# Patient Record
Sex: FEMALE | Race: WHITE | NOT HISPANIC OR LATINO | Employment: OTHER | URBAN - NONMETROPOLITAN AREA
[De-identification: names, ages, dates, MRNs, and addresses within clinical notes are randomized per-mention and may not be internally consistent; named-entity substitution may affect disease eponyms.]

---

## 2017-01-03 ENCOUNTER — HOSPITAL ENCOUNTER (OUTPATIENT)
Dept: INFUSION THERAPY | Facility: HOSPITAL | Age: 77
Discharge: HOME | End: 2017-01-20
Attending: INTERNAL MEDICINE

## 2017-01-03 LAB
ALBUMIN SERPL-MCNC: 3.5 G/DL (ref 3.5–5)
ANION GAP SERPL CALC-SCNC: 15 MMOL/L (ref 10–20)
BUN SERPL-MCNC: 37 MG/DL (ref 7–20)
CALCIUM SERPL-MCNC: 8.9 MG/DL (ref 8.4–10.2)
CHLORIDE SERPL-SCNC: 103 MMOL/L (ref 98–107)
CONV CO2: 28 MMOL/L (ref 26–30)
CREAT UR-MCNC: 2.7 MG/DL (ref 0.6–1.3)
ERYTHROCYTE [DISTWIDTH] IN BLOOD BY AUTOMATED COUNT: 13.9 % (ref 11.5–14.5)
GFR SERPL CREATININE-BSD FRML MDRD: 17 ML/MIN
GLUCOSE SERPL-MCNC: 103 MG/DL (ref 74–98)
HCT VFR BLD AUTO: 31 % (ref 37–47)
HGB BLD-MCNC: 10 G/DL (ref 12–16)
MCH RBC QN AUTO: 32.5 UUG (ref 27–31)
MCHC RBC AUTO-ENTMCNC: 32.1 G/DL (ref 30–37)
MCV RBC AUTO: 101.3 FL (ref 81–99)
PHOSPHATE SERPL-MCNC: 3.1 MG/DL (ref 2.5–4.5)
PLATELET # BLD AUTO: 220 THOUS (ref 130–400)
POTASSIUM SERPL-SCNC: 4.3 MMOL/L (ref 3.5–5.1)
RBC # BLD AUTO: 3.08 M/UL (ref 4.2–5.4)
SODIUM SERPL-SCNC: 141 MMOL/L (ref 137–145)
WBC # BLD AUTO: 4.1 THOUS (ref 4.8–10.8)

## 2017-01-06 VITALS
DIASTOLIC BLOOD PRESSURE: 70 MMHG | HEIGHT: 59 IN | BODY MASS INDEX: 24.8 KG/M2 | WEIGHT: 123 LBS | SYSTOLIC BLOOD PRESSURE: 140 MMHG

## 2017-01-07 ENCOUNTER — TRANSCRIBE ORDERS (OUTPATIENT)
Dept: MAMMOGRAPHY | Facility: HOSPITAL | Age: 77
End: 2017-01-07

## 2017-01-07 ENCOUNTER — TRANSCRIBE ORDERS (OUTPATIENT)
Dept: ADMINISTRATIVE | Facility: HOSPITAL | Age: 77
End: 2017-01-07

## 2017-01-07 DIAGNOSIS — Z13.9 SCREENING: Primary | ICD-10-CM

## 2017-01-11 ENCOUNTER — OFFICE VISIT (OUTPATIENT)
Dept: OBSTETRICS AND GYNECOLOGY | Facility: CLINIC | Age: 77
End: 2017-01-11

## 2017-01-11 VITALS — WEIGHT: 119.9 LBS | SYSTOLIC BLOOD PRESSURE: 148 MMHG | BODY MASS INDEX: 24.22 KG/M2 | DIASTOLIC BLOOD PRESSURE: 82 MMHG

## 2017-01-11 DIAGNOSIS — N81.11 MIDLINE CYSTOCELE: Primary | ICD-10-CM

## 2017-01-11 PROCEDURE — 99212 OFFICE O/P EST SF 10 MIN: CPT | Performed by: PHYSICIAN ASSISTANT

## 2017-01-11 NOTE — MR AVS SNAPSHOT
Carolyne Liang Eliezer   1/11/2017 1:30 PM   Office Visit    Dept Phone:  [phone]   Encounter #:  [id number]    Provider:  Yaz Woods PA-C   Department:  [facility] OBSTETRICS AND GYNECOLOGY                Your Full Care Plan              Today's Medication Changes          These changes are accurate as of: 1/11/17  2:15 PM.  If you have any questions, ask your nurse or doctor.               Stop taking medication(s)listed here:     COREG CR 40 MG 24 hr capsule   Generic drug:  carvedilol CR   Stopped by:  Yaz Woods PA-C                      Your Updated Medication List          This list is accurate as of: 1/11/17  2:15 PM.  Always use your most recent med list.                allopurinol 100 MG tablet   Commonly known as:  ZYLOPRIM       aspirin 81 MG tablet       calcitriol 0.25 MCG capsule   Commonly known as:  ROCALTROL       carvedilol 12.5 MG tablet   Commonly known as:  COREG       cholecalciferol 1000 UNITS tablet   Commonly known as:  VITAMIN D3       CLARITIN 10 MG capsule   Generic drug:  Loratadine       CRESTOR 10 MG tablet   Generic drug:  rosuvastatin       GENTEAL MILD 0.2 % solution   Generic drug:  Hypromellose       * irbesartan 300 MG tablet   Commonly known as:  AVAPRO       * irbesartan 150 MG tablet   Commonly known as:  AVAPRO       omeprazole 40 MG capsule   Commonly known as:  priLOSEC       PLAQUENIL 200 MG tablet   Generic drug:  hydroxychloroquine       predniSONE 2.5 MG tablet   Commonly known as:  DELTASONE       PROCRIT 09299 UNIT/ML injection   Generic drug:  epoetin ovidio       torsemide 20 MG tablet   Commonly known as:  DEMADEX       VITAMIN B-12 PO       * Notice:  This list has 2 medication(s) that are the same as other medications prescribed for you. Read the directions carefully, and ask your doctor or other care provider to review them with you.            Instructions     None    Patient Instructions History         Upcoming Appointments     Visit Type Date Time Department    GYN FOLLOW UP 2017  1:30 PM [facility] FRANCESCOYONIISAAC KATIE    MAMMO IDALMIS SCREEN GET BILATERAL 2017  1:00 PM  [facility] MAMMO    INFUSION 1 HR 2017 11:00 AM  [facility] OP INFU NON ONC    GYN FOLLOW UP 2017  2:00 PM KIRSTIN MILESISAAC KATIE Osbornt Signup     [facility] [employer] allows you to send messages to your doctor, view your test results, renew your prescriptions, schedule appointments, and more. To sign up, go to [employer] and click on the Sign Up Now link in the New User? box. Enter your [employer] Activation Code exactly as it appears below along with the last four digits of your Social Security Number and your Date of Birth () to complete the sign-up process. If you do not sign up before the expiration date, you must request a new code.    [employer] Activation Code: [email or web address]  Expires: 2017  2:15 PM    If you have questions, you can email [email or web address] or call [phone] to talk to our [employer] staff. Remember, [employer] is NOT to be used for urgent needs. For medical emergencies, dial 911.               Other Info from Your Visit           Your Appointments     2017  1:00 PM EST   Mammo idalmis screen get bilateral with IDALMIS MAMM 1   [facility] MAMMO ([place])    [address] [address] [phone]           Bring any outside films/reports to your appointment. Do not apply any deodorant or powder prior to your appointment on the day of your exam.            2017 11:00 AM EST   INFUSION with RM 4 - CHAIR 1  [facility] OP INF   [facility] OUTPATIENT INFUSION ([place])    [address] [address] [phone]            2017  2:00 PM EDT   GYN FOLLOW UP with Yaz Woods PA-C   [facility] OBSTETRICS AND GYNECOLOGY (--)    [address]  [facility] [address] [phone]               Allergies     Ferrlecit [Na Ferric Gluc Cplx In Sucrose]      Lisinopril      Ranitidine Hcl        Reason for Visit     Pessary Check           Vital Signs     Blood Pressure Weight Last Menstrual Period Body Mass Index Smoking Status       148/82 119 lb 14.4 oz (54.4 kg) (LMP Unknown) 24.22 kg/m2 Never Smoker

## 2017-01-11 NOTE — PROGRESS NOTES
Subjective   Chief Complaint   Patient presents with   • Pessary Check     Visit Vitals   • /82   • Wt 119 lb 14.4 oz (54.4 kg)   • LMP  (LMP Unknown)   • BMI 24.22 kg/m2      Carolyne Martins is a 76 y.o. year old  presenting to be seen for pessary maintenance.  Patient using ring with support pessary and reports doing very well with pessary. Voiding well  Having no spotting or bleeding  No dysuria  No pain or discomfort    Past Medical History   Diagnosis Date   • Anemia    • Arthritis    • Backache    • CAD (coronary artery disease)    • Fracture, radius 2016     right distal radius and ulna, healed.   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   • Subacromial bursitis      right    • Valvular heart disease    • Vitamin B12 deficiency         Current Outpatient Prescriptions:   •  allopurinol (ZYLOPRIM) 100 MG tablet, Take 1 tablet by mouth daily., Disp: , Rfl:   •  aspirin 81 MG tablet, Take 1 tablet by mouth daily., Disp: , Rfl:   •  calcitriol (ROCALTROL) 0.25 MCG capsule, Take  by mouth., Disp: , Rfl:   •  carvedilol (COREG) 12.5 MG tablet, 12.5 mg., Disp: , Rfl:   •  cholecalciferol (VITAMIN D3) 1000 UNITS tablet, Take 1 tablet by mouth daily., Disp: , Rfl:   •  Cyanocobalamin (VITAMIN B-12 PO), Take  by mouth., Disp: , Rfl:   •  epoetin ovidio (PROCRIT) 62518 UNIT/ML injection, Procrit SOLN; Patient Sig: Procrit SOLN ; 0; 2014; Active, Disp: , Rfl:   •  hydroxychloroquine (PLAQUENIL) 200 MG tablet, Take 1 tablet by mouth daily., Disp: ,  Rfl:   •  Hypromellose (GENTEAL MILD) 0.2 % solution, Apply  to eye., Disp: , Rfl:   •  irbesartan (AVAPRO) 150 MG tablet, , Disp: , Rfl:   •  irbesartan (AVAPRO) 300 MG tablet, Take 1 tablet by mouth daily., Disp: , Rfl:   •  Loratadine (CLARITIN) 10 MG capsule, Take 1 capsule by mouth as needed., Disp: , Rfl:   •  omeprazole (PriLOSEC) 40 MG capsule, Take 1 capsule by mouth daily., Disp: , Rfl:   •  predniSONE (DELTASONE) 2.5 MG tablet, Take 1 tablet by mouth daily., Disp: , Rfl:   •  rosuvastatin (CRESTOR) 10 MG tablet, Take 1 tablet by mouth daily., Disp: , Rfl:   •  torsemide (DEMADEX) 20 MG tablet, Take 1 tablet by mouth 2 (two) times a week., Disp: , Rfl:    Allergies   Allergen Reactions   • Ferrlecit [Na Ferric Gluc Cplx In Sucrose]    • Lisinopril    • Ranitidine Hcl       Past Surgical History   Procedure Laterality Date   • Cataract extraction     • Eye surgery        Social History     Social History   • Marital status:      Spouse name: N/A   • Number of children: N/A   • Years of education: N/A     Occupational History   • Not on file.     Social History Main Topics   • Smoking status: Never Smoker   • Smokeless tobacco: Never Used   • Alcohol use No   • Drug use: No   • Sexual activity: Defer     Other Topics Concern   • Not on file     Social History Narrative      Family History   Problem Relation Age of Onset   • Liver cancer Maternal Grandmother    • Gout Other    • Osteoporosis Other    • Stroke Other    • Ulcers Other    • Asthma Other    • Hypertension Other    • Heart disease Other    • Osteoarthritis Other        The following portions of the patient's history were reviewed and updated as appropriate:problem list, current medications, allergies, past family history, past medical history, past social history and past surgical history.         Objective     Physical Exam   Constitutional: She appears well-developed and well-nourished.   Abdominal: Soft. Normal appearance. She exhibits  no distension. There is no tenderness. There is no rigidity and no guarding.   Genitourinary: There is no rash, tenderness, lesion or injury on the right labia. There is no rash, tenderness, lesion or injury on the left labia. Cervix exhibits no discharge. Right adnexum displays no mass and no tenderness. Left adnexum displays no mass and no tenderness. No erythema, tenderness or bleeding in the vagina. No signs of injury around the vagina. No vaginal discharge found.   Genitourinary Comments: Pessary removed cleaned and reinserted  No erosion or lacerations   Skin: Skin is warm, dry and intact.   Psychiatric: She has a normal mood and affect. Her speech is normal and behavior is normal.     Carolyne was seen today for pessary check.    Diagnoses and all orders for this visit:    Midline cystocele           This note was electronically signed.    Yaz Woods PA-C   January 11, 2017

## 2017-01-17 PROBLEM — N18.4 ANEMIA DUE TO STAGE 4 CHRONIC KIDNEY DISEASE TREATED WITH ERYTHROPOIETIN: Status: ACTIVE | Noted: 2017-01-17

## 2017-01-17 PROBLEM — D63.1 ANEMIA DUE TO STAGE 4 CHRONIC KIDNEY DISEASE TREATED WITH ERYTHROPOIETIN: Status: ACTIVE | Noted: 2017-01-17

## 2017-01-25 ENCOUNTER — HOSPITAL ENCOUNTER (OUTPATIENT)
Dept: MAMMOGRAPHY | Facility: HOSPITAL | Age: 77
Discharge: HOME | End: 2017-01-25
Attending: INTERNAL MEDICINE | Admitting: INTERNAL MEDICINE

## 2017-01-25 DIAGNOSIS — Z13.9 SCREENING: ICD-10-CM

## 2017-01-25 PROCEDURE — 77063 BREAST TOMOSYNTHESIS BI: CPT

## 2017-01-25 PROCEDURE — G0202 SCR MAMMO BI INCL CAD: HCPCS

## 2017-02-06 ENCOUNTER — APPOINTMENT (OUTPATIENT)
Dept: INFUSION THERAPY | Facility: HOSPITAL | Age: 77
End: 2017-02-06

## 2017-02-06 ENCOUNTER — HOSPITAL ENCOUNTER (OUTPATIENT)
Dept: INFUSION THERAPY | Facility: HOSPITAL | Age: 77
Setting detail: INFUSION SERIES
Discharge: HOME | End: 2017-02-06

## 2017-02-06 VITALS
DIASTOLIC BLOOD PRESSURE: 75 MMHG | HEIGHT: 59 IN | HEART RATE: 64 BPM | TEMPERATURE: 96.1 F | RESPIRATION RATE: 20 BRPM | BODY MASS INDEX: 24.19 KG/M2 | SYSTOLIC BLOOD PRESSURE: 145 MMHG | OXYGEN SATURATION: 93 % | WEIGHT: 120 LBS

## 2017-02-06 DIAGNOSIS — D63.1 ANEMIA DUE TO STAGE 4 CHRONIC KIDNEY DISEASE TREATED WITH ERYTHROPOIETIN (HCC): ICD-10-CM

## 2017-02-06 DIAGNOSIS — N18.4 ANEMIA DUE TO STAGE 4 CHRONIC KIDNEY DISEASE TREATED WITH ERYTHROPOIETIN (HCC): ICD-10-CM

## 2017-02-06 LAB
ALBUMIN SERPL-MCNC: 3.7 G/DL (ref 3.5–5)
ANION GAP SERPL CALCULATED.3IONS-SCNC: 12.7 MMOL/L
BUN BLD-MCNC: 40 MG/DL (ref 7–20)
BUN/CREAT SERPL: 16 (ref 7.1–23.5)
CALCIUM SPEC-SCNC: 9.8 MG/DL (ref 8.4–10.2)
CHLORIDE SERPL-SCNC: 103 MMOL/L (ref 98–107)
CHOLEST SERPL-MCNC: 138 MG/DL (ref 0–199)
CO2 SERPL-SCNC: 28 MMOL/L (ref 26–30)
CREAT BLD-MCNC: 2.5 MG/DL (ref 0.6–1.3)
DEPRECATED RDW RBC AUTO: 50.3 FL (ref 37–54)
ERYTHROCYTE [DISTWIDTH] IN BLOOD BY AUTOMATED COUNT: 13.9 % (ref 11.5–14.5)
GFR SERPL CREATININE-BSD FRML MDRD: 19 ML/MIN/1.73
GLUCOSE BLD-MCNC: 91 MG/DL (ref 74–98)
HCT VFR BLD AUTO: 31.1 % (ref 37–47)
HDLC SERPL-MCNC: 54 MG/DL (ref 40–60)
HGB BLD-MCNC: 10.2 G/DL (ref 12–16)
LDLC SERPL CALC-MCNC: 64 MG/DL (ref 0–99)
LDLC/HDLC SERPL: 1.19 {RATIO}
MCH RBC QN AUTO: 32.7 PG (ref 27–31)
MCHC RBC AUTO-ENTMCNC: 32.8 G/DL (ref 30–37)
MCV RBC AUTO: 99.7 FL (ref 81–99)
PHOSPHATE SERPL-MCNC: 3.5 MG/DL (ref 2.5–4.5)
PLATELET # BLD AUTO: 195 10*3/MM3 (ref 130–400)
PMV BLD AUTO: 10 FL (ref 6–12)
POTASSIUM BLD-SCNC: 4.7 MMOL/L (ref 3.5–5.1)
RBC # BLD AUTO: 3.12 10*6/MM3 (ref 4.2–5.4)
SODIUM BLD-SCNC: 139 MMOL/L (ref 137–145)
TRIGL SERPL-MCNC: 98 MG/DL
VLDLC SERPL-MCNC: 19.6 MG/DL
WBC NRBC COR # BLD: 5.66 10*3/MM3 (ref 4.8–10.8)

## 2017-02-06 PROCEDURE — 80061 LIPID PANEL: CPT | Performed by: INTERNAL MEDICINE

## 2017-02-06 PROCEDURE — 80069 RENAL FUNCTION PANEL: CPT | Performed by: INTERNAL MEDICINE

## 2017-02-06 PROCEDURE — 36415 COLL VENOUS BLD VENIPUNCTURE: CPT

## 2017-02-06 PROCEDURE — 85027 COMPLETE CBC AUTOMATED: CPT | Performed by: INTERNAL MEDICINE

## 2017-03-06 ENCOUNTER — HOSPITAL ENCOUNTER (OUTPATIENT)
Dept: INFUSION THERAPY | Facility: HOSPITAL | Age: 77
Setting detail: INFUSION SERIES
Discharge: HOME | End: 2017-03-06

## 2017-03-06 ENCOUNTER — APPOINTMENT (OUTPATIENT)
Dept: ONCOLOGY | Facility: HOSPITAL | Age: 77
End: 2017-03-06

## 2017-03-06 VITALS
DIASTOLIC BLOOD PRESSURE: 78 MMHG | HEART RATE: 77 BPM | SYSTOLIC BLOOD PRESSURE: 149 MMHG | RESPIRATION RATE: 18 BRPM | TEMPERATURE: 96.3 F | OXYGEN SATURATION: 99 %

## 2017-03-06 DIAGNOSIS — N18.4 ANEMIA DUE TO STAGE 4 CHRONIC KIDNEY DISEASE TREATED WITH ERYTHROPOIETIN (HCC): ICD-10-CM

## 2017-03-06 DIAGNOSIS — D63.1 ANEMIA DUE TO STAGE 4 CHRONIC KIDNEY DISEASE TREATED WITH ERYTHROPOIETIN (HCC): ICD-10-CM

## 2017-03-06 LAB
DEPRECATED RDW RBC AUTO: 52.4 FL (ref 37–54)
ERYTHROCYTE [DISTWIDTH] IN BLOOD BY AUTOMATED COUNT: 14 % (ref 11.5–14.5)
HCT VFR BLD AUTO: 28.9 % (ref 37–47)
HGB BLD-MCNC: 9.2 G/DL (ref 12–16)
MCH RBC QN AUTO: 32.6 PG (ref 27–31)
MCHC RBC AUTO-ENTMCNC: 31.8 G/DL (ref 30–37)
MCV RBC AUTO: 102.5 FL (ref 81–99)
PLATELET # BLD AUTO: 173 10*3/MM3 (ref 130–400)
PMV BLD AUTO: 9.9 FL (ref 6–12)
RBC # BLD AUTO: 2.82 10*6/MM3 (ref 4.2–5.4)
WBC NRBC COR # BLD: 4.07 10*3/MM3 (ref 4.8–10.8)

## 2017-03-06 RX ADMIN — ERYTHROPOIETIN 40000 UNITS: 40000 INJECTION, SOLUTION INTRAVENOUS; SUBCUTANEOUS at 12:09

## 2017-03-06 NOTE — PATIENT INSTRUCTIONS
.Post Procrit Injection:      Common side effects can include rash, soreness of mouth, nausea, vomiting, headache, joint pain, muscle pain, bone pain, redness at the injection site.    You will need to call your doctor before your next injection.    Severe reactions:  Call 911 or come to the Emergency Room.    Severe reactions can include:   1.  Shortness of breath   2.  Wheezing   3.  Swelling of face or hands   4.  Rash all over body   5.  Changes in blood pressure resulting in dizziness or fainting

## 2017-04-11 ENCOUNTER — OFFICE VISIT (OUTPATIENT)
Dept: OBSTETRICS AND GYNECOLOGY | Facility: CLINIC | Age: 77
End: 2017-04-11

## 2017-04-11 VITALS
BODY MASS INDEX: 23.99 KG/M2 | HEIGHT: 59 IN | SYSTOLIC BLOOD PRESSURE: 130 MMHG | WEIGHT: 119 LBS | DIASTOLIC BLOOD PRESSURE: 76 MMHG

## 2017-04-11 DIAGNOSIS — Z46.89 PESSARY MAINTENANCE: Primary | ICD-10-CM

## 2017-04-11 PROCEDURE — 99212 OFFICE O/P EST SF 10 MIN: CPT | Performed by: PHYSICIAN ASSISTANT

## 2017-04-11 RX ORDER — FLUTICASONE PROPIONATE 50 MCG
2 SPRAY, SUSPENSION (ML) NASAL DAILY PRN
COMMUNITY
Start: 2017-02-20

## 2017-04-11 RX ORDER — ALBUTEROL SULFATE 90 UG/1
2 AEROSOL, METERED RESPIRATORY (INHALATION) EVERY 4 HOURS PRN
COMMUNITY
Start: 2017-04-03 | End: 2018-01-05

## 2017-04-11 NOTE — PROGRESS NOTES
"Subjective   Chief Complaint   Patient presents with   • Pessary Check     /76  Ht 59\" (149.9 cm)  Wt 119 lb (54 kg)  LMP  (LMP Unknown)  BMI 24.04 kg/m2   Carolyne Martins is a 76 y.o. year old  presenting to be seen for pessary maintenance  She has no complaints or concerns today  She is voiding well  Having no vaginal spotting or bleeding    Past Medical History:   Diagnosis Date   • Anemia    • Arthritis    • Backache    • CAD (coronary artery disease)    • Fracture, radius 2016    right distal radius and ulna, healed.   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   • Subacromial bursitis     right    • Valvular heart disease    • Vitamin B12 deficiency         Current Outpatient Prescriptions:   •  allopurinol (ZYLOPRIM) 100 MG tablet, Take 1 tablet by mouth daily., Disp: , Rfl:   •  aspirin 81 MG tablet, Take 1 tablet by mouth daily., Disp: , Rfl:   •  calcitriol (ROCALTROL) 0.25 MCG capsule, Take  by mouth., Disp: , Rfl:   •  carvedilol (COREG) 12.5 MG tablet, 12.5 mg., Disp: , Rfl:   •  cholecalciferol (VITAMIN D3) 1000 UNITS tablet, Take 1 tablet by mouth daily., Disp: , Rfl:   •  Cyanocobalamin (VITAMIN B-12 PO), Take  by mouth., Disp: , Rfl:   •  epoetin ovidio (PROCRIT) 73749 UNIT/ML injection, Procrit SOLN; Patient Sig: Procrit SOLN ; 0; -2014; Active, Disp: , Rfl:   •  fluticasone (FLONASE) 50 MCG/ACT nasal spray, , Disp: , Rfl:   •  hydroxychloroquine (PLAQUENIL) 200 MG tablet, Take 1 tablet by mouth daily., Disp: , Rfl:   •  " Hypromellose (GENTEAL MILD) 0.2 % solution, Apply  to eye., Disp: , Rfl:   •  irbesartan (AVAPRO) 150 MG tablet, , Disp: , Rfl:   •  Loratadine (CLARITIN) 10 MG capsule, Take 1 capsule by mouth as needed., Disp: , Rfl:   •  omeprazole (PriLOSEC) 40 MG capsule, Take 1 capsule by mouth daily., Disp: , Rfl:   •  predniSONE (DELTASONE) 2.5 MG tablet, Take 1 tablet by mouth daily., Disp: , Rfl:   •  rosuvastatin (CRESTOR) 10 MG tablet, Take 1 tablet by mouth daily., Disp: , Rfl:   •  torsemide (DEMADEX) 20 MG tablet, Take 1 tablet by mouth 2 (two) times a week., Disp: , Rfl:   •  VENTOLIN  (90 BASE) MCG/ACT inhaler, , Disp: , Rfl:   •  irbesartan (AVAPRO) 300 MG tablet, Take 1 tablet by mouth daily., Disp: , Rfl:    Allergies   Allergen Reactions   • Ferrlecit [Na Ferric Gluc Cplx In Sucrose]    • Lisinopril    • Ranitidine Hcl       Past Surgical History:   Procedure Laterality Date   • CATARACT EXTRACTION     • EYE SURGERY        Social History     Social History   • Marital status:      Spouse name: N/A   • Number of children: N/A   • Years of education: N/A     Occupational History   • Not on file.     Social History Main Topics   • Smoking status: Never Smoker   • Smokeless tobacco: Never Used   • Alcohol use No   • Drug use: No   • Sexual activity: Defer     Other Topics Concern   • Not on file     Social History Narrative      Family History   Problem Relation Age of Onset   • Liver cancer Maternal Grandmother    • Gout Other    • Osteoporosis Other    • Stroke Other    • Ulcers Other    • Asthma Other    • Hypertension Other    • Heart disease Other    • Osteoarthritis Other        The following portions of the patient's history were reviewed and updated as appropriate:problem list, current medications, allergies, past family history, past medical history, past social history and past surgical history.         Objective     Physical Exam   Constitutional: She appears well-developed and  well-nourished. She is cooperative.   Genitourinary: There is no tenderness or lesion on the right labia. There is no tenderness or lesion on the left labia. Cervix exhibits no discharge. Right adnexum displays no mass and no tenderness. Left adnexum displays no mass and no tenderness. No tenderness or bleeding in the vagina. No signs of injury around the vagina. No vaginal discharge found.   Genitourinary Comments: Pessary removed cleaned and reinserted  No abrasions or erosion--small amt granulation tissue right  fornix   Neurological: She is alert.   Skin: Skin is warm and dry.   Psychiatric: She has a normal mood and affect. Her behavior is normal.     Carolyne was seen today for pessary check.    Diagnoses and all orders for this visit:    Pessary maintenance             This note was electronically signed.    Yaz Woods PA-C   April 11, 2017

## 2017-05-01 ENCOUNTER — HOSPITAL ENCOUNTER (OUTPATIENT)
Dept: INFUSION THERAPY | Facility: HOSPITAL | Age: 77
Discharge: HOME | End: 2017-05-01
Admitting: INTERNAL MEDICINE

## 2017-05-01 VITALS
OXYGEN SATURATION: 100 % | SYSTOLIC BLOOD PRESSURE: 136 MMHG | RESPIRATION RATE: 20 BRPM | HEART RATE: 72 BPM | DIASTOLIC BLOOD PRESSURE: 76 MMHG | TEMPERATURE: 96 F

## 2017-05-01 DIAGNOSIS — D63.1 ANEMIA DUE TO STAGE 4 CHRONIC KIDNEY DISEASE TREATED WITH ERYTHROPOIETIN (HCC): ICD-10-CM

## 2017-05-01 DIAGNOSIS — N18.4 ANEMIA DUE TO STAGE 4 CHRONIC KIDNEY DISEASE TREATED WITH ERYTHROPOIETIN (HCC): ICD-10-CM

## 2017-05-01 LAB
ALBUMIN SERPL-MCNC: 3.8 G/DL (ref 3.5–5)
ANION GAP SERPL CALCULATED.3IONS-SCNC: 15.1 MMOL/L
BUN BLD-MCNC: 48 MG/DL (ref 7–20)
BUN/CREAT SERPL: 18.5 (ref 7.1–23.5)
CALCIUM SPEC-SCNC: 9.6 MG/DL (ref 8.4–10.2)
CHLORIDE SERPL-SCNC: 107 MMOL/L (ref 98–107)
CO2 SERPL-SCNC: 24 MMOL/L (ref 26–30)
CREAT BLD-MCNC: 2.6 MG/DL (ref 0.6–1.3)
DEPRECATED RDW RBC AUTO: 51.8 FL (ref 37–54)
ERYTHROCYTE [DISTWIDTH] IN BLOOD BY AUTOMATED COUNT: 13.8 % (ref 11.5–14.5)
GFR SERPL CREATININE-BSD FRML MDRD: 18 ML/MIN/1.73
GLUCOSE BLD-MCNC: 92 MG/DL (ref 74–98)
HCT VFR BLD AUTO: 31.6 % (ref 37–47)
HGB BLD-MCNC: 10 G/DL (ref 12–16)
MCH RBC QN AUTO: 32.3 PG (ref 27–31)
MCHC RBC AUTO-ENTMCNC: 31.6 G/DL (ref 30–37)
MCV RBC AUTO: 101.9 FL (ref 81–99)
PHOSPHATE SERPL-MCNC: 3.6 MG/DL (ref 2.5–4.5)
PLATELET # BLD AUTO: 209 10*3/MM3 (ref 130–400)
PMV BLD AUTO: 9.8 FL (ref 6–12)
POTASSIUM BLD-SCNC: 5.1 MMOL/L (ref 3.5–5.1)
RBC # BLD AUTO: 3.1 10*6/MM3 (ref 4.2–5.4)
SODIUM BLD-SCNC: 141 MMOL/L (ref 137–145)
WBC NRBC COR # BLD: 5.14 10*3/MM3 (ref 4.8–10.8)

## 2017-05-01 PROCEDURE — 36415 COLL VENOUS BLD VENIPUNCTURE: CPT

## 2017-05-01 PROCEDURE — 85027 COMPLETE CBC AUTOMATED: CPT | Performed by: INTERNAL MEDICINE

## 2017-05-01 PROCEDURE — 80069 RENAL FUNCTION PANEL: CPT | Performed by: INTERNAL MEDICINE

## 2017-06-05 ENCOUNTER — HOSPITAL ENCOUNTER (OUTPATIENT)
Dept: INFUSION THERAPY | Facility: HOSPITAL | Age: 77
Discharge: HOME | End: 2017-06-05
Admitting: INTERNAL MEDICINE

## 2017-06-05 VITALS — SYSTOLIC BLOOD PRESSURE: 178 MMHG | DIASTOLIC BLOOD PRESSURE: 87 MMHG

## 2017-06-05 DIAGNOSIS — N18.4 ANEMIA DUE TO STAGE 4 CHRONIC KIDNEY DISEASE TREATED WITH ERYTHROPOIETIN (HCC): ICD-10-CM

## 2017-06-05 DIAGNOSIS — D63.1 ANEMIA DUE TO STAGE 4 CHRONIC KIDNEY DISEASE TREATED WITH ERYTHROPOIETIN (HCC): ICD-10-CM

## 2017-06-05 LAB
ALBUMIN SERPL-MCNC: 4.1 G/DL (ref 3.5–5)
ANION GAP SERPL CALCULATED.3IONS-SCNC: 13.7 MMOL/L
BASOPHILS # BLD AUTO: 0.01 10*3/MM3 (ref 0–0.2)
BASOPHILS NFR BLD AUTO: 0.2 % (ref 0–2.5)
BUN BLD-MCNC: 36 MG/DL (ref 7–20)
BUN/CREAT SERPL: 15 (ref 7.1–23.5)
CALCIUM SPEC-SCNC: 9.9 MG/DL (ref 8.4–10.2)
CHLORIDE SERPL-SCNC: 104 MMOL/L (ref 98–107)
CO2 SERPL-SCNC: 27 MMOL/L (ref 26–30)
CREAT BLD-MCNC: 2.4 MG/DL (ref 0.6–1.3)
DEPRECATED RDW RBC AUTO: 50.8 FL (ref 37–54)
EOSINOPHIL # BLD AUTO: 0.22 10*3/MM3 (ref 0–0.7)
EOSINOPHIL NFR BLD AUTO: 4.8 % (ref 0–7)
ERYTHROCYTE [DISTWIDTH] IN BLOOD BY AUTOMATED COUNT: 14 % (ref 11.5–14.5)
FERRITIN SERPL-MCNC: 36.8 NG/ML (ref 11.1–264)
GFR SERPL CREATININE-BSD FRML MDRD: 20 ML/MIN/1.73
GLUCOSE BLD-MCNC: 91 MG/DL (ref 74–98)
HCT VFR BLD AUTO: 31.9 % (ref 37–47)
HGB BLD-MCNC: 10.4 G/DL (ref 12–16)
IMM GRANULOCYTES # BLD: 0.03 10*3/MM3 (ref 0–0.06)
IMM GRANULOCYTES NFR BLD: 0.7 % (ref 0–0.6)
IRON 24H UR-MRATE: 103 MCG/DL (ref 37–181)
IRON SATN MFR SERPL: 32 % (ref 11–46)
LYMPHOCYTES # BLD AUTO: 1.05 10*3/MM3 (ref 0.6–3.4)
LYMPHOCYTES NFR BLD AUTO: 23 % (ref 10–50)
MCH RBC QN AUTO: 32.6 PG (ref 27–31)
MCHC RBC AUTO-ENTMCNC: 32.6 G/DL (ref 30–37)
MCV RBC AUTO: 100 FL (ref 81–99)
MONOCYTES # BLD AUTO: 0.55 10*3/MM3 (ref 0–0.9)
MONOCYTES NFR BLD AUTO: 12 % (ref 0–12)
NEUTROPHILS # BLD AUTO: 2.71 10*3/MM3 (ref 2–6.9)
NEUTROPHILS NFR BLD AUTO: 59.3 % (ref 37–80)
NRBC BLD MANUAL-RTO: 0 /100 WBC (ref 0–0)
PHOSPHATE SERPL-MCNC: 3.8 MG/DL (ref 2.5–4.5)
PLATELET # BLD AUTO: 204 10*3/MM3 (ref 130–400)
PMV BLD AUTO: 10.2 FL (ref 6–12)
POTASSIUM BLD-SCNC: 4.7 MMOL/L (ref 3.5–5.1)
RBC # BLD AUTO: 3.19 10*6/MM3 (ref 4.2–5.4)
SODIUM BLD-SCNC: 140 MMOL/L (ref 137–145)
TIBC SERPL-MCNC: 320 MCG/DL (ref 261–497)
URATE SERPL-MCNC: 6.3 MG/DL (ref 2.5–8.5)
WBC NRBC COR # BLD: 4.57 10*3/MM3 (ref 4.8–10.8)

## 2017-06-05 PROCEDURE — 36415 COLL VENOUS BLD VENIPUNCTURE: CPT

## 2017-06-05 PROCEDURE — 85025 COMPLETE CBC W/AUTO DIFF WBC: CPT | Performed by: INTERNAL MEDICINE

## 2017-06-05 PROCEDURE — 83540 ASSAY OF IRON: CPT | Performed by: INTERNAL MEDICINE

## 2017-06-05 PROCEDURE — 82728 ASSAY OF FERRITIN: CPT | Performed by: INTERNAL MEDICINE

## 2017-06-05 PROCEDURE — 80069 RENAL FUNCTION PANEL: CPT | Performed by: INTERNAL MEDICINE

## 2017-06-05 PROCEDURE — 83550 IRON BINDING TEST: CPT | Performed by: INTERNAL MEDICINE

## 2017-06-05 PROCEDURE — 84550 ASSAY OF BLOOD/URIC ACID: CPT | Performed by: INTERNAL MEDICINE

## 2017-06-05 PROCEDURE — 83970 ASSAY OF PARATHORMONE: CPT | Performed by: INTERNAL MEDICINE

## 2017-06-06 PROCEDURE — 83970 ASSAY OF PARATHORMONE: CPT | Performed by: INTERNAL MEDICINE

## 2017-06-06 NOTE — ADDENDUM NOTE
Encounter addended by: Salina Montez RN on: 6/6/2017 12:04 PM<BR>     Actions taken:  activity accessed

## 2017-06-07 LAB — PTH-INTACT SERPL-MCNC: 78 PG/ML (ref 15–65)

## 2017-07-03 ENCOUNTER — APPOINTMENT (OUTPATIENT)
Dept: INFUSION THERAPY | Facility: HOSPITAL | Age: 77
End: 2017-07-03

## 2017-07-24 ENCOUNTER — OFFICE VISIT (OUTPATIENT)
Dept: OBSTETRICS AND GYNECOLOGY | Facility: CLINIC | Age: 77
End: 2017-07-24

## 2017-07-24 VITALS
DIASTOLIC BLOOD PRESSURE: 76 MMHG | WEIGHT: 118 LBS | BODY MASS INDEX: 23.79 KG/M2 | HEIGHT: 59 IN | SYSTOLIC BLOOD PRESSURE: 130 MMHG

## 2017-07-24 DIAGNOSIS — Z46.89 PESSARY MAINTENANCE: ICD-10-CM

## 2017-07-24 DIAGNOSIS — N81.11 MIDLINE CYSTOCELE: Primary | ICD-10-CM

## 2017-07-24 PROCEDURE — 99212 OFFICE O/P EST SF 10 MIN: CPT | Performed by: PHYSICIAN ASSISTANT

## 2017-07-24 NOTE — PROGRESS NOTES
"Subjective   Chief Complaint   Patient presents with   • Pessary Check     /76  Ht 59\" (149.9 cm)  Wt 118 lb (53.5 kg)  LMP  (LMP Unknown)  BMI 23.83 kg/m2   Carolyne Martins is a 77 y.o. year old  presenting to be seen for pessary maintenance  She has no complaints today  No problems with pessary  Voiding well  No spotting or bleeding    Past Medical History:   Diagnosis Date   • Anemia    • Arthritis    • Backache    • CAD (coronary artery disease)    • Fracture, radius 2016    right distal radius and ulna, healed.   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   • Subacromial bursitis     right    • Valvular heart disease    • Vitamin B12 deficiency         Current Outpatient Prescriptions:   •  allopurinol (ZYLOPRIM) 100 MG tablet, Take 1 tablet by mouth daily., Disp: , Rfl:   •  aspirin 81 MG tablet, Take 1 tablet by mouth daily., Disp: , Rfl:   •  calcitriol (ROCALTROL) 0.25 MCG capsule, Take  by mouth., Disp: , Rfl:   •  carvedilol (COREG) 12.5 MG tablet, 12.5 mg., Disp: , Rfl:   •  cholecalciferol (VITAMIN D3) 1000 UNITS tablet, Take 1 tablet by mouth daily., Disp: , Rfl:   •  Cyanocobalamin (VITAMIN B-12 PO), Take  by mouth., Disp: , Rfl:   •  epoetin ovidio (PROCRIT) 72001 UNIT/ML injection, Procrit SOLN; Patient Sig: Procrit SOLN ; 0; -2014; Active, Disp: , Rfl:   •  fluticasone (FLONASE) 50 MCG/ACT nasal spray, , Disp: , Rfl:   •  hydroxychloroquine (PLAQUENIL) 200 MG tablet, Take 1 tablet by mouth daily., Disp: , Rfl:   •  Hypromellose " (GENTEAL MILD) 0.2 % solution, Apply  to eye., Disp: , Rfl:   •  irbesartan (AVAPRO) 150 MG tablet, , Disp: , Rfl:   •  irbesartan (AVAPRO) 300 MG tablet, Take 1 tablet by mouth daily., Disp: , Rfl:   •  Loratadine (CLARITIN) 10 MG capsule, Take 1 capsule by mouth as needed., Disp: , Rfl:   •  omeprazole (PriLOSEC) 40 MG capsule, Take 1 capsule by mouth daily., Disp: , Rfl:   •  predniSONE (DELTASONE) 2.5 MG tablet, Take 1 tablet by mouth daily., Disp: , Rfl:   •  rosuvastatin (CRESTOR) 10 MG tablet, Take 1 tablet by mouth daily., Disp: , Rfl:   •  torsemide (DEMADEX) 20 MG tablet, Take 1 tablet by mouth 2 (two) times a week., Disp: , Rfl:   •  VENTOLIN  (90 BASE) MCG/ACT inhaler, , Disp: , Rfl:    Allergies   Allergen Reactions   • Ferrlecit [Na Ferric Gluc Cplx In Sucrose]    • Lisinopril    • Ranitidine Hcl       Past Surgical History:   Procedure Laterality Date   • CATARACT EXTRACTION     • EYE SURGERY        Social History     Social History   • Marital status:      Spouse name: N/A   • Number of children: N/A   • Years of education: N/A     Occupational History   • Not on file.     Social History Main Topics   • Smoking status: Never Smoker   • Smokeless tobacco: Never Used   • Alcohol use No   • Drug use: No   • Sexual activity: Defer     Other Topics Concern   • Not on file     Social History Narrative      Family History   Problem Relation Age of Onset   • Liver cancer Maternal Grandmother    • Gout Other    • Osteoporosis Other    • Stroke Other    • Ulcers Other    • Asthma Other    • Hypertension Other    • Heart disease Other    • Osteoarthritis Other        The following portions of the patient's history were reviewed and updated as appropriate:problem list, current medications, allergies, past family history, past medical history, past social history and past surgical history.         Objective     Physical Exam   Constitutional: She appears well-developed and well-nourished.    Abdominal: Soft. Normal appearance. She exhibits no distension and no mass. There is no tenderness. There is no rigidity and no guarding.   Genitourinary: Vagina normal. There is no tenderness or lesion on the right labia. There is no tenderness on the left labia. No bleeding in the vagina. No signs of injury around the vagina. No vaginal discharge found.   Genitourinary Comments: 1-2 plus cystocele  Pessary removed cleaned and reinserted-no erosion or abrasions   Neurological: She is alert.   Skin: Skin is warm, dry and intact.   Psychiatric: She has a normal mood and affect. Her behavior is normal.     Carolyne was seen today for pessary check.    Diagnoses and all orders for this visit:    Midline cystocele    Pessary maintenance           This note was electronically signed.    Yaz Woods PA-C   July 24, 2017

## 2017-07-27 DIAGNOSIS — M25.562 PAIN IN BOTH KNEES, UNSPECIFIED CHRONICITY: Primary | ICD-10-CM

## 2017-07-27 DIAGNOSIS — M25.561 PAIN IN BOTH KNEES, UNSPECIFIED CHRONICITY: Primary | ICD-10-CM

## 2017-08-01 ENCOUNTER — OFFICE VISIT (OUTPATIENT)
Dept: ORTHOPEDIC SURGERY | Facility: CLINIC | Age: 77
End: 2017-08-01

## 2017-08-01 ENCOUNTER — HOSPITAL ENCOUNTER (OUTPATIENT)
Dept: INFUSION THERAPY | Facility: HOSPITAL | Age: 77
Discharge: HOME | End: 2017-08-01
Admitting: INTERNAL MEDICINE

## 2017-08-01 VITALS
HEART RATE: 69 BPM | OXYGEN SATURATION: 100 % | SYSTOLIC BLOOD PRESSURE: 129 MMHG | TEMPERATURE: 96.7 F | RESPIRATION RATE: 18 BRPM | DIASTOLIC BLOOD PRESSURE: 61 MMHG

## 2017-08-01 DIAGNOSIS — N18.4 ANEMIA DUE TO STAGE 4 CHRONIC KIDNEY DISEASE TREATED WITH ERYTHROPOIETIN (HCC): ICD-10-CM

## 2017-08-01 DIAGNOSIS — D63.1 ANEMIA DUE TO STAGE 4 CHRONIC KIDNEY DISEASE TREATED WITH ERYTHROPOIETIN (HCC): ICD-10-CM

## 2017-08-01 DIAGNOSIS — M17.0 BILATERAL PRIMARY OSTEOARTHRITIS OF KNEE: ICD-10-CM

## 2017-08-01 LAB
DEPRECATED RDW RBC AUTO: 53.7 FL (ref 37–54)
ERYTHROCYTE [DISTWIDTH] IN BLOOD BY AUTOMATED COUNT: 14.3 % (ref 11.5–14.5)
HCT VFR BLD AUTO: 28.7 % (ref 37–47)
HGB BLD-MCNC: 9.1 G/DL (ref 12–16)
MCH RBC QN AUTO: 32.3 PG (ref 27–31)
MCHC RBC AUTO-ENTMCNC: 31.7 G/DL (ref 30–37)
MCV RBC AUTO: 101.8 FL (ref 81–99)
PLATELET # BLD AUTO: 171 10*3/MM3 (ref 130–400)
PMV BLD AUTO: 9.6 FL (ref 6–12)
RBC # BLD AUTO: 2.82 10*6/MM3 (ref 4.2–5.4)
WBC NRBC COR # BLD: 4.9 10*3/MM3 (ref 4.8–10.8)

## 2017-08-01 PROCEDURE — 20610 DRAIN/INJ JOINT/BURSA W/O US: CPT | Performed by: PHYSICIAN ASSISTANT

## 2017-08-01 PROCEDURE — 85027 COMPLETE CBC AUTOMATED: CPT | Performed by: INTERNAL MEDICINE

## 2017-08-01 PROCEDURE — 36415 COLL VENOUS BLD VENIPUNCTURE: CPT

## 2017-08-01 PROCEDURE — 96372 THER/PROPH/DIAG INJ SC/IM: CPT

## 2017-08-01 PROCEDURE — 63510000001 EPOETIN ALFA PER 1000 UNITS: Performed by: INTERNAL MEDICINE

## 2017-08-01 RX ADMIN — ERYTHROPOIETIN 40000 UNITS: 40000 INJECTION, SOLUTION INTRAVENOUS; SUBCUTANEOUS at 12:10

## 2017-08-01 NOTE — PROGRESS NOTES
Subjective   Carolyne Martins is a 77 y.o.  female is here today for injection therapy.  Pain and Injections of the Left Knee and Pain and Injections of the Right Knee      History of Present Illness     Patient presents with chronic bilateral knee pain.  She states she's had the same type of knee pain for many years.  She would like to receive bilateral Visco supplementation injections as she states she obtained great relief from these in the past.  She denies injury or trauma.  Denies redness, warmth, numbness or tingling to the lower legs.      Past Medical History:   Diagnosis Date   • Anemia    • Arthritis    • Backache    • CAD (coronary artery disease)    • Fracture, radius 2016    right distal radius and ulna, healed.   • GERD (gastroesophageal reflux disease)    • Gout    • High cholesterol    • Hyperparathyroidism    • Hypertension    • Iron deficiency    • Osteoarthritis    • Osteoarthritis    • Osteoarthritis of knees, bilateral     Both knees Supartz injection series August, 2015   • Osteoporosis    • Personal history of cardiac murmur    • Renal disease    • Rheumatic heart disease     Personal history   • Rotator cuff tendonitis     right    • Rupture of right proximal biceps tendon     chronic   • Subacromial bursitis     right    • Valvular heart disease    • Vitamin B12 deficiency         Past Surgical History:   Procedure Laterality Date   • CATARACT EXTRACTION     • EYE SURGERY         Allergies   Allergen Reactions   • Ferrlecit [Na Ferric Gluc Cplx In Sucrose]    • Lisinopril    • Ranitidine Hcl        Review of Systems   Musculoskeletal: Positive for arthralgias (bilateral knee pain).   All other systems reviewed and are negative.      Objective   LMP  (LMP Unknown)   Physical Exam   Constitutional: She is oriented to person, place, and time. She appears well-nourished.   Neck: No tracheal deviation present.   Musculoskeletal:        Right knee: She exhibits no swelling, no effusion, no  ecchymosis and no erythema. Tenderness found. Medial joint line and lateral joint line tenderness noted.        Left knee: She exhibits no swelling, no deformity and no erythema. Tenderness found. Medial joint line and lateral joint line tenderness noted.   Neurological: She is alert and oriented to person, place, and time.   Skin: No rash noted.   Psychiatric: She has a normal mood and affect. Her behavior is normal.     Skin exam stable with no erythema, ecchymosis or rash.  No new swelling.  No motor or sensory changes.  Distal pulse intact.    Large Joint Arthrocentesis  Date/Time: 8/1/2017 4:09 PM  Consent given by: patient  Site marked: site marked  Timeout: Immediately prior to procedure a time out was called to verify the correct patient, procedure, equipment, support staff and site/side marked as required   Supporting Documentation  Indications: pain   Procedure Details  Location: knee - R knee  Preparation: Patient was prepped and draped in the usual sterile fashion  Needle size: 22 G  Approach: anterolateral  Medications administered: 25 mg sodium hyaluronate 25 MG/2.5ML  Patient tolerance: patient tolerated the procedure well with no immediate complications    Large Joint Arthrocentesis  Date/Time: 8/1/2017 4:10 PM  Consent given by: patient  Site marked: site marked  Timeout: Immediately prior to procedure a time out was called to verify the correct patient, procedure, equipment, support staff and site/side marked as required   Supporting Documentation  Indications: pain   Procedure Details  Location: knee - L knee  Preparation: Patient was prepped and draped in the usual sterile fashion  Needle size: 22 G  Approach: anterolateral  Medications administered: 25 mg sodium hyaluronate 25 MG/2.5ML  Patient tolerance: patient tolerated the procedure well with no immediate complications          Assessment/Plan   Independent Review of Radiographic Studies:    Indication to evaluate joint condition, and  compared with prior images, shows evident chronic advanced osteoarthritis.  Laboratory and Other Studies:  No new results reviewed today.       Carolyne was seen today for pain, injections, pain and injections.    Diagnoses and all orders for this visit:    Bilateral primary osteoarthritis of knee    Other orders  -     Large Joint Arthrocentesis  -     Large Joint Arthrocentesis    Orthopedic activities reviewed and patient expressed appreciation  Discussion of orthopedic goals  Risk, benefits, and merits of treatment alternatives reviewed with the patient and questions answered  Call or notify for any adverse effect from injection therapy    Recommendations/Plan:  Exercise, medications, injections, other patient advice, and return appointment as noted.  Patient is encouraged to call or return for any issues or concerns.    FU in 1 week  Patient agreeable to call or return sooner for any concerns.

## 2017-08-11 ENCOUNTER — OFFICE VISIT (OUTPATIENT)
Dept: ORTHOPEDIC SURGERY | Facility: CLINIC | Age: 77
End: 2017-08-11

## 2017-08-11 VITALS — BODY MASS INDEX: 23.79 KG/M2 | HEIGHT: 59 IN | WEIGHT: 118 LBS | RESPIRATION RATE: 18 BRPM

## 2017-08-11 DIAGNOSIS — M17.0 BILATERAL PRIMARY OSTEOARTHRITIS OF KNEE: Primary | ICD-10-CM

## 2017-08-11 PROCEDURE — 20610 DRAIN/INJ JOINT/BURSA W/O US: CPT | Performed by: PHYSICIAN ASSISTANT

## 2017-08-11 NOTE — PROGRESS NOTES
"Subjective   Carolyne Martins is a 77 y.o.  female is here today for injection therapy.  Pain, Follow-up, and Injections of the Left Knee and Injections, Follow-up, and Pain of the Right Knee      History of Present Illness     Past Medical History:   Diagnosis Date   • Anemia    • Arthritis    • Backache    • CAD (coronary artery disease)    • Fracture, radius 2016    right distal radius and ulna, healed.   • GERD (gastroesophageal reflux disease)    • Gout    • High cholesterol    • Hyperparathyroidism    • Hypertension    • Iron deficiency    • Osteoarthritis    • Osteoarthritis    • Osteoarthritis of knees, bilateral     Both knees Supartz injection series August, 2015   • Osteoporosis    • Personal history of cardiac murmur    • Renal disease    • Rheumatic heart disease     Personal history   • Rotator cuff tendonitis     right    • Rupture of right proximal biceps tendon     chronic   • Subacromial bursitis     right    • Valvular heart disease    • Vitamin B12 deficiency         Past Surgical History:   Procedure Laterality Date   • CATARACT EXTRACTION     • EYE SURGERY         Allergies   Allergen Reactions   • Ferrlecit [Na Ferric Gluc Cplx In Sucrose]    • Lisinopril    • Ranitidine Hcl        Review of Systems   Constitutional: Negative for fever.   HENT: Negative for voice change.    Eyes: Negative for visual disturbance.   Respiratory: Negative for shortness of breath.    Cardiovascular: Negative for chest pain.   Gastrointestinal: Negative for abdominal distention and abdominal pain.   Genitourinary: Negative for dysuria.   Musculoskeletal: Positive for arthralgias. Negative for gait problem and joint swelling.   Skin: Negative for rash.   Neurological: Negative for speech difficulty.   Hematological: Does not bruise/bleed easily.   Psychiatric/Behavioral: Negative for confusion.       Objective   Resp 18  Ht 59\" (149.9 cm)  Wt 118 lb (53.5 kg)  LMP  (LMP Unknown)  BMI 23.83 kg/m2   Physical " Exam  Since last injection, patient notes mild relief of symptoms.  No adverse effects of prior injection.  Skin exam stable with no erythema, ecchymosis or rash.  No new swelling.  No motor or sensory changes.  Distal pulse intact.    Large Joint Arthrocentesis  Date/Time: 8/11/2017 1:22 PM  Consent given by: patient  Site marked: site marked  Timeout: Immediately prior to procedure a time out was called to verify the correct patient, procedure, equipment, support staff and site/side marked as required   Supporting Documentation  Indications: pain   Procedure Details  Location: knee - R knee  Preparation: Patient was prepped and draped in the usual sterile fashion  Needle size: 22 G  Approach: anterolateral  Medications administered: 25 mg sodium hyaluronate 25 MG/2.5ML  Patient tolerance: patient tolerated the procedure well with no immediate complications    Large Joint Arthrocentesis  Date/Time: 8/11/2017 1:23 PM  Consent given by: patient  Site marked: site marked  Timeout: Immediately prior to procedure a time out was called to verify the correct patient, procedure, equipment, support staff and site/side marked as required   Supporting Documentation  Indications: pain   Procedure Details  Location: knee - L knee  Preparation: Patient was prepped and draped in the usual sterile fashion  Needle size: 22 G  Approach: anterolateral  Medications administered: 25 mg sodium hyaluronate 25 MG/2.5ML  Patient tolerance: patient tolerated the procedure well with no immediate complications          Assessment/Plan   Independent Review of Radiographic Studies:    No new imaging done today.  Laboratory and Other Studies:  No new results reviewed today.       Carolyne was seen today for pain, follow-up, injections, injections, follow-up and pain.    Diagnoses and all orders for this visit:    Bilateral primary osteoarthritis of knee    Other orders  -     Large Joint Arthrocentesis  -     Large Joint  Arthrocentesis    Orthopedic activities reviewed and patient expressed appreciation  Discussion of orthopedic goals  Risk, benefits, and merits of treatment alternatives reviewed with the patient and questions answered  Call or notify for any adverse effect from injection therapy    Recommendations/Plan:  Exercise, medications, injections, other patient advice, and return appointment as noted.  Patient is encouraged to call or return for any issues or concerns.    FU in 1 week   Patient agreeable to call or return sooner for any concerns.

## 2017-08-18 ENCOUNTER — OFFICE VISIT (OUTPATIENT)
Dept: ORTHOPEDIC SURGERY | Facility: CLINIC | Age: 77
End: 2017-08-18

## 2017-08-18 VITALS — RESPIRATION RATE: 18 BRPM | HEIGHT: 59 IN | BODY MASS INDEX: 23.79 KG/M2 | WEIGHT: 118 LBS

## 2017-08-18 DIAGNOSIS — M17.0 BILATERAL PRIMARY OSTEOARTHRITIS OF KNEE: Primary | ICD-10-CM

## 2017-08-18 DIAGNOSIS — L30.9 DERMATITIS: ICD-10-CM

## 2017-08-18 PROCEDURE — 20610 DRAIN/INJ JOINT/BURSA W/O US: CPT | Performed by: PHYSICIAN ASSISTANT

## 2017-08-18 PROCEDURE — 99212 OFFICE O/P EST SF 10 MIN: CPT | Performed by: PHYSICIAN ASSISTANT

## 2017-08-18 RX ORDER — PERMETHRIN 50 MG/G
CREAM TOPICAL
Qty: 1 EACH | Refills: 0 | Status: SHIPPED | OUTPATIENT
Start: 2017-08-18 | End: 2017-12-05

## 2017-08-18 NOTE — PROGRESS NOTES
Subjective   Carolyne Martins is a 77 y.o.  female is here today for injection therapy.  Injections of the Left Knee; Injections of the Right Knee; and Injections (Bilateral Supartz #3)      History of Present Illness         Patient presents for routine bilateral knee Visco supplementation injections.  She denies any injury or trauma.  Denies warmth or redness to her knees.      Patient would also like for me to evaluate a rash that she developed 3 days ago after being in the weeds and being around turkeys.  She states she was told it was mite's by family friend.  She states it is intensely pruritic.  She has tried Benadryl and steroid cream with no relief.  She states it itches so bad she has been scratching her legs.  She denies any change in her to soaps or detergents.  Past Medical History:   Diagnosis Date   • Anemia    • Arthritis    • Backache    • CAD (coronary artery disease)    • Fracture, radius 2016    right distal radius and ulna, healed.   • GERD (gastroesophageal reflux disease)    • Gout    • High cholesterol    • Hyperparathyroidism    • Hypertension    • Iron deficiency    • Osteoarthritis    • Osteoarthritis    • Osteoarthritis of knees, bilateral     Both knees Supartz injection series August, 2015   • Osteoporosis    • Personal history of cardiac murmur    • Renal disease    • Rheumatic heart disease     Personal history   • Rotator cuff tendonitis     right    • Rupture of right proximal biceps tendon     chronic   • Subacromial bursitis     right    • Valvular heart disease    • Vitamin B12 deficiency         Past Surgical History:   Procedure Laterality Date   • CATARACT EXTRACTION     • EYE SURGERY         Allergies   Allergen Reactions   • Ferrlecit [Na Ferric Gluc Cplx In Sucrose]    • Lisinopril    • Ranitidine Hcl        Review of Systems   Musculoskeletal: Positive for arthralgias (bilateral knee OA- chronic).   Skin: Positive for rash (diffuse).       Objective   Resp 18  Ht  "59\" (149.9 cm)  Wt 118 lb (53.5 kg)  LMP  (LMP Unknown)  BMI 23.83 kg/m2   Physical Exam   Constitutional: She is oriented to person, place, and time. She appears well-nourished.   Neck: No tracheal deviation present.   Pulmonary/Chest: Effort normal.   Musculoskeletal:        Right knee: She exhibits no swelling and no erythema. Tenderness found. Medial joint line tenderness noted.        Left knee: She exhibits no swelling and no erythema. Tenderness found. Medial joint line tenderness noted.   Neurological: She is alert and oriented to person, place, and time.   Skin: Rash noted.   Psychiatric: She has a normal mood and affect. Her behavior is normal.   Vitals reviewed.    Patient does have a diffuse non-confluent papular type rash on her abdomen back and bilateral lower extremity.  This is slightly excoriated.  There are few of these which are linear and the others are sporadically developed    Since last injection, patient notes no relief of symptoms.  No adverse effects of prior injection.  Skin exam stable with no erythema, ecchymosis or rash.  No new swelling.  No motor or sensory changes.  Distal pulse intact.    Large Joint Arthrocentesis  Date/Time: 8/18/2017 12:53 PM  Consent given by: patient  Site marked: site marked  Timeout: Immediately prior to procedure a time out was called to verify the correct patient, procedure, equipment, support staff and site/side marked as required   Supporting Documentation  Indications: pain   Procedure Details  Location: knee - R knee  Preparation: Patient was prepped and draped in the usual sterile fashion  Needle size: 22 G  Approach: anterolateral  Medications administered: 25 mg sodium hyaluronate 25 MG/2.5ML  Patient tolerance: patient tolerated the procedure well with no immediate complications    Large Joint Arthrocentesis  Date/Time: 8/18/2017 12:56 PM  Consent given by: patient  Site marked: site marked  Timeout: Immediately prior to procedure a time out was " called to verify the correct patient, procedure, equipment, support staff and site/side marked as required   Supporting Documentation  Indications: pain   Procedure Details  Location: knee - L knee  Preparation: Patient was prepped and draped in the usual sterile fashion  Needle size: 22 G  Approach: anterolateral  Medications administered: 25 mg sodium hyaluronate 25 MG/2.5ML  Patient tolerance: patient tolerated the procedure well with no immediate complications          Assessment/Plan   Independent Review of Radiographic Studies:    Reviewed at a prior visit.  Laboratory and Other Studies:  No new results reviewed today.       Carolyne was seen today for injections, injections and injections.    Diagnoses and all orders for this visit:    Bilateral primary osteoarthritis of knee    Dermatitis  -     permethrin (ELIMITE) 5 % cream; Apply thin layer from head to toe and leave on for 12 - 14 hours then rinse off in AM    Other orders  -     Large Joint Arthrocentesis  -     Large Joint Arthrocentesis      Orthopedic activities reviewed and patient expressed appreciation  Discussion of orthopedic goals  Risk, benefits, and merits of treatment alternatives reviewed with the patient and questions answered  Call or notify for any adverse effect from injection therapy    Recommendations/Plan:  Exercise, medications, injections, other patient advice, and return appointment as noted.  Patient is encouraged to call or return for any issues or concerns.      FU in 1 week.   I did agree to right fourth with permethrin cream as her rash does have a similar appearance to a scabies.  She is advised to follow-up with her primary care doctor if the rash does not respond to the cream.  Patient agreeable to call or return sooner for any concerns.

## 2017-08-25 ENCOUNTER — OFFICE VISIT (OUTPATIENT)
Dept: ORTHOPEDIC SURGERY | Facility: CLINIC | Age: 77
End: 2017-08-25

## 2017-08-25 VITALS — WEIGHT: 118 LBS | HEIGHT: 59 IN | BODY MASS INDEX: 23.79 KG/M2 | RESPIRATION RATE: 18 BRPM

## 2017-08-25 DIAGNOSIS — M17.0 BILATERAL PRIMARY OSTEOARTHRITIS OF KNEE: Primary | ICD-10-CM

## 2017-08-25 PROCEDURE — 20610 DRAIN/INJ JOINT/BURSA W/O US: CPT | Performed by: PHYSICIAN ASSISTANT

## 2017-08-25 NOTE — PROGRESS NOTES
Subjective   Carolyne Martins is a 77 y.o.  female is here today for injection therapy.  Follow-up, Pain, and Injections of the Left Knee and Follow-up, Pain, and Injections of the Right Knee      History of Present Illness   Patient is here for her fourth bilateral Visco supplementation injection.  She notes great improvement thus far.  She denies any SIDE effects.  Of note patient was treated by me last week for possible scabies.  She was prescribed regimen cream which she states completely alleviated her rash and pruritis.       Past Medical History:   Diagnosis Date   • Anemia    • Arthritis    • Backache    • CAD (coronary artery disease)    • Fracture, radius 2016    right distal radius and ulna, healed.   • GERD (gastroesophageal reflux disease)    • Gout    • High cholesterol    • Hyperparathyroidism    • Hypertension    • Iron deficiency    • Osteoarthritis    • Osteoarthritis    • Osteoarthritis of knees, bilateral     Both knees Supartz injection series August, 2015   • Osteoporosis    • Personal history of cardiac murmur    • Renal disease    • Rheumatic heart disease     Personal history   • Rotator cuff tendonitis     right    • Rupture of right proximal biceps tendon     chronic   • Subacromial bursitis     right    • Valvular heart disease    • Vitamin B12 deficiency         Past Surgical History:   Procedure Laterality Date   • CATARACT EXTRACTION     • EYE SURGERY         Allergies   Allergen Reactions   • Ferrlecit [Na Ferric Gluc Cplx In Sucrose]    • Lisinopril    • Ranitidine Hcl        Review of Systems   Constitutional: Negative for fever.   HENT: Negative for voice change.    Eyes: Negative for visual disturbance.   Respiratory: Negative for shortness of breath.    Cardiovascular: Negative for chest pain.   Gastrointestinal: Negative for abdominal distention and abdominal pain.   Genitourinary: Negative for dysuria.   Musculoskeletal: Positive for arthralgias. Negative for gait problem  "and joint swelling.   Skin: Negative for rash.   Neurological: Negative for speech difficulty.   Hematological: Does not bruise/bleed easily.   Psychiatric/Behavioral: Negative for confusion.       Objective   Resp 18  Ht 59\" (149.9 cm)  Wt 118 lb (53.5 kg)  LMP  (LMP Unknown)  BMI 23.83 kg/m2   Physical Exam  Since last injection, patient notes moderate relief of symptoms.  No adverse effects of prior injection.  Skin exam stable with no erythema, ecchymosis or rash.  No new swelling.  No motor or sensory changes.  Distal pulse intact.    Large Joint Arthrocentesis  Date/Time: 8/25/2017 1:04 PM  Consent given by: patient  Site marked: site marked  Timeout: Immediately prior to procedure a time out was called to verify the correct patient, procedure, equipment, support staff and site/side marked as required   Supporting Documentation  Indications: pain   Procedure Details  Location: knee - R knee  Preparation: Patient was prepped and draped in the usual sterile fashion  Needle size: 22 G  Approach: anterolateral  Medications administered: 25 mg sodium hyaluronate 25 MG/2.5ML  Patient tolerance: patient tolerated the procedure well with no immediate complications    Large Joint Arthrocentesis  Date/Time: 8/25/2017 1:04 PM  Consent given by: patient  Site marked: site marked  Timeout: Immediately prior to procedure a time out was called to verify the correct patient, procedure, equipment, support staff and site/side marked as required   Supporting Documentation  Indications: pain   Procedure Details  Location: knee - L knee  Preparation: Patient was prepped and draped in the usual sterile fashion  Needle size: 22 G  Approach: anterolateral  Medications administered: 25 mg sodium hyaluronate 25 MG/2.5ML  Patient tolerance: patient tolerated the procedure well with no immediate complications          Assessment/Plan   Independent Review of Radiographic Studies:    No new imaging done today.  Laboratory and Other " Studies:  No new results reviewed today.       There are no diagnoses linked to this encounter.  Orthopedic activities reviewed and patient expressed appreciation  Discussion of orthopedic goals  Risk, benefits, and merits of treatment alternatives reviewed with the patient and questions answered  Call or notify for any adverse effect from injection therapy    Recommendations/Plan:  Exercise, medications, injections, other patient advice, and return appointment as noted.  Patient is encouraged to call or return for any issues or concerns.    Fu in 1 week Patient agreeable to call or return sooner for any concerns.

## 2017-09-01 ENCOUNTER — OFFICE VISIT (OUTPATIENT)
Dept: ORTHOPEDIC SURGERY | Facility: CLINIC | Age: 77
End: 2017-09-01

## 2017-09-01 VITALS — WEIGHT: 118.5 LBS | BODY MASS INDEX: 23.89 KG/M2 | HEIGHT: 59 IN | RESPIRATION RATE: 16 BRPM

## 2017-09-01 DIAGNOSIS — M25.561 PAIN IN BOTH KNEES, UNSPECIFIED CHRONICITY: Primary | ICD-10-CM

## 2017-09-01 DIAGNOSIS — M17.0 BILATERAL PRIMARY OSTEOARTHRITIS OF KNEE: ICD-10-CM

## 2017-09-01 DIAGNOSIS — M25.562 PAIN IN BOTH KNEES, UNSPECIFIED CHRONICITY: Primary | ICD-10-CM

## 2017-09-01 PROCEDURE — 20610 DRAIN/INJ JOINT/BURSA W/O US: CPT | Performed by: PHYSICIAN ASSISTANT

## 2017-09-01 NOTE — PROGRESS NOTES
"Subjective   Carolyne Martins is a 77 y.o.  female is here today for injection therapy.  Injections of the Left Knee (Supartz #5) and Injections of the Right Knee (Supartz #5)      History of Present Illness   Patient is here for her fifth Supartz injection into her bilateral knee.  She denies any unwanted side effects.  Past Medical History:   Diagnosis Date   • Anemia    • Arthritis    • Backache    • CAD (coronary artery disease)    • Fracture, radius 2016    right distal radius and ulna, healed.   • GERD (gastroesophageal reflux disease)    • Gout    • High cholesterol    • Hyperparathyroidism    • Hypertension    • Iron deficiency    • Osteoarthritis    • Osteoarthritis    • Osteoarthritis of knees, bilateral     Both knees Supartz injection series August, 2015   • Osteoporosis    • Personal history of cardiac murmur    • Renal disease    • Rheumatic heart disease     Personal history   • Rotator cuff tendonitis     right    • Rupture of right proximal biceps tendon     chronic   • Subacromial bursitis     right    • Valvular heart disease    • Vitamin B12 deficiency         Past Surgical History:   Procedure Laterality Date   • CATARACT EXTRACTION     • EYE SURGERY         Allergies   Allergen Reactions   • Ferrlecit [Na Ferric Gluc Cplx In Sucrose]    • Lisinopril    • Ranitidine Hcl        Review of Systems    Objective   Resp 16  Ht 59\" (149.9 cm)  Wt 118 lb 8 oz (53.8 kg)  LMP  (LMP Unknown)  BMI 23.93 kg/m2   Physical Exam  Since last injection, patient notes moderate relief of symptoms.  No adverse effects of prior injection.  Skin exam stable with no erythema, ecchymosis or rash.  No new swelling.  No motor or sensory changes.  Distal pulse intact.    Large Joint Arthrocentesis  Date/Time: 9/1/2017 1:05 PM  Consent given by: patient  Site marked: site marked  Timeout: Immediately prior to procedure a time out was called to verify the correct patient, procedure, equipment, support staff and " site/side marked as required   Supporting Documentation  Indications: pain   Procedure Details  Location: knee - R knee  Preparation: Patient was prepped and draped in the usual sterile fashion  Needle size: 22 G  Approach: anterolateral  Medications administered: 25 mg sodium hyaluronate 25 MG/2.5ML  Patient tolerance: patient tolerated the procedure well with no immediate complications    Large Joint Arthrocentesis  Date/Time: 9/1/2017 1:06 PM  Consent given by: patient  Site marked: site marked  Timeout: Immediately prior to procedure a time out was called to verify the correct patient, procedure, equipment, support staff and site/side marked as required   Supporting Documentation  Indications: pain   Procedure Details  Location: knee - L knee  Preparation: Patient was prepped and draped in the usual sterile fashion  Needle size: 22 G  Approach: anterolateral  Medications administered: 25 mg sodium hyaluronate 25 MG/2.5ML  Patient tolerance: patient tolerated the procedure well with no immediate complications          Assessment/Plan       Carolyne was seen today for injections and injections.    Diagnoses and all orders for this visit:    Pain in both knees, unspecified chronicity  -     Large Joint Arthrocentesis  -     Large Joint Arthrocentesis    Bilateral primary osteoarthritis of knee    Orthopedic activities reviewed and patient expressed appreciation  Discussion of orthopedic goals  Risk, benefits, and merits of treatment alternatives reviewed with the patient and questions answered  Take prescribed medications as instructed only as tolerated    Recommendations/Plan:  Exercise, medications, injections, other patient advice, and return appointment as noted.  Patient is encouraged to call or return for any issues or concerns.    FU as needed    Patient agreeable to call or return sooner for any concerns.

## 2017-09-05 ENCOUNTER — HOSPITAL ENCOUNTER (OUTPATIENT)
Dept: INFUSION THERAPY | Facility: HOSPITAL | Age: 77
Discharge: HOME | End: 2017-09-05
Admitting: INTERNAL MEDICINE

## 2017-09-05 VITALS
WEIGHT: 120 LBS | BODY MASS INDEX: 24.19 KG/M2 | RESPIRATION RATE: 20 BRPM | HEIGHT: 59 IN | DIASTOLIC BLOOD PRESSURE: 62 MMHG | SYSTOLIC BLOOD PRESSURE: 143 MMHG | HEART RATE: 71 BPM | TEMPERATURE: 96.7 F | OXYGEN SATURATION: 99 %

## 2017-09-05 DIAGNOSIS — N18.4 ANEMIA DUE TO STAGE 4 CHRONIC KIDNEY DISEASE TREATED WITH ERYTHROPOIETIN (HCC): ICD-10-CM

## 2017-09-05 DIAGNOSIS — D63.1 ANEMIA DUE TO STAGE 4 CHRONIC KIDNEY DISEASE TREATED WITH ERYTHROPOIETIN (HCC): ICD-10-CM

## 2017-09-05 LAB
ALBUMIN SERPL-MCNC: 3.8 G/DL (ref 3.5–5)
ANION GAP SERPL CALCULATED.3IONS-SCNC: 13.5 MMOL/L
BUN BLD-MCNC: 35 MG/DL (ref 7–20)
BUN/CREAT SERPL: 14.6 (ref 7.1–23.5)
CALCIUM SPEC-SCNC: 9.5 MG/DL (ref 8.4–10.2)
CHLORIDE SERPL-SCNC: 106 MMOL/L (ref 98–107)
CO2 SERPL-SCNC: 25 MMOL/L (ref 26–30)
CREAT BLD-MCNC: 2.4 MG/DL (ref 0.6–1.3)
DEPRECATED RDW RBC AUTO: 52.4 FL (ref 37–54)
ERYTHROCYTE [DISTWIDTH] IN BLOOD BY AUTOMATED COUNT: 13.8 % (ref 11.5–14.5)
GFR SERPL CREATININE-BSD FRML MDRD: 20 ML/MIN/1.73
GLUCOSE BLD-MCNC: 84 MG/DL (ref 74–98)
HCT VFR BLD AUTO: 30.8 % (ref 37–47)
HGB BLD-MCNC: 9.7 G/DL (ref 12–16)
MCH RBC QN AUTO: 32.2 PG (ref 27–31)
MCHC RBC AUTO-ENTMCNC: 31.5 G/DL (ref 30–37)
MCV RBC AUTO: 102.3 FL (ref 81–99)
PHOSPHATE SERPL-MCNC: 3.4 MG/DL (ref 2.5–4.5)
PLATELET # BLD AUTO: 200 10*3/MM3 (ref 130–400)
PMV BLD AUTO: 9.9 FL (ref 6–12)
POTASSIUM BLD-SCNC: 4.5 MMOL/L (ref 3.5–5.1)
RBC # BLD AUTO: 3.01 10*6/MM3 (ref 4.2–5.4)
SODIUM BLD-SCNC: 140 MMOL/L (ref 137–145)
WBC NRBC COR # BLD: 5.33 10*3/MM3 (ref 4.8–10.8)

## 2017-09-05 PROCEDURE — 96372 THER/PROPH/DIAG INJ SC/IM: CPT

## 2017-09-05 PROCEDURE — 80069 RENAL FUNCTION PANEL: CPT | Performed by: INTERNAL MEDICINE

## 2017-09-05 PROCEDURE — 63510000001 EPOETIN ALFA PER 1000 UNITS: Performed by: INTERNAL MEDICINE

## 2017-09-05 PROCEDURE — 36415 COLL VENOUS BLD VENIPUNCTURE: CPT

## 2017-09-05 PROCEDURE — 85027 COMPLETE CBC AUTOMATED: CPT | Performed by: INTERNAL MEDICINE

## 2017-09-05 RX ADMIN — ERYTHROPOIETIN 40000 UNITS: 40000 INJECTION, SOLUTION INTRAVENOUS; SUBCUTANEOUS at 11:35

## 2017-10-03 ENCOUNTER — HOSPITAL ENCOUNTER (OUTPATIENT)
Dept: INFUSION THERAPY | Facility: HOSPITAL | Age: 77
Discharge: HOME | End: 2017-10-03
Admitting: INTERNAL MEDICINE

## 2017-10-03 DIAGNOSIS — D63.1 ANEMIA DUE TO STAGE 4 CHRONIC KIDNEY DISEASE TREATED WITH ERYTHROPOIETIN (HCC): ICD-10-CM

## 2017-10-03 DIAGNOSIS — N18.4 ANEMIA DUE TO STAGE 4 CHRONIC KIDNEY DISEASE TREATED WITH ERYTHROPOIETIN (HCC): ICD-10-CM

## 2017-10-03 LAB
DEPRECATED RDW RBC AUTO: 50.9 FL (ref 37–54)
ERYTHROCYTE [DISTWIDTH] IN BLOOD BY AUTOMATED COUNT: 14.1 % (ref 11.5–14.5)
HCT VFR BLD AUTO: 30.7 % (ref 37–47)
HGB BLD-MCNC: 9.8 G/DL (ref 12–16)
MCH RBC QN AUTO: 31.4 PG (ref 27–31)
MCHC RBC AUTO-ENTMCNC: 31.9 G/DL (ref 30–37)
MCV RBC AUTO: 98.4 FL (ref 81–99)
PLATELET # BLD AUTO: 198 10*3/MM3 (ref 130–400)
PMV BLD AUTO: 10 FL (ref 6–12)
RBC # BLD AUTO: 3.12 10*6/MM3 (ref 4.2–5.4)
WBC NRBC COR # BLD: 4.01 10*3/MM3 (ref 4.8–10.8)

## 2017-10-03 PROCEDURE — 36415 COLL VENOUS BLD VENIPUNCTURE: CPT

## 2017-10-03 PROCEDURE — 85027 COMPLETE CBC AUTOMATED: CPT | Performed by: INTERNAL MEDICINE

## 2017-10-03 PROCEDURE — 63510000001 EPOETIN ALFA PER 1000 UNITS: Performed by: INTERNAL MEDICINE

## 2017-10-03 PROCEDURE — 96372 THER/PROPH/DIAG INJ SC/IM: CPT

## 2017-10-03 RX ADMIN — ERYTHROPOIETIN 40000 UNITS: 40000 INJECTION, SOLUTION INTRAVENOUS; SUBCUTANEOUS at 11:49

## 2017-10-24 ENCOUNTER — TRANSCRIBE ORDERS (OUTPATIENT)
Dept: CT IMAGING | Facility: HOSPITAL | Age: 77
End: 2017-10-24

## 2017-10-24 DIAGNOSIS — R22.1 NECK SWELLING: Primary | ICD-10-CM

## 2017-10-27 ENCOUNTER — HOSPITAL ENCOUNTER (OUTPATIENT)
Dept: CT IMAGING | Facility: HOSPITAL | Age: 77
Discharge: HOME | End: 2017-10-27
Attending: INTERNAL MEDICINE

## 2017-11-01 ENCOUNTER — HOSPITAL ENCOUNTER (OUTPATIENT)
Dept: CT IMAGING | Facility: HOSPITAL | Age: 77
Discharge: HOME | End: 2017-11-01
Attending: INTERNAL MEDICINE | Admitting: INTERNAL MEDICINE

## 2017-11-01 DIAGNOSIS — R22.1 NECK SWELLING: ICD-10-CM

## 2017-11-01 PROCEDURE — 70490 CT SOFT TISSUE NECK W/O DYE: CPT

## 2017-11-06 ENCOUNTER — HOSPITAL ENCOUNTER (OUTPATIENT)
Dept: INFUSION THERAPY | Facility: HOSPITAL | Age: 77
Discharge: HOME | End: 2017-11-06
Admitting: INTERNAL MEDICINE

## 2017-11-06 ENCOUNTER — OFFICE VISIT (OUTPATIENT)
Dept: OBSTETRICS AND GYNECOLOGY | Facility: CLINIC | Age: 77
End: 2017-11-06

## 2017-11-06 VITALS
SYSTOLIC BLOOD PRESSURE: 142 MMHG | OXYGEN SATURATION: 99 % | TEMPERATURE: 97 F | DIASTOLIC BLOOD PRESSURE: 67 MMHG | HEART RATE: 73 BPM | RESPIRATION RATE: 20 BRPM

## 2017-11-06 VITALS
WEIGHT: 117 LBS | BODY MASS INDEX: 23.59 KG/M2 | SYSTOLIC BLOOD PRESSURE: 128 MMHG | HEIGHT: 59 IN | DIASTOLIC BLOOD PRESSURE: 76 MMHG

## 2017-11-06 DIAGNOSIS — N18.4 ANEMIA DUE TO STAGE 4 CHRONIC KIDNEY DISEASE TREATED WITH ERYTHROPOIETIN (HCC): ICD-10-CM

## 2017-11-06 DIAGNOSIS — N81.11 CYSTOCELE, MIDLINE: Primary | ICD-10-CM

## 2017-11-06 DIAGNOSIS — Z46.89 PESSARY MAINTENANCE: ICD-10-CM

## 2017-11-06 DIAGNOSIS — D63.1 ANEMIA DUE TO STAGE 4 CHRONIC KIDNEY DISEASE TREATED WITH ERYTHROPOIETIN (HCC): ICD-10-CM

## 2017-11-06 LAB
DEPRECATED RDW RBC AUTO: 53.3 FL (ref 37–54)
ERYTHROCYTE [DISTWIDTH] IN BLOOD BY AUTOMATED COUNT: 15.1 % (ref 11.5–14.5)
HCT VFR BLD AUTO: 29.9 % (ref 37–47)
HGB BLD-MCNC: 9.4 G/DL (ref 12–16)
MCH RBC QN AUTO: 30.4 PG (ref 27–31)
MCHC RBC AUTO-ENTMCNC: 31.4 G/DL (ref 30–37)
MCV RBC AUTO: 96.8 FL (ref 81–99)
PLATELET # BLD AUTO: 212 10*3/MM3 (ref 130–400)
PMV BLD AUTO: 10.2 FL (ref 6–12)
RBC # BLD AUTO: 3.09 10*6/MM3 (ref 4.2–5.4)
WBC NRBC COR # BLD: 5.06 10*3/MM3 (ref 4.8–10.8)

## 2017-11-06 PROCEDURE — 85027 COMPLETE CBC AUTOMATED: CPT | Performed by: INTERNAL MEDICINE

## 2017-11-06 PROCEDURE — 96372 THER/PROPH/DIAG INJ SC/IM: CPT

## 2017-11-06 PROCEDURE — 63510000001 EPOETIN ALFA PER 1000 UNITS: Performed by: INTERNAL MEDICINE

## 2017-11-06 PROCEDURE — 99212 OFFICE O/P EST SF 10 MIN: CPT | Performed by: PHYSICIAN ASSISTANT

## 2017-11-06 PROCEDURE — 36415 COLL VENOUS BLD VENIPUNCTURE: CPT

## 2017-11-06 RX ADMIN — ERYTHROPOIETIN 40000 UNITS: 40000 INJECTION, SOLUTION INTRAVENOUS; SUBCUTANEOUS at 11:43

## 2017-11-06 NOTE — PROGRESS NOTES
Subjective   Chief Complaint   Patient presents with   • Pessary Check       Carolyne Martins is a 77 y.o. year old  presenting to be seen for pessary maintenance  She has no complaints or concerns  Voiding well, good bowel movements, no vaginal bleeding or spotting  Has had no UTI symptoms    Past Medical History:   Diagnosis Date   • Anemia    • Arthritis    • Backache    • CAD (coronary artery disease)    • Disease of thyroid gland    • Fracture, radius 2016    right distal radius and ulna, healed.   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   • Subacromial bursitis     right    • Valvular heart disease    • Vitamin B12 deficiency         Current Outpatient Prescriptions:   •  allopurinol (ZYLOPRIM) 100 MG tablet, Take 1 tablet by mouth daily., Disp: , Rfl:   •  aspirin 81 MG tablet, Take 1 tablet by mouth daily., Disp: , Rfl:   •  calcitriol (ROCALTROL) 0.25 MCG capsule, Take  by mouth., Disp: , Rfl:   •  carvedilol (COREG) 12.5 MG tablet, 12.5 mg., Disp: , Rfl:   •  cholecalciferol (VITAMIN D3) 1000 UNITS tablet, Take 1 tablet by mouth daily., Disp: , Rfl:   •  Cyanocobalamin (VITAMIN B-12 PO), Take  by mouth., Disp: , Rfl:   •  epoetin ovidio (PROCRIT) 41657 UNIT/ML injection, Procrit SOLN; Patient Sig: Procrit SOLISAAC ; 0; -2014; Active, Disp: , Rfl:   •  fluticasone (FLONASE) 50 MCG/ACT nasal spray, , Disp: , Rfl:   •  hydroxychloroquine (PLAQUENIL) 200 MG tablet, Take 1 tablet by mouth daily., Disp: , Rfl:   •  Hypromellose (GENTEAL MILD) 0.2 %  "solution, Apply  to eye., Disp: , Rfl:   •  irbesartan (AVAPRO) 150 MG tablet, , Disp: , Rfl:   •  irbesartan (AVAPRO) 300 MG tablet, Take 1 tablet by mouth daily., Disp: , Rfl:   •  Loratadine (CLARITIN) 10 MG capsule, Take 1 capsule by mouth as needed., Disp: , Rfl:   •  omeprazole (PriLOSEC) 40 MG capsule, Take 1 capsule by mouth daily., Disp: , Rfl:   •  predniSONE (DELTASONE) 2.5 MG tablet, Take 1 tablet by mouth daily., Disp: , Rfl:   •  rosuvastatin (CRESTOR) 10 MG tablet, Take 1 tablet by mouth daily., Disp: , Rfl:   •  torsemide (DEMADEX) 20 MG tablet, Take 1 tablet by mouth 2 (two) times a week., Disp: , Rfl:   •  VENTOLIN  (90 BASE) MCG/ACT inhaler, , Disp: , Rfl:   •  permethrin (ELIMITE) 5 % cream, Apply thin layer from head to toe and leave on for 12 - 14 hours then rinse off in AM, Disp: 1 each, Rfl: 0  No current facility-administered medications for this visit.    Allergies   Allergen Reactions   • Ferrlecit [Na Ferric Gluc Cplx In Sucrose]    • Lisinopril    • Ranitidine Hcl       Past Surgical History:   Procedure Laterality Date   • CATARACT EXTRACTION     • EYE SURGERY        Social History     Social History   • Marital status:      Spouse name: N/A   • Number of children: N/A   • Years of education: N/A     Occupational History   • Not on file.     Social History Main Topics   • Smoking status: Never Smoker   • Smokeless tobacco: Never Used   • Alcohol use No   • Drug use: No   • Sexual activity: Defer     Other Topics Concern   • Not on file     Social History Narrative      Family History   Problem Relation Age of Onset   • Liver cancer Maternal Grandmother    • Gout Other    • Osteoporosis Other    • Stroke Other    • Ulcers Other    • Asthma Other    • Hypertension Other    • Heart disease Other    • Osteoarthritis Other        Review of Systems        Objective   /76  Ht 59\" (149.9 cm)  Wt 117 lb (53.1 kg)  LMP  (LMP Unknown)  BMI 23.63 kg/m2    Physical Exam "   Constitutional: She appears well-developed and well-nourished. She is cooperative.   Abdominal: Soft. Normal appearance. There is no tenderness. There is no rigidity and no guarding.   Genitourinary: There is no tenderness or lesion on the right labia. There is no tenderness or lesion on the left labia. No bleeding in the vagina. No signs of injury around the vagina.   Genitourinary Comments: Ring with support pessary removed cleaned and reinserted  No erosion or abrasions    Neurological: She is alert.            Assessment and Plan  Carolyne was seen today for pessary check.    Diagnoses and all orders for this visit:    Cystocele, midline    Pessary maintenance      Patient Instructions   Follow up 3 months or prn             This note was electronically signed.    Yaz Woods PA-C   November 6, 2017

## 2017-11-06 NOTE — PATIENT INSTRUCTIONS
Epoetin Jefe injection  What is this medicine?  EPOETIN JEFE (e CASSIE e tin AL fa) helps your body make more red blood cells. This medicine is used to treat anemia caused by chronic kidney failure, cancer chemotherapy, or HIV-therapy. It may also be used before surgery if you have anemia.  This medicine may be used for other purposes; ask your health care provider or pharmacist if you have questions.  COMMON BRAND NAME(S): Epogen, Procrit  What should I tell my health care provider before I take this medicine?  They need to know if you have any of these conditions:  -blood clotting disorders  -cancer patient not on chemotherapy  -cystic fibrosis  -heart disease, such as angina or heart failure  -hemoglobin level of 12 g/dL or greater  -high blood pressure  -low levels of folate, iron, or vitamin B12  -seizures  -an unusual or allergic reaction to erythropoietin, albumin, benzyl alcohol, hamster proteins, other medicines, foods, dyes, or preservatives  -pregnant or trying to get pregnant  -breast-feeding  How should I use this medicine?  This medicine is for injection into a vein or under the skin. It is usually given by a health care professional in a hospital or clinic setting.  If you get this medicine at home, you will be taught how to prepare and give this medicine. Use exactly as directed. Take your medicine at regular intervals. Do not take your medicine more often than directed.  It is important that you put your used needles and syringes in a special sharps container. Do not put them in a trash can. If you do not have a sharps container, call your pharmacist or healthcare provider to get one.  Talk to your pediatrician regarding the use of this medicine in children. While this drug may be prescribed for selected conditions, precautions do apply.  Overdosage: If you think you have taken too much of this medicine contact a poison control center or emergency room at once.  NOTE: This medicine is only for you. Do  not share this medicine with others.  What if I miss a dose?  If you miss a dose, take it as soon as you can. If it is almost time for your next dose, take only that dose. Do not take double or extra doses.  What may interact with this medicine?  Do not take this medicine with any of the following medications:  -darbepoetin ovidio  This list may not describe all possible interactions. Give your health care provider a list of all the medicines, herbs, non-prescription drugs, or dietary supplements you use. Also tell them if you smoke, drink alcohol, or use illegal drugs. Some items may interact with your medicine.  What should I watch for while using this medicine?  Visit your prescriber or health care professional for regular checks on your progress and for the needed blood tests and blood pressure measurements. It is especially important for the doctor to make sure your hemoglobin level is in the desired range, to limit the risk of potential side effects and to give you the best benefit. Keep all appointments for any recommended tests. Check your blood pressure as directed. Ask your doctor what your blood pressure should be and when you should contact him or her.  As your body makes more red blood cells, you may need to take iron, folic acid, or vitamin B supplements. Ask your doctor or health care provider which products are right for you. If you have kidney disease continue dietary restrictions, even though this medication can make you feel better. Talk with your doctor or health care professional about the foods you eat and the vitamins that you take.  What side effects may I notice from receiving this medicine?  Side effects that you should report to your doctor or health care professional as soon as possible:  -allergic reactions like skin rash, itching or hives, swelling of the face, lips, or tongue  -breathing problems  -changes in vision  -chest pain  -confusion, trouble speaking or understanding  -feeling  faint or lightheaded, falls  -high blood pressure  -muscle aches or pains  -pain, swelling, warmth in the leg  -rapid weight gain  -severe headaches  -sudden numbness or weakness of the face, arm or leg  -trouble walking, dizziness, loss of balance or coordination  -seizures (convulsions)  -swelling of the ankles, feet, hands  -unusually weak or tired  Side effects that usually do not require medical attention (report to your doctor or health care professional if they continue or are bothersome):  -diarrhea  -fever, chills (flu-like symptoms)  -headaches  -nausea, vomiting  -redness, stinging, or swelling at site where injected  This list may not describe all possible side effects. Call your doctor for medical advice about side effects. You may report side effects to FDA at 1-679-FDA-5876.  Where should I keep my medicine?  Keep out of the reach of children.  Store in a refrigerator between 2 and 8 degrees C (36 and 46 degrees F). Do not freeze or shake. Throw away any unused portion if using a single-dose vial. Multi-dose vials can be kept in the refrigerator for up to 21 days after the initial dose. Throw away unused medicine.  NOTE: This sheet is a summary. It may not cover all possible information. If you have questions about this medicine, talk to your doctor, pharmacist, or health care provider.     © 2017, [employer]. (2009-12-01 10:25:44)

## 2017-12-02 ENCOUNTER — APPOINTMENT (OUTPATIENT)
Dept: CT IMAGING | Facility: HOSPITAL | Age: 77
End: 2017-12-02

## 2017-12-02 ENCOUNTER — HOSPITAL ENCOUNTER (EMERGENCY)
Facility: HOSPITAL | Age: 77
Discharge: HOME | End: 2017-12-02
Attending: STUDENT IN AN ORGANIZED HEALTH CARE EDUCATION/TRAINING PROGRAM | Admitting: STUDENT IN AN ORGANIZED HEALTH CARE EDUCATION/TRAINING PROGRAM

## 2017-12-02 VITALS
BODY MASS INDEX: 23.59 KG/M2 | HEART RATE: 72 BPM | SYSTOLIC BLOOD PRESSURE: 170 MMHG | TEMPERATURE: 98 F | HEIGHT: 59 IN | OXYGEN SATURATION: 99 % | RESPIRATION RATE: 17 BRPM | DIASTOLIC BLOOD PRESSURE: 68 MMHG | WEIGHT: 117 LBS

## 2017-12-02 DIAGNOSIS — K92.2 GASTROINTESTINAL HEMORRHAGE, UNSPECIFIED GASTROINTESTINAL HEMORRHAGE TYPE: ICD-10-CM

## 2017-12-02 DIAGNOSIS — K64.4 EXTERNAL HEMORRHOID: Primary | ICD-10-CM

## 2017-12-02 LAB
ALBUMIN SERPL-MCNC: 3.6 G/DL (ref 3.5–5)
ALBUMIN/GLOB SERPL: 1.7 G/DL (ref 1–2)
ALP SERPL-CCNC: 66 U/L (ref 38–126)
ALT SERPL W P-5'-P-CCNC: 40 U/L (ref 13–69)
ANION GAP SERPL CALCULATED.3IONS-SCNC: 13.8 MMOL/L
AST SERPL-CCNC: 34 U/L (ref 15–46)
BASOPHILS # BLD AUTO: 0.02 10*3/MM3 (ref 0–0.2)
BASOPHILS NFR BLD AUTO: 0.3 % (ref 0–2.5)
BILIRUB SERPL-MCNC: 0.6 MG/DL (ref 0.2–1.3)
BUN BLD-MCNC: 44 MG/DL (ref 7–20)
BUN/CREAT SERPL: 16.9 (ref 7.1–23.5)
CALCIUM SPEC-SCNC: 9.8 MG/DL (ref 8.4–10.2)
CHLORIDE SERPL-SCNC: 104 MMOL/L (ref 98–107)
CO2 SERPL-SCNC: 26 MMOL/L (ref 26–30)
CREAT BLD-MCNC: 2.6 MG/DL (ref 0.6–1.3)
DEPRECATED RDW RBC AUTO: 54.7 FL (ref 37–54)
EOSINOPHIL # BLD AUTO: 0.22 10*3/MM3 (ref 0–0.7)
EOSINOPHIL NFR BLD AUTO: 3.7 % (ref 0–7)
ERYTHROCYTE [DISTWIDTH] IN BLOOD BY AUTOMATED COUNT: 15.7 % (ref 11.5–14.5)
GFR SERPL CREATININE-BSD FRML MDRD: 18 ML/MIN/1.73
GLOBULIN UR ELPH-MCNC: 2.1 GM/DL
GLUCOSE BLD-MCNC: 90 MG/DL (ref 74–98)
HCT VFR BLD AUTO: 28.6 % (ref 37–47)
HEMOCCULT STL QL: NEGATIVE
HGB BLD-MCNC: 8.8 G/DL (ref 12–16)
HOLD SPECIMEN: NORMAL
HOLD SPECIMEN: NORMAL
IMM GRANULOCYTES # BLD: 0.02 10*3/MM3 (ref 0–0.06)
IMM GRANULOCYTES NFR BLD: 0.3 % (ref 0–0.6)
LIPASE SERPL-CCNC: 219 U/L (ref 23–300)
LYMPHOCYTES # BLD AUTO: 0.81 10*3/MM3 (ref 0.6–3.4)
LYMPHOCYTES NFR BLD AUTO: 13.8 % (ref 10–50)
MCH RBC QN AUTO: 29.6 PG (ref 27–31)
MCHC RBC AUTO-ENTMCNC: 30.8 G/DL (ref 30–37)
MCV RBC AUTO: 96.3 FL (ref 81–99)
MONOCYTES # BLD AUTO: 0.54 10*3/MM3 (ref 0–0.9)
MONOCYTES NFR BLD AUTO: 9.2 % (ref 0–12)
NEUTROPHILS # BLD AUTO: 4.27 10*3/MM3 (ref 2–6.9)
NEUTROPHILS NFR BLD AUTO: 72.7 % (ref 37–80)
NRBC BLD MANUAL-RTO: 0 /100 WBC (ref 0–0)
PLATELET # BLD AUTO: 201 10*3/MM3 (ref 130–400)
PMV BLD AUTO: 10.2 FL (ref 6–12)
POTASSIUM BLD-SCNC: 3.8 MMOL/L (ref 3.5–5.1)
PROT SERPL-MCNC: 5.7 G/DL (ref 6.3–8.2)
RBC # BLD AUTO: 2.97 10*6/MM3 (ref 4.2–5.4)
SODIUM BLD-SCNC: 140 MMOL/L (ref 137–145)
WBC NRBC COR # BLD: 5.88 10*3/MM3 (ref 4.8–10.8)
WHOLE BLOOD HOLD SPECIMEN: NORMAL
WHOLE BLOOD HOLD SPECIMEN: NORMAL

## 2017-12-02 PROCEDURE — 83690 ASSAY OF LIPASE: CPT | Performed by: PHYSICIAN ASSISTANT

## 2017-12-02 PROCEDURE — 96360 HYDRATION IV INFUSION INIT: CPT

## 2017-12-02 PROCEDURE — 74176 CT ABD & PELVIS W/O CONTRAST: CPT

## 2017-12-02 PROCEDURE — 85025 COMPLETE CBC W/AUTO DIFF WBC: CPT | Performed by: PHYSICIAN ASSISTANT

## 2017-12-02 PROCEDURE — 82272 OCCULT BLD FECES 1-3 TESTS: CPT | Performed by: PHYSICIAN ASSISTANT

## 2017-12-02 PROCEDURE — 80053 COMPREHEN METABOLIC PANEL: CPT | Performed by: PHYSICIAN ASSISTANT

## 2017-12-02 PROCEDURE — 99283 EMERGENCY DEPT VISIT LOW MDM: CPT

## 2017-12-02 RX ORDER — SODIUM CHLORIDE 0.9 % (FLUSH) 0.9 %
10 SYRINGE (ML) INJECTION AS NEEDED
Status: DISCONTINUED | OUTPATIENT
Start: 2017-12-02 | End: 2017-12-02 | Stop reason: HOSPADM

## 2017-12-02 RX ADMIN — SODIUM CHLORIDE 500 ML: 9 INJECTION, SOLUTION INTRAVENOUS at 12:15

## 2017-12-02 NOTE — ED PROVIDER NOTES
Subjective   HPI Comments: 77-year-old female presents to the emergency department for blood in her stool for 2 days, she has a history of hemorrhoids.  She states that she's had a mixture of bright red blood and dark blood in her stool.  She also reports that she's had upper GI scope to in the past by Dr. Concepcion and she had lesions.  She states the symptoms have been intermittent.  She denies being on blood thinners but does take an aspirin daily.  She denies abdominal pain, she reports that she has some chills but no fever.  She notices the symptoms when she has a bowel movement.      History provided by:  Patient   used: No        Review of Systems   Gastrointestinal: Positive for blood in stool.   All other systems reviewed and are negative.      Past Medical History:   Diagnosis Date   • Anemia    • Arthritis    • Backache    • CAD (coronary artery disease)    • Disease of thyroid gland    • Fracture, radius 2016    right distal radius and ulna, healed.   • GERD (gastroesophageal reflux disease)    • Gout    • High cholesterol    • Hyperparathyroidism    • Hypertension    • Iron deficiency    • Osteoarthritis    • Osteoarthritis    • Osteoarthritis of knees, bilateral     Both knees Supartz injection series August, 2015   • Osteoporosis    • Personal history of cardiac murmur    • Renal disease    • Rheumatic heart disease     Personal history   • Rotator cuff tendonitis     right    • Rupture of right proximal biceps tendon     chronic   • Subacromial bursitis     right    • Valvular heart disease    • Vitamin B12 deficiency        Allergies   Allergen Reactions   • Ferrlecit [Na Ferric Gluc Cplx In Sucrose]    • Lisinopril    • Ranitidine Hcl        Past Surgical History:   Procedure Laterality Date   • CATARACT EXTRACTION     • EYE SURGERY         Family History   Problem Relation Age of Onset   • Liver cancer Maternal Grandmother    • Gout Other    • Osteoporosis Other    • Stroke Other     • Ulcers Other    • Asthma Other    • Hypertension Other    • Heart disease Other    • Osteoarthritis Other        Social History     Social History   • Marital status:      Spouse name: N/A   • Number of children: N/A   • Years of education: N/A     Social History Main Topics   • Smoking status: Never Smoker   • Smokeless tobacco: Never Used   • Alcohol use No   • Drug use: No   • Sexual activity: Defer     Other Topics Concern   • None     Social History Narrative           Objective   Physical Exam   Constitutional: She is oriented to person, place, and time. She appears well-developed and well-nourished.   Eyes: EOM are normal.   Neck: Normal range of motion. Neck supple.   Cardiovascular: Normal rate and regular rhythm.    Pulmonary/Chest: Effort normal and breath sounds normal.   Abdominal: Soft. Bowel sounds are normal.   Genitourinary:   Genitourinary Comments: External hemorrhoid on exam, hemorrhoid did not appear thrombosed.   Musculoskeletal: Normal range of motion.   Neurological: She is alert and oriented to person, place, and time. She has normal reflexes.   Skin: Skin is warm and dry.   Psychiatric: She has a normal mood and affect. Her behavior is normal. Judgment and thought content normal.   Nursing note and vitals reviewed.      Procedures         ED Course  ED Course                  MDM    Final diagnoses:   External hemorrhoid   Gastrointestinal hemorrhage, unspecified gastrointestinal hemorrhage type            Peewee Ragsdale Jr., PA-C  12/02/17 1226

## 2017-12-04 ENCOUNTER — OFFICE VISIT (OUTPATIENT)
Dept: GASTROENTEROLOGY | Facility: CLINIC | Age: 77
End: 2017-12-04

## 2017-12-04 ENCOUNTER — PREP FOR SURGERY (OUTPATIENT)
Dept: OTHER | Facility: HOSPITAL | Age: 77
End: 2017-12-04

## 2017-12-04 ENCOUNTER — HOSPITAL ENCOUNTER (OUTPATIENT)
Dept: INFUSION THERAPY | Facility: HOSPITAL | Age: 77
Discharge: HOME | End: 2017-12-04
Admitting: INTERNAL MEDICINE

## 2017-12-04 VITALS
WEIGHT: 117 LBS | HEART RATE: 75 BPM | HEIGHT: 59 IN | TEMPERATURE: 98.6 F | RESPIRATION RATE: 15 BRPM | SYSTOLIC BLOOD PRESSURE: 162 MMHG | BODY MASS INDEX: 23.59 KG/M2 | DIASTOLIC BLOOD PRESSURE: 68 MMHG

## 2017-12-04 VITALS
TEMPERATURE: 97.9 F | DIASTOLIC BLOOD PRESSURE: 76 MMHG | RESPIRATION RATE: 18 BRPM | SYSTOLIC BLOOD PRESSURE: 169 MMHG | HEART RATE: 75 BPM | OXYGEN SATURATION: 98 %

## 2017-12-04 DIAGNOSIS — K92.1 MELENA: ICD-10-CM

## 2017-12-04 DIAGNOSIS — Z12.11 COLON CANCER SCREENING: ICD-10-CM

## 2017-12-04 DIAGNOSIS — N18.4 ANEMIA DUE TO STAGE 4 CHRONIC KIDNEY DISEASE TREATED WITH ERYTHROPOIETIN (HCC): ICD-10-CM

## 2017-12-04 DIAGNOSIS — R12 HEARTBURN: Primary | ICD-10-CM

## 2017-12-04 DIAGNOSIS — K59.00 CONSTIPATION, UNSPECIFIED CONSTIPATION TYPE: Chronic | ICD-10-CM

## 2017-12-04 DIAGNOSIS — Z12.11 COLON CANCER SCREENING: Primary | ICD-10-CM

## 2017-12-04 DIAGNOSIS — D64.9 ANEMIA, UNSPECIFIED TYPE: ICD-10-CM

## 2017-12-04 DIAGNOSIS — D63.1 ANEMIA DUE TO STAGE 4 CHRONIC KIDNEY DISEASE TREATED WITH ERYTHROPOIETIN (HCC): ICD-10-CM

## 2017-12-04 DIAGNOSIS — D64.9 ANEMIA, UNSPECIFIED TYPE: Primary | Chronic | ICD-10-CM

## 2017-12-04 DIAGNOSIS — R19.4 CHANGE IN BOWEL HABITS: ICD-10-CM

## 2017-12-04 DIAGNOSIS — R12 HEARTBURN: Chronic | ICD-10-CM

## 2017-12-04 DIAGNOSIS — K59.00 CONSTIPATION, UNSPECIFIED CONSTIPATION TYPE: ICD-10-CM

## 2017-12-04 PROBLEM — K31.7 GASTRIC POLYP: Status: ACTIVE | Noted: 2017-12-04

## 2017-12-04 LAB
ALBUMIN SERPL-MCNC: 3.7 G/DL (ref 3.5–5)
ANION GAP SERPL CALCULATED.3IONS-SCNC: 15.5 MMOL/L
BUN BLD-MCNC: 35 MG/DL (ref 7–20)
BUN/CREAT SERPL: 13.5 (ref 7.1–23.5)
CALCIUM SPEC-SCNC: 10.2 MG/DL (ref 8.4–10.2)
CHLORIDE SERPL-SCNC: 102 MMOL/L (ref 98–107)
CO2 SERPL-SCNC: 26 MMOL/L (ref 26–30)
CREAT BLD-MCNC: 2.6 MG/DL (ref 0.6–1.3)
DEPRECATED RDW RBC AUTO: 55.8 FL (ref 37–54)
ERYTHROCYTE [DISTWIDTH] IN BLOOD BY AUTOMATED COUNT: 15.7 % (ref 11.5–14.5)
GFR SERPL CREATININE-BSD FRML MDRD: 18 ML/MIN/1.73
GLUCOSE BLD-MCNC: 104 MG/DL (ref 74–98)
HCT VFR BLD AUTO: 30.8 % (ref 37–47)
HGB BLD-MCNC: 9.5 G/DL (ref 12–16)
MCH RBC QN AUTO: 29.7 PG (ref 27–31)
MCHC RBC AUTO-ENTMCNC: 30.8 G/DL (ref 30–37)
MCV RBC AUTO: 96.3 FL (ref 81–99)
PHOSPHATE SERPL-MCNC: 3.3 MG/DL (ref 2.5–4.5)
PLATELET # BLD AUTO: 217 10*3/MM3 (ref 130–400)
PMV BLD AUTO: 10 FL (ref 6–12)
POTASSIUM BLD-SCNC: 4.5 MMOL/L (ref 3.5–5.1)
RBC # BLD AUTO: 3.2 10*6/MM3 (ref 4.2–5.4)
SODIUM BLD-SCNC: 139 MMOL/L (ref 137–145)
WBC NRBC COR # BLD: 5.01 10*3/MM3 (ref 4.8–10.8)

## 2017-12-04 PROCEDURE — 63510000001 EPOETIN ALFA PER 1000 UNITS: Performed by: PHYSICIAN ASSISTANT

## 2017-12-04 PROCEDURE — 96372 THER/PROPH/DIAG INJ SC/IM: CPT

## 2017-12-04 PROCEDURE — 85027 COMPLETE CBC AUTOMATED: CPT | Performed by: INTERNAL MEDICINE

## 2017-12-04 PROCEDURE — 99214 OFFICE O/P EST MOD 30 MIN: CPT | Performed by: NURSE PRACTITIONER

## 2017-12-04 PROCEDURE — 80069 RENAL FUNCTION PANEL: CPT | Performed by: INTERNAL MEDICINE

## 2017-12-04 RX ORDER — AMOXICILLIN AND CLAVULANATE POTASSIUM 875; 125 MG/1; MG/1
1 TABLET, FILM COATED ORAL 2 TIMES DAILY
COMMUNITY
End: 2018-01-05

## 2017-12-04 RX ORDER — SODIUM CHLORIDE 9 MG/ML
70 INJECTION, SOLUTION INTRAVENOUS CONTINUOUS PRN
Status: CANCELLED | OUTPATIENT
Start: 2017-12-04

## 2017-12-04 RX ADMIN — ERYTHROPOIETIN 40000 UNITS: 40000 INJECTION, SOLUTION INTRAVENOUS; SUBCUTANEOUS at 11:40

## 2017-12-04 NOTE — PROGRESS NOTES
Chief Complaint   Patient presents with   • Rectal Bleeding   • Constipation   • Heartburn     The patient has had black stools off and on for the past 2-3 weeks. 3 days ago, the patient began having black stools over 2-3 episodes and went to the emergency room, but was subsequently discharged and told to follow up in office. 2 days ago, the patient woke up and had large amount of black stools. She has not had any bowel movements today. The patient denies bright red blood per rectum.     The patient has a recurrent history of constipation. The patient may have a hard stool every day or every other day. She has not taken anything for the constipation.     The patient has a history of heartburn in the past. She rarely has heartburn. Heartburn is well controlled with Omeprazole daily.     There is a long-standing history of anemia. The patient has had anemia since December 1998. She takes Procrit once per month. She does have a history of chronic kidney disease. She is not on dialysis.     The patient denies nausea or vomiting. There is no history of abdominal pain. The patient denies diarrhea. The patient's last colonoscopy was in 2011. There is no family history of colon cancer.    Rectal Bleeding   This is a new problem. The current episode started 1 to 4 weeks ago. The problem occurs intermittently. The problem has been gradually worsening. Associated symptoms include arthralgias, fatigue, joint swelling and weakness. Pertinent negatives include no abdominal pain, chest pain, chills, coughing, fever, headaches, myalgias, nausea, rash or vomiting. Nothing aggravates the symptoms. She has tried nothing for the symptoms.   Constipation   This is a recurrent problem. Episode onset: September 2017. The problem is unchanged. Her stool frequency is 4 to 5 times per week. The stool is described as firm. The patient is not on a high fiber diet. There has been adequate water intake. Associated symptoms include back pain and  hematochezia. Pertinent negatives include no abdominal pain, diarrhea, fever, nausea or vomiting. She has tried nothing for the symptoms.   Heartburn   She complains of heartburn and wheezing. She reports no abdominal pain, no chest pain, no coughing or no nausea. This is a chronic problem. The current episode started more than 1 year ago. The problem occurs rarely. The problem has been gradually improving. The heartburn duration is several minutes. The heartburn is located in the substernum. The heartburn is of mild intensity. The heartburn does not wake her from sleep. Nothing aggravates the symptoms. Associated symptoms include fatigue. There are no known risk factors. She has tried a PPI for the symptoms. The treatment provided significant relief. Past procedures include an EGD (2014).   Anemia   Presents for follow-up visit. Symptoms include bruises/bleeds easily. There has been no abdominal pain, fever or palpitations. Signs of blood loss that are present include hematochezia. Procedure history includes EGD (2014).     Review of Systems   Constitutional: Positive for fatigue. Negative for appetite change, chills, fever and unexpected weight change.   HENT: Negative for mouth sores, nosebleeds and trouble swallowing.    Eyes: Negative for discharge and redness.   Respiratory: Positive for shortness of breath and wheezing. Negative for apnea and cough.    Cardiovascular: Negative for chest pain, palpitations and leg swelling.   Gastrointestinal: Positive for constipation, heartburn and hematochezia. Negative for abdominal distention, abdominal pain, anal bleeding, blood in stool, diarrhea, nausea and vomiting.   Endocrine: Negative for cold intolerance, heat intolerance and polydipsia.   Genitourinary: Negative for dysuria, hematuria and urgency.   Musculoskeletal: Positive for arthralgias, back pain and joint swelling. Negative for myalgias.   Skin: Negative for rash.   Allergic/Immunologic: Negative for food  allergies and immunocompromised state.   Neurological: Positive for weakness. Negative for dizziness, seizures, syncope and headaches.   Hematological: Negative for adenopathy. Bruises/bleeds easily.   Psychiatric/Behavioral: Negative for dysphoric mood. The patient is not nervous/anxious and is not hyperactive.      Patient Active Problem List   Diagnosis   • Arthralgia of shoulder region   • Tendinopathy of rotator cuff   • Osteoarthritis of right acromioclavicular joint   • Anemia due to stage 4 chronic kidney disease treated with erythropoietin   • Anemia   • Gastric polyp   • Heartburn   • Melena   • Constipation     Past Medical History:   Diagnosis Date   • Anemia 1998   • Arthritis    • Backache    • CAD (coronary artery disease)    • Disease of thyroid gland    • Fracture, radius 2016    right distal radius and ulna, healed.   • GERD (gastroesophageal reflux disease)    • Gout    • High cholesterol    • Hyperparathyroidism    • Hypertension    • Iron deficiency    • Osteoarthritis    • Osteoarthritis of knees, bilateral     Both knees Supartz injection series August, 2015   • Osteoporosis    • Personal history of cardiac murmur    • Renal disease    • Rheumatic heart disease     Personal history   • Rotator cuff tendonitis     right    • Rupture of right proximal biceps tendon     chronic   • Subacromial bursitis     right    • Valvular heart disease    • Vitamin B12 deficiency      Past Surgical History:   Procedure Laterality Date   • CATARACT EXTRACTION  2014   • COLONOSCOPY  2011   • EYE SURGERY     • UPPER GASTROINTESTINAL ENDOSCOPY  08/18/2014     Family History   Problem Relation Age of Onset   • Liver cancer Maternal Grandmother    • Gout Other    • Osteoporosis Other    • Stroke Other    • Ulcers Other    • Asthma Other    • Hypertension Other    • Heart disease Other    • Osteoarthritis Other    • Colon cancer Neg Hx      Social History   Substance Use Topics   • Smoking status: Never Smoker   •  Smokeless tobacco: Never Used   • Alcohol use No       Current Outpatient Prescriptions:   •  allopurinol (ZYLOPRIM) 100 MG tablet, Take 1 tablet by mouth daily., Disp: , Rfl:   •  amoxicillin-clavulanate (AUGMENTIN) 875-125 MG per tablet, Take 1 tablet by mouth 2 (Two) Times a Day., Disp: , Rfl:   •  aspirin 81 MG tablet, Take 1 tablet by mouth daily., Disp: , Rfl:   •  calcitriol (ROCALTROL) 0.25 MCG capsule, Take  by mouth., Disp: , Rfl:   •  carvedilol (COREG) 12.5 MG tablet, 12.5 mg., Disp: , Rfl:   •  cholecalciferol (VITAMIN D3) 1000 UNITS tablet, Take 1 tablet by mouth daily., Disp: , Rfl:   •  Cyanocobalamin (VITAMIN B-12 PO), Take  by mouth., Disp: , Rfl:   •  epoetin ovidio (PROCRIT) 98571 UNIT/ML injection, Procrit SOLN; Patient Sig: Procrit KYRIE ; 0; 16-Jul-2014; Active, Disp: , Rfl:   •  fluticasone (FLONASE) 50 MCG/ACT nasal spray, , Disp: , Rfl:   •  hydroxychloroquine (PLAQUENIL) 200 MG tablet, Take 1 tablet by mouth daily., Disp: , Rfl:   •  Hypromellose (GENTEAL MILD) 0.2 % solution, Apply  to eye., Disp: , Rfl:   •  irbesartan (AVAPRO) 150 MG tablet, , Disp: , Rfl:   •  Loratadine (CLARITIN) 10 MG capsule, Take 1 capsule by mouth as needed., Disp: , Rfl:   •  omeprazole (PriLOSEC) 40 MG capsule, Take 1 capsule by mouth daily., Disp: , Rfl:   •  permethrin (ELIMITE) 5 % cream, Apply thin layer from head to toe and leave on for 12 - 14 hours then rinse off in AM, Disp: 1 each, Rfl: 0  •  predniSONE (DELTASONE) 2.5 MG tablet, Take 1 tablet by mouth daily., Disp: , Rfl:   •  rosuvastatin (CRESTOR) 10 MG tablet, Take 1 tablet by mouth daily., Disp: , Rfl:   •  torsemide (DEMADEX) 20 MG tablet, Take 1 tablet by mouth 2 (two) times a week., Disp: , Rfl:   •  VENTOLIN  (90 BASE) MCG/ACT inhaler, , Disp: , Rfl:   •  irbesartan (AVAPRO) 300 MG tablet, Take 1 tablet by mouth daily., Disp: , Rfl:   No current facility-administered medications for this visit.     Allergies   Allergen Reactions   •  "Ferrlecit [Na Ferric Gluc Cplx In Sucrose]    • Lisinopril    • Ranitidine Hcl      /68  Pulse 75  Temp 98.6 °F (37 °C)  Resp 15  Ht 59\" (149.9 cm)  Wt 117 lb (53.1 kg)  LMP  (LMP Unknown)  BMI 23.63 kg/m2    Physical Exam   Constitutional: She is oriented to person, place, and time. She appears well-developed and well-nourished. No distress.   HENT:   Head: Normocephalic and atraumatic.   Right Ear: Hearing and external ear normal.   Left Ear: Hearing and external ear normal.   Nose: Nose normal.   Mouth/Throat: Oropharynx is clear and moist and mucous membranes are normal. Mucous membranes are not pale, not dry and not cyanotic. No oral lesions. No oropharyngeal exudate.   Eyes: Conjunctivae and EOM are normal. Right eye exhibits no discharge. Left eye exhibits no discharge.   Neck: Trachea normal. Neck supple. No JVD present. No edema present. No thyroid mass and no thyromegaly present.   Cardiovascular: Normal rate, regular rhythm, S2 normal and normal heart sounds.  Exam reveals no gallop, no S3 and no friction rub.    No murmur heard.  Pulmonary/Chest: Effort normal and breath sounds normal. No respiratory distress. She exhibits no tenderness.   Abdominal: Normal appearance and bowel sounds are normal. She exhibits no distension, no ascites and no mass. There is no splenomegaly or hepatomegaly. There is no tenderness. There is no rigidity, no rebound and no guarding. No hernia.       Vascular Status -  Her exam exhibits no right foot edema. Her exam exhibits no left foot edema.  Lymphadenopathy:     She has no cervical adenopathy.        Left: No supraclavicular adenopathy present.   Neurological: She is alert and oriented to person, place, and time. She has normal strength. No cranial nerve deficit or sensory deficit.   Skin: No rash noted. She is not diaphoretic. No cyanosis. No pallor. Nails show no clubbing.   Psychiatric: She has a normal mood and affect.   Nursing note and vitals " reviewed.  Stigmata of chronic liver disease:  None.  Asterixis:  None.    Laboratory Results:  Upon review of records:    Dated 12/2/2017 glucose 90 BUN 44 creatinine 2.6 sodium 140 potassium 3.8 chloride 104 CO2 26 calcium 9.8 albumin 3.6 ALT 40 AST 34 upon phosphatase 66 total bilirubin 0.6 WBC 5.88 hemoglobin 8.8 hematocrit 28.6 platelet count 201 MCV 96.3 lipase 219 fecal occult blood: Negative    Dated 12/4/2017 glucose 104 BUN 35 creatinine 2.6 sodium 139 potassium 4.5 chloride 102 CO2 26 calcium 10.2 albumin 3.7 phosphorus 3.3 WBC 5.01 hemoglobin 9.5 hematocrit 30.8 platelet count 217 MCV 96.3    Abdominal Imaging:  Upon review of records:    CT of abdomen and pelvis without contrast dated 12/2/2017 reveals lung bases are clear. There is a small sliding-type hiatal hernia. The liver is normal in size and attenuation with a benign-appearing cyst. The spleen is unremarkable.  The adrenals are normal.  The aorta is normal in caliber. There is no hydronephrosis. There is no nephrolithiasis. There are bilateral simple and complex renal cysts with the largest measuring 7.2 x 6.0 cm on the right. The appendix is normal.  The urinary bladder is unremarkable. There is no free fluid or adenopathy. A pessary device is present. There are bilateral L5 pars defects with advanced changes of osteoarthritis    Procedures:  Upon review of records:    EGD dated 08/18/2014 revealed: Schatzki ring postdilation to 18 mm serially. Sliding hiatal hernia (less than 3 cm). Multiple gastric polyps. Some of them were noted to be rather vascular status post multiple hot snare polypectomies as above. Erythematous gastritis. No Sanders mucosa was seen. Second portion of duodenum biopsy revealed no pathologic abnormalities. Gastric polyp revealed benign gastric fundic-type polyps. No atypia or inflammation present.     Assessment and Plan:    Carolyne was seen today for rectal bleeding, constipation and heartburn.    Diagnoses and all orders  for this visit:    Anemia, unspecified type  Comments:  History of long-standing anemia. Of interest, the patient has a long-standing history of chronic kidney disease.    Melena  Comments:  Differentials include peptic ulcer disease.    Heartburn  Comments:  History of long-standing heartburn. Well controlled with Omeprazole daily. Concerns for underlying Sanders's.    Constipation, unspecified constipation type  Comments:  History of recurrent constipation. This is described as a change in bowel habits.    Colon cancer screening  Comments:  Last colonoscopy was in 2011. There is no family history of colon cancer.        Plan  and Patient Instructions:   Patient Instructions   1. Antireflux measures: Avoid fried, fatty foods, alcohol, chocolate, coffee, tea,  soft drinks, peppermint and spearmint, spicy foods, tomatoes and tomato based foods, onion based foods, and smoking. Other antireflux measures include weight reduction if overweight, avoiding tight clothing around the abdomen, elevating the head of the bed 6 inches with blocks under the head board, and don't drink or eat before going to bed and avoid lying down immediately after meals..  2. Omeprazole 40 mg 1 po daily in the am 30 minutes before breakfast.  3. High fiber diet with liberal water intake. Discussed in detail.  4. Upper endoscopy-EGD: Description of the procedure, risks, benefits, alternatives and options, including nonoperative options, were discussed with the patient in detail. The patient understands and wishes to proceed.  5. Colonoscopy: Description of the procedure, risks, benefits, alternatives and options, including nonoperative options, were discussed with the patient in detail. The patient understands and wishes to proceed.    Elsi Sánchez, APRN

## 2017-12-04 NOTE — PATIENT INSTRUCTIONS
1. Antireflux measures: Avoid fried, fatty foods, alcohol, chocolate, coffee, tea,  soft drinks, peppermint and spearmint, spicy foods, tomatoes and tomato based foods, onion based foods, and smoking. Other antireflux measures include weight reduction if overweight, avoiding tight clothing around the abdomen, elevating the head of the bed 6 inches with blocks under the head board, and don't drink or eat before going to bed and avoid lying down immediately after meals..  2. Omeprazole 40 mg 1 po daily in the am 30 minutes before breakfast.  3. High fiber diet with liberal water intake. Discussed in detail.  4. Upper endoscopy-EGD: Description of the procedure, risks, benefits, alternatives and options, including nonoperative options, were discussed with the patient in detail. The patient understands and wishes to proceed.  5. Colonoscopy: Description of the procedure, risks, benefits, alternatives and options, including nonoperative options, were discussed with the patient in detail. The patient understands and wishes to proceed.

## 2017-12-05 PROBLEM — Z12.11 COLON CANCER SCREENING: Status: ACTIVE | Noted: 2017-12-05

## 2017-12-05 PROBLEM — R19.4 CHANGE IN BOWEL HABITS: Status: ACTIVE | Noted: 2017-12-05

## 2017-12-05 RX ORDER — CARVEDILOL 12.5 MG/1
25 TABLET ORAL EVERY EVENING
COMMUNITY
End: 2018-03-07 | Stop reason: SDUPTHER

## 2017-12-05 NOTE — PAT
"PT STATED DURING MEDICAL HISTORY THAT SHE IS CURRENTLY ON AN ANTIBIOTIC FOR BRONCHITIS.  ASKED IF DR. BUTTS'S OFFICE IS AWARE.  PT STATES \"I THINK I TOLD THEM, BUT I'M NOT POSITIVE\".  PT STATES THAT SHE WILL CALL THE OFFICE TO NOTIFY.    "

## 2017-12-06 ENCOUNTER — HOSPITAL ENCOUNTER (OUTPATIENT)
Facility: HOSPITAL | Age: 77
Setting detail: HOSPITAL OUTPATIENT SURGERY
Discharge: HOME | End: 2017-12-06
Attending: INTERNAL MEDICINE | Admitting: INTERNAL MEDICINE

## 2017-12-06 ENCOUNTER — ANESTHESIA EVENT (OUTPATIENT)
Dept: GASTROENTEROLOGY | Facility: HOSPITAL | Age: 77
End: 2017-12-06

## 2017-12-06 ENCOUNTER — ANESTHESIA (OUTPATIENT)
Dept: GASTROENTEROLOGY | Facility: HOSPITAL | Age: 77
End: 2017-12-06

## 2017-12-06 VITALS
BODY MASS INDEX: 23.59 KG/M2 | DIASTOLIC BLOOD PRESSURE: 75 MMHG | TEMPERATURE: 98.6 F | HEART RATE: 78 BPM | WEIGHT: 117 LBS | RESPIRATION RATE: 20 BRPM | SYSTOLIC BLOOD PRESSURE: 140 MMHG | OXYGEN SATURATION: 100 % | HEIGHT: 59 IN

## 2017-12-06 DIAGNOSIS — R12 HEARTBURN: ICD-10-CM

## 2017-12-06 DIAGNOSIS — K92.1 MELENA: ICD-10-CM

## 2017-12-06 PROCEDURE — 25010000002 ONDANSETRON PER 1 MG: Performed by: NURSE ANESTHETIST, CERTIFIED REGISTERED

## 2017-12-06 PROCEDURE — 43239 EGD BIOPSY SINGLE/MULTIPLE: CPT | Performed by: INTERNAL MEDICINE

## 2017-12-06 PROCEDURE — 25010000002 PROPOFOL 10 MG/ML EMULSION: Performed by: NURSE ANESTHETIST, CERTIFIED REGISTERED

## 2017-12-06 PROCEDURE — 43251 EGD REMOVE LESION SNARE: CPT | Performed by: INTERNAL MEDICINE

## 2017-12-06 PROCEDURE — 88305 TISSUE EXAM BY PATHOLOGIST: CPT | Performed by: INTERNAL MEDICINE

## 2017-12-06 RX ORDER — ONDANSETRON 2 MG/ML
INJECTION INTRAMUSCULAR; INTRAVENOUS AS NEEDED
Status: DISCONTINUED | OUTPATIENT
Start: 2017-12-06 | End: 2017-12-06 | Stop reason: SURG

## 2017-12-06 RX ORDER — SODIUM CHLORIDE 9 MG/ML
70 INJECTION, SOLUTION INTRAVENOUS CONTINUOUS PRN
Status: DISCONTINUED | OUTPATIENT
Start: 2017-12-06 | End: 2017-12-06 | Stop reason: HOSPADM

## 2017-12-06 RX ORDER — PROPOFOL 10 MG/ML
VIAL (ML) INTRAVENOUS AS NEEDED
Status: DISCONTINUED | OUTPATIENT
Start: 2017-12-06 | End: 2017-12-06 | Stop reason: SURG

## 2017-12-06 RX ADMIN — PROPOFOL 40 MG: 10 INJECTION, EMULSION INTRAVENOUS at 08:57

## 2017-12-06 RX ADMIN — PROPOFOL 20 MG: 10 INJECTION, EMULSION INTRAVENOUS at 08:49

## 2017-12-06 RX ADMIN — PROPOFOL 20 MG: 10 INJECTION, EMULSION INTRAVENOUS at 08:51

## 2017-12-06 RX ADMIN — SODIUM CHLORIDE: 9 INJECTION, SOLUTION INTRAVENOUS at 08:32

## 2017-12-06 RX ADMIN — PROPOFOL 20 MG: 10 INJECTION, EMULSION INTRAVENOUS at 08:54

## 2017-12-06 RX ADMIN — PROPOFOL 20 MG: 10 INJECTION, EMULSION INTRAVENOUS at 08:52

## 2017-12-06 RX ADMIN — PROPOFOL 50 MG: 10 INJECTION, EMULSION INTRAVENOUS at 08:43

## 2017-12-06 RX ADMIN — PROPOFOL 50 MG: 10 INJECTION, EMULSION INTRAVENOUS at 08:40

## 2017-12-06 RX ADMIN — ONDANSETRON 4 MG: 2 INJECTION INTRAMUSCULAR; INTRAVENOUS at 08:34

## 2017-12-06 RX ADMIN — LIDOCAINE HYDROCHLORIDE 60 MG: 20 INJECTION, SOLUTION INTRAVENOUS at 08:40

## 2017-12-06 RX ADMIN — PROPOFOL 20 MG: 10 INJECTION, EMULSION INTRAVENOUS at 08:45

## 2017-12-06 NOTE — H&P (VIEW-ONLY)
Chief Complaint   Patient presents with   • Rectal Bleeding   • Constipation   • Heartburn     The patient has had black stools off and on for the past 2-3 weeks. 3 days ago, the patient began having black stools over 2-3 episodes and went to the emergency room, but was subsequently discharged and told to follow up in office. 2 days ago, the patient woke up and had large amount of black stools. She has not had any bowel movements today. The patient denies bright red blood per rectum.     The patient has a recurrent history of constipation. The patient may have a hard stool every day or every other day. She has not taken anything for the constipation.     The patient has a history of heartburn in the past. She rarely has heartburn. Heartburn is well controlled with Omeprazole daily.     There is a long-standing history of anemia. The patient has had anemia since December 1998. She takes Procrit once per month. She does have a history of chronic kidney disease. She is not on dialysis.     The patient denies nausea or vomiting. There is no history of abdominal pain. The patient denies diarrhea. The patient's last colonoscopy was in 2011. There is no family history of colon cancer.    Rectal Bleeding   This is a new problem. The current episode started 1 to 4 weeks ago. The problem occurs intermittently. The problem has been gradually worsening. Associated symptoms include arthralgias, fatigue, joint swelling and weakness. Pertinent negatives include no abdominal pain, chest pain, chills, coughing, fever, headaches, myalgias, nausea, rash or vomiting. Nothing aggravates the symptoms. She has tried nothing for the symptoms.   Constipation   This is a recurrent problem. Episode onset: September 2017. The problem is unchanged. Her stool frequency is 4 to 5 times per week. The stool is described as firm. The patient is not on a high fiber diet. There has been adequate water intake. Associated symptoms include back pain and  hematochezia. Pertinent negatives include no abdominal pain, diarrhea, fever, nausea or vomiting. She has tried nothing for the symptoms.   Heartburn   She complains of heartburn and wheezing. She reports no abdominal pain, no chest pain, no coughing or no nausea. This is a chronic problem. The current episode started more than 1 year ago. The problem occurs rarely. The problem has been gradually improving. The heartburn duration is several minutes. The heartburn is located in the substernum. The heartburn is of mild intensity. The heartburn does not wake her from sleep. Nothing aggravates the symptoms. Associated symptoms include fatigue. There are no known risk factors. She has tried a PPI for the symptoms. The treatment provided significant relief. Past procedures include an EGD (2014).   Anemia   Presents for follow-up visit. Symptoms include bruises/bleeds easily. There has been no abdominal pain, fever or palpitations. Signs of blood loss that are present include hematochezia. Procedure history includes EGD (2014).     Review of Systems   Constitutional: Positive for fatigue. Negative for appetite change, chills, fever and unexpected weight change.   HENT: Negative for mouth sores, nosebleeds and trouble swallowing.    Eyes: Negative for discharge and redness.   Respiratory: Positive for shortness of breath and wheezing. Negative for apnea and cough.    Cardiovascular: Negative for chest pain, palpitations and leg swelling.   Gastrointestinal: Positive for constipation, heartburn and hematochezia. Negative for abdominal distention, abdominal pain, anal bleeding, blood in stool, diarrhea, nausea and vomiting.   Endocrine: Negative for cold intolerance, heat intolerance and polydipsia.   Genitourinary: Negative for dysuria, hematuria and urgency.   Musculoskeletal: Positive for arthralgias, back pain and joint swelling. Negative for myalgias.   Skin: Negative for rash.   Allergic/Immunologic: Negative for food  allergies and immunocompromised state.   Neurological: Positive for weakness. Negative for dizziness, seizures, syncope and headaches.   Hematological: Negative for adenopathy. Bruises/bleeds easily.   Psychiatric/Behavioral: Negative for dysphoric mood. The patient is not nervous/anxious and is not hyperactive.      Patient Active Problem List   Diagnosis   • Arthralgia of shoulder region   • Tendinopathy of rotator cuff   • Osteoarthritis of right acromioclavicular joint   • Anemia due to stage 4 chronic kidney disease treated with erythropoietin   • Anemia   • Gastric polyp   • Heartburn   • Melena   • Constipation     Past Medical History:   Diagnosis Date   • Anemia 1998   • Arthritis    • Backache    • CAD (coronary artery disease)    • Disease of thyroid gland    • Fracture, radius 2016    right distal radius and ulna, healed.   • GERD (gastroesophageal reflux disease)    • Gout    • High cholesterol    • Hyperparathyroidism    • Hypertension    • Iron deficiency    • Osteoarthritis    • Osteoarthritis of knees, bilateral     Both knees Supartz injection series August, 2015   • Osteoporosis    • Personal history of cardiac murmur    • Renal disease    • Rheumatic heart disease     Personal history   • Rotator cuff tendonitis     right    • Rupture of right proximal biceps tendon     chronic   • Subacromial bursitis     right    • Valvular heart disease    • Vitamin B12 deficiency      Past Surgical History:   Procedure Laterality Date   • CATARACT EXTRACTION  2014   • COLONOSCOPY  2011   • EYE SURGERY     • UPPER GASTROINTESTINAL ENDOSCOPY  08/18/2014     Family History   Problem Relation Age of Onset   • Liver cancer Maternal Grandmother    • Gout Other    • Osteoporosis Other    • Stroke Other    • Ulcers Other    • Asthma Other    • Hypertension Other    • Heart disease Other    • Osteoarthritis Other    • Colon cancer Neg Hx      Social History   Substance Use Topics   • Smoking status: Never Smoker   •  Smokeless tobacco: Never Used   • Alcohol use No       Current Outpatient Prescriptions:   •  allopurinol (ZYLOPRIM) 100 MG tablet, Take 1 tablet by mouth daily., Disp: , Rfl:   •  amoxicillin-clavulanate (AUGMENTIN) 875-125 MG per tablet, Take 1 tablet by mouth 2 (Two) Times a Day., Disp: , Rfl:   •  aspirin 81 MG tablet, Take 1 tablet by mouth daily., Disp: , Rfl:   •  calcitriol (ROCALTROL) 0.25 MCG capsule, Take  by mouth., Disp: , Rfl:   •  carvedilol (COREG) 12.5 MG tablet, 12.5 mg., Disp: , Rfl:   •  cholecalciferol (VITAMIN D3) 1000 UNITS tablet, Take 1 tablet by mouth daily., Disp: , Rfl:   •  Cyanocobalamin (VITAMIN B-12 PO), Take  by mouth., Disp: , Rfl:   •  epoetin ovidio (PROCRIT) 55996 UNIT/ML injection, Procrit SOLN; Patient Sig: Procrit KYRIE ; 0; 16-Jul-2014; Active, Disp: , Rfl:   •  fluticasone (FLONASE) 50 MCG/ACT nasal spray, , Disp: , Rfl:   •  hydroxychloroquine (PLAQUENIL) 200 MG tablet, Take 1 tablet by mouth daily., Disp: , Rfl:   •  Hypromellose (GENTEAL MILD) 0.2 % solution, Apply  to eye., Disp: , Rfl:   •  irbesartan (AVAPRO) 150 MG tablet, , Disp: , Rfl:   •  Loratadine (CLARITIN) 10 MG capsule, Take 1 capsule by mouth as needed., Disp: , Rfl:   •  omeprazole (PriLOSEC) 40 MG capsule, Take 1 capsule by mouth daily., Disp: , Rfl:   •  permethrin (ELIMITE) 5 % cream, Apply thin layer from head to toe and leave on for 12 - 14 hours then rinse off in AM, Disp: 1 each, Rfl: 0  •  predniSONE (DELTASONE) 2.5 MG tablet, Take 1 tablet by mouth daily., Disp: , Rfl:   •  rosuvastatin (CRESTOR) 10 MG tablet, Take 1 tablet by mouth daily., Disp: , Rfl:   •  torsemide (DEMADEX) 20 MG tablet, Take 1 tablet by mouth 2 (two) times a week., Disp: , Rfl:   •  VENTOLIN  (90 BASE) MCG/ACT inhaler, , Disp: , Rfl:   •  irbesartan (AVAPRO) 300 MG tablet, Take 1 tablet by mouth daily., Disp: , Rfl:   No current facility-administered medications for this visit.     Allergies   Allergen Reactions   •  "Ferrlecit [Na Ferric Gluc Cplx In Sucrose]    • Lisinopril    • Ranitidine Hcl      /68  Pulse 75  Temp 98.6 °F (37 °C)  Resp 15  Ht 59\" (149.9 cm)  Wt 117 lb (53.1 kg)  LMP  (LMP Unknown)  BMI 23.63 kg/m2    Physical Exam   Constitutional: She is oriented to person, place, and time. She appears well-developed and well-nourished. No distress.   HENT:   Head: Normocephalic and atraumatic.   Right Ear: Hearing and external ear normal.   Left Ear: Hearing and external ear normal.   Nose: Nose normal.   Mouth/Throat: Oropharynx is clear and moist and mucous membranes are normal. Mucous membranes are not pale, not dry and not cyanotic. No oral lesions. No oropharyngeal exudate.   Eyes: Conjunctivae and EOM are normal. Right eye exhibits no discharge. Left eye exhibits no discharge.   Neck: Trachea normal. Neck supple. No JVD present. No edema present. No thyroid mass and no thyromegaly present.   Cardiovascular: Normal rate, regular rhythm, S2 normal and normal heart sounds.  Exam reveals no gallop, no S3 and no friction rub.    No murmur heard.  Pulmonary/Chest: Effort normal and breath sounds normal. No respiratory distress. She exhibits no tenderness.   Abdominal: Normal appearance and bowel sounds are normal. She exhibits no distension, no ascites and no mass. There is no splenomegaly or hepatomegaly. There is no tenderness. There is no rigidity, no rebound and no guarding. No hernia.       Vascular Status -  Her exam exhibits no right foot edema. Her exam exhibits no left foot edema.  Lymphadenopathy:     She has no cervical adenopathy.        Left: No supraclavicular adenopathy present.   Neurological: She is alert and oriented to person, place, and time. She has normal strength. No cranial nerve deficit or sensory deficit.   Skin: No rash noted. She is not diaphoretic. No cyanosis. No pallor. Nails show no clubbing.   Psychiatric: She has a normal mood and affect.   Nursing note and vitals " reviewed.  Stigmata of chronic liver disease:  None.  Asterixis:  None.    Laboratory Results:  Upon review of records:    Dated 12/2/2017 glucose 90 BUN 44 creatinine 2.6 sodium 140 potassium 3.8 chloride 104 CO2 26 calcium 9.8 albumin 3.6 ALT 40 AST 34 upon phosphatase 66 total bilirubin 0.6 WBC 5.88 hemoglobin 8.8 hematocrit 28.6 platelet count 201 MCV 96.3 lipase 219 fecal occult blood: Negative    Dated 12/4/2017 glucose 104 BUN 35 creatinine 2.6 sodium 139 potassium 4.5 chloride 102 CO2 26 calcium 10.2 albumin 3.7 phosphorus 3.3 WBC 5.01 hemoglobin 9.5 hematocrit 30.8 platelet count 217 MCV 96.3    Abdominal Imaging:  Upon review of records:    CT of abdomen and pelvis without contrast dated 12/2/2017 reveals lung bases are clear. There is a small sliding-type hiatal hernia. The liver is normal in size and attenuation with a benign-appearing cyst. The spleen is unremarkable.  The adrenals are normal.  The aorta is normal in caliber. There is no hydronephrosis. There is no nephrolithiasis. There are bilateral simple and complex renal cysts with the largest measuring 7.2 x 6.0 cm on the right. The appendix is normal.  The urinary bladder is unremarkable. There is no free fluid or adenopathy. A pessary device is present. There are bilateral L5 pars defects with advanced changes of osteoarthritis    Procedures:  Upon review of records:    EGD dated 08/18/2014 revealed: Schatzki ring postdilation to 18 mm serially. Sliding hiatal hernia (less than 3 cm). Multiple gastric polyps. Some of them were noted to be rather vascular status post multiple hot snare polypectomies as above. Erythematous gastritis. No Sanedrs mucosa was seen. Second portion of duodenum biopsy revealed no pathologic abnormalities. Gastric polyp revealed benign gastric fundic-type polyps. No atypia or inflammation present.     Assessment and Plan:    Carolyne was seen today for rectal bleeding, constipation and heartburn.    Diagnoses and all orders  for this visit:    Anemia, unspecified type  Comments:  History of long-standing anemia. Of interest, the patient has a long-standing history of chronic kidney disease.    Melena  Comments:  Differentials include peptic ulcer disease.    Heartburn  Comments:  History of long-standing heartburn. Well controlled with Omeprazole daily. Concerns for underlying Sanders's.    Constipation, unspecified constipation type  Comments:  History of recurrent constipation. This is described as a change in bowel habits.    Colon cancer screening  Comments:  Last colonoscopy was in 2011. There is no family history of colon cancer.        Plan  and Patient Instructions:   Patient Instructions   1. Antireflux measures: Avoid fried, fatty foods, alcohol, chocolate, coffee, tea,  soft drinks, peppermint and spearmint, spicy foods, tomatoes and tomato based foods, onion based foods, and smoking. Other antireflux measures include weight reduction if overweight, avoiding tight clothing around the abdomen, elevating the head of the bed 6 inches with blocks under the head board, and don't drink or eat before going to bed and avoid lying down immediately after meals..  2. Omeprazole 40 mg 1 po daily in the am 30 minutes before breakfast.  3. High fiber diet with liberal water intake. Discussed in detail.  4. Upper endoscopy-EGD: Description of the procedure, risks, benefits, alternatives and options, including nonoperative options, were discussed with the patient in detail. The patient understands and wishes to proceed.  5. Colonoscopy: Description of the procedure, risks, benefits, alternatives and options, including nonoperative options, were discussed with the patient in detail. The patient understands and wishes to proceed.    Elsi Sánchez, APRN

## 2017-12-06 NOTE — OP NOTE
PROCEDURE:  Upper Endoscopy with multiple hot snare polypectomies and retrieval with Chow net and biopsies.    DATE OF PROCEDURE: December 6, 2017.    REFERRING PROVIDER:  Jerald Dawkins MD.     INSTRUMENT:  Olympus GIF H 190 video endoscope     INDICATIONS OF THE PROCEDURE:    This is a 77-year-old white female with history of reflux, anemia, and melena.  Currently undergoing upper endoscopy for further evaluation.   BIOPSIES: Second portion of duodenum.  Gastric antrum, angularis and body of the stomach.       MEDICATIONS:  MAC.     PHOTOGRAPHS:  Photographs were included in the medical records.     CONSENT/PREPROCEDURE EVALUATION:  Risks, benefits, alternatives and options of the procedure including risks of anesthesia/sedation were discussed and informed consent was obtained prior to the procedure. History and physical examination were performed and nothing precluded the test.     REPORT:  The patient was placed in left lateral decubitus position. Once under the influence of IV sedation, the instrument was inserted into the mouth and esophagus was intubated under direct vision without difficulty.     Esophagus:  Z line was noted to be around  36 cm. a nonobstructive Schatzki's-type ring was seen.   A small sliding hiatal hernia less than 3 cm was noted.  No Sanders's esophagus was seen.     Stomach:  Antrum:  Erythematous gastritis.  Angulus, lesser and greater curves: Normal.  Retroflex examination: Sliding hiatal hernia.  Cardia and fundus:  Gastric polyps.     Body of the stomach: Erythematous gastritis.  Good distensibility of the stomach was achieved no giant folds were noted.  Evidence of old healed ulceration was seen.  Multiple gastric polyps 2-5 mm somewhat increased vascularity were noted involving the body of the stomach and adjacent fundus.  31 were removed with hot snare and recovered with Chow net.  Additional, biopsies were obtained from the gastric antrum, angularis and body of the  stomach.    Pylorus and pyloric channel:  normal.     Duodenum:  Bulb: Normal.  Second portion: normal.  No scalloping was seen in the second portion of duodenum.  Biopsies were obtained from the second portion of duodenum.    Intervention:  Multiple hot snare polypectomies of the larger vascular polyps at the body of the stomach and fundus.  31 were removed.  Chow net was used to recovered.      The upper GI tract was decompressed and the scope was pulled out of the patient. The patient tolerated the procedure well.     DIAGNOSES:     1. Nonobstructive Schatzki's ring.  2. Small sliding hiatal hernia less than 3 cm.  3. Erythematous gastritis with evidence of old healed ulceration.  4. Multiple vascular gastric polyps.  Vascular polyps can contribute to chronic blood loss and anemia.      RECOMMENDATIONS:  1.  Dietary instructions.  2.  Omeprazole 40 mg 1 p.o. q.a.m. 1/2 hour before breakfast.  3.  Follow biopsies.  4.  Follow up in office.     Thank you very much for letting me participate in the care of this patient. Please do not hesitate to call me if you have any questions.

## 2017-12-06 NOTE — PLAN OF CARE
Problem: GI Endoscopy (Adult)  Goal: Signs and Symptoms of Listed Potential Problems Will be Absent or Manageable (GI Endoscopy)  Outcome: Ongoing (interventions implemented as appropriate)    12/06/17 0740   GI Endoscopy   Problems Assessed (GI Endoscopy) all   Problems Present (GI Endoscopy) none

## 2017-12-06 NOTE — ANESTHESIA PREPROCEDURE EVALUATION
Anesthesia Evaluation     Patient summary reviewed and Nursing notes reviewed   history of anesthetic complications: PONV  NPO Solid Status: > 8 hours  NPO Liquid Status: > 8 hours     Airway   Mallampati: I  TM distance: >3 FB  Neck ROM: full  no difficulty expected  Dental - normal exam     Pulmonary - normal exam   (+) recent URI resolved,   Cardiovascular - normal exam    (+) hypertension, hyperlipidemia      Neuro/Psych- negative ROS  GI/Hepatic/Renal/Endo    (+)  GERD, hyperthyroidism    Musculoskeletal (-) negative ROS    Abdominal    Substance History - negative use     OB/GYN negative ob/gyn ROS         Other - negative ROS                                       Anesthesia Plan    ASA 3     MAC     intravenous induction   Anesthetic plan and risks discussed with patient.

## 2017-12-06 NOTE — ANESTHESIA POSTPROCEDURE EVALUATION
Patient: Carolyne Martins    Procedure Summary     Date Anesthesia Start Anesthesia Stop Room / Location    12/06/17 0832  [facility] ENDOSCOPY 2 / [facility] ENDOSCOPY       Procedure Diagnosis Surgeon Provider    ESOPHAGOGASTRODUODENOSCOPY with biopsies and hot snare polypectomies (N/A Esophagus) Heartburn; Melena  (Heartburn [R12]; Melena [K92.1]) MD Bryan Salazar CRNA          Anesthesia Type: MAC  Last vitals  BP   143\71   Temp   98   Pulse   80   Resp   18 (12/06/17 0724)16   SpO2   99     Post Anesthesia Care and Evaluation    Patient location during evaluation: PHASE II  Patient participation: complete - patient participated  Level of consciousness: awake and alert  Pain score: 0  Pain management: satisfactory to patient  Airway patency: patent  Anesthetic complications: No anesthetic complications  PONV Status: none  Cardiovascular status: acceptable and stable  Respiratory status: acceptable and nasal cannula  Hydration status: acceptable

## 2017-12-06 NOTE — DISCHARGE INSTRUCTIONS
Postprocedure instructions.  Nothing by mouth until fully alert.  Once fully alert may have clear liquids.  Avoid soda beverages.  May advance diet as tolerated.  Bedrest until fully alert.  Follow-up precautions.    Diet:   Low fat diet.  Limit Coffee, Chocolate, Fried Foods,   Avoid Sodas,High Fat Foods, Cookies, Excessive Tomato or Onions, Alcohol and Smoking).      Treatments:    Other Instructions:  Call [facility] at [phone] or come to the Emergency Department if you experience the following: Chest pain, abdominal pain, bleeding (vomiting of blood or coffee colored material, black stools or trinh blood in stools), fever/chills, nausea and vomiting or dizziness.      Follow-up:    DR. KENNY BUTTS in 4 weeks.Office phone # [phone].  If you already have an appointment keep the appointment.

## 2017-12-11 LAB
LAB AP CASE REPORT: NORMAL
Lab: NORMAL
PATH REPORT.FINAL DX SPEC: NORMAL

## 2017-12-14 ENCOUNTER — TELEPHONE (OUTPATIENT)
Dept: GASTROENTEROLOGY | Facility: CLINIC | Age: 77
End: 2017-12-14

## 2017-12-14 NOTE — TELEPHONE ENCOUNTER
Reviewed pathology of recent procedure. Patient verbalized understanding and will follow-up in office for further discussion.

## 2018-01-03 ENCOUNTER — HOSPITAL ENCOUNTER (OUTPATIENT)
Dept: INFUSION THERAPY | Facility: HOSPITAL | Age: 78
Discharge: HOME | End: 2018-01-03
Admitting: INTERNAL MEDICINE

## 2018-01-03 VITALS
RESPIRATION RATE: 20 BRPM | OXYGEN SATURATION: 99 % | DIASTOLIC BLOOD PRESSURE: 71 MMHG | TEMPERATURE: 97.6 F | HEART RATE: 76 BPM | SYSTOLIC BLOOD PRESSURE: 162 MMHG

## 2018-01-03 DIAGNOSIS — D63.1 ANEMIA DUE TO STAGE 4 CHRONIC KIDNEY DISEASE TREATED WITH ERYTHROPOIETIN (HCC): ICD-10-CM

## 2018-01-03 DIAGNOSIS — N18.4 ANEMIA DUE TO STAGE 4 CHRONIC KIDNEY DISEASE TREATED WITH ERYTHROPOIETIN (HCC): ICD-10-CM

## 2018-01-03 LAB
DEPRECATED RDW RBC AUTO: 58.5 FL (ref 37–54)
ERYTHROCYTE [DISTWIDTH] IN BLOOD BY AUTOMATED COUNT: 16.7 % (ref 11.5–14.5)
HCT VFR BLD AUTO: 29.9 % (ref 37–47)
HGB BLD-MCNC: 9.2 G/DL (ref 12–16)
MCH RBC QN AUTO: 29.5 PG (ref 27–31)
MCHC RBC AUTO-ENTMCNC: 30.8 G/DL (ref 30–37)
MCV RBC AUTO: 95.8 FL (ref 81–99)
PLATELET # BLD AUTO: 226 10*3/MM3 (ref 130–400)
PMV BLD AUTO: 10.1 FL (ref 6–12)
RBC # BLD AUTO: 3.12 10*6/MM3 (ref 4.2–5.4)
WBC NRBC COR # BLD: 7.79 10*3/MM3 (ref 4.8–10.8)

## 2018-01-03 PROCEDURE — 96372 THER/PROPH/DIAG INJ SC/IM: CPT

## 2018-01-03 PROCEDURE — 85027 COMPLETE CBC AUTOMATED: CPT | Performed by: INTERNAL MEDICINE

## 2018-01-03 PROCEDURE — 63510000001 EPOETIN ALFA PER 1000 UNITS: Performed by: PHYSICIAN ASSISTANT

## 2018-01-03 PROCEDURE — 36415 COLL VENOUS BLD VENIPUNCTURE: CPT

## 2018-01-03 RX ORDER — BUDESONIDE AND FORMOTEROL FUMARATE DIHYDRATE 80; 4.5 UG/1; UG/1
2 AEROSOL RESPIRATORY (INHALATION) DAILY
COMMUNITY
End: 2023-02-22 | Stop reason: HOSPADM

## 2018-01-03 RX ADMIN — ERYTHROPOIETIN 40000 UNITS: 40000 INJECTION, SOLUTION INTRAVENOUS; SUBCUTANEOUS at 12:05

## 2018-01-09 ENCOUNTER — ANESTHESIA (OUTPATIENT)
Dept: GASTROENTEROLOGY | Facility: HOSPITAL | Age: 78
End: 2018-01-09

## 2018-01-09 ENCOUNTER — ANESTHESIA EVENT (OUTPATIENT)
Dept: GASTROENTEROLOGY | Facility: HOSPITAL | Age: 78
End: 2018-01-09

## 2018-01-09 ENCOUNTER — HOSPITAL ENCOUNTER (OUTPATIENT)
Facility: HOSPITAL | Age: 78
Setting detail: HOSPITAL OUTPATIENT SURGERY
Discharge: HOME | End: 2018-01-09
Attending: INTERNAL MEDICINE | Admitting: INTERNAL MEDICINE

## 2018-01-09 VITALS
TEMPERATURE: 97.3 F | BODY MASS INDEX: 22.38 KG/M2 | WEIGHT: 111 LBS | SYSTOLIC BLOOD PRESSURE: 183 MMHG | RESPIRATION RATE: 18 BRPM | HEIGHT: 59 IN | HEART RATE: 66 BPM | OXYGEN SATURATION: 97 % | DIASTOLIC BLOOD PRESSURE: 76 MMHG

## 2018-01-09 DIAGNOSIS — R19.4 CHANGE IN BOWEL HABITS: ICD-10-CM

## 2018-01-09 DIAGNOSIS — D64.9 ANEMIA, UNSPECIFIED TYPE: ICD-10-CM

## 2018-01-09 DIAGNOSIS — Z12.11 COLON CANCER SCREENING: ICD-10-CM

## 2018-01-09 DIAGNOSIS — K59.00 CONSTIPATION, UNSPECIFIED CONSTIPATION TYPE: ICD-10-CM

## 2018-01-09 PROCEDURE — 45380 COLONOSCOPY AND BIOPSY: CPT | Performed by: INTERNAL MEDICINE

## 2018-01-09 PROCEDURE — 25010000002 PROPOFOL 200 MG/20ML EMULSION: Performed by: NURSE ANESTHETIST, CERTIFIED REGISTERED

## 2018-01-09 PROCEDURE — 88305 TISSUE EXAM BY PATHOLOGIST: CPT | Performed by: INTERNAL MEDICINE

## 2018-01-09 PROCEDURE — 45390 COLONOSCOPY W/RESECTION: CPT | Performed by: INTERNAL MEDICINE

## 2018-01-09 PROCEDURE — 45382 COLONOSCOPY W/CONTROL BLEED: CPT | Performed by: INTERNAL MEDICINE

## 2018-01-09 PROCEDURE — S0260 H&P FOR SURGERY: HCPCS | Performed by: INTERNAL MEDICINE

## 2018-01-09 PROCEDURE — 45388 COLONOSCOPY W/ABLATION: CPT | Performed by: INTERNAL MEDICINE

## 2018-01-09 DEVICE — DEV CLIP ENDO RESOLUTION360 CONTRL ROT 235CM: Type: IMPLANTABLE DEVICE | Status: FUNCTIONAL

## 2018-01-09 RX ORDER — PROPOFOL 10 MG/ML
INJECTION, EMULSION INTRAVENOUS AS NEEDED
Status: DISCONTINUED | OUTPATIENT
Start: 2018-01-09 | End: 2018-01-09 | Stop reason: SURG

## 2018-01-09 RX ORDER — SODIUM CHLORIDE 9 MG/ML
70 INJECTION, SOLUTION INTRAVENOUS CONTINUOUS PRN
Status: DISCONTINUED | OUTPATIENT
Start: 2018-01-09 | End: 2018-01-09 | Stop reason: HOSPADM

## 2018-01-09 RX ADMIN — PROPOFOL 50 MG: 10 INJECTION, EMULSION INTRAVENOUS at 09:05

## 2018-01-09 RX ADMIN — PROPOFOL 50 MG: 10 INJECTION, EMULSION INTRAVENOUS at 08:55

## 2018-01-09 RX ADMIN — SODIUM CHLORIDE 70 ML/HR: 9 INJECTION, SOLUTION INTRAVENOUS at 07:44

## 2018-01-09 RX ADMIN — PROPOFOL 50 MG: 10 INJECTION, EMULSION INTRAVENOUS at 08:35

## 2018-01-09 RX ADMIN — PROPOFOL 50 MG: 10 INJECTION, EMULSION INTRAVENOUS at 08:30

## 2018-01-09 RX ADMIN — PROPOFOL 50 MG: 10 INJECTION, EMULSION INTRAVENOUS at 08:45

## 2018-01-09 NOTE — ANESTHESIA POSTPROCEDURE EVALUATION
Patient: Carolyne Martins    Procedure Summary     Date Anesthesia Start Anesthesia Stop Room / Location    01/09/18 0825 [id number] [facility] ENDOSCOPY 2 / [facility] ENDOSCOPY       Procedure Diagnosis Surgeon Provider    COLONOSCOPY with endoscopic mucosal resection (hot snare), normal saline submucosal injection, argon thermal ablation, resolution clip placement x4, and cold biopsy polypectomy (N/A Anus) Change in bowel habits; Colon cancer screening; Constipation, unspecified constipation type; Anemia, unspecified type  (Change in bowel habits [R19.4]; Colon cancer screening [Z12.11]; Constipation, unspecified constipation type [K59.00]; Anemia, unspecified type [D64.9]) MD Jesus Salazar CRNA          Anesthesia Type: MAC  Last vitals  BP   (!) 183/76 (pt. reports B/P running high lately) (01/09/18 1000)   Temp   97.3 °F (36.3 °C) (01/09/18 1000)   Pulse   66 (01/09/18 1000)   Resp   18 (01/09/18 1000)     SpO2   97 % (01/09/18 1000)     Post Anesthesia Care and Evaluation    Patient location during evaluation: bedside  Patient participation: complete - patient participated  Level of consciousness: awake  Pain score: 0  Pain management: adequate  Airway patency: patent  Anesthetic complications: No anesthetic complications  PONV Status: controlled  Cardiovascular status: acceptable and stable  Respiratory status: acceptable and room air  Hydration status: acceptable

## 2018-01-09 NOTE — ANESTHESIA PREPROCEDURE EVALUATION
Anesthesia Evaluation     Patient summary reviewed and Nursing notes reviewed   history of anesthetic complications (Hx spinal headache):  NPO Solid Status: > 8 hours  NPO Liquid Status: > 8 hours     Airway   Mallampati: I  TM distance: >3 FB  Neck ROM: full  Dental - normal exam     Pulmonary - normal exam   (+) recent URI resolved,   (-) not a smoker  Cardiovascular - normal exam    Patient on routine beta blocker and Beta blocker given within 24 hours of surgery    (+) hypertension, CAD (states ST was OK per Dr Acuña), hyperlipidemia      Neuro/Psych- negative ROS  GI/Hepatic/Renal/Endo    (+)  GERD, renal disease (stage IV renal failure) CRI, hyperthyroidism    Musculoskeletal     Abdominal    Substance History - negative use     OB/GYN negative ob/gyn ROS         Other   (+) arthritis                                             Anesthesia Plan    ASA 3     MAC   (Pt told that intravenous sedation will be used as the primary anesthetic. Every effort will be made to make sure the patient is comfortable.     The patient was told they may or may not have recall for the procedure.  Will proceed with the plan of care.)  intravenous induction   Anesthetic plan and risks discussed with patient.

## 2018-01-14 LAB
LAB AP CASE REPORT: NORMAL
Lab: NORMAL
PATH REPORT.FINAL DX SPEC: NORMAL

## 2018-02-04 ENCOUNTER — APPOINTMENT (OUTPATIENT)
Dept: CT IMAGING | Facility: HOSPITAL | Age: 78
End: 2018-02-04

## 2018-02-04 ENCOUNTER — APPOINTMENT (OUTPATIENT)
Dept: GENERAL RADIOLOGY | Facility: HOSPITAL | Age: 78
End: 2018-02-04

## 2018-02-04 ENCOUNTER — HOSPITAL ENCOUNTER (EMERGENCY)
Facility: HOSPITAL | Age: 78
Discharge: HOME | End: 2018-02-04
Attending: EMERGENCY MEDICINE | Admitting: EMERGENCY MEDICINE

## 2018-02-04 VITALS
TEMPERATURE: 97.7 F | OXYGEN SATURATION: 98 % | SYSTOLIC BLOOD PRESSURE: 157 MMHG | BODY MASS INDEX: 22.38 KG/M2 | RESPIRATION RATE: 18 BRPM | HEART RATE: 64 BPM | HEIGHT: 59 IN | DIASTOLIC BLOOD PRESSURE: 83 MMHG | WEIGHT: 111 LBS

## 2018-02-04 DIAGNOSIS — N30.00 ACUTE CYSTITIS WITHOUT HEMATURIA: Primary | ICD-10-CM

## 2018-02-04 DIAGNOSIS — D64.9 CHRONIC ANEMIA: ICD-10-CM

## 2018-02-04 DIAGNOSIS — N18.9 CHRONIC RENAL FAILURE, UNSPECIFIED CKD STAGE: ICD-10-CM

## 2018-02-04 LAB
ALBUMIN SERPL-MCNC: 3.4 G/DL (ref 3.5–5)
ALBUMIN/GLOB SERPL: 1.4 G/DL (ref 1–2)
ALP SERPL-CCNC: 64 U/L (ref 38–126)
ALT SERPL W P-5'-P-CCNC: 36 U/L (ref 13–69)
ANION GAP SERPL CALCULATED.3IONS-SCNC: 11.1 MMOL/L
AST SERPL-CCNC: 29 U/L (ref 15–46)
BACTERIA UR QL AUTO: ABNORMAL /HPF
BASOPHILS # BLD AUTO: 0.01 10*3/MM3 (ref 0–0.2)
BASOPHILS NFR BLD AUTO: 0.2 % (ref 0–2.5)
BILIRUB SERPL-MCNC: 0.3 MG/DL (ref 0.2–1.3)
BILIRUB UR QL STRIP: NEGATIVE
BUN BLD-MCNC: 47 MG/DL (ref 7–20)
BUN/CREAT SERPL: 18.1 (ref 7.1–23.5)
CALCIUM SPEC-SCNC: 10.3 MG/DL (ref 8.4–10.2)
CHLORIDE SERPL-SCNC: 103 MMOL/L (ref 98–107)
CLARITY UR: ABNORMAL
CO2 SERPL-SCNC: 29 MMOL/L (ref 26–30)
COLOR UR: YELLOW
CREAT BLD-MCNC: 2.6 MG/DL (ref 0.6–1.3)
DEPRECATED RDW RBC AUTO: 55.7 FL (ref 37–54)
EOSINOPHIL # BLD AUTO: 0.24 10*3/MM3 (ref 0–0.7)
EOSINOPHIL NFR BLD AUTO: 5.6 % (ref 0–7)
ERYTHROCYTE [DISTWIDTH] IN BLOOD BY AUTOMATED COUNT: 15.8 % (ref 11.5–14.5)
FLUAV AG NPH QL: NEGATIVE
FLUBV AG NPH QL IA: NEGATIVE
GFR SERPL CREATININE-BSD FRML MDRD: 18 ML/MIN/1.73
GLOBULIN UR ELPH-MCNC: 2.5 GM/DL
GLUCOSE BLD-MCNC: 96 MG/DL (ref 74–98)
GLUCOSE UR STRIP-MCNC: NEGATIVE MG/DL
HCT VFR BLD AUTO: 27.8 % (ref 37–47)
HGB BLD-MCNC: 8.8 G/DL (ref 12–16)
HGB UR QL STRIP.AUTO: ABNORMAL
HYALINE CASTS UR QL AUTO: ABNORMAL /LPF
IMM GRANULOCYTES # BLD: 0.01 10*3/MM3 (ref 0–0.06)
IMM GRANULOCYTES NFR BLD: 0.2 % (ref 0–0.6)
KETONES UR QL STRIP: NEGATIVE
LEUKOCYTE ESTERASE UR QL STRIP.AUTO: ABNORMAL
LYMPHOCYTES # BLD AUTO: 1.11 10*3/MM3 (ref 0.6–3.4)
LYMPHOCYTES NFR BLD AUTO: 25.9 % (ref 10–50)
MCH RBC QN AUTO: 30.2 PG (ref 27–31)
MCHC RBC AUTO-ENTMCNC: 31.7 G/DL (ref 30–37)
MCV RBC AUTO: 95.5 FL (ref 81–99)
MONOCYTES # BLD AUTO: 0.54 10*3/MM3 (ref 0–0.9)
MONOCYTES NFR BLD AUTO: 12.6 % (ref 0–12)
NEUTROPHILS # BLD AUTO: 2.37 10*3/MM3 (ref 2–6.9)
NEUTROPHILS NFR BLD AUTO: 55.5 % (ref 37–80)
NITRITE UR QL STRIP: NEGATIVE
NRBC BLD MANUAL-RTO: 0 /100 WBC (ref 0–0)
PH UR STRIP.AUTO: 6 [PH] (ref 5–8)
PLATELET # BLD AUTO: 189 10*3/MM3 (ref 130–400)
PMV BLD AUTO: 9.7 FL (ref 6–12)
POTASSIUM BLD-SCNC: 4.1 MMOL/L (ref 3.5–5.1)
PROT SERPL-MCNC: 5.9 G/DL (ref 6.3–8.2)
PROT UR QL STRIP: ABNORMAL
RBC # BLD AUTO: 2.91 10*6/MM3 (ref 4.2–5.4)
RBC # UR: ABNORMAL /HPF
REF LAB TEST METHOD: ABNORMAL
SODIUM BLD-SCNC: 139 MMOL/L (ref 137–145)
SP GR UR STRIP: 1.02 (ref 1–1.03)
SQUAMOUS #/AREA URNS HPF: ABNORMAL /HPF
TROPONIN I SERPL-MCNC: 0.02 NG/ML (ref 0–0.03)
TROPONIN I SERPL-MCNC: 0.02 NG/ML (ref 0–0.03)
UROBILINOGEN UR QL STRIP: ABNORMAL
WBC NRBC COR # BLD: 4.28 10*3/MM3 (ref 4.8–10.8)
WBC UR QL AUTO: ABNORMAL /HPF

## 2018-02-04 PROCEDURE — 84484 ASSAY OF TROPONIN QUANT: CPT | Performed by: EMERGENCY MEDICINE

## 2018-02-04 PROCEDURE — 25010000002 CEFTRIAXONE PER 250 MG: Performed by: EMERGENCY MEDICINE

## 2018-02-04 PROCEDURE — 80053 COMPREHEN METABOLIC PANEL: CPT | Performed by: EMERGENCY MEDICINE

## 2018-02-04 PROCEDURE — 87077 CULTURE AEROBIC IDENTIFY: CPT | Performed by: EMERGENCY MEDICINE

## 2018-02-04 PROCEDURE — 85025 COMPLETE CBC W/AUTO DIFF WBC: CPT | Performed by: EMERGENCY MEDICINE

## 2018-02-04 PROCEDURE — 81001 URINALYSIS AUTO W/SCOPE: CPT | Performed by: EMERGENCY MEDICINE

## 2018-02-04 PROCEDURE — 93005 ELECTROCARDIOGRAM TRACING: CPT | Performed by: EMERGENCY MEDICINE

## 2018-02-04 PROCEDURE — 96365 THER/PROPH/DIAG IV INF INIT: CPT

## 2018-02-04 PROCEDURE — 87147 CULTURE TYPE IMMUNOLOGIC: CPT | Performed by: EMERGENCY MEDICINE

## 2018-02-04 PROCEDURE — 87186 SC STD MICRODIL/AGAR DIL: CPT | Performed by: EMERGENCY MEDICINE

## 2018-02-04 PROCEDURE — 87804 INFLUENZA ASSAY W/OPTIC: CPT | Performed by: EMERGENCY MEDICINE

## 2018-02-04 PROCEDURE — 70450 CT HEAD/BRAIN W/O DYE: CPT

## 2018-02-04 PROCEDURE — 71045 X-RAY EXAM CHEST 1 VIEW: CPT

## 2018-02-04 PROCEDURE — 99284 EMERGENCY DEPT VISIT MOD MDM: CPT

## 2018-02-04 PROCEDURE — 87086 URINE CULTURE/COLONY COUNT: CPT | Performed by: EMERGENCY MEDICINE

## 2018-02-04 RX ORDER — CEPHALEXIN 500 MG/1
500 CAPSULE ORAL 2 TIMES DAILY
Qty: 20 CAPSULE | Refills: 0 | Status: SHIPPED | OUTPATIENT
Start: 2018-02-04 | End: 2018-02-06 | Stop reason: ALTCHOICE

## 2018-02-04 RX ORDER — SODIUM CHLORIDE 0.9 % (FLUSH) 0.9 %
10 SYRINGE (ML) INJECTION AS NEEDED
Status: DISCONTINUED | OUTPATIENT
Start: 2018-02-04 | End: 2018-02-04 | Stop reason: HOSPADM

## 2018-02-04 RX ORDER — CLONIDINE HYDROCHLORIDE 0.1 MG/1
0.1 TABLET ORAL ONCE
Status: COMPLETED | OUTPATIENT
Start: 2018-02-04 | End: 2018-02-04

## 2018-02-04 RX ADMIN — CEFTRIAXONE 1 G: 1 INJECTION, SOLUTION INTRAVENOUS at 04:45

## 2018-02-04 RX ADMIN — CLONIDINE HYDROCHLORIDE 0.1 MG: 0.1 TABLET ORAL at 04:07

## 2018-02-04 NOTE — ED PROVIDER NOTES
Subjective   Patient is a 77 y.o. female presenting with dizziness.   History provided by:  Patient   used: No    Dizziness   Quality:  Lightheadedness  Severity:  Moderate  Onset quality:  Sudden  Timing:  Intermittent  Progression:  Resolved  Chronicity:  New  Context: standing up    Relieved by:  Nothing  Worsened by:  Nothing  Ineffective treatments:  None tried  Associated symptoms: no chest pain, no diarrhea, no headaches, no hearing loss, no nausea, no palpitations, no shortness of breath, no syncope, no tinnitus, no vision changes, no vomiting and no weakness        Review of Systems   Constitutional: Negative for chills and fever.   HENT: Negative for congestion, hearing loss, rhinorrhea, sore throat, tinnitus and trouble swallowing.    Eyes: Negative for discharge and visual disturbance.   Respiratory: Negative for cough, chest tightness, shortness of breath and wheezing.    Cardiovascular: Negative for chest pain, palpitations, leg swelling and syncope.   Gastrointestinal: Negative for abdominal pain, constipation, diarrhea, nausea and vomiting.   Genitourinary: Negative for dysuria, flank pain and hematuria.   Musculoskeletal: Negative for back pain, myalgias and neck pain.   Skin: Negative for color change and rash.   Neurological: Positive for dizziness. Negative for weakness, numbness and headaches.   Psychiatric/Behavioral: Negative for self-injury and suicidal ideas.       Past Medical History:   Diagnosis Date   • Anemia 1998   • Arthritis    • Bleeding from anus    • Bronchitis     STATES HAS BRONCHITIS CURRENTLY (12/5/17).     • Bronchitis 12/2017   • CAD (coronary artery disease)    • Cataract, bilateral    • Chronic kidney disease     STAGE 4.  SEES TERA   • Chronic kidney failure     REPORTS STAGE IV   • Difficulty swallowing solids    • Disease of thyroid gland    • Fracture, radius 2016    right distal radius and ulna, healed.   • GERD (gastroesophageal reflux disease)   "  • Gout    • High cholesterol    • History of nuclear stress test 2014    DR. CORTEZ.  \"IT WAS OK\"   • Hyperparathyroidism    • Hypertension    • Iron deficiency    • Osteoarthritis    • Osteoarthritis of knees, bilateral     Both knees Supartz injection series August, 2015   • Osteoporosis    • Personal history of cardiac murmur    • Presence of pessary    • Problems with swallowing     WITH FOOD OCCASSIONALLY   • Rheumatic heart disease     Personal history   • Rotator cuff tendonitis     right    • Rupture of right proximal biceps tendon     chronic   • Seasonal allergies    • Spinal headache     REPORTS AFTER DELIVERY OF SON   • Subacromial bursitis     right    • Valvular heart disease    • Vitamin B12 deficiency    • Wears glasses        Allergies   Allergen Reactions   • Ferrlecit [Na Ferric Gluc Cplx In Sucrose] Swelling   • Ranitidine Hcl Shortness Of Breath   • Lisinopril Cough       Past Surgical History:   Procedure Laterality Date   • CATARACT EXTRACTION Bilateral    • COLONOSCOPY  2011   • COLONOSCOPY N/A 1/9/2018    Procedure: COLONOSCOPY with endoscopic mucosal resection (hot snare), normal saline submucosal injection, argon thermal ablation, resolution clip placement x4, and cold biopsy polypectomy;  Surgeon: Pieter Concepcion MD;  Location: [facility] ENDOSCOPY;  Service:    • ENDOSCOPY N/A 12/6/2017    Procedure: ESOPHAGOGASTRODUODENOSCOPY with biopsies and hot snare polypectomies;  Surgeon: Pieter Concepcion MD;  Location: [facility] ENDOSCOPY;  Service:    • UPPER GASTROINTESTINAL ENDOSCOPY  08/18/2014       Family History   Problem Relation Age of Onset   • Liver cancer Maternal Grandmother    • Gout Other    • Osteoporosis Other    • Stroke Other    • Ulcers Other    • Asthma Other    • Hypertension Other    • Heart disease Other    • Osteoarthritis Other    • Colon cancer Neg Hx        Social History     Social History   • Marital status:      Spouse name: N/A   • Number of children: N/A   • Years " "of education: N/A     Social History Main Topics   • Smoking status: Never Smoker   • Smokeless tobacco: Never Used   • Alcohol use No   • Drug use: No   • Sexual activity: Defer     Other Topics Concern   • None     Social History Narrative           Objective   Physical Exam   Constitutional: She is oriented to person, place, and time. She appears well-developed and well-nourished.   HENT:   Head: Normocephalic and atraumatic.   Nose: Nose normal.   Mouth/Throat: Oropharynx is clear and moist.   Eyes: Conjunctivae and EOM are normal. Pupils are equal, round, and reactive to light.   Neck: Normal range of motion. Neck supple.   Cardiovascular: Normal rate, regular rhythm, normal heart sounds and intact distal pulses.    Pulmonary/Chest: Effort normal and breath sounds normal. No respiratory distress. She has no wheezes. She exhibits no tenderness.   Abdominal: Soft. Bowel sounds are normal. There is no tenderness. There is no rebound and no guarding.   Musculoskeletal: Normal range of motion. She exhibits no edema, tenderness or deformity.   Neurological: She is alert and oriented to person, place, and time. No cranial nerve deficit. Coordination normal.   Skin: Skin is warm and dry. No rash noted. No erythema. No pallor.   Psychiatric: She has a normal mood and affect. Her behavior is normal. Judgment and thought content normal.   Nursing note and vitals reviewed.      Procedures         ED Course  ED Course                  MDM  Number of Diagnoses or Management Options  Acute cystitis without hematuria:   Diagnosis management comments: EKG: Ventricular rate 65, NE interval 164, , QRS duration 86, sinus rhythm.  EKG: Ventricular rate 62, NE interval 162, , QRS duration 80, sinus rhythm.  Left axis deviation.    77-year-old female presents to the emergency Department with complaints of \"dizziness\".  Patient states that she got out of bed and felt immediately dizzy.  She denies any syncopal episode.  " "Patient came to the emergency department for evaluation.  Patient states that for the last day she just hasn't been feeling well.  Patient is not able to describe this.  Just continues to say she hasn't \"been feeling well.\" When asked what dizzy means to her she says it feels more like lightheadedness than it does vertigo.  She says she really can't describe the dizziness.  Patient states that her symptoms have resolved. They only occurred when she stood up to use the bathroom. Last a few minutes and resolved on its own. She is currently asymptomatic.  Patient denies any chest pain, shortness of breath, nausea, vomiting, diarrhea, constipation, abdominal pain, numbness, tingling, weakness, difficulty speaking, fever, chills.  Vital signs are stable.  Physical exam is normal.  No neuro deficits appreciated. Labs and imaging obtained. CT head normal. CXR does not show an acute process. Creatinine is chronically elevated. Patient follows up with nephrology. Hemoglobin chronically low. UA shows infection. Cultures obtained. Patient given 1G of rocephin. Orthostatic vitals, troponin and EKG are all WNL. On re-evaluation, patient continues to be asymptomatic. Discussed obtaining a repeat EKG and troponin. EKG is WNL. Troponin normal but slightly up from previous. I discussed the results with the patient. I educated her that her troponin is trending up and recommended admission vs repeat troponin. Patient says that she wants to be discharged home. Does not want to wait 2 hours for another troponin/EKG. Patient says that she has no chest pain or shortness of breath and sees Dr. Acuña. She will follow up with him this week. Told the patient to return immediately if she begins having chest pain, shortness of breath, or worsening symptoms. Patient is agreeable with returning. Her vitals are stable. I will start her on antibiotics for her UTI.       Final diagnoses:   Acute cystitis without hematuria   Chronic renal failure, " unspecified CKD stage   Chronic anemia            Sarah Sevilla MD  02/04/18 0659       Sarah Sevilla MD  02/04/18 0659

## 2018-02-05 ENCOUNTER — HOSPITAL ENCOUNTER (OUTPATIENT)
Dept: INFUSION THERAPY | Facility: HOSPITAL | Age: 78
Discharge: HOME | End: 2018-02-05
Admitting: INTERNAL MEDICINE

## 2018-02-05 ENCOUNTER — OFFICE VISIT (OUTPATIENT)
Dept: OBSTETRICS AND GYNECOLOGY | Facility: CLINIC | Age: 78
End: 2018-02-05

## 2018-02-05 VITALS
HEART RATE: 70 BPM | TEMPERATURE: 97.6 F | DIASTOLIC BLOOD PRESSURE: 74 MMHG | SYSTOLIC BLOOD PRESSURE: 136 MMHG | OXYGEN SATURATION: 100 % | RESPIRATION RATE: 18 BRPM

## 2018-02-05 VITALS
HEIGHT: 59 IN | BODY MASS INDEX: 21.97 KG/M2 | DIASTOLIC BLOOD PRESSURE: 78 MMHG | WEIGHT: 109 LBS | SYSTOLIC BLOOD PRESSURE: 158 MMHG

## 2018-02-05 DIAGNOSIS — N18.4 ANEMIA DUE TO STAGE 4 CHRONIC KIDNEY DISEASE TREATED WITH ERYTHROPOIETIN (HCC): ICD-10-CM

## 2018-02-05 DIAGNOSIS — D63.1 ANEMIA DUE TO STAGE 4 CHRONIC KIDNEY DISEASE TREATED WITH ERYTHROPOIETIN (HCC): ICD-10-CM

## 2018-02-05 DIAGNOSIS — Z46.89 PESSARY MAINTENANCE: Primary | ICD-10-CM

## 2018-02-05 DIAGNOSIS — N81.11 CYSTOCELE, MIDLINE: ICD-10-CM

## 2018-02-05 DIAGNOSIS — D64.9 ANEMIA, UNSPECIFIED TYPE: ICD-10-CM

## 2018-02-05 LAB
ALBUMIN SERPL-MCNC: 4.4 G/DL (ref 3.5–5)
ANION GAP SERPL CALCULATED.3IONS-SCNC: 17.6 MMOL/L
BUN BLD-MCNC: 43 MG/DL (ref 7–20)
BUN/CREAT SERPL: 16.5 (ref 7.1–23.5)
CALCIUM SPEC-SCNC: 11.3 MG/DL (ref 8.4–10.2)
CHLORIDE SERPL-SCNC: 101 MMOL/L (ref 98–107)
CO2 SERPL-SCNC: 27 MMOL/L (ref 26–30)
CREAT BLD-MCNC: 2.6 MG/DL (ref 0.6–1.3)
CREAT UR-MCNC: 174.1 MG/DL
FERRITIN SERPL-MCNC: 25.3 NG/ML (ref 11.1–264)
GFR SERPL CREATININE-BSD FRML MDRD: 18 ML/MIN/1.73
GLUCOSE BLD-MCNC: 96 MG/DL (ref 74–98)
IRON 24H UR-MRATE: 149 MCG/DL (ref 37–181)
IRON SATN MFR SERPL: 46 % (ref 11–46)
PHOSPHATE SERPL-MCNC: 4.1 MG/DL (ref 2.5–4.5)
POTASSIUM BLD-SCNC: 4.6 MMOL/L (ref 3.5–5.1)
PROT UR-MCNC: 68 MG/DL (ref 0–11.9)
SODIUM BLD-SCNC: 141 MMOL/L (ref 137–145)
TIBC SERPL-MCNC: 327 MCG/DL (ref 261–497)
URATE SERPL-MCNC: 6.4 MG/DL (ref 2.5–8.5)

## 2018-02-05 PROCEDURE — 83970 ASSAY OF PARATHORMONE: CPT | Performed by: INTERNAL MEDICINE

## 2018-02-05 PROCEDURE — 83550 IRON BINDING TEST: CPT | Performed by: INTERNAL MEDICINE

## 2018-02-05 PROCEDURE — 96372 THER/PROPH/DIAG INJ SC/IM: CPT

## 2018-02-05 PROCEDURE — 99212 OFFICE O/P EST SF 10 MIN: CPT | Performed by: PHYSICIAN ASSISTANT

## 2018-02-05 PROCEDURE — 82570 ASSAY OF URINE CREATININE: CPT | Performed by: INTERNAL MEDICINE

## 2018-02-05 PROCEDURE — 80069 RENAL FUNCTION PANEL: CPT | Performed by: INTERNAL MEDICINE

## 2018-02-05 PROCEDURE — 83540 ASSAY OF IRON: CPT | Performed by: INTERNAL MEDICINE

## 2018-02-05 PROCEDURE — 36415 COLL VENOUS BLD VENIPUNCTURE: CPT

## 2018-02-05 PROCEDURE — 63510000001 EPOETIN ALFA PER 1000 UNITS: Performed by: PHYSICIAN ASSISTANT

## 2018-02-05 PROCEDURE — 84156 ASSAY OF PROTEIN URINE: CPT | Performed by: INTERNAL MEDICINE

## 2018-02-05 PROCEDURE — 84550 ASSAY OF BLOOD/URIC ACID: CPT | Performed by: INTERNAL MEDICINE

## 2018-02-05 PROCEDURE — 82728 ASSAY OF FERRITIN: CPT | Performed by: INTERNAL MEDICINE

## 2018-02-05 RX ADMIN — ERYTHROPOIETIN 40000 UNITS: 40000 INJECTION, SOLUTION INTRAVENOUS; SUBCUTANEOUS at 11:40

## 2018-02-05 NOTE — PROGRESS NOTES
"Subjective   Chief Complaint   Patient presents with   • Follow-up     Pessary check, had urine checked on  and she wants to check the results.       Carolyne Martins is a 77 y.o. year old  presenting to be seen for routine pessary maintenance  She reports no problems with pessary-she is voiding well-has had no vaginal bleeding  Was seen in ER yesterday with elevated blood pressure but noted to have possilbe UTI and was given Keflex-she has not started Keflex yet but will  today  She denies any symptoms of UTI    Urine culture notes 50,000-60,000 staph aureus-sensitivity pending    Past Medical History:   Diagnosis Date   • Anemia    • Arthritis    • Bleeding from anus    • Bronchitis     STATES HAS BRONCHITIS CURRENTLY (17).     • Bronchitis 2017   • CAD (coronary artery disease)    • Cataract, bilateral    • Chronic kidney disease     STAGE 4.  SEES TERA   • Chronic kidney failure     REPORTS STAGE IV   • Difficulty swallowing solids    • Disease of thyroid gland    • Fracture, radius 2016    right distal radius and ulna, healed.   • GERD (gastroesophageal reflux disease)    • Gout    • High cholesterol    • History of nuclear stress test     DR. CORTEZ.  \"IT WAS OK\"   • Hyperparathyroidism    • Hypertension    • Iron deficiency    • Osteoarthritis    • Osteoarthritis of knees, bilateral     Both knees Supartz injection series    • Osteoporosis    • Personal history of cardiac murmur    • Presence of pessary    • Problems with swallowing     WITH FOOD OCCASSIONALLY   • Rheumatic heart disease     Personal history   • Rotator cuff tendonitis     right    • Rupture of right proximal biceps tendon     chronic   • Seasonal allergies    • Spinal headache     REPORTS AFTER DELIVERY OF SON   • Subacromial bursitis     right    • Valvular heart disease    • Vitamin B12 deficiency    • Wears glasses         Current Outpatient Prescriptions:   •  allopurinol (ZYLOPRIM) " 100 MG tablet, Take 1 tablet by mouth daily., Disp: , Rfl:   •  aspirin 81 MG tablet, Take 81 mg by mouth Daily., Disp: , Rfl:   •  budesonide-formoterol (SYMBICORT) 80-4.5 MCG/ACT inhaler, Inhale 2 puffs 2 (Two) Times a Day., Disp: , Rfl:   •  calcitriol (ROCALTROL) 0.25 MCG capsule, Take 0.25 mcg by mouth Daily., Disp: , Rfl:   •  carvedilol (COREG) 12.5 MG tablet, Take 12.5 mg by mouth Every Morning., Disp: , Rfl:   •  carvedilol (COREG) 12.5 MG tablet, Take 25 mg by mouth Every Evening., Disp: , Rfl:   •  cephalexin (KEFLEX) 500 MG capsule, Take 1 capsule by mouth 2 (Two) Times a Day., Disp: 20 capsule, Rfl: 0  •  cholecalciferol (VITAMIN D3) 1000 UNITS tablet, Take 1,000 Units by mouth Daily., Disp: , Rfl:   •  Cyanocobalamin (VITAMIN B-12 PO), Take 500 mg by mouth 2 (Two) Times a Day., Disp: , Rfl:   •  epoetin ovidio (PROCRIT) 45324 UNIT/ML injection, Procrit SOLN; Patient Sig: Procrit KYRIE ; 0; 16-Jul-2014; Active  ONCE A MONTH, Disp: , Rfl:   •  fluticasone (FLONASE) 50 MCG/ACT nasal spray, 2 sprays into each nostril Daily As Needed., Disp: , Rfl:   •  hydroxychloroquine (PLAQUENIL) 200 MG tablet, Take 200 mg by mouth Daily., Disp: , Rfl:   •  irbesartan (AVAPRO) 150 MG tablet, Take 150 mg by mouth Daily., Disp: , Rfl:   •  Loratadine (CLARITIN) 10 MG capsule, Take 10 mg by mouth Daily As Needed (RHINITIS)., Disp: , Rfl:   •  omeprazole (PriLOSEC) 40 MG capsule, Take 40 mg by mouth Daily., Disp: , Rfl:   •  predniSONE (DELTASONE) 2.5 MG tablet, Take 2.5 mg by mouth Daily., Disp: , Rfl:   •  rosuvastatin (CRESTOR) 10 MG tablet, Take 10 mg by mouth Daily., Disp: , Rfl:   •  torsemide (DEMADEX) 20 MG tablet, Take 10 mg by mouth Daily As Needed (SWELLING/SHORTNESS OF BREATH). EDEMA, Disp: , Rfl:   No current facility-administered medications for this visit.    Allergies   Allergen Reactions   • Ferrlecit [Na Ferric Gluc Cplx In Sucrose] Swelling   • Ranitidine Hcl Shortness Of Breath   • Lisinopril Cough     "  Past Surgical History:   Procedure Laterality Date   • CATARACT EXTRACTION Bilateral    • COLONOSCOPY  2011   • COLONOSCOPY N/A 1/9/2018    Procedure: COLONOSCOPY with endoscopic mucosal resection (hot snare), normal saline submucosal injection, argon thermal ablation, resolution clip placement x4, and cold biopsy polypectomy;  Surgeon: Pieter Concepcion MD;  Location: [facility] ENDOSCOPY;  Service:    • ENDOSCOPY N/A 12/6/2017    Procedure: ESOPHAGOGASTRODUODENOSCOPY with biopsies and hot snare polypectomies;  Surgeon: Pieter Concepcion MD;  Location: [facility] ENDOSCOPY;  Service:    • UPPER GASTROINTESTINAL ENDOSCOPY  08/18/2014      Social History     Social History   • Marital status:      Spouse name: N/A   • Number of children: N/A   • Years of education: N/A     Occupational History   • Not on file.     Social History Main Topics   • Smoking status: Never Smoker   • Smokeless tobacco: Never Used   • Alcohol use No   • Drug use: No   • Sexual activity: Not Currently     Other Topics Concern   • Not on file     Social History Narrative      Family History   Problem Relation Age of Onset   • Liver cancer Maternal Grandmother    • Gout Other    • Osteoporosis Other    • Stroke Other    • Ulcers Other    • Asthma Other    • Hypertension Other    • Heart disease Other    • Osteoarthritis Other    • Colon cancer Neg Hx        Review of Systems   Constitutional: Negative.    Gastrointestinal: Negative.    Genitourinary: Negative.            Objective   /78  Ht 149.9 cm (59\")  Wt 49.4 kg (109 lb)  LMP  (LMP Unknown)  Breastfeeding? No  BMI 22.02 kg/m2    Physical Exam   Constitutional: She appears well-developed and well-nourished. She is cooperative.   Abdominal: Soft. Normal appearance. There is no tenderness. There is no rigidity and no guarding.   Genitourinary: There is no tenderness or lesion on the right labia. There is no tenderness or lesion on the left labia.   Genitourinary Comments: Pessary " removed cleaned and reinserted  No erosion or abrasions  2 plus cystocele   Neurological: She is alert.   Skin: Skin is warm and dry.   Psychiatric: She has a normal mood and affect. Her behavior is normal.            Assessment and Plan  Carolyne was seen today for follow-up.    Diagnoses and all orders for this visit:    Pessary maintenance    Cystocele, midline      There are no Patient Instructions on file for this visit.           This note was electronically signed.    Yaz Woods PA-C   February 5, 2018

## 2018-02-05 NOTE — CODE DOCUMENTATION
Pt stated in er yesterday hgb 8.8 form ER visit not repeated for today  Stated had been in ER for high bp  Stated upset about the weather as well.

## 2018-02-06 ENCOUNTER — OFFICE VISIT (OUTPATIENT)
Dept: GASTROENTEROLOGY | Facility: CLINIC | Age: 78
End: 2018-02-06

## 2018-02-06 ENCOUNTER — TELEPHONE (OUTPATIENT)
Dept: OBSTETRICS AND GYNECOLOGY | Facility: CLINIC | Age: 78
End: 2018-02-06

## 2018-02-06 VITALS
SYSTOLIC BLOOD PRESSURE: 173 MMHG | HEART RATE: 85 BPM | RESPIRATION RATE: 18 BRPM | BODY MASS INDEX: 22.18 KG/M2 | DIASTOLIC BLOOD PRESSURE: 92 MMHG | WEIGHT: 110 LBS | HEIGHT: 59 IN | TEMPERATURE: 98.7 F

## 2018-02-06 DIAGNOSIS — D64.9 ANEMIA, UNSPECIFIED TYPE: ICD-10-CM

## 2018-02-06 DIAGNOSIS — D12.3 ADENOMATOUS POLYP OF TRANSVERSE COLON: ICD-10-CM

## 2018-02-06 DIAGNOSIS — R12 HEARTBURN: Primary | ICD-10-CM

## 2018-02-06 LAB
BACTERIA SPEC AEROBE CULT: ABNORMAL
BACTERIA SPEC AEROBE CULT: ABNORMAL
PTH-INTACT SERPL-MCNC: 28 PG/ML (ref 15–65)

## 2018-02-06 PROCEDURE — 99214 OFFICE O/P EST MOD 30 MIN: CPT | Performed by: INTERNAL MEDICINE

## 2018-02-06 RX ORDER — SULFAMETHOXAZOLE AND TRIMETHOPRIM 800; 160 MG/1; MG/1
TABLET ORAL
Qty: 14 TABLET | Refills: 0 | Status: SHIPPED | OUTPATIENT
Start: 2018-02-06 | End: 2018-03-05

## 2018-02-06 NOTE — PATIENT INSTRUCTIONS
1. Antireflux measures: Avoid fried, fatty foods, alcohol, chocolate, coffee, tea,  soft drinks, peppermint and spearmint, spicy foods, tomatoes and tomato based foods, onion based foods, and smoking. Other antireflux measures include weight reduction if overweight, avoiding tight clothing around the abdomen, elevating the head of the bed 6 inches with blocks under the head board, and don't drink or eat before going to bed and avoid lying down immediately after meals..  2. Omeprazole 40 mg 1 po daily in the am 30 minutes before breakfast.  3. A follow-up colonoscopy may be considered in 3 years. December 2020

## 2018-02-06 NOTE — PROGRESS NOTES
Chief Complaint   Patient presents with   • Heartburn   • Anemia       History of Present Illness     The patient has a history of reflux off and on for the last several years.  The reflux is moderately severe.  Symptoms are described as retrosternal burning sensation, and indigestion.  There is history of occasional regurgitative symptoms.  Frequency being several times per week.  The symptoms are worse at night.  The patient takes acid suppressive therapy with reasonable control of his symptoms. The patient denies further black tarry stools. There is no hematemesis or bright red blood per rectum. She denies nosebleeds. There is no abdominal pain. She denies dysphagia or odynophagia. There is no recent weight loss. The patient has history of anemia. She was found to have angiodysplasia within the colon. She was treated with thermal ablation using argon plasma coagulator. The patient has history of chronic renal insufficiency. There is no history of liver or pancreatic disease.     Review of Systems   Constitutional: Negative for appetite change, chills, fatigue, fever and unexpected weight change.   HENT: Negative for mouth sores, nosebleeds and trouble swallowing.    Eyes: Negative for discharge and redness.   Respiratory: Negative for apnea, cough and shortness of breath.    Cardiovascular: Positive for chest pain. Negative for palpitations and leg swelling.   Gastrointestinal: Negative for abdominal distention, abdominal pain, anal bleeding, blood in stool, constipation, diarrhea, nausea and vomiting.   Endocrine: Negative for cold intolerance, heat intolerance and polydipsia.   Genitourinary: Negative for dysuria, hematuria and urgency.   Musculoskeletal: Positive for arthralgias. Negative for joint swelling and myalgias.   Skin: Negative for rash.   Allergic/Immunologic: Negative for food allergies and immunocompromised state.   Neurological: Negative for dizziness, seizures, syncope and headaches.  "  Hematological: Negative for adenopathy. Bruises/bleeds easily.   Psychiatric/Behavioral: Negative for dysphoric mood. The patient is not nervous/anxious and is not hyperactive.      Patient Active Problem List   Diagnosis   • Arthralgia of shoulder region   • Tendinopathy of rotator cuff   • Osteoarthritis of right acromioclavicular joint   • Anemia due to stage 4 chronic kidney disease treated with erythropoietin   • Anemia   • Gastric polyp   • Heartburn   • Melena   • Constipation   • Change in bowel habits   • Colon cancer screening     Past Medical History:   Diagnosis Date   • Anemia 1998   • Arthritis    • Bleeding from anus    • Bronchitis     STATES HAS BRONCHITIS CURRENTLY (12/5/17).     • Bronchitis 12/2017   • CAD (coronary artery disease)    • Cataract, bilateral    • Chronic kidney disease     STAGE 4.  SEES TERA   • Chronic kidney failure     REPORTS STAGE IV   • Difficulty swallowing solids    • Disease of thyroid gland    • Fracture, radius 2016    right distal radius and ulna, healed.   • GERD (gastroesophageal reflux disease)    • Gout    • High cholesterol    • History of nuclear stress test 2014    DR. CORTEZ.  \"IT WAS OK\"   • Hyperparathyroidism    • Hypertension    • Iron deficiency    • Osteoarthritis    • Osteoarthritis of knees, bilateral     Both knees Supartz injection series August, 2015   • Osteoporosis    • Personal history of cardiac murmur    • Presence of pessary    • Problems with swallowing     WITH FOOD OCCASSIONALLY   • Rheumatic heart disease     Personal history   • Rotator cuff tendonitis     right    • Rupture of right proximal biceps tendon     chronic   • Seasonal allergies    • Spinal headache     REPORTS AFTER DELIVERY OF SON   • Subacromial bursitis     right    • Valvular heart disease    • Vitamin B12 deficiency    • Wears glasses      Past Surgical History:   Procedure Laterality Date   • CATARACT EXTRACTION Bilateral    • COLONOSCOPY  2011   • COLONOSCOPY N/A " 1/9/2018    Procedure: COLONOSCOPY with endoscopic mucosal resection (hot snare), normal saline submucosal injection, argon thermal ablation, resolution clip placement x4, and cold biopsy polypectomy;  Surgeon: Pieter Concepcion MD;  Location: [facility] ENDOSCOPY;  Service:    • ENDOSCOPY N/A 12/6/2017    Procedure: ESOPHAGOGASTRODUODENOSCOPY with biopsies and hot snare polypectomies;  Surgeon: Pieter Concepcion MD;  Location: [facility] ENDOSCOPY;  Service:    • UPPER GASTROINTESTINAL ENDOSCOPY  08/18/2014     Family History   Problem Relation Age of Onset   • Liver cancer Maternal Grandmother    • Gout Other    • Osteoporosis Other    • Stroke Other    • Ulcers Other    • Asthma Other    • Hypertension Other    • Heart disease Other    • Osteoarthritis Other    • Colon cancer Neg Hx      Social History   Substance Use Topics   • Smoking status: Never Smoker   • Smokeless tobacco: Never Used   • Alcohol use No       Current Outpatient Prescriptions:   •  allopurinol (ZYLOPRIM) 100 MG tablet, Take 1 tablet by mouth daily., Disp: , Rfl:   •  aspirin 81 MG tablet, Take 81 mg by mouth Daily., Disp: , Rfl:   •  budesonide-formoterol (SYMBICORT) 80-4.5 MCG/ACT inhaler, Inhale 2 puffs 2 (Two) Times a Day., Disp: , Rfl:   •  calcitriol (ROCALTROL) 0.25 MCG capsule, Take 0.25 mcg by mouth Daily., Disp: , Rfl:   •  carvedilol (COREG) 12.5 MG tablet, Take 12.5 mg by mouth Every Morning., Disp: , Rfl:   •  carvedilol (COREG) 12.5 MG tablet, Take 25 mg by mouth Every Evening., Disp: , Rfl:   •  cholecalciferol (VITAMIN D3) 1000 UNITS tablet, Take 1,000 Units by mouth Daily., Disp: , Rfl:   •  Cyanocobalamin (VITAMIN B-12 PO), Take 500 mg by mouth 2 (Two) Times a Day., Disp: , Rfl:   •  epoetin ovidio (PROCRIT) 34584 UNIT/ML injection, Procrit SOLN; Patient Sig: Procrit SOLN ; 0; 16-Jul-2014; Active  ONCE A MONTH, Disp: , Rfl:   •  fluticasone (FLONASE) 50 MCG/ACT nasal spray, 2 sprays into each nostril Daily As Needed., Disp: , Rfl:   •   "hydroxychloroquine (PLAQUENIL) 200 MG tablet, Take 200 mg by mouth Daily., Disp: , Rfl:   •  irbesartan (AVAPRO) 150 MG tablet, Take 150 mg by mouth Daily., Disp: , Rfl:   •  Loratadine (CLARITIN) 10 MG capsule, Take 10 mg by mouth Daily As Needed (RHINITIS)., Disp: , Rfl:   •  omeprazole (PriLOSEC) 40 MG capsule, Take 40 mg by mouth Daily., Disp: , Rfl:   •  predniSONE (DELTASONE) 2.5 MG tablet, Take 2.5 mg by mouth Daily., Disp: , Rfl:   •  rosuvastatin (CRESTOR) 10 MG tablet, Take 10 mg by mouth Daily., Disp: , Rfl:   •  sulfamethoxazole-trimethoprim (BACTRIM DS) 800-160 MG per tablet, One tablet po bid x 7 days, Disp: 14 tablet, Rfl: 0  •  torsemide (DEMADEX) 20 MG tablet, Take 10 mg by mouth Daily As Needed (SWELLING/SHORTNESS OF BREATH). EDEMA, Disp: , Rfl:     Allergies   Allergen Reactions   • Ferrlecit [Na Ferric Gluc Cplx In Sucrose] Swelling   • Ranitidine Hcl Shortness Of Breath   • Lisinopril Cough       Blood pressure 173/92, pulse 85, temperature 98.7 °F (37.1 °C), resp. rate 18, height 149.9 cm (59.02\"), weight 49.9 kg (110 lb), not currently breastfeeding.    Physical Exam   Constitutional: She is oriented to person, place, and time. She appears well-developed and well-nourished. No distress.   HENT:   Head: Normocephalic and atraumatic.   Right Ear: Hearing and external ear normal.   Left Ear: Hearing and external ear normal.   Nose: Nose normal.   Mouth/Throat: Oropharynx is clear and moist and mucous membranes are normal. Mucous membranes are not pale, not dry and not cyanotic. No oral lesions. No oropharyngeal exudate.   Eyes: Conjunctivae and EOM are normal. Right eye exhibits no discharge. Left eye exhibits no discharge. No scleral icterus.   Neck: Trachea normal. Neck supple. No JVD present. No edema present. No thyroid mass and no thyromegaly present.   Cardiovascular: Normal rate, regular rhythm, S2 normal and normal heart sounds.  Exam reveals no gallop, no S3 and no friction rub.    No " murmur heard.  Pulmonary/Chest: Effort normal and breath sounds normal. No respiratory distress. She has no wheezes. She has no rales. She exhibits no tenderness.   Abdominal: Soft. Normal appearance and bowel sounds are normal. She exhibits no distension, no ascites and no mass. There is no splenomegaly or hepatomegaly. There is no tenderness. There is no rigidity, no rebound and no guarding. No hernia.   Musculoskeletal: She exhibits no tenderness or deformity.       Vascular Status -  Her exam exhibits no right foot edema. Her exam exhibits no left foot edema.  Lymphadenopathy:     She has no cervical adenopathy.        Left: No supraclavicular adenopathy present.   Neurological: She is alert and oriented to person, place, and time. She has normal strength. No cranial nerve deficit or sensory deficit. She exhibits normal muscle tone. Coordination normal.   Skin: No rash noted. She is not diaphoretic. No cyanosis. No pallor. Nails show no clubbing.   Psychiatric: She has a normal mood and affect. Her behavior is normal. Judgment and thought content normal.   Nursing note and vitals reviewed.    Stigmata of chronic liver disease:  None.  Asterixis:  None.    Laboratory Testing:  Upon review of medical records:      Dated 12/2/2017 glucose 90 BUN 44 creatinine 2.6 sodium 140 potassium 3.8 chloride 104 CO2 26 calcium 9.8 albumin 3.6 ALT 40 AST 34 upon phosphatase 66 total bilirubin 0.6 WBC 5.88 hemoglobin 8.8 hematocrit 28.6 platelet count 201 MCV 96.3 lipase 219 fecal occult blood: Negative     Dated 12/4/2017 glucose 104 BUN 35 creatinine 2.6 sodium 139 potassium 4.5 chloride 102 CO2 26 calcium 10.2 albumin 3.7 phosphorus 3.3 WBC 5.01 hemoglobin 9.5 hematocrit 30.8 platelet count 217 MCV 96.3    Dated January 3, 2018 WBC 7.79 hemoglobin 9.2 hematocrit 29.9 MCV 95.8 and platelet count 226.    Dated February 4, 2018 sodium 139 potassium 4.1 chloride 103 CO2 29 BUN 47 serum creatinine 2.60 glucose 96.  Calcium 10.3.   Total protein 5.9.  Albumin 3.40.  T bili 0.3 AST 29 ALT 36 alkaline phosphatase 64.  WBC 4.28 hemoglobin 8.8 hematocrit 27.8 MCV 95.5 and platelet count 189.     Abdominal Imaging:  Upon review of records:     CT of abdomen and pelvis without contrast dated 12/2/2017 reveals lung bases are clear. There is a small sliding-type hiatal hernia. The liver is normal in size and attenuation with a benign-appearing cyst. The spleen is unremarkable.  The adrenals are normal.  The aorta is normal in caliber. There is no hydronephrosis. There is no nephrolithiasis. There are bilateral simple and complex renal cysts with the largest measuring 7.2 x 6.0 cm on the right. The appendix is normal.  The urinary bladder is unremarkable. There is no free fluid or adenopathy. A pessary device is present. There are bilateral L5 pars defects with advanced changes of osteoarthritis     Procedures:  Upon review of records:       Dated December 6, 2017 the patient underwent an upper endoscopy which revealed:  Nonobstructive Schatzki’s ring.  Small sliding hiatal hernia less than 3 cm.  Erythematous gastritis with evidence of old healed ulceration.  Multiple vascular gastric polyps.  Vascular polyps can contribute to chronic blood loss and anemia. Second portion of duodenum, biopsies revealed no pathologic alteration.  Negative for celiac disease, microorganisms, metaplasia, or atypia.  Antrum, body, angulus, biopsies revealed gastric body mucosa without pathologic alterations.  Negative for H. pylori, metaplasia, dysplasia, or malignancy.  Gastric polyps, body and fundus, biopsies revealed  multiple fundic gland polyps.  Negative for H. pylori, metaplasia, dysplasia, or malignancy.    Dated January 9, 201 the patient underwent a  colonoscopy to the terminal ileum which revealed:  Left-sided diverticulosis.  Angiodysplasia of colon without bleeding.  Status post thermal ablation therapy.  Colon polyp.  Transverse colon flat lesion. Status  post endoscopic mucosal resection, thermal ablation, and placement of resolution clips to coapt the margins.  Internal hemorrhoids.  Ascending colon polyp, biopsy revealed tubular adenoma without high grade dysplasia.  Transverse colon polyp, flat lesion, biopsy revealed sessile serrated adenoma fragments without cytologic dysplasia.       Assessment and Plan:      There are no diagnoses linked to this encounter.          Plan  and Patient Instructions:    Patient Instructions   1. Antireflux measures: Avoid fried, fatty foods, alcohol, chocolate, coffee, tea,  soft drinks, peppermint and spearmint, spicy foods, tomatoes and tomato based foods, onion based foods, and smoking. Other antireflux measures include weight reduction if overweight, avoiding tight clothing around the abdomen, elevating the head of the bed 6 inches with blocks under the head board, and don't drink or eat before going to bed and avoid lying down immediately after meals..  2. Omeprazole 40 mg 1 po daily in the am 30 minutes before breakfast.  3. A follow-up colonoscopy may be considered in 3 years. December 2020          Pieter Concepcion MD

## 2018-02-06 NOTE — TELEPHONE ENCOUNTER
Spoke with patient-her urine sensitivity is in and is not sensitive to keflex so will need to change. She has taken Bactrim before with no problems and is sensitive to bactrim so rx sent to [facility]

## 2018-02-06 NOTE — TELEPHONE ENCOUNTER
----- Message from Fanny Slaughter sent at 2/6/2018  9:14 AM EST -----  Contact: patient  Patient called wanting to know if [facility] could refill the Kelfex she was prescribed in the ER on 2/4/2018 patient states she lost the prescription.  I explained to the patient that since [facility] was not the one to prescribe the medication that she may not be able to refill it for her.     [facility] in [place]

## 2018-02-10 ENCOUNTER — HOSPITAL ENCOUNTER (EMERGENCY)
Facility: HOSPITAL | Age: 78
Discharge: HOME | End: 2018-02-10
Attending: STUDENT IN AN ORGANIZED HEALTH CARE EDUCATION/TRAINING PROGRAM | Admitting: STUDENT IN AN ORGANIZED HEALTH CARE EDUCATION/TRAINING PROGRAM

## 2018-02-10 VITALS
SYSTOLIC BLOOD PRESSURE: 108 MMHG | HEIGHT: 59 IN | BODY MASS INDEX: 23.18 KG/M2 | RESPIRATION RATE: 18 BRPM | DIASTOLIC BLOOD PRESSURE: 68 MMHG | TEMPERATURE: 98 F | WEIGHT: 115 LBS | HEART RATE: 71 BPM | OXYGEN SATURATION: 100 %

## 2018-02-10 DIAGNOSIS — N18.9 ACUTE ON CHRONIC RENAL INSUFFICIENCY: ICD-10-CM

## 2018-02-10 DIAGNOSIS — N28.9 ACUTE ON CHRONIC RENAL INSUFFICIENCY: ICD-10-CM

## 2018-02-10 DIAGNOSIS — N39.0 URINARY TRACT INFECTION WITHOUT HEMATURIA, SITE UNSPECIFIED: Primary | ICD-10-CM

## 2018-02-10 DIAGNOSIS — K30 UPSET STOMACH: ICD-10-CM

## 2018-02-10 DIAGNOSIS — K92.1 BLOOD IN STOOL: ICD-10-CM

## 2018-02-10 LAB
ALBUMIN SERPL-MCNC: 4 G/DL (ref 3.5–5)
ALBUMIN/GLOB SERPL: 1.7 G/DL (ref 1–2)
ALP SERPL-CCNC: 73 U/L (ref 38–126)
ALT SERPL W P-5'-P-CCNC: 39 U/L (ref 13–69)
ANION GAP SERPL CALCULATED.3IONS-SCNC: 19.9 MMOL/L
APTT PPP: 23.4 SECONDS (ref 25–36)
AST SERPL-CCNC: 32 U/L (ref 15–46)
BASOPHILS # BLD AUTO: 0.03 10*3/MM3 (ref 0–0.2)
BASOPHILS NFR BLD AUTO: 0.4 % (ref 0–2.5)
BILIRUB SERPL-MCNC: 0.3 MG/DL (ref 0.2–1.3)
BUN BLD-MCNC: 63 MG/DL (ref 7–20)
BUN/CREAT SERPL: 14 (ref 7.1–23.5)
CALCIUM SPEC-SCNC: 10.2 MG/DL (ref 8.4–10.2)
CHLORIDE SERPL-SCNC: 103 MMOL/L (ref 98–107)
CO2 SERPL-SCNC: 22 MMOL/L (ref 26–30)
CREAT BLD-MCNC: 4.5 MG/DL (ref 0.6–1.3)
DEPRECATED RDW RBC AUTO: 56.7 FL (ref 37–54)
EOSINOPHIL # BLD AUTO: 0.07 10*3/MM3 (ref 0–0.7)
EOSINOPHIL NFR BLD AUTO: 1 % (ref 0–7)
ERYTHROCYTE [DISTWIDTH] IN BLOOD BY AUTOMATED COUNT: 16.3 % (ref 11.5–14.5)
GFR SERPL CREATININE-BSD FRML MDRD: 10 ML/MIN/1.73
GLOBULIN UR ELPH-MCNC: 2.4 GM/DL
GLUCOSE BLD-MCNC: 83 MG/DL (ref 74–98)
HCT VFR BLD AUTO: 30.6 % (ref 37–47)
HGB BLD-MCNC: 10 G/DL (ref 12–16)
HOLD SPECIMEN: NORMAL
HOLD SPECIMEN: NORMAL
IMM GRANULOCYTES # BLD: 0.02 10*3/MM3 (ref 0–0.06)
IMM GRANULOCYTES NFR BLD: 0.3 % (ref 0–0.6)
INR PPP: 1.06 (ref 0.9–1.1)
LYMPHOCYTES # BLD AUTO: 0.8 10*3/MM3 (ref 0.6–3.4)
LYMPHOCYTES NFR BLD AUTO: 11.1 % (ref 10–50)
MCH RBC QN AUTO: 31.2 PG (ref 27–31)
MCHC RBC AUTO-ENTMCNC: 32.7 G/DL (ref 30–37)
MCV RBC AUTO: 95.3 FL (ref 81–99)
MONOCYTES # BLD AUTO: 0.67 10*3/MM3 (ref 0–0.9)
MONOCYTES NFR BLD AUTO: 9.3 % (ref 0–12)
NEUTROPHILS # BLD AUTO: 5.61 10*3/MM3 (ref 2–6.9)
NEUTROPHILS NFR BLD AUTO: 77.9 % (ref 37–80)
NRBC BLD MANUAL-RTO: 0.4 /100 WBC (ref 0–0)
PLATELET # BLD AUTO: 209 10*3/MM3 (ref 130–400)
PMV BLD AUTO: 9.8 FL (ref 6–12)
POTASSIUM BLD-SCNC: 4.9 MMOL/L (ref 3.5–5.1)
PROT SERPL-MCNC: 6.4 G/DL (ref 6.3–8.2)
PROTHROMBIN TIME: 11.9 SECONDS (ref 9.3–12.1)
RBC # BLD AUTO: 3.21 10*6/MM3 (ref 4.2–5.4)
SODIUM BLD-SCNC: 140 MMOL/L (ref 137–145)
WBC NRBC COR # BLD: 7.2 10*3/MM3 (ref 4.8–10.8)
WHOLE BLOOD HOLD SPECIMEN: NORMAL
WHOLE BLOOD HOLD SPECIMEN: NORMAL

## 2018-02-10 PROCEDURE — 85730 THROMBOPLASTIN TIME PARTIAL: CPT | Performed by: STUDENT IN AN ORGANIZED HEALTH CARE EDUCATION/TRAINING PROGRAM

## 2018-02-10 PROCEDURE — 80053 COMPREHEN METABOLIC PANEL: CPT | Performed by: STUDENT IN AN ORGANIZED HEALTH CARE EDUCATION/TRAINING PROGRAM

## 2018-02-10 PROCEDURE — 25010000002 ONDANSETRON PER 1 MG: Performed by: STUDENT IN AN ORGANIZED HEALTH CARE EDUCATION/TRAINING PROGRAM

## 2018-02-10 PROCEDURE — 85610 PROTHROMBIN TIME: CPT | Performed by: STUDENT IN AN ORGANIZED HEALTH CARE EDUCATION/TRAINING PROGRAM

## 2018-02-10 PROCEDURE — 96374 THER/PROPH/DIAG INJ IV PUSH: CPT

## 2018-02-10 PROCEDURE — 96361 HYDRATE IV INFUSION ADD-ON: CPT

## 2018-02-10 PROCEDURE — 99283 EMERGENCY DEPT VISIT LOW MDM: CPT

## 2018-02-10 PROCEDURE — 85025 COMPLETE CBC W/AUTO DIFF WBC: CPT | Performed by: STUDENT IN AN ORGANIZED HEALTH CARE EDUCATION/TRAINING PROGRAM

## 2018-02-10 RX ORDER — ONDANSETRON 2 MG/ML
4 INJECTION INTRAMUSCULAR; INTRAVENOUS ONCE
Status: COMPLETED | OUTPATIENT
Start: 2018-02-10 | End: 2018-02-10

## 2018-02-10 RX ORDER — ONDANSETRON 4 MG/1
4 TABLET, ORALLY DISINTEGRATING ORAL 4 TIMES DAILY PRN
Qty: 20 TABLET | Refills: 0 | Status: SHIPPED | OUTPATIENT
Start: 2018-02-10 | End: 2018-03-07 | Stop reason: ALTCHOICE

## 2018-02-10 RX ADMIN — ONDANSETRON 4 MG: 2 INJECTION INTRAMUSCULAR; INTRAVENOUS at 09:52

## 2018-02-10 RX ADMIN — SODIUM CHLORIDE 500 ML: 9 INJECTION, SOLUTION INTRAVENOUS at 11:00

## 2018-02-10 RX ADMIN — SODIUM CHLORIDE 500 ML: 9 INJECTION, SOLUTION INTRAVENOUS at 09:52

## 2018-02-10 NOTE — ED PROVIDER NOTES
"Subjective   HPI Comments: Patient is a 77-year-old female that presents today saying she does not feel well and is concerned that her \"blood is low\".  Patient states she always is anemic and had a little bit of blood in her stools this morning.  She was recently worked up for bloody stool with Dr. Concepcion and has had colonoscopy, upper endoscopy, and CT scan.  She is told she has diverticulosis.  Patient states she is currently being treated for urinary tract infection and this is taken her appetite awaiting is made her mildly nauseous.  She was initially started on Keflex but once the urine culture came back they switched her to Bactrim.  She is only been on Bactrim for 2 days but did not take a dose last night or this morning.  Patient states she's had an upset stomach since before Christmas.      Review of Systems   All other systems reviewed and are negative.      Past Medical History:   Diagnosis Date   • Anemia 1998   • Arthritis    • Bleeding from anus    • Bronchitis     STATES HAS BRONCHITIS CURRENTLY (12/5/17).     • Bronchitis 12/2017   • CAD (coronary artery disease)    • Cataract, bilateral    • Chronic kidney disease     STAGE 4.  SEES TERA   • Chronic kidney failure     REPORTS STAGE IV   • Difficulty swallowing solids    • Disease of thyroid gland    • Fracture, radius 2016    right distal radius and ulna, healed.   • GERD (gastroesophageal reflux disease)    • Gout    • High cholesterol    • History of nuclear stress test 2014    DR. CORTEZ.  \"IT WAS OK\"   • Hyperparathyroidism    • Hypertension    • Iron deficiency    • Osteoarthritis    • Osteoarthritis of knees, bilateral     Both knees Supartz injection series August, 2015   • Osteoporosis    • Personal history of cardiac murmur    • Presence of pessary    • Problems with swallowing     WITH FOOD OCCASSIONALLY   • Rheumatic heart disease     Personal history   • Rotator cuff tendonitis     right    • Rupture of right proximal biceps tendon     " chronic   • Seasonal allergies    • Spinal headache     REPORTS AFTER DELIVERY OF SON   • Subacromial bursitis     right    • Valvular heart disease    • Vitamin B12 deficiency    • Wears glasses        Allergies   Allergen Reactions   • Ferrlecit [Na Ferric Gluc Cplx In Sucrose] Swelling   • Ranitidine Hcl Shortness Of Breath   • Lisinopril Cough       Past Surgical History:   Procedure Laterality Date   • CATARACT EXTRACTION Bilateral    • COLONOSCOPY  2011   • COLONOSCOPY N/A 1/9/2018    Procedure: COLONOSCOPY with endoscopic mucosal resection (hot snare), normal saline submucosal injection, argon thermal ablation, resolution clip placement x4, and cold biopsy polypectomy;  Surgeon: Pieter Concepcion MD;  Location: [facility] ENDOSCOPY;  Service:    • ENDOSCOPY N/A 12/6/2017    Procedure: ESOPHAGOGASTRODUODENOSCOPY with biopsies and hot snare polypectomies;  Surgeon: Pieter Concepcion MD;  Location: [facility] ENDOSCOPY;  Service:    • UPPER GASTROINTESTINAL ENDOSCOPY  08/18/2014       Family History   Problem Relation Age of Onset   • Liver cancer Maternal Grandmother    • Gout Other    • Osteoporosis Other    • Stroke Other    • Ulcers Other    • Asthma Other    • Hypertension Other    • Heart disease Other    • Osteoarthritis Other    • Colon cancer Neg Hx        Social History     Social History   • Marital status:      Spouse name: N/A   • Number of children: N/A   • Years of education: N/A     Social History Main Topics   • Smoking status: Never Smoker   • Smokeless tobacco: Never Used   • Alcohol use No   • Drug use: No   • Sexual activity: Not Currently     Other Topics Concern   • None     Social History Narrative           Objective   Physical Exam   Nursing note and vitals reviewed.    GEN: No acute distress  Head: Normocephalic, atraumatic  Eyes: Pupils equal round reactive to light  ENT: Posterior pharynx normal in appearance, oral mucosa is dry  Chest: Nontender to palpation  Cardiovascular: Regular  rate  Lungs: Clear to auscultation bilaterally  Abdomen: Soft, nontender, nondistended, no peritoneal signs  Extremities: No edema, normal appearance  Neuro: GCS 15  Psych: Mood and affect are appropriate    Procedures         ED Course  ED Course                  MDM  Number of Diagnoses or Management Options  Acute on chronic renal insufficiency:   Blood in stool:   Upset stomach:   Urinary tract infection without hematuria, site unspecified:   Diagnosis management comments: Patient recently had her antibiotics changed for her UTI and when the culture returned.  She has just started taking the Bactrim and did not take it last night or this morning because her stomach was upset.  I did stress need for compliance and I think UTI is why she is not eating and drinking well.  Patient was treated with IV fluids and antiemetics here and she states she feels much better.  Her creatinine today is grossly elevated when compared to normal.  I did discuss admission with the patient.  The patient at this time would rather go home I do not believe this is unreasonable given the fact she does have chronic renal insufficiency and has not been eating or drinking well for several days.  I did give her additional 500 mL bolus before discharge and will discharge her home with a prescription for Zofran.       Amount and/or Complexity of Data Reviewed  Clinical lab tests: ordered and reviewed  Decide to obtain previous medical records or to obtain history from someone other than the patient: yes  Obtain history from someone other than the patient: yes  Review and summarize past medical records: yes        Final diagnoses:   Urinary tract infection without hematuria, site unspecified   Upset stomach   Blood in stool   Acute on chronic renal insufficiency            Jerald Kerr MD  02/10/18 2014

## 2018-02-26 DIAGNOSIS — R22.1 NECK MASS: Primary | ICD-10-CM

## 2018-03-05 ENCOUNTER — HOSPITAL ENCOUNTER (OUTPATIENT)
Dept: INFUSION THERAPY | Facility: HOSPITAL | Age: 78
Discharge: HOME | End: 2018-03-05
Admitting: INTERNAL MEDICINE

## 2018-03-05 VITALS
DIASTOLIC BLOOD PRESSURE: 72 MMHG | OXYGEN SATURATION: 99 % | SYSTOLIC BLOOD PRESSURE: 151 MMHG | HEART RATE: 77 BPM | TEMPERATURE: 97.8 F

## 2018-03-05 DIAGNOSIS — D63.1 ANEMIA DUE TO STAGE 4 CHRONIC KIDNEY DISEASE TREATED WITH ERYTHROPOIETIN (HCC): ICD-10-CM

## 2018-03-05 DIAGNOSIS — N18.4 ANEMIA DUE TO STAGE 4 CHRONIC KIDNEY DISEASE TREATED WITH ERYTHROPOIETIN (HCC): ICD-10-CM

## 2018-03-05 LAB
DEPRECATED RDW RBC AUTO: 54.4 FL (ref 37–54)
ERYTHROCYTE [DISTWIDTH] IN BLOOD BY AUTOMATED COUNT: 15.9 % (ref 11.5–14.5)
HCT VFR BLD AUTO: 28.6 % (ref 37–47)
HGB BLD-MCNC: 9.3 G/DL (ref 12–16)
MCH RBC QN AUTO: 30.8 PG (ref 27–31)
MCHC RBC AUTO-ENTMCNC: 32.5 G/DL (ref 30–37)
MCV RBC AUTO: 94.7 FL (ref 81–99)
PLATELET # BLD AUTO: 183 10*3/MM3 (ref 130–400)
PMV BLD AUTO: 9.3 FL (ref 6–12)
RBC # BLD AUTO: 3.02 10*6/MM3 (ref 4.2–5.4)
WBC NRBC COR # BLD: 5.07 10*3/MM3 (ref 4.8–10.8)

## 2018-03-05 PROCEDURE — 85027 COMPLETE CBC AUTOMATED: CPT | Performed by: INTERNAL MEDICINE

## 2018-03-05 PROCEDURE — 36415 COLL VENOUS BLD VENIPUNCTURE: CPT

## 2018-03-05 PROCEDURE — 96372 THER/PROPH/DIAG INJ SC/IM: CPT

## 2018-03-05 PROCEDURE — 63510000001 EPOETIN ALFA PER 1000 UNITS: Performed by: PHYSICIAN ASSISTANT

## 2018-03-05 RX ADMIN — ERYTHROPOIETIN 40000 UNITS: 40000 INJECTION, SOLUTION INTRAVENOUS; SUBCUTANEOUS at 11:33

## 2018-03-07 ENCOUNTER — OFFICE VISIT (OUTPATIENT)
Dept: SURGERY | Facility: CLINIC | Age: 78
End: 2018-03-07

## 2018-03-07 VITALS
HEART RATE: 86 BPM | BODY MASS INDEX: 21.37 KG/M2 | TEMPERATURE: 99.6 F | DIASTOLIC BLOOD PRESSURE: 68 MMHG | RESPIRATION RATE: 18 BRPM | HEIGHT: 59 IN | SYSTOLIC BLOOD PRESSURE: 144 MMHG | WEIGHT: 106 LBS | OXYGEN SATURATION: 98 %

## 2018-03-07 DIAGNOSIS — R22.1 PULSATILE NECK MASS: Primary | ICD-10-CM

## 2018-03-07 PROCEDURE — 99203 OFFICE O/P NEW LOW 30 MIN: CPT | Performed by: SURGERY

## 2018-03-07 RX ORDER — ALBUTEROL SULFATE 90 UG/1
AEROSOL, METERED RESPIRATORY (INHALATION)
COMMUNITY
Start: 2018-02-26 | End: 2019-02-14

## 2018-03-07 NOTE — PROGRESS NOTES
"Patient: Carolyne Martins    YOB: 1940    Date: 03/07/2018    Primary Care Provider: Jerald Dawkins MD    Chief Complaint   Patient presents with   • Mass     left cervical area.       Subjective .     History of present illness:  Pt is here for evaluation of a palpable \"knot\" on her left cervical area which has been present for @ 5 months.  Pt denies pain and she denies redness, warmth and/or drainage at the site, pt also denies injury and/or trauma. Pt admitted to recent unexplained weight loss which she attributes to \"no appetite.\"  Soft tissue ct scan of the neck was performed on 11/01/2017, it was unremarkable.Mass is pulsatile.    The following portions of the patient's history were reviewed and updated as appropriate: allergies, current medications, past family history, past medical history, past social history, past surgical history and problem list.      Review of Systems   Constitutional: Positive for unexpected weight change. Negative for chills and fever.   HENT: Negative for hearing loss, trouble swallowing and voice change.    Eyes: Negative for visual disturbance.   Respiratory: Negative for apnea, cough, chest tightness, shortness of breath and wheezing.    Cardiovascular: Negative for chest pain, palpitations and leg swelling.   Gastrointestinal: Negative for abdominal distention, abdominal pain, anal bleeding, blood in stool, constipation, diarrhea, nausea, rectal pain and vomiting.   Endocrine: Negative for cold intolerance and heat intolerance.   Genitourinary: Negative for difficulty urinating, dysuria and flank pain.   Musculoskeletal: Negative for back pain and gait problem.   Skin: Negative for color change, rash and wound.   Neurological: Negative for dizziness, syncope, speech difficulty, weakness, light-headedness, numbness and headaches.   Hematological: Negative for adenopathy. Does not bruise/bleed easily.   Psychiatric/Behavioral: Negative for confusion. The patient is " "not nervous/anxious.        Allergies:  Allergies   Allergen Reactions   • Ferrlecit [Na Ferric Gluc Cplx In Sucrose] Swelling   • Ranitidine Hcl Shortness Of Breath   • Lisinopril Cough       Medications:    Current Outpatient Prescriptions:   •  allopurinol (ZYLOPRIM) 100 MG tablet, Take 1 tablet by mouth daily., Disp: , Rfl:   •  aspirin 81 MG tablet, Take 81 mg by mouth Daily., Disp: , Rfl:   •  budesonide-formoterol (SYMBICORT) 80-4.5 MCG/ACT inhaler, Inhale 2 puffs 2 (Two) Times a Day., Disp: , Rfl:   •  carvedilol (COREG) 12.5 MG tablet, Take 12.5 mg by mouth Every Morning., Disp: , Rfl:   •  cholecalciferol (VITAMIN D3) 1000 UNITS tablet, Take 1,000 Units by mouth Daily., Disp: , Rfl:   •  Cyanocobalamin (VITAMIN B-12 PO), Take 500 mg by mouth 2 (Two) Times a Day., Disp: , Rfl:   •  epoetin ovidio (PROCRIT) 02579 UNIT/ML injection, Procrit SOLN; Patient Sig: Procrit SOLN ; 0; 16-Jul-2014; Active  ONCE A MONTH, Disp: , Rfl:   •  fluticasone (FLONASE) 50 MCG/ACT nasal spray, 2 sprays into each nostril Daily As Needed., Disp: , Rfl:   •  hydroxychloroquine (PLAQUENIL) 200 MG tablet, Take 200 mg by mouth Daily., Disp: , Rfl:   •  irbesartan (AVAPRO) 150 MG tablet, Take 150 mg by mouth Daily., Disp: , Rfl:   •  Loratadine (CLARITIN) 10 MG capsule, Take 10 mg by mouth Daily As Needed (RHINITIS)., Disp: , Rfl:   •  omeprazole (PriLOSEC) 40 MG capsule, Take 40 mg by mouth Daily., Disp: , Rfl:   •  predniSONE (DELTASONE) 2.5 MG tablet, Take 2.5 mg by mouth Daily., Disp: , Rfl:   •  rosuvastatin (CRESTOR) 10 MG tablet, Take 10 mg by mouth Daily., Disp: , Rfl:   •  torsemide (DEMADEX) 20 MG tablet, Take 10 mg by mouth Daily As Needed (SWELLING/SHORTNESS OF BREATH). EDEMA, Disp: , Rfl:   •  VENTOLIN  (90 Base) MCG/ACT inhaler, , Disp: , Rfl:     History\"  Past Medical History:   Diagnosis Date   • Anemia 1998   • Arthritis    • Bleeding from anus    • Bronchitis     STATES HAS BRONCHITIS CURRENTLY (12/5/17).     • " "Bronchitis 12/2017   • CAD (coronary artery disease)    • Cataract, bilateral    • Chronic kidney disease     STAGE 4.  SEES TERA   • Chronic kidney failure     REPORTS STAGE IV   • Difficulty swallowing solids    • Disease of thyroid gland    • Fracture, radius 2016    right distal radius and ulna, healed.   • GERD (gastroesophageal reflux disease)    • Gout    • High cholesterol    • History of nuclear stress test 2014    DR. CORTEZ.  \"IT WAS OK\"   • Hyperparathyroidism    • Hypertension    • Iron deficiency    • Osteoarthritis    • Osteoarthritis of knees, bilateral     Both knees Supartz injection series August, 2015   • Osteoporosis    • Personal history of cardiac murmur    • Presence of pessary    • Problems with swallowing     WITH FOOD OCCASSIONALLY   • Rheumatic heart disease     Personal history   • Rotator cuff tendonitis     right    • Rupture of right proximal biceps tendon     chronic   • Seasonal allergies    • Spinal headache     REPORTS AFTER DELIVERY OF SON   • Subacromial bursitis     right    • Valvular heart disease    • Vitamin B12 deficiency    • Wears glasses        Past Surgical History:   Procedure Laterality Date   • CATARACT EXTRACTION Bilateral    • COLONOSCOPY  2011   • COLONOSCOPY N/A 1/9/2018    Procedure: COLONOSCOPY with endoscopic mucosal resection (hot snare), normal saline submucosal injection, argon thermal ablation, resolution clip placement x4, and cold biopsy polypectomy;  Surgeon: Pieter Concepcion MD;  Location: [facility] ENDOSCOPY;  Service:    • ENDOSCOPY N/A 12/6/2017    Procedure: ESOPHAGOGASTRODUODENOSCOPY with biopsies and hot snare polypectomies;  Surgeon: Pieter Concepcion MD;  Location: [facility] ENDOSCOPY;  Service:    • UPPER GASTROINTESTINAL ENDOSCOPY  08/18/2014       Family History   Problem Relation Age of Onset   • Liver cancer Maternal Grandmother    • Gout Other    • Osteoporosis Other    • Stroke Other    • Ulcers Other    • Asthma Other    • Hypertension Other  " "  • Heart disease Other    • Osteoarthritis Other    • Colon cancer Neg Hx        Social History   Substance Use Topics   • Smoking status: Never Smoker   • Smokeless tobacco: Never Used   • Alcohol use No        Objective     Vital Signs:   /68  Pulse 86  Temp 99.6 °F (37.6 °C) (Temporal Artery )   Resp 18  Ht 149.9 cm (59\")  Wt 48.1 kg (106 lb)  LMP  (LMP Unknown)  SpO2 98%  BMI 21.41 kg/m2    Physical Exam:   General Appearance:    Alert, cooperative, in no acute distress   Head:    Normocephalic, without obvious abnormality, atraumatic   Eyes:            Lids and lashes normal, conjunctivae and sclerae normal, no   icterus, no pallor, corneas clear, PERRLA   Ears:    Ears appear intact with no abnormalities noted   Throat:   No oral lesions, no thrush, oral mucosa moist   Neck:   No adenopathy, supple, trachea midline, no thyromegaly, no   carotid bruit, no JVD.  Pulsatile left neck mass, likely a prominent carotid bulb.  No bruit on exam.     Lungs:     Clear to auscultation,respirations regular, even and                  unlabored    Heart:    Regular rhythm and normal rate, normal S1 and S2, no            murmur, no gallop, no rub, no click   Chest Wall:    No abnormalities observed   Abdomen:     Normal bowel sounds, no masses, no organomegaly, soft        non-tender, non-distended, no guarding, no rebound                tenderness   Extremities:   Moves all extremities well, no edema, no cyanosis, no             redness   Pulses:   Pulses palpable and equal bilaterally   Skin:   No bleeding, bruising or rash   Lymph nodes:   No palpable adenopathy   Neurologic:   Cranial nerves 2 - 12 grossly intact, sensation intact, DTR       present and equal bilaterally     Results Review:   I reviewed the patient's new clinical results.    Assessment/Plan     1. Pulsatile neck mass        Prominent left carotid artery, prominent bulb.  No mass seen on CT scan.  Patient reassured, follow-up as needed.    I " discussed the patients findings and my recommendations with patient    Review of Systems was reviewed and confirmed as accurate today.    Electronically signed by Daryn Spears MD  03/07/18      .  Scribed for Daryn Spears MD by Jodie Lamas. 3/7/2018  11:15 AM

## 2018-03-26 ENCOUNTER — OFFICE VISIT (OUTPATIENT)
Dept: ORTHOPEDIC SURGERY | Facility: CLINIC | Age: 78
End: 2018-03-26

## 2018-03-26 VITALS — RESPIRATION RATE: 16 BRPM | WEIGHT: 110 LBS | HEIGHT: 59 IN | BODY MASS INDEX: 22.18 KG/M2

## 2018-03-26 DIAGNOSIS — M17.11 PRIMARY OSTEOARTHRITIS OF RIGHT KNEE: Primary | ICD-10-CM

## 2018-03-26 PROCEDURE — 20610 DRAIN/INJ JOINT/BURSA W/O US: CPT | Performed by: PHYSICIAN ASSISTANT

## 2018-03-26 NOTE — PROGRESS NOTES
"Subjective   Patient ID: Carolyne Martins is a 77 y.o.  female  Follow-up of the Right Knee (Patient denies injury to either knee. Right hand dominant/) and Follow-up of the Left Knee         History of Present Illness  Patient presents for bilateral knee pain.  She states her right is worse than her left.  She would like to receive the right gel injections.  She denies injury or trauma.    Pain Score: 9  Pain Location: Knee  Pain Orientation: Right, Left     Pain Descriptors: Aching  Pain Frequency: Constant/continuous           Aggravating Factors: Bending, Standing, Walking           Result of Injury: No  Work-Related Injury: No    Past Medical History:   Diagnosis Date   • Anemia 1998   • Arthritis    • Bleeding from anus    • Bronchitis     STATES HAS BRONCHITIS CURRENTLY (12/5/17).     • Bronchitis 12/2017   • CAD (coronary artery disease)    • Cataract, bilateral    • Chronic kidney disease     STAGE 4.  SEES TERA   • Chronic kidney failure     REPORTS STAGE IV   • Difficulty swallowing solids    • Disease of thyroid gland    • Fracture, radius 2016    right distal radius and ulna, healed.   • GERD (gastroesophageal reflux disease)    • Gout    • High cholesterol    • History of nuclear stress test 2014    DR. CORTEZ.  \"IT WAS OK\"   • Hyperparathyroidism    • Hypertension    • Iron deficiency    • Osteoarthritis    • Osteoarthritis of knees, bilateral     Both knees Supartz injection series August, 2015   • Osteoporosis    • Personal history of cardiac murmur    • Presence of pessary    • Problems with swallowing     WITH FOOD OCCASSIONALLY   • Rheumatic heart disease     Personal history   • Rotator cuff tendonitis     right    • Rupture of right proximal biceps tendon     chronic   • Seasonal allergies    • Spinal headache     REPORTS AFTER DELIVERY OF SON   • Subacromial bursitis     right    • Valvular heart disease    • Vitamin B12 deficiency    • Wears glasses         Past Surgical History: "   Procedure Laterality Date   • CATARACT EXTRACTION Bilateral    • COLONOSCOPY  2011   • COLONOSCOPY N/A 1/9/2018    Procedure: COLONOSCOPY with endoscopic mucosal resection (hot snare), normal saline submucosal injection, argon thermal ablation, resolution clip placement x4, and cold biopsy polypectomy;  Surgeon: Pieter Concepcion MD;  Location: [facility] ENDOSCOPY;  Service:    • ENDOSCOPY N/A 12/6/2017    Procedure: ESOPHAGOGASTRODUODENOSCOPY with biopsies and hot snare polypectomies;  Surgeon: Pieter Concepcion MD;  Location: [facility] ENDOSCOPY;  Service:    • UPPER GASTROINTESTINAL ENDOSCOPY  08/18/2014       Family History   Problem Relation Age of Onset   • Liver cancer Maternal Grandmother    • Gout Other    • Osteoporosis Other    • Stroke Other    • Ulcers Other    • Asthma Other    • Hypertension Other    • Heart disease Other    • Osteoarthritis Other    • Colon cancer Neg Hx        Social History     Social History   • Marital status:      Spouse name: N/A   • Number of children: N/A   • Years of education: N/A     Occupational History   • Not on file.     Social History Main Topics   • Smoking status: Never Smoker   • Smokeless tobacco: Never Used   • Alcohol use No   • Drug use: No   • Sexual activity: Not Currently     Other Topics Concern   • Not on file     Social History Narrative   • No narrative on file       Allergies   Allergen Reactions   • Ferrlecit [Na Ferric Gluc Cplx In Sucrose] Swelling   • Ranitidine Hcl Shortness Of Breath   • Lisinopril Cough       Review of Systems   Constitutional: Negative for fever.   HENT: Negative for voice change.    Eyes: Negative for visual disturbance.   Respiratory: Negative for shortness of breath.    Cardiovascular: Negative for chest pain.   Gastrointestinal: Negative for abdominal distention and abdominal pain.   Genitourinary: Negative for dysuria.   Musculoskeletal: Positive for arthralgias. Negative for gait problem and joint swelling.   Skin: Negative  "for rash.   Neurological: Negative for speech difficulty.   Hematological: Does not bruise/bleed easily.   Psychiatric/Behavioral: Negative for confusion.   All other systems reviewed and are negative.      Objective   Resp 16   Ht 149.9 cm (59.02\")   Wt 49.9 kg (110 lb)   LMP  (LMP Unknown)   BMI 22.21 kg/m²    Physical Exam   Constitutional: She is oriented to person, place, and time.   Neck: No tracheal deviation present.   Pulmonary/Chest: No respiratory distress.   Musculoskeletal:        Right knee: She exhibits swelling. She exhibits no effusion, no ecchymosis, no deformity, no laceration, no erythema and normal alignment. Tenderness found. Medial joint line tenderness noted.   Neurological: She is alert and oriented to person, place, and time.   Skin: Capillary refill takes less than 2 seconds.   Psychiatric: She has a normal mood and affect.   Vitals reviewed.    Right Knee Exam     Other   Other tests: no effusion present           Extremity DVT signs are Negative on physical exam with negative Daniele sign, with no calf pain, with no palpable cords, with no increased pain with passive stretch/extension and with no skin tone change   Neurologic Exam     Mental Status   Oriented to person, place, and time.      Right Knee Exam     Tenderness   The patient is experiencing tenderness in the medial joint line.               Assessment/Plan   Independent Review of Radiographic Studies:    No new imaging done today.  Reviewed at a prior visit.      Large Joint Arthrocentesis  Date/Time: 3/26/2018 2:26 PM  Consent given by: patient  Site marked: site marked  Timeout: Immediately prior to procedure a time out was called to verify the correct patient, procedure, equipment, support staff and site/side marked as required   Supporting Documentation  Indications: pain   Procedure Details  Location: knee - R knee  Preparation: Patient was prepped and draped in the usual sterile fashion  Needle size: 22 G  Approach: " anterolateral  Medications administered: 25 mg sodium hyaluronate 25 MG/2.5ML  Patient tolerance: patient tolerated the procedure well with no immediate complications             Carolyne was seen today for follow-up and follow-up.    Diagnoses and all orders for this visit:    Primary osteoarthritis of right knee  -     Large Joint Arthrocentesis  -     sodium hyaluronate (SUPARTZ) injection 25 mg; Inject 25 mg into the joint Once.       Orthopedic activities reviewed and patient expressed appreciation  Discussion of orthopedic goals  Risk, benefits, and merits of treatment alternatives reviewed with the patient and questions answered  Call or notify for any adverse effect from injection therapy    Recommendations/Plan:  Patient is encouraged to call or return for any issues or concerns.    FU in 1 week    Patient agreeable to call or return sooner for any concerns.

## 2018-04-02 ENCOUNTER — HOSPITAL ENCOUNTER (OUTPATIENT)
Dept: INFUSION THERAPY | Facility: HOSPITAL | Age: 78
Discharge: HOME | End: 2018-04-02
Admitting: INTERNAL MEDICINE

## 2018-04-02 VITALS
TEMPERATURE: 98 F | SYSTOLIC BLOOD PRESSURE: 175 MMHG | HEART RATE: 77 BPM | RESPIRATION RATE: 18 BRPM | OXYGEN SATURATION: 100 % | DIASTOLIC BLOOD PRESSURE: 83 MMHG

## 2018-04-02 DIAGNOSIS — N18.4 ANEMIA DUE TO STAGE 4 CHRONIC KIDNEY DISEASE TREATED WITH ERYTHROPOIETIN (HCC): ICD-10-CM

## 2018-04-02 DIAGNOSIS — D63.1 ANEMIA DUE TO STAGE 4 CHRONIC KIDNEY DISEASE TREATED WITH ERYTHROPOIETIN (HCC): ICD-10-CM

## 2018-04-02 LAB
DEPRECATED RDW RBC AUTO: 56 FL (ref 37–54)
ERYTHROCYTE [DISTWIDTH] IN BLOOD BY AUTOMATED COUNT: 15.9 % (ref 11.5–14.5)
HCT VFR BLD AUTO: 28.4 % (ref 37–47)
HGB BLD-MCNC: 9.1 G/DL (ref 12–16)
MCH RBC QN AUTO: 30.6 PG (ref 27–31)
MCHC RBC AUTO-ENTMCNC: 32 G/DL (ref 30–37)
MCV RBC AUTO: 95.6 FL (ref 81–99)
PLATELET # BLD AUTO: 213 10*3/MM3 (ref 130–400)
PMV BLD AUTO: 9.5 FL (ref 6–12)
RBC # BLD AUTO: 2.97 10*6/MM3 (ref 4.2–5.4)
WBC NRBC COR # BLD: 4.67 10*3/MM3 (ref 4.8–10.8)

## 2018-04-02 PROCEDURE — 85027 COMPLETE CBC AUTOMATED: CPT | Performed by: INTERNAL MEDICINE

## 2018-04-02 PROCEDURE — 63510000001 EPOETIN ALFA PER 1000 UNITS: Performed by: PHYSICIAN ASSISTANT

## 2018-04-02 PROCEDURE — 96372 THER/PROPH/DIAG INJ SC/IM: CPT

## 2018-04-02 RX ADMIN — ERYTHROPOIETIN 40000 UNITS: 40000 INJECTION, SOLUTION INTRAVENOUS; SUBCUTANEOUS at 11:47

## 2018-04-06 ENCOUNTER — OFFICE VISIT (OUTPATIENT)
Dept: ORTHOPEDIC SURGERY | Facility: CLINIC | Age: 78
End: 2018-04-06

## 2018-04-06 ENCOUNTER — HOSPITAL ENCOUNTER (OUTPATIENT)
Dept: ULTRASOUND IMAGING | Facility: HOSPITAL | Age: 78
Discharge: HOME | End: 2018-04-06
Admitting: PHYSICIAN ASSISTANT

## 2018-04-06 VITALS — BODY MASS INDEX: 22.18 KG/M2 | RESPIRATION RATE: 18 BRPM | HEIGHT: 59 IN | WEIGHT: 110 LBS

## 2018-04-06 DIAGNOSIS — M17.11 PRIMARY OSTEOARTHRITIS OF RIGHT KNEE: Primary | ICD-10-CM

## 2018-04-06 PROCEDURE — 93971 EXTREMITY STUDY: CPT

## 2018-04-06 PROCEDURE — 20610 DRAIN/INJ JOINT/BURSA W/O US: CPT | Performed by: PHYSICIAN ASSISTANT

## 2018-04-06 NOTE — PROGRESS NOTES
"Subjective   Carolyne Martins is a 77 y.o.  female   Follow-up, Pain, and Injections of the Right Knee      History of Present Illness   Patient presents for her right Visco supplementation injection.  This will be her second injection.  She states the knee is doing well but she isn't having pain in swelling to the right calf which developed several days ago.  She is concerned she may have a blood clot.  She denies recent travel.  She denies recent surgery.  Patient denies chest pain or shortness of breath.  Past Medical History:   Diagnosis Date   • Anemia 1998   • Arthritis    • Bleeding from anus    • Bronchitis     STATES HAS BRONCHITIS CURRENTLY (12/5/17).     • Bronchitis 12/2017   • CAD (coronary artery disease)    • Cataract, bilateral    • Chronic kidney disease     STAGE 4.  SEES TERA   • Chronic kidney failure     REPORTS STAGE IV   • Difficulty swallowing solids    • Disease of thyroid gland    • Fracture, radius 2016    right distal radius and ulna, healed.   • GERD (gastroesophageal reflux disease)    • Gout    • High cholesterol    • History of nuclear stress test 2014    DR. CORTEZ.  \"IT WAS OK\"   • Hyperparathyroidism    • Hypertension    • Iron deficiency    • Osteoarthritis    • Osteoarthritis of knees, bilateral     Both knees Supartz injection series August, 2015   • Osteoporosis    • Personal history of cardiac murmur    • Presence of pessary    • Problems with swallowing     WITH FOOD OCCASSIONALLY   • Rheumatic heart disease     Personal history   • Rotator cuff tendonitis     right    • Rupture of right proximal biceps tendon     chronic   • Seasonal allergies    • Spinal headache     REPORTS AFTER DELIVERY OF SON   • Subacromial bursitis     right    • Valvular heart disease    • Vitamin B12 deficiency    • Wears glasses         Past Surgical History:   Procedure Laterality Date   • CATARACT EXTRACTION Bilateral    • COLONOSCOPY  2011   • COLONOSCOPY N/A 1/9/2018    Procedure: COLONOSCOPY " "with endoscopic mucosal resection (hot snare), normal saline submucosal injection, argon thermal ablation, resolution clip placement x4, and cold biopsy polypectomy;  Surgeon: Pieter Concepcion MD;  Location: [facility] ENDOSCOPY;  Service:    • ENDOSCOPY N/A 12/6/2017    Procedure: ESOPHAGOGASTRODUODENOSCOPY with biopsies and hot snare polypectomies;  Surgeon: Pieter Concepcion MD;  Location: [facility] ENDOSCOPY;  Service:    • UPPER GASTROINTESTINAL ENDOSCOPY  08/18/2014       Allergies   Allergen Reactions   • Ferrlecit [Na Ferric Gluc Cplx In Sucrose] Swelling   • Ranitidine Hcl Shortness Of Breath   • Lisinopril Cough       Review of Systems   Constitutional: Negative for fever.   HENT: Negative for voice change.    Eyes: Negative for visual disturbance.   Respiratory: Negative for shortness of breath.    Cardiovascular: Negative for chest pain.   Gastrointestinal: Negative for abdominal distention and abdominal pain.   Genitourinary: Negative for dysuria.   Musculoskeletal: Positive for arthralgias. Negative for gait problem and joint swelling.   Skin: Negative for rash.   Neurological: Negative for speech difficulty.   Hematological: Does not bruise/bleed easily.   Psychiatric/Behavioral: Negative for confusion.       Objective   Resp 18   Ht 149.9 cm (59.02\")   Wt 49.9 kg (110 lb)   LMP  (LMP Unknown)   BMI 22.20 kg/m²    Physical Exam   Constitutional: She is oriented to person, place, and time. She appears well-nourished.   Neck: No tracheal deviation present.   Musculoskeletal:        Right knee: She exhibits no swelling, no effusion, no ecchymosis and no erythema. Tenderness found. Medial joint line and lateral joint line tenderness noted.        Right ankle: She exhibits normal pulse.        Right lower leg: She exhibits tenderness and swelling (1+ pitting edema. ).        Right foot: There is normal capillary refill.   Neurological: She is alert and oriented to person, place, and time.   Skin: Capillary refill " takes less than 2 seconds.   Psychiatric: She has a normal mood and affect. Her behavior is normal.   Vitals reviewed.    No redness to the RLE    No adverse effects of prior injection.  Skin exam stable with no erythema, ecchymosis or rash.  No motor or sensory changes.  Distal pulse intact.    Large Joint Arthrocentesis  Date/Time: 4/6/2018 1:14 PM  Consent given by: patient  Site marked: site marked  Timeout: Immediately prior to procedure a time out was called to verify the correct patient, procedure, equipment, support staff and site/side marked as required   Supporting Documentation  Indications: pain   Procedure Details  Location: knee - R knee  Preparation: Patient was prepped and draped in the usual sterile fashion  Needle size: 22 G  Approach: anterolateral  Medications administered: 25 mg sodium hyaluronate 25 MG/2.5ML  Patient tolerance: patient tolerated the procedure well with no immediate complications          Assessment/Plan   Independent Review of Radiographic Studies:    No new imaging done today.  Laboratory and Other Studies:  No new results reviewed today.       Carolyne was seen today for follow-up, pain and injections.    Diagnoses and all orders for this visit:    Primary osteoarthritis of right knee  -     Large Joint Arthrocentesis  -     US Venous Doppler Lower Extremity Right (duplex)      Orthopedic activities reviewed and patient expressed appreciation  Discussion of orthopedic goals  Risk, benefits, and merits of treatment alternatives reviewed with the patient and questions answered  Call or notify for any adverse effect from injection therapy    Recommendations/Plan:  Patient is encouraged to call or return for any issues or concerns.  Because the patient is tender to the right calf we will send for an ultrasound.  If negative she will be able to go home and follow-up in 1 week for her next injection.  Patient agreeable to call or return sooner for any concerns.

## 2018-04-13 ENCOUNTER — OFFICE VISIT (OUTPATIENT)
Dept: ORTHOPEDIC SURGERY | Facility: CLINIC | Age: 78
End: 2018-04-13

## 2018-04-13 VITALS — HEIGHT: 59 IN | BODY MASS INDEX: 22.18 KG/M2 | WEIGHT: 110.01 LBS | RESPIRATION RATE: 16 BRPM

## 2018-04-13 DIAGNOSIS — M17.11 PRIMARY OSTEOARTHRITIS OF RIGHT KNEE: Primary | ICD-10-CM

## 2018-04-13 PROCEDURE — 20610 DRAIN/INJ JOINT/BURSA W/O US: CPT | Performed by: PHYSICIAN ASSISTANT

## 2018-04-13 NOTE — PROGRESS NOTES
"Subjective   Patient ID: Carolyne Martins is a 77 y.o.  female  Follow-up of the Right Knee         History of Present Illness    Patient is here for her 3rd Visco supplementation injection.  She states the knee is improving.  Patient was seen in the office last Friday where she had swelling to the right leg with no skin changes she had a venous Doppler ultrasound was negative for blood clot.  She states she has an upcoming appointment with Dr. Cortez for the swelling of her right leg  Pain Score: 2  Pain Location: Knee  Pain Orientation: Right     Pain Descriptors: Aching  Pain Frequency: Intermittent           Aggravating Factors: Walking, Standing           Result of Injury: No  Work-Related Injury: No    Past Medical History:   Diagnosis Date   • Anemia 1998   • Arthritis    • Bleeding from anus    • Bronchitis     STATES HAS BRONCHITIS CURRENTLY (12/5/17).     • Bronchitis 12/2017   • CAD (coronary artery disease)    • Cataract, bilateral    • Chronic kidney disease     STAGE 4.  SEES TERA   • Chronic kidney failure     REPORTS STAGE IV   • Difficulty swallowing solids    • Disease of thyroid gland    • Fracture, radius 2016    right distal radius and ulna, healed.   • GERD (gastroesophageal reflux disease)    • Gout    • High cholesterol    • History of nuclear stress test 2014    DR. CORTEZ.  \"IT WAS OK\"   • Hyperparathyroidism    • Hypertension    • Iron deficiency    • Osteoarthritis    • Osteoarthritis of knees, bilateral     Both knees Supartz injection series August, 2015   • Osteoporosis    • Personal history of cardiac murmur    • Presence of pessary    • Problems with swallowing     WITH FOOD OCCASSIONALLY   • Rheumatic heart disease     Personal history   • Rotator cuff tendonitis     right    • Rupture of right proximal biceps tendon     chronic   • Seasonal allergies    • Spinal headache     REPORTS AFTER DELIVERY OF SON   • Subacromial bursitis     right    • Valvular heart disease    • Vitamin " B12 deficiency    • Wears glasses         Past Surgical History:   Procedure Laterality Date   • CATARACT EXTRACTION Bilateral    • COLONOSCOPY  2011   • COLONOSCOPY N/A 1/9/2018    Procedure: COLONOSCOPY with endoscopic mucosal resection (hot snare), normal saline submucosal injection, argon thermal ablation, resolution clip placement x4, and cold biopsy polypectomy;  Surgeon: Pieter Concepcion MD;  Location: [facility] ENDOSCOPY;  Service:    • ENDOSCOPY N/A 12/6/2017    Procedure: ESOPHAGOGASTRODUODENOSCOPY with biopsies and hot snare polypectomies;  Surgeon: Pieter Concepcion MD;  Location: [facility] ENDOSCOPY;  Service:    • UPPER GASTROINTESTINAL ENDOSCOPY  08/18/2014       Family History   Problem Relation Age of Onset   • Liver cancer Maternal Grandmother    • Gout Other    • Osteoporosis Other    • Stroke Other    • Ulcers Other    • Asthma Other    • Hypertension Other    • Heart disease Other    • Osteoarthritis Other    • Colon cancer Neg Hx        Social History     Social History   • Marital status:      Spouse name: N/A   • Number of children: N/A   • Years of education: N/A     Occupational History   • Not on file.     Social History Main Topics   • Smoking status: Never Smoker   • Smokeless tobacco: Never Used   • Alcohol use No   • Drug use: No   • Sexual activity: Not Currently     Other Topics Concern   • Not on file     Social History Narrative   • No narrative on file       Allergies   Allergen Reactions   • Ferrlecit [Na Ferric Gluc Cplx In Sucrose] Swelling   • Ranitidine Hcl Shortness Of Breath   • Lisinopril Cough       Review of Systems   Constitutional: Negative for fever.   HENT: Negative for voice change.    Eyes: Negative for visual disturbance.   Respiratory: Negative for shortness of breath.    Cardiovascular: Negative for chest pain.   Gastrointestinal: Negative for abdominal distention and abdominal pain.   Genitourinary: Negative for dysuria.   Musculoskeletal: Positive for arthralgias.  "Negative for gait problem and joint swelling.   Skin: Negative for rash.   Neurological: Negative for speech difficulty.   Hematological: Does not bruise/bleed easily.   Psychiatric/Behavioral: Negative for confusion.   All other systems reviewed and are negative.      Objective   Resp 16   Ht 149.9 cm (59.02\")   Wt 49.9 kg (110 lb 0.2 oz)   LMP  (LMP Unknown)   BMI 22.21 kg/m²    Physical Exam   Constitutional: She is oriented to person, place, and time.   Pulmonary/Chest: Effort normal.   Musculoskeletal:        Right knee: She exhibits no swelling and no erythema. No tenderness found. No medial joint line and no lateral joint line tenderness noted.        Right lower leg: She exhibits swelling (1+ pitting edema ). She exhibits no deformity and no laceration.   Neurological: She is alert and oriented to person, place, and time.   Skin: Capillary refill takes less than 2 seconds. No rash noted.   Psychiatric: She has a normal mood and affect.   Vitals reviewed.    Ortho Exam     Neurologic Exam     Mental Status   Oriented to person, place, and time.      Right Knee Exam     Tenderness   The patient is experiencing tenderness in the no medial joint line, no lateral joint line.         There is NO erythema to the RLE      Assessment/Plan   Independent Review of Radiographic Studies:    No new imaging done today.      Large Joint Arthrocentesis  Date/Time: 4/13/2018 1:18 PM  Consent given by: patient  Site marked: site marked  Timeout: Immediately prior to procedure a time out was called to verify the correct patient, procedure, equipment, support staff and site/side marked as required   Supporting Documentation  Indications: pain   Procedure Details  Location: knee - R knee  Preparation: Patient was prepped and draped in the usual sterile fashion  Needle gauge: 21 gauge used.  Medications administered: 25 mg sodium hyaluronate 25 MG/2.5ML  Patient tolerance: patient tolerated the procedure well with no immediate " complications             Carolyne was seen today for follow-up.    Diagnoses and all orders for this visit:    Primary osteoarthritis of right knee  -     Large Joint Arthrocentesis    Other orders  -     Cancel: Large Joint Arthrocentesis       Orthopedic activities reviewed and patient expressed appreciation  Discussion of orthopedic goals  Risk, benefits, and merits of treatment alternatives reviewed with the patient and questions answered  Call or notify for any adverse effect from injection therapy    Recommendations/Plan:  Patient is encouraged to call or return for any issues or concerns.    FU in 1 week    Patient agreeable to call or return sooner for any concerns.

## 2018-04-20 ENCOUNTER — OFFICE VISIT (OUTPATIENT)
Dept: ORTHOPEDIC SURGERY | Facility: CLINIC | Age: 78
End: 2018-04-20

## 2018-04-20 VITALS — HEIGHT: 59 IN | BODY MASS INDEX: 22.66 KG/M2 | WEIGHT: 112.4 LBS | RESPIRATION RATE: 12 BRPM

## 2018-04-20 DIAGNOSIS — M17.11 PRIMARY OSTEOARTHRITIS OF ONE KNEE, RIGHT: ICD-10-CM

## 2018-04-20 PROCEDURE — 20610 DRAIN/INJ JOINT/BURSA W/O US: CPT | Performed by: PHYSICIAN ASSISTANT

## 2018-04-20 NOTE — PROGRESS NOTES
"Subjective   Patient ID: Carolyne Martins is a 77 y.o. right hand dominant female  Pain of the Right Knee         History of Present Illness  Patient is here for her fourth Supartz injection to the right knee.  She denies redness or warmth to her knee.  Pain Score: 5  Pain Location: Knee  Pain Orientation: Right     Pain Descriptors: Aching  Pain Frequency: Constant/continuous  Pain Onset: Ongoing        Aggravating Factors: Walking, Standing                   Past Medical History:   Diagnosis Date   • Anemia 1998   • Arthritis    • Bleeding from anus    • Bronchitis     STATES HAS BRONCHITIS CURRENTLY (12/5/17).     • Bronchitis 12/2017   • CAD (coronary artery disease)    • Cataract, bilateral    • Chronic kidney disease     STAGE 4.  SEES TERA   • Chronic kidney failure     REPORTS STAGE IV   • Difficulty swallowing solids    • Disease of thyroid gland    • Fracture, radius 2016    right distal radius and ulna, healed.   • GERD (gastroesophageal reflux disease)    • Gout    • High cholesterol    • History of nuclear stress test 2014    DR. CORTEZ.  \"IT WAS OK\"   • Hyperparathyroidism    • Hypertension    • Iron deficiency    • Osteoarthritis    • Osteoarthritis of knees, bilateral     Both knees Supartz injection series August, 2015   • Osteoporosis    • Personal history of cardiac murmur    • Presence of pessary    • Problems with swallowing     WITH FOOD OCCASSIONALLY   • Rheumatic heart disease     Personal history   • Rotator cuff tendonitis     right    • Rupture of right proximal biceps tendon     chronic   • Seasonal allergies    • Spinal headache     REPORTS AFTER DELIVERY OF SON   • Subacromial bursitis     right    • Valvular heart disease    • Vitamin B12 deficiency    • Wears glasses         Past Surgical History:   Procedure Laterality Date   • CATARACT EXTRACTION Bilateral    • COLONOSCOPY  2011   • COLONOSCOPY N/A 1/9/2018    Procedure: COLONOSCOPY with endoscopic mucosal resection (hot snare), " normal saline submucosal injection, argon thermal ablation, resolution clip placement x4, and cold biopsy polypectomy;  Surgeon: Pieter Concepcion MD;  Location: [facility] ENDOSCOPY;  Service:    • ENDOSCOPY N/A 12/6/2017    Procedure: ESOPHAGOGASTRODUODENOSCOPY with biopsies and hot snare polypectomies;  Surgeon: Pieter Concepcion MD;  Location: [facility] ENDOSCOPY;  Service:    • UPPER GASTROINTESTINAL ENDOSCOPY  08/18/2014       Family History   Problem Relation Age of Onset   • Liver cancer Maternal Grandmother    • Gout Other    • Osteoporosis Other    • Stroke Other    • Ulcers Other    • Asthma Other    • Hypertension Other    • Heart disease Other    • Osteoarthritis Other    • Colon cancer Neg Hx        Social History     Social History   • Marital status:      Spouse name: N/A   • Number of children: N/A   • Years of education: N/A     Occupational History   • Not on file.     Social History Main Topics   • Smoking status: Never Smoker   • Smokeless tobacco: Never Used   • Alcohol use No   • Drug use: No   • Sexual activity: Not Currently     Other Topics Concern   • Not on file     Social History Narrative   • No narrative on file       Allergies   Allergen Reactions   • Ferrlecit [Na Ferric Gluc Cplx In Sucrose] Swelling   • Ranitidine Hcl Shortness Of Breath   • Lisinopril Cough       Review of Systems   Constitutional: Negative for fever.   HENT: Negative for voice change.    Eyes: Negative for visual disturbance.   Respiratory: Negative for shortness of breath.    Cardiovascular: Negative for chest pain.   Gastrointestinal: Negative for abdominal distention and abdominal pain.   Genitourinary: Negative for dysuria.   Musculoskeletal: Positive for arthralgias. Negative for gait problem and joint swelling.   Skin: Negative for rash.   Neurological: Negative for speech difficulty.   Hematological: Does not bruise/bleed easily.   Psychiatric/Behavioral: Negative for confusion.   All other systems reviewed and  "are negative.      Objective   Resp 12   Ht 149.9 cm (59.02\")   Wt 51 kg (112 lb 6.4 oz)   LMP  (LMP Unknown)   BMI 22.69 kg/m²    Physical Exam  Ortho Exam   Extremity DVT signs are Negative by clinical screen. and Negative on physical exam with negative Daniele sign, with no calf pain, with no palpable cords, with no increased pain with passive stretch/extension and with no skin tone change   Neurologic Exam   Ortho Exam         Assessment/Plan   Independent Review of Radiographic Studies:    No new imaging done today.  Reviewed with patient at a prior visit.      Large Joint Arthrocentesis  Date/Time: 4/20/2018 1:13 PM  Consent given by: patient  Site marked: site marked  Timeout: Immediately prior to procedure a time out was called to verify the correct patient, procedure, equipment, support staff and site/side marked as required   Supporting Documentation  Indications: pain   Procedure Details  Location: knee - R knee  Preparation: Patient was prepped and draped in the usual sterile fashion  Needle size: 22 G  Approach: anterolateral  Medications administered: 25 mg sodium hyaluronate 25 MG/2.5ML  Patient tolerance: patient tolerated the procedure well with no immediate complications             Carolyne was seen today for pain.    Diagnoses and all orders for this visit:    Primary osteoarthritis of one knee, right  -     Large Joint Arthrocentesis       Orthopedic activities reviewed and patient expressed appreciation  Discussion of orthopedic goals  Risk, benefits, and merits of treatment alternatives reviewed with the patient and questions answered  Call or notify for any adverse effect from injection therapy    Recommendations/Plan:  Patient is encouraged to call or return for any issues or concerns.    Fu in 1 week  Patient agreeable to call or return sooner for any concerns.       "

## 2018-04-25 ENCOUNTER — TRANSCRIBE ORDERS (OUTPATIENT)
Dept: ADMINISTRATIVE | Facility: HOSPITAL | Age: 78
End: 2018-04-25

## 2018-04-25 DIAGNOSIS — Z12.39 SCREENING BREAST EXAMINATION: Primary | ICD-10-CM

## 2018-04-27 ENCOUNTER — OFFICE VISIT (OUTPATIENT)
Dept: ORTHOPEDIC SURGERY | Facility: CLINIC | Age: 78
End: 2018-04-27

## 2018-04-27 VITALS — RESPIRATION RATE: 18 BRPM | HEIGHT: 59 IN | WEIGHT: 112 LBS | BODY MASS INDEX: 22.58 KG/M2

## 2018-04-27 DIAGNOSIS — M17.11 PRIMARY OSTEOARTHRITIS OF ONE KNEE, RIGHT: ICD-10-CM

## 2018-04-27 DIAGNOSIS — R52 PAIN: Primary | ICD-10-CM

## 2018-04-27 PROCEDURE — 20610 DRAIN/INJ JOINT/BURSA W/O US: CPT | Performed by: PHYSICIAN ASSISTANT

## 2018-04-27 RX ORDER — METHYLPREDNISOLONE 4 MG/1
TABLET ORAL
Qty: 21 TABLET | Refills: 0 | Status: SHIPPED | OUTPATIENT
Start: 2018-04-27 | End: 2018-04-27

## 2018-04-27 RX ORDER — METHYLPREDNISOLONE 4 MG/1
TABLET ORAL
Qty: 21 TABLET | Refills: 0 | Status: SHIPPED | OUTPATIENT
Start: 2018-04-27 | End: 2019-01-07

## 2018-04-27 NOTE — PROGRESS NOTES
"Subjective   Carolyne Martins is a 77 y.o.  female   Follow-up, Pain, and Injections of the Right Knee      History of Present Illness     Patient is here for right knee Visco supplementation injection.  She states she is having increased pain to the right knee where her Baker's cyst is.  She denies redness or warmth to the knee.  She denies chest pain or shortness of breath.    Past Medical History:   Diagnosis Date   • Anemia 1998   • Arthritis    • Bleeding from anus    • Bronchitis     STATES HAS BRONCHITIS CURRENTLY (12/5/17).     • Bronchitis 12/2017   • CAD (coronary artery disease)    • Cataract, bilateral    • Chronic kidney disease     STAGE 4.  SEES TERA   • Chronic kidney failure     REPORTS STAGE IV   • Difficulty swallowing solids    • Disease of thyroid gland    • Fracture, radius 2016    right distal radius and ulna, healed.   • GERD (gastroesophageal reflux disease)    • Gout    • High cholesterol    • History of nuclear stress test 2014    DR. CORTEZ.  \"IT WAS OK\"   • Hyperparathyroidism    • Hypertension    • Iron deficiency    • Osteoarthritis    • Osteoarthritis of knees, bilateral     Both knees Supartz injection series August, 2015   • Osteoporosis    • Personal history of cardiac murmur    • Presence of pessary    • Problems with swallowing     WITH FOOD OCCASSIONALLY   • Rheumatic heart disease     Personal history   • Rotator cuff tendonitis     right    • Rupture of right proximal biceps tendon     chronic   • Seasonal allergies    • Spinal headache     REPORTS AFTER DELIVERY OF SON   • Subacromial bursitis     right    • Valvular heart disease    • Vitamin B12 deficiency    • Wears glasses         Past Surgical History:   Procedure Laterality Date   • CATARACT EXTRACTION Bilateral    • COLONOSCOPY  2011   • COLONOSCOPY N/A 1/9/2018    Procedure: COLONOSCOPY with endoscopic mucosal resection (hot snare), normal saline submucosal injection, argon thermal ablation, resolution clip " "placement x4, and cold biopsy polypectomy;  Surgeon: Pieter Concepcion MD;  Location: [facility] ENDOSCOPY;  Service:    • ENDOSCOPY N/A 12/6/2017    Procedure: ESOPHAGOGASTRODUODENOSCOPY with biopsies and hot snare polypectomies;  Surgeon: Pieter Concepcion MD;  Location: [facility] ENDOSCOPY;  Service:    • UPPER GASTROINTESTINAL ENDOSCOPY  08/18/2014       Allergies   Allergen Reactions   • Ferrlecit [Na Ferric Gluc Cplx In Sucrose] Swelling   • Ranitidine Hcl Shortness Of Breath   • Lisinopril Cough       Review of Systems   Constitutional: Negative for fever.   HENT: Negative for voice change.    Eyes: Negative for visual disturbance.   Respiratory: Negative for shortness of breath.    Cardiovascular: Negative for chest pain.   Gastrointestinal: Negative for abdominal distention and abdominal pain.   Genitourinary: Negative for dysuria.   Musculoskeletal: Positive for arthralgias. Negative for gait problem and joint swelling.   Skin: Negative for rash.   Neurological: Negative for speech difficulty.   Hematological: Does not bruise/bleed easily.   Psychiatric/Behavioral: Negative for confusion.       Objective   Resp 18   Ht 149.9 cm (59.02\")   Wt 50.8 kg (112 lb)   LMP  (LMP Unknown)   BMI 22.61 kg/m²    Physical Exam   Constitutional: She is oriented to person, place, and time. She appears well-nourished.   Pulmonary/Chest: No respiratory distress.   Musculoskeletal:        Right knee: She exhibits no swelling, no effusion, no ecchymosis, no deformity and no erythema. Tenderness (ttp behind the right knee) found. Medial joint line tenderness noted.   Neurological: She is alert and oriented to person, place, and time.   Skin: Capillary refill takes less than 2 seconds.   Psychiatric: She has a normal mood and affect. Her behavior is normal.   Vitals reviewed.    Since last injection, patient notes mild relief of symptoms.  No adverse effects of prior injection.  Skin exam stable with no erythema, ecchymosis or rash.  No " new swelling.  No motor or sensory changes.  Distal pulse intact.    Large Joint Arthrocentesis  Date/Time: 4/27/2018 11:16 AM  Consent given by: patient  Site marked: site marked  Timeout: Immediately prior to procedure a time out was called to verify the correct patient, procedure, equipment, support staff and site/side marked as required   Supporting Documentation  Indications: pain   Procedure Details  Location: knee - R knee  Preparation: Patient was prepped and draped in the usual sterile fashion  Needle size: 22 G  Approach: anterolateral  Medications administered: 25 mg sodium hyaluronate 25 MG/2.5ML  Patient tolerance: patient tolerated the procedure well with no immediate complications          Assessment/Plan   Independent Review of Radiographic Studies:    No new imaging done today.  Laboratory and Other Studies:  No new results reviewed today.       Carolyne was seen today for follow-up, pain and injections.    Diagnoses and all orders for this visit:    Pain  -     Large Joint Arthrocentesis    Primary osteoarthritis of one knee, right    Other orders  -     Discontinue: MethylPREDNISolone (MEDROL, DOC,) 4 MG tablet; Take as directed on package instructions.  -     MethylPREDNISolone (MEDROL, DOC,) 4 MG tablet; Take as directed on package instructions.      Orthopedic activities reviewed and patient expressed appreciation  Discussion of orthopedic goals  Risk, benefits, and merits of treatment alternatives reviewed with the patient and questions answered  Call or notify for any adverse effect from injection therapy    Recommendations/Plan:  Patient is encouraged to call or return for any issues or concerns.    FU in 1 -2 months    Patient agreeable to call or return sooner for any concerns.

## 2018-04-30 ENCOUNTER — HOSPITAL ENCOUNTER (OUTPATIENT)
Dept: INFUSION THERAPY | Facility: HOSPITAL | Age: 78
Discharge: HOME | End: 2018-04-30
Admitting: INTERNAL MEDICINE

## 2018-04-30 VITALS
OXYGEN SATURATION: 100 % | DIASTOLIC BLOOD PRESSURE: 74 MMHG | RESPIRATION RATE: 18 BRPM | SYSTOLIC BLOOD PRESSURE: 158 MMHG | TEMPERATURE: 97.6 F | HEART RATE: 68 BPM

## 2018-04-30 DIAGNOSIS — N18.4 ANEMIA DUE TO STAGE 4 CHRONIC KIDNEY DISEASE TREATED WITH ERYTHROPOIETIN (HCC): ICD-10-CM

## 2018-04-30 DIAGNOSIS — D63.1 ANEMIA DUE TO STAGE 4 CHRONIC KIDNEY DISEASE TREATED WITH ERYTHROPOIETIN (HCC): ICD-10-CM

## 2018-04-30 LAB
DEPRECATED RDW RBC AUTO: 56.5 FL (ref 37–54)
ERYTHROCYTE [DISTWIDTH] IN BLOOD BY AUTOMATED COUNT: 16.2 % (ref 11.5–14.5)
HCT VFR BLD AUTO: 26.1 % (ref 37–47)
HGB BLD-MCNC: 8.4 G/DL (ref 12–16)
MCH RBC QN AUTO: 31.2 PG (ref 27–31)
MCHC RBC AUTO-ENTMCNC: 32.2 G/DL (ref 30–37)
MCV RBC AUTO: 97 FL (ref 81–99)
PLATELET # BLD AUTO: 216 10*3/MM3 (ref 130–400)
PMV BLD AUTO: 9.7 FL (ref 6–12)
RBC # BLD AUTO: 2.69 10*6/MM3 (ref 4.2–5.4)
WBC NRBC COR # BLD: 6.4 10*3/MM3 (ref 4.8–10.8)

## 2018-04-30 PROCEDURE — 85027 COMPLETE CBC AUTOMATED: CPT | Performed by: INTERNAL MEDICINE

## 2018-04-30 PROCEDURE — 63510000001 EPOETIN ALFA PER 1000 UNITS: Performed by: PHYSICIAN ASSISTANT

## 2018-04-30 PROCEDURE — 36415 COLL VENOUS BLD VENIPUNCTURE: CPT

## 2018-04-30 PROCEDURE — 96372 THER/PROPH/DIAG INJ SC/IM: CPT

## 2018-04-30 RX ADMIN — ERYTHROPOIETIN 40000 UNITS: 40000 INJECTION, SOLUTION INTRAVENOUS; SUBCUTANEOUS at 11:37

## 2018-05-04 ENCOUNTER — OFFICE VISIT (OUTPATIENT)
Dept: OBSTETRICS AND GYNECOLOGY | Facility: CLINIC | Age: 78
End: 2018-05-04

## 2018-05-04 VITALS
WEIGHT: 114 LBS | BODY MASS INDEX: 22.98 KG/M2 | SYSTOLIC BLOOD PRESSURE: 168 MMHG | DIASTOLIC BLOOD PRESSURE: 70 MMHG | HEIGHT: 59 IN

## 2018-05-04 DIAGNOSIS — Z46.89 PESSARY MAINTENANCE: Primary | ICD-10-CM

## 2018-05-04 DIAGNOSIS — N81.11 CYSTOCELE, MIDLINE: ICD-10-CM

## 2018-05-04 PROCEDURE — 99212 OFFICE O/P EST SF 10 MIN: CPT | Performed by: PHYSICIAN ASSISTANT

## 2018-05-04 NOTE — PROGRESS NOTES
"Subjective   Chief Complaint   Patient presents with   • Pessary Check     No Complaints/Concerns       Carolyne Martins is a 78 y.o. year old  presenting to be seen for routine pessary maintenance  She has no complaints  She is voiding well, has had no symptoms of UTI, no problems with bowel movements  No vaginal bleeding or spotting    Past Medical History:   Diagnosis Date   • Anemia    • Arthritis    • Bleeding from anus    • Bronchitis     STATES HAS BRONCHITIS CURRENTLY (17).     • Bronchitis 2017   • CAD (coronary artery disease)    • Cataract, bilateral    • Chronic kidney disease     STAGE 4.  SEES TERA   • Chronic kidney failure     REPORTS STAGE IV   • Difficulty swallowing solids    • Disease of thyroid gland    • Fracture, radius 2016    right distal radius and ulna, healed.   • GERD (gastroesophageal reflux disease)    • Gout    • High cholesterol    • History of nuclear stress test     DR. CORTEZ.  \"IT WAS OK\"   • Hyperparathyroidism    • Hypertension    • Iron deficiency    • Osteoarthritis    • Osteoarthritis of knees, bilateral     Both knees Supartz injection series    • Osteoporosis    • Personal history of cardiac murmur    • Presence of pessary    • Problems with swallowing     WITH FOOD OCCASSIONALLY   • Rheumatic heart disease     Personal history   • Rotator cuff tendonitis     right    • Rupture of right proximal biceps tendon     chronic   • Seasonal allergies    • Spinal headache     REPORTS AFTER DELIVERY OF SON   • Subacromial bursitis     right    • Valvular heart disease    • Vitamin B12 deficiency    • Wears glasses         Current Outpatient Prescriptions:   •  allopurinol (ZYLOPRIM) 100 MG tablet, Take 1 tablet by mouth daily., Disp: , Rfl:   •  aspirin 81 MG tablet, Take 81 mg by mouth Daily., Disp: , Rfl:   •  budesonide-formoterol (SYMBICORT) 80-4.5 MCG/ACT inhaler, Inhale 2 puffs 2 (Two) Times a Day., Disp: , Rfl:   •  carvedilol (COREG) " 12.5 MG tablet, Take 12.5 mg by mouth Every Morning., Disp: , Rfl:   •  cholecalciferol (VITAMIN D3) 1000 UNITS tablet, Take 1,000 Units by mouth Daily., Disp: , Rfl:   •  Cyanocobalamin (VITAMIN B-12 PO), Take 500 mg by mouth 2 (Two) Times a Day., Disp: , Rfl:   •  epoetin ovidio (PROCRIT) 85802 UNIT/ML injection, Procrit SOLN; Patient Sig: Procrit SOLN ; 0; 16-Jul-2014; Active  ONCE A MONTH, Disp: , Rfl:   •  fluticasone (FLONASE) 50 MCG/ACT nasal spray, 2 sprays into each nostril Daily As Needed., Disp: , Rfl:   •  hydroxychloroquine (PLAQUENIL) 200 MG tablet, Take 200 mg by mouth Daily., Disp: , Rfl:   •  irbesartan (AVAPRO) 150 MG tablet, Take 150 mg by mouth Daily., Disp: , Rfl:   •  Loratadine (CLARITIN) 10 MG capsule, Take 10 mg by mouth Daily As Needed (RHINITIS)., Disp: , Rfl:   •  MethylPREDNISolone (MEDROL, DOC,) 4 MG tablet, Take as directed on package instructions., Disp: 21 tablet, Rfl: 0  •  omeprazole (PriLOSEC) 40 MG capsule, Take 40 mg by mouth Daily., Disp: , Rfl:   •  predniSONE (DELTASONE) 2.5 MG tablet, Take 2.5 mg by mouth Daily., Disp: , Rfl:   •  rosuvastatin (CRESTOR) 10 MG tablet, Take 10 mg by mouth Daily., Disp: , Rfl:   •  torsemide (DEMADEX) 20 MG tablet, Take 10 mg by mouth Daily As Needed (SWELLING/SHORTNESS OF BREATH). EDEMA, Disp: , Rfl:   •  VENTOLIN  (90 Base) MCG/ACT inhaler, , Disp: , Rfl:    Allergies   Allergen Reactions   • Ferrlecit [Na Ferric Gluc Cplx In Sucrose] Swelling   • Ranitidine Hcl Shortness Of Breath   • Lisinopril Cough      Past Surgical History:   Procedure Laterality Date   • CATARACT EXTRACTION Bilateral    • COLONOSCOPY  2011   • COLONOSCOPY N/A 1/9/2018    Procedure: COLONOSCOPY with endoscopic mucosal resection (hot snare), normal saline submucosal injection, argon thermal ablation, resolution clip placement x4, and cold biopsy polypectomy;  Surgeon: Pieter Concepcion MD;  Location: [facility] ENDOSCOPY;  Service:    • ENDOSCOPY N/A 12/6/2017     "Procedure: ESOPHAGOGASTRODUODENOSCOPY with biopsies and hot snare polypectomies;  Surgeon: Pieter Concepcion MD;  Location: [facility] ENDOSCOPY;  Service:    • UPPER GASTROINTESTINAL ENDOSCOPY  08/18/2014      Social History     Social History   • Marital status:      Spouse name: N/A   • Number of children: N/A   • Years of education: N/A     Occupational History   • Not on file.     Social History Main Topics   • Smoking status: Never Smoker   • Smokeless tobacco: Never Used   • Alcohol use No   • Drug use: No   • Sexual activity: Not Currently     Other Topics Concern   • Not on file     Social History Narrative   • No narrative on file      Family History   Problem Relation Age of Onset   • Liver cancer Maternal Grandmother    • Gout Other    • Osteoporosis Other    • Stroke Other    • Ulcers Other    • Asthma Other    • Hypertension Other    • Heart disease Other    • Osteoarthritis Other    • Colon cancer Neg Hx        Review of Systems   Constitutional: Negative.    Gastrointestinal: Negative.    Genitourinary: Negative.            Objective   /70   Ht 149.9 cm (59\")   Wt 51.7 kg (114 lb)   LMP  (LMP Unknown)   BMI 23.03 kg/m²     Physical Exam   Constitutional: She appears well-developed and well-nourished. She is cooperative. No distress.   Abdominal: Soft. Normal appearance. There is no tenderness.   Genitourinary: There is no tenderness or lesion on the right labia. There is no tenderness or lesion on the left labia.   Genitourinary Comments: Ring pessary with support removed, cleaned and reinserted  No erosion or abrasions vaginal walls  1-2 plus cystocele     Neurological: She is alert.   Skin: Skin is warm and dry.   Psychiatric: She has a normal mood and affect. Her behavior is normal.            Assessment and Plan  Carolyne was seen today for pessary check.    Diagnoses and all orders for this visit:    Pessary maintenance    Cystocele, midline      Patient Instructions   Return to office 3 " months or prn             This note was electronically signed.    Yaz Woods PA-C   May 4, 2018

## 2018-05-28 ENCOUNTER — APPOINTMENT (OUTPATIENT)
Dept: INFUSION THERAPY | Facility: HOSPITAL | Age: 78
End: 2018-05-28

## 2018-05-29 ENCOUNTER — APPOINTMENT (OUTPATIENT)
Dept: INFUSION THERAPY | Facility: HOSPITAL | Age: 78
End: 2018-05-29

## 2018-05-30 ENCOUNTER — HOSPITAL ENCOUNTER (OUTPATIENT)
Dept: INFUSION THERAPY | Facility: HOSPITAL | Age: 78
Discharge: HOME | End: 2018-05-30

## 2018-05-30 ENCOUNTER — HOSPITAL ENCOUNTER (OUTPATIENT)
Dept: MAMMOGRAPHY | Facility: HOSPITAL | Age: 78
Discharge: HOME | End: 2018-05-30
Attending: INTERNAL MEDICINE | Admitting: INTERNAL MEDICINE

## 2018-05-30 VITALS
HEART RATE: 64 BPM | OXYGEN SATURATION: 100 % | RESPIRATION RATE: 20 BRPM | TEMPERATURE: 97.6 F | DIASTOLIC BLOOD PRESSURE: 70 MMHG | SYSTOLIC BLOOD PRESSURE: 143 MMHG

## 2018-05-30 DIAGNOSIS — N18.4 ANEMIA DUE TO STAGE 4 CHRONIC KIDNEY DISEASE TREATED WITH ERYTHROPOIETIN (HCC): ICD-10-CM

## 2018-05-30 DIAGNOSIS — Z12.39 SCREENING BREAST EXAMINATION: ICD-10-CM

## 2018-05-30 DIAGNOSIS — D63.1 ANEMIA DUE TO STAGE 4 CHRONIC KIDNEY DISEASE TREATED WITH ERYTHROPOIETIN (HCC): ICD-10-CM

## 2018-05-30 LAB
ALBUMIN SERPL-MCNC: 3.8 G/DL (ref 3.5–5)
ANION GAP SERPL CALCULATED.3IONS-SCNC: 15.6 MMOL/L (ref 10–20)
BUN BLD-MCNC: 45 MG/DL (ref 7–20)
BUN/CREAT SERPL: 18.8 (ref 7.1–23.5)
CALCIUM SPEC-SCNC: 9.2 MG/DL (ref 8.4–10.2)
CHLORIDE SERPL-SCNC: 103 MMOL/L (ref 98–107)
CO2 SERPL-SCNC: 25 MMOL/L (ref 26–30)
CREAT BLD-MCNC: 2.4 MG/DL (ref 0.6–1.3)
DEPRECATED RDW RBC AUTO: 57.6 FL (ref 37–54)
ERYTHROCYTE [DISTWIDTH] IN BLOOD BY AUTOMATED COUNT: 16.2 % (ref 11.5–14.5)
GFR SERPL CREATININE-BSD FRML MDRD: 20 ML/MIN/1.73
GLUCOSE BLD-MCNC: 90 MG/DL (ref 74–98)
HCT VFR BLD AUTO: 27.9 % (ref 37–47)
HGB BLD-MCNC: 8.9 G/DL (ref 12–16)
MCH RBC QN AUTO: 30.8 PG (ref 27–31)
MCHC RBC AUTO-ENTMCNC: 31.9 G/DL (ref 30–37)
MCV RBC AUTO: 96.5 FL (ref 81–99)
PHOSPHATE SERPL-MCNC: 3.6 MG/DL (ref 2.5–4.5)
PLATELET # BLD AUTO: 223 10*3/MM3 (ref 130–400)
PMV BLD AUTO: 10 FL (ref 6–12)
POTASSIUM BLD-SCNC: 4.6 MMOL/L (ref 3.5–5.1)
RBC # BLD AUTO: 2.89 10*6/MM3 (ref 4.2–5.4)
SODIUM BLD-SCNC: 139 MMOL/L (ref 137–145)
WBC NRBC COR # BLD: 3.7 10*3/MM3 (ref 4.8–10.8)

## 2018-05-30 PROCEDURE — 77063 BREAST TOMOSYNTHESIS BI: CPT

## 2018-05-30 PROCEDURE — 96372 THER/PROPH/DIAG INJ SC/IM: CPT

## 2018-05-30 PROCEDURE — 63510000001 EPOETIN ALFA PER 1000 UNITS: Performed by: PHYSICIAN ASSISTANT

## 2018-05-30 PROCEDURE — 36415 COLL VENOUS BLD VENIPUNCTURE: CPT

## 2018-05-30 PROCEDURE — 77067 SCR MAMMO BI INCL CAD: CPT

## 2018-05-30 PROCEDURE — 85027 COMPLETE CBC AUTOMATED: CPT | Performed by: INTERNAL MEDICINE

## 2018-05-30 PROCEDURE — 80069 RENAL FUNCTION PANEL: CPT | Performed by: INTERNAL MEDICINE

## 2018-05-30 RX ADMIN — ERYTHROPOIETIN 40000 UNITS: 40000 INJECTION, SOLUTION INTRAVENOUS; SUBCUTANEOUS at 11:43

## 2018-06-08 ENCOUNTER — LAB (OUTPATIENT)
Dept: LAB | Facility: HOSPITAL | Age: 78
End: 2018-06-08
Attending: INTERNAL MEDICINE

## 2018-06-08 ENCOUNTER — TRANSCRIBE ORDERS (OUTPATIENT)
Dept: LAB | Facility: HOSPITAL | Age: 78
End: 2018-06-08

## 2018-06-08 DIAGNOSIS — N18.4 CHRONIC KIDNEY DISEASE, STAGE IV (SEVERE) (HCC): ICD-10-CM

## 2018-06-08 DIAGNOSIS — N18.4 CHRONIC KIDNEY DISEASE, STAGE IV (SEVERE) (HCC): Primary | ICD-10-CM

## 2018-06-08 LAB
ALBUMIN SERPL-MCNC: 3.5 G/DL (ref 3.5–5)
ANION GAP SERPL CALCULATED.3IONS-SCNC: 14.2 MMOL/L (ref 10–20)
BASOPHILS # BLD AUTO: 0.01 10*3/MM3 (ref 0–0.2)
BASOPHILS NFR BLD AUTO: 0.2 % (ref 0–2.5)
BUN BLD-MCNC: 46 MG/DL (ref 7–20)
BUN/CREAT SERPL: 19.2 (ref 7.1–23.5)
CALCIUM SPEC-SCNC: 9.1 MG/DL (ref 8.4–10.2)
CHLORIDE SERPL-SCNC: 105 MMOL/L (ref 98–107)
CO2 SERPL-SCNC: 27 MMOL/L (ref 26–30)
CREAT BLD-MCNC: 2.4 MG/DL (ref 0.6–1.3)
DEPRECATED RDW RBC AUTO: 58.2 FL (ref 37–54)
EOSINOPHIL # BLD AUTO: 0.16 10*3/MM3 (ref 0–0.7)
EOSINOPHIL NFR BLD AUTO: 3.2 % (ref 0–7)
ERYTHROCYTE [DISTWIDTH] IN BLOOD BY AUTOMATED COUNT: 16.3 % (ref 11.5–14.5)
GFR SERPL CREATININE-BSD FRML MDRD: 20 ML/MIN/1.73
GLUCOSE BLD-MCNC: 88 MG/DL (ref 74–98)
HCT VFR BLD AUTO: 27.3 % (ref 37–47)
HGB BLD-MCNC: 8.6 G/DL (ref 12–16)
IMM GRANULOCYTES # BLD: 0.03 10*3/MM3 (ref 0–0.06)
IMM GRANULOCYTES NFR BLD: 0.6 % (ref 0–0.6)
LYMPHOCYTES # BLD AUTO: 0.59 10*3/MM3 (ref 0.6–3.4)
LYMPHOCYTES NFR BLD AUTO: 11.8 % (ref 10–50)
MCH RBC QN AUTO: 30.6 PG (ref 27–31)
MCHC RBC AUTO-ENTMCNC: 31.5 G/DL (ref 30–37)
MCV RBC AUTO: 97.2 FL (ref 81–99)
MONOCYTES # BLD AUTO: 0.66 10*3/MM3 (ref 0–0.9)
MONOCYTES NFR BLD AUTO: 13.3 % (ref 0–12)
NEUTROPHILS # BLD AUTO: 3.53 10*3/MM3 (ref 2–6.9)
NEUTROPHILS NFR BLD AUTO: 70.9 % (ref 37–80)
NRBC BLD MANUAL-RTO: 0 /100 WBC (ref 0–0)
PHOSPHATE SERPL-MCNC: 4.3 MG/DL (ref 2.5–4.5)
PLATELET # BLD AUTO: 207 10*3/MM3 (ref 130–400)
PMV BLD AUTO: 9.6 FL (ref 6–12)
POTASSIUM BLD-SCNC: 4.2 MMOL/L (ref 3.5–5.1)
RBC # BLD AUTO: 2.81 10*6/MM3 (ref 4.2–5.4)
SODIUM BLD-SCNC: 142 MMOL/L (ref 137–145)
URATE SERPL-MCNC: 6.5 MG/DL (ref 2.5–8.5)
WBC NRBC COR # BLD: 4.98 10*3/MM3 (ref 4.8–10.8)

## 2018-06-08 PROCEDURE — 84550 ASSAY OF BLOOD/URIC ACID: CPT

## 2018-06-08 PROCEDURE — 85025 COMPLETE CBC W/AUTO DIFF WBC: CPT

## 2018-06-08 PROCEDURE — 80069 RENAL FUNCTION PANEL: CPT

## 2018-06-08 PROCEDURE — 36415 COLL VENOUS BLD VENIPUNCTURE: CPT

## 2018-06-27 ENCOUNTER — HOSPITAL ENCOUNTER (OUTPATIENT)
Dept: INFUSION THERAPY | Facility: HOSPITAL | Age: 78
Discharge: HOME | End: 2018-06-27
Admitting: INTERNAL MEDICINE

## 2018-06-27 VITALS
TEMPERATURE: 97.8 F | HEART RATE: 70 BPM | RESPIRATION RATE: 18 BRPM | SYSTOLIC BLOOD PRESSURE: 166 MMHG | DIASTOLIC BLOOD PRESSURE: 82 MMHG | OXYGEN SATURATION: 97 %

## 2018-06-27 DIAGNOSIS — D63.1 ANEMIA DUE TO STAGE 4 CHRONIC KIDNEY DISEASE TREATED WITH ERYTHROPOIETIN (HCC): ICD-10-CM

## 2018-06-27 DIAGNOSIS — N18.4 ANEMIA DUE TO STAGE 4 CHRONIC KIDNEY DISEASE TREATED WITH ERYTHROPOIETIN (HCC): ICD-10-CM

## 2018-06-27 LAB
DEPRECATED RDW RBC AUTO: 54.9 FL (ref 37–54)
ERYTHROCYTE [DISTWIDTH] IN BLOOD BY AUTOMATED COUNT: 16 % (ref 11.5–14.5)
HCT VFR BLD AUTO: 29.6 % (ref 37–47)
HGB BLD-MCNC: 9.5 G/DL (ref 12–16)
MCH RBC QN AUTO: 30.4 PG (ref 27–31)
MCHC RBC AUTO-ENTMCNC: 32.1 G/DL (ref 30–37)
MCV RBC AUTO: 94.9 FL (ref 81–99)
PLATELET # BLD AUTO: 220 10*3/MM3 (ref 130–400)
PMV BLD AUTO: 9.8 FL (ref 6–12)
RBC # BLD AUTO: 3.12 10*6/MM3 (ref 4.2–5.4)
WBC NRBC COR # BLD: 5.15 10*3/MM3 (ref 4.8–10.8)

## 2018-06-27 PROCEDURE — 96372 THER/PROPH/DIAG INJ SC/IM: CPT

## 2018-06-27 PROCEDURE — 36415 COLL VENOUS BLD VENIPUNCTURE: CPT

## 2018-06-27 PROCEDURE — 85027 COMPLETE CBC AUTOMATED: CPT | Performed by: INTERNAL MEDICINE

## 2018-06-27 PROCEDURE — 63510000001 EPOETIN ALFA PER 1000 UNITS: Performed by: INTERNAL MEDICINE

## 2018-06-27 RX ADMIN — ERYTHROPOIETIN 40000 UNITS: 40000 INJECTION, SOLUTION INTRAVENOUS; SUBCUTANEOUS at 11:48

## 2018-06-27 NOTE — CODE DOCUMENTATION
Venipuncture to LAC x 1. CBC obtained and sent to lab. Renal panel obtained on 6/8/18 and not due at this time. Discussed MD orders/lab draw schedule with pt.

## 2018-07-18 ENCOUNTER — HOSPITAL ENCOUNTER (OUTPATIENT)
Dept: INFUSION THERAPY | Facility: HOSPITAL | Age: 78
Discharge: HOME | End: 2018-07-18
Admitting: INTERNAL MEDICINE

## 2018-07-18 VITALS
HEART RATE: 73 BPM | WEIGHT: 110 LBS | BODY MASS INDEX: 22.18 KG/M2 | OXYGEN SATURATION: 99 % | DIASTOLIC BLOOD PRESSURE: 82 MMHG | TEMPERATURE: 97.7 F | SYSTOLIC BLOOD PRESSURE: 146 MMHG | HEIGHT: 59 IN | RESPIRATION RATE: 20 BRPM

## 2018-07-18 DIAGNOSIS — N18.4 ANEMIA DUE TO STAGE 4 CHRONIC KIDNEY DISEASE TREATED WITH ERYTHROPOIETIN (HCC): ICD-10-CM

## 2018-07-18 DIAGNOSIS — D63.1 ANEMIA DUE TO STAGE 4 CHRONIC KIDNEY DISEASE TREATED WITH ERYTHROPOIETIN (HCC): ICD-10-CM

## 2018-07-18 DIAGNOSIS — D64.9 ANEMIA, UNSPECIFIED TYPE: ICD-10-CM

## 2018-07-18 LAB
DEPRECATED RDW RBC AUTO: 57.2 FL (ref 37–54)
ERYTHROCYTE [DISTWIDTH] IN BLOOD BY AUTOMATED COUNT: 16.1 % (ref 11.5–14.5)
HCT VFR BLD AUTO: 28.9 % (ref 37–47)
HGB BLD-MCNC: 9.1 G/DL (ref 12–16)
MCH RBC QN AUTO: 30.4 PG (ref 27–31)
MCHC RBC AUTO-ENTMCNC: 31.5 G/DL (ref 30–37)
MCV RBC AUTO: 96.7 FL (ref 81–99)
PLATELET # BLD AUTO: 170 10*3/MM3 (ref 130–400)
PMV BLD AUTO: 9.7 FL (ref 6–12)
RBC # BLD AUTO: 2.99 10*6/MM3 (ref 4.2–5.4)
WBC NRBC COR # BLD: 3.77 10*3/MM3 (ref 4.8–10.8)

## 2018-07-18 PROCEDURE — 36415 COLL VENOUS BLD VENIPUNCTURE: CPT

## 2018-07-18 PROCEDURE — 85027 COMPLETE CBC AUTOMATED: CPT | Performed by: INTERNAL MEDICINE

## 2018-07-18 PROCEDURE — 96372 THER/PROPH/DIAG INJ SC/IM: CPT

## 2018-07-18 PROCEDURE — 63510000001 EPOETIN ALFA PER 1000 UNITS: Performed by: INTERNAL MEDICINE

## 2018-07-18 RX ORDER — ERYTHROMYCIN 5 MG/G
OINTMENT OPHTHALMIC NIGHTLY
Status: ON HOLD | COMMUNITY
End: 2019-01-15

## 2018-07-18 RX ADMIN — ERYTHROPOIETIN 40000 UNITS: 40000 INJECTION, SOLUTION INTRAVENOUS; SUBCUTANEOUS at 11:39

## 2018-08-02 ENCOUNTER — OFFICE VISIT (OUTPATIENT)
Dept: OBSTETRICS AND GYNECOLOGY | Facility: CLINIC | Age: 78
End: 2018-08-02

## 2018-08-02 VITALS
HEIGHT: 59 IN | BODY MASS INDEX: 22.18 KG/M2 | SYSTOLIC BLOOD PRESSURE: 140 MMHG | DIASTOLIC BLOOD PRESSURE: 80 MMHG | WEIGHT: 110 LBS

## 2018-08-02 DIAGNOSIS — N81.11 CYSTOCELE, MIDLINE: ICD-10-CM

## 2018-08-02 DIAGNOSIS — Z46.89 PESSARY MAINTENANCE: Primary | ICD-10-CM

## 2018-08-02 PROCEDURE — 99212 OFFICE O/P EST SF 10 MIN: CPT | Performed by: PHYSICIAN ASSISTANT

## 2018-08-02 NOTE — PROGRESS NOTES
"Subjective   Chief Complaint   Patient presents with   • Follow-up     Pessary check       Carolyne Martins is a 78 y.o. year old  presenting to be seen for routine pessary maintenance  She has no complaints or concerns  She is voiding well and has no problems with bowel movements  No vaginal bleeding    Past Medical History:   Diagnosis Date   • Anemia    • Arthritis    • Bleeding from anus    • Bronchitis     STATES HAS BRONCHITIS CURRENTLY (17).     • Bronchitis 2017   • CAD (coronary artery disease)    • Cataract, bilateral    • Chronic kidney disease     STAGE 4.  SEES TERA   • Chronic kidney failure     REPORTS STAGE IV   • Difficulty swallowing solids    • Disease of thyroid gland    • Fracture, radius 2016    right distal radius and ulna, healed.   • GERD (gastroesophageal reflux disease)    • Gout    • High cholesterol    • History of nuclear stress test     DR. CORTEZ.  \"IT WAS OK\"   • Hyperparathyroidism (CMS/HCC)    • Hypertension    • Iron deficiency    • Osteoarthritis    • Osteoarthritis of knees, bilateral     Both knees Supartz injection series    • Osteoporosis    • Personal history of cardiac murmur    • Presence of pessary    • Problems with swallowing     WITH FOOD OCCASSIONALLY   • Rheumatic heart disease     Personal history   • Rotator cuff tendonitis     right    • Rupture of right proximal biceps tendon     chronic   • Seasonal allergies    • Spinal headache     REPORTS AFTER DELIVERY OF SON   • Subacromial bursitis     right    • Valvular heart disease    • Vitamin B12 deficiency    • Wears glasses         Current Outpatient Prescriptions:   •  allopurinol (ZYLOPRIM) 100 MG tablet, Take 1 tablet by mouth daily., Disp: , Rfl:   •  aspirin 81 MG tablet, Take 81 mg by mouth Daily., Disp: , Rfl:   •  budesonide-formoterol (SYMBICORT) 80-4.5 MCG/ACT inhaler, Inhale 2 puffs 2 (Two) Times a Day., Disp: , Rfl:   •  carvedilol (COREG) 12.5 MG tablet, Take 12.5 " mg by mouth Every Morning., Disp: , Rfl:   •  cholecalciferol (VITAMIN D3) 1000 UNITS tablet, Take 1,000 Units by mouth Daily., Disp: , Rfl:   •  Cyanocobalamin (VITAMIN B-12 PO), Take 500 mg by mouth 2 (Two) Times a Day., Disp: , Rfl:   •  epoetin ovidio (PROCRIT) 73382 UNIT/ML injection, Procrit SOLN; Patient Sig: Procrit SOLN ; 0; 16-Jul-2014; every 3 weeks, Disp: , Rfl:   •  erythromycin (ROMYCIN) 5 MG/GM ophthalmic ointment, Administer  to both eyes Every Night., Disp: , Rfl:   •  fluticasone (FLONASE) 50 MCG/ACT nasal spray, 2 sprays into each nostril Daily As Needed., Disp: , Rfl:   •  hydroxychloroquine (PLAQUENIL) 200 MG tablet, Take 200 mg by mouth Daily., Disp: , Rfl:   •  irbesartan (AVAPRO) 150 MG tablet, Take 150 mg by mouth Daily., Disp: , Rfl:   •  Loratadine (CLARITIN) 10 MG capsule, Take 10 mg by mouth Daily As Needed (RHINITIS)., Disp: , Rfl:   •  MethylPREDNISolone (MEDROL, DOC,) 4 MG tablet, Take as directed on package instructions., Disp: 21 tablet, Rfl: 0  •  omeprazole (PriLOSEC) 40 MG capsule, Take 40 mg by mouth Daily., Disp: , Rfl:   •  predniSONE (DELTASONE) 2.5 MG tablet, Take 2.5 mg by mouth Daily., Disp: , Rfl:   •  rosuvastatin (CRESTOR) 10 MG tablet, Take 10 mg by mouth Daily., Disp: , Rfl:   •  torsemide (DEMADEX) 20 MG tablet, Take 10 mg by mouth Daily As Needed (SWELLING/SHORTNESS OF BREATH). EDEMA, Disp: , Rfl:   •  VENTOLIN  (90 Base) MCG/ACT inhaler, , Disp: , Rfl:    Allergies   Allergen Reactions   • Ferrlecit [Na Ferric Gluc Cplx In Sucrose] Swelling   • Ranitidine Hcl Shortness Of Breath   • Lisinopril Cough      Past Surgical History:   Procedure Laterality Date   • CATARACT EXTRACTION Bilateral    • COLONOSCOPY  2011   • COLONOSCOPY N/A 1/9/2018    Procedure: COLONOSCOPY with endoscopic mucosal resection (hot snare), normal saline submucosal injection, argon thermal ablation, resolution clip placement x4, and cold biopsy polypectomy;  Surgeon: Pieter Concepcion MD;   "Location: [facility] ENDOSCOPY;  Service:    • ENDOSCOPY N/A 12/6/2017    Procedure: ESOPHAGOGASTRODUODENOSCOPY with biopsies and hot snare polypectomies;  Surgeon: Pieter Concepcion MD;  Location: [facility] ENDOSCOPY;  Service:    • UPPER GASTROINTESTINAL ENDOSCOPY  08/18/2014      Social History     Social History   • Marital status:      Spouse name: N/A   • Number of children: N/A   • Years of education: N/A     Occupational History   • Not on file.     Social History Main Topics   • Smoking status: Never Smoker   • Smokeless tobacco: Never Used   • Alcohol use No   • Drug use: No   • Sexual activity: Not Currently     Other Topics Concern   • Not on file     Social History Narrative   • No narrative on file      Family History   Problem Relation Age of Onset   • Liver cancer Maternal Grandmother    • Gout Other    • Osteoporosis Other    • Stroke Other    • Ulcers Other    • Asthma Other    • Hypertension Other    • Heart disease Other    • Osteoarthritis Other    • Colon cancer Neg Hx        Review of Systems   Constitutional: Negative.    Gastrointestinal: Negative.    Genitourinary: Negative.            Objective   /80   Ht 149.9 cm (59.02\")   Wt 49.9 kg (110 lb)   LMP  (LMP Unknown)   BMI 22.21 kg/m²     Physical Exam   Constitutional: She appears well-developed and well-nourished. She is cooperative.   Abdominal: Soft. Normal appearance. There is no tenderness.   Genitourinary: There is no tenderness or lesion on the right labia. There is no tenderness or lesion on the left labia. No erythema, tenderness or bleeding in the vagina. No signs of injury around the vagina.   Genitourinary Comments: Ring pessary with support removed and cleaned and reinserted  No vaginal wall erosion or abrasions  1-2 plus cystocele   Neurological: She is alert.   Skin: Skin is warm and dry.   Psychiatric: She has a normal mood and affect. Her behavior is normal.            Assessment and Plan  Carolyne was seen today for " follow-up.    Diagnoses and all orders for this visit:    Pessary maintenance    Cystocele, midline      Patient Instructions   RTO 3 months or prn             This note was electronically signed.    Yaz Woods PA-C   August 2, 2018

## 2018-08-08 ENCOUNTER — APPOINTMENT (OUTPATIENT)
Dept: INFUSION THERAPY | Facility: HOSPITAL | Age: 78
End: 2018-08-08

## 2018-08-10 ENCOUNTER — HOSPITAL ENCOUNTER (OUTPATIENT)
Dept: INFUSION THERAPY | Facility: HOSPITAL | Age: 78
Discharge: HOME | End: 2018-08-10
Admitting: INTERNAL MEDICINE

## 2018-08-10 VITALS
DIASTOLIC BLOOD PRESSURE: 70 MMHG | OXYGEN SATURATION: 100 % | SYSTOLIC BLOOD PRESSURE: 140 MMHG | TEMPERATURE: 97.8 F | RESPIRATION RATE: 18 BRPM | HEART RATE: 64 BPM

## 2018-08-10 DIAGNOSIS — D63.1 ANEMIA DUE TO STAGE 4 CHRONIC KIDNEY DISEASE TREATED WITH ERYTHROPOIETIN (HCC): ICD-10-CM

## 2018-08-10 DIAGNOSIS — N18.4 ANEMIA DUE TO STAGE 4 CHRONIC KIDNEY DISEASE TREATED WITH ERYTHROPOIETIN (HCC): ICD-10-CM

## 2018-08-10 LAB
DEPRECATED RDW RBC AUTO: 57.6 FL (ref 37–54)
ERYTHROCYTE [DISTWIDTH] IN BLOOD BY AUTOMATED COUNT: 16.5 % (ref 11.5–14.5)
HCT VFR BLD AUTO: 30 % (ref 37–47)
HGB BLD-MCNC: 9 G/DL (ref 12–16)
MCH RBC QN AUTO: 28.8 PG (ref 27–31)
MCHC RBC AUTO-ENTMCNC: 30 G/DL (ref 30–37)
MCV RBC AUTO: 96.2 FL (ref 81–99)
PLATELET # BLD AUTO: 180 10*3/MM3 (ref 130–400)
PMV BLD AUTO: 9.9 FL (ref 6–12)
RBC # BLD AUTO: 3.12 10*6/MM3 (ref 4.2–5.4)
WBC NRBC COR # BLD: 4.6 10*3/MM3 (ref 4.8–10.8)

## 2018-08-10 PROCEDURE — 36415 COLL VENOUS BLD VENIPUNCTURE: CPT

## 2018-08-10 PROCEDURE — 96372 THER/PROPH/DIAG INJ SC/IM: CPT

## 2018-08-10 PROCEDURE — 63510000001 EPOETIN ALFA PER 1000 UNITS: Performed by: INTERNAL MEDICINE

## 2018-08-10 PROCEDURE — 85027 COMPLETE CBC AUTOMATED: CPT | Performed by: INTERNAL MEDICINE

## 2018-08-10 RX ADMIN — ERYTHROPOIETIN 40000 UNITS: 40000 INJECTION, SOLUTION INTRAVENOUS; SUBCUTANEOUS at 10:56

## 2018-08-31 ENCOUNTER — HOSPITAL ENCOUNTER (OUTPATIENT)
Dept: INFUSION THERAPY | Facility: HOSPITAL | Age: 78
Discharge: HOME | End: 2018-08-31
Admitting: INTERNAL MEDICINE

## 2018-08-31 VITALS
DIASTOLIC BLOOD PRESSURE: 80 MMHG | HEART RATE: 64 BPM | RESPIRATION RATE: 18 BRPM | TEMPERATURE: 97.8 F | SYSTOLIC BLOOD PRESSURE: 168 MMHG

## 2018-08-31 DIAGNOSIS — N18.4 ANEMIA DUE TO STAGE 4 CHRONIC KIDNEY DISEASE TREATED WITH ERYTHROPOIETIN (HCC): ICD-10-CM

## 2018-08-31 DIAGNOSIS — D63.1 ANEMIA DUE TO STAGE 4 CHRONIC KIDNEY DISEASE TREATED WITH ERYTHROPOIETIN (HCC): ICD-10-CM

## 2018-08-31 PROCEDURE — 63510000001 EPOETIN ALFA PER 1000 UNITS: Performed by: INTERNAL MEDICINE

## 2018-08-31 PROCEDURE — 85027 COMPLETE CBC AUTOMATED: CPT | Performed by: INTERNAL MEDICINE

## 2018-08-31 PROCEDURE — 96372 THER/PROPH/DIAG INJ SC/IM: CPT

## 2018-08-31 PROCEDURE — 36415 COLL VENOUS BLD VENIPUNCTURE: CPT

## 2018-08-31 RX ADMIN — ERYTHROPOIETIN 40000 UNITS: 40000 INJECTION, SOLUTION INTRAVENOUS; SUBCUTANEOUS at 11:22

## 2018-09-21 ENCOUNTER — HOSPITAL ENCOUNTER (OUTPATIENT)
Dept: INFUSION THERAPY | Facility: HOSPITAL | Age: 78
Discharge: HOME | End: 2018-09-21
Admitting: INTERNAL MEDICINE

## 2018-09-21 VITALS
TEMPERATURE: 97.6 F | OXYGEN SATURATION: 99 % | SYSTOLIC BLOOD PRESSURE: 162 MMHG | RESPIRATION RATE: 18 BRPM | DIASTOLIC BLOOD PRESSURE: 82 MMHG | HEART RATE: 70 BPM

## 2018-09-21 DIAGNOSIS — D63.1 ANEMIA DUE TO STAGE 4 CHRONIC KIDNEY DISEASE TREATED WITH ERYTHROPOIETIN (HCC): ICD-10-CM

## 2018-09-21 DIAGNOSIS — N18.4 ANEMIA DUE TO STAGE 4 CHRONIC KIDNEY DISEASE TREATED WITH ERYTHROPOIETIN (HCC): ICD-10-CM

## 2018-09-21 LAB
DEPRECATED RDW RBC AUTO: 57.2 FL (ref 37–54)
ERYTHROCYTE [DISTWIDTH] IN BLOOD BY AUTOMATED COUNT: 16.9 % (ref 11.5–14.5)
HCT VFR BLD AUTO: 29.2 % (ref 37–47)
HGB BLD-MCNC: 8.9 G/DL (ref 12–16)
MCH RBC QN AUTO: 28.4 PG (ref 27–31)
MCHC RBC AUTO-ENTMCNC: 30.5 G/DL (ref 30–37)
MCV RBC AUTO: 93.3 FL (ref 81–99)
PLATELET # BLD AUTO: 176 10*3/MM3 (ref 130–400)
PMV BLD AUTO: 9.1 FL (ref 6–12)
RBC # BLD AUTO: 3.13 10*6/MM3 (ref 4.2–5.4)
WBC NRBC COR # BLD: 3.76 10*3/MM3 (ref 4.8–10.8)

## 2018-09-21 PROCEDURE — 96372 THER/PROPH/DIAG INJ SC/IM: CPT

## 2018-09-21 PROCEDURE — 63510000001 EPOETIN ALFA PER 1000 UNITS: Performed by: INTERNAL MEDICINE

## 2018-09-21 PROCEDURE — 85027 COMPLETE CBC AUTOMATED: CPT | Performed by: INTERNAL MEDICINE

## 2018-09-21 PROCEDURE — 36415 COLL VENOUS BLD VENIPUNCTURE: CPT

## 2018-09-21 RX ADMIN — ERYTHROPOIETIN 40000 UNITS: 40000 INJECTION, SOLUTION INTRAVENOUS; SUBCUTANEOUS at 11:02

## 2018-09-21 NOTE — CODE DOCUMENTATION
Pt request to not do renal panel today that she wants to do it with next visit due to has a lot of extra lab work to do including renal panel and was suppose to fast and that she did not.  Cbc only ordered per pt request.

## 2018-10-04 ENCOUNTER — TRANSCRIBE ORDERS (OUTPATIENT)
Dept: ADMINISTRATIVE | Facility: HOSPITAL | Age: 78
End: 2018-10-04

## 2018-10-04 DIAGNOSIS — Z12.39 SCREENING BREAST EXAMINATION: Primary | ICD-10-CM

## 2018-10-12 ENCOUNTER — APPOINTMENT (OUTPATIENT)
Dept: BONE DENSITY | Facility: HOSPITAL | Age: 78
End: 2018-10-12
Attending: INTERNAL MEDICINE

## 2018-10-12 ENCOUNTER — HOSPITAL ENCOUNTER (OUTPATIENT)
Dept: INFUSION THERAPY | Facility: HOSPITAL | Age: 78
Discharge: HOME | End: 2018-10-12
Admitting: INTERNAL MEDICINE

## 2018-10-12 VITALS
TEMPERATURE: 98 F | BODY MASS INDEX: 22.18 KG/M2 | HEART RATE: 61 BPM | OXYGEN SATURATION: 100 % | SYSTOLIC BLOOD PRESSURE: 190 MMHG | HEIGHT: 59 IN | RESPIRATION RATE: 18 BRPM | DIASTOLIC BLOOD PRESSURE: 90 MMHG | WEIGHT: 110 LBS

## 2018-10-12 DIAGNOSIS — Z12.39 SCREENING BREAST EXAMINATION: ICD-10-CM

## 2018-10-12 DIAGNOSIS — N18.4 ANEMIA DUE TO STAGE 4 CHRONIC KIDNEY DISEASE TREATED WITH ERYTHROPOIETIN (HCC): ICD-10-CM

## 2018-10-12 DIAGNOSIS — D63.1 ANEMIA DUE TO STAGE 4 CHRONIC KIDNEY DISEASE TREATED WITH ERYTHROPOIETIN (HCC): ICD-10-CM

## 2018-10-12 LAB
ALBUMIN SERPL-MCNC: 4.2 G/DL (ref 3.5–5)
ANION GAP SERPL CALCULATED.3IONS-SCNC: 11.5 MMOL/L (ref 10–20)
BASOPHILS # BLD AUTO: 0.02 10*3/MM3 (ref 0–0.2)
BASOPHILS NFR BLD AUTO: 0.3 % (ref 0–2.5)
BUN BLD-MCNC: 44 MG/DL (ref 7–20)
BUN/CREAT SERPL: 20 (ref 7.1–23.5)
CALCIUM SPEC-SCNC: 9.8 MG/DL (ref 8.4–10.2)
CHLORIDE SERPL-SCNC: 106 MMOL/L (ref 98–107)
CHOLEST SERPL-MCNC: 141 MG/DL (ref 0–199)
CO2 SERPL-SCNC: 25 MMOL/L (ref 26–30)
CREAT BLD-MCNC: 2.2 MG/DL (ref 0.6–1.3)
DEPRECATED RDW RBC AUTO: 58.1 FL (ref 37–54)
EOSINOPHIL # BLD AUTO: 0.13 10*3/MM3 (ref 0–0.7)
EOSINOPHIL NFR BLD AUTO: 1.6 % (ref 0–7)
ERYTHROCYTE [DISTWIDTH] IN BLOOD BY AUTOMATED COUNT: 17.2 % (ref 11.5–14.5)
FERRITIN SERPL-MCNC: 14.3 NG/ML (ref 11.1–264)
GFR SERPL CREATININE-BSD FRML MDRD: 22 ML/MIN/1.73
GLUCOSE BLD-MCNC: 95 MG/DL (ref 74–98)
HCT VFR BLD AUTO: 30.9 % (ref 37–47)
HDLC SERPL-MCNC: 67 MG/DL (ref 40–60)
HGB BLD-MCNC: 9.5 G/DL (ref 12–16)
IMM GRANULOCYTES # BLD: 0.03 10*3/MM3 (ref 0–0.06)
IMM GRANULOCYTES NFR BLD: 0.4 % (ref 0–0.6)
IRON 24H UR-MRATE: 148 MCG/DL (ref 37–181)
IRON SATN MFR SERPL: 37 % (ref 11–46)
LDLC SERPL CALC-MCNC: 57 MG/DL (ref 0–99)
LDLC/HDLC SERPL: 0.85 {RATIO}
LYMPHOCYTES # BLD AUTO: 0.89 10*3/MM3 (ref 0.6–3.4)
LYMPHOCYTES NFR BLD AUTO: 11.2 % (ref 10–50)
MCH RBC QN AUTO: 28.5 PG (ref 27–31)
MCHC RBC AUTO-ENTMCNC: 30.7 G/DL (ref 30–37)
MCV RBC AUTO: 92.8 FL (ref 81–99)
MONOCYTES # BLD AUTO: 0.47 10*3/MM3 (ref 0–0.9)
MONOCYTES NFR BLD AUTO: 5.9 % (ref 0–12)
NEUTROPHILS # BLD AUTO: 6.42 10*3/MM3 (ref 2–6.9)
NEUTROPHILS NFR BLD AUTO: 80.6 % (ref 37–80)
NRBC BLD MANUAL-RTO: 0 /100 WBC (ref 0–0)
PHOSPHATE SERPL-MCNC: 3.8 MG/DL (ref 2.5–4.5)
PLATELET # BLD AUTO: 193 10*3/MM3 (ref 130–400)
PMV BLD AUTO: 9.6 FL (ref 6–12)
POTASSIUM BLD-SCNC: 4.5 MMOL/L (ref 3.5–5.1)
RBC # BLD AUTO: 3.33 10*6/MM3 (ref 4.2–5.4)
SODIUM BLD-SCNC: 138 MMOL/L (ref 137–145)
TIBC SERPL-MCNC: 403 MCG/DL (ref 261–497)
TRIGL SERPL-MCNC: 86 MG/DL
URATE SERPL-MCNC: 5.6 MG/DL (ref 2.5–8.5)
VLDLC SERPL-MCNC: 17.2 MG/DL
WBC NRBC COR # BLD: 7.96 10*3/MM3 (ref 4.8–10.8)

## 2018-10-12 PROCEDURE — 80069 RENAL FUNCTION PANEL: CPT | Performed by: INTERNAL MEDICINE

## 2018-10-12 PROCEDURE — 84550 ASSAY OF BLOOD/URIC ACID: CPT | Performed by: INTERNAL MEDICINE

## 2018-10-12 PROCEDURE — 36415 COLL VENOUS BLD VENIPUNCTURE: CPT

## 2018-10-12 PROCEDURE — 83550 IRON BINDING TEST: CPT | Performed by: INTERNAL MEDICINE

## 2018-10-12 PROCEDURE — A9270 NON-COVERED ITEM OR SERVICE: HCPCS | Performed by: PHYSICIAN ASSISTANT

## 2018-10-12 PROCEDURE — 63710000001 FUROSEMIDE 20 MG TABLET: Performed by: PHYSICIAN ASSISTANT

## 2018-10-12 PROCEDURE — 77080 DXA BONE DENSITY AXIAL: CPT

## 2018-10-12 PROCEDURE — 96372 THER/PROPH/DIAG INJ SC/IM: CPT

## 2018-10-12 PROCEDURE — 85025 COMPLETE CBC W/AUTO DIFF WBC: CPT | Performed by: INTERNAL MEDICINE

## 2018-10-12 PROCEDURE — 63510000001 EPOETIN ALFA PER 1000 UNITS: Performed by: INTERNAL MEDICINE

## 2018-10-12 PROCEDURE — 82728 ASSAY OF FERRITIN: CPT | Performed by: INTERNAL MEDICINE

## 2018-10-12 PROCEDURE — 83540 ASSAY OF IRON: CPT | Performed by: INTERNAL MEDICINE

## 2018-10-12 PROCEDURE — 80061 LIPID PANEL: CPT | Performed by: INTERNAL MEDICINE

## 2018-10-12 RX ORDER — FUROSEMIDE 20 MG/1
20 TABLET ORAL ONCE
Status: COMPLETED | OUTPATIENT
Start: 2018-10-12 | End: 2018-10-12

## 2018-10-12 RX ADMIN — ERYTHROPOIETIN 40000 UNITS: 40000 INJECTION, SOLUTION INTRAVENOUS; SUBCUTANEOUS at 10:36

## 2018-10-12 RX ADMIN — FUROSEMIDE 20 MG: 20 TABLET ORAL at 10:50

## 2018-10-12 NOTE — CODE DOCUMENTATION
BP elevated at 190/90. Called 's office and spoke with Tiara. Per Jarrod Connor PA-C advised that it was okay to give Procrit injection and to give Lasix 20mg PO. RBV. Instructed pt if she becomes symptomatic she will need to advised  office an go to ER. Pt verbalized understanding.

## 2018-10-23 ENCOUNTER — HOSPITAL ENCOUNTER (EMERGENCY)
Facility: HOSPITAL | Age: 78
Discharge: HOME | End: 2018-10-23
Attending: STUDENT IN AN ORGANIZED HEALTH CARE EDUCATION/TRAINING PROGRAM | Admitting: STUDENT IN AN ORGANIZED HEALTH CARE EDUCATION/TRAINING PROGRAM

## 2018-10-23 ENCOUNTER — APPOINTMENT (OUTPATIENT)
Dept: CT IMAGING | Facility: HOSPITAL | Age: 78
End: 2018-10-23

## 2018-10-23 ENCOUNTER — APPOINTMENT (OUTPATIENT)
Dept: GENERAL RADIOLOGY | Facility: HOSPITAL | Age: 78
End: 2018-10-23

## 2018-10-23 VITALS
RESPIRATION RATE: 18 BRPM | SYSTOLIC BLOOD PRESSURE: 143 MMHG | BODY MASS INDEX: 21.33 KG/M2 | HEART RATE: 97 BPM | DIASTOLIC BLOOD PRESSURE: 76 MMHG | TEMPERATURE: 97.5 F | HEIGHT: 59 IN | WEIGHT: 105.8 LBS | OXYGEN SATURATION: 100 %

## 2018-10-23 DIAGNOSIS — R10.9 NON-SURGICAL ABDOMINAL PAIN: Primary | ICD-10-CM

## 2018-10-23 DIAGNOSIS — E86.0 DEHYDRATION: ICD-10-CM

## 2018-10-23 DIAGNOSIS — K62.5 BRIGHT RED BLOOD PER RECTUM: ICD-10-CM

## 2018-10-23 LAB
ALBUMIN SERPL-MCNC: 3.7 G/DL (ref 3.5–5)
ALBUMIN/GLOB SERPL: 1.4 G/DL (ref 1–2)
ALP SERPL-CCNC: 123 U/L (ref 38–126)
ALT SERPL W P-5'-P-CCNC: 36 U/L (ref 13–69)
ANION GAP SERPL CALCULATED.3IONS-SCNC: 10.3 MMOL/L (ref 10–20)
AST SERPL-CCNC: 31 U/L (ref 15–46)
BACTERIA UR QL AUTO: ABNORMAL /HPF
BASOPHILS # BLD AUTO: 0.02 10*3/MM3 (ref 0–0.2)
BASOPHILS NFR BLD AUTO: 0.3 % (ref 0–2.5)
BILIRUB SERPL-MCNC: 0.7 MG/DL (ref 0.2–1.3)
BILIRUB UR QL STRIP: NEGATIVE
BUN BLD-MCNC: 50 MG/DL (ref 7–20)
BUN/CREAT SERPL: 15.6 (ref 7.1–23.5)
CALCIUM SPEC-SCNC: 9.7 MG/DL (ref 8.4–10.2)
CHLORIDE SERPL-SCNC: 105 MMOL/L (ref 98–107)
CLARITY UR: CLEAR
CO2 SERPL-SCNC: 24 MMOL/L (ref 26–30)
COLOR UR: YELLOW
CREAT BLD-MCNC: 3.2 MG/DL (ref 0.6–1.3)
DEPRECATED RDW RBC AUTO: 58 FL (ref 37–54)
EOSINOPHIL # BLD AUTO: 0.12 10*3/MM3 (ref 0–0.7)
EOSINOPHIL NFR BLD AUTO: 1.8 % (ref 0–7)
ERYTHROCYTE [DISTWIDTH] IN BLOOD BY AUTOMATED COUNT: 17.8 % (ref 11.5–14.5)
GFR SERPL CREATININE-BSD FRML MDRD: 14 ML/MIN/1.73
GFR SERPL CREATININE-BSD FRML MDRD: ABNORMAL ML/MIN/1.73
GLOBULIN UR ELPH-MCNC: 2.6 GM/DL
GLUCOSE BLD-MCNC: 98 MG/DL (ref 74–98)
GLUCOSE UR STRIP-MCNC: NEGATIVE MG/DL
HCT VFR BLD AUTO: 29.4 % (ref 37–47)
HGB BLD-MCNC: 9.1 G/DL (ref 12–16)
HGB UR QL STRIP.AUTO: ABNORMAL
HYALINE CASTS UR QL AUTO: ABNORMAL /LPF
IMM GRANULOCYTES # BLD: 0.03 10*3/MM3 (ref 0–0.06)
IMM GRANULOCYTES NFR BLD: 0.4 % (ref 0–0.6)
KETONES UR QL STRIP: NEGATIVE
LEUKOCYTE ESTERASE UR QL STRIP.AUTO: ABNORMAL
LYMPHOCYTES # BLD AUTO: 0.72 10*3/MM3 (ref 0.6–3.4)
LYMPHOCYTES NFR BLD AUTO: 10.6 % (ref 10–50)
MCH RBC QN AUTO: 28 PG (ref 27–31)
MCHC RBC AUTO-ENTMCNC: 31 G/DL (ref 30–37)
MCV RBC AUTO: 90.5 FL (ref 81–99)
MONOCYTES # BLD AUTO: 0.42 10*3/MM3 (ref 0–0.9)
MONOCYTES NFR BLD AUTO: 6.2 % (ref 0–12)
NEUTROPHILS # BLD AUTO: 5.5 10*3/MM3 (ref 2–6.9)
NEUTROPHILS NFR BLD AUTO: 80.7 % (ref 37–80)
NITRITE UR QL STRIP: NEGATIVE
NRBC BLD MANUAL-RTO: 0 /100 WBC (ref 0–0)
PH UR STRIP.AUTO: <=5 [PH] (ref 5–8)
PLATELET # BLD AUTO: 248 10*3/MM3 (ref 130–400)
PMV BLD AUTO: 9.7 FL (ref 6–12)
POTASSIUM BLD-SCNC: 4.3 MMOL/L (ref 3.5–5.1)
PROT SERPL-MCNC: 6.3 G/DL (ref 6.3–8.2)
PROT UR QL STRIP: ABNORMAL
RBC # BLD AUTO: 3.25 10*6/MM3 (ref 4.2–5.4)
RBC # UR: ABNORMAL /HPF
REF LAB TEST METHOD: ABNORMAL
SODIUM BLD-SCNC: 135 MMOL/L (ref 137–145)
SP GR UR STRIP: 1.02 (ref 1–1.03)
SQUAMOUS #/AREA URNS HPF: ABNORMAL /HPF
UROBILINOGEN UR QL STRIP: ABNORMAL
WBC NRBC COR # BLD: 6.81 10*3/MM3 (ref 4.8–10.8)
WBC UR QL AUTO: ABNORMAL /HPF

## 2018-10-23 PROCEDURE — 71046 X-RAY EXAM CHEST 2 VIEWS: CPT

## 2018-10-23 PROCEDURE — 81001 URINALYSIS AUTO W/SCOPE: CPT | Performed by: STUDENT IN AN ORGANIZED HEALTH CARE EDUCATION/TRAINING PROGRAM

## 2018-10-23 PROCEDURE — 85025 COMPLETE CBC W/AUTO DIFF WBC: CPT | Performed by: STUDENT IN AN ORGANIZED HEALTH CARE EDUCATION/TRAINING PROGRAM

## 2018-10-23 PROCEDURE — 80053 COMPREHEN METABOLIC PANEL: CPT | Performed by: STUDENT IN AN ORGANIZED HEALTH CARE EDUCATION/TRAINING PROGRAM

## 2018-10-23 PROCEDURE — 99283 EMERGENCY DEPT VISIT LOW MDM: CPT

## 2018-10-23 PROCEDURE — 93005 ELECTROCARDIOGRAM TRACING: CPT | Performed by: STUDENT IN AN ORGANIZED HEALTH CARE EDUCATION/TRAINING PROGRAM

## 2018-10-23 PROCEDURE — 74176 CT ABD & PELVIS W/O CONTRAST: CPT

## 2018-10-23 RX ADMIN — SODIUM CHLORIDE, POTASSIUM CHLORIDE, SODIUM LACTATE AND CALCIUM CHLORIDE 500 ML: 600; 310; 30; 20 INJECTION, SOLUTION INTRAVENOUS at 14:28

## 2018-10-23 NOTE — ED PROVIDER NOTES
"Subjective   Patient is a 78-year-old female that presents with concerns for abdominal pain as well as bright red blood per rectum.  Patient states that Dr. Schilling put her on Prilosec for acid reflux and her hiatal hernia 2 weeks ago.  She started passing bright red blood 2 days ago.  She read somewhere that this medication could cause this.  She also states that earlier today for less than 30 minutes she had epigastric pain radiating around the left side of her abdomen into her back.  She states she thought this was her hiatal hernia but wanted to come in to be checked.            Review of Systems   All other systems reviewed and are negative.      Past Medical History:   Diagnosis Date   • Anemia 1998   • Arthritis    • Bleeding from anus    • Bronchitis     STATES HAS BRONCHITIS CURRENTLY (12/5/17).     • Bronchitis 12/2017   • CAD (coronary artery disease)    • Cataract, bilateral    • Chronic kidney disease     STAGE 4.  SEES TERA   • Chronic kidney failure     REPORTS STAGE IV   • Difficulty swallowing solids    • Disease of thyroid gland    • Fracture, radius 2016    right distal radius and ulna, healed.   • GERD (gastroesophageal reflux disease)    • Gout    • High cholesterol    • History of nuclear stress test 2014    DR. CORTEZ.  \"IT WAS OK\"   • Hyperparathyroidism (CMS/HCC)    • Hypertension    • Iron deficiency    • Osteoarthritis    • Osteoarthritis of knees, bilateral     Both knees Supartz injection series August, 2015   • Osteoporosis    • Personal history of cardiac murmur    • Presence of pessary    • Problems with swallowing     WITH FOOD OCCASSIONALLY   • Rheumatic heart disease     Personal history   • Rotator cuff tendonitis     right    • Rupture of right proximal biceps tendon     chronic   • Seasonal allergies    • Spinal headache     REPORTS AFTER DELIVERY OF SON   • Subacromial bursitis     right    • Valvular heart disease    • Vitamin B12 deficiency    • Wears glasses  "       Allergies   Allergen Reactions   • Ferrlecit [Na Ferric Gluc Cplx In Sucrose] Swelling   • Ranitidine Hcl Shortness Of Breath   • Lisinopril Cough       Past Surgical History:   Procedure Laterality Date   • CATARACT EXTRACTION Bilateral    • COLONOSCOPY  2011   • COLONOSCOPY N/A 1/9/2018    Procedure: COLONOSCOPY with endoscopic mucosal resection (hot snare), normal saline submucosal injection, argon thermal ablation, resolution clip placement x4, and cold biopsy polypectomy;  Surgeon: Pieter Concepcion MD;  Location: [facility] ENDOSCOPY;  Service:    • ENDOSCOPY N/A 12/6/2017    Procedure: ESOPHAGOGASTRODUODENOSCOPY with biopsies and hot snare polypectomies;  Surgeon: Pieter Concepcion MD;  Location: [facility] ENDOSCOPY;  Service:    • UPPER GASTROINTESTINAL ENDOSCOPY  08/18/2014       Family History   Problem Relation Age of Onset   • Liver cancer Maternal Grandmother    • Gout Other    • Osteoporosis Other    • Stroke Other    • Ulcers Other    • Asthma Other    • Hypertension Other    • Heart disease Other    • Osteoarthritis Other    • Colon cancer Neg Hx        Social History     Social History   • Marital status:      Social History Main Topics   • Smoking status: Never Smoker   • Smokeless tobacco: Never Used   • Alcohol use No   • Drug use: No   • Sexual activity: Not Currently     Other Topics Concern   • Not on file           Objective   Physical Exam   Nursing note and vitals reviewed.    GEN: No acute distress  Head: Normocephalic, atraumatic  Eyes: Pupils equal round reactive to light  ENT: Posterior pharynx normal in appearance, oral mucosa is moist  Chest: Nontender to palpation  Cardiovascular: Regular rate  Lungs: Clear to auscultation bilaterally  Abdomen: Soft, nontender, nondistended, no peritoneal signs  Extremities: No edema, normal appearance  Neuro: GCS 15  Psych: Mood and affect are appropriate    Procedures           ED Course  ED Course as of Oct 23 1535   Tue Oct 23, 2018   1226 EKG  shows normal sinus rhythm rate is 63.  No significant ST segments.  Intervals are normal.  This is an abnormal EKG.  [DT]      ED Course User Index  [DT] Jerald Kerr MD                  MDM  Number of Diagnoses or Management Options  Bright red blood per rectum:   Dehydration:   Non-surgical abdominal pain:   Diagnosis management comments: Patient has had extensive workups in the past regarding her bright red blood per rectum and after having endoscopy and imaging performed it is believed to be due to diverticulosis.  Abdominal CT shows no acute pathology.  Laboratories do reflect mild dehydration.  Patient was treated with a small fluid bolus.  She does have chronic kidney disease at baseline and does follow Dr. Schilling regularly.       Amount and/or Complexity of Data Reviewed  Clinical lab tests: reviewed  Tests in the radiology section of CPT®: reviewed  Decide to obtain previous medical records or to obtain history from someone other than the patient: yes  Obtain history from someone other than the patient: yes  Review and summarize past medical records: yes  Independent visualization of images, tracings, or specimens: yes          Final diagnoses:   Non-surgical abdominal pain   Bright red blood per rectum   Dehydration            Jerald Kerr MD  10/23/18 1530

## 2018-11-02 ENCOUNTER — HOSPITAL ENCOUNTER (OUTPATIENT)
Dept: INFUSION THERAPY | Facility: HOSPITAL | Age: 78
Discharge: HOME | End: 2018-11-02
Admitting: INTERNAL MEDICINE

## 2018-11-02 VITALS
SYSTOLIC BLOOD PRESSURE: 169 MMHG | BODY MASS INDEX: 21.17 KG/M2 | WEIGHT: 105 LBS | DIASTOLIC BLOOD PRESSURE: 77 MMHG | RESPIRATION RATE: 18 BRPM | TEMPERATURE: 98.7 F | OXYGEN SATURATION: 98 % | HEIGHT: 59 IN | HEART RATE: 75 BPM

## 2018-11-02 DIAGNOSIS — D63.1 ANEMIA DUE TO STAGE 4 CHRONIC KIDNEY DISEASE TREATED WITH ERYTHROPOIETIN (HCC): ICD-10-CM

## 2018-11-02 DIAGNOSIS — N18.4 ANEMIA DUE TO STAGE 4 CHRONIC KIDNEY DISEASE TREATED WITH ERYTHROPOIETIN (HCC): ICD-10-CM

## 2018-11-02 LAB
DEPRECATED RDW RBC AUTO: 59.7 FL (ref 37–54)
ERYTHROCYTE [DISTWIDTH] IN BLOOD BY AUTOMATED COUNT: 17.7 % (ref 11.5–14.5)
HCT VFR BLD AUTO: 28.4 % (ref 37–47)
HGB BLD-MCNC: 8.6 G/DL (ref 12–16)
MCH RBC QN AUTO: 27.7 PG (ref 27–31)
MCHC RBC AUTO-ENTMCNC: 30.3 G/DL (ref 30–37)
MCV RBC AUTO: 91.6 FL (ref 81–99)
PLATELET # BLD AUTO: 195 10*3/MM3 (ref 130–400)
PMV BLD AUTO: 10 FL (ref 6–12)
RBC # BLD AUTO: 3.1 10*6/MM3 (ref 4.2–5.4)
WBC NRBC COR # BLD: 4.21 10*3/MM3 (ref 4.8–10.8)

## 2018-11-02 PROCEDURE — 85027 COMPLETE CBC AUTOMATED: CPT | Performed by: INTERNAL MEDICINE

## 2018-11-02 PROCEDURE — 96372 THER/PROPH/DIAG INJ SC/IM: CPT

## 2018-11-02 PROCEDURE — 36415 COLL VENOUS BLD VENIPUNCTURE: CPT

## 2018-11-02 PROCEDURE — 63510000001 EPOETIN ALFA PER 1000 UNITS: Performed by: INTERNAL MEDICINE

## 2018-11-02 RX ADMIN — ERYTHROPOIETIN 40000 UNITS: 40000 INJECTION, SOLUTION INTRAVENOUS; SUBCUTANEOUS at 11:07

## 2018-11-26 ENCOUNTER — OFFICE VISIT (OUTPATIENT)
Dept: OBSTETRICS AND GYNECOLOGY | Facility: CLINIC | Age: 78
End: 2018-11-26

## 2018-11-26 ENCOUNTER — HOSPITAL ENCOUNTER (OUTPATIENT)
Dept: INFUSION THERAPY | Facility: HOSPITAL | Age: 78
Discharge: HOME | End: 2018-11-26
Admitting: INTERNAL MEDICINE

## 2018-11-26 VITALS
SYSTOLIC BLOOD PRESSURE: 197 MMHG | DIASTOLIC BLOOD PRESSURE: 91 MMHG | HEART RATE: 74 BPM | TEMPERATURE: 97.5 F | RESPIRATION RATE: 18 BRPM | OXYGEN SATURATION: 99 %

## 2018-11-26 VITALS
HEIGHT: 59 IN | WEIGHT: 110 LBS | SYSTOLIC BLOOD PRESSURE: 158 MMHG | BODY MASS INDEX: 22.18 KG/M2 | DIASTOLIC BLOOD PRESSURE: 88 MMHG

## 2018-11-26 DIAGNOSIS — Z46.89 PESSARY MAINTENANCE: Primary | ICD-10-CM

## 2018-11-26 DIAGNOSIS — N18.4 ANEMIA DUE TO STAGE 4 CHRONIC KIDNEY DISEASE TREATED WITH ERYTHROPOIETIN (HCC): Primary | ICD-10-CM

## 2018-11-26 DIAGNOSIS — D63.1 ANEMIA DUE TO STAGE 4 CHRONIC KIDNEY DISEASE TREATED WITH ERYTHROPOIETIN (HCC): Primary | ICD-10-CM

## 2018-11-26 DIAGNOSIS — N81.11 CYSTOCELE, MIDLINE: ICD-10-CM

## 2018-11-26 LAB
DEPRECATED RDW RBC AUTO: 60.9 FL (ref 37–54)
ERYTHROCYTE [DISTWIDTH] IN BLOOD BY AUTOMATED COUNT: 18.4 % (ref 11.5–14.5)
HCT VFR BLD AUTO: 29.7 % (ref 37–47)
HGB BLD-MCNC: 8.9 G/DL (ref 12–16)
MCH RBC QN AUTO: 27.2 PG (ref 27–31)
MCHC RBC AUTO-ENTMCNC: 30 G/DL (ref 30–37)
MCV RBC AUTO: 90.8 FL (ref 81–99)
PLATELET # BLD AUTO: 168 10*3/MM3 (ref 130–400)
PMV BLD AUTO: 9.8 FL (ref 6–12)
RBC # BLD AUTO: 3.27 10*6/MM3 (ref 4.2–5.4)
WBC NRBC COR # BLD: 4.2 10*3/MM3 (ref 4.8–10.8)

## 2018-11-26 PROCEDURE — 36415 COLL VENOUS BLD VENIPUNCTURE: CPT

## 2018-11-26 PROCEDURE — 25010000002 EPOETIN ALFA PER 1000 UNITS: Performed by: INTERNAL MEDICINE

## 2018-11-26 PROCEDURE — 85027 COMPLETE CBC AUTOMATED: CPT | Performed by: INTERNAL MEDICINE

## 2018-11-26 PROCEDURE — 96372 THER/PROPH/DIAG INJ SC/IM: CPT

## 2018-11-26 PROCEDURE — 99212 OFFICE O/P EST SF 10 MIN: CPT | Performed by: PHYSICIAN ASSISTANT

## 2018-11-26 RX ORDER — PNEUMOCOCCAL VACCINE POLYVALENT 25; 25; 25; 25; 25; 25; 25; 25; 25; 25; 25; 25; 25; 25; 25; 25; 25; 25; 25; 25; 25; 25; 25 UG/.5ML; UG/.5ML; UG/.5ML; UG/.5ML; UG/.5ML; UG/.5ML; UG/.5ML; UG/.5ML; UG/.5ML; UG/.5ML; UG/.5ML; UG/.5ML; UG/.5ML; UG/.5ML; UG/.5ML; UG/.5ML; UG/.5ML; UG/.5ML; UG/.5ML; UG/.5ML; UG/.5ML; UG/.5ML; UG/.5ML
INJECTION, SOLUTION INTRAMUSCULAR; SUBCUTANEOUS
Refills: 0 | COMMUNITY
Start: 2018-10-29 | End: 2019-02-14

## 2018-11-26 RX ORDER — INFLUENZA A VIRUS A/MICHIGAN/45/2015 X-275 (H1N1) ANTIGEN (FORMALDEHYDE INACTIVATED), INFLUENZA A VIRUS A/SINGAPORE/INFIMH-16-0019/2016 IVR-186 (H3N2) ANTIGEN (FORMALDEHYDE INACTIVATED), AND INFLUENZA B VIRUS B/MARYLAND/15/2016 BX-69A (A B/COLORADO/6/2017-LIKE VIRUS) ANTIGEN (FORMALDEHYDE INACTIVATED) 60; 60; 60 UG/.5ML; UG/.5ML; UG/.5ML
INJECTION, SUSPENSION INTRAMUSCULAR
Refills: 0 | Status: ON HOLD | COMMUNITY
Start: 2018-10-29 | End: 2019-01-15

## 2018-11-26 RX ADMIN — ERYTHROPOIETIN 40000 UNITS: 40000 INJECTION, SOLUTION INTRAVENOUS; SUBCUTANEOUS at 11:07

## 2018-11-26 NOTE — CODE DOCUMENTATION
Called dr mehta office regard bp  And event of last pm   informed nurse to tell her to take an addition whole water pill   To proceed with procrit

## 2018-11-26 NOTE — CODE DOCUMENTATION
Pt stated pt bp high 0100 this am  Stated feeling bad at that time and heart rate up at that time  Took fluid pill 170/80

## 2018-11-26 NOTE — PROGRESS NOTES
"Subjective   Chief Complaint   Patient presents with   • Pessary Check       Carolyne Martins is a 78 y.o. year old  presenting to be seen for routine pessary check  She has no complaints today  She is voiding well, has had no urinary tract symptoms, no problems with bowel movements and no vaginal bleeding    Past Medical History:   Diagnosis Date   • Anemia    • Arthritis    • Bleeding from anus    • Bronchitis     STATES HAS BRONCHITIS CURRENTLY (17).     • Bronchitis 2017   • CAD (coronary artery disease)    • Cataract, bilateral    • Chronic kidney disease     STAGE 4.  SEES TERA   • Chronic kidney failure     REPORTS STAGE IV   • Difficulty swallowing solids    • Disease of thyroid gland    • Fracture, radius 2016    right distal radius and ulna, healed.   • GERD (gastroesophageal reflux disease)    • Gout    • High cholesterol    • History of nuclear stress test     DR. CORTEZ.  \"IT WAS OK\"   • Hyperparathyroidism (CMS/HCC)    • Hypertension    • Iron deficiency    • Osteoarthritis    • Osteoarthritis of knees, bilateral     Both knees Supartz injection series    • Osteoporosis    • Personal history of cardiac murmur    • Presence of pessary    • Problems with swallowing     WITH FOOD OCCASSIONALLY   • Rheumatic heart disease     Personal history   • Rotator cuff tendonitis     right    • Rupture of right proximal biceps tendon     chronic   • Seasonal allergies    • Spinal headache     REPORTS AFTER DELIVERY OF SON   • Subacromial bursitis     right    • Valvular heart disease    • Vitamin B12 deficiency    • Wears glasses         Current Outpatient Medications:   •  allopurinol (ZYLOPRIM) 100 MG tablet, Take 1 tablet by mouth daily., Disp: , Rfl:   •  aspirin 81 MG tablet, Take 81 mg by mouth Daily., Disp: , Rfl:   •  budesonide-formoterol (SYMBICORT) 80-4.5 MCG/ACT inhaler, Inhale 2 puffs 2 (Two) Times a Day., Disp: , Rfl:   •  carvedilol (COREG) 12.5 MG tablet, Take " 12.5 mg by mouth Every Morning., Disp: , Rfl:   •  cholecalciferol (VITAMIN D3) 1000 UNITS tablet, Take 1,000 Units by mouth Daily., Disp: , Rfl:   •  Cyanocobalamin (VITAMIN B-12 PO), Take 500 mg by mouth 2 (Two) Times a Day., Disp: , Rfl:   •  epoetin ovidio (PROCRIT) 56109 UNIT/ML injection, Procrit SOLN; Patient Sig: Procrit SOLN ; 0; 16-Jul-2014; every 3 weeks, Disp: , Rfl:   •  erythromycin (ROMYCIN) 5 MG/GM ophthalmic ointment, Administer  to both eyes Every Night., Disp: , Rfl:   •  fluticasone (FLONASE) 50 MCG/ACT nasal spray, 2 sprays into each nostril Daily As Needed., Disp: , Rfl:   •  FLUZONE HIGH-DOSE 0.5 ML suspension prefilled syringe injection, inject 0.5 milliliter intramuscularly, Disp: , Rfl: 0  •  irbesartan (AVAPRO) 150 MG tablet, Take 150 mg by mouth Daily., Disp: , Rfl:   •  Loratadine (CLARITIN) 10 MG capsule, Take 10 mg by mouth Daily As Needed (RHINITIS)., Disp: , Rfl:   •  MethylPREDNISolone (MEDROL, DOC,) 4 MG tablet, Take as directed on package instructions., Disp: 21 tablet, Rfl: 0  •  omeprazole (PriLOSEC) 40 MG capsule, Take 40 mg by mouth Daily., Disp: , Rfl:   •  PNEUMOVAX 23 25 MCG/0.5ML vaccine, inject 0.5 milliliter intramuscularly, Disp: , Rfl: 0  •  rosuvastatin (CRESTOR) 10 MG tablet, Take 10 mg by mouth Daily., Disp: , Rfl:   •  torsemide (DEMADEX) 20 MG tablet, Take 10 mg by mouth Daily As Needed (SWELLING/SHORTNESS OF BREATH). EDEMA, Disp: , Rfl:   •  VENTOLIN  (90 Base) MCG/ACT inhaler, , Disp: , Rfl:   No current facility-administered medications for this visit.    Allergies   Allergen Reactions   • Ferrlecit [Na Ferric Gluc Cplx In Sucrose] Swelling   • Ranitidine Hcl Shortness Of Breath   • Levofloxacin Other (See Comments)     Leg cramps     • Lisinopril Cough      Past Surgical History:   Procedure Laterality Date   • CATARACT EXTRACTION Bilateral    • COLONOSCOPY  2011   • UPPER GASTROINTESTINAL ENDOSCOPY  08/18/2014      Social History     Socioeconomic  "History   • Marital status:      Spouse name: Not on file   • Number of children: Not on file   • Years of education: Not on file   • Highest education level: Not on file   Social Needs   • Financial resource strain: Not on file   • Food insecurity - worry: Not on file   • Food insecurity - inability: Not on file   • Transportation needs - medical: Not on file   • Transportation needs - non-medical: Not on file   Occupational History   • Not on file   Tobacco Use   • Smoking status: Never Smoker   • Smokeless tobacco: Never Used   Substance and Sexual Activity   • Alcohol use: No   • Drug use: No   • Sexual activity: Not Currently   Other Topics Concern   • Not on file   Social History Narrative   • Not on file      Family History   Problem Relation Age of Onset   • Liver cancer Maternal Grandmother    • Gout Other    • Osteoporosis Other    • Stroke Other    • Ulcers Other    • Asthma Other    • Hypertension Other    • Heart disease Other    • Osteoarthritis Other    • Colon cancer Neg Hx        Review of Systems   Constitutional: Negative.    Gastrointestinal: Negative.    Genitourinary: Negative.            Objective   /88 (BP Location: Left arm, Patient Position: Sitting, Cuff Size: Adult)   Ht 149.9 cm (59\")   Wt 49.9 kg (110 lb)   LMP  (LMP Unknown)   BMI 22.22 kg/m²     Physical Exam   Constitutional: She appears well-developed and well-nourished. She is cooperative.   Abdominal: Soft. Normal appearance. There is no tenderness.   Genitourinary: There is no tenderness or lesion on the right labia. There is no tenderness or lesion on the left labia.   Genitourinary Comments: Ring pessary with support removed, cleaned and reinserted. No vaginal wall abrasions or erosion  2 plus cystocele   Neurological: She is alert.   Skin: Skin is warm and dry.   Psychiatric: She has a normal mood and affect. Her behavior is normal.            Assessment and Plan  Carolyne was seen today for pessary " check.    Diagnoses and all orders for this visit:    Pessary maintenance    Cystocele, midline      Patient Instructions   RTO 3 months or prn             This note was electronically signed.    Yaz Woods PA-C   November 26, 2018

## 2018-12-17 ENCOUNTER — OFFICE VISIT (OUTPATIENT)
Dept: GASTROENTEROLOGY | Facility: CLINIC | Age: 78
End: 2018-12-17

## 2018-12-17 ENCOUNTER — HOSPITAL ENCOUNTER (OUTPATIENT)
Dept: INFUSION THERAPY | Facility: HOSPITAL | Age: 78
Discharge: HOME | End: 2018-12-17
Admitting: PHYSICIAN ASSISTANT

## 2018-12-17 VITALS
TEMPERATURE: 98.6 F | HEIGHT: 59 IN | DIASTOLIC BLOOD PRESSURE: 77 MMHG | WEIGHT: 110 LBS | OXYGEN SATURATION: 100 % | BODY MASS INDEX: 22.18 KG/M2 | HEART RATE: 64 BPM | RESPIRATION RATE: 16 BRPM | SYSTOLIC BLOOD PRESSURE: 176 MMHG

## 2018-12-17 VITALS
HEIGHT: 59 IN | WEIGHT: 108 LBS | DIASTOLIC BLOOD PRESSURE: 72 MMHG | BODY MASS INDEX: 21.77 KG/M2 | RESPIRATION RATE: 16 BRPM | HEART RATE: 86 BPM | SYSTOLIC BLOOD PRESSURE: 151 MMHG | TEMPERATURE: 98.4 F

## 2018-12-17 DIAGNOSIS — D64.9 ANEMIA, UNSPECIFIED TYPE: ICD-10-CM

## 2018-12-17 DIAGNOSIS — N18.4 ANEMIA DUE TO STAGE 4 CHRONIC KIDNEY DISEASE TREATED WITH ERYTHROPOIETIN (HCC): Primary | ICD-10-CM

## 2018-12-17 DIAGNOSIS — R13.10 DYSPHAGIA, UNSPECIFIED TYPE: ICD-10-CM

## 2018-12-17 DIAGNOSIS — R10.13 EPIGASTRIC PAIN: Primary | ICD-10-CM

## 2018-12-17 DIAGNOSIS — R63.4 WEIGHT LOSS: ICD-10-CM

## 2018-12-17 DIAGNOSIS — K59.00 CONSTIPATION, UNSPECIFIED CONSTIPATION TYPE: ICD-10-CM

## 2018-12-17 DIAGNOSIS — D63.1 ANEMIA DUE TO STAGE 4 CHRONIC KIDNEY DISEASE TREATED WITH ERYTHROPOIETIN (HCC): Primary | ICD-10-CM

## 2018-12-17 LAB
DEPRECATED RDW RBC AUTO: 63.2 FL (ref 37–54)
ERYTHROCYTE [DISTWIDTH] IN BLOOD BY AUTOMATED COUNT: 19 % (ref 11.5–14.5)
HCT VFR BLD AUTO: 29.8 % (ref 37–47)
HGB BLD-MCNC: 9.1 G/DL (ref 12–16)
MCH RBC QN AUTO: 28.2 PG (ref 27–31)
MCHC RBC AUTO-ENTMCNC: 30.5 G/DL (ref 30–37)
MCV RBC AUTO: 92.3 FL (ref 81–99)
PLATELET # BLD AUTO: 197 10*3/MM3 (ref 130–400)
PMV BLD AUTO: 9.7 FL (ref 6–12)
RBC # BLD AUTO: 3.23 10*6/MM3 (ref 4.2–5.4)
WBC NRBC COR # BLD: 4.65 10*3/MM3 (ref 4.8–10.8)

## 2018-12-17 PROCEDURE — 85027 COMPLETE CBC AUTOMATED: CPT | Performed by: PHYSICIAN ASSISTANT

## 2018-12-17 PROCEDURE — 25010000002 EPOETIN ALFA PER 1000 UNITS: Performed by: INTERNAL MEDICINE

## 2018-12-17 PROCEDURE — 99214 OFFICE O/P EST MOD 30 MIN: CPT | Performed by: INTERNAL MEDICINE

## 2018-12-17 PROCEDURE — 96372 THER/PROPH/DIAG INJ SC/IM: CPT

## 2018-12-17 PROCEDURE — 36415 COLL VENOUS BLD VENIPUNCTURE: CPT

## 2018-12-17 RX ORDER — CARBOXYMETHYLCELLULOSE SODIUM 5 MG/ML
1 SOLUTION/ DROPS OPHTHALMIC 3 TIMES DAILY PRN
COMMUNITY

## 2018-12-17 RX ADMIN — ERYTHROPOIETIN 40000 UNITS: 40000 INJECTION, SOLUTION INTRAVENOUS; SUBCUTANEOUS at 11:39

## 2018-12-17 NOTE — PROGRESS NOTES
Chief Complaint   Patient presents with   • Abdominal Pain     History of Present Illness     There is history of epigastric abdominal pain off and on for the last 2-3 months.  The pain is gradual in onset, mild  in severity and aching in character.  Frequency being 2-3 times a week.  The pain may last for several minutes.  There is no radiation of abdominal pain.  Eating worsens the abdominal discomfort.  No definite relieving factors of abdominal pain.  The patient has mild nausea at times. There is occasional associated emesis. The patient has been having intermittent dysphagia for the last 2-3 months. She claims that there is some weight loss. The patient was weighing 134 pounds about 2 years ago. Currently the patient weighs 108 pounds. The patient has history of moderately severe reflux. Her symptoms are under good control. There is history of occasional mild constipation. She denies bright red blood per rectum, melena or hematochezia.     Review of Systems   Constitutional: Positive for fatigue. Negative for appetite change, chills, fever and unexpected weight change.   HENT: Positive for trouble swallowing. Negative for mouth sores and nosebleeds.    Eyes: Negative for discharge and redness.   Respiratory: Negative for apnea, cough and shortness of breath.    Cardiovascular: Negative for chest pain, palpitations and leg swelling.   Gastrointestinal: Positive for abdominal pain and constipation. Negative for anal bleeding, blood in stool, diarrhea, nausea and vomiting.   Endocrine: Negative for cold intolerance, heat intolerance and polydipsia.   Genitourinary: Negative for dysuria, hematuria and urgency.   Musculoskeletal: Positive for arthralgias. Negative for joint swelling and myalgias.   Skin: Negative for rash.   Allergic/Immunologic: Negative for food allergies and immunocompromised state.   Neurological: Positive for dizziness and headaches. Negative for seizures and syncope.   Hematological: Negative  "for adenopathy. Bruises/bleeds easily.   Psychiatric/Behavioral: Negative for dysphoric mood. The patient is not nervous/anxious and is not hyperactive.      Patient Active Problem List   Diagnosis   • Arthralgia of shoulder region   • Tendinopathy of rotator cuff   • Osteoarthritis of right acromioclavicular joint   • Anemia due to stage 4 chronic kidney disease treated with erythropoietin (CMS/HCC)   • Anemia   • Gastric polyp   • Heartburn   • Melena   • Constipation   • Change in bowel habits   • Colon cancer screening     Past Medical History:   Diagnosis Date   • Anemia 1998   • Arthritis    • Bleeding from anus    • Bronchitis     STATES HAS BRONCHITIS CURRENTLY (12/5/17).     • Bronchitis 12/2017   • CAD (coronary artery disease)    • Cataract, bilateral    • Chronic kidney disease     STAGE 4.  SEES TERA   • Chronic kidney failure     REPORTS STAGE IV   • Difficulty swallowing solids    • Disease of thyroid gland    • Fracture, radius 2016    right distal radius and ulna, healed.   • GERD (gastroesophageal reflux disease)    • Gout    • High cholesterol    • History of nuclear stress test 2014    DR. CORTEZ.  \"IT WAS OK\"   • Hyperparathyroidism (CMS/HCC)    • Hyperparathyroidism (CMS/HCC)    • Hypertension    • Iron deficiency    • Osteoarthritis    • Osteoarthritis of knees, bilateral     Both knees Supartz injection series August, 2015   • Osteoporosis    • Personal history of cardiac murmur    • Presence of pessary    • Problems with swallowing     WITH FOOD OCCASSIONALLY   • Rheumatic heart disease     Personal history   • Rotator cuff tendonitis     right    • Rupture of right proximal biceps tendon     chronic   • Seasonal allergies    • Spinal headache     REPORTS AFTER DELIVERY OF SON   • Subacromial bursitis     right    • Valvular heart disease    • Vitamin B12 deficiency    • Vitamin D deficiency    • Wears glasses      Past Surgical History:   Procedure Laterality Date   • CATARACT EXTRACTION " Bilateral    • COLONOSCOPY  2011   • COLONOSCOPY N/A 1/9/2018    Procedure: COLONOSCOPY with endoscopic mucosal resection (hot snare), normal saline submucosal injection, argon thermal ablation, resolution clip placement x4, and cold biopsy polypectomy;  Surgeon: Pieter Concepcion MD;  Location: [facility] ENDOSCOPY;  Service:    • ENDOSCOPY N/A 12/6/2017    Procedure: ESOPHAGOGASTRODUODENOSCOPY with biopsies and hot snare polypectomies;  Surgeon: Pieter Concepcion MD;  Location: [facility] ENDOSCOPY;  Service:    • UPPER GASTROINTESTINAL ENDOSCOPY  08/18/2014     Family History   Problem Relation Age of Onset   • Liver cancer Maternal Grandmother    • Gout Other    • Osteoporosis Other    • Stroke Other    • Ulcers Other    • Asthma Other    • Hypertension Other    • Heart disease Other    • Osteoarthritis Other    • Colon cancer Neg Hx      Social History     Tobacco Use   • Smoking status: Never Smoker   • Smokeless tobacco: Never Used   Substance Use Topics   • Alcohol use: No       Current Outpatient Medications:   •  allopurinol (ZYLOPRIM) 100 MG tablet, Take 1 tablet by mouth daily., Disp: , Rfl:   •  aspirin 81 MG tablet, Take 81 mg by mouth Daily., Disp: , Rfl:   •  budesonide-formoterol (SYMBICORT) 80-4.5 MCG/ACT inhaler, Inhale 2 puffs 2 (Two) Times a Day., Disp: , Rfl:   •  carboxymethylcellulose (REFRESH PLUS) 0.5 % solution, 3 (Three) Times a Day As Needed for Dry Eyes., Disp: , Rfl:   •  carvedilol (COREG) 12.5 MG tablet, Take 12.5 mg by mouth Every Morning., Disp: , Rfl:   •  cholecalciferol (VITAMIN D3) 1000 UNITS tablet, Take 1,000 Units by mouth Daily., Disp: , Rfl:   •  Cyanocobalamin (VITAMIN B-12 PO), Take 500 mg by mouth 2 (Two) Times a Day., Disp: , Rfl:   •  epoetin ovidio (PROCRIT) 27730 UNIT/ML injection, Procrit SOLN; Patient Sig: Procrit SOLN ; 0; 16-Jul-2014; every 3 weeks, Disp: , Rfl:   •  fluticasone (FLONASE) 50 MCG/ACT nasal spray, 2 sprays into each nostril Daily As Needed., Disp: , Rfl:   •   "irbesartan (AVAPRO) 150 MG tablet, Take 150 mg by mouth Daily., Disp: , Rfl:   •  Loratadine (CLARITIN) 10 MG capsule, Take 10 mg by mouth Daily As Needed (RHINITIS)., Disp: , Rfl:   •  MethylPREDNISolone (MEDROL, DOC,) 4 MG tablet, Take as directed on package instructions., Disp: 21 tablet, Rfl: 0  •  omeprazole (PriLOSEC) 40 MG capsule, Take 40 mg by mouth Daily., Disp: , Rfl:   •  rosuvastatin (CRESTOR) 10 MG tablet, Take 10 mg by mouth Daily., Disp: , Rfl:   •  torsemide (DEMADEX) 20 MG tablet, Take 10 mg by mouth 2 (Two) Times a Week. EDEMA, Disp: , Rfl:   •  erythromycin (ROMYCIN) 5 MG/GM ophthalmic ointment, Administer  to both eyes Every Night., Disp: , Rfl:   •  FLUZONE HIGH-DOSE 0.5 ML suspension prefilled syringe injection, inject 0.5 milliliter intramuscularly, Disp: , Rfl: 0  •  PNEUMOVAX 23 25 MCG/0.5ML vaccine, inject 0.5 milliliter intramuscularly, Disp: , Rfl: 0  •  VENTOLIN  (90 Base) MCG/ACT inhaler, , Disp: , Rfl:     Allergies   Allergen Reactions   • Ferrlecit [Na Ferric Gluc Cplx In Sucrose] Swelling   • Ranitidine Hcl Shortness Of Breath   • Levofloxacin Other (See Comments)     Leg cramps     • Lisinopril Cough       Blood pressure 151/72, pulse 86, temperature 98.4 °F (36.9 °C), resp. rate 16, height 149.9 cm (59\"), weight 49 kg (108 lb), not currently breastfeeding.    Physical Exam   Constitutional: She is oriented to person, place, and time. She appears well-developed and well-nourished. No distress.   HENT:   Head: Normocephalic and atraumatic.   Right Ear: Hearing and external ear normal.   Left Ear: Hearing and external ear normal.   Nose: Nose normal.   Mouth/Throat: Oropharynx is clear and moist and mucous membranes are normal. Mucous membranes are not pale, not dry and not cyanotic. No oral lesions. No oropharyngeal exudate.   Eyes: Conjunctivae and EOM are normal. Right eye exhibits no discharge. Left eye exhibits no discharge. No scleral icterus.   Neck: Trachea normal. " Neck supple. No JVD present. No edema present. No thyroid mass and no thyromegaly present.   Cardiovascular: Normal rate, regular rhythm, S2 normal and normal heart sounds. Exam reveals no gallop, no S3 and no friction rub.   No murmur heard.  Pulmonary/Chest: Effort normal and breath sounds normal. No respiratory distress. She has no wheezes. She has no rales. She exhibits no tenderness.   Abdominal: Soft. Normal appearance and bowel sounds are normal. She exhibits no distension, no ascites and no mass. There is no splenomegaly or hepatomegaly. There is tenderness. There is no rigidity, no rebound and no guarding. No hernia.   Musculoskeletal: She exhibits no tenderness or deformity.     Vascular Status -  Her right foot exhibits no edema. Her left foot exhibits no edema.  Lymphadenopathy:     She has no cervical adenopathy.        Left: No supraclavicular adenopathy present.   Neurological: She is alert and oriented to person, place, and time. She has normal strength. No cranial nerve deficit or sensory deficit. She exhibits normal muscle tone. Coordination normal.   Skin: No rash noted. She is not diaphoretic. No cyanosis. No pallor. Nails show no clubbing.   Psychiatric: She has a normal mood and affect. Her behavior is normal. Judgment and thought content normal.   Nursing note and vitals reviewed.  Stigmata of chronic liver disease:  None.  Asterixis:  None.    Laboratory Testing:  Upon review of medical records:    Dated 12/2/2017 glucose 90 BUN 44 creatinine 2.6 sodium 140 potassium 3.8 chloride 104 CO2 26 calcium 9.8 albumin 3.6 ALT 40 AST 34 upon phosphatase 66 total bilirubin 0.6 WBC 5.88 hemoglobin 8.8 hematocrit 28.6 platelet count 201 MCV 96.3 lipase 219 fecal occult blood: Negative     Dated 12/4/2017 glucose 104 BUN 35 creatinine 2.6 sodium 139 potassium 4.5 chloride 102 CO2 26 calcium 10.2 albumin 3.7 phosphorus 3.3 WBC 5.01 hemoglobin 9.5 hematocrit 30.8 platelet count 217 MCV 96.3     Dated  January 3, 2018 WBC 7.79 hemoglobin 9.2 hematocrit 29.9 MCV 95.8 and platelet count 226.     Dated February 4, 2018 sodium 139 potassium 4.1 chloride 103 CO2 29 BUN 47 serum creatinine 2.60 glucose 96.  Calcium 10.3.  Total protein 5.9.  Albumin 3.40.  T bili 0.3 AST 29 ALT 36 alkaline phosphatase 64.  WBC 4.28 hemoglobin 8.8 hematocrit 27.8 MCV 95.5 and platelet count 189.    Dated October 12, 2018 sodium 138 potassium 4.5 chloride 106 CO2 25 BUN 44 serum creatinine 2.20 glucose 95.  Calcium 9.8.  Albumin 4.20.  WBC 7.96 and global 9.5 hematocrit 30.9 MCV 92.8 and platelet count 193.     Abdominal Imaging:  Upon review of records:     CT of abdomen and pelvis without contrast dated 12/2/2017 reveals lung bases are clear. There is a small sliding-type hiatal hernia. The liver is normal in size and attenuation with a benign-appearing cyst. The spleen is unremarkable.  The adrenals are normal.  The aorta is normal in caliber. There is no hydronephrosis. There is no nephrolithiasis. There are bilateral simple and complex renal cysts with the largest measuring 7.2 x 6.0 cm on the right. The appendix is normal.  The urinary bladder is unremarkable. There is no free fluid or adenopathy. A pessary device is present. There are bilateral L5 pars defects with advanced changes of osteoarthritis    Dated October 23, 2018 the patient underwent a CT of abdomen and pelvis without contrast which revealed: Bilateral renal masses are seen incompletely characterized without contrast.  Many of these do not feel criteria of simple cyst.  These are stable since prior exam.  Small low-density lesion (hepatic lobe is seen probably a cyst, unchanged.  Remaining solid organs are unremarkable.  Bowel shows no evidence of obstruction.  No free air or free fluid within the abdomen.  No bowel dilatation is seen.  No free fluid.  Appendix is normal.  Uterus is unremarkable.  A pessary is present.  Advanced sigmoid diverticulosis is noted.      Procedures:  Upon review of records:      Dated December 6, 2017 the patient underwent an upper endoscopy which revealed:  Nonobstructive Schatzki’s ring.  Small sliding hiatal hernia less than 3 cm.  Erythematous gastritis with evidence of old healed ulceration.  Multiple vascular gastric polyps.  Vascular polyps can contribute to chronic blood loss and anemia. Second portion of duodenum, biopsies revealed no pathologic alteration.  Negative for celiac disease, microorganisms, metaplasia, or atypia.  Antrum, body, angulus, biopsies revealed gastric body mucosa without pathologic alterations.  Negative for H. pylori, metaplasia, dysplasia, or malignancy.  Gastric polyps, body and fundus, biopsies revealed  multiple fundic gland polyps.  Negative for H. pylori, metaplasia, dysplasia, or malignancy.     Dated January 9, 2018 the patient underwent a  colonoscopy to the terminal ileum which revealed:  Left-sided diverticulosis.  Angiodysplasia of colon without bleeding.  Status post thermal ablation therapy.  Colon polyp. Transverse colon flat lesion. Status post endoscopic mucosal resection, thermal ablation, and placement of resolution clips to coapt the margins.  Internal hemorrhoids.  Ascending colon polyp, biopsy revealed tubular adenoma without high grade dysplasia.  Transverse colon polyp, flat lesion, biopsy revealed sessile serrated adenoma fragments without cytologic dysplasia.      Assessment:      ICD-10-CM ICD-9-CM   1. Epigastric pain R10.13 789.06   2. Dysphagia, unspecified type R13.10 787.20   3. Constipation, unspecified constipation type K59.00 564.00   4. Weight loss R63.4 783.21   5. Anemia, unspecified type D64.9 285.9         Discussion:  1.     Plan/  Patient Instructions   1. Omeprazole 40 mg 1 by mouth every morning one half hour before breakfast.     2. Zofran 4 mg tablet.  1-to 3 times a day by mouth as needed for nausea.  3. Upper endoscopy (EGD) counseling:  Description of the procedure,  risks, benefits, alternatives and options including nonoperative options were discussed with the patient in detail.  The patient understands and wishes to proceed.       Pieter Concepcion MD

## 2018-12-17 NOTE — PATIENT INSTRUCTIONS
1. Omeprazole 40 mg 1 by mouth every morning one half hour before breakfast.     2. Zofran 4 mg tablet.  1-to 3 times a day by mouth as needed for nausea.  3. Upper endoscopy (EGD) counseling:  Description of the procedure, risks, benefits, alternatives and options including nonoperative options were discussed with the patient in detail.  The patient understands and wishes to proceed.

## 2019-01-03 ENCOUNTER — PREP FOR SURGERY (OUTPATIENT)
Dept: OTHER | Facility: HOSPITAL | Age: 79
End: 2019-01-03

## 2019-01-03 DIAGNOSIS — R63.4 WEIGHT LOSS: ICD-10-CM

## 2019-01-03 DIAGNOSIS — R13.10 DYSPHAGIA, UNSPECIFIED TYPE: ICD-10-CM

## 2019-01-03 DIAGNOSIS — R10.13 EPIGASTRIC PAIN: Primary | ICD-10-CM

## 2019-01-03 DIAGNOSIS — D64.9 ANEMIA: ICD-10-CM

## 2019-01-03 RX ORDER — SODIUM CHLORIDE 9 MG/ML
70 INJECTION, SOLUTION INTRAVENOUS CONTINUOUS PRN
Status: CANCELLED | OUTPATIENT
Start: 2019-01-03

## 2019-01-04 PROBLEM — R63.4 WEIGHT LOSS: Status: ACTIVE | Noted: 2019-01-04

## 2019-01-04 PROBLEM — R10.13 EPIGASTRIC PAIN: Status: ACTIVE | Noted: 2019-01-04

## 2019-01-07 ENCOUNTER — HOSPITAL ENCOUNTER (OUTPATIENT)
Dept: INFUSION THERAPY | Facility: HOSPITAL | Age: 79
Discharge: HOME | End: 2019-01-07
Admitting: INTERNAL MEDICINE

## 2019-01-07 VITALS
DIASTOLIC BLOOD PRESSURE: 70 MMHG | RESPIRATION RATE: 18 BRPM | HEART RATE: 68 BPM | WEIGHT: 108 LBS | TEMPERATURE: 97.2 F | HEIGHT: 59 IN | SYSTOLIC BLOOD PRESSURE: 137 MMHG | BODY MASS INDEX: 21.77 KG/M2 | OXYGEN SATURATION: 100 %

## 2019-01-07 DIAGNOSIS — M81.0 SENILE OSTEOPOROSIS: ICD-10-CM

## 2019-01-07 DIAGNOSIS — N18.4 ANEMIA DUE TO STAGE 4 CHRONIC KIDNEY DISEASE TREATED WITH ERYTHROPOIETIN (HCC): Primary | ICD-10-CM

## 2019-01-07 DIAGNOSIS — D63.1 ANEMIA DUE TO STAGE 4 CHRONIC KIDNEY DISEASE TREATED WITH ERYTHROPOIETIN (HCC): Primary | ICD-10-CM

## 2019-01-07 LAB
ALBUMIN SERPL-MCNC: 3.9 G/DL (ref 3.5–5)
ANION GAP SERPL CALCULATED.3IONS-SCNC: 12.3 MMOL/L (ref 10–20)
BUN BLD-MCNC: 40 MG/DL (ref 7–20)
BUN/CREAT SERPL: 16.7 (ref 7.1–23.5)
CALCIUM SPEC-SCNC: 9 MG/DL (ref 8.4–10.2)
CHLORIDE SERPL-SCNC: 104 MMOL/L (ref 98–107)
CO2 SERPL-SCNC: 25 MMOL/L (ref 26–30)
CREAT BLD-MCNC: 2.4 MG/DL (ref 0.6–1.3)
DEPRECATED RDW RBC AUTO: 63.8 FL (ref 37–54)
ERYTHROCYTE [DISTWIDTH] IN BLOOD BY AUTOMATED COUNT: 19.3 % (ref 11.5–14.5)
GFR SERPL CREATININE-BSD FRML MDRD: 20 ML/MIN/1.73
GLUCOSE BLD-MCNC: 86 MG/DL (ref 74–98)
HCT VFR BLD AUTO: 29.1 % (ref 37–47)
HGB BLD-MCNC: 8.9 G/DL (ref 12–16)
MCH RBC QN AUTO: 28.2 PG (ref 27–31)
MCHC RBC AUTO-ENTMCNC: 30.6 G/DL (ref 30–37)
MCV RBC AUTO: 92.1 FL (ref 81–99)
PHOSPHATE SERPL-MCNC: 4.3 MG/DL (ref 2.5–4.5)
PLATELET # BLD AUTO: 179 10*3/MM3 (ref 130–400)
PMV BLD AUTO: 9.9 FL (ref 6–12)
POTASSIUM BLD-SCNC: 4.3 MMOL/L (ref 3.5–5.1)
RBC # BLD AUTO: 3.16 10*6/MM3 (ref 4.2–5.4)
SODIUM BLD-SCNC: 137 MMOL/L (ref 137–145)
WBC NRBC COR # BLD: 3.38 10*3/MM3 (ref 4.8–10.8)

## 2019-01-07 PROCEDURE — 96372 THER/PROPH/DIAG INJ SC/IM: CPT

## 2019-01-07 PROCEDURE — 85027 COMPLETE CBC AUTOMATED: CPT | Performed by: INTERNAL MEDICINE

## 2019-01-07 PROCEDURE — 25010000002 EPOETIN ALFA PER 1000 UNITS: Performed by: INTERNAL MEDICINE

## 2019-01-07 PROCEDURE — 36415 COLL VENOUS BLD VENIPUNCTURE: CPT

## 2019-01-07 PROCEDURE — 96401 CHEMO ANTI-NEOPL SQ/IM: CPT

## 2019-01-07 PROCEDURE — 25010000002 DENOSUMAB 60 MG/ML SOLUTION: Performed by: INTERNAL MEDICINE

## 2019-01-07 PROCEDURE — 80069 RENAL FUNCTION PANEL: CPT | Performed by: INTERNAL MEDICINE

## 2019-01-07 RX ADMIN — ERYTHROPOIETIN 40000 UNITS: 40000 INJECTION, SOLUTION INTRAVENOUS; SUBCUTANEOUS at 12:02

## 2019-01-07 RX ADMIN — DENOSUMAB 60 MG: 60 INJECTION SUBCUTANEOUS at 11:48

## 2019-01-07 NOTE — PATIENT INSTRUCTIONS
Denosumab injection  What is this medicine?  DENOSUMAB (den oh irvin mab) slows bone breakdown. Prolia is used to treat osteoporosis in women after menopause and in men. Xgeva is used to treat a high calcium level due to cancer and to prevent bone fractures and other bone problems caused by multiple myeloma or cancer bone metastases. Xgeva is also used to treat giant cell tumor of the bone.  This medicine may be used for other purposes; ask your health care provider or pharmacist if you have questions.  COMMON BRAND NAME(S): Prolia, XGEVA  What should I tell my health care provider before I take this medicine?  They need to know if you have any of these conditions:  -dental disease  -having surgery or tooth extraction  -infection  -kidney disease  -low levels of calcium or Vitamin D in the blood  -malnutrition  -on hemodialysis  -skin conditions or sensitivity  -thyroid or parathyroid disease  -an unusual reaction to denosumab, other medicines, foods, dyes, or preservatives  -pregnant or trying to get pregnant  -breast-feeding  How should I use this medicine?  This medicine is for injection under the skin. It is given by a health care professional in a hospital or clinic setting.  If you are getting Prolia, a special MedGuide will be given to you by the pharmacist with each prescription and refill. Be sure to read this information carefully each time.  For Prolia, talk to your pediatrician regarding the use of this medicine in children. Special care may be needed. For Xgeva, talk to your pediatrician regarding the use of this medicine in children. While this drug may be prescribed for children as young as 13 years for selected conditions, precautions do apply.  Overdosage: If you think you have taken too much of this medicine contact a poison control center or emergency room at once.  NOTE: This medicine is only for you. Do not share this medicine with others.  What if I miss a dose?  It is important not to miss your  dose. Call your doctor or health care professional if you are unable to keep an appointment.  What may interact with this medicine?  Do not take this medicine with any of the following medications:  -other medicines containing denosumab  This medicine may also interact with the following medications:  -medicines that lower your chance of fighting infection  -steroid medicines like prednisone or cortisone  This list may not describe all possible interactions. Give your health care provider a list of all the medicines, herbs, non-prescription drugs, or dietary supplements you use. Also tell them if you smoke, drink alcohol, or use illegal drugs. Some items may interact with your medicine.  What should I watch for while using this medicine?  Visit your doctor or health care professional for regular checks on your progress. Your doctor or health care professional may order blood tests and other tests to see how you are doing.  Call your doctor or health care professional for advice if you get a fever, chills or sore throat, or other symptoms of a cold or flu. Do not treat yourself. This drug may decrease your body's ability to fight infection. Try to avoid being around people who are sick.  You should make sure you get enough calcium and vitamin D while you are taking this medicine, unless your doctor tells you not to. Discuss the foods you eat and the vitamins you take with your health care professional.  See your dentist regularly. Brush and floss your teeth as directed. Before you have any dental work done, tell your dentist you are receiving this medicine.  Do not become pregnant while taking this medicine or for 5 months after stopping it. Talk with your doctor or health care professional about your birth control options while taking this medicine. Women should inform their doctor if they wish to become pregnant or think they might be pregnant. There is a potential for serious side effects to an unborn child. Talk  to your health care professional or pharmacist for more information.  What side effects may I notice from receiving this medicine?  Side effects that you should report to your doctor or health care professional as soon as possible:  -allergic reactions like skin rash, itching or hives, swelling of the face, lips, or tongue  -bone pain  -breathing problems  -dizziness  -jaw pain, especially after dental work  -redness, blistering, peeling of the skin  -signs and symptoms of infection like fever or chills; cough; sore throat; pain or trouble passing urine  -signs of low calcium like fast heartbeat, muscle cramps or muscle pain; pain, tingling, numbness in the hands or feet; seizures  -unusual bleeding or bruising  -unusually weak or tired  Side effects that usually do not require medical attention (report to your doctor or health care professional if they continue or are bothersome):  -constipation  -diarrhea  -headache  -joint pain  -loss of appetite  -muscle pain  -runny nose  -tiredness  -upset stomach  This list may not describe all possible side effects. Call your doctor for medical advice about side effects. You may report side effects to FDA at 1-143-FDA-2282.  Where should I keep my medicine?  This medicine is only given in a clinic, doctor's office, or other health care setting and will not be stored at home.  NOTE: This sheet is a summary. It may not cover all possible information. If you have questions about this medicine, talk to your doctor, pharmacist, or health care provider.  © 2018 [employer]/Gold Standard (2018-01-09 19:17:21)

## 2019-01-15 ENCOUNTER — ANESTHESIA EVENT (OUTPATIENT)
Dept: GASTROENTEROLOGY | Facility: HOSPITAL | Age: 79
End: 2019-01-15

## 2019-01-15 ENCOUNTER — ANESTHESIA (OUTPATIENT)
Dept: GASTROENTEROLOGY | Facility: HOSPITAL | Age: 79
End: 2019-01-15

## 2019-01-15 ENCOUNTER — HOSPITAL ENCOUNTER (OUTPATIENT)
Facility: HOSPITAL | Age: 79
Setting detail: HOSPITAL OUTPATIENT SURGERY
Discharge: HOME | End: 2019-01-15
Attending: INTERNAL MEDICINE | Admitting: INTERNAL MEDICINE

## 2019-01-15 VITALS
TEMPERATURE: 97.6 F | HEIGHT: 59 IN | WEIGHT: 110 LBS | OXYGEN SATURATION: 98 % | DIASTOLIC BLOOD PRESSURE: 61 MMHG | BODY MASS INDEX: 22.18 KG/M2 | RESPIRATION RATE: 18 BRPM | HEART RATE: 68 BPM | SYSTOLIC BLOOD PRESSURE: 127 MMHG

## 2019-01-15 DIAGNOSIS — R10.13 EPIGASTRIC PAIN: ICD-10-CM

## 2019-01-15 DIAGNOSIS — R63.4 WEIGHT LOSS: ICD-10-CM

## 2019-01-15 DIAGNOSIS — D64.9 ANEMIA: ICD-10-CM

## 2019-01-15 DIAGNOSIS — R13.10 DYSPHAGIA, UNSPECIFIED TYPE: ICD-10-CM

## 2019-01-15 PROCEDURE — 43251 EGD REMOVE LESION SNARE: CPT | Performed by: INTERNAL MEDICINE

## 2019-01-15 PROCEDURE — 25010000002 AMPICILLIN PER 500 MG: Performed by: INTERNAL MEDICINE

## 2019-01-15 PROCEDURE — 88305 TISSUE EXAM BY PATHOLOGIST: CPT | Performed by: INTERNAL MEDICINE

## 2019-01-15 PROCEDURE — 25010000002 PROPOFOL 200 MG/20ML EMULSION: Performed by: NURSE ANESTHETIST, CERTIFIED REGISTERED

## 2019-01-15 PROCEDURE — 43239 EGD BIOPSY SINGLE/MULTIPLE: CPT | Performed by: INTERNAL MEDICINE

## 2019-01-15 RX ORDER — SODIUM CHLORIDE 9 MG/ML
70 INJECTION, SOLUTION INTRAVENOUS CONTINUOUS PRN
Status: DISCONTINUED | OUTPATIENT
Start: 2019-01-15 | End: 2019-01-15 | Stop reason: HOSPADM

## 2019-01-15 RX ORDER — SODIUM CHLORIDE 0.9 % (FLUSH) 0.9 %
3 SYRINGE (ML) INJECTION AS NEEDED
Status: DISCONTINUED | OUTPATIENT
Start: 2019-01-15 | End: 2019-01-15 | Stop reason: HOSPADM

## 2019-01-15 RX ORDER — PROPOFOL 10 MG/ML
INJECTION, EMULSION INTRAVENOUS AS NEEDED
Status: DISCONTINUED | OUTPATIENT
Start: 2019-01-15 | End: 2019-01-15 | Stop reason: SURG

## 2019-01-15 RX ADMIN — SODIUM CHLORIDE 70 ML/HR: 9 INJECTION, SOLUTION INTRAVENOUS at 07:29

## 2019-01-15 RX ADMIN — AMPICILLIN SODIUM 2 G: 2 INJECTION, POWDER, FOR SOLUTION INTRAMUSCULAR; INTRAVENOUS at 07:18

## 2019-01-15 RX ADMIN — PROPOFOL 50 MG: 10 INJECTION, EMULSION INTRAVENOUS at 08:27

## 2019-01-15 RX ADMIN — LIDOCAINE HYDROCHLORIDE 60 MG: 20 INJECTION, SOLUTION INTRAVENOUS at 08:27

## 2019-01-15 RX ADMIN — PROPOFOL 50 MG: 10 INJECTION, EMULSION INTRAVENOUS at 08:37

## 2019-01-15 RX ADMIN — PROPOFOL 50 MG: 10 INJECTION, EMULSION INTRAVENOUS at 08:30

## 2019-01-15 RX ADMIN — PROPOFOL 50 MG: 10 INJECTION, EMULSION INTRAVENOUS at 08:34

## 2019-01-15 NOTE — ANESTHESIA PREPROCEDURE EVALUATION
Anesthesia Evaluation     Patient summary reviewed and Nursing notes reviewed   history of anesthetic complications (Hx spinal headache):  NPO Solid Status: > 8 hours  NPO Liquid Status: > 8 hours           Airway   Mallampati: I  TM distance: >3 FB  Neck ROM: full  No difficulty expected  Dental - normal exam     Pulmonary - normal exam   (+) recent URI resolved,   (-) not a smoker  Cardiovascular - normal exam  Exercise tolerance: good (4-7 METS)    ECG reviewed  PT is on anticoagulation therapy  Patient on routine beta blocker and Beta blocker given within 24 hours of surgery    (+) hypertension poorly controlled 2 medications or greater, CAD (states ST was OK per Dr Acuña), hyperlipidemia,     ROS comment: ASA 81 mg     Neuro/Psych  (+) headaches,     GI/Hepatic/Renal/Endo    (+)  GERD,  renal disease (stage IV renal failure) CRI, hyperthyroidism    Musculoskeletal     Abdominal     Abdomen: soft.   Substance History - negative use     OB/GYN negative ob/gyn ROS         Other   (+) arthritis       Other Comment: Hx of spinal headache                    Anesthesia Plan    ASA 3     MAC   (Pt told that intravenous sedation will be used as the primary anesthetic. Every effort will be made to make sure the patient is comfortable.     The patient was told they may or may not have recall for the procedure.  Will proceed with the plan of care.)  intravenous induction   Anesthetic plan, all risks, benefits, and alternatives have been provided, discussed and informed consent has been obtained with: patient.

## 2019-01-15 NOTE — ANESTHESIA POSTPROCEDURE EVALUATION
Patient: Carolyne Martins    Procedure Summary     Date:  01/15/19 Room / Location:  [facility] ENDOSCOPY 2 / [facility] ENDOSCOPY    Anesthesia Start:  0817 Anesthesia Stop:  0853    Procedure:  ESOPHAGOGASTRODUODENOSCOPY WITH BIOPSIES AND HOT SNARE POLYPECTOMIES X10 (N/A Esophagus) Diagnosis:       Schatzki's ring      Gastritis      Gastric polyps      Hiatal hernia      (Epigastric pain [R10.13])      (Anemia [D64.9])      (Weight loss [R63.4])    Surgeon:  Pieter Concepcion MD Provider:  Eliud Duque CRNA    Anesthesia Type:  MAC ASA Status:  3          Anesthesia Type: MAC  Last vitals  BP   127/61 (01/15/19 0918)   Temp   97.6 °F (36.4 °C) (01/15/19 0854)   Pulse   68 (01/15/19 0918)   Resp   18 (01/15/19 0918)     SpO2   98 % (01/15/19 0918)     Post Anesthesia Care and Evaluation    Patient location during evaluation: bedside  Patient participation: complete - patient participated  Level of consciousness: awake and sleepy but conscious  Pain management: adequate  Airway patency: patent  Anesthetic complications: No anesthetic complications  PONV Status: none  Cardiovascular status: acceptable  Respiratory status: acceptable, spontaneous ventilation and nasal cannula  Hydration status: acceptable

## 2019-01-15 NOTE — INTERVAL H&P NOTE
"  H&P reviewed. The patient was examined and there are no changes to the H&P.       No recent shortness of breath or chest pains.      Blood pressure 180/84, pulse 68, temperature 97.5 °F (36.4 °C), temperature source Temporal, resp. rate 18, height 149.9 cm (59\"), weight 49.9 kg (110 lb), SpO2 99 %, not currently breastfeeding.    Chest: clear to auscultation.  Cardiac exam: No S3 no murmurs.   Abdomen: soft bowel sounds present nondistended nontender.        "

## 2019-01-15 NOTE — DISCHARGE INSTRUCTIONS
Rest today  No pushing,pulling,tugging,heavy lifting, or strenuous activity   No major decision making,driving,or drinking alcoholic beverages for 24 hours due to the sedation you received  Always use good hand hygiene/washing technique  No driving on pain medication.      To assist you in voiding:  Drink plenty of fluids  Listen to running water while attempting to void.    If you are unable to urinate and you have an uncomfortable urge to void or it has been   6 hours since you were discharged, return to the Emergency Room.        ************************************************************************************      Postprocedure instructions.  1. Nothing by mouth for 30 min.  2. Clear liquids diet (No Sodas) for 1 hours.  3. May advance to soft low-fat diet  in 2 hours       Diet otherwise:    1. Low fat diet.    2. Avoid soda beverages, excessive use of coffee and tea.   3. Avoid smoking and alcohol.      Other Instructions:  Call [facility] at [phone] or come to the Emergency Department if you experience the following: Chest pain, abdominal pain, bleeding (vomiting of blood or coffee colored material, black stools or trinh blood in stools),   fever/chills, nausea and vomiting or dizziness.    Follow-up:    Dr. Concepcion in 3-4 weeks.   Office phone #[phone].   If you already have an appointment just keep that appointment.    ************************************************************************************    Notes to the patient and the family from Dr. Concepcion.    Dear patient/family member,    Findings on today's procedure are as follows:  1. Schatzki's ring.  2. Inflammation of the stomach. Gastritis.    3. Small sliding hiatal hernia.    4. No cancer. No active ulcers.  5. Stomach polyps. 10 were removed.    Recommendations:    Prilosec(Omeprazole) DR capsule 40 mg. Take one capsule in the morning half an hour before eating daily.  Other instructions as above.      Should you have  more questions please do not hesitate to talk to the nurse who can call me and let me talk to you.      I hope you feel better.    Pieter Concepcion M.D., FACP, FACG.

## 2019-01-15 NOTE — OP NOTE
PROCEDURE:  Upper Endoscopy with 10 hot snare polypectomies and biopsies.    DATE OF PROCEDURE: January 15, 2019.    REFERRING PROVIDER:  Jerald Dawkins MD.     INSTRUMENT:    Olympus GIF H180 J video endoscope.  Olympus GIF H 190 video endoscope     INDICATIONS OF THE PROCEDURE:         BIOPSIES: Second portion of duodenum.  Gastric antrum, body and fundus.       MEDICATIONS:  MAC.     PHOTOGRAPHS:  Photographs were included in the medical records.     CONSENT/PREPROCEDURE EVALUATION:  Risks, benefits, alternatives and options of the procedure including risks of anesthesia/sedation were discussed and informed consent was obtained prior to the procedure. History and physical examination were performed and nothing precluded the test.     REPORT:  The patient was placed in left lateral decubitus position. Once under the influence of IV sedation, the instrument was inserted into the mouth and esophagus was intubated under direct vision without difficulty.     Esophagus:  Z line was noted to be around 36  cm.    A small sliding hiatal hernia less than 3 cm was noted.  No Sanders's esophagus was seen. A nonobstructive Schatzki's-type ring was seen.     Stomach:  Antrum:  Erythematous gastritis.  Angulus, lesser and greater curves: Normal.  Retroflex examination: Sliding hiatal hernia.  Cardia and fundus:  2-4 mm sessile polyps were seen. 2 were removed with hot snare.      Body of the stomach: Erythematous gastritis.  Good distensibility of the stomach was achieved no giant folds were noted.   Biopsies were obtained from the gastric antrum,  body and fundus. 2-4 mm sessile polyps were noted at the body of the stomach. 2 were removed with hot snare.     Pylorus and pyloric channel:  normal.     Duodenum:  Bulb: Normal.  Second portion: normal.  No scalloping was seen in the second portion of duodenum.  Biopsies were obtained from the second portion of duodenum.    Intervention:  None.       The upper GI tract was  decompressed and the scope was pulled out of the patient. The patient tolerated the procedure well.     DIAGNOSES:     1. Nonobstructive Schatzki's-type ring.   2. Small sliding hiatal hernia less than 3 cm.   3. Gastric polyps.   4. Erythematous gastritis.  5. No Sanders's.  6. Good distensibility of the stomach was achieved. No giant folds were noted.  7. This test does not explain the cause of anemia.      RECOMMENDATIONS:  1.  Dietary instructions.  2.  Omeprazole 40 mg 1 p.o. q.a.m. 1/2 hour before breakfast.  3.  Follow biopsies.  4.  Follow up in office.     Thank you very much for letting me participate in the care of this patient. Please do not hesitate to call me if you have any questions.

## 2019-01-17 ENCOUNTER — TELEPHONE (OUTPATIENT)
Dept: GASTROENTEROLOGY | Facility: CLINIC | Age: 79
End: 2019-01-17

## 2019-01-17 NOTE — TELEPHONE ENCOUNTER
Tell the patient to monitor bowel movements. Eat high fiber diet and drink liberal amounts of water. If she has black, tarry stools or is having abdominal pain, fevers, or vomiting of blood, she needs to go to the emergency room for evaluation.

## 2019-01-17 NOTE — TELEPHONE ENCOUNTER
Pt had EGD on 1-15, had 1st bm today and says it was really dark and somewhat loose, should she be concerned

## 2019-01-18 LAB
LAB AP CASE REPORT: NORMAL
PATH REPORT.FINAL DX SPEC: NORMAL

## 2019-01-23 ENCOUNTER — HOSPITAL ENCOUNTER (EMERGENCY)
Facility: HOSPITAL | Age: 79
Discharge: HOME | End: 2019-01-23
Attending: EMERGENCY MEDICINE | Admitting: EMERGENCY MEDICINE

## 2019-01-23 ENCOUNTER — TELEPHONE (OUTPATIENT)
Dept: GASTROENTEROLOGY | Facility: CLINIC | Age: 79
End: 2019-01-23

## 2019-01-23 ENCOUNTER — APPOINTMENT (OUTPATIENT)
Dept: GENERAL RADIOLOGY | Facility: HOSPITAL | Age: 79
End: 2019-01-23

## 2019-01-23 VITALS
SYSTOLIC BLOOD PRESSURE: 148 MMHG | HEART RATE: 72 BPM | DIASTOLIC BLOOD PRESSURE: 76 MMHG | RESPIRATION RATE: 19 BRPM | HEIGHT: 59 IN | TEMPERATURE: 98 F | OXYGEN SATURATION: 99 % | BODY MASS INDEX: 21 KG/M2 | WEIGHT: 104.2 LBS

## 2019-01-23 DIAGNOSIS — K92.1 BLOOD IN STOOL: Primary | ICD-10-CM

## 2019-01-23 LAB
ALBUMIN SERPL-MCNC: 4 G/DL (ref 3.5–5)
ALBUMIN/GLOB SERPL: 1.6 G/DL (ref 1–2)
ALP SERPL-CCNC: 87 U/L (ref 38–126)
ALT SERPL W P-5'-P-CCNC: 18 U/L (ref 13–69)
ANION GAP SERPL CALCULATED.3IONS-SCNC: 11.9 MMOL/L (ref 10–20)
AST SERPL-CCNC: 31 U/L (ref 15–46)
BASOPHILS # BLD AUTO: 0.01 10*3/MM3 (ref 0–0.2)
BASOPHILS NFR BLD AUTO: 0.3 % (ref 0–2.5)
BILIRUB SERPL-MCNC: 0.8 MG/DL (ref 0.2–1.3)
BUN BLD-MCNC: 46 MG/DL (ref 7–20)
BUN/CREAT SERPL: 20.9 (ref 7.1–23.5)
CALCIUM SPEC-SCNC: 8 MG/DL (ref 8.4–10.2)
CHLORIDE SERPL-SCNC: 105 MMOL/L (ref 98–107)
CO2 SERPL-SCNC: 23 MMOL/L (ref 26–30)
CREAT BLD-MCNC: 2.2 MG/DL (ref 0.6–1.3)
DEPRECATED RDW RBC AUTO: 63 FL (ref 37–54)
EOSINOPHIL # BLD AUTO: 0.11 10*3/MM3 (ref 0–0.7)
EOSINOPHIL NFR BLD AUTO: 3.1 % (ref 0–7)
ERYTHROCYTE [DISTWIDTH] IN BLOOD BY AUTOMATED COUNT: 18.9 % (ref 11.5–14.5)
GFR SERPL CREATININE-BSD FRML MDRD: 22 ML/MIN/1.73
GLOBULIN UR ELPH-MCNC: 2.5 GM/DL
GLUCOSE BLD-MCNC: 84 MG/DL (ref 74–98)
HCT VFR BLD AUTO: 27.9 % (ref 37–47)
HEMOCCULT STL QL: NEGATIVE
HGB BLD-MCNC: 8.8 G/DL (ref 12–16)
IMM GRANULOCYTES # BLD AUTO: 0.01 10*3/MM3 (ref 0–0.06)
IMM GRANULOCYTES NFR BLD AUTO: 0.3 % (ref 0–0.6)
INR PPP: 1.07 (ref 0.9–1.1)
LIPASE SERPL-CCNC: 196 U/L (ref 23–300)
LYMPHOCYTES # BLD AUTO: 1.05 10*3/MM3 (ref 0.6–3.4)
LYMPHOCYTES NFR BLD AUTO: 29.8 % (ref 10–50)
MCH RBC QN AUTO: 28.9 PG (ref 27–31)
MCHC RBC AUTO-ENTMCNC: 31.5 G/DL (ref 30–37)
MCV RBC AUTO: 91.5 FL (ref 81–99)
MONOCYTES # BLD AUTO: 0.49 10*3/MM3 (ref 0–0.9)
MONOCYTES NFR BLD AUTO: 13.9 % (ref 0–12)
NEUTROPHILS # BLD AUTO: 1.85 10*3/MM3 (ref 2–6.9)
NEUTROPHILS NFR BLD AUTO: 52.6 % (ref 37–80)
NRBC BLD AUTO-RTO: 0 /100 WBC (ref 0–0)
PLATELET # BLD AUTO: 201 10*3/MM3 (ref 130–400)
PMV BLD AUTO: 9.6 FL (ref 6–12)
POTASSIUM BLD-SCNC: 4.9 MMOL/L (ref 3.5–5.1)
PROT SERPL-MCNC: 6.5 G/DL (ref 6.3–8.2)
PROTHROMBIN TIME: 11.9 SECONDS (ref 9.3–12.1)
RBC # BLD AUTO: 3.05 10*6/MM3 (ref 4.2–5.4)
SODIUM BLD-SCNC: 135 MMOL/L (ref 137–145)
WBC NRBC COR # BLD: 3.52 10*3/MM3 (ref 4.8–10.8)

## 2019-01-23 PROCEDURE — 82272 OCCULT BLD FECES 1-3 TESTS: CPT | Performed by: PHYSICIAN ASSISTANT

## 2019-01-23 PROCEDURE — 85610 PROTHROMBIN TIME: CPT | Performed by: PHYSICIAN ASSISTANT

## 2019-01-23 PROCEDURE — 99283 EMERGENCY DEPT VISIT LOW MDM: CPT

## 2019-01-23 PROCEDURE — 85025 COMPLETE CBC W/AUTO DIFF WBC: CPT | Performed by: PHYSICIAN ASSISTANT

## 2019-01-23 PROCEDURE — 71046 X-RAY EXAM CHEST 2 VIEWS: CPT

## 2019-01-23 PROCEDURE — 80053 COMPREHEN METABOLIC PANEL: CPT | Performed by: PHYSICIAN ASSISTANT

## 2019-01-23 PROCEDURE — 74018 RADEX ABDOMEN 1 VIEW: CPT

## 2019-01-23 PROCEDURE — 83690 ASSAY OF LIPASE: CPT | Performed by: PHYSICIAN ASSISTANT

## 2019-01-23 RX ORDER — SODIUM CHLORIDE 0.9 % (FLUSH) 0.9 %
10 SYRINGE (ML) INJECTION AS NEEDED
Status: DISCONTINUED | OUTPATIENT
Start: 2019-01-23 | End: 2019-01-23 | Stop reason: HOSPADM

## 2019-01-23 NOTE — TELEPHONE ENCOUNTER
Patient is S/P EGD 1/15/19.  Last pm had vomiting, diarrhea, and Rectal bleeding.  Wants to know if she should go to the ER.  I told her that is what we would advise.  H/O hgb 8.9 on January 7th.  With her bleeding and symptoms, it would be best to be evaluated at ER.

## 2019-01-23 NOTE — ED PROVIDER NOTES
"Subjective   This patient states she has a history of gastritis and Dr. Concepcion performed an upper endoscopy about a days ago and removed 10 polyps which were found to be benign.  She states last night she had one episode of diarrhea that had dark red blood in it and one episode of vomiting.  She also states this morning she had another soft stool with dark red blood.  She says she has mild epigastric abdominal pain, no rectal pain.  She called Dr. Simental's office to make them aware of her symptoms and instructed her to come to the ER for \"scans.\"            Review of Systems   Gastrointestinal: Positive for abdominal pain, blood in stool, diarrhea and vomiting.   All other systems reviewed and are negative.      Past Medical History:   Diagnosis Date   • Anemia 1998   • Arthritis    • Bleeding from anus    • Bronchitis     STATES HAS BRONCHITIS CURRENTLY (12/5/17).     • Bronchitis 12/2017   • CAD (coronary artery disease)    • Cataract, bilateral    • Chronic kidney disease     STAGE 4.  SEES TERA   • Chronic kidney failure     REPORTS STAGE IV   • Difficulty swallowing solids    • Disease of thyroid gland    • Fracture, radius 2016    right distal radius and ulna, healed.   • GERD (gastroesophageal reflux disease)    • Gout    • High cholesterol    • History of nuclear stress test 2014    DR. CORTEZ.  \"IT WAS OK\"   • Hyperparathyroidism (CMS/HCC)    • Hyperparathyroidism (CMS/HCC)    • Hypertension    • Iron deficiency    • Osteoarthritis    • Osteoarthritis of knees, bilateral     Both knees Supartz injection series August, 2015   • Osteoporosis    • Personal history of cardiac murmur    • Presence of pessary    • Problems with swallowing     WITH FOOD OCCASSIONALLY   • Rheumatic heart disease     Personal history   • Rotator cuff tendonitis     right    • Rupture of right proximal biceps tendon     chronic   • Seasonal allergies    • Spinal headache     REPORTS AFTER DELIVERY OF SON   • Subacromial bursitis     " right    • Valvular heart disease    • Vitamin B12 deficiency    • Vitamin D deficiency    • Wears glasses        Allergies   Allergen Reactions   • Ferrlecit [Na Ferric Gluc Cplx In Sucrose] Swelling   • Ranitidine Hcl Shortness Of Breath   • Levofloxacin Other (See Comments)     Leg cramps     • Lisinopril Cough       Past Surgical History:   Procedure Laterality Date   • CATARACT EXTRACTION Bilateral    • COLONOSCOPY  2011   • COLONOSCOPY N/A 1/9/2018    Procedure: COLONOSCOPY with endoscopic mucosal resection (hot snare), normal saline submucosal injection, argon thermal ablation, resolution clip placement x4, and cold biopsy polypectomy;  Surgeon: Pieter Concepcion MD;  Location: [facility] ENDOSCOPY;  Service:    • ENDOSCOPY N/A 12/6/2017    Procedure: ESOPHAGOGASTRODUODENOSCOPY with biopsies and hot snare polypectomies;  Surgeon: Pieter Concepcion MD;  Location: [facility] ENDOSCOPY;  Service:    • ENDOSCOPY N/A 1/15/2019    Procedure: ESOPHAGOGASTRODUODENOSCOPY WITH BIOPSIES AND HOT SNARE POLYPECTOMIES X10;  Surgeon: Pieter Concepcion MD;  Location: [facility] ENDOSCOPY;  Service: Gastroenterology   • UPPER GASTROINTESTINAL ENDOSCOPY  08/18/2014       Family History   Problem Relation Age of Onset   • Liver cancer Maternal Grandmother    • Gout Other    • Osteoporosis Other    • Stroke Other    • Ulcers Other    • Asthma Other    • Hypertension Other    • Heart disease Other    • Osteoarthritis Other    • Colon cancer Neg Hx        Social History     Socioeconomic History   • Marital status:      Spouse name: Not on file   • Number of children: Not on file   • Years of education: Not on file   • Highest education level: Not on file   Tobacco Use   • Smoking status: Never Smoker   • Smokeless tobacco: Never Used   Substance and Sexual Activity   • Alcohol use: No   • Drug use: No   • Sexual activity: Not Currently           Objective   Physical Exam   Constitutional: She is oriented to person, place, and time. She appears  well-developed and well-nourished.   HENT:   Head: Normocephalic and atraumatic.   Neck: Normal range of motion.   Cardiovascular: Normal rate and regular rhythm.   Pulmonary/Chest: Effort normal.   Abdominal: Soft. Bowel sounds are normal. There is tenderness in the epigastric area. There is no rigidity, no rebound, no guarding and no tenderness at McBurney's point.   Genitourinary: Rectal exam shows external hemorrhoid. Rectal exam shows no internal hemorrhoid, no fissure, no mass and anal tone normal.   Musculoskeletal: Normal range of motion.   Neurological: She is alert and oriented to person, place, and time.   Skin: Skin is warm and dry. Capillary refill takes less than 2 seconds.   Psychiatric: She has a normal mood and affect. Her behavior is normal. Judgment and thought content normal.       Procedures           ED Course        5:37 PM,review of plain films with Dr. Mckenzie reveals no free air or acute abnormality. Pt with vital signs that are WNL and overall well appearing. Has fu with Dr. Concepcion on 2/11 and will keep that appt and return to the ED for worsening sxs between now and then.          [facility]      Final diagnoses:   Blood in stool            Camron Reis PA-C  01/23/19 [id number]

## 2019-01-28 ENCOUNTER — HOSPITAL ENCOUNTER (OUTPATIENT)
Dept: INFUSION THERAPY | Facility: HOSPITAL | Age: 79
Discharge: HOME | End: 2019-01-28
Admitting: INTERNAL MEDICINE

## 2019-01-28 VITALS
SYSTOLIC BLOOD PRESSURE: 148 MMHG | WEIGHT: 104 LBS | DIASTOLIC BLOOD PRESSURE: 67 MMHG | BODY MASS INDEX: 20.96 KG/M2 | HEIGHT: 59 IN | HEART RATE: 63 BPM | TEMPERATURE: 97.5 F | RESPIRATION RATE: 20 BRPM | OXYGEN SATURATION: 100 %

## 2019-01-28 DIAGNOSIS — N18.4 ANEMIA DUE TO STAGE 4 CHRONIC KIDNEY DISEASE TREATED WITH ERYTHROPOIETIN (HCC): ICD-10-CM

## 2019-01-28 DIAGNOSIS — D63.1 ANEMIA DUE TO STAGE 4 CHRONIC KIDNEY DISEASE TREATED WITH ERYTHROPOIETIN (HCC): ICD-10-CM

## 2019-01-28 DIAGNOSIS — D64.9 ANEMIA, UNSPECIFIED TYPE: Primary | ICD-10-CM

## 2019-01-28 LAB
ALBUMIN SERPL-MCNC: 4 G/DL (ref 3.5–5)
ANION GAP SERPL CALCULATED.3IONS-SCNC: 11.4 MMOL/L (ref 10–20)
BASOPHILS # BLD AUTO: 0.02 10*3/MM3 (ref 0–0.2)
BASOPHILS NFR BLD AUTO: 0.6 % (ref 0–2.5)
BUN BLD-MCNC: 34 MG/DL (ref 7–20)
BUN/CREAT SERPL: 15.5 (ref 7.1–23.5)
CALCIUM SPEC-SCNC: 8.8 MG/DL (ref 8.4–10.2)
CHLORIDE SERPL-SCNC: 108 MMOL/L (ref 98–107)
CO2 SERPL-SCNC: 22 MMOL/L (ref 26–30)
CREAT BLD-MCNC: 2.2 MG/DL (ref 0.6–1.3)
DEPRECATED RDW RBC AUTO: 63.6 FL (ref 37–54)
EOSINOPHIL # BLD AUTO: 0.11 10*3/MM3 (ref 0–0.7)
EOSINOPHIL NFR BLD AUTO: 3.1 % (ref 0–7)
ERYTHROCYTE [DISTWIDTH] IN BLOOD BY AUTOMATED COUNT: 18.8 % (ref 11.5–14.5)
FERRITIN SERPL-MCNC: 13.4 NG/ML (ref 11.1–264)
GFR SERPL CREATININE-BSD FRML MDRD: 22 ML/MIN/1.73
GLUCOSE BLD-MCNC: 90 MG/DL (ref 74–98)
HCT VFR BLD AUTO: 30.1 % (ref 37–47)
HGB BLD-MCNC: 9.3 G/DL (ref 12–16)
IMM GRANULOCYTES # BLD AUTO: 0.02 10*3/MM3 (ref 0–0.06)
IMM GRANULOCYTES NFR BLD AUTO: 0.6 % (ref 0–0.6)
IRON 24H UR-MRATE: 148 MCG/DL (ref 37–181)
IRON SATN MFR SERPL: 38 % (ref 11–46)
LYMPHOCYTES # BLD AUTO: 0.93 10*3/MM3 (ref 0.6–3.4)
LYMPHOCYTES NFR BLD AUTO: 26.1 % (ref 10–50)
MCH RBC QN AUTO: 28.6 PG (ref 27–31)
MCHC RBC AUTO-ENTMCNC: 30.9 G/DL (ref 30–37)
MCV RBC AUTO: 92.6 FL (ref 81–99)
MONOCYTES # BLD AUTO: 0.42 10*3/MM3 (ref 0–0.9)
MONOCYTES NFR BLD AUTO: 11.8 % (ref 0–12)
NEUTROPHILS # BLD AUTO: 2.06 10*3/MM3 (ref 2–6.9)
NEUTROPHILS NFR BLD AUTO: 57.8 % (ref 37–80)
NRBC BLD AUTO-RTO: 0 /100 WBC (ref 0–0)
PHOSPHATE SERPL-MCNC: 3.5 MG/DL (ref 2.5–4.5)
PLATELET # BLD AUTO: 203 10*3/MM3 (ref 130–400)
PMV BLD AUTO: 9.6 FL (ref 6–12)
POTASSIUM BLD-SCNC: 4.4 MMOL/L (ref 3.5–5.1)
RBC # BLD AUTO: 3.25 10*6/MM3 (ref 4.2–5.4)
SODIUM BLD-SCNC: 137 MMOL/L (ref 137–145)
TIBC SERPL-MCNC: 387 MCG/DL (ref 261–497)
URATE SERPL-MCNC: 6.6 MG/DL (ref 2.5–8.5)
WBC NRBC COR # BLD: 3.56 10*3/MM3 (ref 4.8–10.8)

## 2019-01-28 PROCEDURE — 83550 IRON BINDING TEST: CPT | Performed by: INTERNAL MEDICINE

## 2019-01-28 PROCEDURE — 84550 ASSAY OF BLOOD/URIC ACID: CPT | Performed by: INTERNAL MEDICINE

## 2019-01-28 PROCEDURE — 80069 RENAL FUNCTION PANEL: CPT | Performed by: INTERNAL MEDICINE

## 2019-01-28 PROCEDURE — 36415 COLL VENOUS BLD VENIPUNCTURE: CPT

## 2019-01-28 PROCEDURE — 82728 ASSAY OF FERRITIN: CPT | Performed by: INTERNAL MEDICINE

## 2019-01-28 PROCEDURE — 96372 THER/PROPH/DIAG INJ SC/IM: CPT

## 2019-01-28 PROCEDURE — 85025 COMPLETE CBC W/AUTO DIFF WBC: CPT | Performed by: INTERNAL MEDICINE

## 2019-01-28 PROCEDURE — 83540 ASSAY OF IRON: CPT | Performed by: INTERNAL MEDICINE

## 2019-01-28 PROCEDURE — 25010000002 EPOETIN ALFA PER 1000 UNITS: Performed by: INTERNAL MEDICINE

## 2019-01-28 RX ADMIN — ERYTHROPOIETIN 40000 UNITS: 40000 INJECTION, SOLUTION INTRAVENOUS; SUBCUTANEOUS at 11:47

## 2019-02-14 ENCOUNTER — OFFICE VISIT (OUTPATIENT)
Dept: GASTROENTEROLOGY | Facility: CLINIC | Age: 79
End: 2019-02-14

## 2019-02-14 VITALS
RESPIRATION RATE: 18 BRPM | TEMPERATURE: 97.4 F | DIASTOLIC BLOOD PRESSURE: 72 MMHG | SYSTOLIC BLOOD PRESSURE: 146 MMHG | WEIGHT: 111 LBS | HEART RATE: 78 BPM | BODY MASS INDEX: 22.38 KG/M2 | HEIGHT: 59 IN

## 2019-02-14 DIAGNOSIS — K31.7 GASTRIC POLYP: ICD-10-CM

## 2019-02-14 DIAGNOSIS — K59.00 CONSTIPATION, UNSPECIFIED CONSTIPATION TYPE: ICD-10-CM

## 2019-02-14 DIAGNOSIS — K62.5 BRBPR (BRIGHT RED BLOOD PER RECTUM): ICD-10-CM

## 2019-02-14 DIAGNOSIS — D64.9 ANEMIA, UNSPECIFIED TYPE: ICD-10-CM

## 2019-02-14 DIAGNOSIS — R12 HEARTBURN: ICD-10-CM

## 2019-02-14 DIAGNOSIS — D12.3 ADENOMATOUS POLYP OF TRANSVERSE COLON: ICD-10-CM

## 2019-02-14 DIAGNOSIS — R10.13 EPIGASTRIC PAIN: Primary | ICD-10-CM

## 2019-02-14 PROCEDURE — 99214 OFFICE O/P EST MOD 30 MIN: CPT | Performed by: INTERNAL MEDICINE

## 2019-02-14 NOTE — PROGRESS NOTES
Chief Complaint   Patient presents with   • Abdominal Pain     History of Present Illness     The patient has history of epigastric abdominal pain off and on for the last 4 months. The pain had been described as mild in severity and aching in character. Frequency was 2-3 times a week. The pain used to last for several minutes. The patient denied radiation of abdominal pain. Eating will worsen the abdominal pain. The patient had some nausea described as mild off and on. The patient had occasional emesis. She had undergone an upper endoscopy on January 15, 2019 and was found to have nonobstructive Schatzki's ring, small sliding hiatal hernia less than 3 cm, gastric polyps, Erythematous gastritis. The patient has history of anemia. Upper endoscopy did not reveal the cause of anemia. Multiple polyps were removed. On January 23, 2019 the patient went to [facility] emergency room with nausea vomiting and diarrhea She had one episode of small amounts of reddish blood associated with loose diarrheal stools. Her diarrhea lasted for 1 day. The patient denies further diarrhea. There is no further bright red blood per rectum.    Since then the patient denies further epigastric abdominal pain. She denies nausea or vomiting. Her reflux symptoms are under control. She denies dysphagia. There is no odynophagia. The patient denies hematemesis, melena or hematochezia. She is eating better. The patient has regained 7 pounds. The patient has some constipation off and on. There is no dizziness.     Review of Systems   Constitutional: Negative for appetite change, chills, fatigue, fever and unexpected weight change.   HENT: Negative for mouth sores, nosebleeds and trouble swallowing.    Eyes: Negative for discharge and redness.   Respiratory: Negative for apnea, cough and shortness of breath.    Cardiovascular: Negative for chest pain, palpitations and leg swelling.   Gastrointestinal: Positive for abdominal pain and  "constipation. Negative for abdominal distention, anal bleeding, blood in stool, diarrhea, nausea and vomiting.   Endocrine: Negative for cold intolerance, heat intolerance and polydipsia.   Genitourinary: Negative for dysuria, hematuria and urgency.   Musculoskeletal: Positive for arthralgias. Negative for joint swelling and myalgias.   Skin: Negative for rash.   Allergic/Immunologic: Negative for food allergies and immunocompromised state.   Neurological: Negative for dizziness, seizures, syncope and headaches.   Hematological: Negative for adenopathy. Does not bruise/bleed easily.   Psychiatric/Behavioral: Negative for dysphoric mood. The patient is not nervous/anxious and is not hyperactive.      Patient Active Problem List   Diagnosis   • Arthralgia of shoulder region   • Tendinopathy of rotator cuff   • Osteoarthritis of right acromioclavicular joint   • Anemia due to stage 4 chronic kidney disease treated with erythropoietin (CMS/HCC)   • Anemia   • Gastric polyp   • Heartburn   • Melena   • Constipation   • Change in bowel habits   • Colon cancer screening   • Epigastric pain   • Weight loss   • Senile osteoporosis     Past Medical History:   Diagnosis Date   • Anemia 1998   • Arthritis    • Bleeding from anus    • Bronchitis     STATES HAS BRONCHITIS CURRENTLY (12/5/17).     • Bronchitis 12/2017   • CAD (coronary artery disease)    • Cataract, bilateral    • Chronic kidney disease     STAGE 4.  SEES TERA   • Chronic kidney failure     REPORTS STAGE IV   • Difficulty swallowing solids    • Disease of thyroid gland    • Fracture, radius 2016    right distal radius and ulna, healed.   • GERD (gastroesophageal reflux disease)    • Gout    • High cholesterol    • History of nuclear stress test 2014    DR. CORTEZ.  \"IT WAS OK\"   • Hyperparathyroidism (CMS/HCC)    • Hyperparathyroidism (CMS/HCC)    • Hypertension    • Iron deficiency    • Osteoarthritis    • Osteoarthritis of knees, bilateral     Both knees " Supartz injection series August, 2015   • Osteoporosis    • Personal history of cardiac murmur    • Presence of pessary    • Problems with swallowing     WITH FOOD OCCASSIONALLY   • Rheumatic heart disease     Personal history   • Rotator cuff tendonitis     right    • Rupture of right proximal biceps tendon     chronic   • Seasonal allergies    • Spinal headache     REPORTS AFTER DELIVERY OF SON   • Subacromial bursitis     right    • Valvular heart disease    • Vitamin B12 deficiency    • Vitamin D deficiency    • Wears glasses      Past Surgical History:   Procedure Laterality Date   • CATARACT EXTRACTION Bilateral    • CERVICAL POLYPECTOMY     • COLONOSCOPY  2011   • COLONOSCOPY N/A 1/9/2018    Procedure: COLONOSCOPY with endoscopic mucosal resection (hot snare), normal saline submucosal injection, argon thermal ablation, resolution clip placement x4, and cold biopsy polypectomy;  Surgeon: Pieter Concepcion MD;  Location: [facility] ENDOSCOPY;  Service:    • ENDOSCOPY N/A 12/6/2017    Procedure: ESOPHAGOGASTRODUODENOSCOPY with biopsies and hot snare polypectomies;  Surgeon: Pieter Concepcion MD;  Location: [facility] ENDOSCOPY;  Service:    • ENDOSCOPY N/A 1/15/2019    Procedure: ESOPHAGOGASTRODUODENOSCOPY WITH BIOPSIES AND HOT SNARE POLYPECTOMIES X10;  Surgeon: Pieter Concepcion MD;  Location: [facility] ENDOSCOPY;  Service: Gastroenterology   • UPPER GASTROINTESTINAL ENDOSCOPY  08/18/2014     Family History   Problem Relation Age of Onset   • Liver cancer Maternal Grandmother    • Gout Other    • Osteoporosis Other    • Stroke Other    • Ulcers Other    • Asthma Other    • Hypertension Other    • Heart disease Other    • Osteoarthritis Other    • Colon cancer Neg Hx      Social History     Tobacco Use   • Smoking status: Never Smoker   • Smokeless tobacco: Never Used   Substance Use Topics   • Alcohol use: No       Current Outpatient Medications:   •  allopurinol (ZYLOPRIM) 100 MG tablet, Take 1 tablet by mouth daily., Disp: ,  "Rfl:   •  aspirin 81 MG tablet, Take 81 mg by mouth Daily., Disp: , Rfl:   •  budesonide-formoterol (SYMBICORT) 80-4.5 MCG/ACT inhaler, Inhale 2 puffs 2 (Two) Times a Day., Disp: , Rfl:   •  carboxymethylcellulose (REFRESH PLUS) 0.5 % solution, 3 (Three) Times a Day As Needed for Dry Eyes., Disp: , Rfl:   •  carvedilol (COREG) 12.5 MG tablet, Take 12.5 mg by mouth Every Morning. 1 AM and 2 evening, Disp: , Rfl:   •  Cyanocobalamin (VITAMIN B-12 PO), Take 500 mg by mouth 2 (Two) Times a Day., Disp: , Rfl:   •  epoetin ovidio (PROCRIT) 22822 UNIT/ML injection, Procrit SOLN; Patient Sig: Procrit SOLN ; 0; 16-Jul-2014; every 3 weeks, Disp: , Rfl:   •  fluticasone (FLONASE) 50 MCG/ACT nasal spray, 2 sprays into each nostril Daily As Needed., Disp: , Rfl:   •  irbesartan (AVAPRO) 150 MG tablet, Take 150 mg by mouth Daily., Disp: , Rfl:   •  Loratadine (CLARITIN) 10 MG capsule, Take 10 mg by mouth Daily As Needed (RHINITIS)., Disp: , Rfl:   •  omeprazole (PriLOSEC) 40 MG capsule, Take 40 mg by mouth Daily., Disp: , Rfl:   •  rosuvastatin (CRESTOR) 10 MG tablet, Take 10 mg by mouth Daily., Disp: , Rfl:   •  torsemide (DEMADEX) 20 MG tablet, Take 10 mg by mouth. EDEMA- TAKE EVERY OTHER DAY, Disp: , Rfl:   •  cholecalciferol (VITAMIN D3) 1000 UNITS tablet, Take 1,000 Units by mouth Daily., Disp: , Rfl:     Allergies   Allergen Reactions   • Ferrlecit [Na Ferric Gluc Cplx In Sucrose] Swelling   • Ranitidine Hcl Shortness Of Breath   • Levofloxacin Other (See Comments)     Leg cramps     • Lisinopril Cough       Blood pressure 146/72, pulse 78, temperature 97.4 °F (36.3 °C), resp. rate 18, height 149.9 cm (59\"), weight 50.3 kg (111 lb), not currently breastfeeding.    Physical Exam   Constitutional: She is oriented to person, place, and time. She appears well-developed and well-nourished. No distress.   HENT:   Head: Normocephalic and atraumatic.   Right Ear: Hearing and external ear normal.   Left Ear: Hearing and external ear " normal.   Nose: Nose normal.   Mouth/Throat: Oropharynx is clear and moist and mucous membranes are normal. Mucous membranes are not pale, not dry and not cyanotic. No oral lesions. No oropharyngeal exudate.   Eyes: Conjunctivae and EOM are normal. Right eye exhibits no discharge. Left eye exhibits no discharge. No scleral icterus.   Neck: Trachea normal. Neck supple. No JVD present. No edema present. No thyroid mass and no thyromegaly present.   Cardiovascular: Normal rate, regular rhythm, S2 normal and normal heart sounds. Exam reveals no gallop, no S3 and no friction rub.   No murmur heard.  Pulmonary/Chest: Effort normal and breath sounds normal. No respiratory distress. She has no wheezes. She has no rales. She exhibits no tenderness.   Abdominal: Soft. Normal appearance and bowel sounds are normal. She exhibits no distension, no ascites and no mass. There is no splenomegaly or hepatomegaly. There is no tenderness. There is no rigidity, no rebound and no guarding. No hernia.   Musculoskeletal: She exhibits no tenderness or deformity.     Vascular Status -  Her right foot exhibits no edema. Her left foot exhibits no edema.  Lymphadenopathy:     She has no cervical adenopathy.        Left: No supraclavicular adenopathy present.   Neurological: She is alert and oriented to person, place, and time. She has normal strength. No cranial nerve deficit or sensory deficit. She exhibits normal muscle tone. Coordination normal.   Skin: No rash noted. She is not diaphoretic. No cyanosis. No pallor. Nails show no clubbing.   Psychiatric: She has a normal mood and affect. Her behavior is normal. Judgment and thought content normal.   Nursing note and vitals reviewed.  Stigmata of chronic liver disease:  None.  Asterixis:  None.    Laboratory Testing:  Upon review of medical records:    Dated 12/2/2017 glucose 90 BUN 44 creatinine 2.6 sodium 140 potassium 3.8 chloride 104 CO2 26 calcium 9.8 albumin 3.6 ALT 40 AST 34 upon  phosphatase 66 total bilirubin 0.6 WBC 5.88 hemoglobin 8.8 hematocrit 28.6 platelet count 201 MCV 96.3 lipase 219 fecal occult blood: Negative     Dated 12/4/2017 glucose 104 BUN 35 creatinine 2.6 sodium 139 potassium 4.5 chloride 102 CO2 26 calcium 10.2 albumin 3.7 phosphorus 3.3 WBC 5.01 hemoglobin 9.5 hematocrit 30.8 platelet count 217 MCV 96.3     Dated January 3, 2018 WBC 7.79 hemoglobin 9.2 hematocrit 29.9 MCV 95.8 and platelet count 226.     Dated February 4, 2018 sodium 139 potassium 4.1 chloride 103 CO2 29 BUN 47 serum creatinine 2.60 glucose 96.  Calcium 10.3.  Total protein 5.9.  Albumin 3.40.  T bili 0.3 AST 29 ALT 36 alkaline phosphatase 64.  WBC 4.28 hemoglobin 8.8 hematocrit 27.8 MCV 95.5 and platelet count 189.     Dated October 12, 2018 sodium 138 potassium 4.5 chloride 106 CO2 25 BUN 44 serum creatinine 2.20 glucose 95.  Calcium 9.8.  Albumin 4.20.  WBC 7.96 and global 9.5 hematocrit 30.9 MCV 92.8 and platelet count 193.    Dated January 28 2019 sodium 137 potassium 4.4 chloride 108 CO2 22 BUN 34 serum creatinine 2.20 glucose 90.  Calcium 8.8.  Albumin 4.00.  Phosphorus 3.5.  Serum iron 148.  TIBC 387.  Iron saturation 38%.  Ferritin 13.40.  Uric acid 6.6.  WBC 3.56 hemoglobin 9.3 hematocrit 30.1 MCV 92.6 and platelet count 203.     Abdominal Imaging:  Upon review of records:     CT of abdomen and pelvis without contrast dated 12/2/2017 reveals lung bases are clear. There is a small sliding-type hiatal hernia. The liver is normal in size and attenuation with a benign-appearing cyst. The spleen is unremarkable.  The adrenals are normal.  The aorta is normal in caliber. There is no hydronephrosis. There is no nephrolithiasis. There are bilateral simple and complex renal cysts with the largest measuring 7.2 x 6.0 cm on the right. The appendix is normal.  The urinary bladder is unremarkable. There is no free fluid or adenopathy. A pessary device is present. There are bilateral L5 pars defects with  advanced changes of osteoarthritis     Dated October 23, 2018 the patient underwent a CT of abdomen and pelvis without contrast which revealed: Bilateral renal masses are seen incompletely characterized without contrast.  Many of these do not feel criteria of simple cyst.  These are stable since prior exam.  Small low-density lesion (hepatic lobe is seen probably a cyst, unchanged.  Remaining solid organs are unremarkable.  Bowel shows no evidence of obstruction.  No free air or free fluid within the abdomen.  No bowel dilatation is seen.  No free fluid.  Appendix is normal.  Uterus is unremarkable.  A pessary is present.  Advanced sigmoid diverticulosis is noted.     Procedures:  Upon review of records:     Dated January 9, 2018 the patient underwent a  colonoscopy to the terminal ileum which revealed: Left-sided diverticulosis.  Angiodysplasia of colon without bleeding.  Status post thermal ablation therapy.  Colon polyp. Transverse colon flat lesion. Status post endoscopic mucosal resection, thermal ablation, and placement of resolution clips to coapt the margins.  Internal hemorrhoids.  Ascending colon polyp, biopsy revealed tubular adenoma without high grade dysplasia.  Transverse colon polyp, flat lesion, biopsy revealed sessile serrated adenoma fragments without cytologic dysplasia.       Dated January 15, 2019 the patient underwent an upper endoscopy which revealed: Nonobstructive Schatzki’s-type ring.  Small sliding hiatal hernia less than 3 cm.  Gastric polyps.  Erythematous gastritis.  No Sanders’s.  Good distensibility of the stomach was achieved.  No giant polyp were noted.  This test is not explain the cause of anemia.  An portion of duodenum, biopsy revealed no pathologic alterations.  Negative for celiac disease, microorganisms, metaplasia or atypia.  Antrum, body and fundus, biopsies revealed no pathologic alterations.  Negative for H. pylori, metaplasia, dysplasia or malignancy.  Gastric polyps,  body and fundus, biopsies revealed fundic gland polyps.  Negative for H. pylori, metaplasia, dysplasia or malignancy.    Assessment:      ICD-10-CM ICD-9-CM   1. Epigastric pain R10.13 789.06   2. BRBPR (bright red blood per rectum) K62.5 569.3   3. Constipation, unspecified constipation type K59.00 564.00   4. Heartburn R12 787.1   5. Anemia, unspecified type D64.9 285.9   6. Gastric polyp K31.7 211.1   7. Adenomatous polyp of transverse colon D12.3 211.3         Discussion:  1. The patient has history of colonic angiodysplasia which had been treated with thermal ablation in the past. She was also noted to have vascular gastric polyps which could have been contributing to anemia.    Plan/  Patient Instructions   High-fiber diet with liberal water intake.  Prilosec(Omeprazole) DR capsule 40 mg. Take one capsule in the morning half an hour before eating daily.  The patient may take Toth stool softener over-the-counter. Take 1 gelcap at night for constipation.  The patient has been advised to continue to watch the symptoms. Should there be a recurrence of symptoms the patient has been advised to call back. She should also continue to watch her weight.  Follow-up colonoscopy is recommended in 3 years. January 2021.             Pieter Concepcion MD

## 2019-02-16 NOTE — PATIENT INSTRUCTIONS
High-fiber diet with liberal water intake.  Prilosec(Omeprazole) DR capsule 40 mg. Take one capsule in the morning half an hour before eating daily.  The patient may take Toth stool softener over-the-counter. Take 1 gelcap at night for constipation.  The patient has been advised to continue to watch the symptoms. Should there be a recurrence of symptoms the patient has been advised to call back. She should also continue to watch her weight.  Follow-up colonoscopy is recommended in 3 years. January 2021.

## 2019-02-18 ENCOUNTER — OFFICE VISIT (OUTPATIENT)
Dept: OBSTETRICS AND GYNECOLOGY | Facility: CLINIC | Age: 79
End: 2019-02-18

## 2019-02-18 ENCOUNTER — HOSPITAL ENCOUNTER (OUTPATIENT)
Dept: INFUSION THERAPY | Facility: HOSPITAL | Age: 79
Discharge: HOME | End: 2019-02-18
Admitting: INTERNAL MEDICINE

## 2019-02-18 VITALS
SYSTOLIC BLOOD PRESSURE: 150 MMHG | DIASTOLIC BLOOD PRESSURE: 78 MMHG | HEIGHT: 59 IN | WEIGHT: 108.4 LBS | BODY MASS INDEX: 21.85 KG/M2

## 2019-02-18 VITALS
HEART RATE: 66 BPM | BODY MASS INDEX: 21.97 KG/M2 | DIASTOLIC BLOOD PRESSURE: 66 MMHG | WEIGHT: 109 LBS | SYSTOLIC BLOOD PRESSURE: 126 MMHG | HEIGHT: 59 IN

## 2019-02-18 DIAGNOSIS — D63.1 ANEMIA DUE TO STAGE 4 CHRONIC KIDNEY DISEASE TREATED WITH ERYTHROPOIETIN (HCC): Primary | ICD-10-CM

## 2019-02-18 DIAGNOSIS — Z46.89 PESSARY MAINTENANCE: Primary | ICD-10-CM

## 2019-02-18 DIAGNOSIS — R30.0 BURNING WITH URINATION: ICD-10-CM

## 2019-02-18 DIAGNOSIS — N18.4 ANEMIA DUE TO STAGE 4 CHRONIC KIDNEY DISEASE TREATED WITH ERYTHROPOIETIN (HCC): Primary | ICD-10-CM

## 2019-02-18 DIAGNOSIS — N81.11 CYSTOCELE, MIDLINE: ICD-10-CM

## 2019-02-18 LAB
BILIRUB BLD-MCNC: NEGATIVE MG/DL
CLARITY, POC: ABNORMAL
COLOR UR: YELLOW
DEPRECATED RDW RBC AUTO: 62.4 FL (ref 37–54)
ERYTHROCYTE [DISTWIDTH] IN BLOOD BY AUTOMATED COUNT: 18.6 % (ref 11.5–14.5)
GLUCOSE UR STRIP-MCNC: NEGATIVE MG/DL
HCT VFR BLD AUTO: 30 % (ref 37–47)
HGB BLD-MCNC: 9.2 G/DL (ref 12–16)
KETONES UR QL: NEGATIVE
LEUKOCYTE EST, POC: ABNORMAL
MCH RBC QN AUTO: 28.3 PG (ref 27–31)
MCHC RBC AUTO-ENTMCNC: 30.7 G/DL (ref 30–37)
MCV RBC AUTO: 92.3 FL (ref 81–99)
NITRITE UR-MCNC: NEGATIVE MG/ML
PH UR: 5.5 [PH] (ref 5–8)
PLATELET # BLD AUTO: 182 10*3/MM3 (ref 130–400)
PMV BLD AUTO: 9.4 FL (ref 6–12)
PROT UR STRIP-MCNC: ABNORMAL MG/DL
RBC # BLD AUTO: 3.25 10*6/MM3 (ref 4.2–5.4)
RBC # UR STRIP: NEGATIVE /UL
SP GR UR: 1.02 (ref 1–1.03)
UROBILINOGEN UR QL: NORMAL
WBC NRBC COR # BLD: 3.97 10*3/MM3 (ref 4.8–10.8)

## 2019-02-18 PROCEDURE — 25010000002 EPOETIN ALFA PER 1000 UNITS: Performed by: INTERNAL MEDICINE

## 2019-02-18 PROCEDURE — 99213 OFFICE O/P EST LOW 20 MIN: CPT | Performed by: PHYSICIAN ASSISTANT

## 2019-02-18 PROCEDURE — 85027 COMPLETE CBC AUTOMATED: CPT | Performed by: INTERNAL MEDICINE

## 2019-02-18 PROCEDURE — 96372 THER/PROPH/DIAG INJ SC/IM: CPT

## 2019-02-18 PROCEDURE — 36415 COLL VENOUS BLD VENIPUNCTURE: CPT

## 2019-02-18 RX ORDER — CEPHALEXIN 500 MG/1
500 CAPSULE ORAL 2 TIMES DAILY
Qty: 14 CAPSULE | Refills: 0 | Status: SHIPPED | OUTPATIENT
Start: 2019-02-18 | End: 2019-02-25

## 2019-02-18 RX ADMIN — ERYTHROPOIETIN 40000 UNITS: 40000 INJECTION, SOLUTION INTRAVENOUS; SUBCUTANEOUS at 11:41

## 2019-02-18 NOTE — PROGRESS NOTES
I have reviewed the notes, assessments, and/or procedures performed by   Sharron Woods, I concur with her/his documentation of Carolyne Martins.

## 2019-02-18 NOTE — PATIENT INSTRUCTIONS
will send clean catch urine for culture  Will start keflex today with complaint of dysuria  RTO 3 months for pessary check or prn

## 2019-02-18 NOTE — PROGRESS NOTES
"Subjective   Chief Complaint   Patient presents with   • Pessary Check     C/O burning with urination       Carolyne Martins is a 78 y.o. year old  presenting to be seen for routine pessary maintenance  She reports having dysuria for 3-4 days but has been voiding ok. No hematuria  Has had no vaginal bleeding  Has had a little constipation and using stool softeners      Past Medical History:   Diagnosis Date   • Anemia    • Arthritis    • Bleeding from anus    • Bronchitis     STATES HAS BRONCHITIS CURRENTLY (17).     • Bronchitis 2017   • CAD (coronary artery disease)    • Cataract, bilateral    • Chronic kidney disease     STAGE 4.  SEES TERA   • Chronic kidney failure     REPORTS STAGE IV   • Difficulty swallowing solids    • Disease of thyroid gland    • Fracture, radius 2016    right distal radius and ulna, healed.   • GERD (gastroesophageal reflux disease)    • Gout    • High cholesterol    • History of nuclear stress test     DR. CORTEZ.  \"IT WAS OK\"   • Hyperparathyroidism (CMS/HCC)    • Hyperparathyroidism (CMS/HCC)    • Hypertension    • Iron deficiency    • Osteoarthritis    • Osteoarthritis of knees, bilateral     Both knees Supartz injection series    • Osteoporosis    • Personal history of cardiac murmur    • Presence of pessary    • Problems with swallowing     WITH FOOD OCCASSIONALLY   • Rheumatic heart disease     Personal history   • Rotator cuff tendonitis     right    • Rupture of right proximal biceps tendon     chronic   • Seasonal allergies    • Spinal headache     REPORTS AFTER DELIVERY OF SON   • Subacromial bursitis     right    • Valvular heart disease    • Vitamin B12 deficiency    • Vitamin D deficiency    • Wears glasses         Current Outpatient Medications:   •  allopurinol (ZYLOPRIM) 100 MG tablet, Take 1 tablet by mouth daily., Disp: , Rfl:   •  aspirin 81 MG tablet, Take 81 mg by mouth Daily., Disp: , Rfl:   •  budesonide-formoterol (SYMBICORT) " 80-4.5 MCG/ACT inhaler, Inhale 2 puffs 2 (Two) Times a Day., Disp: , Rfl:   •  carboxymethylcellulose (REFRESH PLUS) 0.5 % solution, 3 (Three) Times a Day As Needed for Dry Eyes., Disp: , Rfl:   •  carvedilol (COREG) 12.5 MG tablet, Take 12.5 mg by mouth Every Morning. 1 AM and 2 evening, Disp: , Rfl:   •  cholecalciferol (VITAMIN D3) 1000 UNITS tablet, Take 1,000 Units by mouth Daily., Disp: , Rfl:   •  Cyanocobalamin (VITAMIN B-12 PO), Take 500 mg by mouth 2 (Two) Times a Day., Disp: , Rfl:   •  epoetin ovidio (PROCRIT) 38219 UNIT/ML injection, Procrit SOLN; Patient Sig: Procrit SOLN ; 0; 16-Jul-2014; every 3 weeks, Disp: , Rfl:   •  irbesartan (AVAPRO) 150 MG tablet, Take 150 mg by mouth Daily., Disp: , Rfl:   •  Loratadine (CLARITIN) 10 MG capsule, Take 10 mg by mouth Daily As Needed (RHINITIS)., Disp: , Rfl:   •  omeprazole (PriLOSEC) 40 MG capsule, Take 40 mg by mouth Daily., Disp: , Rfl:   •  rosuvastatin (CRESTOR) 10 MG tablet, Take 10 mg by mouth Daily., Disp: , Rfl:   •  torsemide (DEMADEX) 20 MG tablet, Take 10 mg by mouth. EDEMA- TAKE EVERY OTHER DAY, Disp: , Rfl:   •  cephalexin (KEFLEX) 500 MG capsule, Take 1 capsule by mouth 2 (Two) Times a Day for 7 days., Disp: 14 capsule, Rfl: 0  •  fluticasone (FLONASE) 50 MCG/ACT nasal spray, 2 sprays into each nostril Daily As Needed., Disp: , Rfl:   No current facility-administered medications for this visit.    Allergies   Allergen Reactions   • Ferrlecit [Na Ferric Gluc Cplx In Sucrose] Swelling   • Ranitidine Hcl Shortness Of Breath   • Levofloxacin Other (See Comments)     Leg cramps     • Lisinopril Cough      Past Surgical History:   Procedure Laterality Date   • CATARACT EXTRACTION Bilateral    • CERVICAL POLYPECTOMY     • COLONOSCOPY  2011   • COLONOSCOPY N/A 1/9/2018    Procedure: COLONOSCOPY with endoscopic mucosal resection (hot snare), normal saline submucosal injection, argon thermal ablation, resolution clip placement x4, and cold biopsy  "polypectomy;  Surgeon: Pieter Concepcion MD;  Location: [facility] ENDOSCOPY;  Service:    • ENDOSCOPY N/A 12/6/2017    Procedure: ESOPHAGOGASTRODUODENOSCOPY with biopsies and hot snare polypectomies;  Surgeon: Pieter Concepcion MD;  Location: [facility] ENDOSCOPY;  Service:    • ENDOSCOPY N/A 1/15/2019    Procedure: ESOPHAGOGASTRODUODENOSCOPY WITH BIOPSIES AND HOT SNARE POLYPECTOMIES X10;  Surgeon: Pieter Concepcion MD;  Location: [facility] ENDOSCOPY;  Service: Gastroenterology   • UPPER GASTROINTESTINAL ENDOSCOPY  08/18/2014      Social History     Socioeconomic History   • Marital status:      Spouse name: Not on file   • Number of children: Not on file   • Years of education: Not on file   • Highest education level: Not on file   Social Needs   • Financial resource strain: Not on file   • Food insecurity - worry: Not on file   • Food insecurity - inability: Not on file   • Transportation needs - medical: Not on file   • Transportation needs - non-medical: Not on file   Occupational History   • Not on file   Tobacco Use   • Smoking status: Never Smoker   • Smokeless tobacco: Never Used   Substance and Sexual Activity   • Alcohol use: No   • Drug use: No   • Sexual activity: Not Currently   Other Topics Concern   • Not on file   Social History Narrative   • Not on file      Family History   Problem Relation Age of Onset   • Liver cancer Maternal Grandmother    • Gout Other    • Osteoporosis Other    • Stroke Other    • Ulcers Other    • Asthma Other    • Hypertension Other    • Heart disease Other    • Osteoarthritis Other    • Colon cancer Neg Hx        Review of Systems   Constitutional: Negative.    Gastrointestinal: Negative.    Genitourinary: Positive for dysuria. Negative for difficulty urinating, pelvic pain and vaginal bleeding.           Objective   /78   Ht 149.9 cm (59\")   Wt 49.2 kg (108 lb 6.4 oz)   LMP  (LMP Unknown)   Breastfeeding? No   BMI 21.89 kg/m²     Physical Exam   Constitutional: She appears " well-developed and well-nourished. She is cooperative. No distress.   Genitourinary: There is no tenderness or lesion on the right labia. There is no tenderness or lesion on the left labia.   Genitourinary Comments: Ring with support pessary removed, cleaned and reinserted. No vaginal wall erosion or abrasions    Neurological: She is alert.   Skin: Skin is warm and dry.   Psychiatric: She has a normal mood and affect. Her behavior is normal.            Assessment and Plan  Carolyne was seen today for pessary check.    Diagnoses and all orders for this visit:    Pessary maintenance    Cystocele, midline    Burning with urination  -     POC Urinalysis Dipstick, Automated  -     Urine Culture - , Urine, Clean Catch    Other orders  -     cephalexin (KEFLEX) 500 MG capsule; Take 1 capsule by mouth 2 (Two) Times a Day for 7 days.      Patient Instructions   will send clean catch urine for culture  Will start keflex today with complaint of dysuria  RTO 3 months for pessary check or prn             This note was electronically signed.    Yaz Woods PA-C   February 18, 2019

## 2019-02-21 LAB
BACTERIA UR CULT: ABNORMAL
BACTERIA UR CULT: ABNORMAL
OTHER ANTIBIOTIC SUSC ISLT: ABNORMAL

## 2019-02-22 ENCOUNTER — TELEPHONE (OUTPATIENT)
Dept: OBSTETRICS AND GYNECOLOGY | Facility: CLINIC | Age: 79
End: 2019-02-22

## 2019-02-22 RX ORDER — SULFAMETHOXAZOLE AND TRIMETHOPRIM 800; 160 MG/1; MG/1
1 TABLET ORAL 2 TIMES DAILY
Qty: 14 TABLET | Refills: 0 | Status: SHIPPED | OUTPATIENT
Start: 2019-02-22 | End: 2019-03-01

## 2019-02-22 NOTE — TELEPHONE ENCOUNTER
Notified patient of urine lab results and sent Bactrim DS.  Patient wanted me to let you know that due to kidney issues she isn't supposed to take more than 500mgs of antibiotics at a time.  She just wants to make sure Bactrim DS will be okay to take.

## 2019-02-22 NOTE — TELEPHONE ENCOUNTER
I think so with bactrim this is OK but she could call her nephrologist to double check with them. Thanks

## 2019-02-24 NOTE — PROGRESS NOTES
"Subjective   Patient ID: Carolyne Martins is a 77 y.o. right hand dominant female {16TC Visit Reason Acute:90480::\"is being seen for orthopaedic evaluation today for ***\"} Pain and Injections of the Left Knee and Pain and Injections of the Right Knee         History of Present Illness       Pain Location: Knee  Pain Orientation: Left, Right     Pain Descriptors: Aching              Aggravating Factors: Walking                   Past Medical History:   Diagnosis Date   • Anemia    • Arthritis    • Backache    • CAD (coronary artery disease)    • Fracture, radius 2016    right distal radius and ulna, healed.   • GERD (gastroesophageal reflux disease)    • Gout    • High cholesterol    • Hyperparathyroidism    • Hypertension    • Iron deficiency    • Osteoarthritis    • Osteoarthritis    • Osteoarthritis of knees, bilateral     Both knees Supartz injection series August, 2015   • Osteoporosis    • Personal history of cardiac murmur    • Renal disease    • Rheumatic heart disease     Personal history   • Rotator cuff tendonitis     right    • Rupture of right proximal biceps tendon     chronic   • Subacromial bursitis     right    • Valvular heart disease    • Vitamin B12 deficiency         Past Surgical History:   Procedure Laterality Date   • CATARACT EXTRACTION     • EYE SURGERY         Family History   Problem Relation Age of Onset   • Liver cancer Maternal Grandmother    • Gout Other    • Osteoporosis Other    • Stroke Other    • Ulcers Other    • Asthma Other    • Hypertension Other    • Heart disease Other    • Osteoarthritis Other        Social History     Social History   • Marital status:      Spouse name: N/A   • Number of children: N/A   • Years of education: N/A     Occupational History   • Not on file.     Social History Main Topics   • Smoking status: Never Smoker   • Smokeless tobacco: Never Used   • Alcohol use No   • Drug use: No   • Sexual activity: Defer     Other Topics Concern   • Not " "on file     Social History Narrative       Allergies   Allergen Reactions   • Ferrlecit [Na Ferric Gluc Cplx In Sucrose]    • Lisinopril    • Ranitidine Hcl        Review of Systems   Constitutional: Negative for fever.   HENT: Negative for voice change.    Eyes: Negative for visual disturbance.   Respiratory: Negative for shortness of breath.    Cardiovascular: Negative for chest pain.   Gastrointestinal: Negative for abdominal distention and abdominal pain.   Genitourinary: Negative for dysuria.   Musculoskeletal: Positive for arthralgias. Negative for gait problem and joint swelling.   Skin: Negative for rash.   Neurological: Negative for speech difficulty.   Hematological: Does not bruise/bleed easily.   Psychiatric/Behavioral: Negative for confusion.   All other systems reviewed and are negative.      Objective   LMP  (LMP Unknown)   Physical Exam  Ortho Exam   Extremity DVT signs are {[facility] Daniele:06514}   Neurologic Exam   Ortho Exam         Assessment/Plan   Independent Review of Radiographic Studies:    {TC Imagin}  Laboratory and Other Studies:  {[facility] Lab Studies:41562}   Medical Decision Making:    {[facility] Clinical Progress, Decision Making, and Outcome:16996}    Procedures  [] No procedures were performed in office today.     There are no diagnoses linked to this encounter.   {[facility] Patient Advice:10415::\"Regular exercise as tolerated\",\"Orthopedic activities reviewed and patient expressed appreciation\",\"Discussion of orthopedic goals\",\"Risk, benefits, and merits of treatment alternatives reviewed with the patient and questions answered\"}    Recommendations/Plan:  {TC Recommendations/Plan:35278::\"Exercise, medications, injections, other patient advice, and return appointment as noted.\",\"Patient is encouraged to call or return for any issues or concerns.\"}    No Follow-up on file.  Patient agreeable to call or return sooner for any concerns.                 " Launch [employer] and print the 'Prescriptions from this Visit' Report

## 2019-02-25 RX ORDER — TETRACYCLINE HYDROCHLORIDE 250 MG/1
250 CAPSULE ORAL 2 TIMES DAILY
Qty: 10 CAPSULE | Refills: 0 | Status: SHIPPED | OUTPATIENT
Start: 2019-02-25 | End: 2019-10-01

## 2019-03-11 ENCOUNTER — HOSPITAL ENCOUNTER (OUTPATIENT)
Dept: INFUSION THERAPY | Facility: HOSPITAL | Age: 79
Discharge: HOME | End: 2019-03-11
Admitting: INTERNAL MEDICINE

## 2019-03-11 VITALS
TEMPERATURE: 98.6 F | DIASTOLIC BLOOD PRESSURE: 64 MMHG | OXYGEN SATURATION: 96 % | HEIGHT: 59 IN | RESPIRATION RATE: 20 BRPM | BODY MASS INDEX: 22.18 KG/M2 | WEIGHT: 110 LBS | HEART RATE: 73 BPM | SYSTOLIC BLOOD PRESSURE: 133 MMHG

## 2019-03-11 DIAGNOSIS — D63.1 ANEMIA DUE TO STAGE 4 CHRONIC KIDNEY DISEASE TREATED WITH ERYTHROPOIETIN (HCC): Primary | ICD-10-CM

## 2019-03-11 DIAGNOSIS — N18.4 ANEMIA DUE TO STAGE 4 CHRONIC KIDNEY DISEASE TREATED WITH ERYTHROPOIETIN (HCC): Primary | ICD-10-CM

## 2019-03-11 LAB
DEPRECATED RDW RBC AUTO: 60.7 FL (ref 37–54)
ERYTHROCYTE [DISTWIDTH] IN BLOOD BY AUTOMATED COUNT: 18.5 % (ref 11.5–14.5)
HCT VFR BLD AUTO: 27.2 % (ref 37–47)
HGB BLD-MCNC: 8.6 G/DL (ref 12–16)
MCH RBC QN AUTO: 28.5 PG (ref 27–31)
MCHC RBC AUTO-ENTMCNC: 31.6 G/DL (ref 30–37)
MCV RBC AUTO: 90.1 FL (ref 81–99)
PLATELET # BLD AUTO: 122 10*3/MM3 (ref 130–400)
PMV BLD AUTO: 10 FL (ref 6–12)
RBC # BLD AUTO: 3.02 10*6/MM3 (ref 4.2–5.4)
WBC NRBC COR # BLD: 4.6 10*3/MM3 (ref 4.8–10.8)

## 2019-03-11 PROCEDURE — 25010000002 EPOETIN ALFA PER 1000 UNITS: Performed by: INTERNAL MEDICINE

## 2019-03-11 PROCEDURE — 36415 COLL VENOUS BLD VENIPUNCTURE: CPT

## 2019-03-11 PROCEDURE — 96372 THER/PROPH/DIAG INJ SC/IM: CPT

## 2019-03-11 PROCEDURE — 85027 COMPLETE CBC AUTOMATED: CPT | Performed by: INTERNAL MEDICINE

## 2019-03-11 RX ADMIN — ERYTHROPOIETIN 40000 UNITS: 40000 INJECTION, SOLUTION INTRAVENOUS; SUBCUTANEOUS at 11:54

## 2019-04-01 ENCOUNTER — HOSPITAL ENCOUNTER (OUTPATIENT)
Dept: INFUSION THERAPY | Facility: HOSPITAL | Age: 79
Discharge: HOME | End: 2019-04-01
Admitting: INTERNAL MEDICINE

## 2019-04-01 VITALS
RESPIRATION RATE: 18 BRPM | WEIGHT: 110 LBS | DIASTOLIC BLOOD PRESSURE: 72 MMHG | TEMPERATURE: 98.1 F | BODY MASS INDEX: 22.18 KG/M2 | SYSTOLIC BLOOD PRESSURE: 158 MMHG | HEART RATE: 67 BPM | OXYGEN SATURATION: 99 % | HEIGHT: 59 IN

## 2019-04-01 DIAGNOSIS — D63.1 ANEMIA DUE TO STAGE 4 CHRONIC KIDNEY DISEASE TREATED WITH ERYTHROPOIETIN (HCC): Primary | ICD-10-CM

## 2019-04-01 DIAGNOSIS — N18.4 ANEMIA DUE TO STAGE 4 CHRONIC KIDNEY DISEASE TREATED WITH ERYTHROPOIETIN (HCC): Primary | ICD-10-CM

## 2019-04-01 LAB
DEPRECATED RDW RBC AUTO: 61.7 FL (ref 37–54)
ERYTHROCYTE [DISTWIDTH] IN BLOOD BY AUTOMATED COUNT: 18.5 % (ref 12.3–15.4)
HCT VFR BLD AUTO: 28 % (ref 34–46.6)
HGB BLD-MCNC: 8.6 G/DL (ref 12–15.9)
MCH RBC QN AUTO: 28.3 PG (ref 26.6–33)
MCHC RBC AUTO-ENTMCNC: 30.7 G/DL (ref 31.5–35.7)
MCV RBC AUTO: 92.1 FL (ref 79–97)
PLATELET # BLD AUTO: 178 10*3/MM3 (ref 140–450)
PMV BLD AUTO: 9.9 FL (ref 6–12)
RBC # BLD AUTO: 3.04 10*6/MM3 (ref 3.77–5.28)
WBC NRBC COR # BLD: 3.05 10*3/MM3 (ref 3.4–10.8)

## 2019-04-01 PROCEDURE — 85027 COMPLETE CBC AUTOMATED: CPT | Performed by: INTERNAL MEDICINE

## 2019-04-01 PROCEDURE — 96372 THER/PROPH/DIAG INJ SC/IM: CPT

## 2019-04-01 PROCEDURE — 36415 COLL VENOUS BLD VENIPUNCTURE: CPT

## 2019-04-01 PROCEDURE — 25010000002 EPOETIN ALFA PER 1000 UNITS: Performed by: INTERNAL MEDICINE

## 2019-04-01 RX ADMIN — ERYTHROPOIETIN 40000 UNITS: 40000 INJECTION, SOLUTION INTRAVENOUS; SUBCUTANEOUS at 11:40

## 2019-04-22 ENCOUNTER — HOSPITAL ENCOUNTER (OUTPATIENT)
Dept: INFUSION THERAPY | Facility: HOSPITAL | Age: 79
Discharge: HOME | End: 2019-04-22
Admitting: PHYSICIAN ASSISTANT

## 2019-04-22 VITALS
TEMPERATURE: 97.7 F | BODY MASS INDEX: 21.77 KG/M2 | RESPIRATION RATE: 20 BRPM | WEIGHT: 108 LBS | DIASTOLIC BLOOD PRESSURE: 60 MMHG | OXYGEN SATURATION: 100 % | SYSTOLIC BLOOD PRESSURE: 158 MMHG | HEART RATE: 69 BPM | HEIGHT: 59 IN

## 2019-04-22 DIAGNOSIS — D63.1 ANEMIA DUE TO STAGE 4 CHRONIC KIDNEY DISEASE TREATED WITH ERYTHROPOIETIN (HCC): Primary | ICD-10-CM

## 2019-04-22 DIAGNOSIS — N18.4 ANEMIA DUE TO STAGE 4 CHRONIC KIDNEY DISEASE TREATED WITH ERYTHROPOIETIN (HCC): Primary | ICD-10-CM

## 2019-04-22 LAB
ALBUMIN SERPL-MCNC: 3.6 G/DL (ref 3.5–5)
ANION GAP SERPL CALCULATED.3IONS-SCNC: 12.4 MMOL/L (ref 10–20)
BUN BLD-MCNC: 37 MG/DL (ref 7–20)
BUN/CREAT SERPL: 16.8 (ref 7.1–23.5)
CALCIUM SPEC-SCNC: 8.7 MG/DL (ref 8.4–10.2)
CHLORIDE SERPL-SCNC: 104 MMOL/L (ref 98–107)
CO2 SERPL-SCNC: 25 MMOL/L (ref 26–30)
CREAT BLD-MCNC: 2.2 MG/DL (ref 0.6–1.3)
DEPRECATED RDW RBC AUTO: 62.4 FL (ref 37–54)
ERYTHROCYTE [DISTWIDTH] IN BLOOD BY AUTOMATED COUNT: 18.7 % (ref 12.3–15.4)
GFR SERPL CREATININE-BSD FRML MDRD: 22 ML/MIN/1.73
GLUCOSE BLD-MCNC: 100 MG/DL (ref 74–98)
HCT VFR BLD AUTO: 26.8 % (ref 34–46.6)
HGB BLD-MCNC: 8.5 G/DL (ref 12–15.9)
MCH RBC QN AUTO: 29.2 PG (ref 26.6–33)
MCHC RBC AUTO-ENTMCNC: 31.7 G/DL (ref 31.5–35.7)
MCV RBC AUTO: 92.1 FL (ref 79–97)
PHOSPHATE SERPL-MCNC: 3.7 MG/DL (ref 2.5–4.5)
PLATELET # BLD AUTO: 188 10*3/MM3 (ref 140–450)
PMV BLD AUTO: 10 FL (ref 6–12)
POTASSIUM BLD-SCNC: 4.4 MMOL/L (ref 3.5–5.1)
RBC # BLD AUTO: 2.91 10*6/MM3 (ref 3.77–5.28)
SODIUM BLD-SCNC: 137 MMOL/L (ref 137–145)
WBC NRBC COR # BLD: 3.35 10*3/MM3 (ref 3.4–10.8)

## 2019-04-22 PROCEDURE — 36415 COLL VENOUS BLD VENIPUNCTURE: CPT

## 2019-04-22 PROCEDURE — 85027 COMPLETE CBC AUTOMATED: CPT | Performed by: INTERNAL MEDICINE

## 2019-04-22 PROCEDURE — 96372 THER/PROPH/DIAG INJ SC/IM: CPT

## 2019-04-22 PROCEDURE — 80069 RENAL FUNCTION PANEL: CPT | Performed by: INTERNAL MEDICINE

## 2019-04-22 PROCEDURE — 25010000002 EPOETIN ALFA PER 1000 UNITS: Performed by: PHYSICIAN ASSISTANT

## 2019-04-22 RX ADMIN — ERYTHROPOIETIN 40000 UNITS: 40000 INJECTION, SOLUTION INTRAVENOUS; SUBCUTANEOUS at 11:38

## 2019-05-13 ENCOUNTER — HOSPITAL ENCOUNTER (OUTPATIENT)
Dept: INFUSION THERAPY | Facility: HOSPITAL | Age: 79
Discharge: HOME | End: 2019-05-13
Admitting: INTERNAL MEDICINE

## 2019-05-13 ENCOUNTER — OFFICE VISIT (OUTPATIENT)
Dept: OBSTETRICS AND GYNECOLOGY | Facility: CLINIC | Age: 79
End: 2019-05-13

## 2019-05-13 VITALS
OXYGEN SATURATION: 100 % | HEART RATE: 65 BPM | DIASTOLIC BLOOD PRESSURE: 67 MMHG | BODY MASS INDEX: 22.18 KG/M2 | TEMPERATURE: 97.7 F | WEIGHT: 110 LBS | RESPIRATION RATE: 20 BRPM | HEIGHT: 59 IN | SYSTOLIC BLOOD PRESSURE: 156 MMHG

## 2019-05-13 VITALS
DIASTOLIC BLOOD PRESSURE: 90 MMHG | BODY MASS INDEX: 23.02 KG/M2 | WEIGHT: 114.2 LBS | HEIGHT: 59 IN | SYSTOLIC BLOOD PRESSURE: 182 MMHG

## 2019-05-13 DIAGNOSIS — Z46.89 PESSARY MAINTENANCE: Primary | ICD-10-CM

## 2019-05-13 DIAGNOSIS — N81.11 CYSTOCELE, MIDLINE: ICD-10-CM

## 2019-05-13 DIAGNOSIS — D63.1 ANEMIA DUE TO STAGE 4 CHRONIC KIDNEY DISEASE TREATED WITH ERYTHROPOIETIN (HCC): Primary | ICD-10-CM

## 2019-05-13 DIAGNOSIS — N18.4 ANEMIA DUE TO STAGE 4 CHRONIC KIDNEY DISEASE TREATED WITH ERYTHROPOIETIN (HCC): Primary | ICD-10-CM

## 2019-05-13 LAB
DEPRECATED RDW RBC AUTO: 65.1 FL (ref 37–54)
ERYTHROCYTE [DISTWIDTH] IN BLOOD BY AUTOMATED COUNT: 19.4 % (ref 12.3–15.4)
HCT VFR BLD AUTO: 25.1 % (ref 34–46.6)
HGB BLD-MCNC: 7.9 G/DL (ref 12–15.9)
MCH RBC QN AUTO: 29.3 PG (ref 26.6–33)
MCHC RBC AUTO-ENTMCNC: 31.5 G/DL (ref 31.5–35.7)
MCV RBC AUTO: 93 FL (ref 79–97)
PLATELET # BLD AUTO: 184 10*3/MM3 (ref 140–450)
PMV BLD AUTO: 9.2 FL (ref 6–12)
RBC # BLD AUTO: 2.7 10*6/MM3 (ref 3.77–5.28)
WBC NRBC COR # BLD: 3.34 10*3/MM3 (ref 3.4–10.8)

## 2019-05-13 PROCEDURE — 85027 COMPLETE CBC AUTOMATED: CPT | Performed by: INTERNAL MEDICINE

## 2019-05-13 PROCEDURE — 99212 OFFICE O/P EST SF 10 MIN: CPT | Performed by: PHYSICIAN ASSISTANT

## 2019-05-13 PROCEDURE — 36415 COLL VENOUS BLD VENIPUNCTURE: CPT

## 2019-05-13 PROCEDURE — 96372 THER/PROPH/DIAG INJ SC/IM: CPT

## 2019-05-13 PROCEDURE — 25010000002 EPOETIN ALFA PER 1000 UNITS: Performed by: PHYSICIAN ASSISTANT

## 2019-05-13 RX ADMIN — ERYTHROPOIETIN 40000 UNITS: 40000 INJECTION, SOLUTION INTRAVENOUS; SUBCUTANEOUS at 12:13

## 2019-05-13 NOTE — CODE DOCUMENTATION
Spoke with Latisha at Dr Fitzgerald, new orders to check labs every 2 weeks, patient reports more tiredness this week, advised patient to report to doctor's office with worsening symptoms.   Patient advised nursing staff she fell at home during the night to get up for the bathroom, hit hit on the right side and did not have any headache, confusion or blurry vision since fall.   Reported the fall to the doctor's office as well.   Advised patient to FU at the ED for any SS of confusion, headache, or blurry vision or  Weakness in one side of the body.

## 2019-05-13 NOTE — PROGRESS NOTES
"Subjective   Chief Complaint   Patient presents with   • Follow-up     3 month FU Pessary check. No complaints       Carolyne Martins is a 79 y.o. year old  presenting to be seen for routine pessary maintenance  She has no complaints or concerns  She is voiding well, has had no symptoms of UTI, no problems with bowel movements and no vaginal bleeding     Past Medical History:   Diagnosis Date   • Anemia    • Arthritis    • Bleeding from anus    • Bronchitis     STATES HAS BRONCHITIS CURRENTLY (17).     • Bronchitis 2017   • CAD (coronary artery disease)    • Cataract, bilateral    • Chronic kidney disease     STAGE 4.  SEES TERA   • Chronic kidney failure     REPORTS STAGE IV   • Difficulty swallowing solids    • Disease of thyroid gland    • Fracture, radius 2016    right distal radius and ulna, healed.   • GERD (gastroesophageal reflux disease)    • Gout    • High cholesterol    • History of nuclear stress test     DR. CORTEZ.  \"IT WAS OK\"   • Hyperparathyroidism (CMS/HCC)    • Hyperparathyroidism (CMS/HCC)    • Hypertension    • Iron deficiency    • Osteoarthritis    • Osteoarthritis of knees, bilateral     Both knees Supartz injection series    • Osteoporosis    • Personal history of cardiac murmur    • Presence of pessary    • Problems with swallowing     WITH FOOD OCCASSIONALLY   • Rheumatic heart disease     Personal history   • Rotator cuff tendonitis     right    • Rupture of right proximal biceps tendon     chronic   • Seasonal allergies    • Spinal headache     REPORTS AFTER DELIVERY OF SON   • Subacromial bursitis     right    • Valvular heart disease    • Vitamin B12 deficiency    • Vitamin D deficiency    • Wears glasses         Current Outpatient Medications:   •  allopurinol (ZYLOPRIM) 100 MG tablet, Take 1 tablet by mouth daily., Disp: , Rfl:   •  aspirin 81 MG tablet, Take 81 mg by mouth Daily., Disp: , Rfl:   •  budesonide-formoterol (SYMBICORT) 80-4.5 MCG/ACT " inhaler, Inhale 2 puffs 2 (Two) Times a Day., Disp: , Rfl:   •  carboxymethylcellulose (REFRESH PLUS) 0.5 % solution, 3 (Three) Times a Day As Needed for Dry Eyes., Disp: , Rfl:   •  carvedilol (COREG) 12.5 MG tablet, Take 12.5 mg by mouth Every Morning. 1 AM and 2 evening, Disp: , Rfl:   •  cholecalciferol (VITAMIN D3) 1000 UNITS tablet, Take 1,000 Units by mouth Daily., Disp: , Rfl:   •  Cyanocobalamin (VITAMIN B-12 PO), Take 500 mg by mouth 2 (Two) Times a Day., Disp: , Rfl:   •  epoetin ovidio (PROCRIT) 03383 UNIT/ML injection, Procrit SOLN; Patient Sig: Procrit SOLN ; 0; 16-Jul-2014; every 3 weeks, Disp: , Rfl:   •  fluticasone (FLONASE) 50 MCG/ACT nasal spray, 2 sprays into each nostril Daily As Needed., Disp: , Rfl:   •  irbesartan (AVAPRO) 150 MG tablet, Take 150 mg by mouth Daily., Disp: , Rfl:   •  Loratadine (CLARITIN) 10 MG capsule, Take 10 mg by mouth Daily As Needed (RHINITIS)., Disp: , Rfl:   •  omeprazole (PriLOSEC) 40 MG capsule, Take 40 mg by mouth Daily., Disp: , Rfl:   •  rosuvastatin (CRESTOR) 10 MG tablet, Take 10 mg by mouth Daily., Disp: , Rfl:   •  tetracycline (ACHROMYCIN,SUMYCIN) 250 MG capsule, Take 1 capsule by mouth 2 (Two) Times a Day., Disp: 10 capsule, Rfl: 0  •  torsemide (DEMADEX) 20 MG tablet, Take 10 mg by mouth. EDEMA- TAKE EVERY OTHER DAY, Disp: , Rfl:   No current facility-administered medications for this visit.    Allergies   Allergen Reactions   • Ferrlecit [Na Ferric Gluc Cplx In Sucrose] Swelling   • Ranitidine Hcl Shortness Of Breath   • Levofloxacin Other (See Comments)     Leg cramps     • Lisinopril Cough      Past Surgical History:   Procedure Laterality Date   • CATARACT EXTRACTION Bilateral    • CERVICAL POLYPECTOMY     • COLONOSCOPY  2011   • COLONOSCOPY N/A 1/9/2018    Procedure: COLONOSCOPY with endoscopic mucosal resection (hot snare), normal saline submucosal injection, argon thermal ablation, resolution clip placement x4, and cold biopsy polypectomy;  Surgeon:  "Pieter Concepcion MD;  Location: [facility] ENDOSCOPY;  Service:    • ENDOSCOPY N/A 12/6/2017    Procedure: ESOPHAGOGASTRODUODENOSCOPY with biopsies and hot snare polypectomies;  Surgeon: Pieter Concepcion MD;  Location: [facility] ENDOSCOPY;  Service:    • ENDOSCOPY N/A 1/15/2019    Procedure: ESOPHAGOGASTRODUODENOSCOPY WITH BIOPSIES AND HOT SNARE POLYPECTOMIES X10;  Surgeon: Pieter Concepcion MD;  Location: [facility] ENDOSCOPY;  Service: Gastroenterology   • UPPER GASTROINTESTINAL ENDOSCOPY  08/18/2014      Social History     Socioeconomic History   • Marital status:      Spouse name: Not on file   • Number of children: Not on file   • Years of education: Not on file   • Highest education level: Not on file   Tobacco Use   • Smoking status: Never Smoker   • Smokeless tobacco: Never Used   Substance and Sexual Activity   • Alcohol use: No   • Drug use: No   • Sexual activity: Not Currently      Family History   Problem Relation Age of Onset   • Liver cancer Maternal Grandmother    • Gout Other    • Osteoporosis Other    • Stroke Other    • Ulcers Other    • Asthma Other    • Hypertension Other    • Heart disease Other    • Osteoarthritis Other    • Colon cancer Neg Hx        Review of Systems   Constitutional: Negative.    Gastrointestinal: Negative.    Genitourinary: Negative.            Objective   BP (!) 182/90   Ht 149.9 cm (59\")   Wt 51.8 kg (114 lb 3.2 oz)   LMP  (LMP Unknown)   Breastfeeding? No   BMI 23.07 kg/m²     Physical Exam   Constitutional: She appears well-developed and well-nourished. She is cooperative. No distress.   Abdominal: Soft. Normal appearance. There is no tenderness. There is no rigidity and no guarding.   Genitourinary: There is no tenderness or lesion on the right labia. There is no tenderness or lesion on the left labia.   Genitourinary Comments: Ring with support pessary removed cleaned and reinserted  No vaginal wall erosion or abrasions   2 plus cystocele    Neurological: She is alert. "   Skin: Skin is warm and dry.   Psychiatric: She has a normal mood and affect. Her behavior is normal.            Assessment and Plan  Carolyne was seen today for follow-up.    Diagnoses and all orders for this visit:    Pessary maintenance    Cystocele, midline      Patient Instructions   RTO 3 months or prn             This note was electronically signed.    Yaz Woods PA-C   May 13, 2019

## 2019-05-23 ENCOUNTER — TRANSCRIBE ORDERS (OUTPATIENT)
Dept: ADMINISTRATIVE | Facility: HOSPITAL | Age: 79
End: 2019-05-23

## 2019-05-23 DIAGNOSIS — Z12.39 SCREENING BREAST EXAMINATION: Primary | ICD-10-CM

## 2019-05-28 ENCOUNTER — HOSPITAL ENCOUNTER (OUTPATIENT)
Dept: INFUSION THERAPY | Facility: HOSPITAL | Age: 79
Discharge: HOME | End: 2019-05-28
Admitting: INTERNAL MEDICINE

## 2019-05-28 VITALS
SYSTOLIC BLOOD PRESSURE: 152 MMHG | RESPIRATION RATE: 20 BRPM | HEIGHT: 59 IN | HEART RATE: 60 BPM | TEMPERATURE: 97.9 F | OXYGEN SATURATION: 100 % | BODY MASS INDEX: 22.18 KG/M2 | DIASTOLIC BLOOD PRESSURE: 75 MMHG | WEIGHT: 110 LBS

## 2019-05-28 DIAGNOSIS — D63.1 ANEMIA DUE TO STAGE 4 CHRONIC KIDNEY DISEASE TREATED WITH ERYTHROPOIETIN (HCC): Primary | ICD-10-CM

## 2019-05-28 DIAGNOSIS — N18.4 ANEMIA DUE TO STAGE 4 CHRONIC KIDNEY DISEASE TREATED WITH ERYTHROPOIETIN (HCC): Primary | ICD-10-CM

## 2019-05-28 LAB
25(OH)D3 SERPL-MCNC: 62.5 NG/ML
ALBUMIN SERPL-MCNC: 3.9 G/DL (ref 3.5–5)
ANION GAP SERPL CALCULATED.3IONS-SCNC: 14.2 MMOL/L (ref 10–20)
BUN BLD-MCNC: 47 MG/DL (ref 7–20)
BUN/CREAT SERPL: 20.4 (ref 7.1–23.5)
CALCIUM SPEC-SCNC: 8.3 MG/DL (ref 8.4–10.2)
CHLORIDE SERPL-SCNC: 102 MMOL/L (ref 98–107)
CO2 SERPL-SCNC: 25 MMOL/L (ref 26–30)
CREAT BLD-MCNC: 2.3 MG/DL (ref 0.6–1.3)
DEPRECATED RDW RBC AUTO: 64.7 FL (ref 37–54)
ERYTHROCYTE [DISTWIDTH] IN BLOOD BY AUTOMATED COUNT: 19.5 % (ref 12.3–15.4)
FERRITIN SERPL-MCNC: 12.7 NG/ML (ref 11.1–264)
GFR SERPL CREATININE-BSD FRML MDRD: 21 ML/MIN/1.73
GLUCOSE BLD-MCNC: 92 MG/DL (ref 74–98)
HCT VFR BLD AUTO: 26.1 % (ref 34–46.6)
HGB BLD-MCNC: 8.2 G/DL (ref 12–15.9)
IRON 24H UR-MRATE: 90 MCG/DL (ref 37–181)
IRON SATN MFR SERPL: 23 % (ref 11–46)
MCH RBC QN AUTO: 29 PG (ref 26.6–33)
MCHC RBC AUTO-ENTMCNC: 31.4 G/DL (ref 31.5–35.7)
MCV RBC AUTO: 92.2 FL (ref 79–97)
PHOSPHATE SERPL-MCNC: 4.1 MG/DL (ref 2.5–4.5)
PLATELET # BLD AUTO: 202 10*3/MM3 (ref 140–450)
PMV BLD AUTO: 9.5 FL (ref 6–12)
POTASSIUM BLD-SCNC: 4.2 MMOL/L (ref 3.5–5.1)
RBC # BLD AUTO: 2.83 10*6/MM3 (ref 3.77–5.28)
SODIUM BLD-SCNC: 137 MMOL/L (ref 137–145)
TIBC SERPL-MCNC: 391 MCG/DL (ref 261–497)
URATE SERPL-MCNC: 6.5 MG/DL (ref 2.5–8.5)
WBC NRBC COR # BLD: 3.15 10*3/MM3 (ref 3.4–10.8)

## 2019-05-28 PROCEDURE — 85027 COMPLETE CBC AUTOMATED: CPT | Performed by: INTERNAL MEDICINE

## 2019-05-28 PROCEDURE — 84550 ASSAY OF BLOOD/URIC ACID: CPT | Performed by: INTERNAL MEDICINE

## 2019-05-28 PROCEDURE — 82310 ASSAY OF CALCIUM: CPT | Performed by: INTERNAL MEDICINE

## 2019-05-28 PROCEDURE — 83970 ASSAY OF PARATHORMONE: CPT | Performed by: INTERNAL MEDICINE

## 2019-05-28 PROCEDURE — 82728 ASSAY OF FERRITIN: CPT | Performed by: INTERNAL MEDICINE

## 2019-05-28 PROCEDURE — 80069 RENAL FUNCTION PANEL: CPT | Performed by: INTERNAL MEDICINE

## 2019-05-28 PROCEDURE — 83540 ASSAY OF IRON: CPT | Performed by: INTERNAL MEDICINE

## 2019-05-28 PROCEDURE — 82306 VITAMIN D 25 HYDROXY: CPT | Performed by: INTERNAL MEDICINE

## 2019-05-28 PROCEDURE — 83550 IRON BINDING TEST: CPT | Performed by: INTERNAL MEDICINE

## 2019-05-28 PROCEDURE — 25010000002 EPOETIN ALFA PER 1000 UNITS: Performed by: PHYSICIAN ASSISTANT

## 2019-05-28 PROCEDURE — 96372 THER/PROPH/DIAG INJ SC/IM: CPT

## 2019-05-28 RX ADMIN — ERYTHROPOIETIN 40000 UNITS: 40000 INJECTION, SOLUTION INTRAVENOUS; SUBCUTANEOUS at 12:22

## 2019-05-29 LAB
CALCIUM SERPL-MCNC: 8.4 MG/DL (ref 8.7–10.3)
INTACT PTH: ABNORMAL
PTH-INTACT SERPL-MCNC: 166 PG/ML (ref 15–65)

## 2019-06-10 ENCOUNTER — HOSPITAL ENCOUNTER (OUTPATIENT)
Dept: INFUSION THERAPY | Facility: HOSPITAL | Age: 79
Discharge: HOME | End: 2019-06-10
Admitting: INTERNAL MEDICINE

## 2019-06-10 VITALS
SYSTOLIC BLOOD PRESSURE: 157 MMHG | HEART RATE: 67 BPM | TEMPERATURE: 98.5 F | WEIGHT: 110 LBS | RESPIRATION RATE: 18 BRPM | HEIGHT: 59 IN | OXYGEN SATURATION: 100 % | BODY MASS INDEX: 22.18 KG/M2 | DIASTOLIC BLOOD PRESSURE: 70 MMHG

## 2019-06-10 DIAGNOSIS — N18.4 ANEMIA DUE TO STAGE 4 CHRONIC KIDNEY DISEASE TREATED WITH ERYTHROPOIETIN (HCC): Primary | ICD-10-CM

## 2019-06-10 DIAGNOSIS — D63.1 ANEMIA DUE TO STAGE 4 CHRONIC KIDNEY DISEASE TREATED WITH ERYTHROPOIETIN (HCC): Primary | ICD-10-CM

## 2019-06-10 LAB
DEPRECATED RDW RBC AUTO: 65.2 FL (ref 37–54)
ERYTHROCYTE [DISTWIDTH] IN BLOOD BY AUTOMATED COUNT: 19.6 % (ref 12.3–15.4)
HCT VFR BLD AUTO: 27 % (ref 34–46.6)
HGB BLD-MCNC: 8.3 G/DL (ref 12–15.9)
MCH RBC QN AUTO: 28.1 PG (ref 26.6–33)
MCHC RBC AUTO-ENTMCNC: 30.7 G/DL (ref 31.5–35.7)
MCV RBC AUTO: 91.5 FL (ref 79–97)
PLATELET # BLD AUTO: 192 10*3/MM3 (ref 140–450)
PMV BLD AUTO: 9.3 FL (ref 6–12)
RBC # BLD AUTO: 2.95 10*6/MM3 (ref 3.77–5.28)
WBC NRBC COR # BLD: 3.79 10*3/MM3 (ref 3.4–10.8)

## 2019-06-10 PROCEDURE — 96372 THER/PROPH/DIAG INJ SC/IM: CPT

## 2019-06-10 PROCEDURE — 36415 COLL VENOUS BLD VENIPUNCTURE: CPT

## 2019-06-10 PROCEDURE — 85027 COMPLETE CBC AUTOMATED: CPT | Performed by: INTERNAL MEDICINE

## 2019-06-10 PROCEDURE — 25010000002 EPOETIN ALFA PER 1000 UNITS: Performed by: PHYSICIAN ASSISTANT

## 2019-06-10 RX ADMIN — ERYTHROPOIETIN 40000 UNITS: 40000 INJECTION, SOLUTION INTRAVENOUS; SUBCUTANEOUS at 12:00

## 2019-06-11 ENCOUNTER — HOSPITAL ENCOUNTER (OUTPATIENT)
Dept: INFUSION THERAPY | Facility: HOSPITAL | Age: 79
End: 2019-06-11

## 2019-06-24 ENCOUNTER — HOSPITAL ENCOUNTER (OUTPATIENT)
Dept: INFUSION THERAPY | Facility: HOSPITAL | Age: 79
Discharge: HOME | End: 2019-06-24
Admitting: INTERNAL MEDICINE

## 2019-06-24 VITALS
RESPIRATION RATE: 18 BRPM | OXYGEN SATURATION: 100 % | DIASTOLIC BLOOD PRESSURE: 68 MMHG | HEART RATE: 65 BPM | SYSTOLIC BLOOD PRESSURE: 141 MMHG | TEMPERATURE: 98.6 F

## 2019-06-24 DIAGNOSIS — D63.1 ANEMIA DUE TO STAGE 4 CHRONIC KIDNEY DISEASE TREATED WITH ERYTHROPOIETIN (HCC): Primary | ICD-10-CM

## 2019-06-24 DIAGNOSIS — N18.4 ANEMIA DUE TO STAGE 4 CHRONIC KIDNEY DISEASE TREATED WITH ERYTHROPOIETIN (HCC): Primary | ICD-10-CM

## 2019-06-24 LAB
DEPRECATED RDW RBC AUTO: 62.1 FL (ref 37–54)
ERYTHROCYTE [DISTWIDTH] IN BLOOD BY AUTOMATED COUNT: 18.6 % (ref 12.3–15.4)
HCT VFR BLD AUTO: 28.1 % (ref 34–46.6)
HGB BLD-MCNC: 8.6 G/DL (ref 12–15.9)
MCH RBC QN AUTO: 28.2 PG (ref 26.6–33)
MCHC RBC AUTO-ENTMCNC: 30.6 G/DL (ref 31.5–35.7)
MCV RBC AUTO: 92.1 FL (ref 79–97)
PLATELET # BLD AUTO: 216 10*3/MM3 (ref 140–450)
PMV BLD AUTO: 9.6 FL (ref 6–12)
RBC # BLD AUTO: 3.05 10*6/MM3 (ref 3.77–5.28)
WBC NRBC COR # BLD: 3.77 10*3/MM3 (ref 3.4–10.8)

## 2019-06-24 PROCEDURE — 25010000002 EPOETIN ALFA PER 1000 UNITS: Performed by: PHYSICIAN ASSISTANT

## 2019-06-24 PROCEDURE — 36415 COLL VENOUS BLD VENIPUNCTURE: CPT

## 2019-06-24 PROCEDURE — 85027 COMPLETE CBC AUTOMATED: CPT | Performed by: INTERNAL MEDICINE

## 2019-06-24 PROCEDURE — 96372 THER/PROPH/DIAG INJ SC/IM: CPT

## 2019-06-24 RX ADMIN — ERYTHROPOIETIN 40000 UNITS: 40000 INJECTION, SOLUTION INTRAVENOUS; SUBCUTANEOUS at 11:34

## 2019-07-08 ENCOUNTER — HOSPITAL ENCOUNTER (OUTPATIENT)
Dept: INFUSION THERAPY | Facility: HOSPITAL | Age: 79
Discharge: HOME | End: 2019-07-08
Admitting: INTERNAL MEDICINE

## 2019-07-08 VITALS
SYSTOLIC BLOOD PRESSURE: 151 MMHG | HEART RATE: 69 BPM | OXYGEN SATURATION: 98 % | TEMPERATURE: 98.2 F | DIASTOLIC BLOOD PRESSURE: 73 MMHG

## 2019-07-08 DIAGNOSIS — D63.1 ANEMIA DUE TO STAGE 4 CHRONIC KIDNEY DISEASE TREATED WITH ERYTHROPOIETIN (HCC): Primary | ICD-10-CM

## 2019-07-08 DIAGNOSIS — N18.4 ANEMIA DUE TO STAGE 4 CHRONIC KIDNEY DISEASE TREATED WITH ERYTHROPOIETIN (HCC): Primary | ICD-10-CM

## 2019-07-08 LAB
DEPRECATED RDW RBC AUTO: 58.8 FL (ref 37–54)
ERYTHROCYTE [DISTWIDTH] IN BLOOD BY AUTOMATED COUNT: 18 % (ref 12.3–15.4)
HCT VFR BLD AUTO: 28 % (ref 34–46.6)
HGB BLD-MCNC: 8.6 G/DL (ref 12–15.9)
MCH RBC QN AUTO: 27.7 PG (ref 26.6–33)
MCHC RBC AUTO-ENTMCNC: 30.7 G/DL (ref 31.5–35.7)
MCV RBC AUTO: 90 FL (ref 79–97)
PLATELET # BLD AUTO: 209 10*3/MM3 (ref 140–450)
PMV BLD AUTO: 9.3 FL (ref 6–12)
RBC # BLD AUTO: 3.11 10*6/MM3 (ref 3.77–5.28)
WBC NRBC COR # BLD: 4.39 10*3/MM3 (ref 3.4–10.8)

## 2019-07-08 PROCEDURE — 85027 COMPLETE CBC AUTOMATED: CPT | Performed by: INTERNAL MEDICINE

## 2019-07-08 PROCEDURE — 96372 THER/PROPH/DIAG INJ SC/IM: CPT

## 2019-07-08 PROCEDURE — 36415 COLL VENOUS BLD VENIPUNCTURE: CPT

## 2019-07-08 PROCEDURE — 25010000002 EPOETIN ALFA PER 1000 UNITS: Performed by: PHYSICIAN ASSISTANT

## 2019-07-08 RX ADMIN — ERYTHROPOIETIN 40000 UNITS: 40000 INJECTION, SOLUTION INTRAVENOUS; SUBCUTANEOUS at 11:41

## 2019-07-17 ENCOUNTER — HOSPITAL ENCOUNTER (OUTPATIENT)
Dept: MAMMOGRAPHY | Facility: HOSPITAL | Age: 79
Discharge: HOME | End: 2019-07-17
Admitting: PHYSICIAN ASSISTANT

## 2019-07-17 DIAGNOSIS — Z12.39 SCREENING BREAST EXAMINATION: ICD-10-CM

## 2019-07-17 PROCEDURE — 77063 BREAST TOMOSYNTHESIS BI: CPT

## 2019-07-17 PROCEDURE — 77067 SCR MAMMO BI INCL CAD: CPT

## 2019-07-22 ENCOUNTER — HOSPITAL ENCOUNTER (OUTPATIENT)
Dept: INFUSION THERAPY | Facility: HOSPITAL | Age: 79
Discharge: HOME | End: 2019-07-22
Admitting: INTERNAL MEDICINE

## 2019-07-22 VITALS
TEMPERATURE: 98 F | DIASTOLIC BLOOD PRESSURE: 70 MMHG | HEART RATE: 71 BPM | SYSTOLIC BLOOD PRESSURE: 143 MMHG | OXYGEN SATURATION: 100 %

## 2019-07-22 DIAGNOSIS — N18.4 ANEMIA DUE TO STAGE 4 CHRONIC KIDNEY DISEASE TREATED WITH ERYTHROPOIETIN (HCC): Primary | ICD-10-CM

## 2019-07-22 DIAGNOSIS — D63.1 ANEMIA DUE TO STAGE 4 CHRONIC KIDNEY DISEASE TREATED WITH ERYTHROPOIETIN (HCC): Primary | ICD-10-CM

## 2019-07-22 LAB
DEPRECATED RDW RBC AUTO: 58.3 FL (ref 37–54)
ERYTHROCYTE [DISTWIDTH] IN BLOOD BY AUTOMATED COUNT: 17.9 % (ref 12.3–15.4)
HCT VFR BLD AUTO: 28.5 % (ref 34–46.6)
HGB BLD-MCNC: 8.6 G/DL (ref 12–15.9)
MCH RBC QN AUTO: 27.1 PG (ref 26.6–33)
MCHC RBC AUTO-ENTMCNC: 30.2 G/DL (ref 31.5–35.7)
MCV RBC AUTO: 89.9 FL (ref 79–97)
PLATELET # BLD AUTO: 194 10*3/MM3 (ref 140–450)
PMV BLD AUTO: 9.2 FL (ref 6–12)
RBC # BLD AUTO: 3.17 10*6/MM3 (ref 3.77–5.28)
WBC NRBC COR # BLD: 4.1 10*3/MM3 (ref 3.4–10.8)

## 2019-07-22 PROCEDURE — 96372 THER/PROPH/DIAG INJ SC/IM: CPT

## 2019-07-22 PROCEDURE — 25010000002 EPOETIN ALFA PER 1000 UNITS: Performed by: PHYSICIAN ASSISTANT

## 2019-07-22 PROCEDURE — 85027 COMPLETE CBC AUTOMATED: CPT | Performed by: INTERNAL MEDICINE

## 2019-07-22 RX ADMIN — ERYTHROPOIETIN 40000 UNITS: 40000 INJECTION, SOLUTION INTRAVENOUS; SUBCUTANEOUS at 11:34

## 2019-08-05 ENCOUNTER — HOSPITAL ENCOUNTER (OUTPATIENT)
Dept: INFUSION THERAPY | Facility: HOSPITAL | Age: 79
Discharge: HOME | End: 2019-08-05
Admitting: INTERNAL MEDICINE

## 2019-08-05 VITALS
HEART RATE: 66 BPM | RESPIRATION RATE: 18 BRPM | HEIGHT: 59 IN | WEIGHT: 111 LBS | BODY MASS INDEX: 22.38 KG/M2 | SYSTOLIC BLOOD PRESSURE: 176 MMHG | TEMPERATURE: 97.5 F | DIASTOLIC BLOOD PRESSURE: 80 MMHG | OXYGEN SATURATION: 100 %

## 2019-08-05 DIAGNOSIS — M81.0 SENILE OSTEOPOROSIS: ICD-10-CM

## 2019-08-05 DIAGNOSIS — D63.1 ANEMIA DUE TO STAGE 4 CHRONIC KIDNEY DISEASE TREATED WITH ERYTHROPOIETIN (HCC): Primary | ICD-10-CM

## 2019-08-05 DIAGNOSIS — N18.4 ANEMIA DUE TO STAGE 4 CHRONIC KIDNEY DISEASE TREATED WITH ERYTHROPOIETIN (HCC): Primary | ICD-10-CM

## 2019-08-05 LAB
DEPRECATED RDW RBC AUTO: 58.8 FL (ref 37–54)
ERYTHROCYTE [DISTWIDTH] IN BLOOD BY AUTOMATED COUNT: 17.6 % (ref 12.3–15.4)
HCT VFR BLD AUTO: 29.2 % (ref 34–46.6)
HGB BLD-MCNC: 8.7 G/DL (ref 12–15.9)
MCH RBC QN AUTO: 27.1 PG (ref 26.6–33)
MCHC RBC AUTO-ENTMCNC: 29.8 G/DL (ref 31.5–35.7)
MCV RBC AUTO: 91 FL (ref 79–97)
PLATELET # BLD AUTO: 200 10*3/MM3 (ref 140–450)
PMV BLD AUTO: 9.4 FL (ref 6–12)
RBC # BLD AUTO: 3.21 10*6/MM3 (ref 3.77–5.28)
WBC NRBC COR # BLD: 4.19 10*3/MM3 (ref 3.4–10.8)

## 2019-08-05 PROCEDURE — 96372 THER/PROPH/DIAG INJ SC/IM: CPT

## 2019-08-05 PROCEDURE — 25010000002 DENOSUMAB 60 MG/ML SOLUTION PREFILLED SYRINGE: Performed by: INTERNAL MEDICINE

## 2019-08-05 PROCEDURE — 96401 CHEMO ANTI-NEOPL SQ/IM: CPT

## 2019-08-05 PROCEDURE — 25010000002 EPOETIN ALFA PER 1000 UNITS: Performed by: PHYSICIAN ASSISTANT

## 2019-08-05 PROCEDURE — 36415 COLL VENOUS BLD VENIPUNCTURE: CPT

## 2019-08-05 PROCEDURE — 85027 COMPLETE CBC AUTOMATED: CPT | Performed by: INTERNAL MEDICINE

## 2019-08-05 RX ADMIN — DENOSUMAB 60 MG: 60 INJECTION SUBCUTANEOUS at 11:36

## 2019-08-05 RX ADMIN — ERYTHROPOIETIN 40000 UNITS: 40000 INJECTION, SOLUTION INTRAVENOUS; SUBCUTANEOUS at 11:34

## 2019-08-19 ENCOUNTER — HOSPITAL ENCOUNTER (OUTPATIENT)
Dept: INFUSION THERAPY | Facility: HOSPITAL | Age: 79
Discharge: HOME | End: 2019-08-19
Admitting: INTERNAL MEDICINE

## 2019-08-19 ENCOUNTER — OFFICE VISIT (OUTPATIENT)
Dept: OBSTETRICS AND GYNECOLOGY | Facility: CLINIC | Age: 79
End: 2019-08-19

## 2019-08-19 VITALS
HEIGHT: 59 IN | SYSTOLIC BLOOD PRESSURE: 164 MMHG | BODY MASS INDEX: 22.86 KG/M2 | DIASTOLIC BLOOD PRESSURE: 78 MMHG | WEIGHT: 113.4 LBS

## 2019-08-19 VITALS
DIASTOLIC BLOOD PRESSURE: 82 MMHG | HEART RATE: 69 BPM | TEMPERATURE: 98.1 F | RESPIRATION RATE: 18 BRPM | SYSTOLIC BLOOD PRESSURE: 148 MMHG | OXYGEN SATURATION: 99 %

## 2019-08-19 DIAGNOSIS — N18.4 ANEMIA DUE TO STAGE 4 CHRONIC KIDNEY DISEASE TREATED WITH ERYTHROPOIETIN (HCC): Primary | ICD-10-CM

## 2019-08-19 DIAGNOSIS — N81.11 CYSTOCELE, MIDLINE: ICD-10-CM

## 2019-08-19 DIAGNOSIS — Z46.89 PESSARY MAINTENANCE: Primary | ICD-10-CM

## 2019-08-19 DIAGNOSIS — D63.1 ANEMIA DUE TO STAGE 4 CHRONIC KIDNEY DISEASE TREATED WITH ERYTHROPOIETIN (HCC): Primary | ICD-10-CM

## 2019-08-19 LAB
ALBUMIN SERPL-MCNC: 3.7 G/DL (ref 3.5–5.2)
ANION GAP SERPL CALCULATED.3IONS-SCNC: 12 MMOL/L (ref 5–15)
BUN BLD-MCNC: 39 MG/DL (ref 8–23)
BUN/CREAT SERPL: 15.4 (ref 7–25)
CALCIUM SPEC-SCNC: 8.8 MG/DL (ref 8.6–10.5)
CHLORIDE SERPL-SCNC: 105 MMOL/L (ref 98–107)
CO2 SERPL-SCNC: 22 MMOL/L (ref 22–29)
CREAT BLD-MCNC: 2.53 MG/DL (ref 0.57–1)
DEPRECATED RDW RBC AUTO: 57.7 FL (ref 37–54)
ERYTHROCYTE [DISTWIDTH] IN BLOOD BY AUTOMATED COUNT: 17.4 % (ref 12.3–15.4)
GFR SERPL CREATININE-BSD FRML MDRD: 18 ML/MIN/1.73
GLUCOSE BLD-MCNC: 93 MG/DL (ref 65–99)
HCT VFR BLD AUTO: 29.1 % (ref 34–46.6)
HGB BLD-MCNC: 8.6 G/DL (ref 12–15.9)
MCH RBC QN AUTO: 26.8 PG (ref 26.6–33)
MCHC RBC AUTO-ENTMCNC: 29.6 G/DL (ref 31.5–35.7)
MCV RBC AUTO: 90.7 FL (ref 79–97)
PHOSPHATE SERPL-MCNC: 3.4 MG/DL (ref 2.5–4.5)
PLATELET # BLD AUTO: 200 10*3/MM3 (ref 140–450)
PMV BLD AUTO: 9.8 FL (ref 6–12)
POTASSIUM BLD-SCNC: 4.5 MMOL/L (ref 3.5–5.2)
RBC # BLD AUTO: 3.21 10*6/MM3 (ref 3.77–5.28)
SODIUM BLD-SCNC: 139 MMOL/L (ref 136–145)
WBC NRBC COR # BLD: 3.63 10*3/MM3 (ref 3.4–10.8)

## 2019-08-19 PROCEDURE — 99212 OFFICE O/P EST SF 10 MIN: CPT | Performed by: PHYSICIAN ASSISTANT

## 2019-08-19 PROCEDURE — 80069 RENAL FUNCTION PANEL: CPT | Performed by: PHYSICIAN ASSISTANT

## 2019-08-19 PROCEDURE — 36415 COLL VENOUS BLD VENIPUNCTURE: CPT

## 2019-08-19 PROCEDURE — 85027 COMPLETE CBC AUTOMATED: CPT | Performed by: PHYSICIAN ASSISTANT

## 2019-08-19 PROCEDURE — 96372 THER/PROPH/DIAG INJ SC/IM: CPT

## 2019-08-19 PROCEDURE — 25010000002 EPOETIN ALFA PER 1000 UNITS: Performed by: PHYSICIAN ASSISTANT

## 2019-08-19 RX ADMIN — ERYTHROPOIETIN 40000 UNITS: 40000 INJECTION, SOLUTION INTRAVENOUS; SUBCUTANEOUS at 11:33

## 2019-08-19 NOTE — PROGRESS NOTES
"Subjective   Chief Complaint   Patient presents with   • Pessary Check     Doing well/ No complaints       Carolyne Martins is a 79 y.o. year old  presenting to be seen for routine pessary maintenance  She has no complaints or concerns  She is voiding well, no symptoms or urinary tract infection, no problems with bowel movements and no vaginal bleeding      Past Medical History:   Diagnosis Date   • Anemia    • Arthritis    • Bleeding from anus    • Bronchitis     STATES HAS BRONCHITIS CURRENTLY (17).     • Bronchitis 2017   • CAD (coronary artery disease)    • Cataract, bilateral    • Chronic kidney disease     STAGE 4.  SEES TERA   • Chronic kidney failure     REPORTS STAGE IV   • Difficulty swallowing solids    • Disease of thyroid gland    • Fracture, radius 2016    right distal radius and ulna, healed.   • GERD (gastroesophageal reflux disease)    • Gout    • High cholesterol    • History of nuclear stress test     DR. CORTEZ.  \"IT WAS OK\"   • Hyperparathyroidism (CMS/HCC)    • Hyperparathyroidism (CMS/HCC)    • Hypertension    • Iron deficiency    • Osteoarthritis    • Osteoarthritis of knees, bilateral     Both knees Supartz injection series    • Osteoporosis    • Personal history of cardiac murmur    • Presence of pessary    • Problems with swallowing     WITH FOOD OCCASSIONALLY   • Rheumatic heart disease     Personal history   • Rotator cuff tendonitis     right    • Rupture of right proximal biceps tendon     chronic   • Seasonal allergies    • Spinal headache     REPORTS AFTER DELIVERY OF SON   • Subacromial bursitis     right    • Valvular heart disease    • Vitamin B12 deficiency    • Vitamin D deficiency    • Wears glasses         Current Outpatient Medications:   •  allopurinol (ZYLOPRIM) 100 MG tablet, Take 1 tablet by mouth daily., Disp: , Rfl:   •  aspirin 81 MG tablet, Take 81 mg by mouth Daily., Disp: , Rfl:   •  budesonide-formoterol (SYMBICORT) 80-4.5 " MCG/ACT inhaler, Inhale 2 puffs 2 (Two) Times a Day., Disp: , Rfl:   •  carboxymethylcellulose (REFRESH PLUS) 0.5 % solution, 3 (Three) Times a Day As Needed for Dry Eyes., Disp: , Rfl:   •  carvedilol (COREG) 12.5 MG tablet, Take 12.5 mg by mouth Every Morning. 1 AM and 2 evening, Disp: , Rfl:   •  cholecalciferol (VITAMIN D3) 1000 UNITS tablet, Take 1,000 Units by mouth Daily., Disp: , Rfl:   •  Cyanocobalamin (VITAMIN B-12 PO), Take 500 mg by mouth 2 (Two) Times a Day., Disp: , Rfl:   •  epoetin ovidio (PROCRIT) 07031 UNIT/ML injection, Procrit SOLN; Patient Sig: Procrit SOLN ; 0; 16-Jul-2014; every 3 weeks, Disp: , Rfl:   •  fluticasone (FLONASE) 50 MCG/ACT nasal spray, 2 sprays into each nostril Daily As Needed., Disp: , Rfl:   •  irbesartan (AVAPRO) 150 MG tablet, Take 150 mg by mouth Daily., Disp: , Rfl:   •  Loratadine (CLARITIN) 10 MG capsule, Take 10 mg by mouth Daily As Needed (RHINITIS)., Disp: , Rfl:   •  omeprazole (PriLOSEC) 40 MG capsule, Take 40 mg by mouth Daily., Disp: , Rfl:   •  rosuvastatin (CRESTOR) 10 MG tablet, Take 10 mg by mouth Daily., Disp: , Rfl:   •  tetracycline (ACHROMYCIN,SUMYCIN) 250 MG capsule, Take 1 capsule by mouth 2 (Two) Times a Day., Disp: 10 capsule, Rfl: 0  •  torsemide (DEMADEX) 20 MG tablet, Take 10 mg by mouth. EDEMA- TAKE EVERY OTHER DAY, Disp: , Rfl:    Allergies   Allergen Reactions   • Ferrlecit [Na Ferric Gluc Cplx In Sucrose] Swelling   • Ranitidine Hcl Shortness Of Breath   • Levofloxacin Other (See Comments)     Leg cramps     • Lisinopril Cough      Past Surgical History:   Procedure Laterality Date   • CATARACT EXTRACTION Bilateral    • CERVICAL POLYPECTOMY     • COLONOSCOPY  2011   • COLONOSCOPY N/A 1/9/2018    Procedure: COLONOSCOPY with endoscopic mucosal resection (hot snare), normal saline submucosal injection, argon thermal ablation, resolution clip placement x4, and cold biopsy polypectomy;  Surgeon: Pieter Concepcion MD;  Location: [facility] ENDOSCOPY;   "Service:    • ENDOSCOPY N/A 12/6/2017    Procedure: ESOPHAGOGASTRODUODENOSCOPY with biopsies and hot snare polypectomies;  Surgeon: Pieter Concepcion MD;  Location: [facility] ENDOSCOPY;  Service:    • ENDOSCOPY N/A 1/15/2019    Procedure: ESOPHAGOGASTRODUODENOSCOPY WITH BIOPSIES AND HOT SNARE POLYPECTOMIES X10;  Surgeon: Pieter Concepcion MD;  Location: [facility] ENDOSCOPY;  Service: Gastroenterology   • UPPER GASTROINTESTINAL ENDOSCOPY  08/18/2014      Social History     Socioeconomic History   • Marital status:      Spouse name: Not on file   • Number of children: Not on file   • Years of education: Not on file   • Highest education level: Not on file   Tobacco Use   • Smoking status: Never Smoker   • Smokeless tobacco: Never Used   Substance and Sexual Activity   • Alcohol use: No   • Drug use: No   • Sexual activity: Not Currently      Family History   Problem Relation Age of Onset   • Liver cancer Maternal Grandmother    • Gout Other    • Osteoporosis Other    • Stroke Other    • Ulcers Other    • Asthma Other    • Hypertension Other    • Heart disease Other    • Osteoarthritis Other    • Colon cancer Neg Hx        Review of Systems   Constitutional: Negative.    Gastrointestinal: Negative.    Genitourinary: Negative.            Objective   /78   Ht 149.9 cm (59\")   Wt 51.4 kg (113 lb 6.4 oz)   LMP  (LMP Unknown)   Breastfeeding? No   BMI 22.90 kg/m²     Physical Exam   Constitutional: She appears well-developed and well-nourished. She is cooperative.   Abdominal: Soft. Normal appearance. There is no tenderness.   Genitourinary: There is no tenderness or lesion on the right labia. There is no tenderness or lesion on the left labia.   Genitourinary Comments: Ring with support pessary removed, cleaned and reinserted. No vaginal wall erosion or granulation tissue  2nd degree cystocele   Neurological: She is alert.   Skin: Skin is warm and dry.   Psychiatric: She has a normal mood and affect. Her behavior is " normal.            Assessment and Plan  Carolyne was seen today for pessary check.    Diagnoses and all orders for this visit:    Pessary maintenance    Cystocele, midline      Patient Instructions   RTO 3 months or prn             This note was electronically signed.    Yaz Woods PA-C   August 19, 2019

## 2019-09-03 ENCOUNTER — HOSPITAL ENCOUNTER (OUTPATIENT)
Dept: INFUSION THERAPY | Facility: HOSPITAL | Age: 79
Discharge: HOME | End: 2019-09-03
Admitting: PHYSICIAN ASSISTANT

## 2019-09-03 VITALS
TEMPERATURE: 98.6 F | OXYGEN SATURATION: 99 % | SYSTOLIC BLOOD PRESSURE: 129 MMHG | DIASTOLIC BLOOD PRESSURE: 74 MMHG | RESPIRATION RATE: 18 BRPM | HEART RATE: 71 BPM

## 2019-09-03 DIAGNOSIS — N18.4 ANEMIA DUE TO STAGE 4 CHRONIC KIDNEY DISEASE TREATED WITH ERYTHROPOIETIN (HCC): Primary | ICD-10-CM

## 2019-09-03 DIAGNOSIS — D63.1 ANEMIA DUE TO STAGE 4 CHRONIC KIDNEY DISEASE TREATED WITH ERYTHROPOIETIN (HCC): Primary | ICD-10-CM

## 2019-09-03 LAB
DEPRECATED RDW RBC AUTO: 58.2 FL (ref 37–54)
ERYTHROCYTE [DISTWIDTH] IN BLOOD BY AUTOMATED COUNT: 17.6 % (ref 12.3–15.4)
HCT VFR BLD AUTO: 28.4 % (ref 34–46.6)
HGB BLD-MCNC: 8.4 G/DL (ref 12–15.9)
MCH RBC QN AUTO: 26.9 PG (ref 26.6–33)
MCHC RBC AUTO-ENTMCNC: 29.6 G/DL (ref 31.5–35.7)
MCV RBC AUTO: 91 FL (ref 79–97)
PLATELET # BLD AUTO: 191 10*3/MM3 (ref 140–450)
PMV BLD AUTO: 9.6 FL (ref 6–12)
RBC # BLD AUTO: 3.12 10*6/MM3 (ref 3.77–5.28)
WBC NRBC COR # BLD: 3.57 10*3/MM3 (ref 3.4–10.8)

## 2019-09-03 PROCEDURE — 96372 THER/PROPH/DIAG INJ SC/IM: CPT

## 2019-09-03 PROCEDURE — 25010000002 EPOETIN ALFA PER 1000 UNITS: Performed by: PHYSICIAN ASSISTANT

## 2019-09-03 PROCEDURE — 36415 COLL VENOUS BLD VENIPUNCTURE: CPT

## 2019-09-03 PROCEDURE — 85027 COMPLETE CBC AUTOMATED: CPT | Performed by: INTERNAL MEDICINE

## 2019-09-03 RX ADMIN — ERYTHROPOIETIN 40000 UNITS: 40000 INJECTION, SOLUTION INTRAVENOUS; SUBCUTANEOUS at 11:24

## 2019-09-17 ENCOUNTER — HOSPITAL ENCOUNTER (OUTPATIENT)
Dept: INFUSION THERAPY | Facility: HOSPITAL | Age: 79
Discharge: HOME | End: 2019-09-17
Admitting: PHYSICIAN ASSISTANT

## 2019-09-17 VITALS
HEART RATE: 74 BPM | OXYGEN SATURATION: 99 % | SYSTOLIC BLOOD PRESSURE: 133 MMHG | DIASTOLIC BLOOD PRESSURE: 68 MMHG | TEMPERATURE: 97.9 F | RESPIRATION RATE: 18 BRPM

## 2019-09-17 DIAGNOSIS — N18.4 ANEMIA DUE TO STAGE 4 CHRONIC KIDNEY DISEASE TREATED WITH ERYTHROPOIETIN (HCC): Primary | ICD-10-CM

## 2019-09-17 DIAGNOSIS — D63.1 ANEMIA DUE TO STAGE 4 CHRONIC KIDNEY DISEASE TREATED WITH ERYTHROPOIETIN (HCC): Primary | ICD-10-CM

## 2019-09-17 LAB
DEPRECATED RDW RBC AUTO: 57.3 FL (ref 37–54)
ERYTHROCYTE [DISTWIDTH] IN BLOOD BY AUTOMATED COUNT: 17.9 % (ref 12.3–15.4)
HCT VFR BLD AUTO: 27.9 % (ref 34–46.6)
HGB BLD-MCNC: 8.4 G/DL (ref 12–15.9)
MCH RBC QN AUTO: 27 PG (ref 26.6–33)
MCHC RBC AUTO-ENTMCNC: 30.1 G/DL (ref 31.5–35.7)
MCV RBC AUTO: 89.7 FL (ref 79–97)
PLATELET # BLD AUTO: 203 10*3/MM3 (ref 140–450)
PMV BLD AUTO: 9.8 FL (ref 6–12)
RBC # BLD AUTO: 3.11 10*6/MM3 (ref 3.77–5.28)
WBC NRBC COR # BLD: 3.67 10*3/MM3 (ref 3.4–10.8)

## 2019-09-17 PROCEDURE — 25010000002 EPOETIN ALFA PER 1000 UNITS: Performed by: PHYSICIAN ASSISTANT

## 2019-09-17 PROCEDURE — 85027 COMPLETE CBC AUTOMATED: CPT | Performed by: INTERNAL MEDICINE

## 2019-09-17 PROCEDURE — 36415 COLL VENOUS BLD VENIPUNCTURE: CPT

## 2019-09-17 PROCEDURE — 96372 THER/PROPH/DIAG INJ SC/IM: CPT

## 2019-09-17 RX ORDER — ERYTHROMYCIN 5 MG/G
OINTMENT OPHTHALMIC NIGHTLY
Status: ON HOLD | COMMUNITY
End: 2021-03-30

## 2019-09-17 RX ADMIN — ERYTHROPOIETIN 40000 UNITS: 40000 INJECTION, SOLUTION INTRAVENOUS; SUBCUTANEOUS at 11:27

## 2019-09-24 DIAGNOSIS — M25.561 ARTHRALGIA OF BOTH KNEES: Primary | ICD-10-CM

## 2019-09-24 DIAGNOSIS — M25.562 ARTHRALGIA OF BOTH KNEES: Primary | ICD-10-CM

## 2019-09-25 ENCOUNTER — OFFICE VISIT (OUTPATIENT)
Dept: ORTHOPEDIC SURGERY | Facility: CLINIC | Age: 79
End: 2019-09-25

## 2019-09-25 VITALS — RESPIRATION RATE: 18 BRPM | BODY MASS INDEX: 22.78 KG/M2 | HEIGHT: 59 IN | WEIGHT: 113 LBS

## 2019-09-25 DIAGNOSIS — M17.12 PRIMARY LOCALIZED OSTEOARTHRITIS OF LEFT KNEE: ICD-10-CM

## 2019-09-25 DIAGNOSIS — M17.11 PRIMARY LOCALIZED OSTEOARTHRITIS OF RIGHT KNEE: Primary | ICD-10-CM

## 2019-09-25 PROCEDURE — 20610 DRAIN/INJ JOINT/BURSA W/O US: CPT | Performed by: PHYSICIAN ASSISTANT

## 2019-09-25 PROCEDURE — 99212 OFFICE O/P EST SF 10 MIN: CPT | Performed by: PHYSICIAN ASSISTANT

## 2019-09-25 RX ORDER — PERMETHRIN 50 MG/G
CREAM TOPICAL
Refills: 0 | COMMUNITY
Start: 2019-07-05 | End: 2019-12-16

## 2019-09-25 NOTE — PROGRESS NOTES
"Subjective   Patient ID: Carolyne Martins is a 79 y.o. female  Pain of the Left Knee (Requesting Supartz bilaterally, had right knee Supartz 4/2018) and Pain of the Right Knee         History of Present Illness  Patient presents with chronic bilateral knee pain left greater than right ongoing for many years.  She has had Supartz injection in the past which has helped minimally.  She denies injury or trauma.  Denies redness or warmth to either knee.                                                 Past Medical History:   Diagnosis Date   • Anemia 1998   • Arthritis    • Bleeding from anus    • Bronchitis     STATES HAS BRONCHITIS CURRENTLY (12/5/17).     • Bronchitis 12/2017   • CAD (coronary artery disease)    • Cataract, bilateral    • Chronic kidney disease     STAGE 4.  SEES TERA   • Chronic kidney failure     REPORTS STAGE IV   • Difficulty swallowing solids    • Disease of thyroid gland    • Fracture, radius 2016    right distal radius and ulna, healed.   • GERD (gastroesophageal reflux disease)    • Gout    • High cholesterol    • History of nuclear stress test 2014    DR. CORTEZ.  \"IT WAS OK\"   • Hyperparathyroidism (CMS/HCC)    • Hyperparathyroidism (CMS/HCC)    • Hypertension    • Iron deficiency    • Osteoarthritis    • Osteoarthritis of knees, bilateral     Both knees Supartz injection series August, 2015   • Osteoporosis    • Personal history of cardiac murmur    • Presence of pessary    • Problems with swallowing     WITH FOOD OCCASSIONALLY   • Rheumatic heart disease     Personal history   • Rotator cuff tendonitis     right    • Rupture of right proximal biceps tendon     chronic   • Seasonal allergies    • Spinal headache     REPORTS AFTER DELIVERY OF SON   • Subacromial bursitis     right    • Valvular heart disease    • Vitamin B12 deficiency    • Vitamin D deficiency    • Wears glasses         Past Surgical History:   Procedure Laterality Date   • CATARACT EXTRACTION Bilateral    • CERVICAL " POLYPECTOMY     • COLONOSCOPY  2011   • COLONOSCOPY N/A 1/9/2018    Procedure: COLONOSCOPY with endoscopic mucosal resection (hot snare), normal saline submucosal injection, argon thermal ablation, resolution clip placement x4, and cold biopsy polypectomy;  Surgeon: Pieter Concepcion MD;  Location: [facility] ENDOSCOPY;  Service:    • ENDOSCOPY N/A 12/6/2017    Procedure: ESOPHAGOGASTRODUODENOSCOPY with biopsies and hot snare polypectomies;  Surgeon: Pieter Concepcion MD;  Location: [facility] ENDOSCOPY;  Service:    • ENDOSCOPY N/A 1/15/2019    Procedure: ESOPHAGOGASTRODUODENOSCOPY WITH BIOPSIES AND HOT SNARE POLYPECTOMIES X10;  Surgeon: Pieter Concepcion MD;  Location: [facility] ENDOSCOPY;  Service: Gastroenterology   • UPPER GASTROINTESTINAL ENDOSCOPY  08/18/2014       Family History   Problem Relation Age of Onset   • Liver cancer Maternal Grandmother    • Gout Other    • Osteoporosis Other    • Stroke Other    • Ulcers Other    • Asthma Other    • Hypertension Other    • Heart disease Other    • Osteoarthritis Other    • Colon cancer Neg Hx        Social History     Socioeconomic History   • Marital status:      Spouse name: Not on file   • Number of children: Not on file   • Years of education: Not on file   • Highest education level: Not on file   Tobacco Use   • Smoking status: Never Smoker   • Smokeless tobacco: Never Used   Substance and Sexual Activity   • Alcohol use: No   • Drug use: No   • Sexual activity: Not Currently         Current Outpatient Medications:   •  allopurinol (ZYLOPRIM) 100 MG tablet, Take 1 tablet by mouth daily., Disp: , Rfl:   •  aspirin 81 MG tablet, Take 81 mg by mouth Daily., Disp: , Rfl:   •  budesonide-formoterol (SYMBICORT) 80-4.5 MCG/ACT inhaler, Inhale 2 puffs 2 (Two) Times a Day., Disp: , Rfl:   •  carboxymethylcellulose (REFRESH PLUS) 0.5 % solution, 3 (Three) Times a Day As Needed for Dry Eyes., Disp: , Rfl:   •  carvedilol (COREG) 12.5 MG tablet, Take 12.5 mg by mouth Every Morning.  1 AM and 2 evening, Disp: , Rfl:   •  cholecalciferol (VITAMIN D3) 1000 UNITS tablet, Take 1,000 Units by mouth Daily., Disp: , Rfl:   •  Cyanocobalamin (VITAMIN B-12 PO), Take 500 mg by mouth 2 (Two) Times a Day., Disp: , Rfl:   •  epoetin ovidio (PROCRIT) 18629 UNIT/ML injection, Procrit SOLN; Patient Sig: Procrit SOLN ; 0; 16-Jul-2014; every 3 weeks, Disp: , Rfl:   •  erythromycin (ROMYCIN) 5 MG/GM ophthalmic ointment, Administer  to both eyes Every Night., Disp: , Rfl:   •  fluticasone (FLONASE) 50 MCG/ACT nasal spray, 2 sprays into each nostril Daily As Needed., Disp: , Rfl:   •  irbesartan (AVAPRO) 150 MG tablet, Take 150 mg by mouth Daily., Disp: , Rfl:   •  Loratadine (CLARITIN) 10 MG capsule, Take 10 mg by mouth Daily As Needed (RHINITIS)., Disp: , Rfl:   •  omeprazole (PriLOSEC) 40 MG capsule, Take 40 mg by mouth Daily., Disp: , Rfl:   •  permethrin (ELIMITE) 5 % cream, massage thoroughly into skin from head to soles of feet leave on for 8 to 12 hours then wash off, Disp: , Rfl: 0  •  rosuvastatin (CRESTOR) 10 MG tablet, Take 10 mg by mouth Daily., Disp: , Rfl:   •  tetracycline (ACHROMYCIN,SUMYCIN) 250 MG capsule, Take 1 capsule by mouth 2 (Two) Times a Day., Disp: 10 capsule, Rfl: 0  •  torsemide (DEMADEX) 20 MG tablet, Take 10 mg by mouth. EDEMA- TAKE EVERY OTHER DAY, Disp: , Rfl:     Allergies   Allergen Reactions   • Ferrlecit [Na Ferric Gluc Cplx In Sucrose] Swelling   • Ranitidine Hcl Shortness Of Breath   • Levofloxacin Other (See Comments)     Leg cramps     • Lisinopril Cough       Review of Systems   Constitutional: Negative for diaphoresis, fever and unexpected weight change.   HENT: Negative for dental problem and sore throat.    Eyes: Negative for visual disturbance.   Respiratory: Negative for shortness of breath.    Cardiovascular: Negative for chest pain.   Gastrointestinal: Negative for abdominal pain, constipation, diarrhea, nausea and vomiting.   Genitourinary: Negative for difficulty  "urinating and frequency.   Musculoskeletal: Positive for arthralgias (bilateral knee) and joint swelling.   Neurological: Negative for headaches.   Hematological: Does not bruise/bleed easily.       I have reviewed the above medical and surgical history, family history, social history, medications, allergies and review of systems.    Objective   Resp 18   Ht 149.9 cm (59\")   Wt 51.3 kg (113 lb)   LMP  (LMP Unknown)   BMI 22.82 kg/m²    Physical Exam   Constitutional: She appears well-developed and well-nourished.   Neck: No tracheal deviation present.   Pulmonary/Chest: Effort normal.   Musculoskeletal:        Right knee: She exhibits no swelling, no effusion, no ecchymosis and no erythema. Tenderness found. Medial joint line and lateral joint line tenderness noted.        Left knee: She exhibits no swelling, no effusion, no ecchymosis and no erythema. Tenderness found. Medial joint line and lateral joint line tenderness noted.   Neurological: She is alert.   Psychiatric: She has a normal mood and affect.   Nursing note and vitals reviewed.    Right Knee Exam     Range of Motion   Extension: 5   Flexion: 100     Other   Erythema: absent  Effusion: no effusion present      Left Knee Exam     Range of Motion   Extension: 5   Flexion: 90     Other   Erythema: absent  Effusion: no effusion present           Extremity DVT signs are Negative on physical exam with negative Daniele sign, with no calf pain, with no palpable cords, with no increased pain with passive stretch/extension and with no skin tone change   Neurologic Exam       Assessment/Plan   Independent Review of Radiographic Studies:    Indication to evaluate joint condition, and compared with prior images, shows evident chronic advanced osteoarthritis.      Large Joint Arthrocentesis: R knee  Date/Time: 9/25/2019 1:57 PM  Consent given by: patient  Site marked: site marked  Timeout: Immediately prior to procedure a time out was called to verify the correct " patient, procedure, equipment, support staff and site/side marked as required   Supporting Documentation  Indications: pain   Procedure Details  Location: knee - R knee  Preparation: Patient was prepped and draped in the usual sterile fashion  Needle size: 20 G (21g)  Approach: anterolateral  Medications administered: 25 mg sodium hyaluronate 25 MG/2.5ML  Patient tolerance: patient tolerated the procedure well with no immediate complications    Large Joint Arthrocentesis: L knee  Date/Time: 9/25/2019 1:58 PM  Consent given by: patient  Site marked: site marked  Timeout: Immediately prior to procedure a time out was called to verify the correct patient, procedure, equipment, support staff and site/side marked as required   Supporting Documentation  Indications: pain   Procedure Details  Location: knee - L knee  Preparation: Patient was prepped and draped in the usual sterile fashion  Needle size: 20 G (21g)  Approach: anterolateral  Medications administered: 25 mg sodium hyaluronate 25 MG/2.5ML  Patient tolerance: patient tolerated the procedure well with no immediate complications             Carolyne was seen today for pain and pain.    Diagnoses and all orders for this visit:    Primary localized osteoarthritis of right knee  -     Large Joint Arthrocentesis: R knee    Primary localized osteoarthritis of left knee  -     Large Joint Arthrocentesis: L knee       Orthopedic activities reviewed and patient expressed appreciation  Discussion of orthopedic goals  Risk, benefits, and merits of treatment alternatives reviewed with the patient and questions answered  Call or notify for any adverse effect from injection therapy    Recommendations/Plan:  Patient is encouraged to call or return for any issues or concerns.    FU in 1 week    Patient agreeable to call or return sooner for any concerns.           EMR Dragon-transcription disclaimer:  This encounter note is an electronic transcription of spoken language to printed  text.  Electronic transcription of spoken language may permit erroneous or at times nonsensical words or phrases to be inadvertently transcribed.  Although I have reviewed the note for such errors, some may still exist

## 2019-10-01 ENCOUNTER — HOSPITAL ENCOUNTER (OUTPATIENT)
Dept: INFUSION THERAPY | Facility: HOSPITAL | Age: 79
Discharge: HOME | End: 2019-10-01
Admitting: INTERNAL MEDICINE

## 2019-10-01 ENCOUNTER — OFFICE VISIT (OUTPATIENT)
Dept: ORTHOPEDIC SURGERY | Facility: CLINIC | Age: 79
End: 2019-10-01

## 2019-10-01 VITALS — BODY MASS INDEX: 22.38 KG/M2 | WEIGHT: 111 LBS | HEIGHT: 59 IN | RESPIRATION RATE: 18 BRPM

## 2019-10-01 VITALS
HEART RATE: 72 BPM | WEIGHT: 111 LBS | BODY MASS INDEX: 22.38 KG/M2 | HEIGHT: 59 IN | OXYGEN SATURATION: 99 % | RESPIRATION RATE: 16 BRPM | TEMPERATURE: 98.5 F | DIASTOLIC BLOOD PRESSURE: 70 MMHG | SYSTOLIC BLOOD PRESSURE: 150 MMHG

## 2019-10-01 DIAGNOSIS — M17.11 PRIMARY LOCALIZED OSTEOARTHRITIS OF RIGHT KNEE: Primary | ICD-10-CM

## 2019-10-01 DIAGNOSIS — N18.4 ANEMIA DUE TO STAGE 4 CHRONIC KIDNEY DISEASE TREATED WITH ERYTHROPOIETIN (HCC): Primary | ICD-10-CM

## 2019-10-01 DIAGNOSIS — D63.1 ANEMIA DUE TO STAGE 4 CHRONIC KIDNEY DISEASE TREATED WITH ERYTHROPOIETIN (HCC): Primary | ICD-10-CM

## 2019-10-01 DIAGNOSIS — M17.12 PRIMARY LOCALIZED OSTEOARTHRITIS OF LEFT KNEE: ICD-10-CM

## 2019-10-01 LAB
25(OH)D3 SERPL-MCNC: 46.1 NG/ML (ref 30–100)
ALBUMIN SERPL-MCNC: 4 G/DL (ref 3.5–5.2)
ALBUMIN/GLOB SERPL: 1.4 G/DL
ALP SERPL-CCNC: 61 U/L (ref 39–117)
ALT SERPL W P-5'-P-CCNC: 9 U/L (ref 1–33)
ANION GAP SERPL CALCULATED.3IONS-SCNC: 12.8 MMOL/L (ref 5–15)
AST SERPL-CCNC: 20 U/L (ref 1–32)
BILIRUB SERPL-MCNC: 0.4 MG/DL (ref 0.2–1.2)
BUN BLD-MCNC: 47 MG/DL (ref 8–23)
BUN/CREAT SERPL: 18.4 (ref 7–25)
CALCIUM SPEC-SCNC: 9.2 MG/DL (ref 8.6–10.5)
CHLORIDE SERPL-SCNC: 103 MMOL/L (ref 98–107)
CO2 SERPL-SCNC: 21.2 MMOL/L (ref 22–29)
CREAT BLD-MCNC: 2.55 MG/DL (ref 0.57–1)
CREAT UR-MCNC: 118.6 MG/DL
DEPRECATED RDW RBC AUTO: 58.7 FL (ref 37–54)
ERYTHROCYTE [DISTWIDTH] IN BLOOD BY AUTOMATED COUNT: 18.2 % (ref 12.3–15.4)
GFR SERPL CREATININE-BSD FRML MDRD: 18 ML/MIN/1.73
GLOBULIN UR ELPH-MCNC: 2.8 GM/DL
GLUCOSE BLD-MCNC: 93 MG/DL (ref 65–99)
HCT VFR BLD AUTO: 27.4 % (ref 34–46.6)
HGB BLD-MCNC: 8.1 G/DL (ref 12–15.9)
MCH RBC QN AUTO: 26.6 PG (ref 26.6–33)
MCHC RBC AUTO-ENTMCNC: 29.6 G/DL (ref 31.5–35.7)
MCV RBC AUTO: 89.8 FL (ref 79–97)
PHOSPHATE SERPL-MCNC: 4.3 MG/DL (ref 2.5–4.5)
PLATELET # BLD AUTO: 193 10*3/MM3 (ref 140–450)
PMV BLD AUTO: 9.7 FL (ref 6–12)
POTASSIUM BLD-SCNC: 4.9 MMOL/L (ref 3.5–5.2)
PROT SERPL-MCNC: 6.8 G/DL (ref 6–8.5)
PROT UR-MCNC: 43 MG/DL
PROT/CREAT UR: 362.6 MG/G CREA (ref 0–200)
PTH-INTACT SERPL-MCNC: 105 PG/ML (ref 15–65)
RBC # BLD AUTO: 3.05 10*6/MM3 (ref 3.77–5.28)
SODIUM BLD-SCNC: 137 MMOL/L (ref 136–145)
URATE SERPL-MCNC: 5.7 MG/DL (ref 2.4–5.7)
WBC NRBC COR # BLD: 3.61 10*3/MM3 (ref 3.4–10.8)

## 2019-10-01 PROCEDURE — 36415 COLL VENOUS BLD VENIPUNCTURE: CPT

## 2019-10-01 PROCEDURE — 25010000002 EPOETIN ALFA PER 1000 UNITS: Performed by: PHYSICIAN ASSISTANT

## 2019-10-01 PROCEDURE — 85027 COMPLETE CBC AUTOMATED: CPT | Performed by: INTERNAL MEDICINE

## 2019-10-01 PROCEDURE — 84100 ASSAY OF PHOSPHORUS: CPT | Performed by: INTERNAL MEDICINE

## 2019-10-01 PROCEDURE — 82306 VITAMIN D 25 HYDROXY: CPT | Performed by: INTERNAL MEDICINE

## 2019-10-01 PROCEDURE — 82570 ASSAY OF URINE CREATININE: CPT | Performed by: INTERNAL MEDICINE

## 2019-10-01 PROCEDURE — 84156 ASSAY OF PROTEIN URINE: CPT | Performed by: INTERNAL MEDICINE

## 2019-10-01 PROCEDURE — 83970 ASSAY OF PARATHORMONE: CPT | Performed by: INTERNAL MEDICINE

## 2019-10-01 PROCEDURE — 96372 THER/PROPH/DIAG INJ SC/IM: CPT

## 2019-10-01 PROCEDURE — 80053 COMPREHEN METABOLIC PANEL: CPT | Performed by: INTERNAL MEDICINE

## 2019-10-01 PROCEDURE — 84550 ASSAY OF BLOOD/URIC ACID: CPT | Performed by: INTERNAL MEDICINE

## 2019-10-01 PROCEDURE — 20610 DRAIN/INJ JOINT/BURSA W/O US: CPT | Performed by: ORTHOPAEDIC SURGERY

## 2019-10-01 RX ADMIN — ERYTHROPOIETIN 40000 UNITS: 40000 INJECTION, SOLUTION INTRAVENOUS; SUBCUTANEOUS at 11:48

## 2019-10-01 NOTE — PROGRESS NOTES
"Subjective   Carolyne Martins is a 79 y.o. female here today for injection therapy.    Chief Complaint   Patient presents with   • Left Knee - Injections   • Right Knee - Injections     Bilateral Supartz # 2       Past Medical History:   Diagnosis Date   • Anemia 1998   • Arthritis    • Bleeding from anus    • Bronchitis     STATES HAS BRONCHITIS CURRENTLY (12/5/17).     • Bronchitis 12/2017   • CAD (coronary artery disease)    • Cataract, bilateral    • Chronic kidney disease     STAGE 4.  SEES TERA   • Chronic kidney failure     REPORTS STAGE IV   • Difficulty swallowing solids    • Disease of thyroid gland    • Fracture, radius 2016    right distal radius and ulna, healed.   • GERD (gastroesophageal reflux disease)    • Gout    • High cholesterol    • History of nuclear stress test 2014    DR. CORTEZ.  \"IT WAS OK\"   • Hyperparathyroidism (CMS/HCC)    • Hyperparathyroidism (CMS/HCC)    • Hypertension    • Iron deficiency    • Osteoarthritis    • Osteoarthritis of knees, bilateral     Both knees Supartz injection series August, 2015   • Osteoporosis    • Personal history of cardiac murmur    • Presence of pessary    • Problems with swallowing     WITH FOOD OCCASSIONALLY   • Rheumatic heart disease     Personal history   • Rotator cuff tendonitis     right    • Rupture of right proximal biceps tendon     chronic   • Seasonal allergies    • Spinal headache     REPORTS AFTER DELIVERY OF SON   • Subacromial bursitis     right    • Valvular heart disease    • Vitamin B12 deficiency    • Vitamin D deficiency    • Wears glasses         Past Surgical History:   Procedure Laterality Date   • CATARACT EXTRACTION Bilateral    • CERVICAL POLYPECTOMY     • COLONOSCOPY  2011   • COLONOSCOPY N/A 1/9/2018    Procedure: COLONOSCOPY with endoscopic mucosal resection (hot snare), normal saline submucosal injection, argon thermal ablation, resolution clip placement x4, and cold biopsy polypectomy;  Surgeon: Pieter Concepcion MD;  " "Location: [facility] ENDOSCOPY;  Service:    • ENDOSCOPY N/A 12/6/2017    Procedure: ESOPHAGOGASTRODUODENOSCOPY with biopsies and hot snare polypectomies;  Surgeon: Pieter Concepcion MD;  Location: [facility] ENDOSCOPY;  Service:    • ENDOSCOPY N/A 1/15/2019    Procedure: ESOPHAGOGASTRODUODENOSCOPY WITH BIOPSIES AND HOT SNARE POLYPECTOMIES X10;  Surgeon: Pieter Concepcion MD;  Location: [facility] ENDOSCOPY;  Service: Gastroenterology   • UPPER GASTROINTESTINAL ENDOSCOPY  08/18/2014       Allergies   Allergen Reactions   • Ferrlecit [Na Ferric Gluc Cplx In Sucrose] Swelling   • Ranitidine Hcl Shortness Of Breath   • Levofloxacin Other (See Comments)     Leg cramps     • Lisinopril Cough       Objective   Resp 18   Ht 149.9 cm (59\")   Wt 50.3 kg (111 lb)   LMP  (LMP Unknown)   BMI 22.42 kg/m²    Physical Exam  Skin exam stable with no erythema, ecchymosis or rash. No new swelling. No motor or sensory changes. Distal pulse intact.    Large Joint Arthrocentesis: R knee  Date/Time: 10/1/2019 2:18 PM  Consent given by: patient  Site marked: site marked  Timeout: Immediately prior to procedure a time out was called to verify the correct patient, procedure, equipment, support staff and site/side marked as required   Supporting Documentation  Indications: pain   Procedure Details  Location: knee - R knee  Preparation: Patient was prepped and draped in the usual sterile fashion  Needle gauge: 21g.  Medications administered: 25 mg sodium hyaluronate 25 MG/2.5ML  Patient tolerance: patient tolerated the procedure well with no immediate complications    Large Joint Arthrocentesis: L knee  Date/Time: 10/1/2019 2:19 PM  Consent given by: patient  Site marked: site marked  Timeout: Immediately prior to procedure a time out was called to verify the correct patient, procedure, equipment, support staff and site/side marked as required   Supporting Documentation  Indications: pain   Procedure Details  Location: knee - L knee  Preparation: Patient " was prepped and draped in the usual sterile fashion  Needle gauge: 21g.  Medications administered: 25 mg sodium hyaluronate 25 MG/2.5ML  Patient tolerance: patient tolerated the procedure well with no immediate complications          Assessment/Plan     No diagnosis found.    No complications of injection noted.    Discussion of orthopaedic goals and activities and patient and/or guardian expressed appreciation. Call or notify for any adverse effect from injection therapy. Ice, heat, and/or modalities as beneficial. Watch for signs and symptoms of infection.    Recommendations:  Work/Activity Status: May perform usual activities as tolerated. May return to routine exercise and physical work as tolerated. No strenuous activity.  Patient and/or guardian is encouraged to call or return for any issues or concerns.      Patient agreeable to call or return sooner for any concerns.

## 2019-10-08 ENCOUNTER — APPOINTMENT (OUTPATIENT)
Dept: INFUSION THERAPY | Facility: HOSPITAL | Age: 79
End: 2019-10-08

## 2019-10-11 ENCOUNTER — OFFICE VISIT (OUTPATIENT)
Dept: ORTHOPEDIC SURGERY | Facility: CLINIC | Age: 79
End: 2019-10-11

## 2019-10-11 VITALS — WEIGHT: 111 LBS | BODY MASS INDEX: 22.38 KG/M2 | HEIGHT: 59 IN | RESPIRATION RATE: 18 BRPM

## 2019-10-11 DIAGNOSIS — M17.11 PRIMARY LOCALIZED OSTEOARTHRITIS OF RIGHT KNEE: Primary | ICD-10-CM

## 2019-10-11 DIAGNOSIS — M17.12 PRIMARY LOCALIZED OSTEOARTHRITIS OF LEFT KNEE: ICD-10-CM

## 2019-10-11 PROCEDURE — 20610 DRAIN/INJ JOINT/BURSA W/O US: CPT | Performed by: PHYSICIAN ASSISTANT

## 2019-10-11 NOTE — PROGRESS NOTES
"Subjective   Carolyne Martins is a 79 y.o. female here today for injection therapy.    Chief Complaint   Patient presents with   • Left Knee - Injections   • Right Knee - Injections     Supartz # 3       Past Medical History:   Diagnosis Date   • Anemia 1998   • Arthritis    • Bleeding from anus    • Bronchitis     STATES HAS BRONCHITIS CURRENTLY (12/5/17).     • Bronchitis 12/2017   • CAD (coronary artery disease)    • Cataract, bilateral    • Chronic kidney disease     STAGE 4.  SEES TERA   • Chronic kidney failure     REPORTS STAGE IV   • Difficulty swallowing solids    • Disease of thyroid gland    • Fracture, radius 2016    right distal radius and ulna, healed.   • GERD (gastroesophageal reflux disease)    • Gout    • High cholesterol    • History of nuclear stress test 2014    DR. CORTEZ.  \"IT WAS OK\"   • Hyperparathyroidism (CMS/HCC)    • Hyperparathyroidism (CMS/HCC)    • Hypertension    • Iron deficiency    • Osteoarthritis    • Osteoarthritis of knees, bilateral     Both knees Supartz injection series August, 2015   • Osteoporosis    • Personal history of cardiac murmur    • Presence of pessary    • Problems with swallowing     WITH FOOD OCCASSIONALLY   • Rheumatic heart disease     Personal history   • Rotator cuff tendonitis     right    • Rupture of right proximal biceps tendon     chronic   • Seasonal allergies    • Spinal headache     REPORTS AFTER DELIVERY OF SON   • Subacromial bursitis     right    • Valvular heart disease    • Vitamin B12 deficiency    • Vitamin D deficiency    • Wears glasses          Past Surgical History:   Procedure Laterality Date   • CATARACT EXTRACTION Bilateral    • CERVICAL POLYPECTOMY     • COLONOSCOPY  2011   • COLONOSCOPY N/A 1/9/2018    Procedure: COLONOSCOPY with endoscopic mucosal resection (hot snare), normal saline submucosal injection, argon thermal ablation, resolution clip placement x4, and cold biopsy polypectomy;  Surgeon: Pieter Concepcion MD;  Location: [facility]" "[facility] ENDOSCOPY;  Service:    • ENDOSCOPY N/A 12/6/2017    Procedure: ESOPHAGOGASTRODUODENOSCOPY with biopsies and hot snare polypectomies;  Surgeon: Pieter Concepcion MD;  Location: [facility] ENDOSCOPY;  Service:    • ENDOSCOPY N/A 1/15/2019    Procedure: ESOPHAGOGASTRODUODENOSCOPY WITH BIOPSIES AND HOT SNARE POLYPECTOMIES X10;  Surgeon: Pieter Concepcion MD;  Location: [facility] ENDOSCOPY;  Service: Gastroenterology   • UPPER GASTROINTESTINAL ENDOSCOPY  08/18/2014       Allergies   Allergen Reactions   • Ferrlecit [Na Ferric Gluc Cplx In Sucrose] Swelling   • Ranitidine Hcl Shortness Of Breath   • Levofloxacin Other (See Comments)     Leg cramps     • Lisinopril Cough       Objective   Resp 18   Ht 149.9 cm (59\")   Wt 50.3 kg (111 lb)   LMP  (LMP Unknown)   BMI 22.42 kg/m²    Physical Exam    Skin exam stable with no erythema, ecchymosis or rash.  No new swelling.  No motor or sensory changes.  Distal pulse intact.    Large Joint Arthrocentesis: R knee  Date/Time: 10/11/2019 11:42 AM  Consent given by: patient  Site marked: site marked  Timeout: Immediately prior to procedure a time out was called to verify the correct patient, procedure, equipment, support staff and site/side marked as required   Supporting Documentation  Indications: pain   Procedure Details  Location: knee - R knee  Preparation: Patient was prepped and draped in the usual sterile fashion  Needle size: 22 G (21g)  Approach: anterolateral  Medications administered: 25 mg sodium hyaluronate 25 MG/2.5ML  Patient tolerance: patient tolerated the procedure well with no immediate complications    Large Joint Arthrocentesis: L knee  Date/Time: 10/11/2019 11:44 AM  Consent given by: patient  Site marked: site marked  Timeout: Immediately prior to procedure a time out was called to verify the correct patient, procedure, equipment, support staff and site/side marked as required   Supporting Documentation  Indications: pain   Procedure Details  Location: knee - L " knee  Preparation: Patient was prepped and draped in the usual sterile fashion  Needle size: 22 G (21g)  Approach: anterolateral  Medications administered: 25 mg sodium hyaluronate 25 MG/2.5ML  Patient tolerance: patient tolerated the procedure well with no immediate complications          Assessment/Plan      Diagnosis Plan   1. Primary localized osteoarthritis of right knee  Large Joint Arthrocentesis: R knee   2. Primary localized osteoarthritis of left knee  Large Joint Arthrocentesis: L knee       Ice, heat, and/or modalities as beneficial  Watch for signs and symptoms of infection  Guided on proper techniques for mobility, strength, agility and/or conditioning exercises    Recommendations:  No strenuous activity.    FU in 1 week    Patient agreeable to call or return sooner for any concerns.        - - -

## 2019-10-15 ENCOUNTER — HOSPITAL ENCOUNTER (OUTPATIENT)
Dept: INFUSION THERAPY | Facility: HOSPITAL | Age: 79
Discharge: HOME | End: 2019-10-15
Admitting: INTERNAL MEDICINE

## 2019-10-15 ENCOUNTER — OFFICE VISIT (OUTPATIENT)
Dept: ORTHOPEDIC SURGERY | Facility: CLINIC | Age: 79
End: 2019-10-15

## 2019-10-15 VITALS
TEMPERATURE: 98.4 F | DIASTOLIC BLOOD PRESSURE: 72 MMHG | SYSTOLIC BLOOD PRESSURE: 142 MMHG | HEART RATE: 75 BPM | RESPIRATION RATE: 18 BRPM | OXYGEN SATURATION: 99 %

## 2019-10-15 VITALS — RESPIRATION RATE: 18 BRPM | BODY MASS INDEX: 22.38 KG/M2 | WEIGHT: 111 LBS | HEIGHT: 59 IN

## 2019-10-15 DIAGNOSIS — D63.1 ANEMIA DUE TO STAGE 4 CHRONIC KIDNEY DISEASE TREATED WITH ERYTHROPOIETIN (HCC): Primary | ICD-10-CM

## 2019-10-15 DIAGNOSIS — M17.12 PRIMARY LOCALIZED OSTEOARTHRITIS OF LEFT KNEE: ICD-10-CM

## 2019-10-15 DIAGNOSIS — N18.4 ANEMIA DUE TO STAGE 4 CHRONIC KIDNEY DISEASE TREATED WITH ERYTHROPOIETIN (HCC): Primary | ICD-10-CM

## 2019-10-15 DIAGNOSIS — M17.11 PRIMARY LOCALIZED OSTEOARTHRITIS OF RIGHT KNEE: Primary | ICD-10-CM

## 2019-10-15 LAB
25(OH)D3 SERPL-MCNC: 57.3 NG/ML (ref 30–100)
CREAT UR-MCNC: 135.4 MG/DL
DEPRECATED RDW RBC AUTO: 58.4 FL (ref 37–54)
ERYTHROCYTE [DISTWIDTH] IN BLOOD BY AUTOMATED COUNT: 17.8 % (ref 12.3–15.4)
FERRITIN SERPL-MCNC: 11.7 NG/ML (ref 13–150)
FOLATE SERPL-MCNC: >20 NG/ML (ref 4.78–24.2)
HCT VFR BLD AUTO: 28.9 % (ref 34–46.6)
HGB BLD-MCNC: 8.8 G/DL (ref 12–15.9)
IRON 24H UR-MRATE: 55 MCG/DL (ref 37–145)
IRON SATN MFR SERPL: 12 % (ref 20–50)
MCH RBC QN AUTO: 27.3 PG (ref 26.6–33)
MCHC RBC AUTO-ENTMCNC: 30.4 G/DL (ref 31.5–35.7)
MCV RBC AUTO: 89.8 FL (ref 79–97)
PLATELET # BLD AUTO: 215 10*3/MM3 (ref 140–450)
PMV BLD AUTO: 9.7 FL (ref 6–12)
PROT UR-MCNC: 39 MG/DL
PROT/CREAT UR: 288 MG/G CREA (ref 0–200)
PTH-INTACT SERPL-MCNC: 242 PG/ML (ref 15–65)
RBC # BLD AUTO: 3.22 10*6/MM3 (ref 3.77–5.28)
RETICS # AUTO: 0.02 10*6/MM3 (ref 0.02–0.13)
RETICS/RBC NFR AUTO: 0.75 % (ref 0.7–1.9)
TIBC SERPL-MCNC: 471 MCG/DL (ref 298–536)
TRANSFERRIN SERPL-MCNC: 316 MG/DL (ref 200–360)
VIT B12 BLD-MCNC: >2000 PG/ML (ref 211–946)
WBC NRBC COR # BLD: 7.14 10*3/MM3 (ref 3.4–10.8)

## 2019-10-15 PROCEDURE — 82306 VITAMIN D 25 HYDROXY: CPT | Performed by: INTERNAL MEDICINE

## 2019-10-15 PROCEDURE — 83540 ASSAY OF IRON: CPT | Performed by: INTERNAL MEDICINE

## 2019-10-15 PROCEDURE — 82570 ASSAY OF URINE CREATININE: CPT | Performed by: INTERNAL MEDICINE

## 2019-10-15 PROCEDURE — 82728 ASSAY OF FERRITIN: CPT | Performed by: INTERNAL MEDICINE

## 2019-10-15 PROCEDURE — 84466 ASSAY OF TRANSFERRIN: CPT | Performed by: INTERNAL MEDICINE

## 2019-10-15 PROCEDURE — 82607 VITAMIN B-12: CPT | Performed by: INTERNAL MEDICINE

## 2019-10-15 PROCEDURE — 85027 COMPLETE CBC AUTOMATED: CPT | Performed by: INTERNAL MEDICINE

## 2019-10-15 PROCEDURE — 96372 THER/PROPH/DIAG INJ SC/IM: CPT

## 2019-10-15 PROCEDURE — 36415 COLL VENOUS BLD VENIPUNCTURE: CPT

## 2019-10-15 PROCEDURE — 85045 AUTOMATED RETICULOCYTE COUNT: CPT | Performed by: INTERNAL MEDICINE

## 2019-10-15 PROCEDURE — 83970 ASSAY OF PARATHORMONE: CPT | Performed by: INTERNAL MEDICINE

## 2019-10-15 PROCEDURE — 25010000002 EPOETIN ALFA PER 1000 UNITS: Performed by: PHYSICIAN ASSISTANT

## 2019-10-15 PROCEDURE — 82746 ASSAY OF FOLIC ACID SERUM: CPT | Performed by: INTERNAL MEDICINE

## 2019-10-15 PROCEDURE — 84156 ASSAY OF PROTEIN URINE: CPT | Performed by: INTERNAL MEDICINE

## 2019-10-15 PROCEDURE — 20610 DRAIN/INJ JOINT/BURSA W/O US: CPT | Performed by: PHYSICIAN ASSISTANT

## 2019-10-15 RX ADMIN — ERYTHROPOIETIN 40000 UNITS: 40000 INJECTION, SOLUTION INTRAVENOUS; SUBCUTANEOUS at 10:06

## 2019-10-15 NOTE — PROGRESS NOTES
"Subjective   Carolyne Martins is a 79 y.o. female here today for injection therapy.    Chief Complaint   Patient presents with   • Left Knee - Injections   • Right Knee - Injections     Supartz # 4       Past Medical History:   Diagnosis Date   • Anemia 1998   • Arthritis    • Bleeding from anus    • Bronchitis     STATES HAS BRONCHITIS CURRENTLY (12/5/17).     • Bronchitis 12/2017   • CAD (coronary artery disease)    • Cataract, bilateral    • Chronic kidney disease     STAGE 4.  SEES TERA   • Chronic kidney failure     REPORTS STAGE IV   • Difficulty swallowing solids    • Disease of thyroid gland    • Fracture, radius 2016    right distal radius and ulna, healed.   • GERD (gastroesophageal reflux disease)    • Gout    • High cholesterol    • History of nuclear stress test 2014    DR. CORTEZ.  \"IT WAS OK\"   • Hyperparathyroidism (CMS/HCC)    • Hyperparathyroidism (CMS/HCC)    • Hypertension    • Iron deficiency    • Osteoarthritis    • Osteoarthritis of knees, bilateral     Both knees Supartz injection series August, 2015   • Osteoporosis    • Personal history of cardiac murmur    • Presence of pessary    • Problems with swallowing     WITH FOOD OCCASSIONALLY   • Rheumatic heart disease     Personal history   • Rotator cuff tendonitis     right    • Rupture of right proximal biceps tendon     chronic   • Seasonal allergies    • Spinal headache     REPORTS AFTER DELIVERY OF SON   • Subacromial bursitis     right    • Valvular heart disease    • Vitamin B12 deficiency    • Vitamin D deficiency    • Wears glasses          Past Surgical History:   Procedure Laterality Date   • CATARACT EXTRACTION Bilateral    • CERVICAL POLYPECTOMY     • COLONOSCOPY  2011   • COLONOSCOPY N/A 1/9/2018    Procedure: COLONOSCOPY with endoscopic mucosal resection (hot snare), normal saline submucosal injection, argon thermal ablation, resolution clip placement x4, and cold biopsy polypectomy;  Surgeon: Pieter Concepcion MD;  Location: [facility] [place]" "[facility] ENDOSCOPY;  Service:    • ENDOSCOPY N/A 12/6/2017    Procedure: ESOPHAGOGASTRODUODENOSCOPY with biopsies and hot snare polypectomies;  Surgeon: Pieter Concepcion MD;  Location: [facility] ENDOSCOPY;  Service:    • ENDOSCOPY N/A 1/15/2019    Procedure: ESOPHAGOGASTRODUODENOSCOPY WITH BIOPSIES AND HOT SNARE POLYPECTOMIES X10;  Surgeon: Pieter Concepcion MD;  Location: [facility] ENDOSCOPY;  Service: Gastroenterology   • UPPER GASTROINTESTINAL ENDOSCOPY  08/18/2014       Allergies   Allergen Reactions   • Ferrlecit [Na Ferric Gluc Cplx In Sucrose] Swelling   • Ranitidine Hcl Shortness Of Breath   • Levofloxacin Other (See Comments)     Leg cramps     • Lisinopril Cough       Objective   Resp 18   Ht 149.9 cm (59\")   Wt 50.3 kg (111 lb)   LMP  (LMP Unknown)   BMI 22.42 kg/m²    Physical Exam    Skin exam stable with no erythema, ecchymosis or rash.  No new swelling.  No motor or sensory changes.  Distal pulse intact.    Large Joint Arthrocentesis: R knee  Date/Time: 10/15/2019 2:04 PM  Consent given by: patient  Site marked: site marked  Timeout: Immediately prior to procedure a time out was called to verify the correct patient, procedure, equipment, support staff and site/side marked as required   Supporting Documentation  Indications: pain   Procedure Details  Location: knee - R knee  Preparation: Patient was prepped and draped in the usual sterile fashion  Needle gauge: 21g.  Medications administered: 25 mg sodium hyaluronate 25 MG/2.5ML  Patient tolerance: patient tolerated the procedure well with no immediate complications    Large Joint Arthrocentesis: L knee  Date/Time: 10/15/2019 2:05 PM  Consent given by: patient  Site marked: site marked  Timeout: Immediately prior to procedure a time out was called to verify the correct patient, procedure, equipment, support staff and site/side marked as required   Supporting Documentation  Indications: pain   Procedure Details  Location: knee - L knee  Preparation: Patient was " prepped and draped in the usual sterile fashion  Needle gauge: 21g.  Medications administered: 25 mg sodium hyaluronate 25 MG/2.5ML  Patient tolerance: patient tolerated the procedure well with no immediate complications          Assessment/Plan      Diagnosis Plan   1. Primary localized osteoarthritis of right knee  Large Joint Arthrocentesis: R knee   2. Primary localized osteoarthritis of left knee  Large Joint Arthrocentesis: L knee       Ice, heat, and/or modalities as beneficial  Watch for signs and symptoms of infection    Recommendations:  No strenuous activity.    FU in 1 week    Patient agreeable to call or return sooner for any concerns.

## 2019-10-22 ENCOUNTER — OFFICE VISIT (OUTPATIENT)
Dept: ORTHOPEDIC SURGERY | Facility: CLINIC | Age: 79
End: 2019-10-22

## 2019-10-22 VITALS — BODY MASS INDEX: 22.38 KG/M2 | WEIGHT: 111 LBS | RESPIRATION RATE: 18 BRPM | HEIGHT: 59 IN

## 2019-10-22 DIAGNOSIS — M17.12 PRIMARY LOCALIZED OSTEOARTHRITIS OF LEFT KNEE: ICD-10-CM

## 2019-10-22 DIAGNOSIS — M17.11 PRIMARY LOCALIZED OSTEOARTHRITIS OF RIGHT KNEE: Primary | ICD-10-CM

## 2019-10-22 PROCEDURE — 20610 DRAIN/INJ JOINT/BURSA W/O US: CPT | Performed by: PHYSICIAN ASSISTANT

## 2019-10-22 RX ORDER — CALCITRIOL 0.25 UG/1
0.5 CAPSULE, LIQUID FILLED ORAL DAILY
COMMUNITY
Start: 2019-10-17 | End: 2021-03-31 | Stop reason: HOSPADM

## 2019-10-22 RX ORDER — ONDANSETRON 4 MG/1
4 TABLET, ORALLY DISINTEGRATING ORAL EVERY 8 HOURS PRN
Status: ON HOLD | COMMUNITY
Start: 2019-10-21 | End: 2021-03-30

## 2019-10-22 NOTE — PROGRESS NOTES
"Subjective   Carolyne Martins is a 79 y.o. female here today for injection therapy.    Chief Complaint   Patient presents with   • Left Knee - Injections   • Right Knee - Injections       Past Medical History:   Diagnosis Date   • Anemia 1998   • Arthritis    • Bleeding from anus    • Bronchitis     STATES HAS BRONCHITIS CURRENTLY (12/5/17).     • Bronchitis 12/2017   • CAD (coronary artery disease)    • Cataract, bilateral    • Chronic kidney disease     STAGE 4.  SEES TERA   • Chronic kidney failure     REPORTS STAGE IV   • Difficulty swallowing solids    • Disease of thyroid gland    • Fracture, radius 2016    right distal radius and ulna, healed.   • GERD (gastroesophageal reflux disease)    • Gout    • High cholesterol    • History of nuclear stress test 2014    DR. CORTEZ.  \"IT WAS OK\"   • Hyperparathyroidism (CMS/HCC)    • Hyperparathyroidism (CMS/HCC)    • Hypertension    • Iron deficiency    • Osteoarthritis    • Osteoarthritis of knees, bilateral     Both knees Supartz injection series August, 2015   • Osteoporosis    • Personal history of cardiac murmur    • Presence of pessary    • Problems with swallowing     WITH FOOD OCCASSIONALLY   • Rheumatic heart disease     Personal history   • Rotator cuff tendonitis     right    • Rupture of right proximal biceps tendon     chronic   • Seasonal allergies    • Spinal headache     REPORTS AFTER DELIVERY OF SON   • Subacromial bursitis     right    • Valvular heart disease    • Vitamin B12 deficiency    • Vitamin D deficiency    • Wears glasses          Past Surgical History:   Procedure Laterality Date   • CATARACT EXTRACTION Bilateral    • CERVICAL POLYPECTOMY     • COLONOSCOPY  2011   • COLONOSCOPY N/A 1/9/2018    Procedure: COLONOSCOPY with endoscopic mucosal resection (hot snare), normal saline submucosal injection, argon thermal ablation, resolution clip placement x4, and cold biopsy polypectomy;  Surgeon: Pieter Concepcion MD;  Location: [facility] ENDOSCOPY;  " "Service:    • ENDOSCOPY N/A 12/6/2017    Procedure: ESOPHAGOGASTRODUODENOSCOPY with biopsies and hot snare polypectomies;  Surgeon: Pieter Concepcion MD;  Location: [facility] ENDOSCOPY;  Service:    • ENDOSCOPY N/A 1/15/2019    Procedure: ESOPHAGOGASTRODUODENOSCOPY WITH BIOPSIES AND HOT SNARE POLYPECTOMIES X10;  Surgeon: Pieter Concepcion MD;  Location: [facility] ENDOSCOPY;  Service: Gastroenterology   • UPPER GASTROINTESTINAL ENDOSCOPY  08/18/2014       Allergies   Allergen Reactions   • Ferrlecit [Na Ferric Gluc Cplx In Sucrose] Swelling   • Ranitidine Hcl Shortness Of Breath   • Levofloxacin Other (See Comments)     Leg cramps     • Lisinopril Cough       Objective   Resp 18   Ht 149.9 cm (59\")   Wt 50.3 kg (111 lb)   LMP  (LMP Unknown)   BMI 22.42 kg/m²    Physical Exam    Skin exam stable with no erythema, ecchymosis or rash.  No new swelling.  No motor or sensory changes.  Distal pulse intact.    Large Joint Arthrocentesis: R knee  Date/Time: 10/22/2019 8:48 AM  Consent given by: patient  Site marked: site marked  Timeout: Immediately prior to procedure a time out was called to verify the correct patient, procedure, equipment, support staff and site/side marked as required   Supporting Documentation  Indications: pain   Procedure Details  Location: knee - R knee  Preparation: Patient was prepped and draped in the usual sterile fashion  Needle size: 22 G (21G)  Approach: anterolateral  Medications administered: 25 mg sodium hyaluronate 25 MG/2.5ML  Patient tolerance: patient tolerated the procedure well with no immediate complications    Large Joint Arthrocentesis: L knee  Date/Time: 10/22/2019 8:49 AM  Consent given by: patient  Site marked: site marked  Timeout: Immediately prior to procedure a time out was called to verify the correct patient, procedure, equipment, support staff and site/side marked as required   Supporting Documentation  Indications: pain   Procedure Details  Location: knee - L knee  Preparation: " Patient was prepped and draped in the usual sterile fashion  Needle size: 22 G (21G)  Approach: anterolateral  Medications administered: 25 mg sodium hyaluronate 25 MG/2.5ML  Patient tolerance: patient tolerated the procedure well with no immediate complications          Assessment/Plan      Diagnosis Plan   1. Primary localized osteoarthritis of right knee  Large Joint Arthrocentesis: R knee   2. Primary localized osteoarthritis of left knee  Large Joint Arthrocentesis: L knee       Call or notify for any adverse effect from injection therapy  Ice, heat, and/or modalities as beneficial  Watch for signs and symptoms of infection  Guided on proper techniques for mobility, strength, agility and/or conditioning exercises    Recommendations:  No strenuous activity.    FU in 6 months    Patient agreeable to call or return sooner for any concerns.

## 2019-10-28 ENCOUNTER — HOSPITAL ENCOUNTER (OUTPATIENT)
Dept: INFUSION THERAPY | Facility: HOSPITAL | Age: 79
Discharge: HOME | End: 2019-10-28
Admitting: PHYSICIAN ASSISTANT

## 2019-10-28 VITALS
SYSTOLIC BLOOD PRESSURE: 146 MMHG | TEMPERATURE: 99.1 F | HEART RATE: 71 BPM | RESPIRATION RATE: 18 BRPM | DIASTOLIC BLOOD PRESSURE: 75 MMHG | OXYGEN SATURATION: 100 %

## 2019-10-28 DIAGNOSIS — N18.4 ANEMIA DUE TO STAGE 4 CHRONIC KIDNEY DISEASE TREATED WITH ERYTHROPOIETIN (HCC): Primary | ICD-10-CM

## 2019-10-28 DIAGNOSIS — D63.1 ANEMIA DUE TO STAGE 4 CHRONIC KIDNEY DISEASE TREATED WITH ERYTHROPOIETIN (HCC): Primary | ICD-10-CM

## 2019-10-28 LAB
DEPRECATED RDW RBC AUTO: 59 FL (ref 37–54)
ERYTHROCYTE [DISTWIDTH] IN BLOOD BY AUTOMATED COUNT: 18.4 % (ref 12.3–15.4)
HCT VFR BLD AUTO: 29.2 % (ref 34–46.6)
HGB BLD-MCNC: 8.8 G/DL (ref 12–15.9)
MCH RBC QN AUTO: 27.2 PG (ref 26.6–33)
MCHC RBC AUTO-ENTMCNC: 30.1 G/DL (ref 31.5–35.7)
MCV RBC AUTO: 90.4 FL (ref 79–97)
PLATELET # BLD AUTO: 263 10*3/MM3 (ref 140–450)
PMV BLD AUTO: 9.3 FL (ref 6–12)
RBC # BLD AUTO: 3.23 10*6/MM3 (ref 3.77–5.28)
WBC NRBC COR # BLD: 3.83 10*3/MM3 (ref 3.4–10.8)

## 2019-10-28 PROCEDURE — 36415 COLL VENOUS BLD VENIPUNCTURE: CPT

## 2019-10-28 PROCEDURE — 96372 THER/PROPH/DIAG INJ SC/IM: CPT

## 2019-10-28 PROCEDURE — 25010000002 EPOETIN ALFA PER 1000 UNITS: Performed by: PHYSICIAN ASSISTANT

## 2019-10-28 PROCEDURE — 85027 COMPLETE CBC AUTOMATED: CPT | Performed by: INTERNAL MEDICINE

## 2019-10-28 RX ORDER — CEPHALEXIN 500 MG/1
500 CAPSULE ORAL 2 TIMES DAILY
COMMUNITY
End: 2019-12-08

## 2019-10-28 RX ADMIN — ERYTHROPOIETIN 40000 UNITS: 40000 INJECTION, SOLUTION INTRAVENOUS; SUBCUTANEOUS at 11:52

## 2019-10-29 ENCOUNTER — APPOINTMENT (OUTPATIENT)
Dept: INFUSION THERAPY | Facility: HOSPITAL | Age: 79
End: 2019-10-29

## 2019-11-12 ENCOUNTER — APPOINTMENT (OUTPATIENT)
Dept: INFUSION THERAPY | Facility: HOSPITAL | Age: 79
End: 2019-11-12

## 2019-11-14 ENCOUNTER — OFFICE VISIT (OUTPATIENT)
Dept: OBSTETRICS AND GYNECOLOGY | Facility: CLINIC | Age: 79
End: 2019-11-14

## 2019-11-14 ENCOUNTER — HOSPITAL ENCOUNTER (OUTPATIENT)
Dept: INFUSION THERAPY | Facility: HOSPITAL | Age: 79
Discharge: HOME | End: 2019-11-14
Admitting: INTERNAL MEDICINE

## 2019-11-14 VITALS
WEIGHT: 113.6 LBS | BODY MASS INDEX: 22.9 KG/M2 | HEIGHT: 59 IN | SYSTOLIC BLOOD PRESSURE: 120 MMHG | DIASTOLIC BLOOD PRESSURE: 68 MMHG

## 2019-11-14 VITALS
HEART RATE: 71 BPM | DIASTOLIC BLOOD PRESSURE: 65 MMHG | SYSTOLIC BLOOD PRESSURE: 114 MMHG | TEMPERATURE: 98.9 F | RESPIRATION RATE: 16 BRPM | OXYGEN SATURATION: 100 % | HEIGHT: 59 IN | WEIGHT: 114 LBS | BODY MASS INDEX: 22.98 KG/M2

## 2019-11-14 DIAGNOSIS — N18.4 ANEMIA DUE TO STAGE 4 CHRONIC KIDNEY DISEASE TREATED WITH ERYTHROPOIETIN (HCC): Primary | ICD-10-CM

## 2019-11-14 DIAGNOSIS — D63.1 ANEMIA DUE TO STAGE 4 CHRONIC KIDNEY DISEASE TREATED WITH ERYTHROPOIETIN (HCC): Primary | ICD-10-CM

## 2019-11-14 DIAGNOSIS — Z46.89 PESSARY MAINTENANCE: Primary | ICD-10-CM

## 2019-11-14 DIAGNOSIS — N81.11 CYSTOCELE, MIDLINE: ICD-10-CM

## 2019-11-14 LAB
ALBUMIN SERPL-MCNC: 3.7 G/DL (ref 3.5–5.2)
ANION GAP SERPL CALCULATED.3IONS-SCNC: 10.3 MMOL/L (ref 5–15)
BUN BLD-MCNC: 57 MG/DL (ref 8–23)
BUN/CREAT SERPL: 20.2 (ref 7–25)
CALCIUM SPEC-SCNC: 10.2 MG/DL (ref 8.6–10.5)
CHLORIDE SERPL-SCNC: 100 MMOL/L (ref 98–107)
CO2 SERPL-SCNC: 25.7 MMOL/L (ref 22–29)
CREAT BLD-MCNC: 2.82 MG/DL (ref 0.57–1)
DEPRECATED RDW RBC AUTO: 56.3 FL (ref 37–54)
ERYTHROCYTE [DISTWIDTH] IN BLOOD BY AUTOMATED COUNT: 17.1 % (ref 12.3–15.4)
GFR SERPL CREATININE-BSD FRML MDRD: 16 ML/MIN/1.73
GLUCOSE BLD-MCNC: 115 MG/DL (ref 65–99)
HCT VFR BLD AUTO: 27.2 % (ref 34–46.6)
HGB BLD-MCNC: 8.3 G/DL (ref 12–15.9)
MCH RBC QN AUTO: 27.6 PG (ref 26.6–33)
MCHC RBC AUTO-ENTMCNC: 30.5 G/DL (ref 31.5–35.7)
MCV RBC AUTO: 90.4 FL (ref 79–97)
PHOSPHATE SERPL-MCNC: 3.8 MG/DL (ref 2.5–4.5)
PLATELET # BLD AUTO: 188 10*3/MM3 (ref 140–450)
PMV BLD AUTO: 9.9 FL (ref 6–12)
POTASSIUM BLD-SCNC: 5 MMOL/L (ref 3.5–5.2)
RBC # BLD AUTO: 3.01 10*6/MM3 (ref 3.77–5.28)
SODIUM BLD-SCNC: 136 MMOL/L (ref 136–145)
WBC NRBC COR # BLD: 3.29 10*3/MM3 (ref 3.4–10.8)

## 2019-11-14 PROCEDURE — 85027 COMPLETE CBC AUTOMATED: CPT | Performed by: INTERNAL MEDICINE

## 2019-11-14 PROCEDURE — 80069 RENAL FUNCTION PANEL: CPT | Performed by: INTERNAL MEDICINE

## 2019-11-14 PROCEDURE — 96372 THER/PROPH/DIAG INJ SC/IM: CPT

## 2019-11-14 PROCEDURE — 36415 COLL VENOUS BLD VENIPUNCTURE: CPT

## 2019-11-14 PROCEDURE — 25010000002 EPOETIN ALFA PER 1000 UNITS: Performed by: PHYSICIAN ASSISTANT

## 2019-11-14 PROCEDURE — 99212 OFFICE O/P EST SF 10 MIN: CPT | Performed by: PHYSICIAN ASSISTANT

## 2019-11-14 RX ADMIN — ERYTHROPOIETIN 40000 UNITS: 40000 INJECTION, SOLUTION INTRAVENOUS; SUBCUTANEOUS at 11:38

## 2019-11-14 NOTE — PATIENT INSTRUCTIONS
Recommend starting estrogen cream with atrophy noted to minimize potential vaginal wall irritation from pessary though no issues noted today and patient agreeable  Follow up 3 months or prn

## 2019-11-14 NOTE — PROGRESS NOTES
"Subjective   Chief Complaint   Patient presents with   • Pessary Check     doing well       Carolyne Martins is a 79 y.o. year old  presenting to be seen for routine pessary maintenance.  She has no complaints or concerns  She is voiding well, no vaginal bleeding, no problems with bowel movements  Not using estrogen cream      Past Medical History:   Diagnosis Date   • Anemia    • Arthritis    • Bleeding from anus    • Bronchitis     STATES HAS BRONCHITIS CURRENTLY (17).     • Bronchitis 2017   • CAD (coronary artery disease)    • Cataract, bilateral    • Chronic kidney disease     STAGE 4.  SEES TERA   • Chronic kidney failure     REPORTS STAGE IV   • Difficulty swallowing solids    • Disease of thyroid gland    • Fracture, radius 2016    right distal radius and ulna, healed.   • GERD (gastroesophageal reflux disease)    • Gout    • High cholesterol    • History of nuclear stress test     DR. CORTEZ.  \"IT WAS OK\"   • Hyperparathyroidism (CMS/HCC)    • Hyperparathyroidism (CMS/HCC)    • Hypertension    • Iron deficiency    • Osteoarthritis    • Osteoarthritis of knees, bilateral     Both knees Supartz injection series    • Osteoporosis    • Personal history of cardiac murmur    • Presence of pessary    • Problems with swallowing     WITH FOOD OCCASSIONALLY   • Rheumatic heart disease     Personal history   • Rotator cuff tendonitis     right    • Rupture of right proximal biceps tendon     chronic   • Seasonal allergies    • Spinal headache     REPORTS AFTER DELIVERY OF SON   • Subacromial bursitis     right    • Valvular heart disease    • Vitamin B12 deficiency    • Vitamin D deficiency    • Wears glasses         Current Outpatient Medications:   •  allopurinol (ZYLOPRIM) 100 MG tablet, Take 1 tablet by mouth daily., Disp: , Rfl:   •  aspirin 81 MG tablet, Take 81 mg by mouth Daily., Disp: , Rfl:   •  budesonide-formoterol (SYMBICORT) 80-4.5 MCG/ACT inhaler, Inhale 2 puffs 2 " (Two) Times a Day., Disp: , Rfl:   •  calcitriol (ROCALTROL) 0.25 MCG capsule, , Disp: , Rfl:   •  carboxymethylcellulose (REFRESH PLUS) 0.5 % solution, 3 (Three) Times a Day As Needed for Dry Eyes., Disp: , Rfl:   •  carvedilol (COREG) 12.5 MG tablet, Take 12.5 mg by mouth Every Morning. 1 AM and 2 evening, Disp: , Rfl:   •  cholecalciferol (VITAMIN D3) 1000 UNITS tablet, Take 1,000 Units by mouth Daily., Disp: , Rfl:   •  Cyanocobalamin (VITAMIN B-12 PO), Take 500 mg by mouth 2 (Two) Times a Day., Disp: , Rfl:   •  epoetin ovidio (PROCRIT) 77230 UNIT/ML injection, Procrit SOLN; Patient Sig: Procrit SOLN ; 0; 16-Jul-2014; every 3 weeks, Disp: , Rfl:   •  erythromycin (ROMYCIN) 5 MG/GM ophthalmic ointment, Administer  to both eyes Every Night., Disp: , Rfl:   •  fluticasone (FLONASE) 50 MCG/ACT nasal spray, 2 sprays into each nostril Daily As Needed., Disp: , Rfl:   •  irbesartan (AVAPRO) 150 MG tablet, Take 150 mg by mouth Daily., Disp: , Rfl:   •  Loratadine (CLARITIN) 10 MG capsule, Take 10 mg by mouth Daily As Needed (RHINITIS)., Disp: , Rfl:   •  omeprazole (PriLOSEC) 40 MG capsule, Take 40 mg by mouth Daily., Disp: , Rfl:   •  permethrin (ELIMITE) 5 % cream, massage thoroughly into skin from head to soles of feet leave on for 8 to 12 hours then wash off, Disp: , Rfl: 0  •  rosuvastatin (CRESTOR) 10 MG tablet, Take 10 mg by mouth Daily., Disp: , Rfl:   •  torsemide (DEMADEX) 20 MG tablet, Take 10 mg by mouth. EDEMA- TAKE EVERY OTHER DAY, Disp: , Rfl:   •  cephalexin (KEFLEX) 500 MG capsule, Take 500 mg by mouth 2 (Two) Times a Day., Disp: , Rfl:   •  conjugated estrogens (PREMARIN) 0.625 MG/GM vaginal cream, Use 0.5 grams intravaginally 2 times per week to control symptoms., Disp: 30 g, Rfl: 2  •  ondansetron ODT (ZOFRAN-ODT) 4 MG disintegrating tablet, , Disp: , Rfl:   No current facility-administered medications for this visit.    Allergies   Allergen Reactions   • Ferrlecit [Na Ferric Gluc Cplx In Sucrose]  Swelling   • Ranitidine Hcl Shortness Of Breath   • Levofloxacin Other (See Comments)     Leg cramps     • Lisinopril Cough      Past Surgical History:   Procedure Laterality Date   • CATARACT EXTRACTION Bilateral    • CERVICAL POLYPECTOMY     • COLONOSCOPY  2011   • COLONOSCOPY N/A 1/9/2018    Procedure: COLONOSCOPY with endoscopic mucosal resection (hot snare), normal saline submucosal injection, argon thermal ablation, resolution clip placement x4, and cold biopsy polypectomy;  Surgeon: Pieter Concepcion MD;  Location: [facility] ENDOSCOPY;  Service:    • ENDOSCOPY N/A 12/6/2017    Procedure: ESOPHAGOGASTRODUODENOSCOPY with biopsies and hot snare polypectomies;  Surgeon: Pieter Concepcion MD;  Location: [facility] ENDOSCOPY;  Service:    • ENDOSCOPY N/A 1/15/2019    Procedure: ESOPHAGOGASTRODUODENOSCOPY WITH BIOPSIES AND HOT SNARE POLYPECTOMIES X10;  Surgeon: Pieter Concepcion MD;  Location: [facility] ENDOSCOPY;  Service: Gastroenterology   • UPPER GASTROINTESTINAL ENDOSCOPY  08/18/2014      Social History     Socioeconomic History   • Marital status:      Spouse name: Not on file   • Number of children: Not on file   • Years of education: Not on file   • Highest education level: Not on file   Tobacco Use   • Smoking status: Never Smoker   • Smokeless tobacco: Never Used   Substance and Sexual Activity   • Alcohol use: No   • Drug use: No   • Sexual activity: Not Currently     Birth control/protection: Post-menopausal      Family History   Problem Relation Age of Onset   • Liver cancer Maternal Grandmother    • Gout Other    • Osteoporosis Other    • Stroke Other    • Ulcers Other    • Asthma Other    • Hypertension Other    • Heart disease Other    • Osteoarthritis Other    • Colon cancer Neg Hx        Review of Systems   Constitutional: Negative for chills, fever and unexpected weight change.   Gastrointestinal: Negative for abdominal pain, constipation, nausea and vomiting.   Genitourinary: Negative for difficulty  "urinating, dysuria, pelvic pain, vaginal bleeding, vaginal discharge and vaginal pain.           Objective   /68   Ht 149.9 cm (59\")   Wt 51.5 kg (113 lb 9.6 oz)   LMP  (LMP Unknown)   Breastfeeding? No   BMI 22.94 kg/m²     Physical Exam   Constitutional: She appears well-developed and well-nourished. She is cooperative.   Abdominal: Soft. Normal appearance. There is no tenderness.   Genitourinary: There is no tenderness or lesion on the right labia. There is no tenderness or lesion on the left labia. There is erythema and tenderness in the vagina. No bleeding in the vagina. No signs of injury around the vagina. No vaginal discharge found.   Genitourinary Comments: Ring with support pessary removed, cleaned and reinserted. Vaginal tissue atrophic but no abrasions, erosion or granulation tissue   2nd degree cystocele   Neurological: She is alert.            Assessment and Plan  Carolyne was seen today for pessary check.    Diagnoses and all orders for this visit:    Pessary maintenance    Cystocele, midline    Other orders  -     conjugated estrogens (PREMARIN) 0.625 MG/GM vaginal cream; Use 0.5 grams intravaginally 2 times per week to control symptoms.      Patient Instructions   Recommend starting estrogen cream with atrophy noted to minimize potential vaginal wall irritation from pessary though no issues noted today and patient agreeable  Follow up 3 months or prn             This note was electronically signed.    Yaz Woods PA-C   November 14, 2019  "

## 2019-11-18 ENCOUNTER — TELEPHONE (OUTPATIENT)
Dept: OBSTETRICS AND GYNECOLOGY | Facility: CLINIC | Age: 79
End: 2019-11-18

## 2019-11-18 RX ORDER — ESTRADIOL 0.1 MG/G
CREAM VAGINAL
Qty: 42.5 G | Refills: 2 | Status: SHIPPED | OUTPATIENT
Start: 2019-11-18 | End: 2020-12-04 | Stop reason: SDUPTHER

## 2019-11-18 NOTE — TELEPHONE ENCOUNTER
Please inform her I sent in estradiol cream. If it is not covered then recommend  She get the compounded estradiol cream from [facility]'s pharmacy in [place]. Thanks

## 2019-11-18 NOTE — TELEPHONE ENCOUNTER
----- Message from Angela Conway sent at 11/18/2019  1:54 PM EST -----  Contact: Pt  Pt said the Premarin cream was over $200.00  She said Sharron told her to call if she needed a substitute.     Please advise pt.

## 2019-11-20 ENCOUNTER — HOSPITAL ENCOUNTER (EMERGENCY)
Facility: HOSPITAL | Age: 79
Discharge: HOME | End: 2019-11-20
Attending: EMERGENCY MEDICINE | Admitting: EMERGENCY MEDICINE

## 2019-11-20 ENCOUNTER — APPOINTMENT (OUTPATIENT)
Dept: CT IMAGING | Facility: HOSPITAL | Age: 79
End: 2019-11-20

## 2019-11-20 VITALS
BODY MASS INDEX: 22.23 KG/M2 | WEIGHT: 110.25 LBS | TEMPERATURE: 98 F | DIASTOLIC BLOOD PRESSURE: 101 MMHG | OXYGEN SATURATION: 96 % | HEIGHT: 59 IN | RESPIRATION RATE: 18 BRPM | HEART RATE: 76 BPM | SYSTOLIC BLOOD PRESSURE: 175 MMHG

## 2019-11-20 DIAGNOSIS — K92.1 BLOODY STOOL: Primary | ICD-10-CM

## 2019-11-20 DIAGNOSIS — N18.4 STAGE 4 CHRONIC KIDNEY DISEASE (HCC): ICD-10-CM

## 2019-11-20 DIAGNOSIS — I10 HYPERTENSION, UNSPECIFIED TYPE: ICD-10-CM

## 2019-11-20 DIAGNOSIS — R10.9 ABDOMINAL DISCOMFORT: ICD-10-CM

## 2019-11-20 LAB
ALBUMIN SERPL-MCNC: 3.9 G/DL (ref 3.5–5.2)
ALBUMIN/GLOB SERPL: 1.3 G/DL
ALP SERPL-CCNC: 54 U/L (ref 39–117)
ALT SERPL W P-5'-P-CCNC: 13 U/L (ref 1–33)
ANION GAP SERPL CALCULATED.3IONS-SCNC: 13.5 MMOL/L (ref 5–15)
AST SERPL-CCNC: 29 U/L (ref 1–32)
BACTERIA UR QL AUTO: ABNORMAL /HPF
BASOPHILS # BLD AUTO: 0.01 10*3/MM3 (ref 0–0.2)
BASOPHILS NFR BLD AUTO: 0.2 % (ref 0–1.5)
BILIRUB SERPL-MCNC: 0.4 MG/DL (ref 0.2–1.2)
BILIRUB UR QL STRIP: NEGATIVE
BUN BLD-MCNC: 49 MG/DL (ref 8–23)
BUN/CREAT SERPL: 17.1 (ref 7–25)
CALCIUM SPEC-SCNC: 10.1 MG/DL (ref 8.6–10.5)
CHLORIDE SERPL-SCNC: 99 MMOL/L (ref 98–107)
CLARITY UR: CLEAR
CO2 SERPL-SCNC: 24.5 MMOL/L (ref 22–29)
COLOR UR: YELLOW
CREAT BLD-MCNC: 2.86 MG/DL (ref 0.57–1)
DEPRECATED RDW RBC AUTO: 56 FL (ref 37–54)
EOSINOPHIL # BLD AUTO: 0.19 10*3/MM3 (ref 0–0.4)
EOSINOPHIL NFR BLD AUTO: 4.1 % (ref 0.3–6.2)
ERYTHROCYTE [DISTWIDTH] IN BLOOD BY AUTOMATED COUNT: 17.2 % (ref 12.3–15.4)
GFR SERPL CREATININE-BSD FRML MDRD: 16 ML/MIN/1.73
GLOBULIN UR ELPH-MCNC: 2.9 GM/DL
GLUCOSE BLD-MCNC: 97 MG/DL (ref 65–99)
GLUCOSE UR STRIP-MCNC: NEGATIVE MG/DL
HCT VFR BLD AUTO: 29.2 % (ref 34–46.6)
HEMOCCULT STL QL: NEGATIVE
HGB BLD-MCNC: 9.1 G/DL (ref 12–15.9)
HGB UR QL STRIP.AUTO: NEGATIVE
HYALINE CASTS UR QL AUTO: ABNORMAL /LPF
IMM GRANULOCYTES # BLD AUTO: 0.01 10*3/MM3 (ref 0–0.05)
IMM GRANULOCYTES NFR BLD AUTO: 0.2 % (ref 0–0.5)
KETONES UR QL STRIP: NEGATIVE
LEUKOCYTE ESTERASE UR QL STRIP.AUTO: ABNORMAL
LIPASE SERPL-CCNC: 32 U/L (ref 13–60)
LYMPHOCYTES # BLD AUTO: 1.08 10*3/MM3 (ref 0.7–3.1)
LYMPHOCYTES NFR BLD AUTO: 23.1 % (ref 19.6–45.3)
MCH RBC QN AUTO: 28.2 PG (ref 26.6–33)
MCHC RBC AUTO-ENTMCNC: 31.2 G/DL (ref 31.5–35.7)
MCV RBC AUTO: 90.4 FL (ref 79–97)
MONOCYTES # BLD AUTO: 0.6 10*3/MM3 (ref 0.1–0.9)
MONOCYTES NFR BLD AUTO: 12.8 % (ref 5–12)
NEUTROPHILS # BLD AUTO: 2.78 10*3/MM3 (ref 1.7–7)
NEUTROPHILS NFR BLD AUTO: 59.6 % (ref 42.7–76)
NITRITE UR QL STRIP: NEGATIVE
NRBC BLD AUTO-RTO: 0 /100 WBC (ref 0–0.2)
PH UR STRIP.AUTO: 5.5 [PH] (ref 5–8)
PLATELET # BLD AUTO: 217 10*3/MM3 (ref 140–450)
PMV BLD AUTO: 10 FL (ref 6–12)
POTASSIUM BLD-SCNC: 4.5 MMOL/L (ref 3.5–5.2)
PROT SERPL-MCNC: 6.8 G/DL (ref 6–8.5)
PROT UR QL STRIP: ABNORMAL
RBC # BLD AUTO: 3.23 10*6/MM3 (ref 3.77–5.28)
RBC # UR: ABNORMAL /HPF
REF LAB TEST METHOD: ABNORMAL
SODIUM BLD-SCNC: 137 MMOL/L (ref 136–145)
SP GR UR STRIP: 1.01 (ref 1–1.03)
SQUAMOUS #/AREA URNS HPF: ABNORMAL /HPF
UROBILINOGEN UR QL STRIP: ABNORMAL
WBC NRBC COR # BLD: 4.67 10*3/MM3 (ref 3.4–10.8)
WBC UR QL AUTO: ABNORMAL /HPF

## 2019-11-20 PROCEDURE — 85025 COMPLETE CBC W/AUTO DIFF WBC: CPT | Performed by: PHYSICIAN ASSISTANT

## 2019-11-20 PROCEDURE — 82272 OCCULT BLD FECES 1-3 TESTS: CPT | Performed by: PHYSICIAN ASSISTANT

## 2019-11-20 PROCEDURE — 74176 CT ABD & PELVIS W/O CONTRAST: CPT

## 2019-11-20 PROCEDURE — 83690 ASSAY OF LIPASE: CPT | Performed by: PHYSICIAN ASSISTANT

## 2019-11-20 PROCEDURE — 81001 URINALYSIS AUTO W/SCOPE: CPT | Performed by: PHYSICIAN ASSISTANT

## 2019-11-20 PROCEDURE — 99284 EMERGENCY DEPT VISIT MOD MDM: CPT

## 2019-11-20 PROCEDURE — 80053 COMPREHEN METABOLIC PANEL: CPT | Performed by: PHYSICIAN ASSISTANT

## 2019-11-20 RX ORDER — SODIUM CHLORIDE 0.9 % (FLUSH) 0.9 %
10 SYRINGE (ML) INJECTION AS NEEDED
Status: DISCONTINUED | OUTPATIENT
Start: 2019-11-20 | End: 2019-11-20 | Stop reason: HOSPADM

## 2019-11-20 RX ORDER — MORPHINE SULFATE 2 MG/ML
2 INJECTION, SOLUTION INTRAMUSCULAR; INTRAVENOUS ONCE
Status: DISCONTINUED | OUTPATIENT
Start: 2019-11-20 | End: 2019-11-20 | Stop reason: HOSPADM

## 2019-11-20 RX ADMIN — SODIUM CHLORIDE 500 ML: 9 INJECTION, SOLUTION INTRAVENOUS at 11:27

## 2019-11-20 NOTE — ED PROVIDER NOTES
"Subjective   Patient is a 79-year-old female history of anemia, rectal bleeding, CAD, stage IV chronic kidney disease, GERD, high cholesterol, hypertension, hyperparathyroidism, and valvular heart disease presenting to the ER for evaluation of bloody stool.  Patient states that she was not feeling well yesterday in general.  She states she had been constipated and took a stool softener.  She states that she began having loose bowel movements with \"light\" blood.  She states that she did have a few episodes of vomiting yesterday as well, nonbilious and nonbloody in nature.  She states that it seemed to improve around 2 PM yesterday.  She states that this morning she had an additional bowel movement with blood in the stool and with wiping.  She states that she has a baseline anemia so she became worried and wanted to get checked out.  She states that she sees Dr. Vaughan and has a colonoscopy scheduled on December 20.  She states that she does have a dull pain in her left lower quadrant as well.  She states she said chills but has not had a fever.  She denies any headache, dizziness, syncope, hematemesis, cough, dysuria, hematuria, leg pain or swelling, or any other symptoms.  She denies any recent travel, sick contacts, undercooked foods.  She states she takes 81 mg aspirin daily, no other anticoagulation medications.            Review of Systems   Constitutional: Positive for chills. Negative for fever.   HENT: Negative.    Eyes: Negative.    Respiratory: Negative.    Cardiovascular: Negative.    Gastrointestinal: Positive for abdominal pain, blood in stool, diarrhea and vomiting. Negative for nausea.   Genitourinary: Negative for difficulty urinating, dysuria and hematuria.   Musculoskeletal: Negative.    Skin: Negative.    Allergic/Immunologic: Negative for immunocompromised state.   Neurological: Negative for dizziness and syncope.   Psychiatric/Behavioral: Negative.        Past Medical History:   Diagnosis Date   • " "Anemia 1998   • Arthritis    • Bleeding from anus    • Bronchitis     STATES HAS BRONCHITIS CURRENTLY (12/5/17).     • Bronchitis 12/2017   • CAD (coronary artery disease)    • Cataract, bilateral    • Chronic kidney disease     STAGE 4.  SEES TERA   • Chronic kidney failure     REPORTS STAGE IV   • Difficulty swallowing solids    • Disease of thyroid gland    • Fracture, radius 2016    right distal radius and ulna, healed.   • GERD (gastroesophageal reflux disease)    • Gout    • High cholesterol    • History of nuclear stress test 2014    DR. CORTEZ.  \"IT WAS OK\"   • Hyperparathyroidism (CMS/HCC)    • Hyperparathyroidism (CMS/HCC)    • Hypertension    • Iron deficiency    • Osteoarthritis    • Osteoarthritis of knees, bilateral     Both knees Supartz injection series August, 2015   • Osteoporosis    • Personal history of cardiac murmur    • Presence of pessary    • Problems with swallowing     WITH FOOD OCCASSIONALLY   • Rheumatic heart disease     Personal history   • Rotator cuff tendonitis     right    • Rupture of right proximal biceps tendon     chronic   • Seasonal allergies    • Spinal headache     REPORTS AFTER DELIVERY OF SON   • Subacromial bursitis     right    • Valvular heart disease    • Vitamin B12 deficiency    • Vitamin D deficiency    • Wears glasses        Allergies   Allergen Reactions   • Ferrlecit [Na Ferric Gluc Cplx In Sucrose] Swelling   • Ranitidine Hcl Shortness Of Breath   • Levofloxacin Other (See Comments)     Leg cramps     • Lisinopril Cough       Past Surgical History:   Procedure Laterality Date   • CATARACT EXTRACTION Bilateral    • CERVICAL POLYPECTOMY     • COLONOSCOPY  2011   • COLONOSCOPY N/A 1/9/2018    Procedure: COLONOSCOPY with endoscopic mucosal resection (hot snare), normal saline submucosal injection, argon thermal ablation, resolution clip placement x4, and cold biopsy polypectomy;  Surgeon: Pieter Concepcion MD;  Location: [facility] ENDOSCOPY;  Service:    • ENDOSCOPY " N/A 12/6/2017    Procedure: ESOPHAGOGASTRODUODENOSCOPY with biopsies and hot snare polypectomies;  Surgeon: Pieter Concepcion MD;  Location: [facility] ENDOSCOPY;  Service:    • ENDOSCOPY N/A 1/15/2019    Procedure: ESOPHAGOGASTRODUODENOSCOPY WITH BIOPSIES AND HOT SNARE POLYPECTOMIES X10;  Surgeon: Pieter Concepcion MD;  Location: [facility] ENDOSCOPY;  Service: Gastroenterology   • UPPER GASTROINTESTINAL ENDOSCOPY  08/18/2014       Family History   Problem Relation Age of Onset   • Liver cancer Maternal Grandmother    • Gout Other    • Osteoporosis Other    • Stroke Other    • Ulcers Other    • Asthma Other    • Hypertension Other    • Heart disease Other    • Osteoarthritis Other    • Colon cancer Neg Hx        Social History     Socioeconomic History   • Marital status:      Spouse name: Not on file   • Number of children: Not on file   • Years of education: Not on file   • Highest education level: Not on file   Tobacco Use   • Smoking status: Never Smoker   • Smokeless tobacco: Never Used   Substance and Sexual Activity   • Alcohol use: No   • Drug use: No   • Sexual activity: Not Currently     Birth control/protection: Post-menopausal           Objective   Physical Exam   Constitutional: She is oriented to person, place, and time. She appears well-developed and well-nourished. No distress.   HENT:   Head: Normocephalic and atraumatic.   Nose: Nose normal.   Eyes: EOM are normal. Right eye exhibits no discharge. Left eye exhibits no discharge.   Neck: Normal range of motion.   Cardiovascular: Normal rate and regular rhythm.   Pulmonary/Chest: Effort normal and breath sounds normal. No respiratory distress. She has no wheezes. She has no rales.   Abdominal: Soft. Bowel sounds are normal. She exhibits no distension. There is tenderness in the left lower quadrant. There is no guarding.   Tenderness in left lower quadrant. External hemorrhoids noted.  No obvious fissures or bleeding on rectal exam, no stool in the rectal  "vault   Musculoskeletal: Normal range of motion. She exhibits no edema or deformity.   Neurological: She is alert and oriented to person, place, and time. No cranial nerve deficit.   Skin: Skin is warm and dry.   Psychiatric: She has a normal mood and affect. Her behavior is normal.   Nursing note and vitals reviewed.  BP (!) 175/101 Comment: Angela BIRMINGHAM notified; pt to followup with PCP and monitor BP  Pulse 76   Temp 98 °F (36.7 °C) (Oral)   Resp 18   Ht 149.9 cm (59\")   Wt 50 kg (110 lb 4 oz)   LMP  (LMP Unknown)   SpO2 96% Comment: Angela [place]ING[place]AM notified; pt to followup with PCP and monitor BP  BMI 22.27 kg/m²       Procedures           ED Course  ED Course as of Nov 20 2140 Wed Nov 20, 2019   1150 WBC: 4.67 [LA]   1150 Improved from 8 days ago Hemoglobin: (!) 9.1 [LA]   1204 Glucose: 97 [LA]   1204 BUN: (!) 49 [LA]   1204 Stable. Creatinine: (!) 2.86 [LA]   1204 Sodium: 137 [LA]   1204 Potassium: 4.5 [LA]   1204 Chloride: 99 [LA]   1204 ALT (SGPT): 13 [LA]   1204 AST (SGOT): 29 [LA]   1204 Alkaline Phosphatase: 54 [LA]   1204 Total Bilirubin: 0.4 [LA]   1205 HISTORY: LLQ pain, bloody stool     PROCEDURE: Axial images were obtained from the lung bases to the pubic  symphysis by computed tomography. Intravenous and oral contrast were not  administered at request of ordering clinician.     Comparison with 10/23/2018. Solid organ evaluation is limited without  the administration of intravenous contrast. Allowing for this, abdominal  solid organs demonstrate no acute abnormality. Low-attenuation focus in  the lateral segment of the left hepatic lobe is unchanged and favored to  represent a cyst. There are multiple bilateral renal lesions, some of  which are decreased in attenuation and some of which are increased in  attenuation. These are indeterminate, especially given the absence of  intravenous contrast, but appear similar to previous and may represent  combination of simple cysts and hyperdense or " hemorrhagic cyst. Solid  neoplasia thought to be less likely but not entirely excluded and  continued follow-up is suggested.     Evaluation of bowel is limited without oral contrast. No bowel  obstruction. Normal appendix. Moderate stool in the colon. Moderate  diverticulosis without obvious diverticulitis. Pessary ring is present  in the pelvis. Urinary bladder is incompletely distended but otherwise  unremarkable. Uterus is atrophic. Scattered vascular calcifications. No  free fluid, free air or abscess. There is a stable noncalcified 4 mm  nodule in the left lower lobe on series 2 image 11. Minimal scarring in  the lung bases. Mild cardiomegaly. Moderate degenerative changes in the  spine and pelvis.       Impression    1. Diverticulosis.  2. Stable appearance of indeterminate renal lesions as detailed above.  3. Other chronic appearing findings.  4. This exam does not clearly explain the patient's reported history of  bloody stool.      [LA]   1254 Rectal exam chaperoned by doris Carlson  [LA]   1303 Discussed case and labs with Dr. Marmolejo.  Believe she can be followed up as an outpatient with her colonoscopy.  Discussed follow-up with her primary care provider, strict return precautions.  [LA]      ED Course User Index  [LA] Angela Gonzalez PA-C                  MDM  Number of Diagnoses or Management Options  Abdominal discomfort:   Bloody stool:   Hypertension, unspecified type:   Stage 4 chronic kidney disease (CMS/HCC):   Diagnosis management comments: On arrival, patient is afebrile, no acute distress, nontoxic appearance.  Differential includes colitis, infectious diarrhea, hemorrhoids, diverticulitis, anemia, colon polys, mass, GI bleed, and other concerns.  We will obtain basic labs including a lipase, urinalysis.  We will give IV fluids, pain medication.  Will obtain a CT of the abdomen pelvis, no contrast given her CKD. Will perform hemoccult.    Hemoccult negative but also I did not have a lot of  stool in rectal vault, no obvious fissures or external bleeding.  Her hemoglobin has actually improved in comparison to previous.  Her creatinine is baseline.  No obvious signs of infection on the urine.  CT is read by the radiologist revealed diverticulosis, stable renal lesions and other chronic appearing findings.  Discussed these findings the patient.  Discussed the case and lab work with Dr. Marmolejo as well.  Believe patient will need her colonoscopy as scheduled.  I advised to call Dr. Concepcion to see if she needs to be scheduled sooner.  Discussed follow-up with her primary care provider, especially to reevaluate blood pressure.  Discussed strict return precautions to ER.  Patient verbalized understanding and was in agreement with this plan of care       Amount and/or Complexity of Data Reviewed  Clinical lab tests: reviewed and ordered  Tests in the radiology section of CPT®: reviewed and ordered  Discussion of test results with the performing providers: yes  Decide to obtain previous medical records or to obtain history from someone other than the patient: yes  Review and summarize past medical records: yes  Discuss the patient with other providers: yes    Risk of Complications, Morbidity, and/or Mortality  Presenting problems: moderate  Diagnostic procedures: moderate  Management options: low    Patient Progress  Patient progress: stable      Final diagnoses:   Bloody stool   Abdominal discomfort   Stage 4 chronic kidney disease (CMS/[facility])   Hypertension, unspecified type              Angela Gonzalez PA-C  11/20/19 [id number]

## 2019-11-20 NOTE — DISCHARGE INSTRUCTIONS
Try to eat a bland diet for the next few days to prevent further GI upset.  Your bleeding could be related to polyps, internal hemorrhoids.  I see no signs of infectious diarrhea.  If you continue to have bloody diarrhea, will likely need outpatient stool studies with your primary care provider.  I do believe you will need to undergo colonoscopy as scheduled, may contact Dr. Concepcion to see if he needs to do this sooner.  Continue taking all your home medications as directed.  Continue monitoring her blood pressure and follow-up with your primary care to ensure it is stable.  Return to the ER for any change, worsening symptoms, or any additional concerns including but not limited to severe worsening black or bloody stool with dizziness, syncope, severe abdominal pain with fever greater than 100.4.

## 2019-12-03 ENCOUNTER — HOSPITAL ENCOUNTER (OUTPATIENT)
Dept: INFUSION THERAPY | Facility: HOSPITAL | Age: 79
Discharge: HOME | End: 2019-12-03
Admitting: INTERNAL MEDICINE

## 2019-12-03 DIAGNOSIS — N18.4 ANEMIA DUE TO STAGE 4 CHRONIC KIDNEY DISEASE TREATED WITH ERYTHROPOIETIN (HCC): Primary | ICD-10-CM

## 2019-12-03 DIAGNOSIS — D63.1 ANEMIA DUE TO STAGE 4 CHRONIC KIDNEY DISEASE TREATED WITH ERYTHROPOIETIN (HCC): Primary | ICD-10-CM

## 2019-12-03 LAB
DEPRECATED RDW RBC AUTO: 56.3 FL (ref 37–54)
ERYTHROCYTE [DISTWIDTH] IN BLOOD BY AUTOMATED COUNT: 17.4 % (ref 12.3–15.4)
HCT VFR BLD AUTO: 27 % (ref 34–46.6)
HGB BLD-MCNC: 8.3 G/DL (ref 12–15.9)
MCH RBC QN AUTO: 27.9 PG (ref 26.6–33)
MCHC RBC AUTO-ENTMCNC: 30.7 G/DL (ref 31.5–35.7)
MCV RBC AUTO: 90.6 FL (ref 79–97)
PLATELET # BLD AUTO: 187 10*3/MM3 (ref 140–450)
PMV BLD AUTO: 9.7 FL (ref 6–12)
RBC # BLD AUTO: 2.98 10*6/MM3 (ref 3.77–5.28)
WBC NRBC COR # BLD: 3.38 10*3/MM3 (ref 3.4–10.8)

## 2019-12-03 PROCEDURE — 36415 COLL VENOUS BLD VENIPUNCTURE: CPT

## 2019-12-03 PROCEDURE — 96372 THER/PROPH/DIAG INJ SC/IM: CPT

## 2019-12-03 PROCEDURE — 85027 COMPLETE CBC AUTOMATED: CPT | Performed by: INTERNAL MEDICINE

## 2019-12-03 PROCEDURE — 25010000002 EPOETIN ALFA PER 1000 UNITS: Performed by: PHYSICIAN ASSISTANT

## 2019-12-03 RX ADMIN — ERYTHROPOIETIN 40000 UNITS: 40000 INJECTION, SOLUTION INTRAVENOUS; SUBCUTANEOUS at 11:23

## 2019-12-05 ENCOUNTER — OFFICE VISIT (OUTPATIENT)
Dept: GASTROENTEROLOGY | Facility: CLINIC | Age: 79
End: 2019-12-05

## 2019-12-05 VITALS
DIASTOLIC BLOOD PRESSURE: 80 MMHG | RESPIRATION RATE: 15 BRPM | TEMPERATURE: 98.2 F | HEIGHT: 59 IN | BODY MASS INDEX: 22.58 KG/M2 | HEART RATE: 73 BPM | WEIGHT: 112 LBS | SYSTOLIC BLOOD PRESSURE: 140 MMHG

## 2019-12-05 DIAGNOSIS — R10.33 PERIUMBILICAL ABDOMINAL PAIN: Primary | ICD-10-CM

## 2019-12-05 DIAGNOSIS — D63.1 ANEMIA DUE TO STAGE 4 CHRONIC KIDNEY DISEASE TREATED WITH ERYTHROPOIETIN (HCC): ICD-10-CM

## 2019-12-05 DIAGNOSIS — K59.00 CONSTIPATION, UNSPECIFIED CONSTIPATION TYPE: ICD-10-CM

## 2019-12-05 DIAGNOSIS — R11.0 NAUSEA: ICD-10-CM

## 2019-12-05 DIAGNOSIS — R10.814 LEFT LOWER QUADRANT ABDOMINAL TENDERNESS WITHOUT REBOUND TENDERNESS: ICD-10-CM

## 2019-12-05 DIAGNOSIS — N18.4 ANEMIA DUE TO STAGE 4 CHRONIC KIDNEY DISEASE TREATED WITH ERYTHROPOIETIN (HCC): ICD-10-CM

## 2019-12-05 PROCEDURE — 99214 OFFICE O/P EST MOD 30 MIN: CPT | Performed by: INTERNAL MEDICINE

## 2019-12-05 RX ORDER — SULFAMETHOXAZOLE AND TRIMETHOPRIM 400; 80 MG/1; MG/1
1 TABLET ORAL DAILY
Qty: 7 TABLET | Refills: 0 | Status: SHIPPED | OUTPATIENT
Start: 2019-12-05 | End: 2019-12-16

## 2019-12-05 NOTE — PROGRESS NOTES
Chief Complaint   Patient presents with   • Abdominal Pain     History of Present Illness     There is history of epigastric-periumbilical abdominal pain off and on for the last 1 month.  The pain is gradual in onset, mild in severity and aching in character.  Frequency being 2-3 times a week.  The pain may last for several minutes.  There is no radiation of abdominal pain.  Eating worsens the abdominal pain.  No definite relieving factors of abdominal pain.  The patient denies hematemesis, melena or hematochezia.    The patient has history of recurrent nausea for the last several months.  The nausea is described as mild, frequency being a couple of times a week.  Nauseous feeling may last for several minutes.  Eating worsens the symptom however no definite relieving factors of nausea.  There is occasional associated vomiting.  Her reflux symptoms are under control.  She denies dysphagia or odynophagia.  The patient has a tendency to have constipation which is mild to moderate at times.  She denies history of liver disease.  There is no history of recent significant weight loss.     Review of Systems   Constitutional: Positive for fatigue. Negative for appetite change, chills, fever and unexpected weight change.   HENT: Negative for mouth sores, nosebleeds and trouble swallowing.    Eyes: Positive for visual disturbance. Negative for discharge and redness.   Respiratory: Negative for apnea, cough and shortness of breath.    Cardiovascular: Negative for chest pain, palpitations and leg swelling.   Gastrointestinal: Positive for abdominal pain, constipation and nausea. Negative for abdominal distention, anal bleeding, blood in stool, diarrhea and vomiting.   Endocrine: Negative for cold intolerance, heat intolerance and polydipsia.   Genitourinary: Negative for dysuria, hematuria and urgency.   Musculoskeletal: Positive for arthralgias. Negative for joint swelling and myalgias.   Skin: Negative for rash.  "  Allergic/Immunologic: Negative for food allergies and immunocompromised state.   Neurological: Negative for dizziness, seizures, syncope and headaches.   Hematological: Negative for adenopathy. Does not bruise/bleed easily.   Psychiatric/Behavioral: Negative for dysphoric mood. The patient is not nervous/anxious and is not hyperactive.      Patient Active Problem List   Diagnosis   • Arthralgia of shoulder region   • Tendinopathy of rotator cuff   • Osteoarthritis of right acromioclavicular joint   • Anemia due to stage 4 chronic kidney disease treated with erythropoietin (CMS/HCC)   • Anemia   • Gastric polyp   • Heartburn   • Melena   • Constipation   • Change in bowel habits   • Colon cancer screening   • Epigastric pain   • Weight loss   • Senile osteoporosis     Past Medical History:   Diagnosis Date   • Anemia 1998   • Arthritis    • Bleeding from anus    • Bronchitis     STATES HAS BRONCHITIS CURRENTLY (12/5/17).     • Bronchitis 12/2017   • CAD (coronary artery disease)    • Cataract, bilateral    • Chronic kidney disease     STAGE 4.  SEES TERA   • Chronic kidney failure     REPORTS STAGE IV   • Difficulty swallowing solids    • Disease of thyroid gland    • Fracture, radius 2016    right distal radius and ulna, healed.   • GERD (gastroesophageal reflux disease)    • Gout    • High cholesterol    • History of nuclear stress test 2014    DR. CORTEZ.  \"IT WAS OK\"   • Hyperparathyroidism (CMS/HCC)    • Hyperparathyroidism (CMS/HCC)    • Hypertension    • Iron deficiency    • Osteoarthritis    • Osteoarthritis of knees, bilateral     Both knees Supartz injection series August, 2015   • Osteoporosis    • Personal history of cardiac murmur    • Presence of pessary    • Problems with swallowing     WITH FOOD OCCASSIONALLY   • Rheumatic heart disease     Personal history   • Rotator cuff tendonitis     right    • Rupture of right proximal biceps tendon     chronic   • Seasonal allergies    • Spinal headache     " REPORTS AFTER DELIVERY OF SON   • Subacromial bursitis     right    • Valvular heart disease    • Vitamin B12 deficiency    • Vitamin D deficiency    • Wears glasses      Past Surgical History:   Procedure Laterality Date   • CATARACT EXTRACTION Bilateral    • CERVICAL POLYPECTOMY     • COLONOSCOPY  2011   • COLONOSCOPY N/A 1/9/2018    Procedure: COLONOSCOPY with endoscopic mucosal resection (hot snare), normal saline submucosal injection, argon thermal ablation, resolution clip placement x4, and cold biopsy polypectomy;  Surgeon: Pieter Concepcion MD;  Location: [facility] ENDOSCOPY;  Service:    • ENDOSCOPY N/A 12/6/2017    Procedure: ESOPHAGOGASTRODUODENOSCOPY with biopsies and hot snare polypectomies;  Surgeon: Pieter Concepcion MD;  Location: [facility] ENDOSCOPY;  Service:    • ENDOSCOPY N/A 1/15/2019    Procedure: ESOPHAGOGASTRODUODENOSCOPY WITH BIOPSIES AND HOT SNARE POLYPECTOMIES X10;  Surgeon: Pieter Concepcion MD;  Location: [facility] ENDOSCOPY;  Service: Gastroenterology   • UPPER GASTROINTESTINAL ENDOSCOPY  08/18/2014     Family History   Problem Relation Age of Onset   • Liver cancer Maternal Grandmother    • Gout Other    • Osteoporosis Other    • Stroke Other    • Ulcers Other    • Asthma Other    • Hypertension Other    • Heart disease Other    • Osteoarthritis Other    • Colon cancer Neg Hx      Social History     Tobacco Use   • Smoking status: Never Smoker   • Smokeless tobacco: Never Used   Substance Use Topics   • Alcohol use: No       Current Outpatient Medications:   •  allopurinol (ZYLOPRIM) 100 MG tablet, Take 1 tablet by mouth daily., Disp: , Rfl:   •  budesonide-formoterol (SYMBICORT) 80-4.5 MCG/ACT inhaler, Inhale 2 puffs 2 (Two) Times a Day., Disp: , Rfl:   •  calcitriol (ROCALTROL) 0.25 MCG capsule, , Disp: , Rfl:   •  carboxymethylcellulose (REFRESH PLUS) 0.5 % solution, 3 (Three) Times a Day As Needed for Dry Eyes., Disp: , Rfl:   •  carvedilol (COREG) 12.5 MG tablet, Take 12.5 mg by mouth Every  "Morning. 1 AM and 2 evening, Disp: , Rfl:   •  cholecalciferol (VITAMIN D3) 1000 UNITS tablet, Take 1,000 Units by mouth Daily., Disp: , Rfl:   •  Cyanocobalamin (VITAMIN B-12 PO), Take 500 mg by mouth 2 (Two) Times a Day., Disp: , Rfl:   •  epoetin ovidio (PROCRIT) 78779 UNIT/ML injection, Procrit SOLN; Patient Sig: Procrit SOLN ; 0; 16-Jul-2014; every 3 weeks, Disp: , Rfl:   •  erythromycin (ROMYCIN) 5 MG/GM ophthalmic ointment, Administer  to both eyes Every Night., Disp: , Rfl:   •  estradiol (ESTRACE) 0.1 MG/GM vaginal cream, Massage 1 gram in vaginal opening twice weekly, Disp: 42.5 g, Rfl: 2  •  fluticasone (FLONASE) 50 MCG/ACT nasal spray, 2 sprays into each nostril Daily As Needed., Disp: , Rfl:   •  irbesartan (AVAPRO) 150 MG tablet, Take 150 mg by mouth Daily., Disp: , Rfl:   •  omeprazole (PriLOSEC) 40 MG capsule, Take 40 mg by mouth Daily., Disp: , Rfl:   •  ondansetron ODT (ZOFRAN-ODT) 4 MG disintegrating tablet, , Disp: , Rfl:   •  permethrin (ELIMITE) 5 % cream, massage thoroughly into skin from head to soles of feet leave on for 8 to 12 hours then wash off, Disp: , Rfl: 0  •  sulfamethoxazole-trimethoprim (BACTRIM,SEPTRA) 400-80 MG tablet, Take 1 tablet by mouth Daily., Disp: 7 tablet, Rfl: 0    Allergies   Allergen Reactions   • Ferrlecit [Na Ferric Gluc Cplx In Sucrose] Swelling   • Ranitidine Hcl Shortness Of Breath   • Levofloxacin Other (See Comments)     Leg cramps     • Lisinopril Cough       Blood pressure 140/80, pulse 73, temperature 98.2 °F (36.8 °C), resp. rate 15, height 149.9 cm (59\"), weight 50.8 kg (112 lb), not currently breastfeeding.    Physical Exam   Constitutional: She is oriented to person, place, and time. She appears well-developed and well-nourished. No distress.   HENT:   Head: Normocephalic and atraumatic.   Right Ear: Hearing and external ear normal.   Left Ear: Hearing and external ear normal.   Nose: Nose normal.   Mouth/Throat: Oropharynx is clear and moist and mucous " membranes are normal. Mucous membranes are not pale, not dry and not cyanotic. No oral lesions. No oropharyngeal exudate.   Eyes: Conjunctivae and EOM are normal. Right eye exhibits no discharge. Left eye exhibits no discharge. No scleral icterus.   Neck: Trachea normal. Neck supple. No JVD present. No edema present. No thyroid mass and no thyromegaly present.   Cardiovascular: Normal rate, regular rhythm, S2 normal and normal heart sounds. Exam reveals no gallop, no S3 and no friction rub.   No murmur heard.  Pulmonary/Chest: Effort normal and breath sounds normal. No respiratory distress. She has no wheezes. She has no rales. She exhibits no tenderness.   Abdominal: Soft. Normal appearance and bowel sounds are normal. She exhibits no distension, no ascites and no mass. There is no splenomegaly or hepatomegaly. There is tenderness in the left lower quadrant. There is no rigidity, no rebound and no guarding. No hernia.   Musculoskeletal: She exhibits no tenderness or deformity.     Vascular Status -  Her right foot exhibits no edema. Her left foot exhibits no edema.  Lymphadenopathy:     She has no cervical adenopathy.        Left: No supraclavicular adenopathy present.   Neurological: She is alert and oriented to person, place, and time. She has normal strength. No cranial nerve deficit or sensory deficit. She exhibits normal muscle tone. Coordination normal.   Skin: No rash noted. She is not diaphoretic. No cyanosis. No pallor. Nails show no clubbing.   Psychiatric: She has a normal mood and affect. Her behavior is normal. Judgment and thought content normal.   Nursing note and vitals reviewed.  Stigmata of chronic liver disease:  None.  Asterixis:  None.    Laboratory Testing:  Upon review of medical records:    Dated 12/2/2017 glucose 90 BUN 44 creatinine 2.6 sodium 140 potassium 3.8 chloride 104 CO2 26 calcium 9.8 albumin 3.6 ALT 40 AST 34 upon phosphatase 66 total bilirubin 0.6 WBC 5.88 hemoglobin 8.8  hematocrit 28.6 platelet count 201 MCV 96.3 lipase 219 fecal occult blood: Negative     Dated 12/4/2017 glucose 104 BUN 35 creatinine 2.6 sodium 139 potassium 4.5 chloride 102 CO2 26 calcium 10.2 albumin 3.7 phosphorus 3.3 WBC 5.01 hemoglobin 9.5 hematocrit 30.8 platelet count 217 MCV 96.3     Dated January 3, 2018 WBC 7.79 hemoglobin 9.2 hematocrit 29.9 MCV 95.8 and platelet count 226.     Dated February 4, 2018 sodium 139 potassium 4.1 chloride 103 CO2 29 BUN 47 serum creatinine 2.60 glucose 96.  Calcium 10.3.  Total protein 5.9.  Albumin 3.40.  T bili 0.3 AST 29 ALT 36 alkaline phosphatase 64.  WBC 4.28 hemoglobin 8.8 hematocrit 27.8 MCV 95.5 and platelet count 189.     Dated October 12, 2018 sodium 138 potassium 4.5 chloride 106 CO2 25 BUN 44 serum creatinine 2.20 glucose 95.  Calcium 9.8.  Albumin 4.20.  WBC 7.96 and global 9.5 hematocrit 30.9 MCV 92.8 and platelet count 193.     Dated January 28, 2019 sodium 137 potassium 4.4 chloride 108 CO2 22 BUN 34 serum creatinine 2.20 glucose 90.  Calcium 8.8.  Albumin 4.00.  Phosphorus 3.5.  Serum iron 148.  TIBC 387.  Iron saturation 38%.  Ferritin 13.40.  Uric acid 6.6.  WBC 3.56 hemoglobin 9.3 hematocrit 30.1 MCV 92.6 and platelet count 203.    Dated May 13, 2019 WBC 3.34 hemoglobin 7.9 hematocrit 25.1 MCV 93.0 platelet count 184.  Data May 28, 2019 sodium 137 potassium 4.2 chloride 102 CO2 25 BUN 47 serum creatinine 2.30 glucose 92.  Calcium 8.3.  Albumin 3.90.  Phosphorus 4.1.  Serum iron 90.  TIBC 391.  Iron saturation 23%.  Ferritin 12.70.  Uric acid 6.5.  WBC 3.15 hemoglobin 8.2 hematocrit 26.1 MCV 92.2 and platelet count 202.    Dated October 1, 2019 sodium 137 potassium 4.9 chloride 103 CO2 21.2 BUN 47 serum creatinine 2.55 glucose 93.  Calcium 9.2.  Total protein 6.8.  Albumin 4.00.  T bili 0.4 AST 20 ALT 9 alkaline phosphatase 61.  Uric acid 5.7.  Phosphorus 4.3.  WBC 3.61 hemoglobin 8.1 hematocrit 27.4 MCV 89.8 platelet count 193.    Dated October 15, 2019  serum iron 55.  TIBC 471.  Transferrin level 316.  Iron saturation 12%.  Ferritin 11.70.  Vitamin B12 level > 2000.  Folate >20.00.    Dated November 20, 2019 sodium 137 potassium 4.5 chloride 99 CO2 24.5 BUN 49 serum creatinine 2.86 glucose 97.  Calcium 10.1.  Total protein 6.8.  Albumin 3.90.  T bili 0.4 AST 29 ALT 13 alkaline phosphatase 54.  Lipase 32.  Stool for fecal occult blood negative.  WBC 4.67 hemoglobin 9.1 hematocrit 29.2 MCV 90.4 and platelet count 217.    Dated December 3, 2019 WBC 3.38 hemoglobin 8.3 hematocrit 27.0 MCV 90.6 and platelet count 187.     Abdominal Imaging:  Upon review of records:     CT of abdomen and pelvis without contrast dated 12/2/2017 reveals lung bases are clear. There is a small sliding-type hiatal hernia. The liver is normal in size and attenuation with a benign-appearing cyst. The spleen is unremarkable.  The adrenals are normal.  The aorta is normal in caliber. There is no hydronephrosis. There is no nephrolithiasis. There are bilateral simple and complex renal cysts with the largest measuring 7.2 x 6.0 cm on the right. The appendix is normal.  The urinary bladder is unremarkable. There is no free fluid or adenopathy. A pessary device is present. There are bilateral L5 pars defects with advanced changes of osteoarthritis     Dated October 23, 2018 the patient underwent a CT of abdomen and pelvis without contrast which revealed: Bilateral renal masses are seen incompletely characterized without contrast.  Many of these do not feel criteria of simple cyst.  These are stable since prior exam.  Small low-density lesion (hepatic lobe is seen probably a cyst, unchanged.  Remaining solid organs are unremarkable.  Bowel shows no evidence of obstruction.  No free air or free fluid within the abdomen.  No bowel dilatation is seen.  No free fluid.  Appendix is normal.  Uterus is unremarkable.  A pessary is present.  Advanced sigmoid diverticulosis is noted.    Dated November 20, 2019  the patient underwent a CT of the abdomen and pelvis without contrast which revealed: Comparison with 10/23/2018.  Solid organ evaluation is limited without the administration of IV contrast.  Allowing for this, abdominal solid organs demonstrate no acute abnormality.  Low-attenuation focus in the lateral segment of the left hepatic lobe is unchanged and favored to represent a cyst.  Multiple bilateral renal lesions, some of which are decreased in attenuation and some of which are increased in attenuation.  These are indeterminate, especially given the absence of IV contrast, but appear similar to previous and may represent combination of simple cysts and hyperdense or hemorrhagic cyst.  Solid neoplasia thought to be less likely but not entirely excluded and continued follow-up is suggested.  Evaluation of bowel is limited without oral contrast.  No bowel obstruction.  Normal appendix.  Moderate stool in the colon.  Moderate diverticulosis without obvious diverticulitis.  Pessary ring is present in the pelvis.  Urinary bladder is incompletely distended but otherwise unremarkable.  Uterus is atrophic.  Scattered vascular calcifications.  No free fluid, free air or abscess.  There is a stable noncalcified 4 mm nodule in the left lower lobe on series 2 image 11.  Minimal scarring in the lung bases.  Mild cardiomegaly.  Moderate degenerative changes in the spine and pelvis.     Procedures:  Upon review of records:     Dated January 9, 2018 the patient underwent a  colonoscopy to the terminal ileum which revealed: Left-sided diverticulosis.  Angiodysplasia of colon without bleeding.  Status post thermal ablation therapy.  Colon polyp. Transverse colon flat lesion. Status post endoscopic mucosal resection, thermal ablation, and placement of resolution clips to coapt the margins.  Internal hemorrhoids.  Ascending colon polyp, biopsy revealed tubular adenoma without high grade dysplasia.  Transverse colon polyp, flat  "lesion, biopsy revealed sessile serrated adenoma fragments without cytologic dysplasia.       Dated January 15, 2019 the patient underwent an upper endoscopy which revealed: Nonobstructive Schatzki's-type ring.  Small sliding hiatal hernia less than 3 cm.  Gastric polyps.  Erythematous gastritis.  No Sanders's.  Good distensibility of the stomach was achieved.  No giant polyp were noted.  This test is not explain the cause of anemia.  An portion of duodenum, biopsy revealed no pathologic alterations.  Negative for celiac disease, microorganisms, metaplasia or atypia.  Antrum, body and fundus, biopsies revealed no pathologic alterations.  Negative for H. pylori, metaplasia, dysplasia or malignancy.  Gastric polyps, body and fundus, biopsies revealed fundic gland polyps.  Negative for H. pylori, metaplasia, dysplasia or malignancy.       Assessment:      ICD-10-CM ICD-9-CM   1. Periumbilical abdominal pain R10.33 789.05   2. Nausea R11.0 787.02   3. Left lower quadrant abdominal tenderness without rebound tenderness R10.814 789.64   4. Constipation, unspecified constipation type K59.00 564.00   5. Anemia due to stage 4 chronic kidney disease treated with erythropoietin (CMS/[facility]) N18.4 285.21    D63.1 585.4         Discussion:  1.     Plan/  Patient Instructions   1. Prilosec(Omeprazole) DR capsule 40 mg. Take one capsule in the morning half an hour before eating daily.  2. Low fiber diet (avoid fruits, vegetables, cereals, fiber supplements, nuts and seeds) for 5 days thereafter low-fat high-fiber diet.  3. Bactrim 400/80 mg.  Take 1 tablet orally once a day for7 days.  Potential side effects were discussed.  4. Avoid laxatives, enemas for next 5 days.  However, for constipation the patient may use \"stool softeners\" 1-2 per day.  5. May take probiotics such as Align.  Take one orally daily while taking antibiotics and 1-2 weeks thereafter.  6. The patient may call back in 1 week regarding progress.  7. Follow-up in 1 " to 2 weeks.       Pieter Concepcion MD

## 2019-12-05 NOTE — PATIENT INSTRUCTIONS
"1. Prilosec(Omeprazole) DR capsule 40 mg. Take one capsule in the morning half an hour before eating daily.  2. Low fiber diet (avoid fruits, vegetables, cereals, fiber supplements, nuts and seeds) for 5 days thereafter low-fat high-fiber diet.  3. Bactrim 400/80 mg.  Take 1 tablet orally once a day for7 days.  Potential side effects were discussed.  4. Avoid laxatives, enemas for next 5 days.  However, for constipation the patient may use \"stool softeners\" 1-2 per day.  5. May take probiotics such as Align.  Take one orally daily while taking antibiotics and 1-2 weeks thereafter.  6. The patient may call back in 1 week regarding progress.  7. Follow-up in 1 to 2 weeks.  "

## 2019-12-11 ENCOUNTER — TELEPHONE (OUTPATIENT)
Dept: GASTROENTEROLOGY | Facility: CLINIC | Age: 79
End: 2019-12-11

## 2019-12-16 ENCOUNTER — OFFICE VISIT (OUTPATIENT)
Dept: GASTROENTEROLOGY | Facility: CLINIC | Age: 79
End: 2019-12-16

## 2019-12-16 ENCOUNTER — HOSPITAL ENCOUNTER (OUTPATIENT)
Dept: INFUSION THERAPY | Facility: HOSPITAL | Age: 79
Discharge: HOME | End: 2019-12-16
Admitting: INTERNAL MEDICINE

## 2019-12-16 VITALS
DIASTOLIC BLOOD PRESSURE: 50 MMHG | HEART RATE: 85 BPM | TEMPERATURE: 98.9 F | RESPIRATION RATE: 18 BRPM | SYSTOLIC BLOOD PRESSURE: 109 MMHG | OXYGEN SATURATION: 100 %

## 2019-12-16 VITALS
SYSTOLIC BLOOD PRESSURE: 164 MMHG | DIASTOLIC BLOOD PRESSURE: 80 MMHG | RESPIRATION RATE: 16 BRPM | WEIGHT: 110 LBS | HEIGHT: 59 IN | TEMPERATURE: 97.6 F | HEART RATE: 72 BPM | BODY MASS INDEX: 22.18 KG/M2

## 2019-12-16 DIAGNOSIS — D63.1 ANEMIA DUE TO STAGE 4 CHRONIC KIDNEY DISEASE TREATED WITH ERYTHROPOIETIN (HCC): Primary | ICD-10-CM

## 2019-12-16 DIAGNOSIS — N18.4 ANEMIA DUE TO STAGE 4 CHRONIC KIDNEY DISEASE TREATED WITH ERYTHROPOIETIN (HCC): Primary | ICD-10-CM

## 2019-12-16 DIAGNOSIS — R11.0 NAUSEA: ICD-10-CM

## 2019-12-16 DIAGNOSIS — R10.33 PERIUMBILICAL ABDOMINAL PAIN: Primary | ICD-10-CM

## 2019-12-16 DIAGNOSIS — R12 HEARTBURN: ICD-10-CM

## 2019-12-16 LAB
DEPRECATED RDW RBC AUTO: 56.9 FL (ref 37–54)
ERYTHROCYTE [DISTWIDTH] IN BLOOD BY AUTOMATED COUNT: 17.9 % (ref 12.3–15.4)
HCT VFR BLD AUTO: 28.3 % (ref 34–46.6)
HGB BLD-MCNC: 8.8 G/DL (ref 12–15.9)
MCH RBC QN AUTO: 28 PG (ref 26.6–33)
MCHC RBC AUTO-ENTMCNC: 31.1 G/DL (ref 31.5–35.7)
MCV RBC AUTO: 90.1 FL (ref 79–97)
PLATELET # BLD AUTO: 203 10*3/MM3 (ref 140–450)
PMV BLD AUTO: 9.5 FL (ref 6–12)
RBC # BLD AUTO: 3.14 10*6/MM3 (ref 3.77–5.28)
WBC NRBC COR # BLD: 4.13 10*3/MM3 (ref 3.4–10.8)

## 2019-12-16 PROCEDURE — 85027 COMPLETE CBC AUTOMATED: CPT | Performed by: INTERNAL MEDICINE

## 2019-12-16 PROCEDURE — 25010000002 EPOETIN ALFA PER 1000 UNITS: Performed by: PHYSICIAN ASSISTANT

## 2019-12-16 PROCEDURE — 96372 THER/PROPH/DIAG INJ SC/IM: CPT

## 2019-12-16 PROCEDURE — 99214 OFFICE O/P EST MOD 30 MIN: CPT | Performed by: INTERNAL MEDICINE

## 2019-12-16 PROCEDURE — 36415 COLL VENOUS BLD VENIPUNCTURE: CPT

## 2019-12-16 RX ADMIN — ERYTHROPOIETIN 40000 UNITS: 40000 INJECTION, SOLUTION INTRAVENOUS; SUBCUTANEOUS at 11:25

## 2019-12-16 NOTE — PATIENT INSTRUCTIONS
1. Moderate fiber diet.  2. Prilosec(Omeprazole) DR capsule 40 mg. Take one capsule in the morning half an hour before eating daily.  3. The patient may call back on as-needed basis.  4. A possible colon exam may be considered in January 2021 if the overall health is stable in view of large flat polyp (13 mm) which was resected in January 2018.

## 2019-12-16 NOTE — PROGRESS NOTES
Chief Complaint   Patient presents with   • Abdominal Pain     History of Present Illness     Patient has a history of periumbilical abdominal pain off and on for the last a month or so.  The pain was gradual in onset mild in severity and aching in character.  Frequency being 2-3 times a week.  The pain used to last for several minutes.  Eating would worsen the abdominal pain.  The patient was found to have significant tenderness in the left lower quadrant and was treated with antibiotics for diverticulitis.  Currently, she feels much better.  There is resolution of abdominal pain.  She denies associated fever or chills.       There is history of recurrent nausea for the last several months.  Nausea is generally described as mild frequency being couple of times a week.  The nauseous feeling may last for several minutes.  Eating would worsen the nausea.  No definite relieving factors of nausea.  She denied history of emesis.  This has also significantly improved.  The patient did not have further nauseous feeling last week.  Reflux symptoms are under control.  The patient denies dysphagia or odynophagia.  She is feeling better in terms of constipation.  There is no hematemesis, melena or hematochezia.  She denies recent weight loss.  The patient has history of recurrent nausea for the last several months.  The nausea is described as mild, frequency being a couple of times a week. Nauseous feeling may last for several minutes.  Eating worsens the symptom however no definite relieving factors of nausea.  There is occasional associated vomiting.  Her reflux symptoms are under control.  She denies dysphagia or odynophagia.  She denies history of liver disease.  There is no history of recent significant weight loss.     Review of Systems   Constitutional: Positive for fatigue. Negative for appetite change, chills, fever and unexpected weight change.   HENT: Negative for mouth sores, nosebleeds and trouble swallowing.     Eyes: Positive for visual disturbance. Negative for discharge and redness.   Respiratory: Negative for apnea, cough and shortness of breath.    Cardiovascular: Negative for chest pain, palpitations and leg swelling.   Gastrointestinal: Positive for constipation. Negative for abdominal distention, abdominal pain, anal bleeding, blood in stool, diarrhea, nausea and vomiting.   Endocrine: Negative for cold intolerance, heat intolerance and polydipsia.   Genitourinary: Negative for dysuria, hematuria and urgency.   Musculoskeletal: Positive for arthralgias. Negative for joint swelling and myalgias.   Skin: Negative for rash.   Allergic/Immunologic: Negative for food allergies and immunocompromised state.   Neurological: Negative for dizziness, seizures, syncope and headaches.   Hematological: Negative for adenopathy. Does not bruise/bleed easily.   Psychiatric/Behavioral: Negative for dysphoric mood. The patient is not nervous/anxious and is not hyperactive.      Patient Active Problem List   Diagnosis   • Arthralgia of shoulder region   • Tendinopathy of rotator cuff   • Osteoarthritis of right acromioclavicular joint   • Anemia due to stage 4 chronic kidney disease treated with erythropoietin (CMS/[facility])   • Anemia   • Gastric polyp   • Heartburn   • Melena   • Constipation   • Change in bowel habits   • Colon cancer screening   • Epigastric pain   • Weight loss   • Senile osteoporosis     Past Medical History:   Diagnosis Date   • Anemia 1998   • Arthritis    • Bleeding from anus    • Bronchitis     STATES HAS BRONCHITIS CURRENTLY (12/5/17).     • Bronchitis 12/2017   • CAD (coronary artery disease)    • Cataract, bilateral    • Chronic kidney disease     STAGE 4.  SEES TERA   • Chronic kidney failure     REPORTS STAGE IV   • Difficulty swallowing solids    • Disease of thyroid gland    • Fracture, radius 2016    right distal radius and ulna, healed.   • GERD (gastroesophageal reflux disease)    • Gout    • High  "cholesterol    • History of nuclear stress test 2014    DR. CORTEZ.  \"IT WAS OK\"   • Hyperparathyroidism (CMS/HCC)    • Hyperparathyroidism (CMS/HCC)    • Hypertension    • Iron deficiency    • Osteoarthritis    • Osteoarthritis of knees, bilateral     Both knees Supartz injection series August, 2015   • Osteoporosis    • Personal history of cardiac murmur    • Presence of pessary    • Problems with swallowing     WITH FOOD OCCASSIONALLY   • Rheumatic heart disease     Personal history   • Rotator cuff tendonitis     right    • Rupture of right proximal biceps tendon     chronic   • Seasonal allergies    • Spinal headache     REPORTS AFTER DELIVERY OF SON   • Subacromial bursitis     right    • Valvular heart disease    • Vitamin B12 deficiency    • Vitamin D deficiency    • Wears glasses      Past Surgical History:   Procedure Laterality Date   • CATARACT EXTRACTION Bilateral    • CERVICAL POLYPECTOMY     • COLONOSCOPY  2011   • COLONOSCOPY N/A 1/9/2018    Procedure: COLONOSCOPY with endoscopic mucosal resection (hot snare), normal saline submucosal injection, argon thermal ablation, resolution clip placement x4, and cold biopsy polypectomy;  Surgeon: Pieter Concepcion MD;  Location: [facility] ENDOSCOPY;  Service:    • ENDOSCOPY N/A 12/6/2017    Procedure: ESOPHAGOGASTRODUODENOSCOPY with biopsies and hot snare polypectomies;  Surgeon: Pieter Concepcion MD;  Location: [facility] ENDOSCOPY;  Service:    • ENDOSCOPY N/A 1/15/2019    Procedure: ESOPHAGOGASTRODUODENOSCOPY WITH BIOPSIES AND HOT SNARE POLYPECTOMIES X10;  Surgeon: Pieter Concepcion MD;  Location: [facility] ENDOSCOPY;  Service: Gastroenterology   • UPPER GASTROINTESTINAL ENDOSCOPY  08/18/2014     Family History   Problem Relation Age of Onset   • Liver cancer Maternal Grandmother    • Gout Other    • Osteoporosis Other    • Stroke Other    • Ulcers Other    • Asthma Other    • Hypertension Other    • Heart disease Other    • Osteoarthritis Other    • Colon cancer Neg Hx  "     Social History     Tobacco Use   • Smoking status: Never Smoker   • Smokeless tobacco: Never Used   Substance Use Topics   • Alcohol use: No       Current Outpatient Medications:   •  allopurinol (ZYLOPRIM) 100 MG tablet, Take 1 tablet by mouth daily., Disp: , Rfl:   •  budesonide-formoterol (SYMBICORT) 80-4.5 MCG/ACT inhaler, Inhale 2 puffs 2 (Two) Times a Day., Disp: , Rfl:   •  calcitriol (ROCALTROL) 0.25 MCG capsule, , Disp: , Rfl:   •  carboxymethylcellulose (REFRESH PLUS) 0.5 % solution, 3 (Three) Times a Day As Needed for Dry Eyes., Disp: , Rfl:   •  carvedilol (COREG) 12.5 MG tablet, Take 12.5 mg by mouth Every Morning. 1 AM and 2 evening, Disp: , Rfl:   •  cholecalciferol (VITAMIN D3) 1000 UNITS tablet, Take 1,000 Units by mouth Daily., Disp: , Rfl:   •  Cyanocobalamin (VITAMIN B-12 PO), Take 500 mg by mouth 2 (Two) Times a Day., Disp: , Rfl:   •  epoetin ovidio (PROCRIT) 19249 UNIT/ML injection, Procrit SOLN; Patient Sig: Procrit SOLN ; 0; 16-Jul-2014; every 3 weeks, Disp: , Rfl:   •  erythromycin (ROMYCIN) 5 MG/GM ophthalmic ointment, Administer  to both eyes Every Night., Disp: , Rfl:   •  estradiol (ESTRACE) 0.1 MG/GM vaginal cream, Massage 1 gram in vaginal opening twice weekly, Disp: 42.5 g, Rfl: 2  •  fluticasone (FLONASE) 50 MCG/ACT nasal spray, 2 sprays into each nostril Daily As Needed., Disp: , Rfl:   •  irbesartan (AVAPRO) 150 MG tablet, Take 150 mg by mouth Daily., Disp: , Rfl:   •  omeprazole (PriLOSEC) 40 MG capsule, Take 40 mg by mouth Daily., Disp: , Rfl:   •  ondansetron ODT (ZOFRAN-ODT) 4 MG disintegrating tablet, , Disp: , Rfl:   •  Probiotic Product (PROBIOTIC DAILY PO), Take  by mouth Daily., Disp: , Rfl:   No current facility-administered medications for this visit.     Allergies   Allergen Reactions   • Ferrlecit [Na Ferric Gluc Cplx In Sucrose] Swelling   • Ranitidine Hcl Shortness Of Breath   • Levofloxacin Other (See Comments)     Leg cramps     • Lisinopril Cough       Blood  "pressure 164/80, pulse 72, temperature 97.6 °F (36.4 °C), resp. rate 16, height 149.9 cm (59\"), weight 49.9 kg (110 lb), not currently breastfeeding.    Physical Exam   Constitutional: She is oriented to person, place, and time. She appears well-developed and well-nourished. No distress.   HENT:   Head: Normocephalic and atraumatic.   Right Ear: Hearing and external ear normal.   Left Ear: Hearing and external ear normal.   Nose: Nose normal.   Mouth/Throat: Oropharynx is clear and moist and mucous membranes are normal. Mucous membranes are not pale, not dry and not cyanotic. No oral lesions. No oropharyngeal exudate.   Eyes: Conjunctivae and EOM are normal. Right eye exhibits no discharge. Left eye exhibits no discharge. No scleral icterus.   Neck: Trachea normal. Neck supple. No JVD present. No edema present. No thyroid mass and no thyromegaly present.   Cardiovascular: Normal rate, regular rhythm, S2 normal and normal heart sounds. Exam reveals no gallop, no S3 and no friction rub.   No murmur heard.  Pulmonary/Chest: Effort normal and breath sounds normal. No respiratory distress. She has no wheezes. She has no rales. She exhibits no tenderness.   Abdominal: Soft. Normal appearance and bowel sounds are normal. She exhibits no distension, no ascites and no mass. There is no splenomegaly or hepatomegaly. There is no tenderness. There is no rigidity, no rebound and no guarding. No hernia.   Musculoskeletal: She exhibits no tenderness or deformity.     Vascular Status -  Her right foot exhibits no edema. Her left foot exhibits no edema.  Lymphadenopathy:     She has no cervical adenopathy.        Left: No supraclavicular adenopathy present.   Neurological: She is alert and oriented to person, place, and time. She has normal strength. No cranial nerve deficit or sensory deficit. She exhibits normal muscle tone. Coordination normal.   Skin: No rash noted. She is not diaphoretic. No cyanosis. No pallor. Nails show no " clubbing.   Psychiatric: She has a normal mood and affect. Her behavior is normal. Judgment and thought content normal.   Nursing note and vitals reviewed.  Stigmata of chronic liver disease:  None.  Asterixis:  None.    Laboratory Testing:  Upon review of medical records:    Dated February 4, 2018 sodium 139 potassium 4.1 chloride 103 CO2 29 BUN 47 serum creatinine 2.60 glucose 96.  Calcium 10.3.  Total protein 5.9.  Albumin 3.40.  T bili 0.3 AST 29 ALT 36 alkaline phosphatase 64.  WBC 4.28 hemoglobin 8.8 hematocrit 27.8 MCV 95.5 and platelet count 189.     Dated October 12, 2018 sodium 138 potassium 4.5 chloride 106 CO2 25 BUN 44 serum creatinine 2.20 glucose 95.  Calcium 9.8.  Albumin 4.20.  WBC 7.96 and global 9.5 hematocrit 30.9 MCV 92.8 and platelet count 193.     Dated January 28, 2019 sodium 137 potassium 4.4 chloride 108 CO2 22 BUN 34 serum creatinine 2.20 glucose 90.  Calcium 8.8.  Albumin 4.00.  Phosphorus 3.5.  Serum iron 148.  TIBC 387.  Iron saturation 38%.  Ferritin 13.40.  Uric acid 6.6.  WBC 3.56 hemoglobin 9.3 hematocrit 30.1 MCV 92.6 and platelet count 203.     Dated May 13, 2019 WBC 3.34 hemoglobin 7.9 hematocrit 25.1 MCV 93.0 platelet count 184.  Data May 28, 2019 sodium 137 potassium 4.2 chloride 102 CO2 25 BUN 47 serum creatinine 2.30 glucose 92.  Calcium 8.3.  Albumin 3.90.  Phosphorus 4.1.  Serum iron 90.  TIBC 391.  Iron saturation 23%.  Ferritin 12.70.  Uric acid 6.5.  WBC 3.15 hemoglobin 8.2 hematocrit 26.1 MCV 92.2 and platelet count 202.     Dated October 1, 2019 sodium 137 potassium 4.9 chloride 103 CO2 21.2 BUN 47 serum creatinine 2.55 glucose 93.  Calcium 9.2.  Total protein 6.8.  Albumin 4.00.  T bili 0.4 AST 20 ALT 9 alkaline phosphatase 61.  Uric acid 5.7.  Phosphorus 4.3.  WBC 3.61 hemoglobin 8.1 hematocrit 27.4 MCV 89.8 platelet count 193.     Dated October 15, 2019 serum iron 55.  TIBC 471.  Transferrin level 316.  Iron saturation 12%.  Ferritin 11.70.  Vitamin B12 level >  2000.  Folate >20.00.     Dated November 20, 2019 sodium 137 potassium 4.5 chloride 99 CO2 24.5 BUN 49 serum creatinine 2.86 glucose 97.  Calcium 10.1.  Total protein 6.8.  Albumin 3.90.  T bili 0.4 AST 29 ALT 13 alkaline phosphatase 54.  Lipase 32.  Stool for fecal occult blood negative.  WBC 4.67 hemoglobin 9.1 hematocrit 29.2 MCV 90.4 and platelet count 217.     Dated December 3, 2019 WBC 3.38 hemoglobin 8.3 hematocrit 27.0 MCV 90.6 and platelet count 187.     Abdominal Imaging:  Upon review of records:     CT of abdomen and pelvis without contrast dated 12/2/2017 reveals lung bases are clear. There is a small sliding-type hiatal hernia. The liver is normal in size and attenuation with a benign-appearing cyst. The spleen is unremarkable.  The adrenals are normal.  The aorta is normal in caliber. There is no hydronephrosis. There is no nephrolithiasis. There are bilateral simple and complex renal cysts with the largest measuring 7.2 x 6.0 cm on the right. The appendix is normal.  The urinary bladder is unremarkable. There is no free fluid or adenopathy. A pessary device is present. There are bilateral L5 pars defects with advanced changes of osteoarthritis     Dated October 23, 2018 the patient underwent a CT of abdomen and pelvis without contrast which revealed: Bilateral renal masses are seen incompletely characterized without contrast.  Many of these do not feel criteria of simple cyst.  These are stable since prior exam.  Small low-density lesion (hepatic lobe is seen probably a cyst, unchanged.  Remaining solid organs are unremarkable.  Bowel shows no evidence of obstruction.  No free air or free fluid within the abdomen.  No bowel dilatation is seen.  No free fluid.  Appendix is normal.  Uterus is unremarkable.  A pessary is present.  Advanced sigmoid diverticulosis is noted.     Dated November 20, 2019 the patient underwent a CT of the abdomen and pelvis without contrast which revealed: Comparison with  10/23/2018.  Solid organ evaluation is limited without the administration of IV contrast.  Allowing for this, abdominal solid organs demonstrate no acute abnormality.  Low-attenuation focus in the lateral segment of the left hepatic lobe is unchanged and favored to represent a cyst.  Multiple bilateral renal lesions, some of which are decreased in attenuation and some of which are increased in attenuation.  These are indeterminate, especially given the absence of IV contrast, but appear similar to previous and may represent combination of simple cysts and hyperdense or hemorrhagic cyst.  Solid neoplasia thought to be less likely but not entirely excluded and continued follow-up is suggested.  Evaluation of bowel is limited without oral contrast.  No bowel obstruction.  Normal appendix.  Moderate stool in the colon.  Moderate diverticulosis without obvious diverticulitis.  Pessary ring is present in the pelvis.  Urinary bladder is incompletely distended but otherwise unremarkable.  Uterus is atrophic.  Scattered vascular calcifications.  No free fluid, free air or abscess.  There is a stable noncalcified 4 mm nodule in the left lower lobe on series 2 image 11.  Minimal scarring in the lung bases.  Mild cardiomegaly.  Moderate degenerative changes in the spine and pelvis.     Procedures:  Upon review of records:     Dated January 9, 2018 the patient underwent a  colonoscopy to the terminal ileum which revealed: Left-sided diverticulosis.  Angiodysplasia of colon without bleeding.  Status post thermal ablation therapy.  Colon polyp. Transverse colon flat lesion. Status post endoscopic mucosal resection, thermal ablation, and placement of resolution clips to coapt the margins.  Internal hemorrhoids.  Ascending colon polyp, biopsy revealed tubular adenoma without high grade dysplasia.  Transverse colon polyp, flat lesion, biopsy revealed sessile serrated adenoma fragments without cytologic dysplasia.       Dated January  15, 2019 the patient underwent an upper endoscopy which revealed: Nonobstructive Schatzki's-type ring.  Small sliding hiatal hernia less than 3 cm.  Gastric polyps.  Erythematous gastritis.  No Sanders's.  Good distensibility of the stomach was achieved.  No giant polyp were noted.  This test is not explain the cause of anemia.  An portion of duodenum, biopsy revealed no pathologic alterations.  Negative for celiac disease, microorganisms, metaplasia or atypia.  Antrum, body and fundus, biopsies revealed no pathologic alterations.  Negative for H. pylori, metaplasia, dysplasia or malignancy.  Gastric polyps, body and fundus, biopsies revealed fundic gland polyps.  Negative for H. pylori, metaplasia, dysplasia or malignancy.     Assessment:      ICD-10-CM ICD-9-CM   1. Periumbilical abdominal pain R10.33 789.05   2. Nausea R11.0 787.02   3. Heartburn R12 787.1         Discussion:  1.     Plan/  Patient Instructions   1. Moderate fiber diet.  2. Prilosec(Omeprazole) DR capsule 40 mg. Take one capsule in the morning half an hour before eating daily.  3. The patient may call back on as-needed basis.  4. A possible colon exam may be considered in January 2021 if the overall health is stable in view of large flat polyp (13 mm) which was resected in January 2018.       Pieter Concepcion MD

## 2019-12-17 ENCOUNTER — APPOINTMENT (OUTPATIENT)
Dept: INFUSION THERAPY | Facility: HOSPITAL | Age: 79
End: 2019-12-17

## 2019-12-30 ENCOUNTER — HOSPITAL ENCOUNTER (OUTPATIENT)
Dept: INFUSION THERAPY | Facility: HOSPITAL | Age: 79
Discharge: HOME | End: 2019-12-30
Admitting: INTERNAL MEDICINE

## 2019-12-30 VITALS
OXYGEN SATURATION: 100 % | DIASTOLIC BLOOD PRESSURE: 74 MMHG | HEART RATE: 54 BPM | RESPIRATION RATE: 18 BRPM | TEMPERATURE: 98.6 F | SYSTOLIC BLOOD PRESSURE: 163 MMHG

## 2019-12-30 DIAGNOSIS — D63.1 ANEMIA DUE TO STAGE 4 CHRONIC KIDNEY DISEASE TREATED WITH ERYTHROPOIETIN (HCC): Primary | ICD-10-CM

## 2019-12-30 DIAGNOSIS — N18.4 ANEMIA DUE TO STAGE 4 CHRONIC KIDNEY DISEASE TREATED WITH ERYTHROPOIETIN (HCC): Primary | ICD-10-CM

## 2019-12-30 LAB
ALBUMIN SERPL-MCNC: 3.9 G/DL (ref 3.5–5.2)
ANION GAP SERPL CALCULATED.3IONS-SCNC: 12 MMOL/L (ref 5–15)
BUN BLD-MCNC: 49 MG/DL (ref 8–23)
BUN/CREAT SERPL: 17.4 (ref 7–25)
CALCIUM SPEC-SCNC: 10.3 MG/DL (ref 8.6–10.5)
CHLORIDE SERPL-SCNC: 100 MMOL/L (ref 98–107)
CO2 SERPL-SCNC: 26 MMOL/L (ref 22–29)
CREAT BLD-MCNC: 2.82 MG/DL (ref 0.57–1)
DEPRECATED RDW RBC AUTO: 57.1 FL (ref 37–54)
ERYTHROCYTE [DISTWIDTH] IN BLOOD BY AUTOMATED COUNT: 17.6 % (ref 12.3–15.4)
GFR SERPL CREATININE-BSD FRML MDRD: 16 ML/MIN/1.73
GLUCOSE BLD-MCNC: 96 MG/DL (ref 65–99)
HCT VFR BLD AUTO: 26.5 % (ref 34–46.6)
HGB BLD-MCNC: 7.9 G/DL (ref 12–15.9)
MCH RBC QN AUTO: 26.8 PG (ref 26.6–33)
MCHC RBC AUTO-ENTMCNC: 29.8 G/DL (ref 31.5–35.7)
MCV RBC AUTO: 89.8 FL (ref 79–97)
PHOSPHATE SERPL-MCNC: 3.6 MG/DL (ref 2.5–4.5)
PLATELET # BLD AUTO: 204 10*3/MM3 (ref 140–450)
PMV BLD AUTO: 10.3 FL (ref 6–12)
POTASSIUM BLD-SCNC: 4.7 MMOL/L (ref 3.5–5.2)
RBC # BLD AUTO: 2.95 10*6/MM3 (ref 3.77–5.28)
SODIUM BLD-SCNC: 138 MMOL/L (ref 136–145)
WBC NRBC COR # BLD: 4.45 10*3/MM3 (ref 3.4–10.8)

## 2019-12-30 PROCEDURE — 85027 COMPLETE CBC AUTOMATED: CPT | Performed by: INTERNAL MEDICINE

## 2019-12-30 PROCEDURE — 80069 RENAL FUNCTION PANEL: CPT | Performed by: INTERNAL MEDICINE

## 2019-12-30 PROCEDURE — 96372 THER/PROPH/DIAG INJ SC/IM: CPT

## 2019-12-30 PROCEDURE — 36415 COLL VENOUS BLD VENIPUNCTURE: CPT

## 2019-12-30 PROCEDURE — 25010000002 EPOETIN ALFA PER 1000 UNITS: Performed by: PHYSICIAN ASSISTANT

## 2019-12-30 RX ADMIN — ERYTHROPOIETIN 40000 UNITS: 40000 INJECTION, SOLUTION INTRAVENOUS; SUBCUTANEOUS at 11:56

## 2020-01-08 ENCOUNTER — HOSPITAL ENCOUNTER (OUTPATIENT)
Dept: INFUSION THERAPY | Facility: HOSPITAL | Age: 80
Setting detail: INFUSION SERIES
Discharge: HOME | End: 2020-01-08

## 2020-01-08 VITALS
OXYGEN SATURATION: 99 % | DIASTOLIC BLOOD PRESSURE: 73 MMHG | TEMPERATURE: 99 F | BODY MASS INDEX: 22.22 KG/M2 | HEART RATE: 68 BPM | RESPIRATION RATE: 18 BRPM | SYSTOLIC BLOOD PRESSURE: 170 MMHG | WEIGHT: 110 LBS

## 2020-01-08 DIAGNOSIS — D64.9 ANEMIA, UNSPECIFIED TYPE: Primary | ICD-10-CM

## 2020-01-08 LAB
ABO GROUP BLD: NORMAL
ABO GROUP BLD: NORMAL
BLD GP AB SCN SERPL QL: NEGATIVE
RH BLD: POSITIVE
RH BLD: POSITIVE
T&S EXPIRATION DATE: NORMAL

## 2020-01-08 PROCEDURE — 86901 BLOOD TYPING SEROLOGIC RH(D): CPT

## 2020-01-08 PROCEDURE — 86920 COMPATIBILITY TEST SPIN: CPT

## 2020-01-08 PROCEDURE — 86850 RBC ANTIBODY SCREEN: CPT | Performed by: INTERNAL MEDICINE

## 2020-01-08 PROCEDURE — 86900 BLOOD TYPING SEROLOGIC ABO: CPT | Performed by: INTERNAL MEDICINE

## 2020-01-08 PROCEDURE — 86900 BLOOD TYPING SEROLOGIC ABO: CPT

## 2020-01-08 PROCEDURE — 86901 BLOOD TYPING SEROLOGIC RH(D): CPT | Performed by: INTERNAL MEDICINE

## 2020-01-08 PROCEDURE — G0463 HOSPITAL OUTPT CLINIC VISIT: HCPCS

## 2020-01-08 PROCEDURE — 36415 COLL VENOUS BLD VENIPUNCTURE: CPT

## 2020-01-08 RX ORDER — SODIUM CHLORIDE 9 MG/ML
250 INJECTION, SOLUTION INTRAVENOUS AS NEEDED
Status: CANCELLED | OUTPATIENT
Start: 2020-01-08

## 2020-01-10 ENCOUNTER — HOSPITAL ENCOUNTER (OUTPATIENT)
Dept: INFUSION THERAPY | Facility: HOSPITAL | Age: 80
Setting detail: INFUSION SERIES
Discharge: HOME | End: 2020-01-10

## 2020-01-10 VITALS
RESPIRATION RATE: 16 BRPM | TEMPERATURE: 98 F | OXYGEN SATURATION: 99 % | HEART RATE: 68 BPM | SYSTOLIC BLOOD PRESSURE: 154 MMHG | DIASTOLIC BLOOD PRESSURE: 77 MMHG

## 2020-01-10 DIAGNOSIS — D64.9 ANEMIA, UNSPECIFIED TYPE: ICD-10-CM

## 2020-01-10 PROCEDURE — 86900 BLOOD TYPING SEROLOGIC ABO: CPT

## 2020-01-10 PROCEDURE — P9016 RBC LEUKOCYTES REDUCED: HCPCS

## 2020-01-10 PROCEDURE — 36430 TRANSFUSION BLD/BLD COMPNT: CPT

## 2020-01-10 RX ORDER — SODIUM CHLORIDE 9 MG/ML
250 INJECTION, SOLUTION INTRAVENOUS AS NEEDED
Status: DISCONTINUED | OUTPATIENT
Start: 2020-01-10 | End: 2020-01-12 | Stop reason: HOSPADM

## 2020-01-10 RX ADMIN — SODIUM CHLORIDE 250 ML: 9 INJECTION, SOLUTION INTRAVENOUS at 12:28

## 2020-01-11 LAB
ABO + RH BLD: NORMAL
BH BB BLOOD EXPIRATION DATE: NORMAL
BH BB BLOOD TYPE BARCODE: 5100
BH BB DISPENSE STATUS: NORMAL
BH BB PRODUCT CODE: NORMAL
BH BB UNIT NUMBER: NORMAL
CROSSMATCH INTERPRETATION: NORMAL
UNIT  ABO: NORMAL
UNIT  RH: NORMAL

## 2020-01-13 ENCOUNTER — HOSPITAL ENCOUNTER (OUTPATIENT)
Dept: INFUSION THERAPY | Facility: HOSPITAL | Age: 80
Discharge: HOME | End: 2020-01-13
Admitting: INTERNAL MEDICINE

## 2020-01-13 VITALS
OXYGEN SATURATION: 100 % | SYSTOLIC BLOOD PRESSURE: 155 MMHG | TEMPERATURE: 98.8 F | HEART RATE: 65 BPM | DIASTOLIC BLOOD PRESSURE: 74 MMHG | RESPIRATION RATE: 18 BRPM

## 2020-01-13 DIAGNOSIS — N18.4 ANEMIA DUE TO STAGE 4 CHRONIC KIDNEY DISEASE TREATED WITH ERYTHROPOIETIN (HCC): Primary | ICD-10-CM

## 2020-01-13 DIAGNOSIS — D63.1 ANEMIA DUE TO STAGE 4 CHRONIC KIDNEY DISEASE TREATED WITH ERYTHROPOIETIN (HCC): Primary | ICD-10-CM

## 2020-01-13 LAB
DEPRECATED RDW RBC AUTO: 57.4 FL (ref 37–54)
ERYTHROCYTE [DISTWIDTH] IN BLOOD BY AUTOMATED COUNT: 17.9 % (ref 12.3–15.4)
HCT VFR BLD AUTO: 31.5 % (ref 34–46.6)
HGB BLD-MCNC: 9.7 G/DL (ref 12–15.9)
MCH RBC QN AUTO: 28.3 PG (ref 26.6–33)
MCHC RBC AUTO-ENTMCNC: 30.8 G/DL (ref 31.5–35.7)
MCV RBC AUTO: 91.8 FL (ref 79–97)
PLATELET # BLD AUTO: 237 10*3/MM3 (ref 140–450)
PMV BLD AUTO: 10 FL (ref 6–12)
RBC # BLD AUTO: 3.43 10*6/MM3 (ref 3.77–5.28)
WBC NRBC COR # BLD: 3.49 10*3/MM3 (ref 3.4–10.8)

## 2020-01-13 PROCEDURE — 25010000002 EPOETIN ALFA PER 1000 UNITS: Performed by: PHYSICIAN ASSISTANT

## 2020-01-13 PROCEDURE — 85027 COMPLETE CBC AUTOMATED: CPT | Performed by: INTERNAL MEDICINE

## 2020-01-13 PROCEDURE — 36415 COLL VENOUS BLD VENIPUNCTURE: CPT

## 2020-01-13 PROCEDURE — 96372 THER/PROPH/DIAG INJ SC/IM: CPT

## 2020-01-13 RX ADMIN — ERYTHROPOIETIN 40000 UNITS: 40000 INJECTION, SOLUTION INTRAVENOUS; SUBCUTANEOUS at 11:39

## 2020-01-27 ENCOUNTER — HOSPITAL ENCOUNTER (OUTPATIENT)
Dept: INFUSION THERAPY | Facility: HOSPITAL | Age: 80
Discharge: HOME | End: 2020-01-27
Admitting: PHYSICIAN ASSISTANT

## 2020-01-27 VITALS
DIASTOLIC BLOOD PRESSURE: 70 MMHG | TEMPERATURE: 98.3 F | BODY MASS INDEX: 22.18 KG/M2 | RESPIRATION RATE: 18 BRPM | HEART RATE: 70 BPM | OXYGEN SATURATION: 98 % | WEIGHT: 110 LBS | SYSTOLIC BLOOD PRESSURE: 158 MMHG | HEIGHT: 59 IN

## 2020-01-27 DIAGNOSIS — D63.1 ANEMIA DUE TO STAGE 4 CHRONIC KIDNEY DISEASE TREATED WITH ERYTHROPOIETIN (HCC): Primary | ICD-10-CM

## 2020-01-27 DIAGNOSIS — N18.4 ANEMIA DUE TO STAGE 4 CHRONIC KIDNEY DISEASE TREATED WITH ERYTHROPOIETIN (HCC): Primary | ICD-10-CM

## 2020-01-27 LAB
ALBUMIN SERPL-MCNC: 4 G/DL (ref 3.5–5.2)
ANION GAP SERPL CALCULATED.3IONS-SCNC: 13.2 MMOL/L (ref 5–15)
BUN BLD-MCNC: 47 MG/DL (ref 8–23)
BUN/CREAT SERPL: 17.5 (ref 7–25)
CALCIUM SPEC-SCNC: 10.7 MG/DL (ref 8.6–10.5)
CHLORIDE SERPL-SCNC: 99 MMOL/L (ref 98–107)
CO2 SERPL-SCNC: 25.8 MMOL/L (ref 22–29)
CREAT BLD-MCNC: 2.69 MG/DL (ref 0.57–1)
DEPRECATED RDW RBC AUTO: 62.4 FL (ref 37–54)
ERYTHROCYTE [DISTWIDTH] IN BLOOD BY AUTOMATED COUNT: 18.5 % (ref 12.3–15.4)
GFR SERPL CREATININE-BSD FRML MDRD: 17 ML/MIN/1.73
GLUCOSE BLD-MCNC: 97 MG/DL (ref 65–99)
HCT VFR BLD AUTO: 32.7 % (ref 34–46.6)
HGB BLD-MCNC: 9.9 G/DL (ref 12–15.9)
MCH RBC QN AUTO: 28.6 PG (ref 26.6–33)
MCHC RBC AUTO-ENTMCNC: 30.3 G/DL (ref 31.5–35.7)
MCV RBC AUTO: 94.5 FL (ref 79–97)
PHOSPHATE SERPL-MCNC: 3.8 MG/DL (ref 2.5–4.5)
PLATELET # BLD AUTO: 194 10*3/MM3 (ref 140–450)
PMV BLD AUTO: 9.8 FL (ref 6–12)
POTASSIUM BLD-SCNC: 5 MMOL/L (ref 3.5–5.2)
RBC # BLD AUTO: 3.46 10*6/MM3 (ref 3.77–5.28)
SODIUM BLD-SCNC: 138 MMOL/L (ref 136–145)
WBC NRBC COR # BLD: 3.65 10*3/MM3 (ref 3.4–10.8)

## 2020-01-27 PROCEDURE — 80069 RENAL FUNCTION PANEL: CPT | Performed by: INTERNAL MEDICINE

## 2020-01-27 PROCEDURE — 36415 COLL VENOUS BLD VENIPUNCTURE: CPT

## 2020-01-27 PROCEDURE — 85027 COMPLETE CBC AUTOMATED: CPT | Performed by: INTERNAL MEDICINE

## 2020-01-27 PROCEDURE — 25010000002 EPOETIN ALFA PER 1000 UNITS: Performed by: PHYSICIAN ASSISTANT

## 2020-01-27 PROCEDURE — 96372 THER/PROPH/DIAG INJ SC/IM: CPT

## 2020-01-27 RX ADMIN — ERYTHROPOIETIN 40000 UNITS: 40000 INJECTION, SOLUTION INTRAVENOUS; SUBCUTANEOUS at 12:19

## 2020-02-06 ENCOUNTER — HOSPITAL ENCOUNTER (OUTPATIENT)
Dept: INFUSION THERAPY | Facility: HOSPITAL | Age: 80
Discharge: HOME | End: 2020-02-06
Admitting: INTERNAL MEDICINE

## 2020-02-06 VITALS
SYSTOLIC BLOOD PRESSURE: 135 MMHG | OXYGEN SATURATION: 99 % | TEMPERATURE: 98.4 F | DIASTOLIC BLOOD PRESSURE: 83 MMHG | RESPIRATION RATE: 18 BRPM | HEART RATE: 67 BPM

## 2020-02-06 DIAGNOSIS — M81.0 SENILE OSTEOPOROSIS: Primary | ICD-10-CM

## 2020-02-06 PROCEDURE — 96372 THER/PROPH/DIAG INJ SC/IM: CPT

## 2020-02-06 PROCEDURE — 96401 CHEMO ANTI-NEOPL SQ/IM: CPT

## 2020-02-06 PROCEDURE — 25010000002 DENOSUMAB 60 MG/ML SOLUTION PREFILLED SYRINGE: Performed by: INTERNAL MEDICINE

## 2020-02-06 RX ADMIN — DENOSUMAB 60 MG: 60 INJECTION SUBCUTANEOUS at 11:25

## 2020-02-10 ENCOUNTER — OFFICE VISIT (OUTPATIENT)
Dept: OBSTETRICS AND GYNECOLOGY | Facility: CLINIC | Age: 80
End: 2020-02-10

## 2020-02-10 ENCOUNTER — HOSPITAL ENCOUNTER (OUTPATIENT)
Dept: INFUSION THERAPY | Facility: HOSPITAL | Age: 80
Discharge: HOME | End: 2020-02-10
Admitting: INTERNAL MEDICINE

## 2020-02-10 VITALS
DIASTOLIC BLOOD PRESSURE: 77 MMHG | HEART RATE: 69 BPM | RESPIRATION RATE: 16 BRPM | TEMPERATURE: 98.3 F | OXYGEN SATURATION: 98 % | SYSTOLIC BLOOD PRESSURE: 161 MMHG

## 2020-02-10 VITALS
BODY MASS INDEX: 22.82 KG/M2 | SYSTOLIC BLOOD PRESSURE: 172 MMHG | WEIGHT: 113.2 LBS | HEIGHT: 59 IN | DIASTOLIC BLOOD PRESSURE: 72 MMHG

## 2020-02-10 DIAGNOSIS — N81.11 CYSTOCELE, MIDLINE: ICD-10-CM

## 2020-02-10 DIAGNOSIS — N18.4 ANEMIA DUE TO STAGE 4 CHRONIC KIDNEY DISEASE TREATED WITH ERYTHROPOIETIN (HCC): Primary | ICD-10-CM

## 2020-02-10 DIAGNOSIS — D63.1 ANEMIA DUE TO STAGE 4 CHRONIC KIDNEY DISEASE TREATED WITH ERYTHROPOIETIN (HCC): Primary | ICD-10-CM

## 2020-02-10 DIAGNOSIS — Z46.89 PESSARY MAINTENANCE: Primary | ICD-10-CM

## 2020-02-10 LAB
DEPRECATED RDW RBC AUTO: 63.1 FL (ref 37–54)
ERYTHROCYTE [DISTWIDTH] IN BLOOD BY AUTOMATED COUNT: 18.8 % (ref 12.3–15.4)
HCT VFR BLD AUTO: 31.2 % (ref 34–46.6)
HGB BLD-MCNC: 9.6 G/DL (ref 12–15.9)
MCH RBC QN AUTO: 28.8 PG (ref 26.6–33)
MCHC RBC AUTO-ENTMCNC: 30.8 G/DL (ref 31.5–35.7)
MCV RBC AUTO: 93.7 FL (ref 79–97)
PLATELET # BLD AUTO: 212 10*3/MM3 (ref 140–450)
PMV BLD AUTO: 9.5 FL (ref 6–12)
RBC # BLD AUTO: 3.33 10*6/MM3 (ref 3.77–5.28)
WBC NRBC COR # BLD: 4.32 10*3/MM3 (ref 3.4–10.8)

## 2020-02-10 PROCEDURE — 99212 OFFICE O/P EST SF 10 MIN: CPT | Performed by: PHYSICIAN ASSISTANT

## 2020-02-10 PROCEDURE — 36415 COLL VENOUS BLD VENIPUNCTURE: CPT

## 2020-02-10 PROCEDURE — 85027 COMPLETE CBC AUTOMATED: CPT | Performed by: INTERNAL MEDICINE

## 2020-02-10 PROCEDURE — 25010000002 EPOETIN ALFA PER 1000 UNITS: Performed by: PHYSICIAN ASSISTANT

## 2020-02-10 PROCEDURE — 96372 THER/PROPH/DIAG INJ SC/IM: CPT

## 2020-02-10 RX ORDER — FERROUS SULFATE 325(65) MG
325 TABLET ORAL
COMMUNITY
End: 2020-12-14

## 2020-02-10 RX ADMIN — ERYTHROPOIETIN 40000 UNITS: 40000 INJECTION, SOLUTION INTRAVENOUS; SUBCUTANEOUS at 11:16

## 2020-02-10 NOTE — CODE DOCUMENTATION
Advised pt to see PCP or go to ED if having chest pain or soa. Pt verbalized understanding. Pt states she isn't having pain in chest. States if feels more like bone pain. Pt recently took Prolia injection.    .

## 2020-02-10 NOTE — PROGRESS NOTES
"Subjective   Chief Complaint   Patient presents with   • Pessary Check     doing well       Carolyne Martins is a 79 y.o. year old  presenting to be seen for routine pessary maintenance  She has no complaints or concerns  Reports is doing well with pessary and no problems voiding or symptoms of urinary tract infection  No vaginal bleeding    Past Medical History:   Diagnosis Date   • Anemia    • Arthritis    • Bleeding from anus    • Bronchitis     STATES HAS BRONCHITIS CURRENTLY (17).     • Bronchitis 2017   • CAD (coronary artery disease)    • Cataract, bilateral    • Chronic kidney disease     STAGE 4.  SEES TERA   • Chronic kidney failure     REPORTS STAGE IV   • Difficulty swallowing solids    • Disease of thyroid gland    • Fracture, radius 2016    right distal radius and ulna, healed.   • GERD (gastroesophageal reflux disease)    • Gout    • High cholesterol    • History of nuclear stress test     DR. CORTEZ.  \"IT WAS OK\"   • Hyperparathyroidism (CMS/HCC)    • Hyperparathyroidism (CMS/HCC)    • Hypertension    • Iron deficiency    • Osteoarthritis    • Osteoarthritis of knees, bilateral     Both knees Supartz injection series    • Osteoporosis    • Personal history of cardiac murmur    • Presence of pessary    • Problems with swallowing     WITH FOOD OCCASSIONALLY   • Rheumatic heart disease     Personal history   • Rotator cuff tendonitis     right    • Rupture of right proximal biceps tendon     chronic   • Seasonal allergies    • Spinal headache     REPORTS AFTER DELIVERY OF SON   • Subacromial bursitis     right    • Valvular heart disease    • Vitamin B12 deficiency    • Vitamin D deficiency    • Wears glasses         Current Outpatient Medications:   •  allopurinol (ZYLOPRIM) 100 MG tablet, Take 1 tablet by mouth daily., Disp: , Rfl:   •  budesonide-formoterol (SYMBICORT) 80-4.5 MCG/ACT inhaler, Inhale 2 puffs 2 (Two) Times a Day., Disp: , Rfl:   •  calcitriol " (ROCALTROL) 0.25 MCG capsule, , Disp: , Rfl:   •  carboxymethylcellulose (REFRESH PLUS) 0.5 % solution, 3 (Three) Times a Day As Needed for Dry Eyes., Disp: , Rfl:   •  carvedilol (COREG) 12.5 MG tablet, Take 12.5 mg by mouth Every Morning. 1 AM and 2 evening, Disp: , Rfl:   •  cholecalciferol (VITAMIN D3) 1000 UNITS tablet, Take 1,000 Units by mouth Daily., Disp: , Rfl:   •  Cyanocobalamin (VITAMIN B-12 PO), Take 500 mg by mouth 2 (Two) Times a Day., Disp: , Rfl:   •  epoetin ovidio (PROCRIT) 13567 UNIT/ML injection, Procrit SOLN; Patient Sig: Procrit SOLN ; 0; 16-Jul-2014; every 3 weeks, Disp: , Rfl:   •  erythromycin (ROMYCIN) 5 MG/GM ophthalmic ointment, Administer  to both eyes Every Night., Disp: , Rfl:   •  estradiol (ESTRACE) 0.1 MG/GM vaginal cream, Massage 1 gram in vaginal opening twice weekly, Disp: 42.5 g, Rfl: 2  •  ferrous sulfate 325 (65 FE) MG tablet, Take 325 mg by mouth Daily With Breakfast., Disp: , Rfl:   •  fluticasone (FLONASE) 50 MCG/ACT nasal spray, 2 sprays into each nostril Daily As Needed., Disp: , Rfl:   •  irbesartan (AVAPRO) 150 MG tablet, Take 150 mg by mouth Daily., Disp: , Rfl:   •  omeprazole (PriLOSEC) 40 MG capsule, Take 40 mg by mouth Daily., Disp: , Rfl:   •  ondansetron ODT (ZOFRAN-ODT) 4 MG disintegrating tablet, , Disp: , Rfl:   •  Polysaccharide Iron Complex (POLY-IRON 150 PO), Take  by mouth Daily., Disp: , Rfl:   •  Probiotic Product (PROBIOTIC DAILY PO), Take  by mouth Daily., Disp: , Rfl:   No current facility-administered medications for this visit.    Allergies   Allergen Reactions   • Ferrlecit [Na Ferric Gluc Cplx In Sucrose] Swelling   • Ranitidine Hcl Shortness Of Breath   • Levofloxacin Other (See Comments)     Leg cramps     • Lisinopril Cough      Past Surgical History:   Procedure Laterality Date   • CATARACT EXTRACTION Bilateral    • CERVICAL POLYPECTOMY     • COLONOSCOPY  2011   • COLONOSCOPY N/A 1/9/2018    Procedure: COLONOSCOPY with endoscopic mucosal  "resection (hot snare), normal saline submucosal injection, argon thermal ablation, resolution clip placement x4, and cold biopsy polypectomy;  Surgeon: Pieter Concepcion MD;  Location: [facility] ENDOSCOPY;  Service:    • ENDOSCOPY N/A 12/6/2017    Procedure: ESOPHAGOGASTRODUODENOSCOPY with biopsies and hot snare polypectomies;  Surgeon: Pieter Concepcion MD;  Location: [facility] ENDOSCOPY;  Service:    • ENDOSCOPY N/A 1/15/2019    Procedure: ESOPHAGOGASTRODUODENOSCOPY WITH BIOPSIES AND HOT SNARE POLYPECTOMIES X10;  Surgeon: Pieter Concepcion MD;  Location: [facility] ENDOSCOPY;  Service: Gastroenterology   • UPPER GASTROINTESTINAL ENDOSCOPY  08/18/2014      Social History     Socioeconomic History   • Marital status:      Spouse name: Not on file   • Number of children: Not on file   • Years of education: Not on file   • Highest education level: Not on file   Tobacco Use   • Smoking status: Never Smoker   • Smokeless tobacco: Never Used   Substance and Sexual Activity   • Alcohol use: No   • Drug use: No   • Sexual activity: Not Currently     Birth control/protection: Post-menopausal      Family History   Problem Relation Age of Onset   • Liver cancer Maternal Grandmother    • Gout Other    • Osteoporosis Other    • Stroke Other    • Ulcers Other    • Asthma Other    • Hypertension Other    • Heart disease Other    • Osteoarthritis Other    • Colon cancer Neg Hx        Review of Systems   Constitutional: Negative for activity change, chills, diaphoresis and fever.   Gastrointestinal: Negative for abdominal pain, constipation, diarrhea, nausea and vomiting.   Genitourinary: Negative for difficulty urinating, dysuria, hematuria, pelvic pain, vaginal bleeding and vaginal discharge.           Objective   /72   Ht 149.9 cm (59\")   Wt 51.3 kg (113 lb 3.2 oz)   LMP  (LMP Unknown)   Breastfeeding No   BMI 22.86 kg/m²     Physical Exam   Constitutional: She appears well-developed and well-nourished. She is cooperative. No " distress.   Eyes: Conjunctivae, EOM and lids are normal.   Abdominal: Soft. Normal appearance. There is no tenderness. There is no rigidity and no guarding.   Genitourinary: There is no tenderness or lesion on the right labia. There is no tenderness or lesion on the left labia.   Genitourinary Comments: Ring with support pessary removed, cleaned and reinserted  No vaginal wall erosion or abrasions  Grade 2 cystocele   Neurological: She is alert.   Skin: Skin is warm and dry. No lesion and no rash noted.   Psychiatric: She has a normal mood and affect. Her behavior is normal. Thought content normal.            Assessment and Plan  Carolyne was seen today for pessary check.    Diagnoses and all orders for this visit:    Pessary maintenance    Cystocele, midline      Patient Instructions   RTO 3 months or prn             This note was electronically signed.    Yaz Woods PA-C   February 10, 2020

## 2020-02-11 ENCOUNTER — HOSPITAL ENCOUNTER (OUTPATIENT)
Dept: INFUSION THERAPY | Facility: HOSPITAL | Age: 80
Discharge: HOME | End: 2020-02-11
Admitting: INTERNAL MEDICINE

## 2020-02-11 VITALS
DIASTOLIC BLOOD PRESSURE: 77 MMHG | OXYGEN SATURATION: 99 % | SYSTOLIC BLOOD PRESSURE: 153 MMHG | HEART RATE: 70 BPM | TEMPERATURE: 98.7 F | RESPIRATION RATE: 18 BRPM

## 2020-02-11 DIAGNOSIS — D63.1 ANEMIA DUE TO STAGE 4 CHRONIC KIDNEY DISEASE TREATED WITH ERYTHROPOIETIN (HCC): ICD-10-CM

## 2020-02-11 DIAGNOSIS — N18.4 ANEMIA DUE TO STAGE 4 CHRONIC KIDNEY DISEASE TREATED WITH ERYTHROPOIETIN (HCC): ICD-10-CM

## 2020-02-11 LAB
ALBUMIN SERPL-MCNC: 4 G/DL (ref 3.5–5.2)
ALBUMIN/GLOB SERPL: 1.5 G/DL
ALP SERPL-CCNC: 62 U/L (ref 39–117)
ALT SERPL W P-5'-P-CCNC: 12 U/L (ref 1–33)
ANION GAP SERPL CALCULATED.3IONS-SCNC: 12.1 MMOL/L (ref 5–15)
AST SERPL-CCNC: 22 U/L (ref 1–32)
BILIRUB SERPL-MCNC: 0.3 MG/DL (ref 0.2–1.2)
BUN BLD-MCNC: 43 MG/DL (ref 8–23)
BUN/CREAT SERPL: 15.1 (ref 7–25)
CALCIUM SPEC-SCNC: 8.5 MG/DL (ref 8.6–10.5)
CHLORIDE SERPL-SCNC: 102 MMOL/L (ref 98–107)
CO2 SERPL-SCNC: 23.9 MMOL/L (ref 22–29)
CREAT BLD-MCNC: 2.85 MG/DL (ref 0.57–1)
GFR SERPL CREATININE-BSD FRML MDRD: 16 ML/MIN/1.73
GLOBULIN UR ELPH-MCNC: 2.7 GM/DL
GLUCOSE BLD-MCNC: 100 MG/DL (ref 65–99)
IRON 24H UR-MRATE: 126 MCG/DL (ref 37–145)
IRON SATN MFR SERPL: 29 % (ref 20–50)
PHOSPHATE SERPL-MCNC: 3.6 MG/DL (ref 2.5–4.5)
POTASSIUM BLD-SCNC: 4.6 MMOL/L (ref 3.5–5.2)
PROT SERPL-MCNC: 6.7 G/DL (ref 6–8.5)
PTH-INTACT SERPL-MCNC: 305 PG/ML (ref 15–65)
SODIUM BLD-SCNC: 138 MMOL/L (ref 136–145)
TIBC SERPL-MCNC: 435 MCG/DL (ref 298–536)
TRANSFERRIN SERPL-MCNC: 292 MG/DL (ref 200–360)
URATE SERPL-MCNC: 6 MG/DL (ref 2.4–5.7)

## 2020-02-11 PROCEDURE — 84550 ASSAY OF BLOOD/URIC ACID: CPT | Performed by: INTERNAL MEDICINE

## 2020-02-11 PROCEDURE — 80053 COMPREHEN METABOLIC PANEL: CPT | Performed by: INTERNAL MEDICINE

## 2020-02-11 PROCEDURE — 83540 ASSAY OF IRON: CPT | Performed by: INTERNAL MEDICINE

## 2020-02-11 PROCEDURE — 36415 COLL VENOUS BLD VENIPUNCTURE: CPT

## 2020-02-11 PROCEDURE — 83970 ASSAY OF PARATHORMONE: CPT | Performed by: INTERNAL MEDICINE

## 2020-02-11 PROCEDURE — 84466 ASSAY OF TRANSFERRIN: CPT | Performed by: INTERNAL MEDICINE

## 2020-02-11 PROCEDURE — 84100 ASSAY OF PHOSPHORUS: CPT | Performed by: INTERNAL MEDICINE

## 2020-02-24 ENCOUNTER — HOSPITAL ENCOUNTER (OUTPATIENT)
Dept: INFUSION THERAPY | Facility: HOSPITAL | Age: 80
Discharge: HOME | End: 2020-02-24
Admitting: INTERNAL MEDICINE

## 2020-02-24 VITALS
SYSTOLIC BLOOD PRESSURE: 143 MMHG | TEMPERATURE: 98.5 F | DIASTOLIC BLOOD PRESSURE: 65 MMHG | OXYGEN SATURATION: 100 % | RESPIRATION RATE: 18 BRPM | HEART RATE: 66 BPM

## 2020-02-24 DIAGNOSIS — N18.4 ANEMIA DUE TO STAGE 4 CHRONIC KIDNEY DISEASE TREATED WITH ERYTHROPOIETIN (HCC): Primary | ICD-10-CM

## 2020-02-24 DIAGNOSIS — D63.1 ANEMIA DUE TO STAGE 4 CHRONIC KIDNEY DISEASE TREATED WITH ERYTHROPOIETIN (HCC): Primary | ICD-10-CM

## 2020-02-24 LAB
DEPRECATED RDW RBC AUTO: 63.3 FL (ref 37–54)
ERYTHROCYTE [DISTWIDTH] IN BLOOD BY AUTOMATED COUNT: 19 % (ref 12.3–15.4)
HCT VFR BLD AUTO: 32.2 % (ref 34–46.6)
HGB BLD-MCNC: 10.1 G/DL (ref 12–15.9)
MCH RBC QN AUTO: 29.3 PG (ref 26.6–33)
MCHC RBC AUTO-ENTMCNC: 31.4 G/DL (ref 31.5–35.7)
MCV RBC AUTO: 93.3 FL (ref 79–97)
PLATELET # BLD AUTO: 214 10*3/MM3 (ref 140–450)
PMV BLD AUTO: 9.7 FL (ref 6–12)
RBC # BLD AUTO: 3.45 10*6/MM3 (ref 3.77–5.28)
WBC NRBC COR # BLD: 4.06 10*3/MM3 (ref 3.4–10.8)

## 2020-02-24 PROCEDURE — 85027 COMPLETE CBC AUTOMATED: CPT | Performed by: INTERNAL MEDICINE

## 2020-02-24 PROCEDURE — 36415 COLL VENOUS BLD VENIPUNCTURE: CPT

## 2020-03-09 ENCOUNTER — HOSPITAL ENCOUNTER (OUTPATIENT)
Dept: INFUSION THERAPY | Facility: HOSPITAL | Age: 80
Discharge: HOME | End: 2020-03-09
Admitting: INTERNAL MEDICINE

## 2020-03-09 VITALS
OXYGEN SATURATION: 99 % | DIASTOLIC BLOOD PRESSURE: 63 MMHG | TEMPERATURE: 98.7 F | RESPIRATION RATE: 18 BRPM | HEART RATE: 70 BPM | SYSTOLIC BLOOD PRESSURE: 147 MMHG

## 2020-03-09 DIAGNOSIS — N18.4 ANEMIA DUE TO STAGE 4 CHRONIC KIDNEY DISEASE TREATED WITH ERYTHROPOIETIN (HCC): Primary | ICD-10-CM

## 2020-03-09 DIAGNOSIS — D63.1 ANEMIA DUE TO STAGE 4 CHRONIC KIDNEY DISEASE TREATED WITH ERYTHROPOIETIN (HCC): Primary | ICD-10-CM

## 2020-03-09 LAB
DEPRECATED RDW RBC AUTO: 61.6 FL (ref 37–54)
ERYTHROCYTE [DISTWIDTH] IN BLOOD BY AUTOMATED COUNT: 18.1 % (ref 12.3–15.4)
HCT VFR BLD AUTO: 30.2 % (ref 34–46.6)
HGB BLD-MCNC: 9.6 G/DL (ref 12–15.9)
MCH RBC QN AUTO: 29.9 PG (ref 26.6–33)
MCHC RBC AUTO-ENTMCNC: 31.8 G/DL (ref 31.5–35.7)
MCV RBC AUTO: 94.1 FL (ref 79–97)
PLATELET # BLD AUTO: 198 10*3/MM3 (ref 140–450)
PMV BLD AUTO: 9.7 FL (ref 6–12)
RBC # BLD AUTO: 3.21 10*6/MM3 (ref 3.77–5.28)
WBC NRBC COR # BLD: 3.41 10*3/MM3 (ref 3.4–10.8)

## 2020-03-09 PROCEDURE — 85027 COMPLETE CBC AUTOMATED: CPT | Performed by: INTERNAL MEDICINE

## 2020-03-09 PROCEDURE — 36415 COLL VENOUS BLD VENIPUNCTURE: CPT

## 2020-03-09 PROCEDURE — 25010000002 EPOETIN ALFA PER 1000 UNITS: Performed by: PHYSICIAN ASSISTANT

## 2020-03-09 PROCEDURE — 96372 THER/PROPH/DIAG INJ SC/IM: CPT

## 2020-03-09 RX ADMIN — ERYTHROPOIETIN 40000 UNITS: 40000 INJECTION, SOLUTION INTRAVENOUS; SUBCUTANEOUS at 11:32

## 2020-03-23 ENCOUNTER — HOSPITAL ENCOUNTER (OUTPATIENT)
Dept: INFUSION THERAPY | Facility: HOSPITAL | Age: 80
Discharge: HOME | End: 2020-03-23
Admitting: PHYSICIAN ASSISTANT

## 2020-03-23 VITALS
OXYGEN SATURATION: 97 % | HEIGHT: 59 IN | HEART RATE: 66 BPM | RESPIRATION RATE: 17 BRPM | WEIGHT: 115 LBS | BODY MASS INDEX: 23.18 KG/M2 | DIASTOLIC BLOOD PRESSURE: 69 MMHG | TEMPERATURE: 97.4 F | SYSTOLIC BLOOD PRESSURE: 145 MMHG

## 2020-03-23 DIAGNOSIS — N18.4 ANEMIA DUE TO STAGE 4 CHRONIC KIDNEY DISEASE TREATED WITH ERYTHROPOIETIN (HCC): Primary | ICD-10-CM

## 2020-03-23 DIAGNOSIS — D63.1 ANEMIA DUE TO STAGE 4 CHRONIC KIDNEY DISEASE TREATED WITH ERYTHROPOIETIN (HCC): Primary | ICD-10-CM

## 2020-03-23 LAB
ALBUMIN SERPL-MCNC: 3.9 G/DL (ref 3.5–5.2)
ANION GAP SERPL CALCULATED.3IONS-SCNC: 13.5 MMOL/L (ref 5–15)
BUN BLD-MCNC: 55 MG/DL (ref 8–23)
BUN/CREAT SERPL: 14.6 (ref 7–25)
CALCIUM SPEC-SCNC: 11.5 MG/DL (ref 8.6–10.5)
CHLORIDE SERPL-SCNC: 95 MMOL/L (ref 98–107)
CO2 SERPL-SCNC: 27.5 MMOL/L (ref 22–29)
CREAT BLD-MCNC: 3.78 MG/DL (ref 0.57–1)
DEPRECATED RDW RBC AUTO: 66.4 FL (ref 37–54)
ERYTHROCYTE [DISTWIDTH] IN BLOOD BY AUTOMATED COUNT: 19 % (ref 12.3–15.4)
GFR SERPL CREATININE-BSD FRML MDRD: 12 ML/MIN/1.73
GFR SERPL CREATININE-BSD FRML MDRD: ABNORMAL ML/MIN/{1.73_M2}
GLUCOSE BLD-MCNC: 97 MG/DL (ref 65–99)
HCT VFR BLD AUTO: 29.1 % (ref 34–46.6)
HGB BLD-MCNC: 9.2 G/DL (ref 12–15.9)
MCH RBC QN AUTO: 30.5 PG (ref 26.6–33)
MCHC RBC AUTO-ENTMCNC: 31.6 G/DL (ref 31.5–35.7)
MCV RBC AUTO: 96.4 FL (ref 79–97)
PHOSPHATE SERPL-MCNC: 4.9 MG/DL (ref 2.5–4.5)
PLATELET # BLD AUTO: 168 10*3/MM3 (ref 140–450)
PMV BLD AUTO: 9.4 FL (ref 6–12)
POTASSIUM BLD-SCNC: 4.8 MMOL/L (ref 3.5–5.2)
RBC # BLD AUTO: 3.02 10*6/MM3 (ref 3.77–5.28)
SODIUM BLD-SCNC: 136 MMOL/L (ref 136–145)
WBC NRBC COR # BLD: 5.18 10*3/MM3 (ref 3.4–10.8)

## 2020-03-23 PROCEDURE — 36415 COLL VENOUS BLD VENIPUNCTURE: CPT

## 2020-03-23 PROCEDURE — 96372 THER/PROPH/DIAG INJ SC/IM: CPT

## 2020-03-23 PROCEDURE — 80069 RENAL FUNCTION PANEL: CPT | Performed by: INTERNAL MEDICINE

## 2020-03-23 PROCEDURE — 85027 COMPLETE CBC AUTOMATED: CPT | Performed by: INTERNAL MEDICINE

## 2020-03-23 PROCEDURE — 25010000002 EPOETIN ALFA PER 1000 UNITS: Performed by: PHYSICIAN ASSISTANT

## 2020-03-23 RX ADMIN — ERYTHROPOIETIN 40000 UNITS: 40000 INJECTION, SOLUTION INTRAVENOUS; SUBCUTANEOUS at 12:04

## 2020-03-23 NOTE — CODE DOCUMENTATION
Patient complains of pain on right buttocks area. Instructed patient to contact doctor for follow up and go to e.r. If any thing got worse. Patient verbalized understanding

## 2020-04-06 ENCOUNTER — HOSPITAL ENCOUNTER (OUTPATIENT)
Dept: INFUSION THERAPY | Facility: HOSPITAL | Age: 80
Discharge: HOME | End: 2020-04-06
Admitting: INTERNAL MEDICINE

## 2020-04-06 VITALS
DIASTOLIC BLOOD PRESSURE: 67 MMHG | WEIGHT: 110 LBS | OXYGEN SATURATION: 99 % | RESPIRATION RATE: 17 BRPM | HEART RATE: 73 BPM | TEMPERATURE: 97.5 F | BODY MASS INDEX: 22.18 KG/M2 | HEIGHT: 59 IN | SYSTOLIC BLOOD PRESSURE: 162 MMHG

## 2020-04-06 DIAGNOSIS — D63.1 ANEMIA DUE TO STAGE 4 CHRONIC KIDNEY DISEASE TREATED WITH ERYTHROPOIETIN (HCC): Primary | ICD-10-CM

## 2020-04-06 DIAGNOSIS — N18.4 ANEMIA DUE TO STAGE 4 CHRONIC KIDNEY DISEASE TREATED WITH ERYTHROPOIETIN (HCC): Primary | ICD-10-CM

## 2020-04-06 LAB
ALBUMIN SERPL-MCNC: 3.8 G/DL (ref 3.5–5.2)
ANION GAP SERPL CALCULATED.3IONS-SCNC: 11.5 MMOL/L (ref 5–15)
BUN BLD-MCNC: 46 MG/DL (ref 8–23)
BUN/CREAT SERPL: 14.1 (ref 7–25)
CA-I BLD-MCNC: 4.9 MG/DL (ref 4.6–5.4)
CA-I SERPL ISE-MCNC: 1.23 MMOL/L (ref 1.15–1.35)
CALCIUM SPEC-SCNC: 8.6 MG/DL (ref 8.6–10.5)
CHLORIDE SERPL-SCNC: 101 MMOL/L (ref 98–107)
CO2 SERPL-SCNC: 24.5 MMOL/L (ref 22–29)
CREAT BLD-MCNC: 3.26 MG/DL (ref 0.57–1)
DEPRECATED RDW RBC AUTO: 68.4 FL (ref 37–54)
ERYTHROCYTE [DISTWIDTH] IN BLOOD BY AUTOMATED COUNT: 19.3 % (ref 12.3–15.4)
GFR SERPL CREATININE-BSD FRML MDRD: 14 ML/MIN/1.73
GFR SERPL CREATININE-BSD FRML MDRD: ABNORMAL ML/MIN/{1.73_M2}
GLUCOSE BLD-MCNC: 134 MG/DL (ref 65–99)
HCT VFR BLD AUTO: 29.9 % (ref 34–46.6)
HGB BLD-MCNC: 9.6 G/DL (ref 12–15.9)
MCH RBC QN AUTO: 31.4 PG (ref 26.6–33)
MCHC RBC AUTO-ENTMCNC: 32.1 G/DL (ref 31.5–35.7)
MCV RBC AUTO: 97.7 FL (ref 79–97)
PHOSPHATE SERPL-MCNC: 4.5 MG/DL (ref 2.5–4.5)
PLATELET # BLD AUTO: 215 10*3/MM3 (ref 140–450)
PMV BLD AUTO: 9.8 FL (ref 6–12)
POTASSIUM BLD-SCNC: 4.3 MMOL/L (ref 3.5–5.2)
PTH-INTACT SERPL-MCNC: 146 PG/ML (ref 15–65)
RBC # BLD AUTO: 3.06 10*6/MM3 (ref 3.77–5.28)
SODIUM BLD-SCNC: 137 MMOL/L (ref 136–145)
WBC NRBC COR # BLD: 4.48 10*3/MM3 (ref 3.4–10.8)

## 2020-04-06 PROCEDURE — 82330 ASSAY OF CALCIUM: CPT | Performed by: PHYSICIAN ASSISTANT

## 2020-04-06 PROCEDURE — 83970 ASSAY OF PARATHORMONE: CPT | Performed by: PHYSICIAN ASSISTANT

## 2020-04-06 PROCEDURE — 80069 RENAL FUNCTION PANEL: CPT | Performed by: PHYSICIAN ASSISTANT

## 2020-04-06 PROCEDURE — 25010000002 EPOETIN ALFA PER 1000 UNITS: Performed by: PHYSICIAN ASSISTANT

## 2020-04-06 PROCEDURE — 96372 THER/PROPH/DIAG INJ SC/IM: CPT

## 2020-04-06 PROCEDURE — 36415 COLL VENOUS BLD VENIPUNCTURE: CPT

## 2020-04-06 PROCEDURE — 85027 COMPLETE CBC AUTOMATED: CPT | Performed by: PHYSICIAN ASSISTANT

## 2020-04-06 PROCEDURE — 82652 VIT D 1 25-DIHYDROXY: CPT | Performed by: PHYSICIAN ASSISTANT

## 2020-04-06 RX ADMIN — ERYTHROPOIETIN 40000 UNITS: 40000 INJECTION, SOLUTION INTRAVENOUS; SUBCUTANEOUS at 11:17

## 2020-04-08 LAB — 1,25(OH)2D3 SERPL-MCNC: 17.3 PG/ML (ref 19.9–79.3)

## 2020-04-17 ENCOUNTER — TELEPHONE (OUTPATIENT)
Dept: INFUSION THERAPY | Facility: HOSPITAL | Age: 80
End: 2020-04-17

## 2020-04-20 ENCOUNTER — HOSPITAL ENCOUNTER (OUTPATIENT)
Dept: INFUSION THERAPY | Facility: HOSPITAL | Age: 80
Discharge: HOME | End: 2020-04-20
Admitting: PHYSICIAN ASSISTANT

## 2020-04-20 VITALS
HEART RATE: 87 BPM | TEMPERATURE: 97.5 F | SYSTOLIC BLOOD PRESSURE: 159 MMHG | DIASTOLIC BLOOD PRESSURE: 82 MMHG | OXYGEN SATURATION: 100 % | RESPIRATION RATE: 18 BRPM

## 2020-04-20 DIAGNOSIS — N18.4 ANEMIA DUE TO STAGE 4 CHRONIC KIDNEY DISEASE TREATED WITH ERYTHROPOIETIN (HCC): Primary | ICD-10-CM

## 2020-04-20 DIAGNOSIS — D63.1 ANEMIA DUE TO STAGE 4 CHRONIC KIDNEY DISEASE TREATED WITH ERYTHROPOIETIN (HCC): Primary | ICD-10-CM

## 2020-04-20 LAB
DEPRECATED RDW RBC AUTO: 67.7 FL (ref 37–54)
ERYTHROCYTE [DISTWIDTH] IN BLOOD BY AUTOMATED COUNT: 18.7 % (ref 12.3–15.4)
HCT VFR BLD AUTO: 30.3 % (ref 34–46.6)
HGB BLD-MCNC: 9.5 G/DL (ref 12–15.9)
MCH RBC QN AUTO: 31.1 PG (ref 26.6–33)
MCHC RBC AUTO-ENTMCNC: 31.4 G/DL (ref 31.5–35.7)
MCV RBC AUTO: 99.3 FL (ref 79–97)
PLATELET # BLD AUTO: 173 10*3/MM3 (ref 140–450)
PMV BLD AUTO: 9.4 FL (ref 6–12)
RBC # BLD AUTO: 3.05 10*6/MM3 (ref 3.77–5.28)
WBC NRBC COR # BLD: 3.24 10*3/MM3 (ref 3.4–10.8)

## 2020-04-20 PROCEDURE — 96372 THER/PROPH/DIAG INJ SC/IM: CPT

## 2020-04-20 PROCEDURE — 36415 COLL VENOUS BLD VENIPUNCTURE: CPT

## 2020-04-20 PROCEDURE — 25010000002 EPOETIN ALFA PER 1000 UNITS: Performed by: PHYSICIAN ASSISTANT

## 2020-04-20 PROCEDURE — 85027 COMPLETE CBC AUTOMATED: CPT | Performed by: INTERNAL MEDICINE

## 2020-04-20 RX ADMIN — ERYTHROPOIETIN 40000 UNITS: 40000 INJECTION, SOLUTION INTRAVENOUS; SUBCUTANEOUS at 11:49

## 2020-05-04 ENCOUNTER — HOSPITAL ENCOUNTER (OUTPATIENT)
Dept: INFUSION THERAPY | Facility: HOSPITAL | Age: 80
Discharge: HOME | End: 2020-05-04
Admitting: INTERNAL MEDICINE

## 2020-05-04 VITALS
TEMPERATURE: 98.9 F | WEIGHT: 108 LBS | HEART RATE: 60 BPM | BODY MASS INDEX: 21.81 KG/M2 | OXYGEN SATURATION: 100 % | DIASTOLIC BLOOD PRESSURE: 84 MMHG | RESPIRATION RATE: 18 BRPM | SYSTOLIC BLOOD PRESSURE: 150 MMHG

## 2020-05-04 DIAGNOSIS — D63.1 ANEMIA DUE TO STAGE 4 CHRONIC KIDNEY DISEASE TREATED WITH ERYTHROPOIETIN (HCC): Primary | ICD-10-CM

## 2020-05-04 DIAGNOSIS — N18.4 ANEMIA DUE TO STAGE 4 CHRONIC KIDNEY DISEASE TREATED WITH ERYTHROPOIETIN (HCC): Primary | ICD-10-CM

## 2020-05-04 LAB
ALBUMIN SERPL-MCNC: 4.1 G/DL (ref 3.5–5.2)
ANION GAP SERPL CALCULATED.3IONS-SCNC: 12.2 MMOL/L (ref 5–15)
BUN BLD-MCNC: 46 MG/DL (ref 8–23)
BUN/CREAT SERPL: 16.3 (ref 7–25)
CALCIUM SPEC-SCNC: 9 MG/DL (ref 8.6–10.5)
CHLORIDE SERPL-SCNC: 101 MMOL/L (ref 98–107)
CO2 SERPL-SCNC: 24.8 MMOL/L (ref 22–29)
CREAT BLD-MCNC: 2.83 MG/DL (ref 0.57–1)
DEPRECATED RDW RBC AUTO: 60.4 FL (ref 37–54)
ERYTHROCYTE [DISTWIDTH] IN BLOOD BY AUTOMATED COUNT: 17.4 % (ref 12.3–15.4)
GFR SERPL CREATININE-BSD FRML MDRD: 16 ML/MIN/1.73
GLUCOSE BLD-MCNC: 103 MG/DL (ref 65–99)
HCT VFR BLD AUTO: 31.6 % (ref 34–46.6)
HGB BLD-MCNC: 10 G/DL (ref 12–15.9)
MCH RBC QN AUTO: 30.5 PG (ref 26.6–33)
MCHC RBC AUTO-ENTMCNC: 31.6 G/DL (ref 31.5–35.7)
MCV RBC AUTO: 96.3 FL (ref 79–97)
PHOSPHATE SERPL-MCNC: 4.6 MG/DL (ref 2.5–4.5)
PLATELET # BLD AUTO: 208 10*3/MM3 (ref 140–450)
PMV BLD AUTO: 10.2 FL (ref 6–12)
POTASSIUM BLD-SCNC: 4.8 MMOL/L (ref 3.5–5.2)
RBC # BLD AUTO: 3.28 10*6/MM3 (ref 3.77–5.28)
SODIUM BLD-SCNC: 138 MMOL/L (ref 136–145)
WBC NRBC COR # BLD: 4.76 10*3/MM3 (ref 3.4–10.8)

## 2020-05-04 PROCEDURE — 36415 COLL VENOUS BLD VENIPUNCTURE: CPT

## 2020-05-04 PROCEDURE — 25010000002 EPOETIN ALFA PER 1000 UNITS: Performed by: PHYSICIAN ASSISTANT

## 2020-05-04 PROCEDURE — 96372 THER/PROPH/DIAG INJ SC/IM: CPT

## 2020-05-04 PROCEDURE — 85027 COMPLETE CBC AUTOMATED: CPT | Performed by: INTERNAL MEDICINE

## 2020-05-04 PROCEDURE — 80069 RENAL FUNCTION PANEL: CPT | Performed by: INTERNAL MEDICINE

## 2020-05-04 RX ADMIN — ERYTHROPOIETIN 40000 UNITS: 40000 INJECTION, SOLUTION INTRAVENOUS; SUBCUTANEOUS at 11:41

## 2020-05-18 ENCOUNTER — HOSPITAL ENCOUNTER (OUTPATIENT)
Dept: INFUSION THERAPY | Facility: HOSPITAL | Age: 80
Discharge: HOME | End: 2020-05-18
Admitting: PHYSICIAN ASSISTANT

## 2020-05-18 ENCOUNTER — OFFICE VISIT (OUTPATIENT)
Dept: OBSTETRICS AND GYNECOLOGY | Facility: CLINIC | Age: 80
End: 2020-05-18

## 2020-05-18 VITALS
RESPIRATION RATE: 18 BRPM | TEMPERATURE: 98.6 F | DIASTOLIC BLOOD PRESSURE: 69 MMHG | OXYGEN SATURATION: 100 % | SYSTOLIC BLOOD PRESSURE: 141 MMHG | HEART RATE: 66 BPM

## 2020-05-18 VITALS
HEIGHT: 59 IN | WEIGHT: 111.4 LBS | DIASTOLIC BLOOD PRESSURE: 80 MMHG | SYSTOLIC BLOOD PRESSURE: 146 MMHG | BODY MASS INDEX: 22.46 KG/M2

## 2020-05-18 DIAGNOSIS — D63.1 ANEMIA DUE TO STAGE 4 CHRONIC KIDNEY DISEASE TREATED WITH ERYTHROPOIETIN (HCC): Primary | ICD-10-CM

## 2020-05-18 DIAGNOSIS — N18.4 ANEMIA DUE TO STAGE 4 CHRONIC KIDNEY DISEASE TREATED WITH ERYTHROPOIETIN (HCC): Primary | ICD-10-CM

## 2020-05-18 DIAGNOSIS — N81.11 CYSTOCELE, MIDLINE: ICD-10-CM

## 2020-05-18 DIAGNOSIS — Z46.89 PESSARY MAINTENANCE: Primary | ICD-10-CM

## 2020-05-18 LAB
DEPRECATED RDW RBC AUTO: 61.1 FL (ref 37–54)
ERYTHROCYTE [DISTWIDTH] IN BLOOD BY AUTOMATED COUNT: 17.3 % (ref 12.3–15.4)
HCT VFR BLD AUTO: 31.3 % (ref 34–46.6)
HGB BLD-MCNC: 10.1 G/DL (ref 12–15.9)
MCH RBC QN AUTO: 31.4 PG (ref 26.6–33)
MCHC RBC AUTO-ENTMCNC: 32.3 G/DL (ref 31.5–35.7)
MCV RBC AUTO: 97.2 FL (ref 79–97)
PLATELET # BLD AUTO: 204 10*3/MM3 (ref 140–450)
PMV BLD AUTO: 10 FL (ref 6–12)
RBC # BLD AUTO: 3.22 10*6/MM3 (ref 3.77–5.28)
WBC NRBC COR # BLD: 4.26 10*3/MM3 (ref 3.4–10.8)

## 2020-05-18 PROCEDURE — 99212 OFFICE O/P EST SF 10 MIN: CPT | Performed by: PHYSICIAN ASSISTANT

## 2020-05-18 PROCEDURE — 36415 COLL VENOUS BLD VENIPUNCTURE: CPT

## 2020-05-18 PROCEDURE — 85027 COMPLETE CBC AUTOMATED: CPT | Performed by: INTERNAL MEDICINE

## 2020-05-18 PROCEDURE — G0463 HOSPITAL OUTPT CLINIC VISIT: HCPCS

## 2020-05-18 NOTE — PROGRESS NOTES
"Subjective   Chief Complaint   Patient presents with   • Pessary Check     doing well       Carolyne Martins is a 80 y.o. year old  presenting to be seen for routine pessary visit  She has no complaints or concerns. She is doing well with pessary. Having no problems voiding or with bowel movements  No vaginal bleeding    Past Medical History:   Diagnosis Date   • Anemia    • Arthritis    • Bleeding from anus    • Bronchitis     STATES HAS BRONCHITIS CURRENTLY (17).     • Bronchitis 2017   • CAD (coronary artery disease)    • Cataract, bilateral    • Chronic kidney disease     STAGE 4.  SEES TERA   • Chronic kidney failure     REPORTS STAGE IV   • Difficulty swallowing solids    • Disease of thyroid gland    • Fracture, radius 2016    right distal radius and ulna, healed.   • GERD (gastroesophageal reflux disease)    • Gout    • High cholesterol    • History of nuclear stress test     DR. CORTEZ.  \"IT WAS OK\"   • Hyperparathyroidism (CMS/HCC)    • Hyperparathyroidism (CMS/HCC)    • Hypertension    • Iron deficiency    • Osteoarthritis    • Osteoarthritis of knees, bilateral     Both knees Supartz injection series    • Osteoporosis    • Personal history of cardiac murmur    • Presence of pessary    • Problems with swallowing     WITH FOOD OCCASSIONALLY   • Rheumatic heart disease     Personal history   • Rotator cuff tendonitis     right    • Rupture of right proximal biceps tendon     chronic   • Seasonal allergies    • Spinal headache     REPORTS AFTER DELIVERY OF SON   • Subacromial bursitis     right    • Valvular heart disease    • Vitamin B12 deficiency    • Vitamin D deficiency    • Wears glasses         Current Outpatient Medications:   •  allopurinol (ZYLOPRIM) 100 MG tablet, Take 1 tablet by mouth daily., Disp: , Rfl:   •  budesonide-formoterol (SYMBICORT) 80-4.5 MCG/ACT inhaler, Inhale 2 puffs 2 (Two) Times a Day., Disp: , Rfl:   •  calcitriol (ROCALTROL) 0.25 MCG " capsule, 2 (Two) Times a Day., Disp: , Rfl:   •  carboxymethylcellulose (REFRESH PLUS) 0.5 % solution, 3 (Three) Times a Day As Needed for Dry Eyes., Disp: , Rfl:   •  carvedilol (COREG) 12.5 MG tablet, Take 12.5 mg by mouth Every Morning. 1 AM and 2 evening, Disp: , Rfl:   •  Cyanocobalamin (VITAMIN B-12 PO), Take 500 mg by mouth 2 (Two) Times a Day., Disp: , Rfl:   •  epoetin ovidio (PROCRIT) 58762 UNIT/ML injection, Procrit SOLN; Patient Sig: Procrit SOLN ; 0; 16-Jul-2014; every 3 weeks, Disp: , Rfl:   •  erythromycin (ROMYCIN) 5 MG/GM ophthalmic ointment, Administer  to both eyes Every Night., Disp: , Rfl:   •  estradiol (ESTRACE) 0.1 MG/GM vaginal cream, Massage 1 gram in vaginal opening twice weekly, Disp: 42.5 g, Rfl: 2  •  ferrous sulfate 325 (65 FE) MG tablet, Take 325 mg by mouth Daily With Breakfast., Disp: , Rfl:   •  fluticasone (FLONASE) 50 MCG/ACT nasal spray, 2 sprays into each nostril Daily As Needed., Disp: , Rfl:   •  irbesartan (AVAPRO) 150 MG tablet, Take 150 mg by mouth Daily., Disp: , Rfl:   •  omeprazole (PriLOSEC) 40 MG capsule, Take 40 mg by mouth Daily., Disp: , Rfl:   •  Polysaccharide Iron Complex (POLY-IRON 150 PO), Take  by mouth Daily., Disp: , Rfl:   •  Probiotic Product (PROBIOTIC DAILY PO), Take  by mouth Daily., Disp: , Rfl:   •  cholecalciferol (VITAMIN D3) 1000 UNITS tablet, Take 1,000 Units by mouth Daily., Disp: , Rfl:   •  ondansetron ODT (ZOFRAN-ODT) 4 MG disintegrating tablet, , Disp: , Rfl:   No current facility-administered medications for this visit.     Facility-Administered Medications Ordered in Other Visits:   •  epoetin ovidio (EPOGEN,PROCRIT) injection 40,000 Units, 40,000 Units, Subcutaneous, Once PRN, [place], Teshla Janae, PA-C   Allergies   Allergen Reactions   • Ferrlecit [Na Ferric Gluc Cplx In Sucrose] Swelling   • Ranitidine Hcl Shortness Of Breath   • Levofloxacin Other (See Comments)     Leg cramps     • Lisinopril Cough      Past Surgical History:   Procedure  "Laterality Date   • CATARACT EXTRACTION Bilateral    • CERVICAL POLYPECTOMY     • COLONOSCOPY  2011   • COLONOSCOPY N/A 1/9/2018    Procedure: COLONOSCOPY with endoscopic mucosal resection (hot snare), normal saline submucosal injection, argon thermal ablation, resolution clip placement x4, and cold biopsy polypectomy;  Surgeon: Pieter Concepcion MD;  Location: [facility] ENDOSCOPY;  Service:    • ENDOSCOPY N/A 12/6/2017    Procedure: ESOPHAGOGASTRODUODENOSCOPY with biopsies and hot snare polypectomies;  Surgeon: Pieter Concepcion MD;  Location: [facility] ENDOSCOPY;  Service:    • ENDOSCOPY N/A 1/15/2019    Procedure: ESOPHAGOGASTRODUODENOSCOPY WITH BIOPSIES AND HOT SNARE POLYPECTOMIES X10;  Surgeon: Pieter Concepcion MD;  Location: [facility] ENDOSCOPY;  Service: Gastroenterology   • UPPER GASTROINTESTINAL ENDOSCOPY  08/18/2014      Social History     Socioeconomic History   • Marital status:      Spouse name: Not on file   • Number of children: Not on file   • Years of education: Not on file   • Highest education level: Not on file   Tobacco Use   • Smoking status: Never Smoker   • Smokeless tobacco: Never Used   Substance and Sexual Activity   • Alcohol use: No   • Drug use: No   • Sexual activity: Not Currently     Birth control/protection: Post-menopausal      Family History   Problem Relation Age of Onset   • Liver cancer Maternal Grandmother    • Gout Other    • Osteoporosis Other    • Stroke Other    • Ulcers Other    • Asthma Other    • Hypertension Other    • Heart disease Other    • Osteoarthritis Other    • Colon cancer Neg Hx        Review of Systems   Constitutional: Negative for activity change, chills and fever.   Gastrointestinal: Negative for abdominal pain, constipation, diarrhea, nausea and vomiting.   Genitourinary: Negative for difficulty urinating, dysuria, pelvic pain, vaginal bleeding, vaginal discharge and vaginal pain.           Objective   /80   Ht 149.9 cm (59\")   Wt 50.5 kg (111 lb 6.4 oz)  "  LMP  (LMP Unknown)   Breastfeeding No   BMI 22.50 kg/m²     Physical Exam   Constitutional: She appears well-developed and well-nourished. She is cooperative. No distress.   Eyes: Conjunctivae, EOM and lids are normal.   Abdominal: Soft. Normal appearance. There is no tenderness. There is no rigidity and no guarding.   Genitourinary: There is no tenderness or lesion on the right labia. There is no tenderness or lesion on the left labia. No tenderness or bleeding in the vagina. No signs of injury around the vagina. No vaginal discharge found.   Genitourinary Comments: Ring with support pessary removed, cleaned and reinserted. Grade 2 cystocele  No vaginal wall erosion or abrasions   Neurological: She is alert.   Skin: Skin is warm and dry. No lesion and no rash noted.   Psychiatric: She has a normal mood and affect. Her behavior is normal. Thought content normal.            Assessment and Plan  Carolyne was seen today for pessary check.    Diagnoses and all orders for this visit:    Pessary maintenance    Cystocele, midline      Patient Instructions   RTO 3 months or prn             This note was electronically signed.    Yaz Woods PA-C   May 18, 2020

## 2020-05-29 ENCOUNTER — TELEPHONE (OUTPATIENT)
Dept: INFUSION THERAPY | Facility: HOSPITAL | Age: 80
End: 2020-05-29

## 2020-06-01 ENCOUNTER — HOSPITAL ENCOUNTER (OUTPATIENT)
Dept: INFUSION THERAPY | Facility: HOSPITAL | Age: 80
Discharge: HOME | End: 2020-06-01
Admitting: INTERNAL MEDICINE

## 2020-06-01 VITALS
RESPIRATION RATE: 18 BRPM | DIASTOLIC BLOOD PRESSURE: 71 MMHG | HEART RATE: 76 BPM | OXYGEN SATURATION: 99 % | TEMPERATURE: 97.9 F | SYSTOLIC BLOOD PRESSURE: 158 MMHG

## 2020-06-01 DIAGNOSIS — D63.1 ANEMIA DUE TO STAGE 4 CHRONIC KIDNEY DISEASE TREATED WITH ERYTHROPOIETIN (HCC): Primary | ICD-10-CM

## 2020-06-01 DIAGNOSIS — N18.4 ANEMIA DUE TO STAGE 4 CHRONIC KIDNEY DISEASE TREATED WITH ERYTHROPOIETIN (HCC): Primary | ICD-10-CM

## 2020-06-01 LAB
DEPRECATED RDW RBC AUTO: 61.1 FL (ref 37–54)
ERYTHROCYTE [DISTWIDTH] IN BLOOD BY AUTOMATED COUNT: 17.1 % (ref 12.3–15.4)
HCT VFR BLD AUTO: 29.8 % (ref 34–46.6)
HGB BLD-MCNC: 9.4 G/DL (ref 12–15.9)
MCH RBC QN AUTO: 30.6 PG (ref 26.6–33)
MCHC RBC AUTO-ENTMCNC: 31.5 G/DL (ref 31.5–35.7)
MCV RBC AUTO: 97.1 FL (ref 79–97)
PLATELET # BLD AUTO: 156 10*3/MM3 (ref 140–450)
PMV BLD AUTO: 10.3 FL (ref 6–12)
RBC # BLD AUTO: 3.07 10*6/MM3 (ref 3.77–5.28)
WBC NRBC COR # BLD: 3.75 10*3/MM3 (ref 3.4–10.8)

## 2020-06-01 PROCEDURE — 25010000002 EPOETIN ALFA PER 1000 UNITS: Performed by: PHYSICIAN ASSISTANT

## 2020-06-01 PROCEDURE — 85027 COMPLETE CBC AUTOMATED: CPT | Performed by: INTERNAL MEDICINE

## 2020-06-01 PROCEDURE — 96372 THER/PROPH/DIAG INJ SC/IM: CPT

## 2020-06-01 PROCEDURE — 36415 COLL VENOUS BLD VENIPUNCTURE: CPT

## 2020-06-01 RX ADMIN — ERYTHROPOIETIN 40000 UNITS: 40000 INJECTION, SOLUTION INTRAVENOUS; SUBCUTANEOUS at 11:58

## 2020-06-15 ENCOUNTER — HOSPITAL ENCOUNTER (OUTPATIENT)
Dept: INFUSION THERAPY | Facility: HOSPITAL | Age: 80
Discharge: HOME | End: 2020-06-15
Admitting: INTERNAL MEDICINE

## 2020-06-15 VITALS
TEMPERATURE: 97.8 F | SYSTOLIC BLOOD PRESSURE: 147 MMHG | WEIGHT: 111 LBS | BODY MASS INDEX: 22.38 KG/M2 | DIASTOLIC BLOOD PRESSURE: 78 MMHG | OXYGEN SATURATION: 100 % | HEART RATE: 63 BPM | HEIGHT: 59 IN | RESPIRATION RATE: 18 BRPM

## 2020-06-15 DIAGNOSIS — D63.1 ANEMIA DUE TO STAGE 4 CHRONIC KIDNEY DISEASE TREATED WITH ERYTHROPOIETIN (HCC): Primary | ICD-10-CM

## 2020-06-15 DIAGNOSIS — N18.4 ANEMIA DUE TO STAGE 4 CHRONIC KIDNEY DISEASE TREATED WITH ERYTHROPOIETIN (HCC): Primary | ICD-10-CM

## 2020-06-15 LAB
DEPRECATED RDW RBC AUTO: 62.1 FL (ref 37–54)
ERYTHROCYTE [DISTWIDTH] IN BLOOD BY AUTOMATED COUNT: 17.4 % (ref 12.3–15.4)
HCT VFR BLD AUTO: 29.5 % (ref 34–46.6)
HGB BLD-MCNC: 9.4 G/DL (ref 12–15.9)
MCH RBC QN AUTO: 31.3 PG (ref 26.6–33)
MCHC RBC AUTO-ENTMCNC: 31.9 G/DL (ref 31.5–35.7)
MCV RBC AUTO: 98.3 FL (ref 79–97)
PLATELET # BLD AUTO: 166 10*3/MM3 (ref 140–450)
PMV BLD AUTO: 9.9 FL (ref 6–12)
RBC # BLD AUTO: 3 10*6/MM3 (ref 3.77–5.28)
WBC NRBC COR # BLD: 3.67 10*3/MM3 (ref 3.4–10.8)

## 2020-06-15 PROCEDURE — 36415 COLL VENOUS BLD VENIPUNCTURE: CPT

## 2020-06-15 PROCEDURE — 96372 THER/PROPH/DIAG INJ SC/IM: CPT

## 2020-06-15 PROCEDURE — 85027 COMPLETE CBC AUTOMATED: CPT | Performed by: INTERNAL MEDICINE

## 2020-06-15 PROCEDURE — 25010000002 EPOETIN ALFA PER 1000 UNITS: Performed by: PHYSICIAN ASSISTANT

## 2020-06-15 RX ADMIN — ERYTHROPOIETIN 40000 UNITS: 40000 INJECTION, SOLUTION INTRAVENOUS; SUBCUTANEOUS at 11:49

## 2020-06-29 ENCOUNTER — HOSPITAL ENCOUNTER (OUTPATIENT)
Dept: INFUSION THERAPY | Facility: HOSPITAL | Age: 80
Discharge: HOME | End: 2020-06-29
Admitting: INTERNAL MEDICINE

## 2020-06-29 VITALS
SYSTOLIC BLOOD PRESSURE: 151 MMHG | RESPIRATION RATE: 18 BRPM | HEART RATE: 70 BPM | BODY MASS INDEX: 22.38 KG/M2 | OXYGEN SATURATION: 97 % | WEIGHT: 111 LBS | DIASTOLIC BLOOD PRESSURE: 70 MMHG | HEIGHT: 59 IN | TEMPERATURE: 98.6 F

## 2020-06-29 DIAGNOSIS — N18.4 ANEMIA DUE TO STAGE 4 CHRONIC KIDNEY DISEASE TREATED WITH ERYTHROPOIETIN (HCC): Primary | ICD-10-CM

## 2020-06-29 DIAGNOSIS — D63.1 ANEMIA DUE TO STAGE 4 CHRONIC KIDNEY DISEASE TREATED WITH ERYTHROPOIETIN (HCC): Primary | ICD-10-CM

## 2020-06-29 LAB
ALBUMIN SERPL-MCNC: 3.8 G/DL (ref 3.5–5.2)
ANION GAP SERPL CALCULATED.3IONS-SCNC: 9 MMOL/L (ref 5–15)
BUN BLD-MCNC: 45 MG/DL (ref 8–23)
BUN/CREAT SERPL: 17.7 (ref 7–25)
CALCIUM SPEC-SCNC: 9.2 MG/DL (ref 8.6–10.5)
CHLORIDE SERPL-SCNC: 103 MMOL/L (ref 98–107)
CO2 SERPL-SCNC: 24 MMOL/L (ref 22–29)
CREAT BLD-MCNC: 2.54 MG/DL (ref 0.57–1)
DEPRECATED RDW RBC AUTO: 61.2 FL (ref 37–54)
ERYTHROCYTE [DISTWIDTH] IN BLOOD BY AUTOMATED COUNT: 17.2 % (ref 12.3–15.4)
GFR SERPL CREATININE-BSD FRML MDRD: 18 ML/MIN/1.73
GLUCOSE BLD-MCNC: 87 MG/DL (ref 65–99)
HCT VFR BLD AUTO: 30.9 % (ref 34–46.6)
HGB BLD-MCNC: 9.6 G/DL (ref 12–15.9)
MCH RBC QN AUTO: 30.7 PG (ref 26.6–33)
MCHC RBC AUTO-ENTMCNC: 31.1 G/DL (ref 31.5–35.7)
MCV RBC AUTO: 98.7 FL (ref 79–97)
PHOSPHATE SERPL-MCNC: 3.4 MG/DL (ref 2.5–4.5)
PLATELET # BLD AUTO: 170 10*3/MM3 (ref 140–450)
PMV BLD AUTO: 9.9 FL (ref 6–12)
POTASSIUM BLD-SCNC: 4.6 MMOL/L (ref 3.5–5.2)
RBC # BLD AUTO: 3.13 10*6/MM3 (ref 3.77–5.28)
SODIUM BLD-SCNC: 136 MMOL/L (ref 136–145)
WBC NRBC COR # BLD: 3.22 10*3/MM3 (ref 3.4–10.8)

## 2020-06-29 PROCEDURE — 85027 COMPLETE CBC AUTOMATED: CPT | Performed by: INTERNAL MEDICINE

## 2020-06-29 PROCEDURE — 36415 COLL VENOUS BLD VENIPUNCTURE: CPT

## 2020-06-29 PROCEDURE — 80069 RENAL FUNCTION PANEL: CPT | Performed by: INTERNAL MEDICINE

## 2020-06-29 PROCEDURE — 96372 THER/PROPH/DIAG INJ SC/IM: CPT

## 2020-06-29 PROCEDURE — 25010000002 EPOETIN ALFA PER 1000 UNITS: Performed by: PHYSICIAN ASSISTANT

## 2020-06-29 RX ADMIN — ERYTHROPOIETIN 40000 UNITS: 40000 INJECTION, SOLUTION INTRAVENOUS; SUBCUTANEOUS at 11:49

## 2020-07-13 ENCOUNTER — HOSPITAL ENCOUNTER (OUTPATIENT)
Dept: INFUSION THERAPY | Facility: HOSPITAL | Age: 80
Discharge: HOME | End: 2020-07-13
Admitting: INTERNAL MEDICINE

## 2020-07-13 ENCOUNTER — OFFICE VISIT (OUTPATIENT)
Dept: ORTHOPEDIC SURGERY | Facility: CLINIC | Age: 80
End: 2020-07-13

## 2020-07-13 VITALS — HEIGHT: 59 IN | BODY MASS INDEX: 22.38 KG/M2 | WEIGHT: 111 LBS | RESPIRATION RATE: 18 BRPM

## 2020-07-13 VITALS
RESPIRATION RATE: 18 BRPM | TEMPERATURE: 98.6 F | DIASTOLIC BLOOD PRESSURE: 71 MMHG | HEART RATE: 57 BPM | SYSTOLIC BLOOD PRESSURE: 138 MMHG | OXYGEN SATURATION: 100 %

## 2020-07-13 DIAGNOSIS — M17.12 PRIMARY LOCALIZED OSTEOARTHRITIS OF LEFT KNEE: ICD-10-CM

## 2020-07-13 DIAGNOSIS — M25.561 ARTHRALGIA OF BOTH KNEES: Primary | ICD-10-CM

## 2020-07-13 DIAGNOSIS — D63.1 ANEMIA DUE TO STAGE 4 CHRONIC KIDNEY DISEASE TREATED WITH ERYTHROPOIETIN (HCC): Primary | ICD-10-CM

## 2020-07-13 DIAGNOSIS — N18.4 ANEMIA DUE TO STAGE 4 CHRONIC KIDNEY DISEASE TREATED WITH ERYTHROPOIETIN (HCC): Primary | ICD-10-CM

## 2020-07-13 DIAGNOSIS — M17.11 PRIMARY LOCALIZED OSTEOARTHRITIS OF RIGHT KNEE: ICD-10-CM

## 2020-07-13 DIAGNOSIS — M25.562 ARTHRALGIA OF BOTH KNEES: Primary | ICD-10-CM

## 2020-07-13 LAB
ALBUMIN SERPL-MCNC: 4.1 G/DL (ref 3.5–5.2)
ANION GAP SERPL CALCULATED.3IONS-SCNC: 12.2 MMOL/L (ref 5–15)
BUN SERPL-MCNC: 40 MG/DL (ref 8–23)
BUN/CREAT SERPL: 15.2 (ref 7–25)
CALCIUM SPEC-SCNC: 8.9 MG/DL (ref 8.6–10.5)
CALCIUM SPEC-SCNC: 9 MG/DL (ref 8.6–10.5)
CHLORIDE SERPL-SCNC: 102 MMOL/L (ref 98–107)
CO2 SERPL-SCNC: 22.8 MMOL/L (ref 22–29)
CREAT SERPL-MCNC: 2.63 MG/DL (ref 0.57–1)
DEPRECATED RDW RBC AUTO: 59.4 FL (ref 37–54)
ERYTHROCYTE [DISTWIDTH] IN BLOOD BY AUTOMATED COUNT: 16.9 % (ref 12.3–15.4)
GFR SERPL CREATININE-BSD FRML MDRD: 18 ML/MIN/1.73
GLUCOSE SERPL-MCNC: 99 MG/DL (ref 65–99)
HCT VFR BLD AUTO: 32.6 % (ref 34–46.6)
HGB BLD-MCNC: 10.2 G/DL (ref 12–15.9)
MCH RBC QN AUTO: 30.6 PG (ref 26.6–33)
MCHC RBC AUTO-ENTMCNC: 31.3 G/DL (ref 31.5–35.7)
MCV RBC AUTO: 97.9 FL (ref 79–97)
PHOSPHATE SERPL-MCNC: 3.6 MG/DL (ref 2.5–4.5)
PLATELET # BLD AUTO: 204 10*3/MM3 (ref 140–450)
PMV BLD AUTO: 10.2 FL (ref 6–12)
POTASSIUM SERPL-SCNC: 4.5 MMOL/L (ref 3.5–5.2)
PTH-INTACT SERPL-MCNC: 221 PG/ML (ref 15–65)
RBC # BLD AUTO: 3.33 10*6/MM3 (ref 3.77–5.28)
SODIUM SERPL-SCNC: 137 MMOL/L (ref 136–145)
URATE SERPL-MCNC: 6 MG/DL (ref 2.4–5.7)
WBC # BLD AUTO: 3.74 10*3/MM3 (ref 3.4–10.8)

## 2020-07-13 PROCEDURE — 36415 COLL VENOUS BLD VENIPUNCTURE: CPT

## 2020-07-13 PROCEDURE — G0463 HOSPITAL OUTPT CLINIC VISIT: HCPCS

## 2020-07-13 PROCEDURE — 82310 ASSAY OF CALCIUM: CPT | Performed by: INTERNAL MEDICINE

## 2020-07-13 PROCEDURE — 20610 DRAIN/INJ JOINT/BURSA W/O US: CPT | Performed by: PHYSICIAN ASSISTANT

## 2020-07-13 PROCEDURE — 84550 ASSAY OF BLOOD/URIC ACID: CPT | Performed by: INTERNAL MEDICINE

## 2020-07-13 PROCEDURE — 85027 COMPLETE CBC AUTOMATED: CPT | Performed by: INTERNAL MEDICINE

## 2020-07-13 PROCEDURE — 80069 RENAL FUNCTION PANEL: CPT | Performed by: INTERNAL MEDICINE

## 2020-07-13 PROCEDURE — 83970 ASSAY OF PARATHORMONE: CPT | Performed by: INTERNAL MEDICINE

## 2020-07-13 RX ORDER — ROSUVASTATIN CALCIUM 10 MG/1
10 TABLET, COATED ORAL DAILY
COMMUNITY
Start: 2020-05-31 | End: 2021-03-31 | Stop reason: HOSPADM

## 2020-07-13 RX ORDER — CARVEDILOL 25 MG/1
25 TABLET ORAL 2 TIMES DAILY WITH MEALS
COMMUNITY
Start: 2020-05-21 | End: 2023-02-22 | Stop reason: HOSPADM

## 2020-07-13 NOTE — PROGRESS NOTES
"Subjective   Carolyne Martins is a 80 y.o. female here today for injection therapy.    Chief Complaint   Patient presents with   • Right Knee - Injections   • Left Knee - Injections     Requesting bilateral Supartz injections   • Lower Back - Pain     Reports sciatica type pain from low back down right leg   patient would like to repeat bilateral knee visco injections.     Past Medical History:   Diagnosis Date   • Anemia 1998   • Arthritis    • Bleeding from anus    • Bronchitis     STATES HAS BRONCHITIS CURRENTLY (12/5/17).     • Bronchitis 12/2017   • CAD (coronary artery disease)    • Cataract, bilateral    • Chronic kidney disease     STAGE 4.  SEES TERA   • Chronic kidney failure     REPORTS STAGE IV   • Difficulty swallowing solids    • Disease of thyroid gland    • Fracture, radius 2016    right distal radius and ulna, healed.   • GERD (gastroesophageal reflux disease)    • Gout    • High cholesterol    • History of nuclear stress test 2014    DR. CORTEZ.  \"IT WAS OK\"   • Hyperparathyroidism (CMS/HCC)    • Hyperparathyroidism (CMS/HCC)    • Hypertension    • Iron deficiency    • Osteoarthritis    • Osteoarthritis of knees, bilateral     Both knees Supartz injection series August, 2015   • Osteoporosis    • Personal history of cardiac murmur    • Presence of pessary    • Problems with swallowing     WITH FOOD OCCASSIONALLY   • Rheumatic heart disease     Personal history   • Rotator cuff tendonitis     right    • Rupture of right proximal biceps tendon     chronic   • Seasonal allergies    • Spinal headache     REPORTS AFTER DELIVERY OF SON   • Subacromial bursitis     right    • Valvular heart disease    • Vitamin B12 deficiency    • Vitamin D deficiency    • Wears glasses          Past Surgical History:   Procedure Laterality Date   • CATARACT EXTRACTION Bilateral    • CERVICAL POLYPECTOMY     • COLONOSCOPY  2011   • COLONOSCOPY N/A 1/9/2018    Procedure: COLONOSCOPY with endoscopic mucosal resection (hot " "snare), normal saline submucosal injection, argon thermal ablation, resolution clip placement x4, and cold biopsy polypectomy;  Surgeon: Pieter Concepcion MD;  Location: [facility] ENDOSCOPY;  Service:    • ENDOSCOPY N/A 12/6/2017    Procedure: ESOPHAGOGASTRODUODENOSCOPY with biopsies and hot snare polypectomies;  Surgeon: Pieter Concepcion MD;  Location: [facility] ENDOSCOPY;  Service:    • ENDOSCOPY N/A 1/15/2019    Procedure: ESOPHAGOGASTRODUODENOSCOPY WITH BIOPSIES AND HOT SNARE POLYPECTOMIES X10;  Surgeon: Pieter Concepcion MD;  Location: [facility] ENDOSCOPY;  Service: Gastroenterology   • UPPER GASTROINTESTINAL ENDOSCOPY  08/18/2014       Allergies   Allergen Reactions   • Ferrlecit [Na Ferric Gluc Cplx In Sucrose] Swelling   • Ranitidine Hcl Shortness Of Breath   • Levofloxacin Other (See Comments)     Leg cramps     • Lisinopril Cough       Objective   Resp 18   Ht 149.9 cm (59\")   Wt 50.3 kg (111 lb)   LMP  (LMP Unknown)   BMI 22.42 kg/m²    Physical Exam    Skin exam stable with no erythema, ecchymosis or rash.  No new swelling.  No motor or sensory changes.  Distal pulse intact.    Large Joint Arthrocentesis: R knee  Date/Time: 7/13/2020 9:29 AM  Consent given by: patient  Site marked: site marked  Timeout: Immediately prior to procedure a time out was called to verify the correct patient, procedure, equipment, support staff and site/side marked as required   Supporting Documentation  Indications: pain   Procedure Details  Location: knee - R knee  Preparation: Patient was prepped and draped in the usual sterile fashion  Needle gauge: 21g.  Approach: anterolateral  Medications administered: 25 mg sodium hyaluronate 25 MG/2.5ML  Patient tolerance: patient tolerated the procedure well with no immediate complications    Large Joint Arthrocentesis: L knee  Date/Time: 7/13/2020 9:32 AM  Consent given by: patient  Site marked: site marked  Timeout: Immediately prior to procedure a time out was called to verify the correct " patient, procedure, equipment, support staff and site/side marked as required   Supporting Documentation  Indications: pain   Procedure Details  Location: knee - L knee  Preparation: Patient was prepped and draped in the usual sterile fashion  Needle gauge: 21g.  Approach: anterolateral  Medications administered: 25 mg sodium hyaluronate 25 MG/2.5ML  Patient tolerance: patient tolerated the procedure well with no immediate complications          Assessment/Plan      Diagnosis Plan   1. Arthralgia of both knees  XR Knee 3 View Bilateral   2. Primary localized osteoarthritis of right knee  Large Joint Arthrocentesis: R knee   3. Primary localized osteoarthritis of left knee  Large Joint Arthrocentesis: L knee       Guided on proper techniques for mobility, strength, agility and/or conditioning exercises  Ice, heat, and/or modalities as beneficial  Watch for signs and symptoms of infection  Call or notify for any adverse effect from injection therapy    Recommendations:  no strenuous activity.    Follow up in 1 week    Patient agreeable to call or return sooner for any concerns.

## 2020-07-20 ENCOUNTER — OFFICE VISIT (OUTPATIENT)
Dept: ORTHOPEDIC SURGERY | Facility: CLINIC | Age: 80
End: 2020-07-20

## 2020-07-20 VITALS — WEIGHT: 111 LBS | RESPIRATION RATE: 18 BRPM | BODY MASS INDEX: 22.38 KG/M2 | HEIGHT: 59 IN

## 2020-07-20 DIAGNOSIS — M17.11 PRIMARY OSTEOARTHRITIS OF RIGHT KNEE: Primary | ICD-10-CM

## 2020-07-20 DIAGNOSIS — M17.12 PRIMARY OSTEOARTHRITIS OF LEFT KNEE: ICD-10-CM

## 2020-07-20 PROCEDURE — 20610 DRAIN/INJ JOINT/BURSA W/O US: CPT | Performed by: PHYSICIAN ASSISTANT

## 2020-07-20 NOTE — PROGRESS NOTES
"Subjective   Carolyne Martins is a 80 y.o. female here today for injection therapy.    Chief Complaint   Patient presents with   • Left Knee - Injections   • Right Knee - Injections     Supartz # 2     Patient presents for second bilateral knee Supartz injection.  Past Medical History:   Diagnosis Date   • Anemia 1998   • Arthritis    • Bleeding from anus    • Bronchitis     STATES HAS BRONCHITIS CURRENTLY (12/5/17).     • Bronchitis 12/2017   • CAD (coronary artery disease)    • Cataract, bilateral    • Chronic kidney disease     STAGE 4.  SEES TERA   • Chronic kidney failure     REPORTS STAGE IV   • Difficulty swallowing solids    • Disease of thyroid gland    • Fracture, radius 2016    right distal radius and ulna, healed.   • GERD (gastroesophageal reflux disease)    • Gout    • High cholesterol    • History of nuclear stress test 2014    DR. CORTEZ.  \"IT WAS OK\"   • Hyperparathyroidism (CMS/HCC)    • Hyperparathyroidism (CMS/HCC)    • Hypertension    • Iron deficiency    • Osteoarthritis    • Osteoarthritis of knees, bilateral     Both knees Supartz injection series August, 2015   • Osteoporosis    • Personal history of cardiac murmur    • Presence of pessary    • Problems with swallowing     WITH FOOD OCCASSIONALLY   • Rheumatic heart disease     Personal history   • Rotator cuff tendonitis     right    • Rupture of right proximal biceps tendon     chronic   • Seasonal allergies    • Spinal headache     REPORTS AFTER DELIVERY OF SON   • Subacromial bursitis     right    • Valvular heart disease    • Vitamin B12 deficiency    • Vitamin D deficiency    • Wears glasses          Past Surgical History:   Procedure Laterality Date   • CATARACT EXTRACTION Bilateral    • CERVICAL POLYPECTOMY     • COLONOSCOPY  2011   • COLONOSCOPY N/A 1/9/2018    Procedure: COLONOSCOPY with endoscopic mucosal resection (hot snare), normal saline submucosal injection, argon thermal ablation, resolution clip placement x4, and cold " "biopsy polypectomy;  Surgeon: Pieter Concepcion MD;  Location: [facility] ENDOSCOPY;  Service:    • ENDOSCOPY N/A 12/6/2017    Procedure: ESOPHAGOGASTRODUODENOSCOPY with biopsies and hot snare polypectomies;  Surgeon: Pieter Concepcion MD;  Location: [facility] ENDOSCOPY;  Service:    • ENDOSCOPY N/A 1/15/2019    Procedure: ESOPHAGOGASTRODUODENOSCOPY WITH BIOPSIES AND HOT SNARE POLYPECTOMIES X10;  Surgeon: Pieter Concepcion MD;  Location: [facility] ENDOSCOPY;  Service: Gastroenterology   • UPPER GASTROINTESTINAL ENDOSCOPY  08/18/2014       Allergies   Allergen Reactions   • Ferrlecit [Na Ferric Gluc Cplx In Sucrose] Swelling   • Ranitidine Hcl Shortness Of Breath   • Levofloxacin Other (See Comments)     Leg cramps     • Lisinopril Cough       Objective   Resp 18   Ht 149.9 cm (59\")   Wt 50.3 kg (111 lb)   LMP  (LMP Unknown)   BMI 22.42 kg/m²    Physical Exam    Skin exam stable with no erythema, ecchymosis or rash.  No new swelling.  No motor or sensory changes.  Distal pulse intact.    Large Joint Arthrocentesis: R knee  Date/Time: 7/20/2020 2:53 PM  Consent given by: patient  Site marked: site marked  Timeout: Immediately prior to procedure a time out was called to verify the correct patient, procedure, equipment, support staff and site/side marked as required   Supporting Documentation  Indications: pain   Procedure Details  Location: knee - R knee  Preparation: Patient was prepped and draped in the usual sterile fashion  Needle gauge: 21g.  Approach: anterolateral  Medications administered: 25 mg sodium hyaluronate 25 MG/2.5ML  Patient tolerance: patient tolerated the procedure well with no immediate complications    Large Joint Arthrocentesis: L knee  Date/Time: 7/20/2020 2:53 PM  Consent given by: patient  Site marked: site marked  Timeout: Immediately prior to procedure a time out was called to verify the correct patient, procedure, equipment, support staff and site/side marked as required   Supporting " Documentation  Indications: pain   Procedure Details  Location: knee - L knee  Preparation: Patient was prepped and draped in the usual sterile fashion (21g)  Needle size: 22 G  Approach: anterolateral  Medications administered: 25 mg sodium hyaluronate 25 MG/2.5ML  Patient tolerance: patient tolerated the procedure well with no immediate complications          Assessment/Plan      Diagnosis Plan   1. Primary osteoarthritis of right knee  Large Joint Arthrocentesis: R knee   2. Primary osteoarthritis of left knee  Large Joint Arthrocentesis: L knee       Guided on proper techniques for mobility, strength, agility and/or conditioning exercises  Ice, heat, and/or modalities as beneficial  Watch for signs and symptoms of infection  Call or notify for any adverse effect from injection therapy    Recommendations:       Patient agreeable to call or return sooner for any concerns.

## 2020-07-27 ENCOUNTER — OFFICE VISIT (OUTPATIENT)
Dept: ORTHOPEDIC SURGERY | Facility: CLINIC | Age: 80
End: 2020-07-27

## 2020-07-27 ENCOUNTER — HOSPITAL ENCOUNTER (OUTPATIENT)
Dept: INFUSION THERAPY | Facility: HOSPITAL | Age: 80
Discharge: HOME | End: 2020-07-27
Admitting: INTERNAL MEDICINE

## 2020-07-27 VITALS
TEMPERATURE: 97.5 F | SYSTOLIC BLOOD PRESSURE: 132 MMHG | DIASTOLIC BLOOD PRESSURE: 67 MMHG | OXYGEN SATURATION: 97 % | HEART RATE: 59 BPM | RESPIRATION RATE: 18 BRPM

## 2020-07-27 VITALS — BODY MASS INDEX: 22.38 KG/M2 | WEIGHT: 111 LBS | HEIGHT: 59 IN | RESPIRATION RATE: 18 BRPM

## 2020-07-27 DIAGNOSIS — D63.1 ANEMIA DUE TO STAGE 4 CHRONIC KIDNEY DISEASE TREATED WITH ERYTHROPOIETIN (HCC): Primary | ICD-10-CM

## 2020-07-27 DIAGNOSIS — M17.12 PRIMARY OSTEOARTHRITIS OF LEFT KNEE: ICD-10-CM

## 2020-07-27 DIAGNOSIS — N18.4 ANEMIA DUE TO STAGE 4 CHRONIC KIDNEY DISEASE TREATED WITH ERYTHROPOIETIN (HCC): Primary | ICD-10-CM

## 2020-07-27 DIAGNOSIS — M17.11 PRIMARY OSTEOARTHRITIS OF RIGHT KNEE: Primary | ICD-10-CM

## 2020-07-27 LAB
DEPRECATED RDW RBC AUTO: 57.9 FL (ref 37–54)
ERYTHROCYTE [DISTWIDTH] IN BLOOD BY AUTOMATED COUNT: 16.7 % (ref 12.3–15.4)
HCT VFR BLD AUTO: 29.7 % (ref 34–46.6)
HGB BLD-MCNC: 9.4 G/DL (ref 12–15.9)
MCH RBC QN AUTO: 30.5 PG (ref 26.6–33)
MCHC RBC AUTO-ENTMCNC: 31.6 G/DL (ref 31.5–35.7)
MCV RBC AUTO: 96.4 FL (ref 79–97)
PLATELET # BLD AUTO: 139 10*3/MM3 (ref 140–450)
PMV BLD AUTO: 10.6 FL (ref 6–12)
RBC # BLD AUTO: 3.08 10*6/MM3 (ref 3.77–5.28)
WBC # BLD AUTO: 3.53 10*3/MM3 (ref 3.4–10.8)

## 2020-07-27 PROCEDURE — 85027 COMPLETE CBC AUTOMATED: CPT | Performed by: INTERNAL MEDICINE

## 2020-07-27 PROCEDURE — 96372 THER/PROPH/DIAG INJ SC/IM: CPT

## 2020-07-27 PROCEDURE — 20610 DRAIN/INJ JOINT/BURSA W/O US: CPT | Performed by: PHYSICIAN ASSISTANT

## 2020-07-27 PROCEDURE — 36415 COLL VENOUS BLD VENIPUNCTURE: CPT

## 2020-07-27 PROCEDURE — 25010000002 EPOETIN ALFA PER 1000 UNITS: Performed by: PHYSICIAN ASSISTANT

## 2020-07-27 RX ADMIN — ERYTHROPOIETIN 40000 UNITS: 40000 INJECTION, SOLUTION INTRAVENOUS; SUBCUTANEOUS at 11:41

## 2020-07-27 NOTE — PROGRESS NOTES
"Subjective   Carolyne Martins is a 80 y.o. female here today for injection therapy.    Chief Complaint   Patient presents with   • Right Knee - Injections   • Left Knee - Injections     Supartz # 3     Patient presents for 3rd daniela. Knee supartz injection.   Past Medical History:   Diagnosis Date   • Anemia 1998   • Arthritis    • Bleeding from anus    • Bronchitis     STATES HAS BRONCHITIS CURRENTLY (12/5/17).     • Bronchitis 12/2017   • CAD (coronary artery disease)    • Cataract, bilateral    • Chronic kidney disease     STAGE 4.  SEES TERA   • Chronic kidney failure     REPORTS STAGE IV   • Difficulty swallowing solids    • Disease of thyroid gland    • Fracture, radius 2016    right distal radius and ulna, healed.   • GERD (gastroesophageal reflux disease)    • Gout    • High cholesterol    • History of nuclear stress test 2014    DR. CORTEZ.  \"IT WAS OK\"   • Hyperparathyroidism (CMS/HCC)    • Hyperparathyroidism (CMS/HCC)    • Hypertension    • Iron deficiency    • Osteoarthritis    • Osteoarthritis of knees, bilateral     Both knees Supartz injection series August, 2015   • Osteoporosis    • Personal history of cardiac murmur    • Presence of pessary    • Problems with swallowing     WITH FOOD OCCASSIONALLY   • Rheumatic heart disease     Personal history   • Rotator cuff tendonitis     right    • Rupture of right proximal biceps tendon     chronic   • Seasonal allergies    • Spinal headache     REPORTS AFTER DELIVERY OF SON   • Subacromial bursitis     right    • Valvular heart disease    • Vitamin B12 deficiency    • Vitamin D deficiency    • Wears glasses          Past Surgical History:   Procedure Laterality Date   • CATARACT EXTRACTION Bilateral    • CERVICAL POLYPECTOMY     • COLONOSCOPY  2011   • COLONOSCOPY N/A 1/9/2018    Procedure: COLONOSCOPY with endoscopic mucosal resection (hot snare), normal saline submucosal injection, argon thermal ablation, resolution clip placement x4, and cold biopsy " "polypectomy;  Surgeon: Pieter Concepcion MD;  Location: [facility] ENDOSCOPY;  Service:    • ENDOSCOPY N/A 12/6/2017    Procedure: ESOPHAGOGASTRODUODENOSCOPY with biopsies and hot snare polypectomies;  Surgeon: Pieter Concepcion MD;  Location: [facility] ENDOSCOPY;  Service:    • ENDOSCOPY N/A 1/15/2019    Procedure: ESOPHAGOGASTRODUODENOSCOPY WITH BIOPSIES AND HOT SNARE POLYPECTOMIES X10;  Surgeon: Pieter Concepcion MD;  Location: [facility] ENDOSCOPY;  Service: Gastroenterology   • UPPER GASTROINTESTINAL ENDOSCOPY  08/18/2014       Allergies   Allergen Reactions   • Ferrlecit [Na Ferric Gluc Cplx In Sucrose] Swelling   • Ranitidine Hcl Shortness Of Breath   • Levofloxacin Other (See Comments)     Leg cramps     • Lisinopril Cough       Objective   Resp 18   Ht 149.9 cm (59\")   Wt 50.3 kg (111 lb)   LMP  (LMP Unknown)   BMI 22.42 kg/m²    Physical Exam    Skin exam stable with no erythema, ecchymosis or rash.  No new swelling.  No motor or sensory changes.  Distal pulse intact.    Large Joint Arthrocentesis: R knee  Date/Time: 7/27/2020 1:38 PM  Consent given by: patient  Site marked: site marked  Timeout: Immediately prior to procedure a time out was called to verify the correct patient, procedure, equipment, support staff and site/side marked as required   Supporting Documentation  Indications: pain   Procedure Details  Location: knee - R knee  Preparation: Patient was prepped and draped in the usual sterile fashion  Needle gauge: 21g.  Approach: anterolateral  Medications administered: 25 mg sodium hyaluronate 25 MG/2.5ML  Patient tolerance: patient tolerated the procedure well with no immediate complications    Large Joint Arthrocentesis: L knee  Date/Time: 7/27/2020 1:42 PM  Consent given by: patient  Site marked: site marked  Timeout: Immediately prior to procedure a time out was called to verify the correct patient, procedure, equipment, support staff and site/side marked as required   Supporting Documentation  Indications: " pain   Procedure Details  Location: knee - L knee  Preparation: Patient was prepped and draped in the usual sterile fashion  Needle gauge: 21g.  Approach: anterolateral  Medications administered: 25 mg sodium hyaluronate 25 MG/2.5ML  Patient tolerance: patient tolerated the procedure well with no immediate complications          Assessment/Plan      Diagnosis Plan   1. Primary osteoarthritis of right knee  Large Joint Arthrocentesis: R knee   2. Primary osteoarthritis of left knee  Large Joint Arthrocentesis: L knee       Guided on proper techniques for mobility, strength, agility and/or conditioning exercises  Ice, heat, and/or modalities as beneficial  Watch for signs and symptoms of infection  Call or notify for any adverse effect from injection therapy    Recommendations:  Usual activities,    Follow up in 1 week    Patient agreeable to call or return sooner for any concerns.

## 2020-08-03 ENCOUNTER — OFFICE VISIT (OUTPATIENT)
Dept: ORTHOPEDIC SURGERY | Facility: CLINIC | Age: 80
End: 2020-08-03

## 2020-08-03 VITALS — RESPIRATION RATE: 18 BRPM | HEIGHT: 59 IN | BODY MASS INDEX: 22.38 KG/M2 | WEIGHT: 111 LBS

## 2020-08-03 DIAGNOSIS — M17.12 PRIMARY OSTEOARTHRITIS OF LEFT KNEE: ICD-10-CM

## 2020-08-03 DIAGNOSIS — M17.11 PRIMARY OSTEOARTHRITIS OF RIGHT KNEE: Primary | ICD-10-CM

## 2020-08-03 PROCEDURE — 20610 DRAIN/INJ JOINT/BURSA W/O US: CPT | Performed by: PHYSICIAN ASSISTANT

## 2020-08-03 NOTE — PROGRESS NOTES
"Subjective   Carolyne Martins is a 80 y.o. female here today for injection therapy.    Chief Complaint   Patient presents with   • Left Knee - Injections   • Right Knee - Injections     Bilateral knee Supartz # 4   patient presents for bilateral knee supartz    Past Medical History:   Diagnosis Date   • Anemia 1998   • Arthritis    • Bleeding from anus    • Bronchitis     STATES HAS BRONCHITIS CURRENTLY (12/5/17).     • Bronchitis 12/2017   • CAD (coronary artery disease)    • Cataract, bilateral    • Chronic kidney disease     STAGE 4.  SEES TERA   • Chronic kidney failure     REPORTS STAGE IV   • Difficulty swallowing solids    • Disease of thyroid gland    • Fracture, radius 2016    right distal radius and ulna, healed.   • GERD (gastroesophageal reflux disease)    • Gout    • High cholesterol    • History of nuclear stress test 2014    DR. CORTEZ.  \"IT WAS OK\"   • Hyperparathyroidism (CMS/HCC)    • Hyperparathyroidism (CMS/HCC)    • Hypertension    • Iron deficiency    • Osteoarthritis    • Osteoarthritis of knees, bilateral     Both knees Supartz injection series August, 2015   • Osteoporosis    • Personal history of cardiac murmur    • Presence of pessary    • Problems with swallowing     WITH FOOD OCCASSIONALLY   • Rheumatic heart disease     Personal history   • Rotator cuff tendonitis     right    • Rupture of right proximal biceps tendon     chronic   • Seasonal allergies    • Spinal headache     REPORTS AFTER DELIVERY OF SON   • Subacromial bursitis     right    • Valvular heart disease    • Vitamin B12 deficiency    • Vitamin D deficiency    • Wears glasses         Past Surgical History:   Procedure Laterality Date   • CATARACT EXTRACTION Bilateral    • CERVICAL POLYPECTOMY     • COLONOSCOPY  2011   • COLONOSCOPY N/A 1/9/2018    Procedure: COLONOSCOPY with endoscopic mucosal resection (hot snare), normal saline submucosal injection, argon thermal ablation, resolution clip placement x4, and cold biopsy " "polypectomy;  Surgeon: Pieter Concepcion MD;  Location: [facility] ENDOSCOPY;  Service:    • ENDOSCOPY N/A 12/6/2017    Procedure: ESOPHAGOGASTRODUODENOSCOPY with biopsies and hot snare polypectomies;  Surgeon: Pieter Concepcion MD;  Location: [facility] ENDOSCOPY;  Service:    • ENDOSCOPY N/A 1/15/2019    Procedure: ESOPHAGOGASTRODUODENOSCOPY WITH BIOPSIES AND HOT SNARE POLYPECTOMIES X10;  Surgeon: Pieter Concepcion MD;  Location: [facility] ENDOSCOPY;  Service: Gastroenterology   • UPPER GASTROINTESTINAL ENDOSCOPY  08/18/2014       Allergies   Allergen Reactions   • Ferrlecit [Na Ferric Gluc Cplx In Sucrose] Swelling   • Ranitidine Hcl Shortness Of Breath   • Levofloxacin Other (See Comments)     Leg cramps     • Lisinopril Cough       Objective   Resp 18   Ht 149.9 cm (59\")   Wt 50.3 kg (111 lb)   LMP  (LMP Unknown)   BMI 22.42 kg/m²    Physical Exam    Skin exam stable with no erythema, ecchymosis or rash.  No new swelling.  No motor or sensory changes.  Distal pulse intact.    Large Joint Arthrocentesis: R knee  Date/Time: 8/3/2020 2:51 PM  Consent given by: patient  Site marked: site marked  Timeout: Immediately prior to procedure a time out was called to verify the correct patient, procedure, equipment, support staff and site/side marked as required   Supporting Documentation  Indications: pain   Procedure Details  Location: knee - R knee  Preparation: Patient was prepped and draped in the usual sterile fashion  Needle gauge: 21g.  Approach: anteromedial  Medications administered: 25 mg sodium hyaluronate 25 MG/2.5ML  Patient tolerance: patient tolerated the procedure well with no immediate complications    Large Joint Arthrocentesis: L knee  Date/Time: 8/3/2020 2:52 PM  Consent given by: patient  Site marked: site marked  Timeout: Immediately prior to procedure a time out was called to verify the correct patient, procedure, equipment, support staff and site/side marked as required   Supporting Documentation  Indications: " pain   Procedure Details  Location: knee - L knee  Preparation: Patient was prepped and draped in the usual sterile fashion  Needle gauge: 21g.  Approach: anterolateral  Medications administered: 25 mg sodium hyaluronate 25 MG/2.5ML  Patient tolerance: patient tolerated the procedure well with no immediate complications          Assessment/Plan      Diagnosis Plan   1. Primary osteoarthritis of right knee  Large Joint Arthrocentesis: R knee   2. Primary osteoarthritis of left knee  Large Joint Arthrocentesis: L knee       No complications of injection noted.    Discussion of orthopaedic goals and activities and patient and/or guardian expressed appreciation. Call or notify for any adverse effect from injection therapy. Ice, heat, and/or modalities as beneficial. Watch for signs and symptoms of infection.    Recommendations:  Work/Activity Status: May perform usual activities as tolerated. May return to routine exercise and physical work as tolerated. No strenuous activity.  Patient and/or guardian is encouraged to call or return for any issues or concerns.  Follow up in 1 week    Patient agreeable to call or return sooner for any concerns.

## 2020-08-07 ENCOUNTER — TELEPHONE (OUTPATIENT)
Dept: INFUSION THERAPY | Facility: HOSPITAL | Age: 80
End: 2020-08-07

## 2020-08-07 ENCOUNTER — APPOINTMENT (OUTPATIENT)
Dept: INFUSION THERAPY | Facility: HOSPITAL | Age: 80
End: 2020-08-07

## 2020-08-10 ENCOUNTER — HOSPITAL ENCOUNTER (OUTPATIENT)
Dept: INFUSION THERAPY | Facility: HOSPITAL | Age: 80
Discharge: HOME | End: 2020-08-10
Admitting: PHYSICIAN ASSISTANT

## 2020-08-10 ENCOUNTER — OFFICE VISIT (OUTPATIENT)
Dept: ORTHOPEDIC SURGERY | Facility: CLINIC | Age: 80
End: 2020-08-10

## 2020-08-10 VITALS
HEART RATE: 68 BPM | TEMPERATURE: 98.4 F | RESPIRATION RATE: 18 BRPM | SYSTOLIC BLOOD PRESSURE: 136 MMHG | DIASTOLIC BLOOD PRESSURE: 66 MMHG | OXYGEN SATURATION: 99 %

## 2020-08-10 VITALS — BODY MASS INDEX: 22.38 KG/M2 | RESPIRATION RATE: 18 BRPM | HEIGHT: 59 IN | WEIGHT: 111 LBS

## 2020-08-10 DIAGNOSIS — M81.0 SENILE OSTEOPOROSIS: ICD-10-CM

## 2020-08-10 DIAGNOSIS — M17.12 PRIMARY OSTEOARTHRITIS OF LEFT KNEE: ICD-10-CM

## 2020-08-10 DIAGNOSIS — D63.1 ANEMIA DUE TO STAGE 4 CHRONIC KIDNEY DISEASE TREATED WITH ERYTHROPOIETIN (HCC): Primary | ICD-10-CM

## 2020-08-10 DIAGNOSIS — N18.4 ANEMIA DUE TO STAGE 4 CHRONIC KIDNEY DISEASE TREATED WITH ERYTHROPOIETIN (HCC): Primary | ICD-10-CM

## 2020-08-10 DIAGNOSIS — M17.11 PRIMARY OSTEOARTHRITIS OF RIGHT KNEE: Primary | ICD-10-CM

## 2020-08-10 LAB
DEPRECATED RDW RBC AUTO: 61.6 FL (ref 37–54)
ERYTHROCYTE [DISTWIDTH] IN BLOOD BY AUTOMATED COUNT: 17.8 % (ref 12.3–15.4)
HCT VFR BLD AUTO: 30.4 % (ref 34–46.6)
HGB BLD-MCNC: 9.5 G/DL (ref 12–15.9)
MCH RBC QN AUTO: 30.2 PG (ref 26.6–33)
MCHC RBC AUTO-ENTMCNC: 31.3 G/DL (ref 31.5–35.7)
MCV RBC AUTO: 96.5 FL (ref 79–97)
PLATELET # BLD AUTO: 163 10*3/MM3 (ref 140–450)
PMV BLD AUTO: 9.9 FL (ref 6–12)
RBC # BLD AUTO: 3.15 10*6/MM3 (ref 3.77–5.28)
WBC # BLD AUTO: 3.27 10*3/MM3 (ref 3.4–10.8)

## 2020-08-10 PROCEDURE — 96401 CHEMO ANTI-NEOPL SQ/IM: CPT

## 2020-08-10 PROCEDURE — 36415 COLL VENOUS BLD VENIPUNCTURE: CPT

## 2020-08-10 PROCEDURE — 96372 THER/PROPH/DIAG INJ SC/IM: CPT

## 2020-08-10 PROCEDURE — 20610 DRAIN/INJ JOINT/BURSA W/O US: CPT | Performed by: PHYSICIAN ASSISTANT

## 2020-08-10 PROCEDURE — 25010000002 DENOSUMAB 60 MG/ML SOLUTION PREFILLED SYRINGE: Performed by: INTERNAL MEDICINE

## 2020-08-10 PROCEDURE — 85027 COMPLETE CBC AUTOMATED: CPT | Performed by: INTERNAL MEDICINE

## 2020-08-10 PROCEDURE — 25010000002 EPOETIN ALFA PER 1000 UNITS: Performed by: PHYSICIAN ASSISTANT

## 2020-08-10 RX ADMIN — ERYTHROPOIETIN 40000 UNITS: 40000 INJECTION, SOLUTION INTRAVENOUS; SUBCUTANEOUS at 11:59

## 2020-08-10 RX ADMIN — DENOSUMAB 60 MG: 60 INJECTION SUBCUTANEOUS at 12:02

## 2020-08-10 NOTE — CODE DOCUMENTATION
Patient verbalizes taking vitamin d and patient was advised to not take calcium due to levels being high

## 2020-08-10 NOTE — CODE DOCUMENTATION
Per dr. John, patient is ok to have prolia today. Per dr. John, patient is to have renal panel drawn in two weeks.

## 2020-08-10 NOTE — PROGRESS NOTES
"Subjective   Carolyne Martins is a 80 y.o. female here today for injection therapy.    Chief Complaint   Patient presents with   • Left Knee - Injections   • Right Knee - Injections     Bilateral Supartz # 5   Patient presents for bilateral knee Supartz injection #5    Past Medical History:   Diagnosis Date   • Anemia 1998   • Arthritis    • Bleeding from anus    • Bronchitis     STATES HAS BRONCHITIS CURRENTLY (12/5/17).     • Bronchitis 12/2017   • CAD (coronary artery disease)    • Cataract, bilateral    • Chronic kidney disease     STAGE 4.  SEES TERA   • Chronic kidney failure     REPORTS STAGE IV   • Difficulty swallowing solids    • Disease of thyroid gland    • Fracture, radius 2016    right distal radius and ulna, healed.   • GERD (gastroesophageal reflux disease)    • Gout    • High cholesterol    • History of nuclear stress test 2014    DR. CORTEZ.  \"IT WAS OK\"   • Hyperparathyroidism (CMS/HCC)    • Hyperparathyroidism (CMS/HCC)    • Hypertension    • Iron deficiency    • Osteoarthritis    • Osteoarthritis of knees, bilateral     Both knees Supartz injection series August, 2015   • Osteoporosis    • Personal history of cardiac murmur    • Presence of pessary    • Problems with swallowing     WITH FOOD OCCASSIONALLY   • Rheumatic heart disease     Personal history   • Rotator cuff tendonitis     right    • Rupture of right proximal biceps tendon     chronic   • Seasonal allergies    • Spinal headache     REPORTS AFTER DELIVERY OF SON   • Subacromial bursitis     right    • Valvular heart disease    • Vitamin B12 deficiency    • Vitamin D deficiency    • Wears glasses          Past Surgical History:   Procedure Laterality Date   • CATARACT EXTRACTION Bilateral    • CERVICAL POLYPECTOMY     • COLONOSCOPY  2011   • COLONOSCOPY N/A 1/9/2018    Procedure: COLONOSCOPY with endoscopic mucosal resection (hot snare), normal saline submucosal injection, argon thermal ablation, resolution clip placement x4, and " "cold biopsy polypectomy;  Surgeon: Pieter Concepcion MD;  Location: [facility] ENDOSCOPY;  Service:    • ENDOSCOPY N/A 12/6/2017    Procedure: ESOPHAGOGASTRODUODENOSCOPY with biopsies and hot snare polypectomies;  Surgeon: Pieter Concepcion MD;  Location: [facility] ENDOSCOPY;  Service:    • ENDOSCOPY N/A 1/15/2019    Procedure: ESOPHAGOGASTRODUODENOSCOPY WITH BIOPSIES AND HOT SNARE POLYPECTOMIES X10;  Surgeon: Pieter Concepcion MD;  Location: [facility] ENDOSCOPY;  Service: Gastroenterology   • UPPER GASTROINTESTINAL ENDOSCOPY  08/18/2014       Allergies   Allergen Reactions   • Ferrlecit [Na Ferric Gluc Cplx In Sucrose] Swelling   • Ranitidine Hcl Shortness Of Breath   • Levofloxacin Other (See Comments)     Leg cramps     • Lisinopril Cough       Objective   Resp 18   Ht 149.9 cm (59\")   Wt 50.3 kg (111 lb)   LMP  (LMP Unknown)   BMI 22.42 kg/m²    Physical Exam    Skin exam stable with no erythema, ecchymosis or rash.  No new swelling.  No motor or sensory changes.  Distal pulse intact.    Large Joint Arthrocentesis: R knee  Date/Time: 8/10/2020 1:32 PM  Consent given by: patient  Site marked: site marked  Timeout: Immediately prior to procedure a time out was called to verify the correct patient, procedure, equipment, support staff and site/side marked as required   Supporting Documentation  Indications: pain   Procedure Details  Location: knee - R knee  Preparation: Patient was prepped and draped in the usual sterile fashion  Needle gauge: 21g.  Approach: anterolateral  Medications administered: 25 mg sodium hyaluronate 25 MG/2.5ML  Patient tolerance: patient tolerated the procedure well with no immediate complications    Large Joint Arthrocentesis: L knee  Date/Time: 8/10/2020 1:33 PM  Consent given by: patient  Site marked: site marked  Timeout: Immediately prior to procedure a time out was called to verify the correct patient, procedure, equipment, support staff and site/side marked as required   Supporting " Documentation  Indications: pain   Procedure Details  Location: knee - L knee  Preparation: Patient was prepped and draped in the usual sterile fashion  Needle gauge: 21g.  Approach: anterolateral  Medications administered: 25 mg sodium hyaluronate 25 MG/2.5ML  Patient tolerance: patient tolerated the procedure well with no immediate complications          Assessment/Plan      Diagnosis Plan   1. Primary osteoarthritis of right knee  Large Joint Arthrocentesis: R knee   2. Primary osteoarthritis of left knee  Large Joint Arthrocentesis: L knee       Guided on proper techniques for mobility, strength, agility and/or conditioning exercises  Ice, heat, and/or modalities as beneficial  Watch for signs and symptoms of infection  Call or notify for any adverse effect from injection therapy    Recommendations:  no strenuous activity.  Follow-up in 6 months  Patient agreeable to call or return sooner for any concerns.

## 2020-08-24 ENCOUNTER — HOSPITAL ENCOUNTER (OUTPATIENT)
Dept: INFUSION THERAPY | Facility: HOSPITAL | Age: 80
Discharge: HOME | End: 2020-08-24
Admitting: NURSE PRACTITIONER

## 2020-08-24 ENCOUNTER — OFFICE VISIT (OUTPATIENT)
Dept: OBSTETRICS AND GYNECOLOGY | Facility: CLINIC | Age: 80
End: 2020-08-24

## 2020-08-24 VITALS
RESPIRATION RATE: 18 BRPM | SYSTOLIC BLOOD PRESSURE: 144 MMHG | HEART RATE: 64 BPM | OXYGEN SATURATION: 100 % | TEMPERATURE: 97.2 F | DIASTOLIC BLOOD PRESSURE: 75 MMHG

## 2020-08-24 VITALS
BODY MASS INDEX: 22.05 KG/M2 | WEIGHT: 109.4 LBS | HEIGHT: 59 IN | SYSTOLIC BLOOD PRESSURE: 150 MMHG | DIASTOLIC BLOOD PRESSURE: 72 MMHG

## 2020-08-24 DIAGNOSIS — Z46.89 PESSARY MAINTENANCE: Primary | ICD-10-CM

## 2020-08-24 DIAGNOSIS — D63.1 ANEMIA DUE TO STAGE 4 CHRONIC KIDNEY DISEASE TREATED WITH ERYTHROPOIETIN (HCC): Primary | ICD-10-CM

## 2020-08-24 DIAGNOSIS — N81.11 CYSTOCELE, MIDLINE: ICD-10-CM

## 2020-08-24 DIAGNOSIS — N18.4 ANEMIA DUE TO STAGE 4 CHRONIC KIDNEY DISEASE TREATED WITH ERYTHROPOIETIN (HCC): Primary | ICD-10-CM

## 2020-08-24 LAB
DEPRECATED RDW RBC AUTO: 63.7 FL (ref 37–54)
ERYTHROCYTE [DISTWIDTH] IN BLOOD BY AUTOMATED COUNT: 17.9 % (ref 12.3–15.4)
HCT VFR BLD AUTO: 30.7 % (ref 34–46.6)
HGB BLD-MCNC: 9.7 G/DL (ref 12–15.9)
MCH RBC QN AUTO: 30.7 PG (ref 26.6–33)
MCHC RBC AUTO-ENTMCNC: 31.6 G/DL (ref 31.5–35.7)
MCV RBC AUTO: 97.2 FL (ref 79–97)
PLATELET # BLD AUTO: 194 10*3/MM3 (ref 140–450)
PMV BLD AUTO: 9 FL (ref 6–12)
RBC # BLD AUTO: 3.16 10*6/MM3 (ref 3.77–5.28)
WBC # BLD AUTO: 3.97 10*3/MM3 (ref 3.4–10.8)

## 2020-08-24 PROCEDURE — 85027 COMPLETE CBC AUTOMATED: CPT | Performed by: INTERNAL MEDICINE

## 2020-08-24 PROCEDURE — 25010000002 EPOETIN ALFA PER 1000 UNITS: Performed by: NURSE PRACTITIONER

## 2020-08-24 PROCEDURE — 99212 OFFICE O/P EST SF 10 MIN: CPT | Performed by: PHYSICIAN ASSISTANT

## 2020-08-24 PROCEDURE — 36415 COLL VENOUS BLD VENIPUNCTURE: CPT

## 2020-08-24 PROCEDURE — 96372 THER/PROPH/DIAG INJ SC/IM: CPT

## 2020-08-24 RX ORDER — AMOXICILLIN 500 MG/1
1000 CAPSULE ORAL 2 TIMES DAILY
COMMUNITY
End: 2020-12-04

## 2020-08-24 RX ADMIN — ERYTHROPOIETIN 40000 UNITS: 40000 INJECTION, SOLUTION INTRAVENOUS; SUBCUTANEOUS at 12:53

## 2020-08-24 NOTE — PROGRESS NOTES
"Subjective   Chief Complaint   Patient presents with   • Pessary Check     doing well       Carolyne Martins is a 80 y.o. year old  presenting to be seen for routine pessary maintenance  She has no complaints or concerns  Has been voiding well and no vaginal bleeding. No symptoms of urinary tract infection.    Past Medical History:   Diagnosis Date   • Anemia    • Arthritis    • Bleeding from anus    • Bronchitis     STATES HAS BRONCHITIS CURRENTLY (17).     • Bronchitis 2017   • CAD (coronary artery disease)    • Cataract, bilateral    • Chronic kidney disease     STAGE 4.  SEES TERA   • Chronic kidney failure     REPORTS STAGE IV   • Difficulty swallowing solids    • Disease of thyroid gland    • Fracture, radius 2016    right distal radius and ulna, healed.   • GERD (gastroesophageal reflux disease)    • Gout    • High cholesterol    • History of nuclear stress test     DR. CORTEZ.  \"IT WAS OK\"   • Hyperparathyroidism (CMS/HCC)    • Hyperparathyroidism (CMS/HCC)    • Hypertension    • Iron deficiency    • Osteoarthritis    • Osteoarthritis of knees, bilateral     Both knees Supartz injection series    • Osteoporosis    • Personal history of cardiac murmur    • Presence of pessary    • Problems with swallowing     WITH FOOD OCCASSIONALLY   • Rheumatic heart disease     Personal history   • Rotator cuff tendonitis     right    • Rupture of right proximal biceps tendon     chronic   • Seasonal allergies    • Spinal headache     REPORTS AFTER DELIVERY OF SON   • Subacromial bursitis     right    • Valvular heart disease    • Vitamin B12 deficiency    • Vitamin D deficiency    • Wears glasses         Current Outpatient Medications:   •  allopurinol (ZYLOPRIM) 100 MG tablet, Take 1 tablet by mouth daily., Disp: , Rfl:   •  amoxicillin (AMOXIL) 500 MG capsule, Take 1,000 mg by mouth 2 (Two) Times a Day., Disp: , Rfl:   •  budesonide-formoterol (SYMBICORT) 80-4.5 MCG/ACT inhaler, " Inhale 2 puffs 2 (Two) Times a Day., Disp: , Rfl:   •  calcitriol (ROCALTROL) 0.25 MCG capsule, 2 (Two) Times a Day., Disp: , Rfl:   •  carboxymethylcellulose (REFRESH PLUS) 0.5 % solution, 3 (Three) Times a Day As Needed for Dry Eyes., Disp: , Rfl:   •  carvedilol (COREG) 25 MG tablet, , Disp: , Rfl:   •  cholecalciferol (VITAMIN D3) 1000 UNITS tablet, Take 1,000 Units by mouth Daily., Disp: , Rfl:   •  Cyanocobalamin (VITAMIN B-12 PO), Take 500 mg by mouth 2 (Two) Times a Day., Disp: , Rfl:   •  epoetin ovidio (PROCRIT) 60321 UNIT/ML injection, Procrit SOLN; Patient Sig: Procrit SOLN ; 0; 16-Jul-2014; every 3 weeks, Disp: , Rfl:   •  erythromycin (ROMYCIN) 5 MG/GM ophthalmic ointment, Administer  to both eyes Every Night., Disp: , Rfl:   •  estradiol (ESTRACE) 0.1 MG/GM vaginal cream, Massage 1 gram in vaginal opening twice weekly, Disp: 42.5 g, Rfl: 2  •  ferrous sulfate 325 (65 FE) MG tablet, Take 325 mg by mouth Daily With Breakfast., Disp: , Rfl:   •  fluticasone (FLONASE) 50 MCG/ACT nasal spray, 2 sprays into each nostril Daily As Needed., Disp: , Rfl:   •  irbesartan (AVAPRO) 150 MG tablet, Take 150 mg by mouth Daily., Disp: , Rfl:   •  omeprazole (PriLOSEC) 40 MG capsule, Take 40 mg by mouth Daily., Disp: , Rfl:   •  ondansetron ODT (ZOFRAN-ODT) 4 MG disintegrating tablet, , Disp: , Rfl:   •  Polysaccharide Iron Complex (POLY-IRON 150 PO), Take  by mouth Daily., Disp: , Rfl:   •  Probiotic Product (PROBIOTIC DAILY PO), Take  by mouth Daily., Disp: , Rfl:   •  rosuvastatin (CRESTOR) 10 MG tablet, , Disp: , Rfl:   No current facility-administered medications for this visit.    Allergies   Allergen Reactions   • Ferrlecit [Na Ferric Gluc Cplx In Sucrose] Swelling   • Ranitidine Hcl Shortness Of Breath   • Levofloxacin Other (See Comments)     Leg cramps     • Lisinopril Cough      Past Surgical History:   Procedure Laterality Date   • CATARACT EXTRACTION Bilateral    • CERVICAL POLYPECTOMY     • COLONOSCOPY   "2011   • COLONOSCOPY N/A 1/9/2018    Procedure: COLONOSCOPY with endoscopic mucosal resection (hot snare), normal saline submucosal injection, argon thermal ablation, resolution clip placement x4, and cold biopsy polypectomy;  Surgeon: Pieter Concepcion MD;  Location: [facility] ENDOSCOPY;  Service:    • ENDOSCOPY N/A 12/6/2017    Procedure: ESOPHAGOGASTRODUODENOSCOPY with biopsies and hot snare polypectomies;  Surgeon: Pieter Concepcion MD;  Location: [facility] ENDOSCOPY;  Service:    • ENDOSCOPY N/A 1/15/2019    Procedure: ESOPHAGOGASTRODUODENOSCOPY WITH BIOPSIES AND HOT SNARE POLYPECTOMIES X10;  Surgeon: Pieter Concepcion MD;  Location: [facility] ENDOSCOPY;  Service: Gastroenterology   • UPPER GASTROINTESTINAL ENDOSCOPY  08/18/2014      Social History     Socioeconomic History   • Marital status:      Spouse name: Not on file   • Number of children: Not on file   • Years of education: Not on file   • Highest education level: Not on file   Tobacco Use   • Smoking status: Never Smoker   • Smokeless tobacco: Never Used   Substance and Sexual Activity   • Alcohol use: No   • Drug use: No   • Sexual activity: Not Currently     Birth control/protection: Post-menopausal      Family History   Problem Relation Age of Onset   • Liver cancer Maternal Grandmother    • Gout Other    • Osteoporosis Other    • Stroke Other    • Ulcers Other    • Asthma Other    • Hypertension Other    • Heart disease Other    • Osteoarthritis Other    • Colon cancer Neg Hx        Review of Systems   Constitutional: Negative for chills, diaphoresis and fever.   Gastrointestinal: Negative for abdominal pain, nausea and vomiting.   Genitourinary: Negative for difficulty urinating, dysuria, pelvic pain, vaginal bleeding, vaginal discharge and vaginal pain.           Objective   /72   Ht 149.9 cm (59\")   Wt 49.6 kg (109 lb 6.4 oz)   LMP  (LMP Unknown)   Breastfeeding No   BMI 22.10 kg/m²     Physical Exam   Constitutional: She appears well-developed " and well-nourished. She is cooperative. No distress.   Eyes: Conjunctivae, EOM and lids are normal.   Abdominal: Soft. Normal appearance. There is no tenderness. There is no rigidity and no guarding.   Genitourinary: There is no tenderness or lesion on the right labia. There is no tenderness or lesion on the left labia.   Genitourinary Comments: Ring with support pessary removed, cleaned and reinserted. No vaginal wall erosion or abrasions  Grade 2 cystocele   Neurological: She is alert.   Skin: Skin is warm and dry. No lesion and no rash noted.   Psychiatric: She has a normal mood and affect. Her behavior is normal. Thought content normal.            Assessment and Plan  Carolyne was seen today for pessary check.    Diagnoses and all orders for this visit:    Pessary maintenance    Cystocele, midline      Patient Instructions   RTO 3 months or prn             This note was electronically signed.    Yaz Woods PA-C   August 24, 2020

## 2020-08-31 ENCOUNTER — TRANSCRIBE ORDERS (OUTPATIENT)
Dept: ADMINISTRATIVE | Facility: HOSPITAL | Age: 80
End: 2020-08-31

## 2020-08-31 DIAGNOSIS — Z12.31 VISIT FOR SCREENING MAMMOGRAM: Primary | ICD-10-CM

## 2020-08-31 DIAGNOSIS — M54.50 LOW BACK PAIN, UNSPECIFIED BACK PAIN LATERALITY, UNSPECIFIED CHRONICITY, UNSPECIFIED WHETHER SCIATICA PRESENT: ICD-10-CM

## 2020-08-31 DIAGNOSIS — M43.16 SPONDYLOLISTHESIS OF LUMBAR REGION: Primary | ICD-10-CM

## 2020-09-08 ENCOUNTER — HOSPITAL ENCOUNTER (OUTPATIENT)
Dept: INFUSION THERAPY | Facility: HOSPITAL | Age: 80
Discharge: HOME | End: 2020-09-08
Admitting: INTERNAL MEDICINE

## 2020-09-08 VITALS
DIASTOLIC BLOOD PRESSURE: 69 MMHG | TEMPERATURE: 98 F | OXYGEN SATURATION: 96 % | SYSTOLIC BLOOD PRESSURE: 121 MMHG | RESPIRATION RATE: 18 BRPM | HEART RATE: 58 BPM

## 2020-09-08 DIAGNOSIS — N18.4 ANEMIA DUE TO STAGE 4 CHRONIC KIDNEY DISEASE TREATED WITH ERYTHROPOIETIN (HCC): Primary | ICD-10-CM

## 2020-09-08 DIAGNOSIS — D63.1 ANEMIA DUE TO STAGE 4 CHRONIC KIDNEY DISEASE TREATED WITH ERYTHROPOIETIN (HCC): Primary | ICD-10-CM

## 2020-09-08 LAB
DEPRECATED RDW RBC AUTO: 63.8 FL (ref 37–54)
ERYTHROCYTE [DISTWIDTH] IN BLOOD BY AUTOMATED COUNT: 18.2 % (ref 12.3–15.4)
HCT VFR BLD AUTO: 29.9 % (ref 34–46.6)
HGB BLD-MCNC: 9.6 G/DL (ref 12–15.9)
MCH RBC QN AUTO: 31.1 PG (ref 26.6–33)
MCHC RBC AUTO-ENTMCNC: 32.1 G/DL (ref 31.5–35.7)
MCV RBC AUTO: 96.8 FL (ref 79–97)
PLATELET # BLD AUTO: 185 10*3/MM3 (ref 140–450)
PMV BLD AUTO: 10.6 FL (ref 6–12)
RBC # BLD AUTO: 3.09 10*6/MM3 (ref 3.77–5.28)
WBC # BLD AUTO: 4.88 10*3/MM3 (ref 3.4–10.8)

## 2020-09-08 PROCEDURE — 25010000002 EPOETIN ALFA PER 1000 UNITS: Performed by: NURSE PRACTITIONER

## 2020-09-08 PROCEDURE — 96372 THER/PROPH/DIAG INJ SC/IM: CPT

## 2020-09-08 PROCEDURE — 36415 COLL VENOUS BLD VENIPUNCTURE: CPT

## 2020-09-08 PROCEDURE — 85027 COMPLETE CBC AUTOMATED: CPT | Performed by: INTERNAL MEDICINE

## 2020-09-08 RX ADMIN — ERYTHROPOIETIN 40000 UNITS: 40000 INJECTION, SOLUTION INTRAVENOUS; SUBCUTANEOUS at 11:37

## 2020-09-21 ENCOUNTER — TELEPHONE (OUTPATIENT)
Dept: INFUSION THERAPY | Facility: HOSPITAL | Age: 80
End: 2020-09-21

## 2020-09-22 ENCOUNTER — HOSPITAL ENCOUNTER (OUTPATIENT)
Dept: MRI IMAGING | Facility: HOSPITAL | Age: 80
Discharge: HOME | End: 2020-09-22

## 2020-09-22 ENCOUNTER — HOSPITAL ENCOUNTER (OUTPATIENT)
Dept: INFUSION THERAPY | Facility: HOSPITAL | Age: 80
Discharge: HOME | End: 2020-09-22

## 2020-09-22 VITALS
HEART RATE: 60 BPM | DIASTOLIC BLOOD PRESSURE: 60 MMHG | RESPIRATION RATE: 18 BRPM | SYSTOLIC BLOOD PRESSURE: 167 MMHG | TEMPERATURE: 98.4 F | OXYGEN SATURATION: 98 %

## 2020-09-22 DIAGNOSIS — D63.1 ANEMIA DUE TO STAGE 4 CHRONIC KIDNEY DISEASE TREATED WITH ERYTHROPOIETIN (HCC): Primary | ICD-10-CM

## 2020-09-22 DIAGNOSIS — M43.16 SPONDYLOLISTHESIS OF LUMBAR REGION: ICD-10-CM

## 2020-09-22 DIAGNOSIS — N18.4 ANEMIA DUE TO STAGE 4 CHRONIC KIDNEY DISEASE TREATED WITH ERYTHROPOIETIN (HCC): Primary | ICD-10-CM

## 2020-09-22 DIAGNOSIS — M54.50 LOW BACK PAIN, UNSPECIFIED BACK PAIN LATERALITY, UNSPECIFIED CHRONICITY, UNSPECIFIED WHETHER SCIATICA PRESENT: ICD-10-CM

## 2020-09-22 LAB
DEPRECATED RDW RBC AUTO: 63.2 FL (ref 37–54)
ERYTHROCYTE [DISTWIDTH] IN BLOOD BY AUTOMATED COUNT: 17.9 % (ref 12.3–15.4)
HCT VFR BLD AUTO: 31.4 % (ref 34–46.6)
HGB BLD-MCNC: 9.9 G/DL (ref 12–15.9)
MCH RBC QN AUTO: 30.8 PG (ref 26.6–33)
MCHC RBC AUTO-ENTMCNC: 31.5 G/DL (ref 31.5–35.7)
MCV RBC AUTO: 97.8 FL (ref 79–97)
PLATELET # BLD AUTO: 152 10*3/MM3 (ref 140–450)
PMV BLD AUTO: 10.2 FL (ref 6–12)
RBC # BLD AUTO: 3.21 10*6/MM3 (ref 3.77–5.28)
WBC # BLD AUTO: 3.74 10*3/MM3 (ref 3.4–10.8)

## 2020-09-22 PROCEDURE — 36415 COLL VENOUS BLD VENIPUNCTURE: CPT

## 2020-09-22 PROCEDURE — 72148 MRI LUMBAR SPINE W/O DYE: CPT

## 2020-09-22 PROCEDURE — 85027 COMPLETE CBC AUTOMATED: CPT | Performed by: INTERNAL MEDICINE

## 2020-09-22 PROCEDURE — 96372 THER/PROPH/DIAG INJ SC/IM: CPT

## 2020-09-22 PROCEDURE — 25010000002 EPOETIN ALFA PER 1000 UNITS: Performed by: NURSE PRACTITIONER

## 2020-09-22 RX ADMIN — ERYTHROPOIETIN 40000 UNITS: 40000 INJECTION, SOLUTION INTRAVENOUS; SUBCUTANEOUS at 11:50

## 2020-10-02 ENCOUNTER — HOSPITAL ENCOUNTER (OUTPATIENT)
Dept: MAMMOGRAPHY | Facility: HOSPITAL | Age: 80
Discharge: HOME | End: 2020-10-02
Admitting: PHYSICIAN ASSISTANT

## 2020-10-02 DIAGNOSIS — Z12.31 VISIT FOR SCREENING MAMMOGRAM: ICD-10-CM

## 2020-10-02 PROCEDURE — 77063 BREAST TOMOSYNTHESIS BI: CPT

## 2020-10-02 PROCEDURE — 77067 SCR MAMMO BI INCL CAD: CPT

## 2020-10-05 ENCOUNTER — TELEPHONE (OUTPATIENT)
Dept: INFUSION THERAPY | Facility: HOSPITAL | Age: 80
End: 2020-10-05

## 2020-10-06 ENCOUNTER — HOSPITAL ENCOUNTER (OUTPATIENT)
Dept: INFUSION THERAPY | Facility: HOSPITAL | Age: 80
Discharge: HOME | End: 2020-10-06
Admitting: INTERNAL MEDICINE

## 2020-10-06 VITALS
HEART RATE: 65 BPM | OXYGEN SATURATION: 99 % | DIASTOLIC BLOOD PRESSURE: 85 MMHG | SYSTOLIC BLOOD PRESSURE: 138 MMHG | RESPIRATION RATE: 18 BRPM | TEMPERATURE: 98.4 F

## 2020-10-06 DIAGNOSIS — N18.4 ANEMIA DUE TO STAGE 4 CHRONIC KIDNEY DISEASE TREATED WITH ERYTHROPOIETIN (HCC): Primary | ICD-10-CM

## 2020-10-06 DIAGNOSIS — D63.1 ANEMIA DUE TO STAGE 4 CHRONIC KIDNEY DISEASE TREATED WITH ERYTHROPOIETIN (HCC): Primary | ICD-10-CM

## 2020-10-06 LAB
ALBUMIN SERPL-MCNC: 3.6 G/DL (ref 3.5–5.2)
ANION GAP SERPL CALCULATED.3IONS-SCNC: 9.1 MMOL/L (ref 5–15)
BUN SERPL-MCNC: 40 MG/DL (ref 8–23)
BUN/CREAT SERPL: 15.3 (ref 7–25)
CALCIUM SPEC-SCNC: 9.8 MG/DL (ref 8.6–10.5)
CHLORIDE SERPL-SCNC: 104 MMOL/L (ref 98–107)
CO2 SERPL-SCNC: 22.9 MMOL/L (ref 22–29)
CREAT SERPL-MCNC: 2.61 MG/DL (ref 0.57–1)
DEPRECATED RDW RBC AUTO: 61.2 FL (ref 37–54)
ERYTHROCYTE [DISTWIDTH] IN BLOOD BY AUTOMATED COUNT: 17.5 % (ref 12.3–15.4)
GFR SERPL CREATININE-BSD FRML MDRD: 18 ML/MIN/1.73
GLUCOSE SERPL-MCNC: 98 MG/DL (ref 65–99)
HCT VFR BLD AUTO: 31.8 % (ref 34–46.6)
HGB BLD-MCNC: 9.9 G/DL (ref 12–15.9)
MCH RBC QN AUTO: 30 PG (ref 26.6–33)
MCHC RBC AUTO-ENTMCNC: 31.1 G/DL (ref 31.5–35.7)
MCV RBC AUTO: 96.4 FL (ref 79–97)
PHOSPHATE SERPL-MCNC: 3.6 MG/DL (ref 2.5–4.5)
PLATELET # BLD AUTO: 187 10*3/MM3 (ref 140–450)
PMV BLD AUTO: 10.1 FL (ref 6–12)
POTASSIUM SERPL-SCNC: 4.6 MMOL/L (ref 3.5–5.2)
RBC # BLD AUTO: 3.3 10*6/MM3 (ref 3.77–5.28)
SODIUM SERPL-SCNC: 136 MMOL/L (ref 136–145)
WBC # BLD AUTO: 3.82 10*3/MM3 (ref 3.4–10.8)

## 2020-10-06 PROCEDURE — 25010000002 EPOETIN ALFA PER 1000 UNITS: Performed by: NURSE PRACTITIONER

## 2020-10-06 PROCEDURE — 85027 COMPLETE CBC AUTOMATED: CPT | Performed by: INTERNAL MEDICINE

## 2020-10-06 PROCEDURE — 80069 RENAL FUNCTION PANEL: CPT | Performed by: INTERNAL MEDICINE

## 2020-10-06 PROCEDURE — 96372 THER/PROPH/DIAG INJ SC/IM: CPT

## 2020-10-06 PROCEDURE — 36415 COLL VENOUS BLD VENIPUNCTURE: CPT

## 2020-10-06 RX ADMIN — ERYTHROPOIETIN 40000 UNITS: 40000 INJECTION, SOLUTION INTRAVENOUS; SUBCUTANEOUS at 11:43

## 2020-10-19 ENCOUNTER — HOSPITAL ENCOUNTER (OUTPATIENT)
Dept: INFUSION THERAPY | Facility: HOSPITAL | Age: 80
Discharge: HOME | End: 2020-10-19
Admitting: INTERNAL MEDICINE

## 2020-10-19 VITALS
HEART RATE: 68 BPM | TEMPERATURE: 97.5 F | SYSTOLIC BLOOD PRESSURE: 129 MMHG | OXYGEN SATURATION: 100 % | RESPIRATION RATE: 18 BRPM | DIASTOLIC BLOOD PRESSURE: 64 MMHG

## 2020-10-19 DIAGNOSIS — D63.1 ANEMIA DUE TO STAGE 4 CHRONIC KIDNEY DISEASE TREATED WITH ERYTHROPOIETIN (HCC): Primary | ICD-10-CM

## 2020-10-19 DIAGNOSIS — N18.4 ANEMIA DUE TO STAGE 4 CHRONIC KIDNEY DISEASE TREATED WITH ERYTHROPOIETIN (HCC): Primary | ICD-10-CM

## 2020-10-19 LAB
ALBUMIN SERPL-MCNC: 3.7 G/DL (ref 3.5–5.2)
ANION GAP SERPL CALCULATED.3IONS-SCNC: 9.3 MMOL/L (ref 5–15)
BUN SERPL-MCNC: 38 MG/DL (ref 8–23)
BUN/CREAT SERPL: 13.8 (ref 7–25)
CALCIUM SPEC-SCNC: 9.6 MG/DL (ref 8.6–10.5)
CHLORIDE SERPL-SCNC: 102 MMOL/L (ref 98–107)
CO2 SERPL-SCNC: 25.7 MMOL/L (ref 22–29)
CREAT SERPL-MCNC: 2.75 MG/DL (ref 0.57–1)
DEPRECATED RDW RBC AUTO: 63.1 FL (ref 37–54)
ERYTHROCYTE [DISTWIDTH] IN BLOOD BY AUTOMATED COUNT: 17.4 % (ref 12.3–15.4)
GFR SERPL CREATININE-BSD FRML MDRD: 17 ML/MIN/1.73
GLUCOSE SERPL-MCNC: 85 MG/DL (ref 65–99)
HCT VFR BLD AUTO: 33.1 % (ref 34–46.6)
HGB BLD-MCNC: 10.1 G/DL (ref 12–15.9)
MCH RBC QN AUTO: 29.9 PG (ref 26.6–33)
MCHC RBC AUTO-ENTMCNC: 30.5 G/DL (ref 31.5–35.7)
MCV RBC AUTO: 97.9 FL (ref 79–97)
PHOSPHATE SERPL-MCNC: 3.9 MG/DL (ref 2.5–4.5)
PLATELET # BLD AUTO: 228 10*3/MM3 (ref 140–450)
PMV BLD AUTO: 9.9 FL (ref 6–12)
POTASSIUM SERPL-SCNC: 4.5 MMOL/L (ref 3.5–5.2)
PTH-INTACT SERPL-MCNC: 79 PG/ML (ref 15–65)
RBC # BLD AUTO: 3.38 10*6/MM3 (ref 3.77–5.28)
SODIUM SERPL-SCNC: 137 MMOL/L (ref 136–145)
WBC # BLD AUTO: 4.14 10*3/MM3 (ref 3.4–10.8)

## 2020-10-19 PROCEDURE — 80069 RENAL FUNCTION PANEL: CPT | Performed by: INTERNAL MEDICINE

## 2020-10-19 PROCEDURE — 85027 COMPLETE CBC AUTOMATED: CPT | Performed by: INTERNAL MEDICINE

## 2020-10-19 PROCEDURE — 36415 COLL VENOUS BLD VENIPUNCTURE: CPT

## 2020-10-19 PROCEDURE — G0463 HOSPITAL OUTPT CLINIC VISIT: HCPCS

## 2020-10-19 PROCEDURE — 83970 ASSAY OF PARATHORMONE: CPT | Performed by: INTERNAL MEDICINE

## 2020-11-02 ENCOUNTER — HOSPITAL ENCOUNTER (OUTPATIENT)
Dept: INFUSION THERAPY | Facility: HOSPITAL | Age: 80
Discharge: HOME | End: 2020-11-02
Admitting: INTERNAL MEDICINE

## 2020-11-02 VITALS
OXYGEN SATURATION: 100 % | TEMPERATURE: 97.5 F | SYSTOLIC BLOOD PRESSURE: 139 MMHG | DIASTOLIC BLOOD PRESSURE: 69 MMHG | RESPIRATION RATE: 18 BRPM | HEART RATE: 70 BPM

## 2020-11-02 DIAGNOSIS — D63.1 ANEMIA DUE TO STAGE 4 CHRONIC KIDNEY DISEASE TREATED WITH ERYTHROPOIETIN (HCC): Primary | ICD-10-CM

## 2020-11-02 DIAGNOSIS — N18.4 ANEMIA DUE TO STAGE 4 CHRONIC KIDNEY DISEASE TREATED WITH ERYTHROPOIETIN (HCC): Primary | ICD-10-CM

## 2020-11-02 LAB
DEPRECATED RDW RBC AUTO: 60.8 FL (ref 37–54)
ERYTHROCYTE [DISTWIDTH] IN BLOOD BY AUTOMATED COUNT: 17 % (ref 12.3–15.4)
HCT VFR BLD AUTO: 30.7 % (ref 34–46.6)
HGB BLD-MCNC: 9.4 G/DL (ref 12–15.9)
MCH RBC QN AUTO: 29.9 PG (ref 26.6–33)
MCHC RBC AUTO-ENTMCNC: 30.6 G/DL (ref 31.5–35.7)
MCV RBC AUTO: 97.8 FL (ref 79–97)
PLATELET # BLD AUTO: 187 10*3/MM3 (ref 140–450)
PMV BLD AUTO: 10.4 FL (ref 6–12)
RBC # BLD AUTO: 3.14 10*6/MM3 (ref 3.77–5.28)
WBC # BLD AUTO: 3.75 10*3/MM3 (ref 3.4–10.8)

## 2020-11-02 PROCEDURE — 85027 COMPLETE CBC AUTOMATED: CPT | Performed by: INTERNAL MEDICINE

## 2020-11-02 PROCEDURE — 96372 THER/PROPH/DIAG INJ SC/IM: CPT

## 2020-11-02 PROCEDURE — 36415 COLL VENOUS BLD VENIPUNCTURE: CPT

## 2020-11-02 PROCEDURE — 25010000002 EPOETIN ALFA PER 1000 UNITS: Performed by: NURSE PRACTITIONER

## 2020-11-02 RX ADMIN — ERYTHROPOIETIN 40000 UNITS: 40000 INJECTION, SOLUTION INTRAVENOUS; SUBCUTANEOUS at 11:52

## 2020-11-16 ENCOUNTER — HOSPITAL ENCOUNTER (OUTPATIENT)
Dept: INFUSION THERAPY | Facility: HOSPITAL | Age: 80
Discharge: HOME | End: 2020-11-16
Admitting: INTERNAL MEDICINE

## 2020-11-16 VITALS
TEMPERATURE: 98 F | RESPIRATION RATE: 18 BRPM | OXYGEN SATURATION: 100 % | DIASTOLIC BLOOD PRESSURE: 66 MMHG | SYSTOLIC BLOOD PRESSURE: 130 MMHG | HEART RATE: 63 BPM

## 2020-11-16 DIAGNOSIS — D63.1 ANEMIA DUE TO STAGE 4 CHRONIC KIDNEY DISEASE TREATED WITH ERYTHROPOIETIN (HCC): Primary | ICD-10-CM

## 2020-11-16 DIAGNOSIS — N18.4 ANEMIA DUE TO STAGE 4 CHRONIC KIDNEY DISEASE TREATED WITH ERYTHROPOIETIN (HCC): Primary | ICD-10-CM

## 2020-11-16 LAB
DEPRECATED RDW RBC AUTO: 61.6 FL (ref 37–54)
ERYTHROCYTE [DISTWIDTH] IN BLOOD BY AUTOMATED COUNT: 17.2 % (ref 12.3–15.4)
HCT VFR BLD AUTO: 31.6 % (ref 34–46.6)
HGB BLD-MCNC: 9.8 G/DL (ref 12–15.9)
MCH RBC QN AUTO: 30.3 PG (ref 26.6–33)
MCHC RBC AUTO-ENTMCNC: 31 G/DL (ref 31.5–35.7)
MCV RBC AUTO: 97.8 FL (ref 79–97)
PLATELET # BLD AUTO: 181 10*3/MM3 (ref 140–450)
PMV BLD AUTO: 10.4 FL (ref 6–12)
RBC # BLD AUTO: 3.23 10*6/MM3 (ref 3.77–5.28)
WBC # BLD AUTO: 3.92 10*3/MM3 (ref 3.4–10.8)

## 2020-11-16 PROCEDURE — 36415 COLL VENOUS BLD VENIPUNCTURE: CPT

## 2020-11-16 PROCEDURE — 96372 THER/PROPH/DIAG INJ SC/IM: CPT

## 2020-11-16 PROCEDURE — 85027 COMPLETE CBC AUTOMATED: CPT | Performed by: INTERNAL MEDICINE

## 2020-11-16 PROCEDURE — 25010000002 EPOETIN ALFA PER 1000 UNITS: Performed by: NURSE PRACTITIONER

## 2020-11-16 RX ADMIN — ERYTHROPOIETIN 40000 UNITS: 40000 INJECTION, SOLUTION INTRAVENOUS; SUBCUTANEOUS at 11:57

## 2020-11-30 ENCOUNTER — APPOINTMENT (OUTPATIENT)
Dept: INFUSION THERAPY | Facility: HOSPITAL | Age: 80
End: 2020-11-30

## 2020-12-01 ENCOUNTER — APPOINTMENT (OUTPATIENT)
Dept: INFUSION THERAPY | Facility: HOSPITAL | Age: 80
End: 2020-12-01

## 2020-12-04 ENCOUNTER — OFFICE VISIT (OUTPATIENT)
Dept: OBSTETRICS AND GYNECOLOGY | Facility: CLINIC | Age: 80
End: 2020-12-04

## 2020-12-04 ENCOUNTER — HOSPITAL ENCOUNTER (OUTPATIENT)
Dept: INFUSION THERAPY | Facility: HOSPITAL | Age: 80
Discharge: HOME | End: 2020-12-04
Admitting: INTERNAL MEDICINE

## 2020-12-04 VITALS
BODY MASS INDEX: 21.71 KG/M2 | DIASTOLIC BLOOD PRESSURE: 58 MMHG | HEIGHT: 60 IN | WEIGHT: 110.6 LBS | SYSTOLIC BLOOD PRESSURE: 104 MMHG

## 2020-12-04 VITALS
HEART RATE: 64 BPM | SYSTOLIC BLOOD PRESSURE: 122 MMHG | OXYGEN SATURATION: 98 % | DIASTOLIC BLOOD PRESSURE: 59 MMHG | TEMPERATURE: 98.2 F | RESPIRATION RATE: 18 BRPM

## 2020-12-04 DIAGNOSIS — D63.1 ANEMIA DUE TO STAGE 4 CHRONIC KIDNEY DISEASE TREATED WITH ERYTHROPOIETIN (HCC): Primary | ICD-10-CM

## 2020-12-04 DIAGNOSIS — N81.11 CYSTOCELE, MIDLINE: ICD-10-CM

## 2020-12-04 DIAGNOSIS — Z46.89 PESSARY MAINTENANCE: Primary | ICD-10-CM

## 2020-12-04 DIAGNOSIS — N18.4 ANEMIA DUE TO STAGE 4 CHRONIC KIDNEY DISEASE TREATED WITH ERYTHROPOIETIN (HCC): Primary | ICD-10-CM

## 2020-12-04 LAB
DEPRECATED RDW RBC AUTO: 59.7 FL (ref 37–54)
ERYTHROCYTE [DISTWIDTH] IN BLOOD BY AUTOMATED COUNT: 16.7 % (ref 12.3–15.4)
HCT VFR BLD AUTO: 30.8 % (ref 34–46.6)
HGB BLD-MCNC: 9.4 G/DL (ref 12–15.9)
MCH RBC QN AUTO: 29.9 PG (ref 26.6–33)
MCHC RBC AUTO-ENTMCNC: 30.5 G/DL (ref 31.5–35.7)
MCV RBC AUTO: 98.1 FL (ref 79–97)
PLATELET # BLD AUTO: 179 10*3/MM3 (ref 140–450)
PMV BLD AUTO: 10.3 FL (ref 6–12)
RBC # BLD AUTO: 3.14 10*6/MM3 (ref 3.77–5.28)
WBC # BLD AUTO: 4.26 10*3/MM3 (ref 3.4–10.8)

## 2020-12-04 PROCEDURE — 96372 THER/PROPH/DIAG INJ SC/IM: CPT

## 2020-12-04 PROCEDURE — 36415 COLL VENOUS BLD VENIPUNCTURE: CPT

## 2020-12-04 PROCEDURE — 99212 OFFICE O/P EST SF 10 MIN: CPT | Performed by: PHYSICIAN ASSISTANT

## 2020-12-04 PROCEDURE — 85027 COMPLETE CBC AUTOMATED: CPT | Performed by: INTERNAL MEDICINE

## 2020-12-04 PROCEDURE — 25010000002 EPOETIN ALFA PER 1000 UNITS: Performed by: NURSE PRACTITIONER

## 2020-12-04 RX ORDER — ESTRADIOL 0.1 MG/G
CREAM VAGINAL
Qty: 42.5 G | Refills: 2 | Status: SHIPPED | OUTPATIENT
Start: 2020-12-04

## 2020-12-04 RX ADMIN — ERYTHROPOIETIN 40000 UNITS: 40000 INJECTION, SOLUTION INTRAVENOUS; SUBCUTANEOUS at 13:35

## 2020-12-04 NOTE — PROGRESS NOTES
"Subjective   Chief Complaint   Patient presents with   • Pessary Check     doing well       Carolyne Martins is a 80 y.o. year old  presenting to be seen for routine pessary maintenance  She has no complaints or concerns  She is voiding well and no problems with bowel movements  No vaginal bleeding  Needs refills of estradiol cream    Past Medical History:   Diagnosis Date   • Anemia    • Arthritis    • Bleeding from anus    • Bronchitis     STATES HAS BRONCHITIS CURRENTLY (17).     • Bronchitis 2017   • CAD (coronary artery disease)    • Cataract, bilateral    • Chronic kidney disease     STAGE 4.  SEES TERA   • Chronic kidney failure     REPORTS STAGE IV   • Difficulty swallowing solids    • Disease of thyroid gland    • Fracture, radius 2016    right distal radius and ulna, healed.   • GERD (gastroesophageal reflux disease)    • Gout    • High cholesterol    • History of nuclear stress test     DR. CORTEZ.  \"IT WAS OK\"   • Hyperparathyroidism (CMS/HCC)    • Hyperparathyroidism (CMS/HCC)    • Hypertension    • Iron deficiency    • Osteoarthritis    • Osteoarthritis of knees, bilateral     Both knees Supartz injection series    • Osteoporosis    • Personal history of cardiac murmur    • Presence of pessary    • Problems with swallowing     WITH FOOD OCCASSIONALLY   • Rheumatic heart disease     Personal history   • Rotator cuff tendonitis     right    • Rupture of right proximal biceps tendon     chronic   • Seasonal allergies    • Spinal headache     REPORTS AFTER DELIVERY OF SON   • Subacromial bursitis     right    • Valvular heart disease    • Vitamin B12 deficiency    • Vitamin D deficiency    • Wears glasses         Current Outpatient Medications:   •  allopurinol (ZYLOPRIM) 100 MG tablet, Take 1 tablet by mouth daily., Disp: , Rfl:   •  budesonide-formoterol (SYMBICORT) 80-4.5 MCG/ACT inhaler, Inhale 2 puffs 2 (Two) Times a Day., Disp: , Rfl:   •  calcitriol (ROCALTROL) " 0.25 MCG capsule, 2 (Two) Times a Day., Disp: , Rfl:   •  carboxymethylcellulose (REFRESH PLUS) 0.5 % solution, 3 (Three) Times a Day As Needed for Dry Eyes., Disp: , Rfl:   •  carvedilol (COREG) 25 MG tablet, , Disp: , Rfl:   •  cholecalciferol (VITAMIN D3) 1000 UNITS tablet, Take 1,000 Units by mouth Daily., Disp: , Rfl:   •  Cyanocobalamin (VITAMIN B-12 PO), Take 500 mg by mouth 2 (Two) Times a Day., Disp: , Rfl:   •  epoetin ovidio (PROCRIT) 14656 UNIT/ML injection, Procrit SOLN; Patient Sig: Procrit SOLN ; 0; 16-Jul-2014; every 3 weeks, Disp: , Rfl:   •  erythromycin (ROMYCIN) 5 MG/GM ophthalmic ointment, Administer  to both eyes Every Night., Disp: , Rfl:   •  estradiol (ESTRACE) 0.1 MG/GM vaginal cream, Massage 1 gram in vaginal opening twice weekly, Disp: 42.5 g, Rfl: 2  •  ferrous sulfate 325 (65 FE) MG tablet, Take 325 mg by mouth Daily With Breakfast., Disp: , Rfl:   •  fluticasone (FLONASE) 50 MCG/ACT nasal spray, 2 sprays into each nostril Daily As Needed., Disp: , Rfl:   •  irbesartan (AVAPRO) 150 MG tablet, Take 150 mg by mouth Daily., Disp: , Rfl:   •  omeprazole (PriLOSEC) 40 MG capsule, Take 40 mg by mouth Daily., Disp: , Rfl:   •  ondansetron ODT (ZOFRAN-ODT) 4 MG disintegrating tablet, , Disp: , Rfl:   •  Polysaccharide Iron Complex (POLY-IRON 150 PO), Take  by mouth Daily., Disp: , Rfl:   •  Probiotic Product (PROBIOTIC DAILY PO), Take  by mouth Daily., Disp: , Rfl:   •  rosuvastatin (CRESTOR) 10 MG tablet, , Disp: , Rfl:    Allergies   Allergen Reactions   • Ferrlecit [Na Ferric Gluc Cplx In Sucrose] Swelling   • Ranitidine Hcl Shortness Of Breath   • Levofloxacin Other (See Comments)     Leg cramps     • Lisinopril Cough      Past Surgical History:   Procedure Laterality Date   • CATARACT EXTRACTION Bilateral    • CERVICAL POLYPECTOMY     • COLONOSCOPY  2011   • COLONOSCOPY N/A 1/9/2018    Procedure: COLONOSCOPY with endoscopic mucosal resection (hot snare), normal saline submucosal injection,  "argon thermal ablation, resolution clip placement x4, and cold biopsy polypectomy;  Surgeon: Pieter Concepcion MD;  Location: [facility] ENDOSCOPY;  Service:    • ENDOSCOPY N/A 12/6/2017    Procedure: ESOPHAGOGASTRODUODENOSCOPY with biopsies and hot snare polypectomies;  Surgeon: Pieter Concepcion MD;  Location: [facility] ENDOSCOPY;  Service:    • ENDOSCOPY N/A 1/15/2019    Procedure: ESOPHAGOGASTRODUODENOSCOPY WITH BIOPSIES AND HOT SNARE POLYPECTOMIES X10;  Surgeon: Pieter Concepcion MD;  Location: [facility] ENDOSCOPY;  Service: Gastroenterology   • UPPER GASTROINTESTINAL ENDOSCOPY  08/18/2014      Social History     Socioeconomic History   • Marital status:      Spouse name: Not on file   • Number of children: Not on file   • Years of education: Not on file   • Highest education level: Not on file   Tobacco Use   • Smoking status: Never Smoker   • Smokeless tobacco: Never Used   Substance and Sexual Activity   • Alcohol use: No   • Drug use: No   • Sexual activity: Not Currently     Birth control/protection: Post-menopausal      Family History   Problem Relation Age of Onset   • Liver cancer Maternal Grandmother    • Gout Other    • Osteoporosis Other    • Stroke Other    • Ulcers Other    • Asthma Other    • Hypertension Other    • Heart disease Other    • Osteoarthritis Other    • Colon cancer Neg Hx        Review of Systems   Constitutional: Negative for chills, diaphoresis and fever.   Gastrointestinal: Negative for abdominal pain, constipation, diarrhea, nausea and vomiting.   Genitourinary: Negative for difficulty urinating, dysuria, pelvic pain, vaginal bleeding, vaginal discharge and vaginal pain.           Objective   /58   Ht 152.4 cm (60\")   Wt 50.2 kg (110 lb 9.6 oz)   LMP  (LMP Unknown)   Breastfeeding No   BMI 21.60 kg/m²     Physical Exam  Constitutional:       Appearance: Normal appearance. She is well-developed and well-groomed.   Eyes:      General: Lids are normal.      Extraocular Movements: " Extraocular movements intact.      Conjunctiva/sclera: Conjunctivae normal.   Genitourinary:     Labia:         Right: No rash, tenderness or lesion.         Left: No rash, tenderness or lesion.       Urethra: No prolapse, urethral pain, urethral swelling or urethral lesion.      Comments: Ring with support pessary removed, cleaned and reinserted. No vaginal wall erosion or abrasions  Grade 2 cystocele  Skin:     General: Skin is warm and dry.      Findings: No lesion or rash.   Neurological:      Mental Status: She is alert and oriented to person, place, and time.   Psychiatric:         Attention and Perception: Attention normal.         Mood and Affect: Mood normal.         Speech: Speech normal.         Behavior: Behavior is cooperative.         Thought Content: Thought content normal.              Assessment and Plan  Diagnoses and all orders for this visit:    1. Pessary maintenance (Primary)    2. Cystocele, midline    Other orders  -     estradiol (ESTRACE) 0.1 MG/GM vaginal cream; Massage 1 gram in vaginal opening twice weekly  Dispense: 42.5 g; Refill: 2      Patient Instructions   Follow up 3 months or prn             This note was electronically signed.    Yaz Woods PA-C   December 4, 2020

## 2020-12-14 ENCOUNTER — APPOINTMENT (OUTPATIENT)
Dept: INFUSION THERAPY | Facility: HOSPITAL | Age: 80
End: 2020-12-14

## 2020-12-14 ENCOUNTER — OFFICE VISIT (OUTPATIENT)
Dept: GASTROENTEROLOGY | Facility: CLINIC | Age: 80
End: 2020-12-14

## 2020-12-14 VITALS
BODY MASS INDEX: 21.97 KG/M2 | HEIGHT: 59 IN | WEIGHT: 109 LBS | DIASTOLIC BLOOD PRESSURE: 80 MMHG | SYSTOLIC BLOOD PRESSURE: 147 MMHG | TEMPERATURE: 98 F | HEART RATE: 70 BPM

## 2020-12-14 DIAGNOSIS — Z01.818 PREOP TESTING: Primary | ICD-10-CM

## 2020-12-14 DIAGNOSIS — Z86.010 PERSONAL HISTORY OF COLONIC POLYPS: ICD-10-CM

## 2020-12-14 DIAGNOSIS — R13.19 ESOPHAGEAL DYSPHAGIA: ICD-10-CM

## 2020-12-14 DIAGNOSIS — D64.9 NORMOCYTIC ANEMIA: ICD-10-CM

## 2020-12-14 DIAGNOSIS — R10.32 LEFT LOWER QUADRANT ABDOMINAL PAIN: Primary | ICD-10-CM

## 2020-12-14 DIAGNOSIS — K59.04 CHRONIC IDIOPATHIC CONSTIPATION: ICD-10-CM

## 2020-12-14 DIAGNOSIS — K21.9 GASTROESOPHAGEAL REFLUX DISEASE WITHOUT ESOPHAGITIS: ICD-10-CM

## 2020-12-14 PROCEDURE — 99214 OFFICE O/P EST MOD 30 MIN: CPT | Performed by: INTERNAL MEDICINE

## 2020-12-14 RX ORDER — SODIUM CHLORIDE 9 MG/ML
70 INJECTION, SOLUTION INTRAVENOUS CONTINUOUS PRN
Status: CANCELLED | OUTPATIENT
Start: 2020-12-14

## 2020-12-14 RX ORDER — SODIUM, POTASSIUM,MAG SULFATES 17.5-3.13G
2 SOLUTION, RECONSTITUTED, ORAL ORAL ONCE
Qty: 2 BOTTLE | Refills: 0 | Status: SHIPPED | OUTPATIENT
Start: 2020-12-14 | End: 2020-12-14

## 2020-12-14 RX ORDER — DICYCLOMINE HYDROCHLORIDE 10 MG/5ML
10 SOLUTION ORAL 3 TIMES DAILY PRN
Qty: 473 ML | Refills: 1 | Status: ON HOLD | OUTPATIENT
Start: 2020-12-14 | End: 2021-02-13

## 2020-12-14 RX ORDER — CLONIDINE HYDROCHLORIDE 0.1 MG/1
0.05 TABLET ORAL AS NEEDED
Status: ON HOLD | COMMUNITY
End: 2021-02-13

## 2020-12-14 RX ORDER — DOCUSATE SODIUM 100 MG/1
100 CAPSULE, LIQUID FILLED ORAL 2 TIMES DAILY
Qty: 60 CAPSULE | Refills: 5 | Status: SHIPPED | OUTPATIENT
Start: 2020-12-14 | End: 2022-02-07

## 2020-12-14 NOTE — PROGRESS NOTES
New Patient Consult      Date: 2020   Patient Name: Carolyne Martins  MRN: [id number]  : 1940     Referring Physician: Jerald Dawkins MD    Chief Complaint   Patient presents with   • Follow-up   • Abdominal Pain   • Nausea   • Heartburn     Interval history      History of Present Illness: Carolyne Martins is a 80 y.o. female who is here today for follow-up demented abdominal pain LLQ. Pain is intermittent associated with constipation. She will also feels nauseated and has occasional difficulty in swallowing for both solids and liquids.  she had esophogeal dilatation but that did not last long. Pain started again yesterday and got worse mainly in the left lower quadrant with nausea and one episode of vomiting this morning .  Denies any fever chills .      2019  Patient has a history of periumbilical abdominal pain off and on for the last a month or so.  The pain was gradual in onset mild in severity and aching in character.  Frequency being 2-3 times a week.  The pain used to last for several minutes.  Eating would worsen the abdominal pain.  The patient was found to have significant tenderness in the left lower quadrant and was treated with antibiotics for diverticulitis.  Currently, she feels much better.  There is resolution of abdominal pain.  She denies associated fever or chills.       There is history of recurrent nausea for the last several months.  Nausea is generally described as mild frequency being couple of times a week.  The nauseous feeling may last for several minutes.  Eating would worsen the nausea.  No definite relieving factors of nausea.  She denied history of emesis.  This has also significantly improved.  The patient did not have further nauseous feeling last week.  Reflux symptoms are under control.  The patient denies dysphagia or odynophagia.  She is feeling better in terms of constipation.  There is no hematemesis, melena or hematochezia.  She denies recent weight  "loss.  The patient has history of recurrent nausea for the last several months.  The nausea is described as mild, frequency being a couple of times a week. Nauseous feeling may last for several minutes.  Eating worsens the symptom however no definite relieving factors of nausea.  There is occasional associated vomiting.  Her reflux symptoms are under control.  She denies dysphagia or odynophagia.  She denies history of liver disease.  There is no history of recent significant weight loss.        Subjective      Past Medical History:   Past Medical History:   Diagnosis Date   • Anemia 1998   • Arthritis    • Back pain    • Bleeding from anus    • Bronchitis     STATES HAS BRONCHITIS CURRENTLY (12/5/17).     • Bronchitis 12/2017   • CAD (coronary artery disease)    • Cataract, bilateral    • Chronic kidney disease     STAGE 4.  SEES TERA   • Chronic kidney failure     REPORTS STAGE IV   • Difficulty swallowing solids    • Disease of thyroid gland    • Fracture, radius 2016    right distal radius and ulna, healed.   • GERD (gastroesophageal reflux disease)    • Gout    • High cholesterol    • History of nuclear stress test 2014    DR. CORTEZ.  \"IT WAS OK\"   • Hyperparathyroidism (CMS/HCC)    • Hyperparathyroidism (CMS/HCC)    • Hypertension    • Iron deficiency    • Osteoarthritis    • Osteoarthritis of knees, bilateral     Both knees Supartz injection series August, 2015   • Osteoporosis    • Palpitations    • Personal history of cardiac murmur    • Presence of pessary    • Problems with swallowing     WITH FOOD OCCASSIONALLY   • Rheumatic heart disease     Personal history   • Rotator cuff tendonitis     right    • Rupture of right proximal biceps tendon     chronic   • Seasonal allergies    • Sinus problem    • Spinal headache     REPORTS AFTER DELIVERY OF SON   • Subacromial bursitis     right    • Valvular heart disease    • Vision problems    • Vitamin B12 deficiency    • Vitamin D deficiency    • Wears glasses "        Past Surgical History:   Past Surgical History:   Procedure Laterality Date   • CATARACT EXTRACTION Bilateral    • CERVICAL POLYPECTOMY     • COLONOSCOPY  2011   • COLONOSCOPY N/A 1/9/2018    Procedure: COLONOSCOPY with endoscopic mucosal resection (hot snare), normal saline submucosal injection, argon thermal ablation, resolution clip placement x4, and cold biopsy polypectomy;  Surgeon: Pieter Concepcion MD;  Location: [facility] ENDOSCOPY;  Service:    • ENDOSCOPY N/A 12/6/2017    Procedure: ESOPHAGOGASTRODUODENOSCOPY with biopsies and hot snare polypectomies;  Surgeon: Pieter Concepcion MD;  Location: [facility] ENDOSCOPY;  Service:    • ENDOSCOPY N/A 1/15/2019    Procedure: ESOPHAGOGASTRODUODENOSCOPY WITH BIOPSIES AND HOT SNARE POLYPECTOMIES X10;  Surgeon: Pieter Concepcion MD;  Location: [facility] ENDOSCOPY;  Service: Gastroenterology   • UPPER GASTROINTESTINAL ENDOSCOPY  08/18/2014       Family History:   Family History   Problem Relation Age of Onset   • Liver cancer Maternal Grandmother    • Gout Other    • Osteoporosis Other    • Stroke Other    • Ulcers Other    • Asthma Other    • Hypertension Other    • Heart disease Other    • Osteoarthritis Other    • Colon cancer Neg Hx        Social History:   Social History     Socioeconomic History   • Marital status:      Spouse name: Not on file   • Number of children: Not on file   • Years of education: Not on file   • Highest education level: Not on file   Tobacco Use   • Smoking status: Never Smoker   • Smokeless tobacco: Never Used   Substance and Sexual Activity   • Alcohol use: Never     Frequency: Never   • Drug use: Never   • Sexual activity: Defer         Current Outpatient Medications:   •  allopurinol (ZYLOPRIM) 100 MG tablet, Take 1 tablet by mouth daily., Disp: , Rfl:   •  budesonide-formoterol (SYMBICORT) 80-4.5 MCG/ACT inhaler, Inhale 2 puffs 2 (Two) Times a Day., Disp: , Rfl:   •  calcitriol (ROCALTROL) 0.25 MCG capsule, 2 (Two) Times a Day., Disp: ,  Rfl:   •  carboxymethylcellulose (REFRESH PLUS) 0.5 % solution, 3 (Three) Times a Day As Needed for Dry Eyes., Disp: , Rfl:   •  carvedilol (COREG) 25 MG tablet, , Disp: , Rfl:   •  cholecalciferol (VITAMIN D3) 1000 UNITS tablet, Take 1,000 Units by mouth Daily., Disp: , Rfl:   •  Cyanocobalamin (VITAMIN B-12 PO), Take 500 mg by mouth 2 (Two) Times a Day., Disp: , Rfl:   •  epoetin ovidio (PROCRIT) 80380 UNIT/ML injection, Procrit SOLN; Patient Sig: Procrit SOLN ; 0; 16-Jul-2014; every 3 weeks, Disp: , Rfl:   •  erythromycin (ROMYCIN) 5 MG/GM ophthalmic ointment, Administer  to both eyes Every Night., Disp: , Rfl:   •  estradiol (ESTRACE) 0.1 MG/GM vaginal cream, Massage 1 gram in vaginal opening twice weekly, Disp: 42.5 g, Rfl: 2  •  fluticasone (FLONASE) 50 MCG/ACT nasal spray, 2 sprays into each nostril Daily As Needed., Disp: , Rfl:   •  irbesartan (AVAPRO) 150 MG tablet, Take 150 mg by mouth Daily., Disp: , Rfl:   •  omeprazole (PriLOSEC) 40 MG capsule, Take 40 mg by mouth Daily., Disp: , Rfl:   •  ondansetron ODT (ZOFRAN-ODT) 4 MG disintegrating tablet, , Disp: , Rfl:   •  Polysaccharide Iron Complex (POLY-IRON 150 PO), Take  by mouth Daily., Disp: , Rfl:   •  Probiotic Product (PROBIOTIC DAILY PO), Take  by mouth Daily., Disp: , Rfl:   •  rosuvastatin (CRESTOR) 10 MG tablet, , Disp: , Rfl:     Allergies   Allergen Reactions   • Ferrlecit [Na Ferric Gluc Cplx In Sucrose] Swelling   • Ranitidine Hcl Shortness Of Breath   • Levofloxacin Other (See Comments)     Leg cramps     • Lisinopril Cough       Review of Systems:   Review of Systems   Constitutional: Positive for unexpected weight loss. Negative for appetite change, fatigue and fever.   HENT: Positive for trouble swallowing.    Respiratory: Negative for cough, shortness of breath and wheezing.    Cardiovascular: Negative for chest pain, palpitations and leg swelling.   Gastrointestinal: Positive for abdominal pain, constipation, nausea and vomiting. Negative  for abdominal distention, anal bleeding, blood in stool, diarrhea, rectal pain, GERD and indigestion.        Black colored stools     Genitourinary: Negative for dysuria, frequency and hematuria.   Musculoskeletal: Negative for back pain and joint swelling.   Skin: Negative for color change, rash and skin lesions.   Neurological: Negative for dizziness, syncope, speech difficulty, weakness, headache and memory problem.   Hematological: Negative for adenopathy. Does not bruise/bleed easily.   Psychiatric/Behavioral: Negative for agitation, behavioral problems, suicidal ideas and depressed mood.       The following portions of the patient's history were reviewed and updated as appropriate: allergies, current medications, past family history, past medical history, past social history, past surgical history and problem list.    Objective     Physical Exam:  Vital Signs: There were no vitals filed for this visit.    Physical Exam  Vitals signs and nursing note reviewed.   Constitutional:       Appearance: She is well-developed.   Eyes:      Conjunctiva/sclera: Conjunctivae normal.   Neck:      Musculoskeletal: Normal range of motion and neck supple.   Cardiovascular:      Rate and Rhythm: Normal rate and regular rhythm.      Heart sounds: No murmur.   Pulmonary:      Effort: Pulmonary effort is normal. No respiratory distress.      Breath sounds: Normal breath sounds.   Abdominal:      General: Bowel sounds are normal. There is no distension.      Palpations: Abdomen is soft. There is no mass.      Tenderness: There is abdominal tenderness (No obvious tenderness however mild discomfort on deep palpation at left lower quadrant noted).      Hernia: No hernia is present.   Lymphadenopathy:      Cervical: No cervical adenopathy.   Skin:     General: Skin is warm and dry.   Neurological:      Mental Status: She is alert and oriented to person, place, and time.      Sensory: No sensory deficit.         Results Review:   I have  reviewed the patient's new clinical and imaging results and agree with the interpretation.     Hospital Outpatient Visit on 12/04/2020   Component Date Value Ref Range Status   • WBC 12/04/2020 4.26  3.40 - 10.80 10*3/mm3 Final   • RBC 12/04/2020 3.14* 3.77 - 5.28 10*6/mm3 Final   • Hemoglobin 12/04/2020 9.4* 12.0 - 15.9 g/dL Final   • Hematocrit 12/04/2020 30.8* 34.0 - 46.6 % Final   • MCV 12/04/2020 98.1* 79.0 - 97.0 fL Final   • MCH 12/04/2020 29.9  26.6 - 33.0 pg Final   • MCHC 12/04/2020 30.5* 31.5 - 35.7 g/dL Final   • RDW 12/04/2020 16.7* 12.3 - 15.4 % Final   • RDW-SD 12/04/2020 59.7* 37.0 - 54.0 fl Final   • MPV 12/04/2020 10.3  6.0 - 12.0 fL Final   • Platelets 12/04/2020 179  140 - 450 10*3/mm3 Final   Hospital Outpatient Visit on 11/16/2020   Component Date Value Ref Range Status   • WBC 11/16/2020 3.92  3.40 - 10.80 10*3/mm3 Final   • RBC 11/16/2020 3.23* 3.77 - 5.28 10*6/mm3 Final   • Hemoglobin 11/16/2020 9.8* 12.0 - 15.9 g/dL Final   • Hematocrit 11/16/2020 31.6* 34.0 - 46.6 % Final   • MCV 11/16/2020 97.8* 79.0 - 97.0 fL Final   • MCH 11/16/2020 30.3  26.6 - 33.0 pg Final   • MCHC 11/16/2020 31.0* 31.5 - 35.7 g/dL Final   • RDW 11/16/2020 17.2* 12.3 - 15.4 % Final   • RDW-SD 11/16/2020 61.6* 37.0 - 54.0 fl Final   • MPV 11/16/2020 10.4  6.0 - 12.0 fL Final   • Platelets 11/16/2020 181  140 - 450 10*3/mm3 Final   Hospital Outpatient Visit on 11/02/2020   Component Date Value Ref Range Status   • WBC 11/02/2020 3.75  3.40 - 10.80 10*3/mm3 Final   • RBC 11/02/2020 3.14* 3.77 - 5.28 10*6/mm3 Final   • Hemoglobin 11/02/2020 9.4* 12.0 - 15.9 g/dL Final   • Hematocrit 11/02/2020 30.7* 34.0 - 46.6 % Final   • MCV 11/02/2020 97.8* 79.0 - 97.0 fL Final   • MCH 11/02/2020 29.9  26.6 - 33.0 pg Final   • MCHC 11/02/2020 30.6* 31.5 - 35.7 g/dL Final   • RDW 11/02/2020 17.0* 12.3 - 15.4 % Final   • RDW-SD 11/02/2020 60.8* 37.0 - 54.0 fl Final   • MPV 11/02/2020 10.4  6.0 - 12.0 fL Final   • Platelets 11/02/2020  187  140 - 450 10*3/mm3 Final   Hospital Outpatient Visit on 10/19/2020   Component Date Value Ref Range Status   • WBC 10/19/2020 4.14  3.40 - 10.80 10*3/mm3 Final   • RBC 10/19/2020 3.38* 3.77 - 5.28 10*6/mm3 Final   • Hemoglobin 10/19/2020 10.1* 12.0 - 15.9 g/dL Final   • Hematocrit 10/19/2020 33.1* 34.0 - 46.6 % Final   • MCV 10/19/2020 97.9* 79.0 - 97.0 fL Final   • MCH 10/19/2020 29.9  26.6 - 33.0 pg Final   • MCHC 10/19/2020 30.5* 31.5 - 35.7 g/dL Final   • RDW 10/19/2020 17.4* 12.3 - 15.4 % Final   • RDW-SD 10/19/2020 63.1* 37.0 - 54.0 fl Final   • MPV 10/19/2020 9.9  6.0 - 12.0 fL Final   • Platelets 10/19/2020 228  140 - 450 10*3/mm3 Final   • PTH, Intact 10/19/2020 79.0* 15.0 - 65.0 pg/mL Final   • Glucose 10/19/2020 85  65 - 99 mg/dL Final   • BUN 10/19/2020 38* 8 - 23 mg/dL Final   • Creatinine 10/19/2020 2.75* 0.57 - 1.00 mg/dL Final   • Sodium 10/19/2020 137  136 - 145 mmol/L Final   • Potassium 10/19/2020 4.5  3.5 - 5.2 mmol/L Final   • Chloride 10/19/2020 102  98 - 107 mmol/L Final   • CO2 10/19/2020 25.7  22.0 - 29.0 mmol/L Final   • Calcium 10/19/2020 9.6  8.6 - 10.5 mg/dL Final   • Albumin 10/19/2020 3.70  3.50 - 5.20 g/dL Final   • Phosphorus 10/19/2020 3.9  2.5 - 4.5 mg/dL Final   • Anion Gap 10/19/2020 9.3  5.0 - 15.0 mmol/L Final   • BUN/Creatinine Ratio 10/19/2020 13.8  7.0 - 25.0 Final   • eGFR Non African Amer 10/19/2020 17* >60 mL/min/1.73 Final   Hospital Outpatient Visit on 10/06/2020   Component Date Value Ref Range Status   • Glucose 10/06/2020 98  65 - 99 mg/dL Final   • BUN 10/06/2020 40* 8 - 23 mg/dL Final   • Creatinine 10/06/2020 2.61* 0.57 - 1.00 mg/dL Final   • Sodium 10/06/2020 136  136 - 145 mmol/L Final   • Potassium 10/06/2020 4.6  3.5 - 5.2 mmol/L Final   • Chloride 10/06/2020 104  98 - 107 mmol/L Final   • CO2 10/06/2020 22.9  22.0 - 29.0 mmol/L Final   • Calcium 10/06/2020 9.8  8.6 - 10.5 mg/dL Final   • Albumin 10/06/2020 3.60  3.50 - 5.20 g/dL Final   • Phosphorus  10/06/2020 3.6  2.5 - 4.5 mg/dL Final   • Anion Gap 10/06/2020 9.1  5.0 - 15.0 mmol/L Final   • BUN/Creatinine Ratio 10/06/2020 15.3  7.0 - 25.0 Final   • eGFR Non African Amer 10/06/2020 18* >60 mL/min/1.73 Final   • WBC 10/06/2020 3.82  3.40 - 10.80 10*3/mm3 Final   • RBC 10/06/2020 3.30* 3.77 - 5.28 10*6/mm3 Final   • Hemoglobin 10/06/2020 9.9* 12.0 - 15.9 g/dL Final   • Hematocrit 10/06/2020 31.8* 34.0 - 46.6 % Final   • MCV 10/06/2020 96.4  79.0 - 97.0 fL Final   • MCH 10/06/2020 30.0  26.6 - 33.0 pg Final   • MCHC 10/06/2020 31.1* 31.5 - 35.7 g/dL Final   • RDW 10/06/2020 17.5* 12.3 - 15.4 % Final   • RDW-SD 10/06/2020 61.2* 37.0 - 54.0 fl Final   • MPV 10/06/2020 10.1  6.0 - 12.0 fL Final   • Platelets 10/06/2020 187  140 - 450 10*3/mm3 Final   Hospital Outpatient Visit on 09/22/2020   Component Date Value Ref Range Status   • WBC 09/22/2020 3.74  3.40 - 10.80 10*3/mm3 Final   • RBC 09/22/2020 3.21* 3.77 - 5.28 10*6/mm3 Final   • Hemoglobin 09/22/2020 9.9* 12.0 - 15.9 g/dL Final   • Hematocrit 09/22/2020 31.4* 34.0 - 46.6 % Final   • MCV 09/22/2020 97.8* 79.0 - 97.0 fL Final   • MCH 09/22/2020 30.8  26.6 - 33.0 pg Final   • MCHC 09/22/2020 31.5  31.5 - 35.7 g/dL Final   • RDW 09/22/2020 17.9* 12.3 - 15.4 % Final   • RDW-SD 09/22/2020 63.2* 37.0 - 54.0 fl Final   • MPV 09/22/2020 10.2  6.0 - 12.0 fL Final   • Platelets 09/22/2020 152  140 - 450 10*3/mm3 Final      Mri Lumbar Spine Without Contrast    Result Date: 9/22/2020  1. Degenerative changes with areas of canal and foraminal narrowing as detailed above. 2. Renal lesions which appear to be grossly similar to CT from 10/23/2018. Please see reports from previous CTs for further details.   This report was finalized on 9/22/2020 12:15 PM by Ross Grimes MD.    Mammo Screening Digital Tomosynthesis Bilateral With Cad    Result Date: 10/2/2020  BI-RADS CATEGORY: 2 , BENIGN FINDING(S).      RECOMMENDATIONS: Annual screening mammogram.    NOTES: Mammography does  not detect approximately 10-15% of breast cancers. Physical examination of the breasts by a physician and regular monthly breast self examinations are integral parts of breast cancer screening.  A normal mammogram does not exclude breast cancer if there is an abnormal finding on physical examination. When clinically indicated, a biopsy should not be postponed because of a normal mammogram report.  [place]. [id number]  NOTE: If a biopsy is performed on this patient, a copy of the pathology report would be appreciated.  This report was finalized on 10/2/2020 1:58 PM by Sergio Bassett M.D..      Assessment / Plan      Assessment & Plan:  1.  Left lower abdominal pain  2. Nausea  3.  Chronic idiopathic constipation   patient does have a chronic intermittent left abdominal pain.  This could be associated with her severe diverticulosis with diverticular pain.  This could also be related with her intermittent chronic constipation.  This pain episode triggered after she had a recent constipation.  Clinical examination today does not reveal any significant signs of tenderness.  Clinically does not appear to be in acute diverticulitis.   We will start her on antispasmodics as needed  I am also going to start her on Colace p.o. twice daily  I have advised her to call the office if there is any worsening pain symptoms or nausea vomiting or fever in that case she needs antibiotic      4.  Advanced colon polyp  She had a colonoscopy done in 2018 and a flat 1.5 cm advanced polyp was removed from transverse colon with EMR technique.   She is due for surveillance colonoscopy now.   The indications, technique, alternatives and potential risk and complications were discussed with the patient including but not limited to bleeding, bowel perforations, missing lesions and anesthetic complications. The patient understands and wishes to proceed with the procedure and has given their  verbal consent. Written patient education information was given to the patient.   The patient will call if they have further questions before procedure.     5.  Gastroesophageal reflux disease without esophagitis  Reflux symptoms are well controlled with Prilosec 40 m p.o. daily dose    6.  Esophageal dysphagia  Patient has a longstanding history of dysphagia.  Her prior EGDs showed a nonobstructing distal esophageal ring in the past. she had a esophageal dilatation few times in the past which partially helped her symptoms.  Last EGD was in 2019.   We will monitor for now    7.  Normocytic anemia  No history suggestive any GI bleed.  Her anemia appears to be anemia of chronic disease with her CKD  She also has a large complex cyst in the kidney and simple cyst in the liver.  Follow-up closely with imaging        Prior work-up   upon review of medical records:     Dated February 4, 2018 sodium 139 potassium 4.1 chloride 103 CO2 29 BUN 47 serum creatinine 2.60 glucose 96.  Calcium 10.3.  Total protein 5.9.  Albumin 3.40.  T bili 0.3 AST 29 ALT 36 alkaline phosphatase 64.  WBC 4.28 hemoglobin 8.8 hematocrit 27.8 MCV 95.5 and platelet count 189.     Dated October 12, 2018 sodium 138 potassium 4.5 chloride 106 CO2 25 BUN 44 serum creatinine 2.20 glucose 95.  Calcium 9.8.  Albumin 4.20.  WBC 7.96 and global 9.5 hematocrit 30.9 MCV 92.8 and platelet count 193.     Dated January 28, 2019 sodium 137 potassium 4.4 chloride 108 CO2 22 BUN 34 serum creatinine 2.20 glucose 90.  Calcium 8.8.  Albumin 4.00.  Phosphorus 3.5.  Serum iron 148.  TIBC 387.  Iron saturation 38%.  Ferritin 13.40.  Uric acid 6.6.  WBC 3.56 hemoglobin 9.3 hematocrit 30.1 MCV 92.6 and platelet count 203.     Dated May 13, 2019 WBC 3.34 hemoglobin 7.9 hematocrit 25.1 MCV 93.0 platelet count 184.  Data May 28, 2019 sodium 137 potassium 4.2 chloride 102 CO2 25 BUN 47 serum creatinine 2.30 glucose 92.  Calcium 8.3.  Albumin 3.90.  Phosphorus 4.1.  Serum iron  90.  TIBC 391.  Iron saturation 23%.  Ferritin 12.70.  Uric acid 6.5.  WBC 3.15 hemoglobin 8.2 hematocrit 26.1 MCV 92.2 and platelet count 202.     Dated October 1, 2019 sodium 137 potassium 4.9 chloride 103 CO2 21.2 BUN 47 serum creatinine 2.55 glucose 93.  Calcium 9.2.  Total protein 6.8.  Albumin 4.00.  T bili 0.4 AST 20 ALT 9 alkaline phosphatase 61.  Uric acid 5.7.  Phosphorus 4.3.  WBC 3.61 hemoglobin 8.1 hematocrit 27.4 MCV 89.8 platelet count 193.     Dated October 15, 2019 serum iron 55.  TIBC 471.  Transferrin level 316.  Iron saturation 12%.  Ferritin 11.70.  Vitamin B12 level > 2000.  Folate >20.00.     Dated November 20, 2019 sodium 137 potassium 4.5 chloride 99 CO2 24.5 BUN 49 serum creatinine 2.86 glucose 97.  Calcium 10.1.  Total protein 6.8.  Albumin 3.90.  T bili 0.4 AST 29 ALT 13 alkaline phosphatase 54.  Lipase 32.  Stool for fecal occult blood negative.  WBC 4.67 hemoglobin 9.1 hematocrit 29.2 MCV 90.4 and platelet count 217.     Dated December 3, 2019 WBC 3.38 hemoglobin 8.3 hematocrit 27.0 MCV 90.6 and platelet count 187.     Abdominal Imaging:  Upon review of records:     CT of abdomen and pelvis without contrast dated 12/2/2017 reveals lung bases are clear. There is a small sliding-type hiatal hernia. The liver is normal in size and attenuation with a benign-appearing cyst. The spleen is unremarkable.  The adrenals are normal.  The aorta is normal in caliber. There is no hydronephrosis. There is no nephrolithiasis. There are bilateral simple and complex renal cysts with the largest measuring 7.2 x 6.0 cm on the right. The appendix is normal.  The urinary bladder is unremarkable. There is no free fluid or adenopathy. A pessary device is present. There are bilateral L5 pars defects with advanced changes of osteoarthritis     Dated October 23, 2018 the patient underwent a CT of abdomen and pelvis without contrast which revealed: Bilateral renal masses are seen incompletely characterized without  contrast.  Many of these do not feel criteria of simple cyst.  These are stable since prior exam.  Small low-density lesion (hepatic lobe is seen probably a cyst, unchanged.  Remaining solid organs are unremarkable.  Bowel shows no evidence of obstruction.  No free air or free fluid within the abdomen.  No bowel dilatation is seen.  No free fluid.  Appendix is normal.  Uterus is unremarkable.  A pessary is present.  Advanced sigmoid diverticulosis is noted.     Dated November 20, 2019 the patient underwent a CT of the abdomen and pelvis without contrast which revealed: Comparison with 10/23/2018.  Solid organ evaluation is limited without the administration of IV contrast.  Allowing for this, abdominal solid organs demonstrate no acute abnormality.  Low-attenuation focus in the lateral segment of the left hepatic lobe is unchanged and favored to represent a cyst.  Multiple bilateral renal lesions, some of which are decreased in attenuation and some of which are increased in attenuation.  These are indeterminate, especially given the absence of IV contrast, but appear similar to previous and may represent combination of simple cysts and hyperdense or hemorrhagic cyst.  Solid neoplasia thought to be less likely but not entirely excluded and continued follow-up is suggested.  Evaluation of bowel is limited without oral contrast.  No bowel obstruction.  Normal appendix.  Moderate stool in the colon.  Moderate diverticulosis without obvious diverticulitis.  Pessary ring is present in the pelvis.  Urinary bladder is incompletely distended but otherwise unremarkable.  Uterus is atrophic.  Scattered vascular calcifications.  No free fluid, free air or abscess.  There is a stable noncalcified 4 mm nodule in the left lower lobe on series 2 image 11.  Minimal scarring in the lung bases.  Mild cardiomegaly.  Moderate degenerative changes in the spine and pelvis.     Procedures:  Upon review of records:     Dated January 9, 2018  the patient underwent a  colonoscopy to the terminal ileum which revealed: Left-sided diverticulosis.  Angiodysplasia of colon without bleeding.  Status post thermal ablation therapy.  Colon polyp. Transverse colon flat lesion. Status post endoscopic mucosal resection, thermal ablation, and placement of resolution clips to coapt the margins.  Internal hemorrhoids.  Ascending colon polyp, biopsy revealed tubular adenoma without high grade dysplasia.  Transverse colon polyp, flat lesion, biopsy revealed sessile serrated adenoma fragments without cytologic dysplasia.       Dated January 15, 2019 the patient underwent an upper endoscopy which revealed: Nonobstructive Schatzki's-type ring.  Small sliding hiatal hernia less than 3 cm.  Gastric polyps.  Erythematous gastritis.  No Sanders's.  Good distensibility of the stomach was achieved.  No giant polyp were noted.  This test is not explain the cause of anemia.  An portion of duodenum, biopsy revealed no pathologic alterations.  Negative for celiac disease, microorganisms, metaplasia or atypia.  Antrum, body and fundus, biopsies revealed no pathologic alterations.  Negative for H. pylori, metaplasia, dysplasia or malignancy.  Gastric polyps, body and fundus, biopsies revealed fundic gland polyps.  Negative for H. pylori, metaplasia, dysplasia or malignancy.     Follow Up:   No follow-ups on file.    Timothy Braxton MD  [facility] [place]  12/14/2020  14:34 EST    Please note that portions of this note may have been completed with a voice recognition program.

## 2020-12-16 PROBLEM — R10.32 LEFT LOWER QUADRANT ABDOMINAL PAIN: Status: ACTIVE | Noted: 2020-12-16

## 2020-12-16 PROBLEM — Z86.010 PERSONAL HISTORY OF COLONIC POLYPS: Status: ACTIVE | Noted: 2020-12-16

## 2020-12-21 ENCOUNTER — HOSPITAL ENCOUNTER (OUTPATIENT)
Dept: INFUSION THERAPY | Facility: HOSPITAL | Age: 80
Discharge: HOME | End: 2020-12-21
Admitting: INTERNAL MEDICINE

## 2020-12-21 VITALS
RESPIRATION RATE: 18 BRPM | OXYGEN SATURATION: 100 % | TEMPERATURE: 98.6 F | HEART RATE: 68 BPM | DIASTOLIC BLOOD PRESSURE: 72 MMHG | SYSTOLIC BLOOD PRESSURE: 133 MMHG

## 2020-12-21 DIAGNOSIS — N18.4 ANEMIA DUE TO STAGE 4 CHRONIC KIDNEY DISEASE TREATED WITH ERYTHROPOIETIN (HCC): Primary | ICD-10-CM

## 2020-12-21 DIAGNOSIS — D63.1 ANEMIA DUE TO STAGE 4 CHRONIC KIDNEY DISEASE TREATED WITH ERYTHROPOIETIN (HCC): Primary | ICD-10-CM

## 2020-12-21 LAB
DEPRECATED RDW RBC AUTO: 57 FL (ref 37–54)
ERYTHROCYTE [DISTWIDTH] IN BLOOD BY AUTOMATED COUNT: 16.4 % (ref 12.3–15.4)
HCT VFR BLD AUTO: 31.1 % (ref 34–46.6)
HGB BLD-MCNC: 9.7 G/DL (ref 12–15.9)
MCH RBC QN AUTO: 30.2 PG (ref 26.6–33)
MCHC RBC AUTO-ENTMCNC: 31.2 G/DL (ref 31.5–35.7)
MCV RBC AUTO: 96.9 FL (ref 79–97)
PLATELET # BLD AUTO: 179 10*3/MM3 (ref 140–450)
PMV BLD AUTO: 10.2 FL (ref 6–12)
RBC # BLD AUTO: 3.21 10*6/MM3 (ref 3.77–5.28)
WBC # BLD AUTO: 3.55 10*3/MM3 (ref 3.4–10.8)

## 2020-12-21 PROCEDURE — 25010000002 EPOETIN ALFA PER 1000 UNITS: Performed by: NURSE PRACTITIONER

## 2020-12-21 PROCEDURE — 36415 COLL VENOUS BLD VENIPUNCTURE: CPT

## 2020-12-21 PROCEDURE — 96372 THER/PROPH/DIAG INJ SC/IM: CPT

## 2020-12-21 PROCEDURE — 85027 COMPLETE CBC AUTOMATED: CPT | Performed by: INTERNAL MEDICINE

## 2020-12-21 RX ADMIN — ERYTHROPOIETIN 40000 UNITS: 40000 INJECTION, SOLUTION INTRAVENOUS; SUBCUTANEOUS at 11:32

## 2020-12-28 ENCOUNTER — APPOINTMENT (OUTPATIENT)
Dept: INFUSION THERAPY | Facility: HOSPITAL | Age: 80
End: 2020-12-28

## 2021-01-04 ENCOUNTER — APPOINTMENT (OUTPATIENT)
Dept: INFUSION THERAPY | Facility: HOSPITAL | Age: 81
End: 2021-01-04

## 2021-01-06 ENCOUNTER — HOSPITAL ENCOUNTER (OUTPATIENT)
Dept: INFUSION THERAPY | Facility: HOSPITAL | Age: 81
Discharge: HOME | End: 2021-01-06
Admitting: INTERNAL MEDICINE

## 2021-01-06 VITALS
RESPIRATION RATE: 18 BRPM | DIASTOLIC BLOOD PRESSURE: 72 MMHG | HEART RATE: 64 BPM | TEMPERATURE: 98.5 F | OXYGEN SATURATION: 99 % | SYSTOLIC BLOOD PRESSURE: 160 MMHG

## 2021-01-06 DIAGNOSIS — N18.4 ANEMIA DUE TO STAGE 4 CHRONIC KIDNEY DISEASE TREATED WITH ERYTHROPOIETIN (HCC): Primary | ICD-10-CM

## 2021-01-06 DIAGNOSIS — D63.1 ANEMIA DUE TO STAGE 4 CHRONIC KIDNEY DISEASE TREATED WITH ERYTHROPOIETIN (HCC): Primary | ICD-10-CM

## 2021-01-06 LAB
DEPRECATED RDW RBC AUTO: 58.8 FL (ref 37–54)
ERYTHROCYTE [DISTWIDTH] IN BLOOD BY AUTOMATED COUNT: 16.8 % (ref 12.3–15.4)
HCT VFR BLD AUTO: 31.4 % (ref 34–46.6)
HGB BLD-MCNC: 9.8 G/DL (ref 12–15.9)
MCH RBC QN AUTO: 30.2 PG (ref 26.6–33)
MCHC RBC AUTO-ENTMCNC: 31.2 G/DL (ref 31.5–35.7)
MCV RBC AUTO: 96.6 FL (ref 79–97)
PLATELET # BLD AUTO: 178 10*3/MM3 (ref 140–450)
PMV BLD AUTO: 9.6 FL (ref 6–12)
RBC # BLD AUTO: 3.25 10*6/MM3 (ref 3.77–5.28)
WBC # BLD AUTO: 3.37 10*3/MM3 (ref 3.4–10.8)

## 2021-01-06 PROCEDURE — 85027 COMPLETE CBC AUTOMATED: CPT | Performed by: INTERNAL MEDICINE

## 2021-01-06 PROCEDURE — 25010000002 EPOETIN ALFA PER 1000 UNITS: Performed by: NURSE PRACTITIONER

## 2021-01-06 PROCEDURE — 96372 THER/PROPH/DIAG INJ SC/IM: CPT

## 2021-01-06 PROCEDURE — 36415 COLL VENOUS BLD VENIPUNCTURE: CPT

## 2021-01-06 RX ADMIN — ERYTHROPOIETIN 40000 UNITS: 40000 INJECTION, SOLUTION INTRAVENOUS; SUBCUTANEOUS at 12:26

## 2021-01-18 ENCOUNTER — APPOINTMENT (OUTPATIENT)
Dept: INFUSION THERAPY | Facility: HOSPITAL | Age: 81
End: 2021-01-18

## 2021-01-20 ENCOUNTER — HOSPITAL ENCOUNTER (OUTPATIENT)
Dept: INFUSION THERAPY | Facility: HOSPITAL | Age: 81
Discharge: HOME | End: 2021-01-20
Admitting: INTERNAL MEDICINE

## 2021-01-20 VITALS
TEMPERATURE: 97.8 F | HEART RATE: 65 BPM | DIASTOLIC BLOOD PRESSURE: 68 MMHG | SYSTOLIC BLOOD PRESSURE: 136 MMHG | RESPIRATION RATE: 18 BRPM | OXYGEN SATURATION: 99 %

## 2021-01-20 DIAGNOSIS — D63.1 ANEMIA DUE TO STAGE 4 CHRONIC KIDNEY DISEASE TREATED WITH ERYTHROPOIETIN (HCC): Primary | ICD-10-CM

## 2021-01-20 DIAGNOSIS — N18.4 ANEMIA DUE TO STAGE 4 CHRONIC KIDNEY DISEASE TREATED WITH ERYTHROPOIETIN (HCC): Primary | ICD-10-CM

## 2021-01-20 LAB
25(OH)D3 SERPL-MCNC: 81.4 NG/ML (ref 30–100)
ALBUMIN SERPL-MCNC: 3.9 G/DL (ref 3.5–5.2)
ALBUMIN/GLOB SERPL: 1.5 G/DL
ALP SERPL-CCNC: 57 U/L (ref 39–117)
ALT SERPL W P-5'-P-CCNC: 9 U/L (ref 1–33)
ANION GAP SERPL CALCULATED.3IONS-SCNC: 9.6 MMOL/L (ref 5–15)
AST SERPL-CCNC: 20 U/L (ref 1–32)
BILIRUB SERPL-MCNC: 0.4 MG/DL (ref 0–1.2)
BUN SERPL-MCNC: 40 MG/DL (ref 8–23)
BUN/CREAT SERPL: 15.9 (ref 7–25)
CALCIUM SPEC-SCNC: 8.9 MG/DL (ref 8.6–10.5)
CALCIUM SPEC-SCNC: 9 MG/DL (ref 8.6–10.5)
CHLORIDE SERPL-SCNC: 104 MMOL/L (ref 98–107)
CHOLEST SERPL-MCNC: 119 MG/DL (ref 0–200)
CO2 SERPL-SCNC: 23.4 MMOL/L (ref 22–29)
CREAT SERPL-MCNC: 2.51 MG/DL (ref 0.57–1)
DEPRECATED RDW RBC AUTO: 59.4 FL (ref 37–54)
ERYTHROCYTE [DISTWIDTH] IN BLOOD BY AUTOMATED COUNT: 17.1 % (ref 12.3–15.4)
GFR SERPL CREATININE-BSD FRML MDRD: 19 ML/MIN/1.73
GLOBULIN UR ELPH-MCNC: 2.6 GM/DL
GLUCOSE SERPL-MCNC: 98 MG/DL (ref 65–99)
HCT VFR BLD AUTO: 31.3 % (ref 34–46.6)
HDLC SERPL-MCNC: 46 MG/DL (ref 40–60)
HGB BLD-MCNC: 9.9 G/DL (ref 12–15.9)
IRON 24H UR-MRATE: 90 MCG/DL (ref 37–145)
IRON SATN MFR SERPL: 22 % (ref 20–50)
LDLC SERPL CALC-MCNC: 57 MG/DL (ref 0–100)
LDLC/HDLC SERPL: 1.25 {RATIO}
MCH RBC QN AUTO: 30.5 PG (ref 26.6–33)
MCHC RBC AUTO-ENTMCNC: 31.6 G/DL (ref 31.5–35.7)
MCV RBC AUTO: 96.3 FL (ref 79–97)
PLATELET # BLD AUTO: 190 10*3/MM3 (ref 140–450)
PMV BLD AUTO: 10.1 FL (ref 6–12)
POTASSIUM SERPL-SCNC: 4.9 MMOL/L (ref 3.5–5.2)
PROT SERPL-MCNC: 6.5 G/DL (ref 6–8.5)
PTH-INTACT SERPL-MCNC: 162 PG/ML (ref 15–65)
RBC # BLD AUTO: 3.25 10*6/MM3 (ref 3.77–5.28)
SODIUM SERPL-SCNC: 137 MMOL/L (ref 136–145)
TIBC SERPL-MCNC: 414 MCG/DL (ref 298–536)
TRANSFERRIN SERPL-MCNC: 278 MG/DL (ref 200–360)
TRIGL SERPL-MCNC: 77 MG/DL (ref 0–150)
URATE SERPL-MCNC: 5.6 MG/DL (ref 2.4–5.7)
VLDLC SERPL-MCNC: 16 MG/DL (ref 5–40)
WBC # BLD AUTO: 3.92 10*3/MM3 (ref 3.4–10.8)

## 2021-01-20 PROCEDURE — 80061 LIPID PANEL: CPT | Performed by: INTERNAL MEDICINE

## 2021-01-20 PROCEDURE — 83540 ASSAY OF IRON: CPT | Performed by: INTERNAL MEDICINE

## 2021-01-20 PROCEDURE — 83970 ASSAY OF PARATHORMONE: CPT | Performed by: INTERNAL MEDICINE

## 2021-01-20 PROCEDURE — 85027 COMPLETE CBC AUTOMATED: CPT | Performed by: INTERNAL MEDICINE

## 2021-01-20 PROCEDURE — 96372 THER/PROPH/DIAG INJ SC/IM: CPT

## 2021-01-20 PROCEDURE — 84466 ASSAY OF TRANSFERRIN: CPT | Performed by: INTERNAL MEDICINE

## 2021-01-20 PROCEDURE — 84550 ASSAY OF BLOOD/URIC ACID: CPT | Performed by: INTERNAL MEDICINE

## 2021-01-20 PROCEDURE — 82306 VITAMIN D 25 HYDROXY: CPT | Performed by: INTERNAL MEDICINE

## 2021-01-20 PROCEDURE — 25010000002 EPOETIN ALFA PER 1000 UNITS: Performed by: NURSE PRACTITIONER

## 2021-01-20 PROCEDURE — 36415 COLL VENOUS BLD VENIPUNCTURE: CPT

## 2021-01-20 PROCEDURE — 80053 COMPREHEN METABOLIC PANEL: CPT | Performed by: INTERNAL MEDICINE

## 2021-01-20 RX ADMIN — ERYTHROPOIETIN 40000 UNITS: 40000 INJECTION, SOLUTION INTRAVENOUS; SUBCUTANEOUS at 13:43

## 2021-02-01 ENCOUNTER — APPOINTMENT (OUTPATIENT)
Dept: INFUSION THERAPY | Facility: HOSPITAL | Age: 81
End: 2021-02-01

## 2021-02-03 ENCOUNTER — HOSPITAL ENCOUNTER (OUTPATIENT)
Dept: INFUSION THERAPY | Facility: HOSPITAL | Age: 81
Discharge: HOME | End: 2021-02-03
Admitting: INTERNAL MEDICINE

## 2021-02-03 VITALS
OXYGEN SATURATION: 100 % | DIASTOLIC BLOOD PRESSURE: 82 MMHG | WEIGHT: 111 LBS | RESPIRATION RATE: 18 BRPM | BODY MASS INDEX: 22.38 KG/M2 | HEART RATE: 65 BPM | SYSTOLIC BLOOD PRESSURE: 155 MMHG | TEMPERATURE: 97.8 F | HEIGHT: 59 IN

## 2021-02-03 DIAGNOSIS — D63.1 ANEMIA DUE TO STAGE 4 CHRONIC KIDNEY DISEASE TREATED WITH ERYTHROPOIETIN (HCC): Primary | ICD-10-CM

## 2021-02-03 DIAGNOSIS — N18.4 ANEMIA DUE TO STAGE 4 CHRONIC KIDNEY DISEASE TREATED WITH ERYTHROPOIETIN (HCC): Primary | ICD-10-CM

## 2021-02-03 LAB
ALBUMIN SERPL-MCNC: 3.8 G/DL (ref 3.5–5.2)
ANION GAP SERPL CALCULATED.3IONS-SCNC: 7 MMOL/L (ref 5–15)
BUN SERPL-MCNC: 42 MG/DL (ref 8–23)
BUN/CREAT SERPL: 15.8 (ref 7–25)
CALCIUM SPEC-SCNC: 8.9 MG/DL (ref 8.6–10.5)
CHLORIDE SERPL-SCNC: 103 MMOL/L (ref 98–107)
CO2 SERPL-SCNC: 26 MMOL/L (ref 22–29)
CREAT SERPL-MCNC: 2.66 MG/DL (ref 0.57–1)
DEPRECATED RDW RBC AUTO: 59.7 FL (ref 37–54)
ERYTHROCYTE [DISTWIDTH] IN BLOOD BY AUTOMATED COUNT: 17.2 % (ref 12.3–15.4)
GFR SERPL CREATININE-BSD FRML MDRD: 17 ML/MIN/1.73
GLUCOSE SERPL-MCNC: 106 MG/DL (ref 65–99)
HCT VFR BLD AUTO: 30.8 % (ref 34–46.6)
HGB BLD-MCNC: 9.5 G/DL (ref 12–15.9)
MCH RBC QN AUTO: 29.9 PG (ref 26.6–33)
MCHC RBC AUTO-ENTMCNC: 30.8 G/DL (ref 31.5–35.7)
MCV RBC AUTO: 96.9 FL (ref 79–97)
PHOSPHATE SERPL-MCNC: 3.4 MG/DL (ref 2.5–4.5)
PLATELET # BLD AUTO: 188 10*3/MM3 (ref 140–450)
PMV BLD AUTO: 9.8 FL (ref 6–12)
POTASSIUM SERPL-SCNC: 4.6 MMOL/L (ref 3.5–5.2)
RBC # BLD AUTO: 3.18 10*6/MM3 (ref 3.77–5.28)
SODIUM SERPL-SCNC: 136 MMOL/L (ref 136–145)
WBC # BLD AUTO: 3.88 10*3/MM3 (ref 3.4–10.8)

## 2021-02-03 PROCEDURE — 80069 RENAL FUNCTION PANEL: CPT | Performed by: PHYSICIAN ASSISTANT

## 2021-02-03 PROCEDURE — 36415 COLL VENOUS BLD VENIPUNCTURE: CPT

## 2021-02-03 PROCEDURE — 85027 COMPLETE CBC AUTOMATED: CPT | Performed by: PHYSICIAN ASSISTANT

## 2021-02-03 PROCEDURE — 96372 THER/PROPH/DIAG INJ SC/IM: CPT

## 2021-02-03 PROCEDURE — 25010000002 EPOETIN ALFA PER 1000 UNITS: Performed by: NURSE PRACTITIONER

## 2021-02-03 RX ADMIN — ERYTHROPOIETIN 40000 UNITS: 40000 INJECTION, SOLUTION INTRAVENOUS; SUBCUTANEOUS at 13:51

## 2021-02-13 ENCOUNTER — APPOINTMENT (OUTPATIENT)
Dept: GENERAL RADIOLOGY | Facility: HOSPITAL | Age: 81
End: 2021-02-13

## 2021-02-13 ENCOUNTER — HOSPITAL ENCOUNTER (INPATIENT)
Facility: HOSPITAL | Age: 81
LOS: 5 days | Discharge: NURSING, REHAB OR LONG-TERM CARE | End: 2021-02-18
Attending: EMERGENCY MEDICINE | Admitting: FAMILY MEDICINE

## 2021-02-13 DIAGNOSIS — S72.002A CLOSED FRACTURE OF LEFT HIP, INITIAL ENCOUNTER (HCC): Primary | ICD-10-CM

## 2021-02-13 LAB
ABO GROUP BLD: NORMAL
ALBUMIN SERPL-MCNC: 3.4 G/DL (ref 3.5–5.2)
ALBUMIN/GLOB SERPL: 1.3 G/DL
ALP SERPL-CCNC: 58 U/L (ref 39–117)
ALT SERPL W P-5'-P-CCNC: 9 U/L (ref 1–33)
ANION GAP SERPL CALCULATED.3IONS-SCNC: 8.4 MMOL/L (ref 5–15)
AST SERPL-CCNC: 16 U/L (ref 1–32)
BACTERIA UR QL AUTO: ABNORMAL /HPF
BASOPHILS # BLD AUTO: 0.01 10*3/MM3 (ref 0–0.2)
BASOPHILS NFR BLD AUTO: 0.1 % (ref 0–1.5)
BILIRUB SERPL-MCNC: 0.4 MG/DL (ref 0–1.2)
BILIRUB UR QL STRIP: NEGATIVE
BLD GP AB SCN SERPL QL: NEGATIVE
BUN SERPL-MCNC: 52 MG/DL (ref 8–23)
BUN/CREAT SERPL: 17.7 (ref 7–25)
CALCIUM SPEC-SCNC: 9.5 MG/DL (ref 8.6–10.5)
CHLORIDE SERPL-SCNC: 102 MMOL/L (ref 98–107)
CLARITY UR: CLEAR
CO2 SERPL-SCNC: 25.6 MMOL/L (ref 22–29)
COLOR UR: YELLOW
CREAT SERPL-MCNC: 2.93 MG/DL (ref 0.57–1)
DEPRECATED RDW RBC AUTO: 60.1 FL (ref 37–54)
EOSINOPHIL # BLD AUTO: 0.37 10*3/MM3 (ref 0–0.4)
EOSINOPHIL NFR BLD AUTO: 4.9 % (ref 0.3–6.2)
ERYTHROCYTE [DISTWIDTH] IN BLOOD BY AUTOMATED COUNT: 17.3 % (ref 12.3–15.4)
GFR SERPL CREATININE-BSD FRML MDRD: 16 ML/MIN/1.73
GLOBULIN UR ELPH-MCNC: 2.6 GM/DL
GLUCOSE BLDC GLUCOMTR-MCNC: 110 MG/DL (ref 70–130)
GLUCOSE SERPL-MCNC: 115 MG/DL (ref 65–99)
GLUCOSE UR STRIP-MCNC: NEGATIVE MG/DL
HCT VFR BLD AUTO: 29.3 % (ref 34–46.6)
HGB BLD-MCNC: 9.2 G/DL (ref 12–15.9)
HGB UR QL STRIP.AUTO: NEGATIVE
HYALINE CASTS UR QL AUTO: ABNORMAL /LPF
IMM GRANULOCYTES # BLD AUTO: 0.03 10*3/MM3 (ref 0–0.05)
IMM GRANULOCYTES NFR BLD AUTO: 0.4 % (ref 0–0.5)
KETONES UR QL STRIP: NEGATIVE
LEUKOCYTE ESTERASE UR QL STRIP.AUTO: NEGATIVE
LYMPHOCYTES # BLD AUTO: 0.84 10*3/MM3 (ref 0.7–3.1)
LYMPHOCYTES NFR BLD AUTO: 11.1 % (ref 19.6–45.3)
MCH RBC QN AUTO: 30.1 PG (ref 26.6–33)
MCHC RBC AUTO-ENTMCNC: 31.4 G/DL (ref 31.5–35.7)
MCV RBC AUTO: 95.8 FL (ref 79–97)
MONOCYTES # BLD AUTO: 0.57 10*3/MM3 (ref 0.1–0.9)
MONOCYTES NFR BLD AUTO: 7.5 % (ref 5–12)
NEUTROPHILS NFR BLD AUTO: 5.78 10*3/MM3 (ref 1.7–7)
NEUTROPHILS NFR BLD AUTO: 76 % (ref 42.7–76)
NITRITE UR QL STRIP: NEGATIVE
NRBC BLD AUTO-RTO: 0 /100 WBC (ref 0–0.2)
PH UR STRIP.AUTO: <=5 [PH] (ref 5–8)
PLATELET # BLD AUTO: 202 10*3/MM3 (ref 140–450)
PMV BLD AUTO: 10 FL (ref 6–12)
POTASSIUM SERPL-SCNC: 4.5 MMOL/L (ref 3.5–5.2)
PROT SERPL-MCNC: 6 G/DL (ref 6–8.5)
PROT UR QL STRIP: ABNORMAL
RBC # BLD AUTO: 3.06 10*6/MM3 (ref 3.77–5.28)
RBC # UR: ABNORMAL /HPF
REF LAB TEST METHOD: ABNORMAL
RH BLD: POSITIVE
SARS-COV-2 RNA PNL SPEC NAA+PROBE: NOT DETECTED
SODIUM SERPL-SCNC: 136 MMOL/L (ref 136–145)
SP GR UR STRIP: 1.01 (ref 1–1.03)
SQUAMOUS #/AREA URNS HPF: ABNORMAL /HPF
T&S EXPIRATION DATE: NORMAL
TROPONIN T SERPL-MCNC: <0.01 NG/ML (ref 0–0.03)
UROBILINOGEN UR QL STRIP: ABNORMAL
WBC # BLD AUTO: 7.6 10*3/MM3 (ref 3.4–10.8)
WBC UR QL AUTO: ABNORMAL /HPF

## 2021-02-13 PROCEDURE — 25010000002 FENTANYL CITRATE (PF) 100 MCG/2ML SOLUTION: Performed by: EMERGENCY MEDICINE

## 2021-02-13 PROCEDURE — 71045 X-RAY EXAM CHEST 1 VIEW: CPT

## 2021-02-13 PROCEDURE — 85025 COMPLETE CBC W/AUTO DIFF WBC: CPT | Performed by: EMERGENCY MEDICINE

## 2021-02-13 PROCEDURE — 80053 COMPREHEN METABOLIC PANEL: CPT | Performed by: EMERGENCY MEDICINE

## 2021-02-13 PROCEDURE — 86920 COMPATIBILITY TEST SPIN: CPT

## 2021-02-13 PROCEDURE — 86901 BLOOD TYPING SEROLOGIC RH(D): CPT | Performed by: ORTHOPAEDIC SURGERY

## 2021-02-13 PROCEDURE — 99284 EMERGENCY DEPT VISIT MOD MDM: CPT

## 2021-02-13 PROCEDURE — 86900 BLOOD TYPING SEROLOGIC ABO: CPT | Performed by: ORTHOPAEDIC SURGERY

## 2021-02-13 PROCEDURE — 25010000002 MORPHINE PER 10 MG: Performed by: EMERGENCY MEDICINE

## 2021-02-13 PROCEDURE — 73502 X-RAY EXAM HIP UNI 2-3 VIEWS: CPT

## 2021-02-13 PROCEDURE — 99222 1ST HOSP IP/OBS MODERATE 55: CPT | Performed by: INTERNAL MEDICINE

## 2021-02-13 PROCEDURE — 99222 1ST HOSP IP/OBS MODERATE 55: CPT | Performed by: ORTHOPAEDIC SURGERY

## 2021-02-13 PROCEDURE — 25010000002 MORPHINE SULFATE (PF) 2 MG/ML SOLUTION: Performed by: INTERNAL MEDICINE

## 2021-02-13 PROCEDURE — 94640 AIRWAY INHALATION TREATMENT: CPT

## 2021-02-13 PROCEDURE — 81001 URINALYSIS AUTO W/SCOPE: CPT | Performed by: ORTHOPAEDIC SURGERY

## 2021-02-13 PROCEDURE — 84484 ASSAY OF TROPONIN QUANT: CPT | Performed by: EMERGENCY MEDICINE

## 2021-02-13 PROCEDURE — 93005 ELECTROCARDIOGRAM TRACING: CPT | Performed by: EMERGENCY MEDICINE

## 2021-02-13 PROCEDURE — 82962 GLUCOSE BLOOD TEST: CPT

## 2021-02-13 PROCEDURE — 87635 SARS-COV-2 COVID-19 AMP PRB: CPT | Performed by: EMERGENCY MEDICINE

## 2021-02-13 PROCEDURE — 25010000002 ONDANSETRON PER 1 MG: Performed by: INTERNAL MEDICINE

## 2021-02-13 PROCEDURE — 86850 RBC ANTIBODY SCREEN: CPT | Performed by: ORTHOPAEDIC SURGERY

## 2021-02-13 RX ORDER — SODIUM CHLORIDE 0.9 % (FLUSH) 0.9 %
10 SYRINGE (ML) INJECTION EVERY 12 HOURS SCHEDULED
Status: DISCONTINUED | OUTPATIENT
Start: 2021-02-13 | End: 2021-02-18 | Stop reason: HOSPADM

## 2021-02-13 RX ORDER — SODIUM CHLORIDE 0.9 % (FLUSH) 0.9 %
10 SYRINGE (ML) INJECTION AS NEEDED
Status: DISCONTINUED | OUTPATIENT
Start: 2021-02-13 | End: 2021-02-14

## 2021-02-13 RX ORDER — ACETAMINOPHEN 325 MG/1
650 TABLET ORAL EVERY 4 HOURS PRN
Status: DISCONTINUED | OUTPATIENT
Start: 2021-02-13 | End: 2021-02-14

## 2021-02-13 RX ORDER — NALOXONE HCL 0.4 MG/ML
0.4 VIAL (ML) INJECTION
Status: DISCONTINUED | OUTPATIENT
Start: 2021-02-13 | End: 2021-02-14

## 2021-02-13 RX ORDER — MORPHINE SULFATE 4 MG/ML
4 INJECTION, SOLUTION INTRAMUSCULAR; INTRAVENOUS ONCE
Status: COMPLETED | OUTPATIENT
Start: 2021-02-13 | End: 2021-02-13

## 2021-02-13 RX ORDER — CHOLECALCIFEROL (VITAMIN D3) 125 MCG
5 CAPSULE ORAL NIGHTLY PRN
Status: DISCONTINUED | OUTPATIENT
Start: 2021-02-13 | End: 2021-02-18 | Stop reason: HOSPADM

## 2021-02-13 RX ORDER — MORPHINE SULFATE 2 MG/ML
2 INJECTION, SOLUTION INTRAMUSCULAR; INTRAVENOUS EVERY 4 HOURS PRN
Status: DISCONTINUED | OUTPATIENT
Start: 2021-02-13 | End: 2021-02-14

## 2021-02-13 RX ORDER — PANTOPRAZOLE SODIUM 40 MG/1
40 TABLET, DELAYED RELEASE ORAL EVERY MORNING
Status: DISCONTINUED | OUTPATIENT
Start: 2021-02-14 | End: 2021-02-18 | Stop reason: HOSPADM

## 2021-02-13 RX ORDER — ONDANSETRON 2 MG/ML
4 INJECTION INTRAMUSCULAR; INTRAVENOUS EVERY 6 HOURS PRN
Status: DISCONTINUED | OUTPATIENT
Start: 2021-02-13 | End: 2021-02-14

## 2021-02-13 RX ORDER — ACETAMINOPHEN 650 MG/1
650 SUPPOSITORY RECTAL EVERY 4 HOURS PRN
Status: DISCONTINUED | OUTPATIENT
Start: 2021-02-13 | End: 2021-02-14

## 2021-02-13 RX ORDER — HEPARIN SODIUM 5000 [USP'U]/ML
5000 INJECTION, SOLUTION INTRAVENOUS; SUBCUTANEOUS EVERY 12 HOURS SCHEDULED
Status: DISCONTINUED | OUTPATIENT
Start: 2021-02-14 | End: 2021-02-14

## 2021-02-13 RX ORDER — CALCIUM CARBONATE 200(500)MG
2 TABLET,CHEWABLE ORAL 2 TIMES DAILY PRN
Status: DISCONTINUED | OUTPATIENT
Start: 2021-02-13 | End: 2021-02-18 | Stop reason: HOSPADM

## 2021-02-13 RX ORDER — ERYTHROMYCIN 5 MG/G
OINTMENT OPHTHALMIC NIGHTLY
Status: DISCONTINUED | OUTPATIENT
Start: 2021-02-13 | End: 2021-02-18 | Stop reason: HOSPADM

## 2021-02-13 RX ORDER — BUDESONIDE AND FORMOTEROL FUMARATE DIHYDRATE 80; 4.5 UG/1; UG/1
2 AEROSOL RESPIRATORY (INHALATION)
Status: DISCONTINUED | OUTPATIENT
Start: 2021-02-13 | End: 2021-02-18 | Stop reason: HOSPADM

## 2021-02-13 RX ORDER — ASPIRIN 81 MG/1
81 TABLET ORAL DAILY
COMMUNITY

## 2021-02-13 RX ORDER — FENTANYL CITRATE 50 UG/ML
25 INJECTION, SOLUTION INTRAMUSCULAR; INTRAVENOUS ONCE
Status: COMPLETED | OUTPATIENT
Start: 2021-02-13 | End: 2021-02-13

## 2021-02-13 RX ORDER — CARVEDILOL 25 MG/1
25 TABLET ORAL 2 TIMES DAILY WITH MEALS
Status: DISCONTINUED | OUTPATIENT
Start: 2021-02-13 | End: 2021-02-18 | Stop reason: HOSPADM

## 2021-02-13 RX ORDER — ACETAMINOPHEN 160 MG/5ML
650 SOLUTION ORAL EVERY 4 HOURS PRN
Status: DISCONTINUED | OUTPATIENT
Start: 2021-02-13 | End: 2021-02-14

## 2021-02-13 RX ORDER — TORSEMIDE 10 MG/1
5 TABLET ORAL
Status: ON HOLD | COMMUNITY
End: 2021-03-31 | Stop reason: SDUPTHER

## 2021-02-13 RX ADMIN — MORPHINE SULFATE 4 MG: 4 INJECTION, SOLUTION INTRAMUSCULAR; INTRAVENOUS at 12:19

## 2021-02-13 RX ADMIN — BUDESONIDE AND FORMOTEROL FUMARATE DIHYDRATE 2 PUFF: 80; 4.5 AEROSOL RESPIRATORY (INHALATION) at 19:27

## 2021-02-13 RX ADMIN — ERYTHROMYCIN 1 APPLICATION: 5 OINTMENT OPHTHALMIC at 21:41

## 2021-02-13 RX ADMIN — FENTANYL CITRATE 25 MCG: 50 INJECTION, SOLUTION INTRAMUSCULAR; INTRAVENOUS at 14:46

## 2021-02-13 RX ADMIN — ONDANSETRON 4 MG: 2 INJECTION INTRAMUSCULAR; INTRAVENOUS at 15:58

## 2021-02-13 RX ADMIN — MORPHINE SULFATE 2 MG: 2 INJECTION, SOLUTION INTRAMUSCULAR; INTRAVENOUS at 23:37

## 2021-02-13 RX ADMIN — ACETAMINOPHEN 650 MG: 325 TABLET, FILM COATED ORAL at 18:11

## 2021-02-13 RX ADMIN — MORPHINE SULFATE 2 MG: 2 INJECTION, SOLUTION INTRAMUSCULAR; INTRAVENOUS at 15:58

## 2021-02-13 RX ADMIN — SODIUM CHLORIDE, PRESERVATIVE FREE 10 ML: 5 INJECTION INTRAVENOUS at 21:41

## 2021-02-13 RX ADMIN — CARVEDILOL 25 MG: 25 TABLET, FILM COATED ORAL at 18:11

## 2021-02-13 NOTE — NURSING NOTE
Reviewing chart of patient due to come to 320 and aware of [facility] COVID test needing to be collected and resulted prior to coming to floor.  lupe Sprague RN, contacted by phone and is aware.  An influx of overdose patients from [facility] has delayed the transfer to the 3rd floor att.  Will await results of COVID test prior to coming to 320 as another nurse must be called in if positive.

## 2021-02-13 NOTE — ED PROVIDER NOTES
"TRIAGE CHIEF COMPLAINT:     Nursing and triage notes reviewed    Chief Complaint   Patient presents with   • Fall   • Hip Injury      HPI: Carolyne Martins is a 80 y.o. female who presents to the emergency department complaining of a fall and left hip pain.  Patient states she was taking her dog out to the bathroom this morning and the dog pulled on the leash causing her to lose her balance and fall over landing on her left hip.  She denies hitting her head or losing consciousness.  She was unable to get herself up and has been unable to bear weight on her left hip since the injury occurred.  She denies any numbness or tingling.  She states she has a history of chronic kidney disease and also anemia.    REVIEW OF SYSTEMS: All other systems reviewed and are negative     PAST MEDICAL HISTORY:   Past Medical History:   Diagnosis Date   • Anemia 1998   • Arthritis    • Back pain    • Bleeding from anus    • Bronchitis     STATES HAS BRONCHITIS CURRENTLY (12/5/17).     • Bronchitis 12/2017   • CAD (coronary artery disease)    • Cataract, bilateral    • Chronic kidney disease     STAGE 4.  SEES TERA   • Chronic kidney failure     REPORTS STAGE IV   • Difficulty swallowing solids    • Disease of thyroid gland    • Fracture, radius 2016    right distal radius and ulna, healed.   • GERD (gastroesophageal reflux disease)    • Gout    • High cholesterol    • History of nuclear stress test 2014    DR. CORTEZ.  \"IT WAS OK\"   • Hyperparathyroidism (CMS/HCC)    • Hyperparathyroidism (CMS/HCC)    • Hypertension    • Iron deficiency    • Osteoarthritis    • Osteoarthritis of knees, bilateral     Both knees Supartz injection series August, 2015   • Osteoporosis    • Palpitations    • Personal history of cardiac murmur    • Presence of pessary    • Problems with swallowing     WITH FOOD OCCASSIONALLY   • Rheumatic heart disease     Personal history   • Rotator cuff tendonitis     right    • Rupture of right proximal biceps tendon  "    chronic   • Seasonal allergies    • Sinus problem    • Spinal headache     REPORTS AFTER DELIVERY OF SON   • Subacromial bursitis     right    • Valvular heart disease    • Vision problems    • Vitamin B12 deficiency    • Vitamin D deficiency    • Wears glasses         FAMILY HISTORY:   Family History   Problem Relation Age of Onset   • Liver cancer Maternal Grandmother    • Gout Other    • Osteoporosis Other    • Stroke Other    • Ulcers Other    • Asthma Other    • Hypertension Other    • Heart disease Other    • Osteoarthritis Other    • Colon cancer Neg Hx         SOCIAL HISTORY:   Social History     Socioeconomic History   • Marital status:      Spouse name: Not on file   • Number of children: Not on file   • Years of education: Not on file   • Highest education level: Not on file   Tobacco Use   • Smoking status: Never Smoker   • Smokeless tobacco: Never Used   Substance and Sexual Activity   • Alcohol use: Never     Frequency: Never   • Drug use: Never   • Sexual activity: Defer        SURGICAL HISTORY:   Past Surgical History:   Procedure Laterality Date   • CATARACT EXTRACTION Bilateral    • CERVICAL POLYPECTOMY     • COLONOSCOPY  2011   • COLONOSCOPY N/A 1/9/2018    Procedure: COLONOSCOPY with endoscopic mucosal resection (hot snare), normal saline submucosal injection, argon thermal ablation, resolution clip placement x4, and cold biopsy polypectomy;  Surgeon: Pieter Concepcion MD;  Location: [facility] ENDOSCOPY;  Service:    • ENDOSCOPY N/A 12/6/2017    Procedure: ESOPHAGOGASTRODUODENOSCOPY with biopsies and hot snare polypectomies;  Surgeon: Pieter Concepcion MD;  Location: [facility] ENDOSCOPY;  Service:    • ENDOSCOPY N/A 1/15/2019    Procedure: ESOPHAGOGASTRODUODENOSCOPY WITH BIOPSIES AND HOT SNARE POLYPECTOMIES X10;  Surgeon: Pieter Concepcion MD;  Location: [facility] ENDOSCOPY;  Service: Gastroenterology   • UPPER GASTROINTESTINAL ENDOSCOPY  08/18/2014        CURRENT MEDICATIONS:      Medication List       ASK your doctor about these medications    allopurinol 100 MG tablet  Commonly known as: ZYLOPRIM     budesonide-formoterol 80-4.5 MCG/ACT inhaler  Commonly known as: SYMBICORT     calcitriol 0.25 MCG capsule  Commonly known as: ROCALTROL     carboxymethylcellulose 0.5 % solution  Commonly known as: REFRESH PLUS     carvedilol 25 MG tablet  Commonly known as: COREG     cholecalciferol 25 MCG (1000 UT) tablet  Commonly known as: VITAMIN D3     cloNIDine 0.1 MG tablet  Commonly known as: CATAPRES     dicyclomine 10 MG/5ML syrup  Commonly known as: BENTYL  Take 5 mL by mouth 3 (Three) Times a Day As Needed (abdominal  cramps).     docusate sodium 100 MG capsule  Commonly known as: COLACE  Take 1 capsule by mouth 2 (Two) Times a Day.     erythromycin 5 MG/GM ophthalmic ointment  Commonly known as: ROMYCIN     estradiol 0.1 MG/GM vaginal cream  Commonly known as: ESTRACE  Massage 1 gram in vaginal opening twice weekly     fluticasone 50 MCG/ACT nasal spray  Commonly known as: FLONASE     irbesartan 150 MG tablet  Commonly known as: AVAPRO     omeprazole 40 MG capsule  Commonly known as: priLOSEC     ondansetron ODT 4 MG disintegrating tablet  Commonly known as: ZOFRAN-ODT     PROBIOTIC DAILY PO     Procrit 17992 UNIT/ML injection  Generic drug: epoetin ovidio     rosuvastatin 10 MG tablet  Commonly known as: CRESTOR     VITAMIN B-12 PO             ALLERGIES: Ferrlecit [na ferric gluc cplx in sucrose], Ranitidine hcl, Levofloxacin, and Lisinopril     PHYSICAL EXAM:   VITAL SIGNS:   Vitals:    02/13/21 1053   BP: 135/80   Pulse: 55   Resp: 16   Temp: 97.3 °F (36.3 °C)   SpO2: 99%      CONSTITUTIONAL: Awake, oriented, appears non-toxic   HENT: Atraumatic, normocephalic, oral mucosa pink and moist, airway patent. Nares patent without drainage. External ears normal.   EYES: Conjunctiva clear   NECK: Trachea midline, non-tender, supple   CARDIOVASCULAR: Normal heart rate, Normal rhythm, No murmurs, rubs, gallops    PULMONARY/CHEST: Clear to auscultation, no rhonchi, wheezes, or rales. Symmetrical breath sounds..   ABDOMINAL: Non-distended, soft, non-tender - no rebound or guarding.  NEUROLOGIC: Non-focal, moving all four extremities, no gross sensory or motor deficits.   EXTREMITIES: No clubbing, cyanosis.  The left lower extremity appears to be shortened and internally rotated.  There is tenderness over the greater trochanter on the left side.  SKIN: Warm, Dry, No erythema, No rash     ED COURSE / MEDICAL DECISION MAKING:   Carolyne Martins is a 80 y.o. female who presents to the emergency department for evaluation of left hip pain.  Patient arrives and appears uncomfortable but does have stable vital signs.  Exam reveals a shortened and internally rotated left hip with tenderness over the greater trochanter raising suspicions for probable hip fracture.  Will obtain laboratory tests and imaging for evaluation of her symptoms.  Pain medication ordered on arrival.    EKG interpreted by me reveals sinus rhythm with rate of 56 bpm.  There are some nonspecific findings but no obvious acute ischemic changes.  This is an abnormal appearing EKG.    Chest x-ray as interpreted by the radiologist does not reveal any acute cardiopulmonary process.    X-ray of the left hip does reveal an intertrochanteric left-sided hip fracture.    Patient's labs are around her baseline.    Spoke with the orthopedist on-call and we will admit the patient to the hospital for further treatment and plan on surgical fixation tomorrow.    DECISION TO DISCHARGE/ADMIT: see ED care timeline     FINAL IMPRESSION:   1 --hip fracture  2 --   3 --     Electronically signed by: Donna Payne MD, 2/13/2021 11:09 Donna Watts MD  02/13/21 2520

## 2021-02-13 NOTE — H&P
[facility]   HISTORY AND PHYSICAL      Name:  Carolyne Martins   Age:  80 y.o.  Sex:  female  :  1940  MRN:  [id number]   Visit Number:  [id number]  Admission Date:  2021  Date Of Service:  21  Primary Care Physician:  Jerald Dawkins MD    Chief Complaint:     Left hip pain from accidental mechanical fall.    History Of Presenting Illness:      80 year old woman that this morning took her dog out on a leash for the dog to go to the bathroom outside.  Patient tells me she usually stands at the doorway when the weather is bad and the leash length lets the dog go to the yard.  However, the dog suddenly pulled hard on the leash and the patient was pulled to the concrete porch where she fell to the ground.  She had immediate left hip pain and could not stand or bear weight.  She called for help and she states that in about 15 minutes her son, who lives across the street, was getting into his truck to go to work and he saw her on the ground and arrived to help.  Patient does not report any head trauma or loss of consciousness, or any other injury or other acute complaint.  She was brought to [facility] ER where exam and imaging show a closed, comminuted displaced unstable left hip intertrochanteric fracture.      Patient has chronic kidney disease, hypertension, valvular heart disease, chronic anemia and chronic abdominal condition with a planned upcoming colonoscopy in the next few weeks, also GERD, gout, chronic bronchitis, hyperparathyroidism, thyroid disease, arthritis, lumbar disc disease and osteoporosis.  Patient is known to me and treated at our orthopaedic clinic for a right wrist fracture (cast and PT/OT therapy), bilateral knee osteoarthritis (injection therapy), and treatment for foot spurs.  Patient is admitted to the hospitalist service for medical management and optimization prior to planned hip fracture surgery.  Orthopaedic Surgery consulted for evaluation and management of her  "left hip fracture.    Review Of Systems:    General ROS: No fever, pain controlled with bedrest and medication.  Psychological ROS:  No confusion, no hallucinations.  Respiratory ROS: No acute shortness of breath.  Cardiovascular ROS: No reported acute chest pain.  Gastrointestinal ROS:  No acute abdominal symptoms.  Recent left abdominal pain, rectal bleeding and acute on chronic anemia with work-up and treatment ongoing.  Genito-Urinary ROS: No reported dysuria.  Musculoskeletal ROS: Acute left hip and thigh pain. No focal calf pain.  Neurological ROS: No acute focal motor deficit.  Dermatological ROS: No acute rash, no open wound.     Past Medical History:    Past Medical History:   Diagnosis Date   • Anemia 1998   • Arthritis    • Back pain    • Bleeding from anus    • Bronchitis     STATES HAS BRONCHITIS CURRENTLY (12/5/17).     • Bronchitis 12/2017   • CAD (coronary artery disease)    • Cataract, bilateral    • Chronic kidney disease     STAGE 4.  SEES TERA   • Chronic kidney failure     REPORTS STAGE IV   • Difficulty swallowing solids    • Disease of thyroid gland    • Fracture, radius 2016    right distal radius and ulna, healed.   • GERD (gastroesophageal reflux disease)    • Gout    • High cholesterol    • History of nuclear stress test 2014    DR. CORTEZ.  \"IT WAS OK\"   • Hyperparathyroidism (CMS/HCC)    • Hyperparathyroidism (CMS/HCC)    • Hypertension    • Iron deficiency    • Osteoarthritis    • Osteoarthritis of knees, bilateral     Both knees Supartz injection series August, 2015   • Osteoporosis    • Palpitations    • Personal history of cardiac murmur    • Presence of pessary    • Problems with swallowing     WITH FOOD OCCASSIONALLY   • Rheumatic heart disease     Personal history   • Rotator cuff tendonitis     right    • Rupture of right proximal biceps tendon     chronic   • Seasonal allergies    • Sinus problem    • Spinal headache     REPORTS AFTER DELIVERY OF SON   • Subacromial bursitis  "    right    • Valvular heart disease    • Vision problems    • Vitamin B12 deficiency    • Vitamin D deficiency    • Wears glasses        Past Surgical history:    Past Surgical History:   Procedure Laterality Date   • CATARACT EXTRACTION Bilateral    • CERVICAL POLYPECTOMY     • COLONOSCOPY  2011   • COLONOSCOPY N/A 1/9/2018    Procedure: COLONOSCOPY with endoscopic mucosal resection (hot snare), normal saline submucosal injection, argon thermal ablation, resolution clip placement x4, and cold biopsy polypectomy;  Surgeon: Pieter Concepcion MD;  Location: [facility] ENDOSCOPY;  Service:    • ENDOSCOPY N/A 12/6/2017    Procedure: ESOPHAGOGASTRODUODENOSCOPY with biopsies and hot snare polypectomies;  Surgeon: Pieter Concepcion MD;  Location: [facility] ENDOSCOPY;  Service:    • ENDOSCOPY N/A 1/15/2019    Procedure: ESOPHAGOGASTRODUODENOSCOPY WITH BIOPSIES AND HOT SNARE POLYPECTOMIES X10;  Surgeon: Pieter Concepcion MD;  Location: [facility] ENDOSCOPY;  Service: Gastroenterology   • UPPER GASTROINTESTINAL ENDOSCOPY  08/18/2014       Social History:    Social History     Socioeconomic History   • Marital status:      Spouse name: Not on file   • Number of children: Not on file   • Years of education: Not on file   • Highest education level: Not on file   Tobacco Use   • Smoking status: Never Smoker   • Smokeless tobacco: Never Used   Substance and Sexual Activity   • Alcohol use: Never     Frequency: Never   • Drug use: Never   • Sexual activity: Defer       Family History:    Family History   Problem Relation Age of Onset   • Liver cancer Maternal Grandmother    • Gout Other    • Osteoporosis Other    • Stroke Other    • Ulcers Other    • Asthma Other    • Hypertension Other    • Heart disease Other    • Osteoarthritis Other    • Colon cancer Neg Hx        Allergies:      Ferrlecit [na ferric gluc cplx in sucrose], Ranitidine hcl, Levofloxacin, and Lisinopril    Home Medications:    Prior to Admission Medications     Prescriptions  Last Dose Informant Patient Reported? Taking?    allopurinol (ZYLOPRIM) 100 MG tablet  Self Yes No    Take 1 tablet by mouth daily.    budesonide-formoterol (SYMBICORT) 80-4.5 MCG/ACT inhaler  Self Yes No    Inhale 2 puffs 2 (Two) Times a Day.    calcitriol (ROCALTROL) 0.25 MCG capsule   Yes No    2 (Two) Times a Day.    carboxymethylcellulose (REFRESH PLUS) 0.5 % solution   Yes No    3 (Three) Times a Day As Needed for Dry Eyes.    carvedilol (COREG) 25 MG tablet   Yes No    Take 25 mg by mouth 2 (Two) Times a Day With Meals.    cholecalciferol (VITAMIN D3) 1000 UNITS tablet  Self Yes No    Take 1,000 Units by mouth Daily.    cloNIDine (CATAPRES) 0.1 MG tablet   Yes No    Take 0.05 mg by mouth As Needed for High Blood Pressure.    Cyanocobalamin (VITAMIN B-12 PO)  Self Yes No    Take 500 mg by mouth 2 (Two) Times a Day.    dicyclomine (BENTYL) 10 MG/5ML syrup   No No    Take 5 mL by mouth 3 (Three) Times a Day As Needed (abdominal  cramps).    docusate sodium (COLACE) 100 MG capsule   No No    Take 1 capsule by mouth 2 (Two) Times a Day.    epoetin ovidio (PROCRIT) 96900 UNIT/ML injection  Self Yes No    Every 2 weeks    erythromycin (ROMYCIN) 5 MG/GM ophthalmic ointment   Yes No    Administer  to both eyes Every Night.    estradiol (ESTRACE) 0.1 MG/GM vaginal cream   No No    Massage 1 gram in vaginal opening twice weekly    fluticasone (FLONASE) 50 MCG/ACT nasal spray  Self Yes No    2 sprays into each nostril Daily As Needed.    irbesartan (AVAPRO) 150 MG tablet  Self Yes No    Take 150 mg by mouth Daily.    omeprazole (PriLOSEC) 40 MG capsule  Self Yes No    Take 40 mg by mouth Daily.    ondansetron ODT (ZOFRAN-ODT) 4 MG disintegrating tablet   Yes No    Place 4 mg on the tongue Every 8 (Eight) Hours As Needed.    Probiotic Product (PROBIOTIC DAILY PO)   Yes No    Take  by mouth Daily.    rosuvastatin (CRESTOR) 10 MG tablet   Yes No    Take 10 mg by mouth Daily.             ED Medications:    Medications  "  Morphine sulfate (PF) injection 4 mg (4 mg Intravenous Given 2/13/21 1219)       Vital Signs:    Temp:  [97.3 °F (36.3 °C)] 97.3 °F (36.3 °C)  Heart Rate:  [55] 55  Resp:  [16] 16  BP: (135)/(80) 135/80    Vitals:    02/13/21 1053   BP: 135/80   BP Location: Left arm   Patient Position: Sitting   Pulse: 55   Resp: 16   Temp: 97.3 °F (36.3 °C)   TempSrc: Oral   SpO2: 99%   Weight: 50.3 kg (111 lb)   Height: 149.9 cm (59\")           02/13/21  1053   Weight: 50.3 kg (111 lb)       Body mass index is 22.42 kg/m².    No intake or output data in the 24 hours ending 02/13/21 1353    Physical Exam:    General Appearance:    Alert and cooperative, no acute distress.   Head:    Atraumatic and normocephalic, without obvious abnormality.   Eyes:          Extra-ocular movements are intact.   Back:     No kyphoscoliosis present.   Lungs:     Breath sounds heard bilaterally.  No active wheezing.    Heart:    Regular rate and rhythm.   Abdomen:     Soft, non-distended, no guarding.   Extremities:   Left leg shortened and external rotated.  Soft thigh swelling.  No distal calf pain.  Daniele sign negative.   Pulses:   Pulses palpable, no cyanosis.   Skin:   No open wound.   Neurologic:   Motor and sensation grossly intact.       Labs:    Lab Results (last 24 hours)     Procedure Component Value Units Date/Time    Comprehensive Metabolic Panel [[id number]]  (Abnormal) Collected: 02/13/21 1215    Specimen: Blood Updated: 02/13/21 1251     Glucose 115 mg/dL      BUN 52 mg/dL      Creatinine 2.93 mg/dL      Sodium 136 mmol/L      Potassium 4.5 mmol/L      Chloride 102 mmol/L      CO2 25.6 mmol/L      Calcium 9.5 mg/dL      Total Protein 6.0 g/dL      Albumin 3.40 g/dL      ALT (SGPT) 9 U/L      AST (SGOT) 16 U/L      Alkaline Phosphatase 58 U/L      Total Bilirubin 0.4 mg/dL      eGFR Non African Amer 16 mL/min/1.73      Globulin 2.6 gm/dL      A/G Ratio 1.3 g/dL      BUN/Creatinine Ratio 17.7     Anion Gap 8.4 mmol/L     Narrative:      " GFR Normal >60  Chronic Kidney Disease <60  Kidney Failure <15      Troponin [[id number]]  (Normal) Collected: 02/13/21 1215    Specimen: Blood Updated: 02/13/21 1251     Troponin T <0.010 ng/mL     Narrative:      Troponin T Reference Range:  <= 0.03 ng/mL-   Negative for AMI  >0.03 ng/mL-     Abnormal for myocardial necrosis.  Clinicians would have to utilize clinical acumen, EKG, Troponin and serial changes to determine if it is an Acute Myocardial Infarction or myocardial injury due to an underlying chronic condition.       Results may be falsely decreased if patient taking Biotin.      CBC & Differential [[id number]]  (Abnormal) Collected: 02/13/21 1215    Specimen: Blood Updated: 02/13/21 1232    Narrative:      The following orders were created for panel order CBC & Differential.  Procedure                               Abnormality         Status                     ---------                               -----------         ------                     CBC Auto Differential[[id number]]        Abnormal            Final result                 Please view results for these tests on the individual orders.    CBC Auto Differential [[id number]]  (Abnormal) Collected: 02/13/21 1215    Specimen: Blood Updated: 02/13/21 1232     WBC 7.60 10*3/mm3      RBC 3.06 10*6/mm3      Hemoglobin 9.2 g/dL      Hematocrit 29.3 %      MCV 95.8 fL      MCH 30.1 pg      MCHC 31.4 g/dL      RDW 17.3 %      RDW-SD 60.1 fl      MPV 10.0 fL      Platelets 202 10*3/mm3      Neutrophil % 76.0 %      Lymphocyte % 11.1 %      Monocyte % 7.5 %      Eosinophil % 4.9 %      Basophil % 0.1 %      Immature Grans % 0.4 %      Neutrophils, Absolute 5.78 10*3/mm3      Lymphocytes, Absolute 0.84 10*3/mm3      Monocytes, Absolute 0.57 10*3/mm3      Eosinophils, Absolute 0.37 10*3/mm3      Basophils, Absolute 0.01 10*3/mm3      Immature Grans, Absolute 0.03 10*3/mm3      nRBC 0.0 /100 WBC           Radiology:    Imaging Results (Last 72 Hours)     Procedure  Component Value Units Date/Time    XR Chest 1 View [[id number]] Collected: 02/13/21 1206     Updated: 02/13/21 1211    Narrative:      PORTABLE CHEST    2/13/2021 11:48 AM      HISTORY: Fall     COMPARISON: None.     FINDINGS: Heart size upper normal. Lungs and pleural spaces are clear.  No acute fracture. Chronic right rotator cuff tear.           Impression:      No acute cardiopulmonary process .              AP PELVIS, 2V LEFT HIP     HISTORY: Fall.      FINDINGS:  A three view exam demonstrates an acute comminuted  intertrochanteric fracture of the proximal left femur with angulation.  Femoral head remains in the acetabulum. Pelvis is otherwise intact.  Severe degenerative change at L5-S1.        IMPRESSION: Acute intertrochanteric fracture of the left hip.     This report was finalized on 2/13/2021 12:09 PM by Dr Chencho Hughes DO.    XR Hip With or Without Pelvis 2 - 3 View Left [[id number]] Collected: 02/13/21 1206     Updated: 02/13/21 1211    Narrative:      PORTABLE CHEST    2/13/2021 11:48 AM      HISTORY: Fall     COMPARISON: None.     FINDINGS: Heart size upper normal. Lungs and pleural spaces are clear.  No acute fracture. Chronic right rotator cuff tear.           Impression:      No acute cardiopulmonary process .              AP PELVIS, 2V LEFT HIP     HISTORY: Fall.      FINDINGS:  A three view exam demonstrates an acute comminuted  intertrochanteric fracture of the proximal left femur with angulation.  Femoral head remains in the acetabulum. Pelvis is otherwise intact.  Severe degenerative change at L5-S1.        IMPRESSION: Acute intertrochanteric fracture of the left hip.     This report was finalized on 2/13/2021 12:09 PM by Dr Chencho Hughes DO.          Assessment:      Closed fracture of left hip (CMS/[facility])    Orthopaedic Impression:    Acute closed comminuted displaced unstable left hip intertrochanteric fracture.    Plan:     Risks, benefits, and alternative treatments discussed with the patient:  [x] Yes [] No    Risk benefits and merits of the proposed surgery were discussed and the patient's questions were answered risks discussed including and not limited to:  Anesthesia reactions  Blood loss and possible transfusion  Infection  Deep venous thrombosis and pulmonary embolus  Nerve, vascular or tendon injury  Fracture  Deformity  Stiffness  Weakness  Prosthesis and implant issues such as loosening, material wear or dislocation  Skin necrosis  Revision surgery or further treatment  Recurrence of problem and condition     Informed consent: [] Signed  [x] To be obtained at hospital  [] Both    PLANNED PROCEDURE:    HIP TROCHANTER NAIL SHORT WITH INTRAMEDULLARY HIP SCREW, LEFT    Gabriele Loyd MD  02/13/21  13:53 EST

## 2021-02-13 NOTE — H&P
[facility]   HISTORY AND PHYSICAL      Name:  Carolyne Martins   Age:  80 y.o.  Sex:  female  :  1940  MRN:  [id number]   Visit Number:  [id number]  Admission Date:  2021  Date Of Service:  21  Primary Care Physician:  Jerald Dawkins MD    Chief Complaint:     Hip pain    History Of Presenting Illness:      Patient is a very pleasant 80-year-old female with history significant for CKD stage IV, history of CAD, hypertension, and osteoarthritis who presents to the emergency room complaining of left hip pain after a mechanical fall.  She lives at home alone and was taking her dog out to the bathroom this morning when her dog pulled on the leash and she lost her balance.  Patient landed on her left hip and immediately felt pain.  She did not hit her head or lose consciousness.  Plain film x-ray in the emergency room confirmed comminuted intratrochanteric fracture of the proximal left femur.  Dr. Loyd consulted and agreed to see patient.  Currently, patient resting comfortably in bed.  Is complaining of some pain that has been well controlled with IV pain medication.  No headache or vision changes.  No chest pain, shortness of breath, nausea or vomiting.    Review Of Systems:     Full 10 point ROS was reviewed and negative unless otherwise stated in the HPI     Past Medical History:    Past Medical History:   Diagnosis Date   • Anemia    • Arthritis    • Back pain    • Bleeding from anus    • Bronchitis     STATES HAS BRONCHITIS CURRENTLY (17).     • Bronchitis 2017   • CAD (coronary artery disease)    • Cataract, bilateral    • Chronic kidney disease     STAGE 4.  SEES TERA   • Chronic kidney failure     REPORTS STAGE IV   • Difficulty swallowing solids    • Disease of thyroid gland    • Fracture, radius 2016    right distal radius and ulna, healed.   • GERD (gastroesophageal reflux disease)    • Gout    • High cholesterol    • History of nuclear stress  "test 2014    DR. CORTEZ.  \"IT WAS OK\"   • Hyperparathyroidism (CMS/HCC)    • Hyperparathyroidism (CMS/HCC)    • Hypertension    • Iron deficiency    • Osteoarthritis    • Osteoarthritis of knees, bilateral     Both knees Supartz injection series August, 2015   • Osteoporosis    • Palpitations    • Personal history of cardiac murmur    • Presence of pessary    • Problems with swallowing     WITH FOOD OCCASSIONALLY   • Rheumatic heart disease     Personal history   • Rotator cuff tendonitis     right    • Rupture of right proximal biceps tendon     chronic   • Seasonal allergies    • Sinus problem    • Spinal headache     REPORTS AFTER DELIVERY OF SON   • Subacromial bursitis     right    • Valvular heart disease    • Vision problems    • Vitamin B12 deficiency    • Vitamin D deficiency    • Wears glasses        Past Surgical history:    Past Surgical History:   Procedure Laterality Date   • CATARACT EXTRACTION Bilateral    • CERVICAL POLYPECTOMY     • COLONOSCOPY  2011   • COLONOSCOPY N/A 1/9/2018    Procedure: COLONOSCOPY with endoscopic mucosal resection (hot snare), normal saline submucosal injection, argon thermal ablation, resolution clip placement x4, and cold biopsy polypectomy;  Surgeon: Pieter Concepcion MD;  Location: [facility] ENDOSCOPY;  Service:    • ENDOSCOPY N/A 12/6/2017    Procedure: ESOPHAGOGASTRODUODENOSCOPY with biopsies and hot snare polypectomies;  Surgeon: Pieter Concepcion MD;  Location: [facility] ENDOSCOPY;  Service:    • ENDOSCOPY N/A 1/15/2019    Procedure: ESOPHAGOGASTRODUODENOSCOPY WITH BIOPSIES AND HOT SNARE POLYPECTOMIES X10;  Surgeon: Pieter Concepcion MD;  Location: [facility] ENDOSCOPY;  Service: Gastroenterology   • UPPER GASTROINTESTINAL ENDOSCOPY  08/18/2014       Social History:    Social History     Socioeconomic History   • Marital status:      Spouse name: Not on file   • Number of children: Not on file   • Years of education: Not on file   • Highest education level: Not on file "   Tobacco Use   • Smoking status: Never Smoker   • Smokeless tobacco: Never Used   Substance and Sexual Activity   • Alcohol use: Never     Frequency: Never   • Drug use: Never   • Sexual activity: Defer       Family History:    Family History   Problem Relation Age of Onset   • Liver cancer Maternal Grandmother    • Gout Other    • Osteoporosis Other    • Stroke Other    • Ulcers Other    • Asthma Other    • Hypertension Other    • Heart disease Other    • Osteoarthritis Other    • Colon cancer Neg Hx        Allergies:      Ferrlecit [na ferric gluc cplx in sucrose], Ranitidine hcl, Levofloxacin, and Lisinopril    Home Medications:    Prior to Admission Medications     Prescriptions Last Dose Informant Patient Reported? Taking?    allopurinol (ZYLOPRIM) 100 MG tablet 2/12/2021 Self Yes Yes    Take 1 tablet by mouth daily.    aspirin 81 MG EC tablet 2/13/2021  Yes Yes    Take 81 mg by mouth Daily.    budesonide-formoterol (SYMBICORT) 80-4.5 MCG/ACT inhaler 2/13/2021 Self Yes Yes    Inhale 2 puffs 2 (Two) Times a Day.    calcitriol (ROCALTROL) 0.25 MCG capsule 2/13/2021  Yes Yes    2 (Two) Times a Day.    carboxymethylcellulose (REFRESH PLUS) 0.5 % solution 2/13/2021  Yes Yes    3 (Three) Times a Day As Needed for Dry Eyes.    carvedilol (COREG) 25 MG tablet 2/13/2021  Yes Yes    Take 25 mg by mouth 2 (Two) Times a Day With Meals.    cholecalciferol (VITAMIN D3) 1000 UNITS tablet 2/13/2021 Self Yes Yes    Take 1,000 Units by mouth Daily.    Cyanocobalamin (VITAMIN B-12 PO) 2/13/2021 Self Yes Yes    Take 500 mg by mouth 2 (Two) Times a Day.    erythromycin (ROMYCIN) 5 MG/GM ophthalmic ointment 2/12/2021  Yes Yes    Administer  into the left eye Every Night.    fluticasone (FLONASE) 50 MCG/ACT nasal spray 2/12/2021 Self Yes Yes    2 sprays into each nostril Daily As Needed.    irbesartan (AVAPRO) 150 MG tablet 2/13/2021 Self Yes Yes    Take 150 mg by mouth Daily.    omeprazole (PriLOSEC) 40 MG capsule 2/13/2021 Self  Yes Yes    Take 40 mg by mouth Daily.    rosuvastatin (CRESTOR) 10 MG tablet 2/12/2021  Yes Yes    Take 10 mg by mouth Daily.    torsemide (DEMADEX) 10 MG tablet   Yes Yes    Take 5 mg by mouth Daily. Takes Monday, Wednesday, Fridays    docusate sodium (COLACE) 100 MG capsule   No No    Take 1 capsule by mouth 2 (Two) Times a Day.    Patient taking differently:  Take 100 mg by mouth 2 (Two) Times a Day As Needed for Constipation.    epoetin ovidio (PROCRIT) 23004 UNIT/ML injection  Self Yes No    Inject 40,000 Units under the skin into the appropriate area as directed. Every 2 weeks.  Due Wednesday, Feb 17th    estradiol (ESTRACE) 0.1 MG/GM vaginal cream   No No    Massage 1 gram in vaginal opening twice weekly    ondansetron ODT (ZOFRAN-ODT) 4 MG disintegrating tablet   Yes No    Place 4 mg on the tongue Every 8 (Eight) Hours As Needed.    Probiotic Product (PROBIOTIC DAILY PO)   Yes No    Take  by mouth Daily As Needed.             ED Medications:    Medications   budesonide-formoterol (SYMBICORT) 80-4.5 MCG/ACT inhaler 2 puff (has no administration in time range)   carvedilol (COREG) tablet 25 mg (has no administration in time range)   erythromycin (ROMYCIN) ophthalmic ointment (has no administration in time range)   pantoprazole (PROTONIX) EC tablet 40 mg (has no administration in time range)   sodium chloride 0.9 % flush 10 mL (has no administration in time range)   sodium chloride 0.9 % flush 10 mL (has no administration in time range)   acetaminophen (TYLENOL) tablet 650 mg (has no administration in time range)     Or   acetaminophen (TYLENOL) 160 MG/5ML solution 650 mg (has no administration in time range)     Or   acetaminophen (TYLENOL) suppository 650 mg (has no administration in time range)   ondansetron (ZOFRAN) injection 4 mg (4 mg Intravenous Given 2/13/21 4138)   heparin (porcine) 5000 UNIT/ML injection 5,000 Units (has no administration in time range)   Morphine sulfate (PF) injection 2 mg (2 mg  Intravenous Given 2/13/21 1558)     And   naloxone (NARCAN) injection 0.4 mg (has no administration in time range)   melatonin tablet 5 mg (has no administration in time range)   calcium carbonate (TUMS) chewable tablet 500 mg (200 mg elemental) (has no administration in time range)   Morphine sulfate (PF) injection 4 mg (4 mg Intravenous Given 2/13/21 1219)   fentaNYL citrate (PF) (SUBLIMAZE) injection 25 mcg (25 mcg Intravenous Given 2/13/21 1446)       Vital Signs:    Temp:  [97.3 °F (36.3 °C)-97.6 °F (36.4 °C)] 97.6 °F (36.4 °C)  Heart Rate:  [] 95  Resp:  [16-17] 17  BP: (106-139)/() 139/80        02/13/21  1053 02/13/21  1500   Weight: 50.3 kg (111 lb) 51 kg (112 lb 8 oz)       Body mass index is 22.72 kg/m².    Physical Exam:    General Appearance:  Alert and cooperative, not in any acute distress.   Head:  Atraumatic and normocephalic, without obvious abnormality.   Eyes:          PERRLA, conjunctivae and sclerae normal, no Icterus. No pallor. Extraocular movements are within normal limits.   Ears:  Ears appear intact with no abnormalities noted.   Throat: No oral lesions, no thrush, oral mucosa moist.   Neck: Supple, trachea midline, no thyromegaly, no carotid bruit.   Back:   No kyphoscoliosis present. No tenderness to palpation,   range of motion normal.   Lungs:   Chest shape is normal. Breath sounds heard bilaterally equally.  No crackles or wheezing. No pleural rub or bronchial breathing.   Heart:  Normal S1 and S2, no murmur, no gallop, no rub. No JVD.   Abdomen:   Normal bowel sounds, no masses, no organomegaly. Soft, nontender, nondistended, no guarding, no rebound tenderness.   Extremities:  Left leg shorter than right, internally rotated.  No edema, no cyanosis, no clubbing.   Pulses: Pulses palpable and equal bilaterally.   Skin: No bleeding, bruising or rash.   Neurologic: Alert and oriented x 3. No tremors. No facial asymmetry.     Laboratory data:    I have reviewed the labs done  in the emergency room.    Results from last 7 days   Lab Units 02/13/21  1215   SODIUM mmol/L 136   POTASSIUM mmol/L 4.5   CHLORIDE mmol/L 102   CO2 mmol/L 25.6   BUN mg/dL 52*   CREATININE mg/dL 2.93*   CALCIUM mg/dL 9.5   BILIRUBIN mg/dL 0.4   ALK PHOS U/L 58   ALT (SGPT) U/L 9   AST (SGOT) U/L 16   GLUCOSE mg/dL 115*     Results from last 7 days   Lab Units 02/13/21  1215   WBC 10*3/mm3 7.60   HEMOGLOBIN g/dL 9.2*   HEMATOCRIT % 29.3*   PLATELETS 10*3/mm3 202         Results from last 7 days   Lab Units 02/13/21  1215   TROPONIN T ng/mL <0.010                           Invalid input(s): USDES,  BLOODU, NITRITITE, BACT, EP  Pain Management Panel     Pain Management Panel Latest Ref Rng & Units 10/15/2019 10/1/2019    CREATININE UR mg/dL 135.4 118.6              EKG:      EKG personally reviewed reveals sinus rhythm with rate of 56.  No signs of acute infarct or ischemia.    Radiology:    Imaging Results (Last 72 Hours)     Procedure Component Value Units Date/Time    XR Chest 1 View [[id number]] Collected: 02/13/21 1206     Updated: 02/13/21 1211    Narrative:      PORTABLE CHEST    2/13/2021 11:48 AM      HISTORY: Fall     COMPARISON: None.     FINDINGS: Heart size upper normal. Lungs and pleural spaces are clear.  No acute fracture. Chronic right rotator cuff tear.           Impression:      No acute cardiopulmonary process .              AP PELVIS, 2V LEFT HIP     HISTORY: Fall.      FINDINGS:  A three view exam demonstrates an acute comminuted  intertrochanteric fracture of the proximal left femur with angulation.  Femoral head remains in the acetabulum. Pelvis is otherwise intact.  Severe degenerative change at L5-S1.        IMPRESSION: Acute intertrochanteric fracture of the left hip.     This report was finalized on 2/13/2021 12:09 PM by Dr Chencho Hughes DO.    XR Hip With or Without Pelvis 2 - 3 View Left [[id number]] Collected: 02/13/21 1206     Updated: 02/13/21 1211    Narrative:      PORTABLE CHEST     2/13/2021 11:48 AM      HISTORY: Fall     COMPARISON: None.     FINDINGS: Heart size upper normal. Lungs and pleural spaces are clear.  No acute fracture. Chronic right rotator cuff tear.           Impression:      No acute cardiopulmonary process .              AP PELVIS, 2V LEFT HIP     HISTORY: Fall.      FINDINGS:  A three view exam demonstrates an acute comminuted  intertrochanteric fracture of the proximal left femur with angulation.  Femoral head remains in the acetabulum. Pelvis is otherwise intact.  Severe degenerative change at L5-S1.        IMPRESSION: Acute intertrochanteric fracture of the left hip.     This report was finalized on 2/13/2021 12:09 PM by Dr Chencho Hughes DO.            Closed fracture of left hip (CMS/HCC)      Assessment:    Left hip fracture secondary to mechanical fall, POA  CKD stage IV  Hypertension  Hyperlipidemia  Osteoarthritis  Osteoporosis  Age-related chronic debility    Plan:    We will admit patient to the hospital.  Dr. Loyd with orthopedic surgery consulted.  N.p.o. after midnight.  Plan for surgical intervention tomorrow.  Patient is low to moderate surgical risk.  Continue supportive care.  Pain control and antiemetics as needed.  Heparin for DVT prophylaxis to start tomorrow.  PT/OT.  Further orders as clinical course dictates.    Advance Care Planning   ACP discussion was declined by the patient. Patient does not have an advance directive, declines further assistance.    Stepan Peters DO  02/13/21  16:43 EST    Dictated utilizing Dragon dictation.

## 2021-02-13 NOTE — PLAN OF CARE
Goal Outcome Evaluation:  Plan of Care Reviewed With: patient     Outcome Summary: Pt admitted from ED. Surgery is planned for tomorrow am to fix L hip fx. Aguayo cath in place for comfort.

## 2021-02-14 ENCOUNTER — APPOINTMENT (OUTPATIENT)
Dept: GENERAL RADIOLOGY | Facility: HOSPITAL | Age: 81
End: 2021-02-14

## 2021-02-14 ENCOUNTER — ANESTHESIA EVENT (OUTPATIENT)
Dept: PERIOP | Facility: HOSPITAL | Age: 81
End: 2021-02-14

## 2021-02-14 ENCOUNTER — ANESTHESIA (OUTPATIENT)
Dept: PERIOP | Facility: HOSPITAL | Age: 81
End: 2021-02-14

## 2021-02-14 ENCOUNTER — APPOINTMENT (OUTPATIENT)
Dept: ULTRASOUND IMAGING | Facility: HOSPITAL | Age: 81
End: 2021-02-14

## 2021-02-14 LAB
ANION GAP SERPL CALCULATED.3IONS-SCNC: 7.3 MMOL/L (ref 5–15)
BASOPHILS # BLD AUTO: 0.01 10*3/MM3 (ref 0–0.2)
BASOPHILS NFR BLD AUTO: 0.2 % (ref 0–1.5)
BUN SERPL-MCNC: 60 MG/DL (ref 8–23)
BUN/CREAT SERPL: 17.6 (ref 7–25)
CALCIUM SPEC-SCNC: 8.3 MG/DL (ref 8.6–10.5)
CHLORIDE SERPL-SCNC: 104 MMOL/L (ref 98–107)
CHLORIDE UR-SCNC: 20 MMOL/L
CO2 SERPL-SCNC: 23.7 MMOL/L (ref 22–29)
CREAT SERPL-MCNC: 3.4 MG/DL (ref 0.57–1)
CREAT UR-MCNC: 81.1 MG/DL
DEPRECATED RDW RBC AUTO: 58.9 FL (ref 37–54)
EOSINOPHIL # BLD AUTO: 0.18 10*3/MM3 (ref 0–0.4)
EOSINOPHIL NFR BLD AUTO: 3.3 % (ref 0.3–6.2)
ERYTHROCYTE [DISTWIDTH] IN BLOOD BY AUTOMATED COUNT: 17.2 % (ref 12.3–15.4)
FERRITIN SERPL-MCNC: 14.12 NG/ML (ref 13–150)
GFR SERPL CREATININE-BSD FRML MDRD: 13 ML/MIN/1.73
GFR SERPL CREATININE-BSD FRML MDRD: ABNORMAL ML/MIN/{1.73_M2}
GLUCOSE SERPL-MCNC: 110 MG/DL (ref 65–99)
HCT VFR BLD AUTO: 24.4 % (ref 34–46.6)
HGB BLD-MCNC: 7.6 G/DL (ref 12–15.9)
IMM GRANULOCYTES # BLD AUTO: 0.02 10*3/MM3 (ref 0–0.05)
IMM GRANULOCYTES NFR BLD AUTO: 0.4 % (ref 0–0.5)
IRON 24H UR-MRATE: 40 MCG/DL (ref 37–145)
IRON SATN MFR SERPL: 12 % (ref 20–50)
LYMPHOCYTES # BLD AUTO: 1.06 10*3/MM3 (ref 0.7–3.1)
LYMPHOCYTES NFR BLD AUTO: 19.4 % (ref 19.6–45.3)
MCH RBC QN AUTO: 29.2 PG (ref 26.6–33)
MCHC RBC AUTO-ENTMCNC: 31.1 G/DL (ref 31.5–35.7)
MCV RBC AUTO: 93.8 FL (ref 79–97)
MONOCYTES # BLD AUTO: 0.76 10*3/MM3 (ref 0.1–0.9)
MONOCYTES NFR BLD AUTO: 13.9 % (ref 5–12)
NEUTROPHILS NFR BLD AUTO: 3.44 10*3/MM3 (ref 1.7–7)
NEUTROPHILS NFR BLD AUTO: 62.8 % (ref 42.7–76)
NRBC BLD AUTO-RTO: 0 /100 WBC (ref 0–0.2)
PLATELET # BLD AUTO: 176 10*3/MM3 (ref 140–450)
PMV BLD AUTO: 10.4 FL (ref 6–12)
POTASSIUM SERPL-SCNC: 5.3 MMOL/L (ref 3.5–5.2)
PROT UR-MCNC: 19 MG/DL
PROT/CREAT UR: 234.3 MG/G CREA (ref 0–200)
RBC # BLD AUTO: 2.6 10*6/MM3 (ref 3.77–5.28)
SODIUM SERPL-SCNC: 135 MMOL/L (ref 136–145)
SODIUM UR-SCNC: 30 MMOL/L
TIBC SERPL-MCNC: 341 MCG/DL (ref 298–536)
TRANSFERRIN SERPL-MCNC: 229 MG/DL (ref 200–360)
WBC # BLD AUTO: 5.47 10*3/MM3 (ref 3.4–10.8)

## 2021-02-14 PROCEDURE — 94799 UNLISTED PULMONARY SVC/PX: CPT

## 2021-02-14 PROCEDURE — 83540 ASSAY OF IRON: CPT | Performed by: INTERNAL MEDICINE

## 2021-02-14 PROCEDURE — C1713 ANCHOR/SCREW BN/BN,TIS/BN: HCPCS | Performed by: ORTHOPAEDIC SURGERY

## 2021-02-14 PROCEDURE — 76000 FLUOROSCOPY <1 HR PHYS/QHP: CPT

## 2021-02-14 PROCEDURE — 25010000002 PROPOFOL 200 MG/20ML EMULSION: Performed by: NURSE ANESTHETIST, CERTIFIED REGISTERED

## 2021-02-14 PROCEDURE — 25010000002 MORPHINE SULFATE (PF) 2 MG/ML SOLUTION: Performed by: INTERNAL MEDICINE

## 2021-02-14 PROCEDURE — 25010000003 CEFAZOLIN SODIUM-DEXTROSE 2-3 GM-%(50ML) RECONSTITUTED SOLUTION: Performed by: ORTHOPAEDIC SURGERY

## 2021-02-14 PROCEDURE — 99232 SBSQ HOSP IP/OBS MODERATE 35: CPT | Performed by: INTERNAL MEDICINE

## 2021-02-14 PROCEDURE — 25010000002 MORPHINE SULFATE (PF) 2 MG/ML SOLUTION: Performed by: ORTHOPAEDIC SURGERY

## 2021-02-14 PROCEDURE — P9016 RBC LEUKOCYTES REDUCED: HCPCS

## 2021-02-14 PROCEDURE — C1769 GUIDE WIRE: HCPCS | Performed by: ORTHOPAEDIC SURGERY

## 2021-02-14 PROCEDURE — 85025 COMPLETE CBC W/AUTO DIFF WBC: CPT | Performed by: INTERNAL MEDICINE

## 2021-02-14 PROCEDURE — 86900 BLOOD TYPING SEROLOGIC ABO: CPT

## 2021-02-14 PROCEDURE — 25010000002 DEXAMETHASONE PER 1 MG: Performed by: NURSE ANESTHETIST, CERTIFIED REGISTERED

## 2021-02-14 PROCEDURE — 25010000003 CEFAZOLIN PER 500 MG: Performed by: ORTHOPAEDIC SURGERY

## 2021-02-14 PROCEDURE — 82436 ASSAY OF URINE CHLORIDE: CPT | Performed by: INTERNAL MEDICINE

## 2021-02-14 PROCEDURE — 36430 TRANSFUSION BLD/BLD COMPNT: CPT

## 2021-02-14 PROCEDURE — 27245 TREAT THIGH FRACTURE: CPT | Performed by: ORTHOPAEDIC SURGERY

## 2021-02-14 PROCEDURE — 25010000003 CEFAZOLIN SODIUM-DEXTROSE 1-4 GM-%(50ML) RECONSTITUTED SOLUTION: Performed by: ORTHOPAEDIC SURGERY

## 2021-02-14 PROCEDURE — 82728 ASSAY OF FERRITIN: CPT | Performed by: INTERNAL MEDICINE

## 2021-02-14 PROCEDURE — 0QS734Z REPOSITION LEFT UPPER FEMUR WITH INTERNAL FIXATION DEVICE, PERCUTANEOUS APPROACH: ICD-10-PCS | Performed by: ORTHOPAEDIC SURGERY

## 2021-02-14 PROCEDURE — 84156 ASSAY OF PROTEIN URINE: CPT | Performed by: INTERNAL MEDICINE

## 2021-02-14 PROCEDURE — 80048 BASIC METABOLIC PNL TOTAL CA: CPT | Performed by: INTERNAL MEDICINE

## 2021-02-14 PROCEDURE — 84466 ASSAY OF TRANSFERRIN: CPT | Performed by: INTERNAL MEDICINE

## 2021-02-14 PROCEDURE — 82570 ASSAY OF URINE CREATININE: CPT | Performed by: INTERNAL MEDICINE

## 2021-02-14 PROCEDURE — 25010000002 ONDANSETRON PER 1 MG: Performed by: NURSE ANESTHETIST, CERTIFIED REGISTERED

## 2021-02-14 PROCEDURE — 84300 ASSAY OF URINE SODIUM: CPT | Performed by: INTERNAL MEDICINE

## 2021-02-14 PROCEDURE — 25010000003 MEPERIDINE PER 100 MG: Performed by: NURSE ANESTHETIST, CERTIFIED REGISTERED

## 2021-02-14 DEVICE — IMPLANTABLE DEVICE: Type: IMPLANTABLE DEVICE | Site: HIP | Status: FUNCTIONAL

## 2021-02-14 RX ORDER — SODIUM CHLORIDE 9 MG/ML
100 INJECTION, SOLUTION INTRAVENOUS CONTINUOUS
Status: DISCONTINUED | OUTPATIENT
Start: 2021-02-14 | End: 2021-02-14

## 2021-02-14 RX ORDER — SODIUM CHLORIDE, SODIUM LACTATE, POTASSIUM CHLORIDE, CALCIUM CHLORIDE 600; 310; 30; 20 MG/100ML; MG/100ML; MG/100ML; MG/100ML
100 INJECTION, SOLUTION INTRAVENOUS CONTINUOUS
Status: DISCONTINUED | OUTPATIENT
Start: 2021-02-14 | End: 2021-02-14

## 2021-02-14 RX ORDER — TORSEMIDE 10 MG/1
5 TABLET ORAL DAILY
Status: DISCONTINUED | OUTPATIENT
Start: 2021-02-15 | End: 2021-02-14

## 2021-02-14 RX ORDER — SODIUM CHLORIDE 9 MG/ML
50 INJECTION, SOLUTION INTRAVENOUS CONTINUOUS
Status: DISCONTINUED | OUTPATIENT
Start: 2021-02-14 | End: 2021-02-14

## 2021-02-14 RX ORDER — LIDOCAINE HYDROCHLORIDE 20 MG/ML
INJECTION, SOLUTION INTRAVENOUS AS NEEDED
Status: DISCONTINUED | OUTPATIENT
Start: 2021-02-14 | End: 2021-02-14 | Stop reason: SURG

## 2021-02-14 RX ORDER — CEFAZOLIN SODIUM 1 G/50ML
1 SOLUTION INTRAVENOUS EVERY 8 HOURS
Status: COMPLETED | OUTPATIENT
Start: 2021-02-14 | End: 2021-02-15

## 2021-02-14 RX ORDER — MEPERIDINE HYDROCHLORIDE 25 MG/ML
25 INJECTION INTRAMUSCULAR; INTRAVENOUS; SUBCUTANEOUS ONCE
Status: COMPLETED | OUTPATIENT
Start: 2021-02-14 | End: 2021-02-14

## 2021-02-14 RX ORDER — CEFAZOLIN SODIUM 1 G/50ML
1 SOLUTION INTRAVENOUS EVERY 8 HOURS
Status: DISCONTINUED | OUTPATIENT
Start: 2021-02-14 | End: 2021-02-14

## 2021-02-14 RX ORDER — BUPIVACAINE HYDROCHLORIDE 5 MG/ML
INJECTION, SOLUTION EPIDURAL; INTRACAUDAL
Status: COMPLETED | OUTPATIENT
Start: 2021-02-14 | End: 2021-02-14

## 2021-02-14 RX ORDER — NALOXONE HCL 0.4 MG/ML
0.4 VIAL (ML) INJECTION
Status: DISCONTINUED | OUTPATIENT
Start: 2021-02-14 | End: 2021-02-15

## 2021-02-14 RX ORDER — SODIUM CHLORIDE 0.9 % (FLUSH) 0.9 %
1-10 SYRINGE (ML) INJECTION AS NEEDED
Status: DISCONTINUED | OUTPATIENT
Start: 2021-02-14 | End: 2021-02-18 | Stop reason: HOSPADM

## 2021-02-14 RX ORDER — ONDANSETRON 2 MG/ML
4 INJECTION INTRAMUSCULAR; INTRAVENOUS EVERY 6 HOURS PRN
Status: DISCONTINUED | OUTPATIENT
Start: 2021-02-14 | End: 2021-02-18 | Stop reason: HOSPADM

## 2021-02-14 RX ORDER — ONDANSETRON 2 MG/ML
INJECTION INTRAMUSCULAR; INTRAVENOUS AS NEEDED
Status: DISCONTINUED | OUTPATIENT
Start: 2021-02-14 | End: 2021-02-14 | Stop reason: SURG

## 2021-02-14 RX ORDER — ONDANSETRON 2 MG/ML
4 INJECTION INTRAMUSCULAR; INTRAVENOUS ONCE AS NEEDED
Status: DISCONTINUED | OUTPATIENT
Start: 2021-02-14 | End: 2021-02-14 | Stop reason: HOSPADM

## 2021-02-14 RX ORDER — ONDANSETRON 4 MG/1
4 TABLET, FILM COATED ORAL EVERY 6 HOURS PRN
Status: DISCONTINUED | OUTPATIENT
Start: 2021-02-14 | End: 2021-02-18 | Stop reason: HOSPADM

## 2021-02-14 RX ORDER — ROSUVASTATIN CALCIUM 5 MG/1
10 TABLET, COATED ORAL NIGHTLY
Status: DISCONTINUED | OUTPATIENT
Start: 2021-02-14 | End: 2021-02-18 | Stop reason: HOSPADM

## 2021-02-14 RX ORDER — CYCLOBENZAPRINE HCL 10 MG
5 TABLET ORAL EVERY 8 HOURS SCHEDULED
Status: DISCONTINUED | OUTPATIENT
Start: 2021-02-14 | End: 2021-02-16

## 2021-02-14 RX ORDER — LOSARTAN POTASSIUM 50 MG/1
50 TABLET ORAL
Status: DISCONTINUED | OUTPATIENT
Start: 2021-02-14 | End: 2021-02-14

## 2021-02-14 RX ORDER — MORPHINE SULFATE 2 MG/ML
2 INJECTION, SOLUTION INTRAMUSCULAR; INTRAVENOUS
Status: DISCONTINUED | OUTPATIENT
Start: 2021-02-14 | End: 2021-02-15

## 2021-02-14 RX ORDER — HYDROCODONE BITARTRATE AND ACETAMINOPHEN 5; 325 MG/1; MG/1
1 TABLET ORAL EVERY 4 HOURS PRN
Status: DISCONTINUED | OUTPATIENT
Start: 2021-02-14 | End: 2021-02-18 | Stop reason: HOSPADM

## 2021-02-14 RX ORDER — CEFAZOLIN SODIUM 2 G/50ML
2 SOLUTION INTRAVENOUS ONCE
Status: COMPLETED | OUTPATIENT
Start: 2021-02-14 | End: 2021-02-14

## 2021-02-14 RX ORDER — PROPOFOL 10 MG/ML
INJECTION, EMULSION INTRAVENOUS AS NEEDED
Status: DISCONTINUED | OUTPATIENT
Start: 2021-02-14 | End: 2021-02-14 | Stop reason: SURG

## 2021-02-14 RX ORDER — SODIUM CHLORIDE 0.9 % (FLUSH) 0.9 %
3 SYRINGE (ML) INJECTION EVERY 12 HOURS SCHEDULED
Status: DISCONTINUED | OUTPATIENT
Start: 2021-02-14 | End: 2021-02-18 | Stop reason: HOSPADM

## 2021-02-14 RX ORDER — MORPHINE SULFATE 2 MG/ML
2 INJECTION, SOLUTION INTRAMUSCULAR; INTRAVENOUS
Status: DISCONTINUED | OUTPATIENT
Start: 2021-02-14 | End: 2021-02-14 | Stop reason: HOSPADM

## 2021-02-14 RX ORDER — DEXAMETHASONE SODIUM PHOSPHATE 4 MG/ML
INJECTION, SOLUTION INTRA-ARTICULAR; INTRALESIONAL; INTRAMUSCULAR; INTRAVENOUS; SOFT TISSUE AS NEEDED
Status: DISCONTINUED | OUTPATIENT
Start: 2021-02-14 | End: 2021-02-14 | Stop reason: SURG

## 2021-02-14 RX ADMIN — ERYTHROMYCIN: 5 OINTMENT OPHTHALMIC at 20:12

## 2021-02-14 RX ADMIN — SODIUM CHLORIDE 50 ML/HR: 9 INJECTION, SOLUTION INTRAVENOUS at 10:59

## 2021-02-14 RX ADMIN — SODIUM CHLORIDE 100 ML/HR: 9 INJECTION, SOLUTION INTRAVENOUS at 03:07

## 2021-02-14 RX ADMIN — CEFAZOLIN SODIUM 1 G: 1 SOLUTION INTRAVENOUS at 20:11

## 2021-02-14 RX ADMIN — MEPERIDINE HYDROCHLORIDE 25 MG: 25 INJECTION, SOLUTION INTRAMUSCULAR; INTRAVENOUS; SUBCUTANEOUS at 13:17

## 2021-02-14 RX ADMIN — CARVEDILOL 25 MG: 25 TABLET, FILM COATED ORAL at 06:07

## 2021-02-14 RX ADMIN — HYDROCODONE BITARTRATE AND ACETAMINOPHEN 1 TABLET: 5; 325 TABLET ORAL at 18:15

## 2021-02-14 RX ADMIN — MORPHINE SULFATE 2 MG: 2 INJECTION, SOLUTION INTRAMUSCULAR; INTRAVENOUS at 15:02

## 2021-02-14 RX ADMIN — PROPOFOL 200 MG: 10 INJECTION, EMULSION INTRAVENOUS at 12:04

## 2021-02-14 RX ADMIN — SODIUM CHLORIDE 1000 ML: 9 INJECTION, SOLUTION INTRAVENOUS at 00:49

## 2021-02-14 RX ADMIN — DEXAMETHASONE SODIUM PHOSPHATE 4 MG: 4 INJECTION, SOLUTION INTRAMUSCULAR; INTRAVENOUS at 12:04

## 2021-02-14 RX ADMIN — BUDESONIDE AND FORMOTEROL FUMARATE DIHYDRATE 2 PUFF: 80; 4.5 AEROSOL RESPIRATORY (INHALATION) at 10:36

## 2021-02-14 RX ADMIN — LIDOCAINE HYDROCHLORIDE 60 MG: 20 INJECTION, SOLUTION INTRAVENOUS at 12:04

## 2021-02-14 RX ADMIN — SODIUM CHLORIDE, PRESERVATIVE FREE 10 ML: 5 INJECTION INTRAVENOUS at 08:28

## 2021-02-14 RX ADMIN — ACETAMINOPHEN 650 MG: 325 TABLET, FILM COATED ORAL at 06:08

## 2021-02-14 RX ADMIN — BUDESONIDE AND FORMOTEROL FUMARATE DIHYDRATE 2 PUFF: 80; 4.5 AEROSOL RESPIRATORY (INHALATION) at 19:16

## 2021-02-14 RX ADMIN — ONDANSETRON 4 MG: 2 INJECTION INTRAMUSCULAR; INTRAVENOUS at 12:04

## 2021-02-14 RX ADMIN — CYCLOBENZAPRINE HYDROCHLORIDE 5 MG: 10 TABLET, FILM COATED ORAL at 16:17

## 2021-02-14 RX ADMIN — CARVEDILOL 25 MG: 25 TABLET, FILM COATED ORAL at 08:52

## 2021-02-14 RX ADMIN — CARVEDILOL 25 MG: 25 TABLET, FILM COATED ORAL at 18:15

## 2021-02-14 RX ADMIN — ROSUVASTATIN CALCIUM 10 MG: 5 TABLET, FILM COATED ORAL at 20:12

## 2021-02-14 RX ADMIN — CEFAZOLIN SODIUM 2 G: 2 SOLUTION INTRAVENOUS at 12:04

## 2021-02-14 RX ADMIN — MORPHINE SULFATE 2 MG: 2 INJECTION, SOLUTION INTRAMUSCULAR; INTRAVENOUS at 04:09

## 2021-02-14 RX ADMIN — BUPIVACAINE HYDROCHLORIDE 30 ML: 5 INJECTION, SOLUTION EPIDURAL; INTRACAUDAL; PERINEURAL at 12:51

## 2021-02-14 RX ADMIN — CYCLOBENZAPRINE HYDROCHLORIDE 5 MG: 10 TABLET, FILM COATED ORAL at 22:00

## 2021-02-14 RX ADMIN — SODIUM CHLORIDE, PRESERVATIVE FREE 3 ML: 5 INJECTION INTRAVENOUS at 20:23

## 2021-02-14 RX ADMIN — PANTOPRAZOLE SODIUM 40 MG: 40 TABLET, DELAYED RELEASE ORAL at 06:08

## 2021-02-14 NOTE — OP NOTE
[address],  [address]  [place]  [address]  [phone]      OPERATIVE REPORT      PATIENT NAME:  Carolyne Martins                            YOB: 1940        PREOP DIAGNOSIS:   Left hip comminuted displaced and unstable intertrochanteric fracture.    POSTOP DIAGNOSIS:   Same.    PROCEDURE:   Open reduction and internal fixation of Left proximal femur fracture.    SURGEON:     Aristides Loyd MD    OPERATIVE TEAM:  Circulator: Valencia Burden RN  Scrub Person: Greg Doshi    ANESTHETIST:  CRNA: Jesus Conway CRNA    ANESTHESIA:   General with Block    FLUIDS:      1100 ml crystalloid    ESTIMATED BLOOD LOSS: 100 ml      URINE:      100 ml    FINDINGS:     Comminuted displaced unstable proximal femoral  fracture.    IMPLANTS:   [employer] Affixus standard trochanteric nail, 9 x 180 mm, 125 degree neck angle, 85 mm compression lag screw and a 28 mm distal interlock screw, and compression lock set screw.    DRAINS:     Aguayo to gravity.    COMPLICATIONS    None    DISPOSITION:    Stable to recovery.    INDICATIONS:   Painful, displaced unstable proximal femur fracture on     clinical exam and imaging.    NARRATIVE:     Risks, benefits and alternatives discussed and informed consent for surgical procedure obtained.  Risks discussed including but not limited to anesthesia, infection, nerve/vessel/tendon injury, fracture, prosthetic or implant loosening, wear or dislocation, morbidities from any chronic medical conditions, DVT, PE and death.  Goals include pain relief, earlier mobilization and rehabilitation to improve ambulatory and functional level.  Patient presents for planned fracture stabilization surgery.     Antibiotic prophylaxis was given.  Surgeon site marking was performed.  A time out was performed prior to the procedure.  Anesthesia was effective and well tolerated.  The patient was placed onto the fracture table in the supine position with care  taken to pad all areas of the body and observe skin precautions.  Also, care was taken to provide multimodal DVT/PE prophylaxis.  The away leg was kept in a comfortable position in the padded gipson and to provide room for the C-arm image.  The involved leg was placed in a padded fracture boot after performing a gentle closed reduction and placed in mild traction.  At this point, C-arm AP and lateral views of the hip showed a good reduction of the comminuted fracture.  The hip and leg were prepped and draped in the usual sterile fashion.    After sterile prep and drape, a small incision was made starting at the tip of the greater trochanter and extending proximally.  A small opening was made in the tensor fascia darius and a cannulated hand awl was passed down the trochanteric aspect into the proximal femur and across the fracture into the femoral canal.  C-arm image showed good position in AP and lateral planes.  The guide wire was passed down the femoral canal via the cannulated awl and the awl was removed.  Over the guidewire, proximal reaming was performed appropriate for the nail size and in good position.    Over the wire, the actual trochanteric nail was passed across the fracture site and down the canal.  The nail provided a neck angle slot to place the planned lag screw based on both preoperative and intraoperative radiographic measurements.  The nail entry handle was used to place the lag screw into the femoral head with usual trocar placement, threaded pin passage, C-arm image guidance, depth gauge measurement, reaming over the pin and lag screw placement.  Good position noted on C-arm AP and lateral view.  The compression set screw at the top of the nail was tightened fully and achieving good purchase.  Lastly, with the distal interlock slot of the entry handle, the distal interlock screw was placed with standard trocar placement, drilling, depth gauge measure, and screw placement.  The entry handle  assembly was removed as the final nail construct was complete.    Final C-arm views were saved showing a good fracture reduction considering the degree of comminution and good hardware position.  The small wounds were irrigated copiously.  Routine closure was performed with 0 Vicryl for the small opening of the tensor fascia darius, 2-0 Vicryl for the subcutaneous layer and staples for the skin.  A sterile padded xeroform, gauze and Tegaderm dressing was applied.  The patient was gently removed from the fracture table and supine on the hospital bed.  Anesthesia was effective and well tolerated.  There were no complications of the procedure.  The patient was transferred stable to recovery.

## 2021-02-14 NOTE — PLAN OF CARE
Goal Outcome Evaluation:  Plan of Care Reviewed With: patient  Progress: improving   Pt resting between care. Returned to floor around 1500. VSS. Blood started. Assessing pain post-op, states she is having quite a bit muscle pain in the form of spasms. Will continue to monitor.

## 2021-02-14 NOTE — ANESTHESIA POSTPROCEDURE EVALUATION
Patient: Carolyne Martins    Procedure Summary     Date: 02/14/21 Room / Location: [facility] OR  /  [facility]    Anesthesia Start: 1154 Anesthesia Stop: 1306    Procedure: HIP TROCHANTER NAIL SHORT WITH INTRAMEDULLARY HIP SCREW, LEFT (Left Hip) Diagnosis:       Closed fracture of left hip, initial encounter (CMS/[facility])      (Closed fracture of left hip, initial encounter (CMS/[facility]) [S72.002A])    Surgeon: Gabriele Loyd MD Provider: Jesus Conway CRNA    Anesthesia Type: general with block ASA Status: 3          Anesthesia Type: general with block    Vitals  Vitals Value Taken Time   /63 02/14/21 1408   Temp 98.6 °F (37 °C) 02/14/21 1405   Pulse 62 02/14/21 1408   Resp 18 02/14/21 1408   SpO2 94 % 02/14/21 1408           Post Anesthesia Care and Evaluation    Patient location during evaluation: PACU  Patient participation: complete - patient participated  Level of consciousness: awake  Pain score: 3  Pain management: adequate  Airway patency: patent  Anesthetic complications: No anesthetic complications  PONV Status: controlled  Cardiovascular status: acceptable and stable  Respiratory status: acceptable and face mask  Hydration status: acceptable

## 2021-02-14 NOTE — ANESTHESIA PROCEDURE NOTES
Peripheral Block      Patient reassessed immediately prior to procedure    Reason for block: at surgeon's request and post-op pain management  Preanesthetic Checklist  Completed: patient identified, site marked, surgical consent, pre-op evaluation, timeout performed, IV checked, risks and benefits discussed and monitors and equipment checked  Prep:  Pt Position: supine  Sterile barriers:cap, gloves and mask  Prep: ChloraPrep  Patient monitoring: EKG, continuous pulse oximetry and blood pressure monitoring  Procedure  Sedation:yes    Guidance:ultrasound guided  Images:still images obtained    Block Type:fascia iliaca compartment  Injection Technique:single-shot  Needle Type:echogenic      Medications Used: bupivacaine PF (MARCAINE) injection 0.5%, 30 mL      Medications  Preservative Free Saline:30ml  Comment:Adjuncts per total volume of LA:      Post Assessment  Injection Assessment: negative aspiration for heme, no paresthesia on injection and incremental injection  Patient Tolerance:comfortable throughout block  Complications:no  Additional Notes  Procedure:          FASCIA ILIACA BLOCK                                Patient analgesia was achieved with IV Sedation( see meds)      Pt was placed in the supine position.   The insertion site was prepped in sterile fashion with Chloraprep.  A BBraun echogenic needle was advance In-plane under ultrasound guidance. The Anterior superior Iliac crest was initially visualized and the probe was directed slightly medially and slightly towards the umbilicus.  The course of the needle was tracked over the sartorius muscle through the fascia Iliacus and into the anterior portion of the Iliacus muscle.  Major vessels where identified and avoided as were structures of the peritoneal cavity.  LA injection was made incrementally in 1-5 ml amounts and spread was visualized superiorly below the fascia iliacus.  Injection was completed with negative aspiration of blood and negative  intravascular injection.  Injection pressures were normal or minimal resistance.

## 2021-02-14 NOTE — THERAPY EVALUATION
Pt presently in surgery. Will hold PT evalution until tomorrow and new orthopedic post op orders received.

## 2021-02-14 NOTE — CONSULTS
Referring Provider: Gabriele Watson MD  Reason for Consultation: Evaluation of patient with known chronic kidney disease    Subjective     Chief complaint   Chief Complaint   Patient presents with   • Fall   • Hip Injury       History of present illness:    This is a 80-year-old  female was admitted after she sustained a fall when her dog pulled her when she was holding the dog on a leash and she fell down and fractured her left hip she just underwent ORIF left femoral fracture.  The patient has history of chronic kidney disease has been under the care of Dr. Schilling for the past 20 years at least.  History of chronic anemia, degenerative joint disease, chronic back pain, coronary artery disease, chronic kidney disease stage IV, GERD, dyslipidemia, hypothyroidism, secondary hyperparathyroidism, history of vitamin D deficiency and vitamin B12 deficiency.  Her baseline creatinine has been between 2.65-2.75.  On admission yesterday was 2.93 and this morning 3.4.  And her potassium noted to be 5.3.    The patient is complaining of left hip pain related to surgery in the fall, denies any chest pain, no shortness of air, no orthopnea or PND, no nausea or vomiting, her appetite has been borderline, no dysuria or gross hematuria, no bladder outlet symptoms.  Currently has a Aguayo catheter anchored in place.  Past Medical History:   Diagnosis Date   • Anemia 1998   • Anemia    • Arthritis    • Back pain    • Bleeding from anus    • Bronchitis     STATES HAS BRONCHITIS CURRENTLY (12/5/17).     • Bronchitis 12/2017   • CAD (coronary artery disease)    • Cataract, bilateral    • Chronic kidney disease     STAGE 4.  SEES TERA   • Chronic kidney failure     REPORTS STAGE IV   • Difficulty swallowing solids    • Disease of thyroid gland    • Fracture, radius 2016    right distal radius and ulna, healed.   • GERD (gastroesophageal reflux disease)    • Gout    • High cholesterol    • History of blood transfusion 2019  "  • History of nuclear stress test 2014    DR. CORTEZ.  \"IT WAS OK\"   • Hyperparathyroidism (CMS/HCC)    • Hyperparathyroidism (CMS/HCC)    • Hypertension    • Iron deficiency    • Osteoarthritis    • Osteoarthritis of knees, bilateral     Both knees Supartz injection series August, 2015   • Osteoporosis    • Palpitations    • Personal history of cardiac murmur    • PONV (postoperative nausea and vomiting)    • Presence of pessary    • Problems with swallowing     WITH FOOD OCCASSIONALLY   • Rheumatic heart disease     Personal history   • Rotator cuff tendonitis     right    • Rupture of right proximal biceps tendon     chronic   • Seasonal allergies    • Sinus problem    • Spinal headache     REPORTS AFTER DELIVERY OF SON   • Subacromial bursitis     right    • Valvular heart disease    • Vision problems    • Vitamin B12 deficiency    • Vitamin D deficiency    • Wears glasses      Past Surgical History:   Procedure Laterality Date   • CATARACT EXTRACTION Bilateral    • CERVICAL POLYPECTOMY     • COLONOSCOPY  2011   • COLONOSCOPY N/A 1/9/2018    Procedure: COLONOSCOPY with endoscopic mucosal resection (hot snare), normal saline submucosal injection, argon thermal ablation, resolution clip placement x4, and cold biopsy polypectomy;  Surgeon: Pieter Concepcion MD;  Location: [facility] ENDOSCOPY;  Service:    • ENDOSCOPY N/A 12/6/2017    Procedure: ESOPHAGOGASTRODUODENOSCOPY with biopsies and hot snare polypectomies;  Surgeon: Pieter Concepcion MD;  Location: [facility] ENDOSCOPY;  Service:    • ENDOSCOPY N/A 1/15/2019    Procedure: ESOPHAGOGASTRODUODENOSCOPY WITH BIOPSIES AND HOT SNARE POLYPECTOMIES X10;  Surgeon: Pieter Concepcion MD;  Location: [facility] ENDOSCOPY;  Service: Gastroenterology   • UPPER GASTROINTESTINAL ENDOSCOPY  08/18/2014     Family History   Problem Relation Age of Onset   • Liver cancer Maternal Grandmother    • Gout Other    • Osteoporosis Other    • Stroke Other    • Ulcers Other    • Asthma Other    • " Hypertension Other    • Heart disease Other    • Osteoarthritis Other    • Colon cancer Neg Hx        Social History     Tobacco Use   • Smoking status: Never Smoker   • Smokeless tobacco: Never Used   Substance Use Topics   • Alcohol use: Never     Frequency: Never   • Drug use: Never     Medications Prior to Admission   Medication Sig Dispense Refill Last Dose   • allopurinol (ZYLOPRIM) 100 MG tablet Take 1 tablet by mouth daily.   2/13/2021 at 2100   • aspirin 81 MG EC tablet Take 81 mg by mouth Daily.   2/13/2021 at 0800   • budesonide-formoterol (SYMBICORT) 80-4.5 MCG/ACT inhaler Inhale 2 puffs 2 (Two) Times a Day.   2/13/2021 at 0800   • calcitriol (ROCALTROL) 0.25 MCG capsule 2 (Two) Times a Day.   2/13/2021 at 0800   • carboxymethylcellulose (REFRESH PLUS) 0.5 % solution 3 (Three) Times a Day As Needed for Dry Eyes.   2/13/2021 at 0800   • carvedilol (COREG) 25 MG tablet Take 25 mg by mouth 2 (Two) Times a Day With Meals.   2/13/2021 at 0800   • cholecalciferol (VITAMIN D3) 1000 UNITS tablet Take 1,000 Units by mouth Daily.   2/13/2021 at 0800   • Cyanocobalamin (VITAMIN B-12 PO) Take 500 mg by mouth 2 (Two) Times a Day.   2/13/2021 at 0800   • epoetin ovidio (PROCRIT) 79017 UNIT/ML injection Inject 40,000 Units under the skin into the appropriate area as directed. Every 2 weeks.  Due Wednesday, Feb 17th   Past Month at Unknown time   • irbesartan (AVAPRO) 150 MG tablet Take 150 mg by mouth Daily.   2/13/2021 at 0800   • omeprazole (PriLOSEC) 40 MG capsule Take 40 mg by mouth Daily.   2/13/2021 at 0800   • docusate sodium (COLACE) 100 MG capsule Take 1 capsule by mouth 2 (Two) Times a Day. (Patient taking differently: Take 100 mg by mouth 2 (Two) Times a Day As Needed for Constipation.) 60 capsule 5 Unknown at Unknown time   • erythromycin (ROMYCIN) 5 MG/GM ophthalmic ointment Administer  into the left eye Every Night.   Unknown at 2100   • estradiol (ESTRACE) 0.1 MG/GM vaginal cream Massage 1 gram in vaginal  "opening twice weekly 42.5 g 2 Unknown at Unknown time   • fluticasone (FLONASE) 50 MCG/ACT nasal spray 2 sprays into each nostril Daily As Needed.   Unknown at 2100   • ondansetron ODT (ZOFRAN-ODT) 4 MG disintegrating tablet Place 4 mg on the tongue Every 8 (Eight) Hours As Needed.   Unknown at Unknown time   • Probiotic Product (PROBIOTIC DAILY PO) Take  by mouth Daily As Needed.   Unknown at Unknown time   • rosuvastatin (CRESTOR) 10 MG tablet Take 10 mg by mouth Daily.   Unknown at 2100   • torsemide (DEMADEX) 10 MG tablet Take 5 mg by mouth Daily. Takes Monday, Wednesday, Fridays   Unknown at Unknown time     Allergies:  Ferrlecit [na ferric gluc cplx in sucrose], Ranitidine hcl, Levofloxacin, and Lisinopril    Review of Systems  14 points review of system was performed and it was negative other than what noted above in the HPI    Objective     Vital Signs  Temp:  [97.5 °F (36.4 °C)-98.6 °F (37 °C)] 98.2 °F (36.8 °C)  Heart Rate:  [58-95] 67  Resp:  [14-19] 18  BP: ()/(52-90) 147/90    Flowsheet Rows      First Filed Value   Admission Height  149.9 cm (59\") Documented at 02/13/2021 1053   Admission Weight  50.3 kg (111 lb) Documented at 02/13/2021 1053           I/O this shift:  In: 200 [I.V.:200]  Out: 200 [Urine:200]  I/O last 3 completed shifts:  In: 1108.3 [I.V.:108.3; IV Piggyback:1000]  Out: 350 [Urine:350]    Intake/Output Summary (Last 24 hours) at 2/14/2021 1518  Last data filed at 2/14/2021 1400  Gross per 24 hour   Intake 1308.33 ml   Output 550 ml   Net 758.33 ml       Physical Exam:  General Appearance: alert, oriented x 3, no acute distress, appears to be chronically ill and frail  Skin: warm and dry  HEENT: pupils round and reactive to light, oral mucosa dry, nonicteric sclera  Neck: No JVD, trachea midline, no cervical or supraclavicular lymphadenopathy, I did not appreciate carotid bruit  Lungs: CTA, unlabored breathing effort  Heart: RRR, normal S1 and S2, no S3, no rub  Abdomen: soft, " nontender, normoactive bowels  : no palpable bladder, Aguayo catheter is anchored in place  Extremities: no edema, cyanosis or clubbing  Neuro: normal speech and mental status    Results Review:  Results for orders placed or performed during the hospital encounter of 02/13/21   COVID-19[facility] IN-HOUSE,Nasal Swab No Transport Media 3-4 HR TAT - Swab, Nasal Cavity    Specimen: Nasal Cavity; Swab   Result Value Ref Range    COVID19 Not Detected Not Detected - Ref. Range   Comprehensive Metabolic Panel    Specimen: Blood   Result Value Ref Range    Glucose 115 (H) 65 - 99 mg/dL    BUN 52 (H) 8 - 23 mg/dL    Creatinine 2.93 (H) 0.57 - 1.00 mg/dL    Sodium 136 136 - 145 mmol/L    Potassium 4.5 3.5 - 5.2 mmol/L    Chloride 102 98 - 107 mmol/L    CO2 25.6 22.0 - 29.0 mmol/L    Calcium 9.5 8.6 - 10.5 mg/dL    Total Protein 6.0 6.0 - 8.5 g/dL    Albumin 3.40 (L) 3.50 - 5.20 g/dL    ALT (SGPT) 9 1 - 33 U/L    AST (SGOT) 16 1 - 32 U/L    Alkaline Phosphatase 58 39 - 117 U/L    Total Bilirubin 0.4 0.0 - 1.2 mg/dL    eGFR Non African Amer 16 (L) >60 mL/min/1.73    Globulin 2.6 gm/dL    A/G Ratio 1.3 g/dL    BUN/Creatinine Ratio 17.7 7.0 - 25.0    Anion Gap 8.4 5.0 - 15.0 mmol/L   Troponin    Specimen: Blood   Result Value Ref Range    Troponin T <0.010 0.000 - 0.030 ng/mL   CBC Auto Differential    Specimen: Blood   Result Value Ref Range    WBC 7.60 3.40 - 10.80 10*3/mm3    RBC 3.06 (L) 3.77 - 5.28 10*6/mm3    Hemoglobin 9.2 (L) 12.0 - 15.9 g/dL    Hematocrit 29.3 (L) 34.0 - 46.6 %    MCV 95.8 79.0 - 97.0 fL    MCH 30.1 26.6 - 33.0 pg    MCHC 31.4 (L) 31.5 - 35.7 g/dL    RDW 17.3 (H) 12.3 - 15.4 %    RDW-SD 60.1 (H) 37.0 - 54.0 fl    MPV 10.0 6.0 - 12.0 fL    Platelets 202 140 - 450 10*3/mm3    Neutrophil % 76.0 42.7 - 76.0 %    Lymphocyte % 11.1 (L) 19.6 - 45.3 %    Monocyte % 7.5 5.0 - 12.0 %    Eosinophil % 4.9 0.3 - 6.2 %    Basophil % 0.1 0.0 - 1.5 %    Immature Grans % 0.4 0.0 - 0.5 %    Neutrophils, Absolute 5.78 1.70  - 7.00 10*3/mm3    Lymphocytes, Absolute 0.84 0.70 - 3.10 10*3/mm3    Monocytes, Absolute 0.57 0.10 - 0.90 10*3/mm3    Eosinophils, Absolute 0.37 0.00 - 0.40 10*3/mm3    Basophils, Absolute 0.01 0.00 - 0.20 10*3/mm3    Immature Grans, Absolute 0.03 0.00 - 0.05 10*3/mm3    nRBC 0.0 0.0 - 0.2 /100 WBC   Urinalysis With Culture If Indicated - Urine, Clean Catch    Specimen: Urine, Clean Catch   Result Value Ref Range    Color, UA Yellow Yellow, Straw    Appearance, UA Clear Clear    pH, UA <=5.0 5.0 - 8.0    Specific Gravity, UA 1.015 1.005 - 1.030    Glucose, UA Negative Negative    Ketones, UA Negative Negative    Bilirubin, UA Negative Negative    Blood, UA Negative Negative    Protein, UA 30 mg/dL (1+) (A) Negative    Leuk Esterase, UA Negative Negative    Nitrite, UA Negative Negative    Urobilinogen, UA 0.2 E.U./dL 0.2 - 1.0 E.U./dL   Urinalysis, Microscopic Only - Urine, Clean Catch    Specimen: Urine, Clean Catch   Result Value Ref Range    RBC, UA None Seen None Seen /HPF    WBC, UA None Seen None Seen /HPF    Bacteria, UA Trace (A) None Seen /HPF    Squamous Epithelial Cells, UA 0-2 None Seen, 0-2 /HPF    Hyaline Casts, UA None Seen None Seen /LPF    Methodology Manual Light Microscopy    Basic Metabolic Panel    Specimen: Blood   Result Value Ref Range    Glucose 110 (H) 65 - 99 mg/dL    BUN 60 (H) 8 - 23 mg/dL    Creatinine 3.40 (H) 0.57 - 1.00 mg/dL    Sodium 135 (L) 136 - 145 mmol/L    Potassium 5.3 (H) 3.5 - 5.2 mmol/L    Chloride 104 98 - 107 mmol/L    CO2 23.7 22.0 - 29.0 mmol/L    Calcium 8.3 (L) 8.6 - 10.5 mg/dL    eGFR  African Amer      eGFR Non African Amer 13 (L) >60 mL/min/1.73    BUN/Creatinine Ratio 17.6 7.0 - 25.0    Anion Gap 7.3 5.0 - 15.0 mmol/L   CBC Auto Differential    Specimen: Blood   Result Value Ref Range    WBC 5.47 3.40 - 10.80 10*3/mm3    RBC 2.60 (L) 3.77 - 5.28 10*6/mm3    Hemoglobin 7.6 (L) 12.0 - 15.9 g/dL    Hematocrit 24.4 (L) 34.0 - 46.6 %    MCV 93.8 79.0 - 97.0 fL    MCH  29.2 26.6 - 33.0 pg    MCHC 31.1 (L) 31.5 - 35.7 g/dL    RDW 17.2 (H) 12.3 - 15.4 %    RDW-SD 58.9 (H) 37.0 - 54.0 fl    MPV 10.4 6.0 - 12.0 fL    Platelets 176 140 - 450 10*3/mm3    Neutrophil % 62.8 42.7 - 76.0 %    Lymphocyte % 19.4 (L) 19.6 - 45.3 %    Monocyte % 13.9 (H) 5.0 - 12.0 %    Eosinophil % 3.3 0.3 - 6.2 %    Basophil % 0.2 0.0 - 1.5 %    Immature Grans % 0.4 0.0 - 0.5 %    Neutrophils, Absolute 3.44 1.70 - 7.00 10*3/mm3    Lymphocytes, Absolute 1.06 0.70 - 3.10 10*3/mm3    Monocytes, Absolute 0.76 0.10 - 0.90 10*3/mm3    Eosinophils, Absolute 0.18 0.00 - 0.40 10*3/mm3    Basophils, Absolute 0.01 0.00 - 0.20 10*3/mm3    Immature Grans, Absolute 0.02 0.00 - 0.05 10*3/mm3    nRBC 0.0 0.0 - 0.2 /100 WBC   POC Glucose Once    Specimen: Blood   Result Value Ref Range    Glucose 110 70 - 130 mg/dL   Type & Screen    Specimen: Arm, Right; Blood   Result Value Ref Range    ABO Type O     RH type Positive     Antibody Screen Negative     T&S Expiration Date 2/16/2021 11:59:59 PM    Prepare RBC, 1 Units   Result Value Ref Range    Product Code [id number]     Unit Number [id number]     UNIT  ABO O     UNIT  RH POS     Crossmatch Interpretation Compatible     Dispense Status IS     Blood Expiration Date [id number]     Blood Type Barcode 5100      Imaging Results (Last 72 Hours)     Procedure Component Value Units Date/Time    FL C Arm During Surgery [[id number]] Resulted: 02/14/21 1248     Updated: 02/14/21 1248    Narrative:      This procedure was auto-finalized with no dictation required.    US Gayle OR Procedure [[id number]] Resulted: 02/14/21 1025     Updated: 02/14/21 1025    XR Chest 1 View [[id number]] Collected: 02/13/21 1206     Updated: 02/13/21 1211    Narrative:      PORTABLE CHEST    2/13/2021 11:48 AM      HISTORY: Fall     COMPARISON: None.     FINDINGS: Heart size upper normal. Lungs and pleural spaces are clear.  No acute fracture. Chronic right rotator cuff tear.           Impression:       No acute cardiopulmonary process .              AP PELVIS, 2V LEFT HIP     HISTORY: Fall.      FINDINGS:  A three view exam demonstrates an acute comminuted  intertrochanteric fracture of the proximal left femur with angulation.  Femoral head remains in the acetabulum. Pelvis is otherwise intact.  Severe degenerative change at L5-S1.        IMPRESSION: Acute intertrochanteric fracture of the left hip.     This report was finalized on 2/13/2021 12:09 PM by Dr Chencho Hughes DO.    XR Hip With or Without Pelvis 2 - 3 View Left [[id number]] Collected: 02/13/21 1206     Updated: 02/13/21 1211    Narrative:      PORTABLE CHEST    2/13/2021 11:48 AM      HISTORY: Fall     COMPARISON: None.     FINDINGS: Heart size upper normal. Lungs and pleural spaces are clear.  No acute fracture. Chronic right rotator cuff tear.           Impression:      No acute cardiopulmonary process .              AP PELVIS, 2V LEFT HIP     HISTORY: Fall.      FINDINGS:  A three view exam demonstrates an acute comminuted  intertrochanteric fracture of the proximal left femur with angulation.  Femoral head remains in the acetabulum. Pelvis is otherwise intact.  Severe degenerative change at L5-S1.        IMPRESSION: Acute intertrochanteric fracture of the left hip.     This report was finalized on 2/13/2021 12:09 PM by Dr Chencho Hughes DO.            budesonide-formoterol, 2 puff, Inhalation, BID - RT  carvedilol, 25 mg, Oral, BID With Meals  ceFAZolin, 1 g, Intravenous, Q8H  [START ON 2/15/2021] enoxaparin, 30 mg, Subcutaneous, Daily  erythromycin, , Left Eye, Nightly  losartan, 50 mg, Oral, Q24H  pantoprazole, 40 mg, Oral, QAM  rosuvastatin, 10 mg, Oral, Nightly  sodium chloride, 10 mL, Intravenous, Q12H  sodium chloride, 3 mL, Intravenous, Q12H  [START ON 2/15/2021] torsemide, 5 mg, Oral, Daily           Assessment/Plan   1.  Acute kidney injury on chronic kidney disease stage IV the etiology not very clear at this time she had 1 measurement of blood  pressure was in the 98 last night currently has Aguayo catheter anchored in place I am not certain if she had urinary retention before that.  The patient has been on ARB and if she had any hemodynamic changes the ARB presents will not allow the kidney to autoregulate.  2.  Chronic kidney disease stage IV baseline creatinine 2.5-2.75 associated with hypertension  3.  Mechanical fall with fractured left femur patient underwent ORIF fractured left femur today  4.  Anemia on admission hemoglobin 9.2 and this morning was 7.6  5.  Hypertension, controlled  6.  Gout quiescent      Plan:  1.  Check random urine for sodium, chloride and protein to creatinine ratio  2.  Discontinue the ARB and the diuretics for now  3.  Reassess volume status and renal function and adjust diuretics and other medication tomorrow.  4.  Surveillance labs    Thank you for asking me to see this patient in consultation       I discussed the patient's findings and my recommendations with the patient and her son at the bedside.    Deniz Rdorigez MD  02/14/21  15:18 EST      Much of this encounter note is an electronic transcription/translation of spoken language to printed text. The electronic translation of spoken language may permit erroneous, or at times, nonsensical words or phrases to be inadvertently transcribed; Although I have reviewed the note for such errors, some may still exist

## 2021-02-14 NOTE — PROGRESS NOTES
[facility]    Name:  Carolyne Martins   Age:  80 y.o.  Sex:  female  :  1940  MRN:  [id number]   Visit Number:  [id number]  Admission Date:  2021  Date Of Service:  21  Primary Care Physician:  Jerald Dawkins MD     LOS: 1 day :  Patient Care Team:  Jerald Dawkins MD as PCP - General  [facility] (General Surgery):    Chief Complaint:      Left hip pain    Subjective / Interval History:     Patient resting comfortably in bed.  Pain well controlled.  Anxious about surgery but feels well.  No acute events overnight per nursing staff.    Review of Systems:     General ROS: Patient denies any fevers, chills or loss of consciousness.  Respiratory ROS: Denies cough or shortness of breath.  Cardiovascular ROS: Denies chest pain or palpitations. No history of exertional chest pain.  Gastrointestinal ROS: Denies nausea and vomiting. Denies any abdominal pain. No diarrhea.  Neurological ROS: Denies any focal weakness. No loss of consciousness. Denies any numbness.  Dermatological ROS: Denies any redness or pruritis.    Vital Signs:    Temp:  [97.5 °F (36.4 °C)-98.6 °F (37 °C)] 98.6 °F (37 °C)  Heart Rate:  [] 65  Resp:  [14-19] 18  BP: ()/() 128/57    Intake and output:    I/O last 3 completed shifts:  In: 1108.3 [I.V.:108.3; IV Piggyback:1000]  Out: 350 [Urine:350]  I/O this shift:  In: 200 [I.V.:200]  Out: 200 [Urine:200]    Physical Examination:    General Appearance:  Alert and cooperative, not in any acute distress.   Head:  Atraumatic and normocephalic, without obvious abnormality.   Eyes:          PERRLA, conjunctivae and sclerae normal, no Icterus. No pallor. Extraocular movements are within normal limits.   Neck: Supple, trachea midline, no thyromegaly, no carotid bruit.   Lungs:   Chest shape is normal. Breath sounds heard bilaterally equally.  No crackles or wheezing. No Pleural rub or bronchial  breathing.   Heart:  Normal S1 and S2, no murmur, no gallop, no rub. No JVD   Abdomen:   Normal bowel sounds, no masses, no organomegaly. Soft, nontender, nondistended, no guarding, no rebound tenderness.   Extremities: Left leg shorter than right, internally rotated.  No edema, no cyanosis, no clubbing.   Skin: No bleeding, bruising or rash.   Neurologic: Awake, alert and oriented times 3. Moves all 4 extremities equally.     Laboratory results:    Results from last 7 days   Lab Units 02/14/21  0604 02/13/21  1215   SODIUM mmol/L 135* 136   POTASSIUM mmol/L 5.3* 4.5   CHLORIDE mmol/L 104 102   CO2 mmol/L 23.7 25.6   BUN mg/dL 60* 52*   CREATININE mg/dL 3.40* 2.93*   CALCIUM mg/dL 8.3* 9.5   BILIRUBIN mg/dL  --  0.4   ALK PHOS U/L  --  58   ALT (SGPT) U/L  --  9   AST (SGOT) U/L  --  16   GLUCOSE mg/dL 110* 115*     Results from last 7 days   Lab Units 02/14/21  0604 02/13/21  1215   WBC 10*3/mm3 5.47 7.60   HEMOGLOBIN g/dL 7.6* 9.2*   HEMATOCRIT % 24.4* 29.3*   PLATELETS 10*3/mm3 176 202         Results from last 7 days   Lab Units 02/13/21  1215   TROPONIN T ng/mL <0.010               I have reviewed the patient's laboratory results.    Radiology results:    Imaging Results (Last 24 Hours)     Procedure Component Value Units Date/Time    FL C Arm During Surgery [[id number]] Resulted: 02/14/21 1248     Updated: 02/14/21 1248    Narrative:      This procedure was auto-finalized with no dictation required.    US [facility] OR Procedure [[id number]] Resulted: 02/14/21 1025     Updated: 02/14/21 1025          I have reviewed the patient's radiology reports.    Medication Review:     I have reviewed the patient's active and prn medications.       Closed fracture of left hip (CMS/[facility])      Assessment:    Left hip fracture secondary to mechanical fall, POA  CKD stage IV  Hypertension  Hyperlipidemia  Osteoarthritis  Osteoporosis  Age-related chronic debility    Plan:    Continue to monitor patient in hospital.  Taken down for  surgical intervention on 2/14 by Dr. Loyd.  Have ordered 1 unit PRBC transfusion in anticipation of blood loss as prior to surgery patient's hemoglobin is 7.3.  Continue supportive care.  Pain control and antiemetics as needed.  Heparin for DVT prophylaxis to start post surgery.  PT/OT.  Have asked nephrology to consult given CKD stage IV.  Further orders as clinical course dictates.    Stepan Peters,   02/14/21  14:11 EST    Dictated utilizing Dragon dictation.

## 2021-02-14 NOTE — PLAN OF CARE
Goal Outcome Evaluation:  Plan of Care Reviewed With: patient  Progress: improving  Outcome Summary: VS AND UOP IMPROVED,  PT RECEIVED 1000 ML NS BOLUS AND IVF'S  ML/HR FOR 1 LITER.  PAIN MANAGED WITH MORPHINE 2 MG IV.   NPO AFTER MIDNIGHT FOR PENDING SURGERY IN AM.

## 2021-02-14 NOTE — ANESTHESIA PREPROCEDURE EVALUATION
Anesthesia Evaluation     Patient summary reviewed and Nursing notes reviewed   history of anesthetic complications: PONV  NPO Solid Status: > 8 hours  NPO Liquid Status: > 8 hours           Airway   Mallampati: I  TM distance: >3 FB  Neck ROM: full  no difficulty expected  Dental - normal exam     Pulmonary - negative pulmonary ROS and normal exam   Cardiovascular - normal exam    (+) hypertension, CAD, hyperlipidemia,       Neuro/Psych- negative ROS  GI/Hepatic/Renal/Endo    (+)  GERD,  renal disease,     Musculoskeletal     (+) back pain,   Abdominal    Substance History - negative use     OB/GYN negative ob/gyn ROS         Other - negative ROS                       Anesthesia Plan    ASA 3     general with block     intravenous induction     Anesthetic plan, all risks, benefits, and alternatives have been provided, discussed and informed consent has been obtained with: patient.

## 2021-02-14 NOTE — ANESTHESIA PROCEDURE NOTES
Airway  Urgency: elective    Date/Time: 2/14/2021 11:54 AM  Airway not difficult    General Information and Staff    Patient location during procedure: OR  CRNA: Jesus Conway CRNA    Indications and Patient Condition  Indications for airway management: airway protection    Preoxygenated: yes  Mask difficulty assessment: 0 - not attempted    Final Airway Details  Final airway type: supraglottic airway      Successful airway: classic  Size 3    Number of attempts at approach: 1  Assessment: lips, teeth, and gum same as pre-op and atraumatic intubation    Additional Comments  Airway pressure leak at <20cm/h20

## 2021-02-14 NOTE — PROGRESS NOTES
Discharge Planning Assessment   [place]     Patient Name: Carolyne Martins  MRN: [id number]  Today's Date: 2/14/2021    Admit Date: 2/13/2021    Discharge Needs Assessment     Row Name 02/14/21 1116       Living Environment    Lives With  alone    Current Living Arrangements  home/apartment/condo    Duration at Residence  over 70 years with no safety issues voiced    Primary Care Provided by  self    Provides Primary Care For  no one    Family Caregiver if Needed  none    Quality of Family Relationships  supportive    Able to Return to Prior Arrangements  yes       Resource/Environmental Concerns    Resource/Environmental Concerns  none       Transition Planning    Patient/Family Anticipates Transition to  home    Patient/Family Anticipated Services at Transition  home health care Pt chose [facility]    Transportation Anticipated  health plan transportation;family or friend will provide       Discharge Needs Assessment    Equipment Currently Used at Home  cane, straight    Concerns to be Addressed  discharge planning    Equipment Needed After Discharge  walker, rolling    Patient's Choice of Community Agency(s)  Pt chose [facility] and if needs rehab than [facility]  Healthcare @ [facility]        Discharge Plan     Row Name 02/14/21 4466       Plan    Plan  Spoke with pt and pt's son about DCP   Confirmed current address and contacts  Magno Martins, son [phone] (cell) and [phone] (landline)  and Linda Banda, friend [phone]   No POA   PCP Jerald Dawkins MD  Pt is very independent and can perform all ADL good    Pt's son Magno lives across the street and available  as well as her friend Linda Banda and if they need someone to stay they would figure it out   Pt will go to [facility] if needed   Pt prefers [facility]   Pt uses cane prn but has no other DME   Will continue to assess pt for any further needs prior to discharge        Continued Care and Services -  Admitted Since 2/13/2021    Coordination has not been started for this encounter.         Demographic Summary     Row Name 02/14/21 1114       General Information    Admission Type  inpatient    Arrived From  emergency department    Referral Source  admission list    Reason for Consult  discharge planning    Preferred Language  English       Contact Information    Permission Granted to Share Info With      Contact Information Obtained for          Functional Status     Row Name 02/14/21 1115       Functional Status    Usual Activity Tolerance  good    Functional Status Comments  Pt states she lives alone and is independent and can perform all ADL good       Functional Status, IADL    Medications  independent    Meal Preparation  independent    Housekeeping  independent    Laundry  independent    Shopping  independent       Employment/    Employment Status  retired        Psychosocial    No documentation.       Abuse/Neglect    No documentation.       Legal    No documentation.       Substance Abuse    No documentation.       Patient Forms    No documentation.           Paty Roberts RN

## 2021-02-15 ENCOUNTER — APPOINTMENT (OUTPATIENT)
Dept: ULTRASOUND IMAGING | Facility: HOSPITAL | Age: 81
End: 2021-02-15

## 2021-02-15 ENCOUNTER — APPOINTMENT (OUTPATIENT)
Dept: INFUSION THERAPY | Facility: HOSPITAL | Age: 81
End: 2021-02-15

## 2021-02-15 LAB
ALBUMIN SERPL-MCNC: 2.7 G/DL (ref 3.5–5.2)
ALBUMIN/GLOB SERPL: 1.2 G/DL
ALP SERPL-CCNC: 45 U/L (ref 39–117)
ALT SERPL W P-5'-P-CCNC: 5 U/L (ref 1–33)
ANION GAP SERPL CALCULATED.3IONS-SCNC: 7.6 MMOL/L (ref 5–15)
AST SERPL-CCNC: 17 U/L (ref 1–32)
BASOPHILS # BLD AUTO: 0.02 10*3/MM3 (ref 0–0.2)
BASOPHILS NFR BLD AUTO: 0.3 % (ref 0–1.5)
BH BB BLOOD EXPIRATION DATE: NORMAL
BH BB BLOOD TYPE BARCODE: 5100
BH BB DISPENSE STATUS: NORMAL
BH BB PRODUCT CODE: NORMAL
BH BB UNIT NUMBER: NORMAL
BILIRUB SERPL-MCNC: 0.3 MG/DL (ref 0–1.2)
BUN SERPL-MCNC: 56 MG/DL (ref 8–23)
BUN/CREAT SERPL: 16.8 (ref 7–25)
CALCIUM SPEC-SCNC: 7.9 MG/DL (ref 8.6–10.5)
CHLORIDE SERPL-SCNC: 107 MMOL/L (ref 98–107)
CO2 SERPL-SCNC: 21.4 MMOL/L (ref 22–29)
CREAT SERPL-MCNC: 3.33 MG/DL (ref 0.57–1)
CROSSMATCH INTERPRETATION: NORMAL
DEPRECATED RDW RBC AUTO: 57.7 FL (ref 37–54)
EOSINOPHIL # BLD AUTO: 0.12 10*3/MM3 (ref 0–0.4)
EOSINOPHIL NFR BLD AUTO: 2.1 % (ref 0.3–6.2)
ERYTHROCYTE [DISTWIDTH] IN BLOOD BY AUTOMATED COUNT: 16.8 % (ref 12.3–15.4)
GFR SERPL CREATININE-BSD FRML MDRD: 13 ML/MIN/1.73
GFR SERPL CREATININE-BSD FRML MDRD: ABNORMAL ML/MIN/{1.73_M2}
GLOBULIN UR ELPH-MCNC: 2.3 GM/DL
GLUCOSE SERPL-MCNC: 95 MG/DL (ref 65–99)
HCT VFR BLD AUTO: 26.6 % (ref 34–46.6)
HGB BLD-MCNC: 8.3 G/DL (ref 12–15.9)
IMM GRANULOCYTES # BLD AUTO: 0.01 10*3/MM3 (ref 0–0.05)
IMM GRANULOCYTES NFR BLD AUTO: 0.2 % (ref 0–0.5)
LYMPHOCYTES # BLD AUTO: 0.83 10*3/MM3 (ref 0.7–3.1)
LYMPHOCYTES NFR BLD AUTO: 14.3 % (ref 19.6–45.3)
MAGNESIUM SERPL-MCNC: 1.7 MG/DL (ref 1.6–2.4)
MCH RBC QN AUTO: 29.7 PG (ref 26.6–33)
MCHC RBC AUTO-ENTMCNC: 31.2 G/DL (ref 31.5–35.7)
MCV RBC AUTO: 95.3 FL (ref 79–97)
MONOCYTES # BLD AUTO: 0.71 10*3/MM3 (ref 0.1–0.9)
MONOCYTES NFR BLD AUTO: 12.2 % (ref 5–12)
NEUTROPHILS NFR BLD AUTO: 4.11 10*3/MM3 (ref 1.7–7)
NEUTROPHILS NFR BLD AUTO: 70.9 % (ref 42.7–76)
NRBC BLD AUTO-RTO: 0 /100 WBC (ref 0–0.2)
PHOSPHATE SERPL-MCNC: 4.6 MG/DL (ref 2.5–4.5)
PLATELET # BLD AUTO: 136 10*3/MM3 (ref 140–450)
PMV BLD AUTO: 10.7 FL (ref 6–12)
POTASSIUM SERPL-SCNC: 5.1 MMOL/L (ref 3.5–5.2)
PROT SERPL-MCNC: 5 G/DL (ref 6–8.5)
RBC # BLD AUTO: 2.79 10*6/MM3 (ref 3.77–5.28)
SODIUM SERPL-SCNC: 136 MMOL/L (ref 136–145)
UNIT  ABO: NORMAL
UNIT  RH: NORMAL
URATE SERPL-MCNC: 5.8 MG/DL (ref 2.4–5.7)
WBC # BLD AUTO: 5.8 10*3/MM3 (ref 3.4–10.8)

## 2021-02-15 PROCEDURE — 25010000003 CEFAZOLIN SODIUM-DEXTROSE 1-4 GM-%(50ML) RECONSTITUTED SOLUTION: Performed by: ORTHOPAEDIC SURGERY

## 2021-02-15 PROCEDURE — 93971 EXTREMITY STUDY: CPT

## 2021-02-15 PROCEDURE — 25010000002 EPOETIN ALFA-EPBX 40000 UNIT/ML SOLUTION: Performed by: INTERNAL MEDICINE

## 2021-02-15 PROCEDURE — 85025 COMPLETE CBC W/AUTO DIFF WBC: CPT | Performed by: INTERNAL MEDICINE

## 2021-02-15 PROCEDURE — 94799 UNLISTED PULMONARY SVC/PX: CPT

## 2021-02-15 PROCEDURE — 99024 POSTOP FOLLOW-UP VISIT: CPT | Performed by: PHYSICIAN ASSISTANT

## 2021-02-15 PROCEDURE — 83735 ASSAY OF MAGNESIUM: CPT | Performed by: INTERNAL MEDICINE

## 2021-02-15 PROCEDURE — 97161 PT EVAL LOW COMPLEX 20 MIN: CPT

## 2021-02-15 PROCEDURE — 97165 OT EVAL LOW COMPLEX 30 MIN: CPT

## 2021-02-15 PROCEDURE — 80053 COMPREHEN METABOLIC PANEL: CPT | Performed by: INTERNAL MEDICINE

## 2021-02-15 PROCEDURE — 25010000002 ENOXAPARIN PER 10 MG: Performed by: ORTHOPAEDIC SURGERY

## 2021-02-15 PROCEDURE — 84100 ASSAY OF PHOSPHORUS: CPT | Performed by: INTERNAL MEDICINE

## 2021-02-15 PROCEDURE — 99232 SBSQ HOSP IP/OBS MODERATE 35: CPT | Performed by: FAMILY MEDICINE

## 2021-02-15 PROCEDURE — 84550 ASSAY OF BLOOD/URIC ACID: CPT | Performed by: INTERNAL MEDICINE

## 2021-02-15 RX ORDER — MORPHINE SULFATE 2 MG/ML
1 INJECTION, SOLUTION INTRAMUSCULAR; INTRAVENOUS
Status: DISCONTINUED | OUTPATIENT
Start: 2021-02-15 | End: 2021-02-18 | Stop reason: HOSPADM

## 2021-02-15 RX ORDER — CHOLECALCIFEROL (VITAMIN D3) 125 MCG
500 CAPSULE ORAL 2 TIMES DAILY
Status: DISCONTINUED | OUTPATIENT
Start: 2021-02-15 | End: 2021-02-18 | Stop reason: HOSPADM

## 2021-02-15 RX ORDER — CALCITRIOL 0.25 UG/1
0.5 CAPSULE, LIQUID FILLED ORAL DAILY
Status: DISCONTINUED | OUTPATIENT
Start: 2021-02-15 | End: 2021-02-18 | Stop reason: HOSPADM

## 2021-02-15 RX ORDER — MULTIPLE VITAMINS W/ MINERALS TAB 9MG-400MCG
1 TAB ORAL DAILY
Status: DISCONTINUED | OUTPATIENT
Start: 2021-02-15 | End: 2021-02-18 | Stop reason: HOSPADM

## 2021-02-15 RX ORDER — NALOXONE HCL 0.4 MG/ML
0.4 VIAL (ML) INJECTION
Status: DISCONTINUED | OUTPATIENT
Start: 2021-02-15 | End: 2021-02-18 | Stop reason: HOSPADM

## 2021-02-15 RX ADMIN — CEFAZOLIN SODIUM 1 G: 1 SOLUTION INTRAVENOUS at 03:14

## 2021-02-15 RX ADMIN — CALCIUM CARBONATE 2 TABLET: 500 TABLET, CHEWABLE ORAL at 08:40

## 2021-02-15 RX ADMIN — CARVEDILOL 25 MG: 25 TABLET, FILM COATED ORAL at 18:22

## 2021-02-15 RX ADMIN — ROSUVASTATIN CALCIUM 10 MG: 5 TABLET, FILM COATED ORAL at 21:39

## 2021-02-15 RX ADMIN — CYANOCOBALAMIN TAB 500 MCG 500 MCG: 500 TAB at 21:39

## 2021-02-15 RX ADMIN — SODIUM CHLORIDE, PRESERVATIVE FREE 10 ML: 5 INJECTION INTRAVENOUS at 08:35

## 2021-02-15 RX ADMIN — ERYTHROMYCIN: 5 OINTMENT OPHTHALMIC at 21:39

## 2021-02-15 RX ADMIN — CYCLOBENZAPRINE HYDROCHLORIDE 5 MG: 10 TABLET, FILM COATED ORAL at 14:28

## 2021-02-15 RX ADMIN — HYDROCODONE BITARTRATE AND ACETAMINOPHEN 1 TABLET: 5; 325 TABLET ORAL at 12:10

## 2021-02-15 RX ADMIN — EPOETIN ALFA-EPBX 40000 UNITS: 40000 INJECTION, SOLUTION INTRAVENOUS; SUBCUTANEOUS at 12:45

## 2021-02-15 RX ADMIN — SODIUM CHLORIDE, PRESERVATIVE FREE 3 ML: 5 INJECTION INTRAVENOUS at 21:39

## 2021-02-15 RX ADMIN — MULTIPLE VITAMINS W/ MINERALS TAB 1 TABLET: TAB at 12:45

## 2021-02-15 RX ADMIN — CALCIUM CARBONATE 2 TABLET: 500 TABLET, CHEWABLE ORAL at 18:43

## 2021-02-15 RX ADMIN — ENOXAPARIN SODIUM 30 MG: 30 INJECTION SUBCUTANEOUS at 08:39

## 2021-02-15 RX ADMIN — SODIUM CHLORIDE, PRESERVATIVE FREE 3 ML: 5 INJECTION INTRAVENOUS at 08:35

## 2021-02-15 RX ADMIN — CARVEDILOL 25 MG: 25 TABLET, FILM COATED ORAL at 08:39

## 2021-02-15 RX ADMIN — PANTOPRAZOLE SODIUM 40 MG: 40 TABLET, DELAYED RELEASE ORAL at 06:46

## 2021-02-15 RX ADMIN — CYCLOBENZAPRINE HYDROCHLORIDE 5 MG: 10 TABLET, FILM COATED ORAL at 21:39

## 2021-02-15 RX ADMIN — BUDESONIDE AND FORMOTEROL FUMARATE DIHYDRATE 2 PUFF: 80; 4.5 AEROSOL RESPIRATORY (INHALATION) at 07:16

## 2021-02-15 RX ADMIN — CALCITRIOL CAPSULES 0.25 MCG 0.5 MCG: 0.25 CAPSULE ORAL at 14:28

## 2021-02-15 RX ADMIN — SODIUM CHLORIDE, PRESERVATIVE FREE 10 ML: 5 INJECTION INTRAVENOUS at 21:40

## 2021-02-15 RX ADMIN — BUDESONIDE AND FORMOTEROL FUMARATE DIHYDRATE 2 PUFF: 80; 4.5 AEROSOL RESPIRATORY (INHALATION) at 19:30

## 2021-02-15 NOTE — PLAN OF CARE
Goal Outcome Evaluation:   Spoke with pt at her bedside.  Pt was alert and conversational.  I provided support as pt talked about her fall.  Pt stated she had wanted to  her doorway, but her dog pulled her off balance causing her to fall.  Pt also stated hip replacement wasn't necessary, and that she appreciated the care of all [facility] physicians and those caring for her.

## 2021-02-15 NOTE — PROGRESS NOTES
[facility]    Name:  Carolyne Martins   Age:  80 y.o.  Sex:  female  :  1940  MRN:  [id number]   Visit Number:  [id number]  Admission Date:  2021  Date Of Service:  02/15/21  Primary Care Physician:  Jerald Dawkins MD     LOS: 2 days :  Patient Care Team:  Jerald Dawkins MD as PCP - General  Daryn Spears MD as Consulting Physician (General Surgery):    Chief Complaint:      Follow-up on hip fracture    Subjective / Interval History:     Patient was seen and examined this morning.  Underwent hip surgery yesterday.  Still with significant mount of pain.  Notes pain medicine makes her very sleepy.  Denies any fevers or chills.  Has not been out of bed any.  Did discuss rehab with her, she is agreeable to looking into facilities.  Does not have any help at home other than a son who does work however.    Prior work-up:  80-year-old female who unfortunately sustained a mechanical fall at home outside while trying to walk her dog.  She had evidence of a left hip comminuted displaced and unstable intertrochanteric fracture with subsequent ORIF on 2021.    Review of Systems:     General ROS: Patient denies any fevers, chills or loss of consciousness.  Respiratory ROS: Denies cough or shortness of breath.  Cardiovascular ROS: Denies chest pain or palpitations. No history of exertional chest pain.  Gastrointestinal ROS: Denies nausea and vomiting. Denies any abdominal pain. No diarrhea.  Neurological ROS: Denies any focal weakness. No loss of consciousness. Denies any numbness.  Dermatological ROS: Denies any redness or pruritis.    Vital Signs:    Temp:  [98 °F (36.7 °C)-100.4 °F (38 °C)] 98.7 °F (37.1 °C)  Heart Rate:  [] 101  Resp:  [14-19] 18  BP: (112-174)/() 139/80    Intake and output:    I/O last 3 completed shifts:  In: 1848.3 [P.O.:240; I.V.:308.3; Blood:300; IV Piggyback:1000]  Out: 750 [Urine:750]  I/O this shift:  In: 240  [P.O.:240]  Out: -     Physical Examination:    General Appearance:  Alert and cooperative, not in any acute distress.  Elderly-appearing female.   Head:  Atraumatic and normocephalic, without obvious abnormality.   Eyes:          PERRLA, conjunctivae and sclerae normal, no Icterus. No pallor. Extraocular movements are within normal limits.   Neck: Supple, trachea midline, no thyromegaly.   Lungs:   Breath sounds heard bilaterally equally.  No crackles or wheezing. No Pleural rub or bronchial breathing.   Heart:  Normal S1 and S2, no murmur, no gallop, no rub. No JVD   Abdomen:   Normal bowel sounds, no masses, no organomegaly. Soft, non-tender, non-distended, no guarding, no rebound tenderness.   Extremities:  Can move the left lower extremity albeit with pain.  Mild edema noted.  Pulses are intact distally.  Bandage appears clean.   Skin: No bleeding, bruising or rash.   Neurologic: Awake, alert and oriented times 3. Moves all 4 extremities equally.     Laboratory results:    Results from last 7 days   Lab Units 02/15/21  0537 02/14/21  0604 02/13/21  1215   SODIUM mmol/L 136 135* 136   POTASSIUM mmol/L 5.1 5.3* 4.5   CHLORIDE mmol/L 107 104 102   CO2 mmol/L 21.4* 23.7 25.6   BUN mg/dL 56* 60* 52*   CREATININE mg/dL 3.33* 3.40* 2.93*   CALCIUM mg/dL 7.9* 8.3* 9.5   BILIRUBIN mg/dL 0.3  --  0.4   ALK PHOS U/L 45  --  58   ALT (SGPT) U/L 5  --  9   AST (SGOT) U/L 17  --  16   GLUCOSE mg/dL 95 110* 115*     Results from last 7 days   Lab Units 02/15/21  0537 02/14/21  0604 02/13/21  1215   WBC 10*3/mm3 5.80 5.47 7.60   HEMOGLOBIN g/dL 8.3* 7.6* 9.2*   HEMATOCRIT % 26.6* 24.4* 29.3*   PLATELETS 10*3/mm3 136* 176 202         Results from last 7 days   Lab Units 02/13/21  1215   TROPONIN T ng/mL <0.010               I have reviewed the patient's laboratory results.    Radiology results:    Imaging Results (Last 24 Hours)     Procedure Component Value Units Date/Time    FL C Arm During Surgery [[id number]] Resulted:  02/14/21 1248     Updated: 02/14/21 1248    Narrative:      This procedure was auto-finalized with no dictation required.    US [facility] OR Procedure [[id number]] Resulted: 02/14/21 1025     Updated: 02/14/21 1025          I have reviewed the patient's radiology reports.    Medication Review:     I have reviewed the patient's active and prn medications.       Closed fracture of left hip (CMS/HCC)      Assessment:    Left hip fracture secondary to mechanical fall, POA  CKD stage IV  Hypertension  Hyperlipidemia  Osteoarthritis  Osteoporosis  Age-related chronic debility    Plan:    The patient is overall hemodynamically stable.  Has received 1 unit of packed red blood cell transfusion with hemoglobin of 8 this morning.  Remains on heparin for DVT prophylaxis.  Nephrology is following due to her chronic kidney disease stage IV.  She also has history of chronic anemia.  Encourage patient to work with physical therapy as tolerated.  Continue with pain control.  Venous ultrasound this morning to rule out any blood clot of the left lower extremity per orthopedics.    Case management consult for placement.  Will likely need short-term rehab.    Kyung Ingram DO  02/15/21  09:53 EST    Dictated utilizing Dragon dictation.

## 2021-02-15 NOTE — PLAN OF CARE
Goal Outcome Evaluation:  Plan of Care Reviewed With: patient  Progress: improving   Pt resting between care. VSS. Worked with PT/OT today, and is agreeable to rehab since her son works and she is unable to care for herself at this time. No other acute changes to report at this time. Will continue to monitor.

## 2021-02-15 NOTE — PROGRESS NOTES
"Nephrology Progress Note.    LOS: 2 days    Patient Care Team:  Jerald Dawkins MD as PCP - General  Daryn Spears MD as Consulting Physician (General Surgery)    Chief Complaint:    Chief Complaint   Patient presents with   • Fall   • Hip Injury       Subjective:   Follow up for JASMEET and Chronic Kidney disease stage 4  Interval History:   Patient Complaints: none  Patient seen and examined this morning.  Events from last 24 hours and over the weekend noted.  Patient denies having any fevers chills.  No nausea or vomiting no abdominal pain.  Denies any chest pain, shortness of breath or cough and sputum production.  There is no significant edema.   Patient also denies having new onset weakness of numbness of either extremity.  She is still complaining of pain and said she has not been out of bed.  She wants to go home.  History taken from: patient    Objective:    Vital Signs  /80 (BP Location: Right arm, Patient Position: Lying)   Pulse 101   Temp 98.7 °F (37.1 °C) (Oral)   Resp 18   Ht 149.9 cm (59\")   Wt 49.1 kg (108 lb 3.9 oz)   LMP  (LMP Unknown)   SpO2 96%   BMI 21.86 kg/m²     No intake/output data recorded.    Intake/Output Summary (Last 24 hours) at 2/15/2021 0757  Last data filed at 2/15/2021 0600  Gross per 24 hour   Intake 740 ml   Output 600 ml   Net 140 ml       Physical Exam:  General Appearance: alert, oriented x 3, no acute distress,   HEENT: Oral mucosa dry, extra occular movements intact. Sclera clear.  Skin: Warm and dry  Neck: supple, no JVD, trachea midline  Lungs:Chest shape is normal. Breath sounds heard bilaterally equally. No crackles, No wheezing.   Heart: regular rate and rhythm. normal S1 and S2, no S3, no rub, 2/6 systolic ejection murmur.  Peripheral pulses weak but palpable.  Abdomen: soft, non-tender,  present bowel sounds to auscultation  : no palpable bladder.  Extremities: Trace edema, no cyanosis or clubbing.   Neuro: normal speech and mental status, grossly non " focal.     Results Review:   Results from last 7 days   Lab Units 02/15/21  0537 02/14/21  0604 02/13/21  1215   SODIUM mmol/L 136 135* 136   POTASSIUM mmol/L 5.1 5.3* 4.5   CHLORIDE mmol/L 107 104 102   CO2 mmol/L 21.4* 23.7 25.6   BUN mg/dL 56* 60* 52*   CREATININE mg/dL 3.33* 3.40* 2.93*   CALCIUM mg/dL 7.9* 8.3* 9.5   ALBUMIN g/dL 2.70*  --  3.40*   BILIRUBIN mg/dL 0.3  --  0.4   ALK PHOS U/L 45  --  58   ALT (SGPT) U/L 5  --  9   AST (SGOT) U/L 17  --  16   GLUCOSE mg/dL 95 110* 115*     Estimated Creatinine Clearance: 10.8 mL/min (A) (by C-G formula based on SCr of 3.33 mg/dL (H)).  Results from last 7 days   Lab Units 02/15/21  0537   MAGNESIUM mg/dL 1.7   PHOSPHORUS mg/dL 4.6*     Results from last 7 days   Lab Units 02/15/21  0537   URIC ACID mg/dL 5.8*     Results from last 7 days   Lab Units 02/15/21  0537 02/14/21  0604 02/13/21  1215   WBC 10*3/mm3 5.80 5.47 7.60   HEMOGLOBIN g/dL 8.3* 7.6* 9.2*   PLATELETS 10*3/mm3 136* 176 202         Brief Urine Lab Results  (Last result in the past 365 days)      Color   Clarity   Blood   Leuk Est   Nitrite   Protein   CREAT   Urine HCG        02/14/21 1541             81.1           Protein/Creatinine Ratio, Urine   Date Value Ref Range Status   02/14/2021 234.3 (H) 0.0 - 200.0 mg/G Crea Final     Imaging Results (Last 24 Hours)     Procedure Component Value Units Date/Time    FL C Arm During Surgery [[id number]] Resulted: 02/14/21 1248     Updated: 02/14/21 1248    Narrative:      This procedure was auto-finalized with no dictation required.    US [facility] OR Procedure [[id number]] Resulted: 02/14/21 1025     Updated: 02/14/21 1025        budesonide-formoterol, 2 puff, Inhalation, BID - RT  carvedilol, 25 mg, Oral, BID With Meals  cyclobenzaprine, 5 mg, Oral, Q8H  enoxaparin, 30 mg, Subcutaneous, Daily  erythromycin, , Left Eye, Nightly  pantoprazole, 40 mg, Oral, QAM  rosuvastatin, 10 mg, Oral, Nightly  sodium chloride, 10 mL, Intravenous, Q12H  sodium chloride, 3  mL, Intravenous, Q12H             Medication Review:   Current Facility-Administered Medications   Medication Dose Route Frequency Provider Last Rate Last Admin   • budesonide-formoterol (SYMBICORT) 80-4.5 MCG/ACT inhaler 2 puff  2 puff Inhalation BID - RT Gabriele Loyd MD   2 puff at 02/15/21 0716   • calcium carbonate (TUMS) chewable tablet 500 mg (200 mg elemental)  2 tablet Oral BID PRN Gabriele Loyd MD       • carvedilol (COREG) tablet 25 mg  25 mg Oral BID With Meals Gabriele Loyd MD   25 mg at 02/14/21 1815   • cyclobenzaprine (FLEXERIL) tablet 5 mg  5 mg Oral Q8H Stepan Peters DO   5 mg at 02/14/21 2200   • enoxaparin (LOVENOX) syringe 30 mg  30 mg Subcutaneous Daily Gabriele Loyd MD       • erythromycin (ROMYCIN) ophthalmic ointment   Left Eye Nightly Gabriele Loyd MD   Given at 02/14/21 2012   • HYDROcodone-acetaminophen (NORCO) 5-325 MG per tablet 1 tablet  1 tablet Oral Q4H PRN Gabriele Loyd MD   1 tablet at 02/14/21 1815   • melatonin tablet 5 mg  5 mg Oral Nightly PRN Gabriele Loyd MD       • Morphine sulfate (PF) injection 2 mg  2 mg Intravenous Q2H PRN Gabriele Loyd MD   2 mg at 02/14/21 1502    And   • naloxone (NARCAN) injection 0.4 mg  0.4 mg Intravenous Q5 Min PRN Gabriele Loyd MD       • ondansetron (ZOFRAN) tablet 4 mg  4 mg Oral Q6H PRN Gabriele Loyd MD        Or   • ondansetron (ZOFRAN) injection 4 mg  4 mg Intravenous Q6H PRN Gabriele Loyd MD       • pantoprazole (PROTONIX) EC tablet 40 mg  40 mg Oral QAM Gabriele Loyd MD   40 mg at 02/15/21 0646   • rosuvastatin (CRESTOR) tablet 10 mg  10 mg Oral Nightly Gabriele Loyd MD   10 mg at 02/14/21 2012   • sodium chloride 0.9 % flush 1-10 mL  1-10 mL Intravenous PRN Gabriele Loyd MD       • sodium chloride 0.9 % flush 10 mL  10 mL Intravenous Q12H Gabriele Loyd MD   10 mL at 02/14/21 0828   • sodium chloride 0.9 % flush 3 mL  3 mL Intravenous Q12H  Gabriele Loyd MD   3 mL at 02/14/21 2023       Assessment/Plan:  1. Acute kidney injury   2. Chronic kidney disease stage IV: baseline creatinine 2.51-2.66  3. Anemia of CKD: patient on outpatient LIU  4. Hypertension with CKD  5. Secondary hyperparathyroidism: patient on calcitriol  6. Vitamin D Deficiency  7. Closed fracture of left hip (CMS/formerly [facility]): followed by Dr. Loyd  8. GERD  9. Gout  10. Vitamin B12 Deficiency  11. COPD  12. Dyslipidemia  13. Osteoarthritis  14. Osteoporosis     Plan:  · Her medications were reviewed and we will restart some of the medications that are appropriate to be restarted today.  · I have also discussed with the patient to go to the rehab, she said her most convenient place will be [facility] rehab.  I discussed this with the .  · Patient was concerned about her Procrit dose will go ahead and give her 1 shot today.  · Details were discussed with the patient no family in the room.    · Details were also discussed with the hospitalist service.   · Continue with rest of the current treatment plan and surveillance labs.  · Further recommendations will depend on clinical course of the patient during the current hospitalization.    · I also discussed the details with the nursing staff.  · Rest as ordered.    Clay Schilling MD, FASN  02/15/21  07:57 EST    Dictated utilizing Dragon dictation.

## 2021-02-15 NOTE — PROGRESS NOTES
"      [facility]  PROGRESS NOTE    Name:  Carolyne Martins   Age:  80 y.o.  Sex:  female  :  1940  MRN:  [id number]   Visit Number:  [id number]  Admission Date:  2021  Date Of Service:  02/15/21  Primary Care Physician:  Jerald Dawkins MD     LOS: 2 days :  Patient Care Team:  Jerald Dawkins MD as PCP - General  Daryn Spears MD as Consulting Physician (General Surgery):    Chief Complaint:      Postop day #1 left hip ORIF    Subjective / Interval History:     Patient is lying supine in the hospital bed.  She states her pain is well controlled but the pain medication does make her \"groggy and sleepy\" she denies chest pain, abdominal pain or shortness of breath.  She has passed flatus.     Review of Systems:     General ROS: No fever spike, no acute cognitive change.  Ophthalmic ROS: no acute visual disturbance.  ENT ROS: No acute sinus congestion.   Respiratory ROS: No new shortness of breath.   Cardiovascular ROS: No new chest pain.  Gastrointestinal ROS: No acute abdominal change. Non-distended.  Genito-Urinary ROS: No reported dysuria.  Musculoskeletal ROS: No deep calf pain. No acute focal motor deficit  Neurological ROS: No new numbness or tingling.  Dermatological ROS: No erythema.  No cyanosis.  No rash or pruritis.    Vital Signs:    Temp:  [98 °F (36.7 °C)-100.4 °F (38 °C)] 98.7 °F (37.1 °C)  Heart Rate:  [] 101  Resp:  [14-19] 18  BP: (112-174)/() 139/80    Intake and output:    I/O last 3 completed shifts:  In: 1848.3 [P.O.:240; I.V.:308.3; Blood:300; IV Piggyback:1000]  Out: 750 [Urine:750]  I/O this shift:  In: 240 [P.O.:240]  Out: -     Physical Examination:    General Appearance:    Alert, cooperative, and no acute distress.   Head:    Atraumatic and normocephalic, without obvious abnormality.   Eyes:    Extraocular movements are within normal limits.   ENT:   No acute change.   Neck:   Supple, trachea midline.   Lungs:     Normal respirations, unlabored.    " Heart:    Regular rate.   Abdomen:     Soft, nondistended.   Extremities:   + left calf pain.  No new motor deficit   Skin:     No rash.  No cyanosis.  No erythema.  No active drainage.       Neurologic:   Sensation intact, pulses intact.     Laboratory results:    Lab Results (last 24 hours)     Procedure Component Value Units Date/Time    Uric Acid [[id number]]  (Abnormal) Collected: 02/15/21 0537    Specimen: Blood Updated: 02/15/21 0619     Uric Acid 5.8 mg/dL     Phosphorus [[id number]]  (Abnormal) Collected: 02/15/21 0537    Specimen: Blood Updated: 02/15/21 0619     Phosphorus 4.6 mg/dL     Comprehensive Metabolic Panel [[id number]]  (Abnormal) Collected: 02/15/21 0537    Specimen: Blood Updated: 02/15/21 0619     Glucose 95 mg/dL      BUN 56 mg/dL      Creatinine 3.33 mg/dL      Sodium 136 mmol/L      Potassium 5.1 mmol/L      Chloride 107 mmol/L      CO2 21.4 mmol/L      Calcium 7.9 mg/dL      Total Protein 5.0 g/dL      Albumin 2.70 g/dL      ALT (SGPT) 5 U/L      AST (SGOT) 17 U/L      Alkaline Phosphatase 45 U/L      Total Bilirubin 0.3 mg/dL      eGFR Non African Amer 13 mL/min/1.73      Comment: <15 Indicative of kidney failure.        eGFR   Amer --     Comment: <15 Indicative of kidney failure.        Globulin 2.3 gm/dL      A/G Ratio 1.2 g/dL      BUN/Creatinine Ratio 16.8     Anion Gap 7.6 mmol/L     Narrative:      GFR Normal >60  Chronic Kidney Disease <60  Kidney Failure <15      Magnesium [[id number]]  (Normal) Collected: 02/15/21 0537    Specimen: Blood Updated: 02/15/21 0619     Magnesium 1.7 mg/dL     CBC & Differential [[id number]]  (Abnormal) Collected: 02/15/21 0537    Specimen: Blood Updated: 02/15/21 0603    Narrative:      The following orders were created for panel order CBC & Differential.  Procedure                               Abnormality         Status                     ---------                               -----------         ------                     CBC Auto  Differential[[id number]]        Abnormal            Final result                 Please view results for these tests on the individual orders.    CBC Auto Differential [[id number]]  (Abnormal) Collected: 02/15/21 0537    Specimen: Blood Updated: 02/15/21 0603     WBC 5.80 10*3/mm3      RBC 2.79 10*6/mm3      Hemoglobin 8.3 g/dL      Hematocrit 26.6 %      MCV 95.3 fL      MCH 29.7 pg      MCHC 31.2 g/dL      RDW 16.8 %      RDW-SD 57.7 fl      MPV 10.7 fL      Platelets 136 10*3/mm3      Neutrophil % 70.9 %      Lymphocyte % 14.3 %      Monocyte % 12.2 %      Eosinophil % 2.1 %      Basophil % 0.3 %      Immature Grans % 0.2 %      Neutrophils, Absolute 4.11 10*3/mm3      Lymphocytes, Absolute 0.83 10*3/mm3      Monocytes, Absolute 0.71 10*3/mm3      Eosinophils, Absolute 0.12 10*3/mm3      Basophils, Absolute 0.02 10*3/mm3      Immature Grans, Absolute 0.01 10*3/mm3      nRBC 0.0 /100 WBC     Chloride, Urine, Random - Urine, Catheter [[id number]] Collected: 02/14/21 1541    Specimen: Urine, Catheter Updated: 02/14/21 2031     Chloride, Urine 20 mmol/L     Narrative:      Reference intervals for random urine have not been established.  Clinical usage is dependent upon physician's interpretation in combination with other laboratory tests.       Protein / Creatinine Ratio, Urine - Urine, Catheter [[id number]]  (Abnormal) Collected: 02/14/21 1541    Specimen: Urine, Catheter Updated: 02/14/21 2030     Protein/Creatinine Ratio, Urine 234.3 mg/G Crea      Creatinine, Urine 81.1 mg/dL      Total Protein, Urine 19.0 mg/dL     Sodium, Urine, Random - Urine, Catheter [[id number]] Collected: 02/14/21 1542    Specimen: Urine, Catheter Updated: 02/14/21 1646     Sodium, Urine 30 mmol/L     Narrative:      Reference intervals for random urine have not been established.  Clinical usage is dependent upon physician's interpretation in combination with other laboratory tests.       Ferritin [[id number]]  (Normal) Collected: 02/14/21 0604     Specimen: Blood Updated: 02/14/21 1601     Ferritin 14.12 ng/mL     Narrative:      Results may be falsely decreased if patient taking Biotin.      Iron Profile [[id number]]  (Abnormal) Collected: 02/14/21 0604    Specimen: Blood Updated: 02/14/21 1552     Iron 40 mcg/dL      Iron Saturation 12 %      Transferrin 229 mg/dL      TIBC 341 mcg/dL           I have reviewed the patient's laboratory results.    Radiology results:    Imaging Results (Last 24 Hours)     Procedure Component Value Units Date/Time    FL C Arm During Surgery [[id number]] Resulted: 02/14/21 1248     Updated: 02/14/21 1248    Narrative:      This procedure was auto-finalized with no dictation required.    US [facility] OR Procedure [[id number]] Resulted: 02/14/21 1025     Updated: 02/14/21 1025          I have reviewed the patient's radiology reports.    AP and lateral taken perioperative shows no acute concern.  Good position and alignment of implants and/or fracture.       Assessment/Plan      Closed fracture of left hip (CMS/HCC)      S/P ORIF left proximal femur:    Continue current management per the detailed orders and instructions, including skin, safety and fall precautions, respiratory therapy with incentive spirometer, advance diet as tolerated, bowel regimen, multi-modal analgesia, DVT prophylaxis, Hospitalist medical management, PT/OT following post-surgical rehab protocol, and Case Management for discharge and rehabilitation plans.    We will order a stat has Doppler left lower extremity rule out DVT if negative patient is stable from the orthopedic standpoint for discharge to rehab.  Change hip dressing every other day to keep clean and dry.  Weightbearing as tolerated with walker and assistance.  Recommend Eliquis 2.5 mg twice daily X 8 days ( eight days) for DVT ppx  Sagar Bocanegra PA-C  02/15/21  10:04 EST

## 2021-02-15 NOTE — PROGRESS NOTES
Patient: Carolyne Mratins  Procedure(s):  HIP TROCHANTER NAIL SHORT WITH INTRAMEDULLARY HIP SCREW, LEFT  Anesthesia type: general with block    Patient location: [facility]  Last vitals:   Vitals:    02/15/21 0731   BP: 139/80   Pulse: 101   Resp: 18   Temp: 98.7 °F (37.1 °C)   SpO2:      Level of consciousness: awake, alert and oriented    Post-anesthesia pain: adequate analgesia  Airway patency: patent  Respiratory: unassisted  Cardiovascular: stable and blood pressure at baseline  Hydration: euvolemic    Anesthetic complications: no

## 2021-02-15 NOTE — PLAN OF CARE
Goal Outcome Evaluation:  Plan of Care Reviewed With: patient  Progress: no change  Outcome Summary: Pt seen for OT evaluation today.  Pt presents with pain, weakness and decreased independence with self care and functional mobiltiy tasks.  Pt is expected to benefit from skilled OT to improve her strength and independence with ADL tasks.

## 2021-02-15 NOTE — DISCHARGE PLACEMENT REQUEST
"  Referral for rehab  , Aranza Venegas [phone]    Carolyne Lopes (80 y.o. Female)     Date of Birth Social Security Number Address Home Phone MRN    1940  [address] [facility] [place] [address] [phone] [id number]    Sikh Marital Status          Restoration        Admission Date Admission Type Admitting Provider Attending Provider Department, Room/Bed    2/13/21 Emergency Kyung Ingram, Kyung Quinn,  [facility] SURG  3, 320/1    Discharge Date Discharge Disposition Discharge Destination                       Attending Provider: Kyung Ingram DO    Allergies: Ferrlecit [Na Ferric Gluc Cplx In Sucrose], Ranitidine Hcl, Levofloxacin, Lisinopril    Isolation: None   Infection: COVID Screen (preop/placement) (12/14/20), 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)   Code Status: CPR    Ht: 149.9 cm (59\")   Wt: 49.1 kg (108 lb 3.9 oz)    Admission Cmt: None   Principal Problem: None                Active Insurance as of 2/13/2021     Primary Coverage     Payor Plan Insurance Group Employer/Plan Group    MEDICARE MEDICARE A & B      Payor Plan Address Payor Plan Phone Number Payor Plan Fax Number Effective Dates    [address] [phone]  4/1/2007 - None Entered    [facility] [id number]       Subscriber Name Subscriber Birth Date Member ID       CAROLYNE LOPES 1940 [id number]           Secondary Coverage     Payor Plan Insurance Group Employer/Plan Group    ANTH BLUE CROSS [facility] SUPP [id number]     Payor Plan Address Payor Plan Phone Number Payor Plan Fax Number Effective Dates    [address]   12/1/2016 - None Entered    [facility] [id number]       Subscriber Name Subscriber Birth Date Member ID       CAROLYNE LOPES 1940 [id number]                 Emergency Contacts      (Rel.) Home Phone Work Phone Mobile Phone    Magno Lopes (Son) [phone] -- [phone]    Linda Banda (Friend) " [phone] -- --               History & Physical      Stepan Peters DO at 21 1643              [facility]   HISTORY AND PHYSICAL      Name:  Carolyne Martins   Age:  80 y.o.  Sex:  female  :  1940  MRN:  [id number]   Visit Number:  [id number]  Admission Date:  2021  Date Of Service:  21  Primary Care Physician:  Jerlad Dawkins MD    Chief Complaint:     Hip pain    History Of Presenting Illness:      Patient is a very pleasant 80-year-old female with history significant for CKD stage IV, history of CAD, hypertension, and osteoarthritis who presents to the emergency room complaining of left hip pain after a mechanical fall.  She lives at home alone and was taking her dog out to the bathroom this morning when her dog pulled on the leash and she lost her balance.  Patient landed on her left hip and immediately felt pain.  She did not hit her head or lose consciousness.  Plain film x-ray in the emergency room confirmed comminuted intratrochanteric fracture of the proximal left femur.  Dr. Loyd consulted and agreed to see patient.  Currently, patient resting comfortably in bed.  Is complaining of some pain that has been well controlled with IV pain medication.  No headache or vision changes.  No chest pain, shortness of breath, nausea or vomiting.    Review Of Systems:     Full 10 point ROS was reviewed and negative unless otherwise stated in the HPI     Past Medical History:    Past Medical History:   Diagnosis Date   • Anemia    • Arthritis    • Back pain    • Bleeding from anus    • Bronchitis     STATES HAS BRONCHITIS CURRENTLY (17).     • Bronchitis 2017   • CAD (coronary artery disease)    • Cataract, bilateral    • Chronic kidney disease     STAGE 4.  SEEREBA HALEY   • Chronic kidney failure     REPORTS STAGE IV   • Difficulty swallowing solids    • Disease of thyroid gland    • Fracture, radius 2016    right distal radius and ulna,  "healed.   • GERD (gastroesophageal reflux disease)    • Gout    • High cholesterol    • History of nuclear stress test 2014    DR. CORTEZ.  \"IT WAS OK\"   • Hyperparathyroidism (CMS/HCC)    • Hyperparathyroidism (CMS/HCC)    • Hypertension    • Iron deficiency    • Osteoarthritis    • Osteoarthritis of knees, bilateral     Both knees Supartz injection series August, 2015   • Osteoporosis    • Palpitations    • Personal history of cardiac murmur    • Presence of pessary    • Problems with swallowing     WITH FOOD OCCASSIONALLY   • Rheumatic heart disease     Personal history   • Rotator cuff tendonitis     right    • Rupture of right proximal biceps tendon     chronic   • Seasonal allergies    • Sinus problem    • Spinal headache     REPORTS AFTER DELIVERY OF SON   • Subacromial bursitis     right    • Valvular heart disease    • Vision problems    • Vitamin B12 deficiency    • Vitamin D deficiency    • Wears glasses        Past Surgical history:    Past Surgical History:   Procedure Laterality Date   • CATARACT EXTRACTION Bilateral    • CERVICAL POLYPECTOMY     • COLONOSCOPY  2011   • COLONOSCOPY N/A 1/9/2018    Procedure: COLONOSCOPY with endoscopic mucosal resection (hot snare), normal saline submucosal injection, argon thermal ablation, resolution clip placement x4, and cold biopsy polypectomy;  Surgeon: Pieter Concepcion MD;  Location: [facility] ENDOSCOPY;  Service:    • ENDOSCOPY N/A 12/6/2017    Procedure: ESOPHAGOGASTRODUODENOSCOPY with biopsies and hot snare polypectomies;  Surgeon: Pieter Concepcion MD;  Location: [facility] ENDOSCOPY;  Service:    • ENDOSCOPY N/A 1/15/2019    Procedure: ESOPHAGOGASTRODUODENOSCOPY WITH BIOPSIES AND HOT SNARE POLYPECTOMIES X10;  Surgeon: Pieter Concepcion MD;  Location: [facility] ENDOSCOPY;  Service: Gastroenterology   • UPPER GASTROINTESTINAL ENDOSCOPY  08/18/2014       Social History:    Social History     Socioeconomic History   • Marital status:      Spouse name: Not on file   • " Number of children: Not on file   • Years of education: Not on file   • Highest education level: Not on file   Tobacco Use   • Smoking status: Never Smoker   • Smokeless tobacco: Never Used   Substance and Sexual Activity   • Alcohol use: Never     Frequency: Never   • Drug use: Never   • Sexual activity: Defer       Family History:    Family History   Problem Relation Age of Onset   • Liver cancer Maternal Grandmother    • Gout Other    • Osteoporosis Other    • Stroke Other    • Ulcers Other    • Asthma Other    • Hypertension Other    • Heart disease Other    • Osteoarthritis Other    • Colon cancer Neg Hx        Allergies:      Ferrlecit [na ferric gluc cplx in sucrose], Ranitidine hcl, Levofloxacin, and Lisinopril    Home Medications:    Prior to Admission Medications     Prescriptions Last Dose Informant Patient Reported? Taking?    allopurinol (ZYLOPRIM) 100 MG tablet 2/12/2021 Self Yes Yes    Take 1 tablet by mouth daily.    aspirin 81 MG EC tablet 2/13/2021  Yes Yes    Take 81 mg by mouth Daily.    budesonide-formoterol (SYMBICORT) 80-4.5 MCG/ACT inhaler 2/13/2021 Self Yes Yes    Inhale 2 puffs 2 (Two) Times a Day.    calcitriol (ROCALTROL) 0.25 MCG capsule 2/13/2021  Yes Yes    2 (Two) Times a Day.    carboxymethylcellulose (REFRESH PLUS) 0.5 % solution 2/13/2021  Yes Yes    3 (Three) Times a Day As Needed for Dry Eyes.    carvedilol (COREG) 25 MG tablet 2/13/2021  Yes Yes    Take 25 mg by mouth 2 (Two) Times a Day With Meals.    cholecalciferol (VITAMIN D3) 1000 UNITS tablet 2/13/2021 Self Yes Yes    Take 1,000 Units by mouth Daily.    Cyanocobalamin (VITAMIN B-12 PO) 2/13/2021 Self Yes Yes    Take 500 mg by mouth 2 (Two) Times a Day.    erythromycin (ROMYCIN) 5 MG/GM ophthalmic ointment 2/12/2021  Yes Yes    Administer  into the left eye Every Night.    fluticasone (FLONASE) 50 MCG/ACT nasal spray 2/12/2021 Self Yes Yes    2 sprays into each nostril Daily As Needed.    irbesartan (AVAPRO) 150 MG tablet  2/13/2021 Self Yes Yes    Take 150 mg by mouth Daily.    omeprazole (PriLOSEC) 40 MG capsule 2/13/2021 Self Yes Yes    Take 40 mg by mouth Daily.    rosuvastatin (CRESTOR) 10 MG tablet 2/12/2021  Yes Yes    Take 10 mg by mouth Daily.    torsemide (DEMADEX) 10 MG tablet   Yes Yes    Take 5 mg by mouth Daily. Takes Monday, Wednesday, Fridays    docusate sodium (COLACE) 100 MG capsule   No No    Take 1 capsule by mouth 2 (Two) Times a Day.    Patient taking differently:  Take 100 mg by mouth 2 (Two) Times a Day As Needed for Constipation.    epoetin ovidio (PROCRIT) 10772 UNIT/ML injection  Self Yes No    Inject 40,000 Units under the skin into the appropriate area as directed. Every 2 weeks.  Due Wednesday, Feb 17th    estradiol (ESTRACE) 0.1 MG/GM vaginal cream   No No    Massage 1 gram in vaginal opening twice weekly    ondansetron ODT (ZOFRAN-ODT) 4 MG disintegrating tablet   Yes No    Place 4 mg on the tongue Every 8 (Eight) Hours As Needed.    Probiotic Product (PROBIOTIC DAILY PO)   Yes No    Take  by mouth Daily As Needed.             ED Medications:    Medications   budesonide-formoterol (SYMBICORT) 80-4.5 MCG/ACT inhaler 2 puff (has no administration in time range)   carvedilol (COREG) tablet 25 mg (has no administration in time range)   erythromycin (ROMYCIN) ophthalmic ointment (has no administration in time range)   pantoprazole (PROTONIX) EC tablet 40 mg (has no administration in time range)   sodium chloride 0.9 % flush 10 mL (has no administration in time range)   sodium chloride 0.9 % flush 10 mL (has no administration in time range)   acetaminophen (TYLENOL) tablet 650 mg (has no administration in time range)     Or   acetaminophen (TYLENOL) 160 MG/5ML solution 650 mg (has no administration in time range)     Or   acetaminophen (TYLENOL) suppository 650 mg (has no administration in time range)   ondansetron (ZOFRAN) injection 4 mg (4 mg Intravenous Given 2/13/21 0538)   heparin (porcine) 5000 UNIT/ML  injection 5,000 Units (has no administration in time range)   Morphine sulfate (PF) injection 2 mg (2 mg Intravenous Given 2/13/21 1558)     And   naloxone (NARCAN) injection 0.4 mg (has no administration in time range)   melatonin tablet 5 mg (has no administration in time range)   calcium carbonate (TUMS) chewable tablet 500 mg (200 mg elemental) (has no administration in time range)   Morphine sulfate (PF) injection 4 mg (4 mg Intravenous Given 2/13/21 1219)   fentaNYL citrate (PF) (SUBLIMAZE) injection 25 mcg (25 mcg Intravenous Given 2/13/21 1446)       Vital Signs:    Temp:  [97.3 °F (36.3 °C)-97.6 °F (36.4 °C)] 97.6 °F (36.4 °C)  Heart Rate:  [] 95  Resp:  [16-17] 17  BP: (106-139)/() 139/80        02/13/21  1053 02/13/21  1500   Weight: 50.3 kg (111 lb) 51 kg (112 lb 8 oz)       Body mass index is 22.72 kg/m².    Physical Exam:    General Appearance:  Alert and cooperative, not in any acute distress.   Head:  Atraumatic and normocephalic, without obvious abnormality.   Eyes:          PERRLA, conjunctivae and sclerae normal, no Icterus. No pallor. Extraocular movements are within normal limits.   Ears:  Ears appear intact with no abnormalities noted.   Throat: No oral lesions, no thrush, oral mucosa moist.   Neck: Supple, trachea midline, no thyromegaly, no carotid bruit.   Back:   No kyphoscoliosis present. No tenderness to palpation,   range of motion normal.   Lungs:   Chest shape is normal. Breath sounds heard bilaterally equally.  No crackles or wheezing. No pleural rub or bronchial breathing.   Heart:  Normal S1 and S2, no murmur, no gallop, no rub. No JVD.   Abdomen:   Normal bowel sounds, no masses, no organomegaly. Soft, nontender, nondistended, no guarding, no rebound tenderness.   Extremities:  Left leg shorter than right, internally rotated.  No edema, no cyanosis, no clubbing.   Pulses: Pulses palpable and equal bilaterally.   Skin: No bleeding, bruising or rash.   Neurologic: Alert  and oriented x 3. No tremors. No facial asymmetry.     Laboratory data:    I have reviewed the labs done in the emergency room.    Results from last 7 days   Lab Units 02/13/21  1215   SODIUM mmol/L 136   POTASSIUM mmol/L 4.5   CHLORIDE mmol/L 102   CO2 mmol/L 25.6   BUN mg/dL 52*   CREATININE mg/dL 2.93*   CALCIUM mg/dL 9.5   BILIRUBIN mg/dL 0.4   ALK PHOS U/L 58   ALT (SGPT) U/L 9   AST (SGOT) U/L 16   GLUCOSE mg/dL 115*     Results from last 7 days   Lab Units 02/13/21  1215   WBC 10*3/mm3 7.60   HEMOGLOBIN g/dL 9.2*   HEMATOCRIT % 29.3*   PLATELETS 10*3/mm3 202         Results from last 7 days   Lab Units 02/13/21  1215   TROPONIN T ng/mL <0.010                           Invalid input(s): USDES,  BLOODU, NITRITITE, BACT, EP  Pain Management Panel     Pain Management Panel Latest Ref Rng & Units 10/15/2019 10/1/2019    CREATININE UR mg/dL 135.4 118.6              EKG:      EKG personally reviewed reveals sinus rhythm with rate of 56.  No signs of acute infarct or ischemia.    Radiology:    Imaging Results (Last 72 Hours)     Procedure Component Value Units Date/Time    XR Chest 1 View [[id number]] Collected: 02/13/21 1206     Updated: 02/13/21 1211    Narrative:      PORTABLE CHEST    2/13/2021 11:48 AM      HISTORY: Fall     COMPARISON: None.     FINDINGS: Heart size upper normal. Lungs and pleural spaces are clear.  No acute fracture. Chronic right rotator cuff tear.           Impression:      No acute cardiopulmonary process .              AP PELVIS, 2V LEFT HIP     HISTORY: Fall.      FINDINGS:  A three view exam demonstrates an acute comminuted  intertrochanteric fracture of the proximal left femur with angulation.  Femoral head remains in the acetabulum. Pelvis is otherwise intact.  Severe degenerative change at L5-S1.        IMPRESSION: Acute intertrochanteric fracture of the left hip.     This report was finalized on 2/13/2021 12:09 PM by Dr Chencho Hughes DO.    XR Hip With or Without Pelvis 2 - 3 View Left  [[id number]] Collected: 21 1206     Updated: 21 1211    Narrative:      PORTABLE CHEST    2021 11:48 AM      HISTORY: Fall     COMPARISON: None.     FINDINGS: Heart size upper normal. Lungs and pleural spaces are clear.  No acute fracture. Chronic right rotator cuff tear.           Impression:      No acute cardiopulmonary process .              AP PELVIS, 2V LEFT HIP     HISTORY: Fall.      FINDINGS:  A three view exam demonstrates an acute comminuted  intertrochanteric fracture of the proximal left femur with angulation.  Femoral head remains in the acetabulum. Pelvis is otherwise intact.  Severe degenerative change at L5-S1.        IMPRESSION: Acute intertrochanteric fracture of the left hip.     This report was finalized on 2021 12:09 PM by Dr Chencho Hughes DO.            Closed fracture of left hip (CMS/[facility])      Assessment:    Left hip fracture secondary to mechanical fall, POA  CKD stage IV  Hypertension  Hyperlipidemia  Osteoarthritis  Osteoporosis  Age-related chronic debility    Plan:    We will admit patient to the hospital.  Dr. Loyd with orthopedic surgery consulted.  N.p.o. after midnight.  Plan for surgical intervention tomorrow.  Patient is low to moderate surgical risk.  Continue supportive care.  Pain control and antiemetics as needed.  Heparin for DVT prophylaxis to start tomorrow.  PT/OT.  Further orders as clinical course dictates.    Advance Care Planning   ACP discussion was declined by the patient. Patient does not have an advance directive, declines further assistance.    Stepan Peters DO  21  16:43 EST    Dictated utilizing Dragon dictation.      Electronically signed by Stepan Peters DO at 21 6536     Gabriele Loyd MD at 21 4869          [facility]   HISTORY AND PHYSICAL      Name:  Carolyne Martins   Age:  80 y.o.  Sex:  female  :  1940  MRN:  [id number]   Visit Number:  [id number]  Admission Date:   2/13/2021  Date Of Service:  02/13/21  Primary Care Physician:  Jerald Dawkins MD    Chief Complaint:     Left hip pain from accidental mechanical fall.    History Of Presenting Illness:      80 year old woman that this morning took her dog out on a leash for the dog to go to the bathroom outside.  Patient tells me she usually stands at the doorway when the weather is bad and the leash length lets the dog go to the yard.  However, the dog suddenly pulled hard on the leash and the patient was pulled to the concrete porch where she fell to the ground.  She had immediate left hip pain and could not stand or bear weight.  She called for help and she states that in about 15 minutes her son, who lives across the street, was getting into his truck to go to work and he saw her on the ground and arrived to help.  Patient does not report any head trauma or loss of consciousness, or any other injury or other acute complaint.  She was brought to [facility] ER where exam and imaging show a closed, comminuted displaced unstable left hip intertrochanteric fracture.      Patient has chronic kidney disease, hypertension, valvular heart disease, chronic anemia and chronic abdominal condition with a planned upcoming colonoscopy in the next few weeks, also GERD, gout, chronic bronchitis, hyperparathyroidism, thyroid disease, arthritis, lumbar disc disease and osteoporosis.  Patient is known to me and treated at our orthopaedic clinic for a right wrist fracture (cast and PT/OT therapy), bilateral knee osteoarthritis (injection therapy), and treatment for foot spurs.  Patient is admitted to the hospitalist service for medical management and optimization prior to planned hip fracture surgery.  Orthopaedic Surgery consulted for evaluation and management of her left hip fracture.    Review Of Systems:    General ROS: No fever, pain controlled with bedrest and medication.  Psychological ROS:  No confusion, no hallucinations.  Respiratory ROS: No  "acute shortness of breath.  Cardiovascular ROS: No reported acute chest pain.  Gastrointestinal ROS:  No acute abdominal symptoms.  Recent left abdominal pain, rectal bleeding and acute on chronic anemia with work-up and treatment ongoing.  Genito-Urinary ROS: No reported dysuria.  Musculoskeletal ROS: Acute left hip and thigh pain. No focal calf pain.  Neurological ROS: No acute focal motor deficit.  Dermatological ROS: No acute rash, no open wound.     Past Medical History:    Past Medical History:   Diagnosis Date   • Anemia 1998   • Arthritis    • Back pain    • Bleeding from anus    • Bronchitis     STATES HAS BRONCHITIS CURRENTLY (12/5/17).     • Bronchitis 12/2017   • CAD (coronary artery disease)    • Cataract, bilateral    • Chronic kidney disease     STAGE 4.  SEES TERA   • Chronic kidney failure     REPORTS STAGE IV   • Difficulty swallowing solids    • Disease of thyroid gland    • Fracture, radius 2016    right distal radius and ulna, healed.   • GERD (gastroesophageal reflux disease)    • Gout    • High cholesterol    • History of nuclear stress test 2014    DR. CORTEZ.  \"IT WAS OK\"   • Hyperparathyroidism (CMS/HCC)    • Hyperparathyroidism (CMS/HCC)    • Hypertension    • Iron deficiency    • Osteoarthritis    • Osteoarthritis of knees, bilateral     Both knees Supartz injection series August, 2015   • Osteoporosis    • Palpitations    • Personal history of cardiac murmur    • Presence of pessary    • Problems with swallowing     WITH FOOD OCCASSIONALLY   • Rheumatic heart disease     Personal history   • Rotator cuff tendonitis     right    • Rupture of right proximal biceps tendon     chronic   • Seasonal allergies    • Sinus problem    • Spinal headache     REPORTS AFTER DELIVERY OF SON   • Subacromial bursitis     right    • Valvular heart disease    • Vision problems    • Vitamin B12 deficiency    • Vitamin D deficiency    • Wears glasses        Past Surgical history:    Past Surgical History: "   Procedure Laterality Date   • CATARACT EXTRACTION Bilateral    • CERVICAL POLYPECTOMY     • COLONOSCOPY  2011   • COLONOSCOPY N/A 1/9/2018    Procedure: COLONOSCOPY with endoscopic mucosal resection (hot snare), normal saline submucosal injection, argon thermal ablation, resolution clip placement x4, and cold biopsy polypectomy;  Surgeon: Pieter Concepcion MD;  Location: [facility] ENDOSCOPY;  Service:    • ENDOSCOPY N/A 12/6/2017    Procedure: ESOPHAGOGASTRODUODENOSCOPY with biopsies and hot snare polypectomies;  Surgeon: Pieter Concepcion MD;  Location: [facility] ENDOSCOPY;  Service:    • ENDOSCOPY N/A 1/15/2019    Procedure: ESOPHAGOGASTRODUODENOSCOPY WITH BIOPSIES AND HOT SNARE POLYPECTOMIES X10;  Surgeon: Pieter Concepcion MD;  Location: [facility] ENDOSCOPY;  Service: Gastroenterology   • UPPER GASTROINTESTINAL ENDOSCOPY  08/18/2014       Social History:    Social History     Socioeconomic History   • Marital status:      Spouse name: Not on file   • Number of children: Not on file   • Years of education: Not on file   • Highest education level: Not on file   Tobacco Use   • Smoking status: Never Smoker   • Smokeless tobacco: Never Used   Substance and Sexual Activity   • Alcohol use: Never     Frequency: Never   • Drug use: Never   • Sexual activity: Defer       Family History:    Family History   Problem Relation Age of Onset   • Liver cancer Maternal Grandmother    • Gout Other    • Osteoporosis Other    • Stroke Other    • Ulcers Other    • Asthma Other    • Hypertension Other    • Heart disease Other    • Osteoarthritis Other    • Colon cancer Neg Hx        Allergies:      Ferrlecit [na ferric gluc cplx in sucrose], Ranitidine hcl, Levofloxacin, and Lisinopril    Home Medications:    Prior to Admission Medications     Prescriptions Last Dose Informant Patient Reported? Taking?    allopurinol (ZYLOPRIM) 100 MG tablet  Self Yes No    Take 1 tablet by mouth daily.    budesonide-formoterol (SYMBICORT) 80-4.5 MCG/ACT  inhaler  Self Yes No    Inhale 2 puffs 2 (Two) Times a Day.    calcitriol (ROCALTROL) 0.25 MCG capsule   Yes No    2 (Two) Times a Day.    carboxymethylcellulose (REFRESH PLUS) 0.5 % solution   Yes No    3 (Three) Times a Day As Needed for Dry Eyes.    carvedilol (COREG) 25 MG tablet   Yes No    Take 25 mg by mouth 2 (Two) Times a Day With Meals.    cholecalciferol (VITAMIN D3) 1000 UNITS tablet  Self Yes No    Take 1,000 Units by mouth Daily.    cloNIDine (CATAPRES) 0.1 MG tablet   Yes No    Take 0.05 mg by mouth As Needed for High Blood Pressure.    Cyanocobalamin (VITAMIN B-12 PO)  Self Yes No    Take 500 mg by mouth 2 (Two) Times a Day.    dicyclomine (BENTYL) 10 MG/5ML syrup   No No    Take 5 mL by mouth 3 (Three) Times a Day As Needed (abdominal  cramps).    docusate sodium (COLACE) 100 MG capsule   No No    Take 1 capsule by mouth 2 (Two) Times a Day.    epoetin ovidio (PROCRIT) 36891 UNIT/ML injection  Self Yes No    Every 2 weeks    erythromycin (ROMYCIN) 5 MG/GM ophthalmic ointment   Yes No    Administer  to both eyes Every Night.    estradiol (ESTRACE) 0.1 MG/GM vaginal cream   No No    Massage 1 gram in vaginal opening twice weekly    fluticasone (FLONASE) 50 MCG/ACT nasal spray  Self Yes No    2 sprays into each nostril Daily As Needed.    irbesartan (AVAPRO) 150 MG tablet  Self Yes No    Take 150 mg by mouth Daily.    omeprazole (PriLOSEC) 40 MG capsule  Self Yes No    Take 40 mg by mouth Daily.    ondansetron ODT (ZOFRAN-ODT) 4 MG disintegrating tablet   Yes No    Place 4 mg on the tongue Every 8 (Eight) Hours As Needed.    Probiotic Product (PROBIOTIC DAILY PO)   Yes No    Take  by mouth Daily.    rosuvastatin (CRESTOR) 10 MG tablet   Yes No    Take 10 mg by mouth Daily.             ED Medications:    Medications   Morphine sulfate (PF) injection 4 mg (4 mg Intravenous Given 2/13/21 1219)       Vital Signs:    Temp:  [97.3 °F (36.3 °C)] 97.3 °F (36.3 °C)  Heart Rate:  [55] 55  Resp:  [16] 16  BP:  "(135)/(80) 135/80    Vitals:    02/13/21 1053   BP: 135/80   BP Location: Left arm   Patient Position: Sitting   Pulse: 55   Resp: 16   Temp: 97.3 °F (36.3 °C)   TempSrc: Oral   SpO2: 99%   Weight: 50.3 kg (111 lb)   Height: 149.9 cm (59\")           02/13/21  1053   Weight: 50.3 kg (111 lb)       Body mass index is 22.42 kg/m².    No intake or output data in the 24 hours ending 02/13/21 1353    Physical Exam:    General Appearance:    Alert and cooperative, no acute distress.   Head:    Atraumatic and normocephalic, without obvious abnormality.   Eyes:          Extra-ocular movements are intact.   Back:     No kyphoscoliosis present.   Lungs:     Breath sounds heard bilaterally.  No active wheezing.    Heart:    Regular rate and rhythm.   Abdomen:     Soft, non-distended, no guarding.   Extremities:   Left leg shortened and external rotated.  Soft thigh swelling.  No distal calf pain.  Daniele sign negative.   Pulses:   Pulses palpable, no cyanosis.   Skin:   No open wound.   Neurologic:   Motor and sensation grossly intact.       Labs:    Lab Results (last 24 hours)     Procedure Component Value Units Date/Time    Comprehensive Metabolic Panel [[id number]]  (Abnormal) Collected: 02/13/21 1215    Specimen: Blood Updated: 02/13/21 1251     Glucose 115 mg/dL      BUN 52 mg/dL      Creatinine 2.93 mg/dL      Sodium 136 mmol/L      Potassium 4.5 mmol/L      Chloride 102 mmol/L      CO2 25.6 mmol/L      Calcium 9.5 mg/dL      Total Protein 6.0 g/dL      Albumin 3.40 g/dL      ALT (SGPT) 9 U/L      AST (SGOT) 16 U/L      Alkaline Phosphatase 58 U/L      Total Bilirubin 0.4 mg/dL      eGFR Non African Amer 16 mL/min/1.73      Globulin 2.6 gm/dL      A/G Ratio 1.3 g/dL      BUN/Creatinine Ratio 17.7     Anion Gap 8.4 mmol/L     Narrative:      GFR Normal >60  Chronic Kidney Disease <60  Kidney Failure <15      Troponin [[id number]]  (Normal) Collected: 02/13/21 1215    Specimen: Blood Updated: 02/13/21 1251     Troponin T " <0.010 ng/mL     Narrative:      Troponin T Reference Range:  <= 0.03 ng/mL-   Negative for AMI  >0.03 ng/mL-     Abnormal for myocardial necrosis.  Clinicians would have to utilize clinical acumen, EKG, Troponin and serial changes to determine if it is an Acute Myocardial Infarction or myocardial injury due to an underlying chronic condition.       Results may be falsely decreased if patient taking Biotin.      CBC & Differential [[id number]]  (Abnormal) Collected: 02/13/21 1215    Specimen: Blood Updated: 02/13/21 1232    Narrative:      The following orders were created for panel order CBC & Differential.  Procedure                               Abnormality         Status                     ---------                               -----------         ------                     CBC Auto Differential[[id number]]        Abnormal            Final result                 Please view results for these tests on the individual orders.    CBC Auto Differential [[id number]]  (Abnormal) Collected: 02/13/21 1215    Specimen: Blood Updated: 02/13/21 1232     WBC 7.60 10*3/mm3      RBC 3.06 10*6/mm3      Hemoglobin 9.2 g/dL      Hematocrit 29.3 %      MCV 95.8 fL      MCH 30.1 pg      MCHC 31.4 g/dL      RDW 17.3 %      RDW-SD 60.1 fl      MPV 10.0 fL      Platelets 202 10*3/mm3      Neutrophil % 76.0 %      Lymphocyte % 11.1 %      Monocyte % 7.5 %      Eosinophil % 4.9 %      Basophil % 0.1 %      Immature Grans % 0.4 %      Neutrophils, Absolute 5.78 10*3/mm3      Lymphocytes, Absolute 0.84 10*3/mm3      Monocytes, Absolute 0.57 10*3/mm3      Eosinophils, Absolute 0.37 10*3/mm3      Basophils, Absolute 0.01 10*3/mm3      Immature Grans, Absolute 0.03 10*3/mm3      nRBC 0.0 /100 WBC           Radiology:    Imaging Results (Last 72 Hours)     Procedure Component Value Units Date/Time    XR Chest 1 View [[id number]] Collected: 02/13/21 1206     Updated: 02/13/21 1211    Narrative:      PORTABLE CHEST    2/13/2021 11:48 AM       HISTORY: Fall     COMPARISON: None.     FINDINGS: Heart size upper normal. Lungs and pleural spaces are clear.  No acute fracture. Chronic right rotator cuff tear.           Impression:      No acute cardiopulmonary process .              AP PELVIS, 2V LEFT HIP     HISTORY: Fall.      FINDINGS:  A three view exam demonstrates an acute comminuted  intertrochanteric fracture of the proximal left femur with angulation.  Femoral head remains in the acetabulum. Pelvis is otherwise intact.  Severe degenerative change at L5-S1.        IMPRESSION: Acute intertrochanteric fracture of the left hip.     This report was finalized on 2/13/2021 12:09 PM by Dr Chencho Hughes DO.    XR Hip With or Without Pelvis 2 - 3 View Left [[id number]] Collected: 02/13/21 1206     Updated: 02/13/21 1211    Narrative:      PORTABLE CHEST    2/13/2021 11:48 AM      HISTORY: Fall     COMPARISON: None.     FINDINGS: Heart size upper normal. Lungs and pleural spaces are clear.  No acute fracture. Chronic right rotator cuff tear.           Impression:      No acute cardiopulmonary process .              AP PELVIS, 2V LEFT HIP     HISTORY: Fall.      FINDINGS:  A three view exam demonstrates an acute comminuted  intertrochanteric fracture of the proximal left femur with angulation.  Femoral head remains in the acetabulum. Pelvis is otherwise intact.  Severe degenerative change at L5-S1.        IMPRESSION: Acute intertrochanteric fracture of the left hip.     This report was finalized on 2/13/2021 12:09 PM by Dr Chencho Hughes DO.          Assessment:      Closed fracture of left hip (CMS/[facility])    Orthopaedic Impression:    Acute closed comminuted displaced unstable left hip intertrochanteric fracture.    Plan:     Risks, benefits, and alternative treatments discussed with the patient: [x] Yes [] No    Risk benefits and merits of the proposed surgery were discussed and the patient's questions were answered risks discussed including and not limited  to:  Anesthesia reactions  Blood loss and possible transfusion  Infection  Deep venous thrombosis and pulmonary embolus  Nerve, vascular or tendon injury  Fracture  Deformity  Stiffness  Weakness  Prosthesis and implant issues such as loosening, material wear or dislocation  Skin necrosis  Revision surgery or further treatment  Recurrence of problem and condition     Informed consent: [] Signed  [x] To be obtained at hospital  [] Both    PLANNED PROCEDURE:    HIP TROCHANTER NAIL SHORT WITH INTRAMEDULLARY HIP SCREW, LEFT    Gabriele Loyd MD  02/13/21  13:53 EST      Electronically signed by Gabriele Loyd MD at 02/14/21 0818          Emergency Department Notes      Donna Payne MD at 02/13/21 1109          TRIAGE CHIEF COMPLAINT:     Nursing and triage notes reviewed    Chief Complaint   Patient presents with   • Fall   • Hip Injury      HPI: Carolyne Martins is a 80 y.o. female who presents to the emergency department complaining of a fall and left hip pain.  Patient states she was taking her dog out to the bathroom this morning and the dog pulled on the leash causing her to lose her balance and fall over landing on her left hip.  She denies hitting her head or losing consciousness.  She was unable to get herself up and has been unable to bear weight on her left hip since the injury occurred.  She denies any numbness or tingling.  She states she has a history of chronic kidney disease and also anemia.    REVIEW OF SYSTEMS: All other systems reviewed and are negative     PAST MEDICAL HISTORY:   Past Medical History:   Diagnosis Date   • Anemia 1998   • Arthritis    • Back pain    • Bleeding from anus    • Bronchitis     STATES HAS BRONCHITIS CURRENTLY (12/5/17).     • Bronchitis 12/2017   • CAD (coronary artery disease)    • Cataract, bilateral    • Chronic kidney disease     STAGE 4.  SEES TERA   • Chronic kidney failure     REPORTS STAGE IV   • Difficulty swallowing solids    • Disease of  "thyroid gland    • Fracture, radius 2016    right distal radius and ulna, healed.   • GERD (gastroesophageal reflux disease)    • Gout    • High cholesterol    • History of nuclear stress test 2014    DR. CORTEZ.  \"IT WAS OK\"   • Hyperparathyroidism (CMS/HCC)    • Hyperparathyroidism (CMS/HCC)    • Hypertension    • Iron deficiency    • Osteoarthritis    • Osteoarthritis of knees, bilateral     Both knees Supartz injection series August, 2015   • Osteoporosis    • Palpitations    • Personal history of cardiac murmur    • Presence of pessary    • Problems with swallowing     WITH FOOD OCCASSIONALLY   • Rheumatic heart disease     Personal history   • Rotator cuff tendonitis     right    • Rupture of right proximal biceps tendon     chronic   • Seasonal allergies    • Sinus problem    • Spinal headache     REPORTS AFTER DELIVERY OF SON   • Subacromial bursitis     right    • Valvular heart disease    • Vision problems    • Vitamin B12 deficiency    • Vitamin D deficiency    • Wears glasses         FAMILY HISTORY:   Family History   Problem Relation Age of Onset   • Liver cancer Maternal Grandmother    • Gout Other    • Osteoporosis Other    • Stroke Other    • Ulcers Other    • Asthma Other    • Hypertension Other    • Heart disease Other    • Osteoarthritis Other    • Colon cancer Neg Hx         SOCIAL HISTORY:   Social History     Socioeconomic History   • Marital status:      Spouse name: Not on file   • Number of children: Not on file   • Years of education: Not on file   • Highest education level: Not on file   Tobacco Use   • Smoking status: Never Smoker   • Smokeless tobacco: Never Used   Substance and Sexual Activity   • Alcohol use: Never     Frequency: Never   • Drug use: Never   • Sexual activity: Defer        SURGICAL HISTORY:   Past Surgical History:   Procedure Laterality Date   • CATARACT EXTRACTION Bilateral    • CERVICAL POLYPECTOMY     • COLONOSCOPY  2011   • COLONOSCOPY N/A 1/9/2018    " Procedure: COLONOSCOPY with endoscopic mucosal resection (hot snare), normal saline submucosal injection, argon thermal ablation, resolution clip placement x4, and cold biopsy polypectomy;  Surgeon: Pieter Concepcion MD;  Location: [facility] ENDOSCOPY;  Service:    • ENDOSCOPY N/A 12/6/2017    Procedure: ESOPHAGOGASTRODUODENOSCOPY with biopsies and hot snare polypectomies;  Surgeon: Pieter Concepcion MD;  Location: [facility] ENDOSCOPY;  Service:    • ENDOSCOPY N/A 1/15/2019    Procedure: ESOPHAGOGASTRODUODENOSCOPY WITH BIOPSIES AND HOT SNARE POLYPECTOMIES X10;  Surgeon: Pieter Concepcion MD;  Location: [facility] ENDOSCOPY;  Service: Gastroenterology   • UPPER GASTROINTESTINAL ENDOSCOPY  08/18/2014        CURRENT MEDICATIONS:      Medication List      ASK your doctor about these medications    allopurinol 100 MG tablet  Commonly known as: ZYLOPRIM     budesonide-formoterol 80-4.5 MCG/ACT inhaler  Commonly known as: SYMBICORT     calcitriol 0.25 MCG capsule  Commonly known as: ROCALTROL     carboxymethylcellulose 0.5 % solution  Commonly known as: REFRESH PLUS     carvedilol 25 MG tablet  Commonly known as: COREG     cholecalciferol 25 MCG (1000 UT) tablet  Commonly known as: VITAMIN D3     cloNIDine 0.1 MG tablet  Commonly known as: CATAPRES     dicyclomine 10 MG/5ML syrup  Commonly known as: BENTYL  Take 5 mL by mouth 3 (Three) Times a Day As Needed (abdominal  cramps).     docusate sodium 100 MG capsule  Commonly known as: COLACE  Take 1 capsule by mouth 2 (Two) Times a Day.     erythromycin 5 MG/GM ophthalmic ointment  Commonly known as: ROMYCIN     estradiol 0.1 MG/GM vaginal cream  Commonly known as: ESTRACE  Massage 1 gram in vaginal opening twice weekly     fluticasone 50 MCG/ACT nasal spray  Commonly known as: FLONASE     irbesartan 150 MG tablet  Commonly known as: AVAPRO     omeprazole 40 MG capsule  Commonly known as: priLOSEC     ondansetron ODT 4 MG disintegrating tablet  Commonly known as: ZOFRAN-ODT     PROBIOTIC DAILY  PO     Procrit 51471 UNIT/ML injection  Generic drug: epoetin ovidio     rosuvastatin 10 MG tablet  Commonly known as: CRESTOR     VITAMIN B-12 PO             ALLERGIES: Ferrlecit [na ferric gluc cplx in sucrose], Ranitidine hcl, Levofloxacin, and Lisinopril     PHYSICAL EXAM:   VITAL SIGNS:   Vitals:    02/13/21 1053   BP: 135/80   Pulse: 55   Resp: 16   Temp: 97.3 °F (36.3 °C)   SpO2: 99%      CONSTITUTIONAL: Awake, oriented, appears non-toxic   HENT: Atraumatic, normocephalic, oral mucosa pink and moist, airway patent. Nares patent without drainage. External ears normal.   EYES: Conjunctiva clear   NECK: Trachea midline, non-tender, supple   CARDIOVASCULAR: Normal heart rate, Normal rhythm, No murmurs, rubs, gallops   PULMONARY/CHEST: Clear to auscultation, no rhonchi, wheezes, or rales. Symmetrical breath sounds..   ABDOMINAL: Non-distended, soft, non-tender - no rebound or guarding.  NEUROLOGIC: Non-focal, moving all four extremities, no gross sensory or motor deficits.   EXTREMITIES: No clubbing, cyanosis.  The left lower extremity appears to be shortened and internally rotated.  There is tenderness over the greater trochanter on the left side.  SKIN: Warm, Dry, No erythema, No rash     ED COURSE / MEDICAL DECISION MAKING:   Carolyne Martins is a 80 y.o. female who presents to the emergency department for evaluation of left hip pain.  Patient arrives and appears uncomfortable but does have stable vital signs.  Exam reveals a shortened and internally rotated left hip with tenderness over the greater trochanter raising suspicions for probable hip fracture.  Will obtain laboratory tests and imaging for evaluation of her symptoms.  Pain medication ordered on arrival.    EKG interpreted by me reveals sinus rhythm with rate of 56 bpm.  There are some nonspecific findings but no obvious acute ischemic changes.  This is an abnormal appearing EKG.    Chest x-ray as interpreted by the radiologist does not reveal any acute  cardiopulmonary process.    X-ray of the left hip does reveal an intertrochanteric left-sided hip fracture.    Patient's labs are around her baseline.    Spoke with the orthopedist on-call and we will admit the patient to the hospital for further treatment and plan on surgical fixation tomorrow.    DECISION TO DISCHARGE/ADMIT: see ED care timeline     FINAL IMPRESSION:   1 --hip fracture  2 --   3 --     Electronically signed by: Donna Payne MD, 2/13/2021 11:09 Donna Watts MD  02/13/21 1324      Electronically signed by Donna Payne MD at 02/13/21 1324     Oly Guevara at 02/13/21 1301        Dr. Loyd transferred to Dr. Payne at this time.     Oly Guevara  02/13/21 1302      Electronically signed by Oly Guevara at 02/13/21 1302       Oxygen Therapy (last day)     Date/Time   SpO2   Device (Oxygen Therapy)   Flow (L/min)   Oxygen Concentration (%)   ETCO2 (mmHg)    02/15/21 0716   96   room air   --   --   --    02/15/21 0400   --   room air   --   --   --    02/15/21 0346   98   room air   --   --   --    02/15/21 0000   --   room air   --   --   --    02/14/21 2341   96   room air   --   --   --    02/14/21 2000   --   room air   --   --   --    02/14/21 1946   95   room air   --   --   --    02/14/21 1916   96   room air   --   --   --    02/14/21 1851   96   room air   --   --   --    02/14/21 1811   98   room air   --   --   --    02/14/21 1600   --   room air   --   --   --    02/14/21 1537   97   room air   --   --   --    02/14/21 1449   98   --   --   --   --    02/14/21 1415   96   room air   --   --   --    02/14/21 1408   94   --   --   --   --    02/14/21 1405   96   room air   --   --   --    02/14/21 1358   93   --   --   --   --    02/14/21 1348   94   --   --   --   --    02/14/21 1338   94   --   --   --   --    02/14/21 1335   --   room air   --   --   --    02/14/21 1331   99   room air   --   --   --    02/14/21 1328   100   --   --   --   --     02/14/21 1323   100   --   --   --   --    02/14/21 1320   --   simple face mask   6   --   --    02/14/21 1318   100   --   --   --   --    02/14/21 1313   100   --   --   --   --    02/14/21 1309   100   --   --   --   --    02/14/21 1308   100   simple face mask   6   --   --    02/14/21 1305   99   simple face mask   6   --   --    02/14/21 1037   --   room air   --   --   --    02/14/21 0800   --   room air   --   --   --    02/14/21 0700   99   room air   --   --   --    02/14/21 0345   98   room air   --   --   --    02/14/21 0000   --   room air   --   --   --                Current Facility-Administered Medications   Medication Dose Route Frequency Provider Last Rate Last Admin   • budesonide-formoterol (SYMBICORT) 80-4.5 MCG/ACT inhaler 2 puff  2 puff Inhalation BID - RT Gabriele Loyd MD   2 puff at 02/15/21 0716   • calcium carbonate (TUMS) chewable tablet 500 mg (200 mg elemental)  2 tablet Oral BID PRN Gabriele Loyd MD   2 tablet at 02/15/21 0840   • carvedilol (COREG) tablet 25 mg  25 mg Oral BID With Meals Gabriele Loyd MD   25 mg at 02/15/21 0839   • cyclobenzaprine (FLEXERIL) tablet 5 mg  5 mg Oral Q8H Stepan Peters DO   5 mg at 02/14/21 2200   • enoxaparin (LOVENOX) syringe 30 mg  30 mg Subcutaneous Daily Gabriele Loyd MD   30 mg at 02/15/21 0839   • erythromycin (ROMYCIN) ophthalmic ointment   Left Eye Nightly Gabriele Loyd MD   Given at 02/14/21 2012   • HYDROcodone-acetaminophen (NORCO) 5-325 MG per tablet 1 tablet  1 tablet Oral Q4H PRN Gabriele Loyd MD   1 tablet at 02/14/21 1815   • melatonin tablet 5 mg  5 mg Oral Nightly PRN Gabriele Loyd MD       • Morphine sulfate (PF) injection 1 mg  1 mg Intravenous Q2H PRN Kyung Ingram DO        And   • naloxone (NARCAN) injection 0.4 mg  0.4 mg Intravenous Q5 Min PRN Kyung Ingram DO       • ondansetron (ZOFRAN) tablet 4 mg  4 mg Oral Q6H PRN Gabriele Loyd MD        Or  "  • ondansetron (ZOFRAN) injection 4 mg  4 mg Intravenous Q6H PRN Gabriele Loyd MD       • pantoprazole (PROTONIX) EC tablet 40 mg  40 mg Oral QAM Gabriele Loyd MD   40 mg at 02/15/21 0646   • rosuvastatin (CRESTOR) tablet 10 mg  10 mg Oral Nightly Gabriele Loyd MD   10 mg at 21   • sodium chloride 0.9 % flush 1-10 mL  1-10 mL Intravenous PRN Gabriele Loyd MD       • sodium chloride 0.9 % flush 10 mL  10 mL Intravenous Q12H Gabriele Loyd MD   10 mL at 02/15/21 0835   • sodium chloride 0.9 % flush 3 mL  3 mL Intravenous Q12H Gabriele Loyd MD   3 mL at 02/15/21 0835        Physician Progress Notes (last 48 hours) (Notes from 21 1035 through 02/15/21 1035)      Sagar Bocanegra PA-C at 02/15/21 0956                [facility]  PROGRESS NOTE    Name:  Carolyne Martins   Age:  80 y.o.  Sex:  female  :  1940  MRN:  [id number]   Visit Number:  [id number]  Admission Date:  2021  Date Of Service:  02/15/21  Primary Care Physician:  Jerald Dawkins MD     LOS: 2 days :  Patient Care Team:  Jerald Dawkins MD as PCP - General  Daryn Spears MD as Consulting Physician (General Surgery):    Chief Complaint:      Postop day #1 left hip ORIF    Subjective / Interval History:     Patient is lying supine in the hospital bed.  She states her pain is well controlled but the pain medication does make her \"groggy and sleepy\" she denies chest pain, abdominal pain or shortness of breath.  She has passed flatus.     Review of Systems:     General ROS: No fever spike, no acute cognitive change.  Ophthalmic ROS: no acute visual disturbance.  ENT ROS: No acute sinus congestion.   Respiratory ROS: No new shortness of breath.   Cardiovascular ROS: No new chest pain.  Gastrointestinal ROS: No acute abdominal change. Non-distended.  Genito-Urinary ROS: No reported dysuria.  Musculoskeletal ROS: No deep calf pain. No acute focal motor deficit  Neurological ROS: " No new numbness or tingling.  Dermatological ROS: No erythema.  No cyanosis.  No rash or pruritis.    Vital Signs:    Temp:  [98 °F (36.7 °C)-100.4 °F (38 °C)] 98.7 °F (37.1 °C)  Heart Rate:  [] 101  Resp:  [14-19] 18  BP: (112-174)/() 139/80    Intake and output:    I/O last 3 completed shifts:  In: 1848.3 [P.O.:240; I.V.:308.3; Blood:300; IV Piggyback:1000]  Out: 750 [Urine:750]  I/O this shift:  In: 240 [P.O.:240]  Out: -     Physical Examination:    General Appearance:    Alert, cooperative, and no acute distress.   Head:    Atraumatic and normocephalic, without obvious abnormality.   Eyes:    Extraocular movements are within normal limits.   ENT:   No acute change.   Neck:   Supple, trachea midline.   Lungs:     Normal respirations, unlabored.    Heart:    Regular rate.   Abdomen:     Soft, nondistended.   Extremities:   + left calf pain.  No new motor deficit   Skin:     No rash.  No cyanosis.  No erythema.  No active drainage.       Neurologic:   Sensation intact, pulses intact.     Laboratory results:    Lab Results (last 24 hours)     Procedure Component Value Units Date/Time    Uric Acid [[id number]]  (Abnormal) Collected: 02/15/21 0537    Specimen: Blood Updated: 02/15/21 0619     Uric Acid 5.8 mg/dL     Phosphorus [[id number]]  (Abnormal) Collected: 02/15/21 0537    Specimen: Blood Updated: 02/15/21 0619     Phosphorus 4.6 mg/dL     Comprehensive Metabolic Panel [[id number]]  (Abnormal) Collected: 02/15/21 0537    Specimen: Blood Updated: 02/15/21 0619     Glucose 95 mg/dL      BUN 56 mg/dL      Creatinine 3.33 mg/dL      Sodium 136 mmol/L      Potassium 5.1 mmol/L      Chloride 107 mmol/L      CO2 21.4 mmol/L      Calcium 7.9 mg/dL      Total Protein 5.0 g/dL      Albumin 2.70 g/dL      ALT (SGPT) 5 U/L      AST (SGOT) 17 U/L      Alkaline Phosphatase 45 U/L      Total Bilirubin 0.3 mg/dL      eGFR Non African Amer 13 mL/min/1.73      Comment: <15 Indicative of kidney failure.        eGFR    Amer --     Comment: <15 Indicative of kidney failure.        Globulin 2.3 gm/dL      A/G Ratio 1.2 g/dL      BUN/Creatinine Ratio 16.8     Anion Gap 7.6 mmol/L     Narrative:      GFR Normal >60  Chronic Kidney Disease <60  Kidney Failure <15      Magnesium [[id number]]  (Normal) Collected: 02/15/21 0537    Specimen: Blood Updated: 02/15/21 0619     Magnesium 1.7 mg/dL     CBC & Differential [[id number]]  (Abnormal) Collected: 02/15/21 0537    Specimen: Blood Updated: 02/15/21 0603    Narrative:      The following orders were created for panel order CBC & Differential.  Procedure                               Abnormality         Status                     ---------                               -----------         ------                     CBC Auto Differential[[id number]]        Abnormal            Final result                 Please view results for these tests on the individual orders.    CBC Auto Differential [[id number]]  (Abnormal) Collected: 02/15/21 0537    Specimen: Blood Updated: 02/15/21 0603     WBC 5.80 10*3/mm3      RBC 2.79 10*6/mm3      Hemoglobin 8.3 g/dL      Hematocrit 26.6 %      MCV 95.3 fL      MCH 29.7 pg      MCHC 31.2 g/dL      RDW 16.8 %      RDW-SD 57.7 fl      MPV 10.7 fL      Platelets 136 10*3/mm3      Neutrophil % 70.9 %      Lymphocyte % 14.3 %      Monocyte % 12.2 %      Eosinophil % 2.1 %      Basophil % 0.3 %      Immature Grans % 0.2 %      Neutrophils, Absolute 4.11 10*3/mm3      Lymphocytes, Absolute 0.83 10*3/mm3      Monocytes, Absolute 0.71 10*3/mm3      Eosinophils, Absolute 0.12 10*3/mm3      Basophils, Absolute 0.02 10*3/mm3      Immature Grans, Absolute 0.01 10*3/mm3      nRBC 0.0 /100 WBC     Chloride, Urine, Random - Urine, Catheter [[id number]] Collected: 02/14/21 1541    Specimen: Urine, Catheter Updated: 02/14/21 2031     Chloride, Urine 20 mmol/L     Narrative:      Reference intervals for random urine have not been established.  Clinical usage is dependent  upon physician's interpretation in combination with other laboratory tests.       Protein / Creatinine Ratio, Urine - Urine, Catheter [[id number]]  (Abnormal) Collected: 02/14/21 1541    Specimen: Urine, Catheter Updated: 02/14/21 2030     Protein/Creatinine Ratio, Urine 234.3 mg/G Crea      Creatinine, Urine 81.1 mg/dL      Total Protein, Urine 19.0 mg/dL     Sodium, Urine, Random - Urine, Catheter [[id number]] Collected: 02/14/21 1542    Specimen: Urine, Catheter Updated: 02/14/21 1646     Sodium, Urine 30 mmol/L     Narrative:      Reference intervals for random urine have not been established.  Clinical usage is dependent upon physician's interpretation in combination with other laboratory tests.       Ferritin [[id number]]  (Normal) Collected: 02/14/21 0604    Specimen: Blood Updated: 02/14/21 1601     Ferritin 14.12 ng/mL     Narrative:      Results may be falsely decreased if patient taking Biotin.      Iron Profile [[id number]]  (Abnormal) Collected: 02/14/21 0604    Specimen: Blood Updated: 02/14/21 1552     Iron 40 mcg/dL      Iron Saturation 12 %      Transferrin 229 mg/dL      TIBC 341 mcg/dL           I have reviewed the patient's laboratory results.    Radiology results:    Imaging Results (Last 24 Hours)     Procedure Component Value Units Date/Time    FL C Arm During Surgery [[id number]] Resulted: 02/14/21 1248     Updated: 02/14/21 1248    Narrative:      This procedure was auto-finalized with no dictation required.    [facility] OR Procedure [[id number]] Resulted: 02/14/21 1025     Updated: 02/14/21 1025          I have reviewed the patient's radiology reports.    AP and lateral taken perioperative shows no acute concern.  Good position and alignment of implants and/or fracture.       Assessment/Plan      Closed fracture of left hip (CMS/HCC)      S/P ORIF left proximal femur:    Continue current management per the detailed orders and instructions, including skin, safety and fall precautions,  respiratory therapy with incentive spirometer, advance diet as tolerated, bowel regimen, multi-modal analgesia, DVT prophylaxis, Hospitalist medical management, PT/OT following post-surgical rehab protocol, and Case Management for discharge and rehabilitation plans.    We will order a stat has Doppler left lower extremity rule out DVT if negative patient is stable from the orthopedic standpoint for discharge to rehab.  Change hip dressing every other day to keep clean and dry.  Weightbearing as tolerated with walker and assistance.  Recommend Eliquis 2.5 mg twice daily X 8 days ( eight days) for DVT ppx  Sagar Bocanegra PA-C  02/15/21  10:04 EST    Electronically signed by Sagar Bocanegra PA-C at 02/15/21 1004     Kyung Ingram DO at 02/15/21 0972                [facility]    Name:  Carolyne Martins   Age:  80 y.o.  Sex:  female  :  1940  MRN:  [id number]   Visit Number:  [id number]  Admission Date:  2021  Date Of Service:  02/15/21  Primary Care Physician:  Jerald Dawkins MD     LOS: 2 days :  Patient Care Team:  Jerald Dawkins MD as PCP - General  Daryn Spears MD as Consulting Physician (General Surgery):    Chief Complaint:      Follow-up on hip fracture    Subjective / Interval History:     Patient was seen and examined this morning.  Underwent hip surgery yesterday.  Still with significant mount of pain.  Notes pain medicine makes her very sleepy.  Denies any fevers or chills.  Has not been out of bed any.  Did discuss rehab with her, she is agreeable to looking into facilities.  Does not have any help at home other than a son who does work however.    Prior work-up:  80-year-old female who unfortunately sustained a mechanical fall at home outside while trying to walk her dog.  She had evidence of a left hip comminuted displaced and unstable intertrochanteric fracture with subsequent ORIF on 2021.    Review of Systems:      General ROS: Patient denies any fevers, chills or loss of consciousness.  Respiratory ROS: Denies cough or shortness of breath.  Cardiovascular ROS: Denies chest pain or palpitations. No history of exertional chest pain.  Gastrointestinal ROS: Denies nausea and vomiting. Denies any abdominal pain. No diarrhea.  Neurological ROS: Denies any focal weakness. No loss of consciousness. Denies any numbness.  Dermatological ROS: Denies any redness or pruritis.    Vital Signs:    Temp:  [98 °F (36.7 °C)-100.4 °F (38 °C)] 98.7 °F (37.1 °C)  Heart Rate:  [] 101  Resp:  [14-19] 18  BP: (112-174)/() 139/80    Intake and output:    I/O last 3 completed shifts:  In: 1848.3 [P.O.:240; I.V.:308.3; Blood:300; IV Piggyback:1000]  Out: 750 [Urine:750]  I/O this shift:  In: 240 [P.O.:240]  Out: -     Physical Examination:    General Appearance:  Alert and cooperative, not in any acute distress.  Elderly-appearing female.   Head:  Atraumatic and normocephalic, without obvious abnormality.   Eyes:          PERRLA, conjunctivae and sclerae normal, no Icterus. No pallor. Extraocular movements are within normal limits.   Neck: Supple, trachea midline, no thyromegaly.   Lungs:   Breath sounds heard bilaterally equally.  No crackles or wheezing. No Pleural rub or bronchial breathing.   Heart:  Normal S1 and S2, no murmur, no gallop, no rub. No JVD   Abdomen:   Normal bowel sounds, no masses, no organomegaly. Soft, non-tender, non-distended, no guarding, no rebound tenderness.   Extremities:  Can move the left lower extremity albeit with pain.  Mild edema noted.  Pulses are intact distally.  Bandage appears clean.   Skin: No bleeding, bruising or rash.   Neurologic: Awake, alert and oriented times 3. Moves all 4 extremities equally.     Laboratory results:    Results from last 7 days   Lab Units 02/15/21  0537 02/14/21  0604 02/13/21  1215   SODIUM mmol/L 136 135* 136   POTASSIUM mmol/L 5.1 5.3* 4.5   CHLORIDE mmol/L 107 104  102   CO2 mmol/L 21.4* 23.7 25.6   BUN mg/dL 56* 60* 52*   CREATININE mg/dL 3.33* 3.40* 2.93*   CALCIUM mg/dL 7.9* 8.3* 9.5   BILIRUBIN mg/dL 0.3  --  0.4   ALK PHOS U/L 45  --  58   ALT (SGPT) U/L 5  --  9   AST (SGOT) U/L 17  --  16   GLUCOSE mg/dL 95 110* 115*     Results from last 7 days   Lab Units 02/15/21  0537 02/14/21  0604 02/13/21  1215   WBC 10*3/mm3 5.80 5.47 7.60   HEMOGLOBIN g/dL 8.3* 7.6* 9.2*   HEMATOCRIT % 26.6* 24.4* 29.3*   PLATELETS 10*3/mm3 136* 176 202         Results from last 7 days   Lab Units 02/13/21  1215   TROPONIN T ng/mL <0.010               I have reviewed the patient's laboratory results.    Radiology results:    Imaging Results (Last 24 Hours)     Procedure Component Value Units Date/Time    FL C Arm During Surgery [[id number]] Resulted: 02/14/21 1248     Updated: 02/14/21 1248    Narrative:      This procedure was auto-finalized with no dictation required.     [facility] OR Procedure [[id number]] Resulted: 02/14/21 1025     Updated: 02/14/21 1025          I have reviewed the patient's radiology reports.    Medication Review:     I have reviewed the patient's active and prn medications.       Closed fracture of left hip (CMS/[facility])      Assessment:    Left hip fracture secondary to mechanical fall, POA  CKD stage IV  Hypertension  Hyperlipidemia  Osteoarthritis  Osteoporosis  Age-related chronic debility    Plan:    The patient is overall hemodynamically stable.  Has received 1 unit of packed red blood cell transfusion with hemoglobin of 8 this morning.  Remains on heparin for DVT prophylaxis.  Nephrology is following due to her chronic kidney disease stage IV.  She also has history of chronic anemia.  Encourage patient to work with physical therapy as tolerated.  Continue with pain control.  Venous ultrasound this morning to rule out any blood clot of the left lower extremity per orthopedics.    Case management consult for placement.  Will likely need short-term rehab.    Kyung GRIGGS  DO Nataly  02/15/21  09:53 EST    Dictated utilizing Dragon dictation.    Electronically signed by Kyung Ingram DO at 02/15/21 0955     Stepan Peters DO at 21 1411                [facility]    Name:  Carolyne Martins   Age:  80 y.o.  Sex:  female  :  1940  MRN:  [id number]   Visit Number:  [id number]  Admission Date:  2021  Date Of Service:  21  Primary Care Physician:  Jerald Dawkins MD     LOS: 1 day :  Patient Care Team:  Jerald Dawkins MD as PCP - General  [facility] Hospital[facility] as Consulting Physician (General Surgery):    Chief Complaint:      Left hip pain    Subjective / Interval History:     Patient resting comfortably in bed.  Pain well controlled.  Anxious about surgery but feels well.  No acute events overnight per nursing staff.    Review of Systems:     General ROS: Patient denies any fevers, chills or loss of consciousness.  Respiratory ROS: Denies cough or shortness of breath.  Cardiovascular ROS: Denies chest pain or palpitations. No history of exertional chest pain.  Gastrointestinal ROS: Denies nausea and vomiting. Denies any abdominal pain. No diarrhea.  Neurological ROS: Denies any focal weakness. No loss of consciousness. Denies any numbness.  Dermatological ROS: Denies any redness or pruritis.    Vital Signs:    Temp:  [97.5 °F (36.4 °C)-98.6 °F (37 °C)] 98.6 °F (37 °C)  Heart Rate:  [] 65  Resp:  [14-19] 18  BP: ()/() 128/57    Intake and output:    I/O last 3 completed shifts:  In: 1108.3 [I.V.:108.3; IV Piggyback:1000]  Out: 350 [Urine:350]  I/O this shift:  In: 200 [I.V.:200]  Out: 200 [Urine:200]    Physical Examination:    General Appearance:  Alert and cooperative, not in any acute distress.   Head:  Atraumatic and normocephalic, without obvious abnormality.   Eyes:          PERRLA, conjunctivae and sclerae normal, no Icterus. No pallor. Extraocular movements are within normal  limits.   Neck: Supple, trachea midline, no thyromegaly, no carotid bruit.   Lungs:   Chest shape is normal. Breath sounds heard bilaterally equally.  No crackles or wheezing. No Pleural rub or bronchial breathing.   Heart:  Normal S1 and S2, no murmur, no gallop, no rub. No JVD   Abdomen:   Normal bowel sounds, no masses, no organomegaly. Soft, nontender, nondistended, no guarding, no rebound tenderness.   Extremities: Left leg shorter than right, internally rotated.  No edema, no cyanosis, no clubbing.   Skin: No bleeding, bruising or rash.   Neurologic: Awake, alert and oriented times 3. Moves all 4 extremities equally.     Laboratory results:    Results from last 7 days   Lab Units 02/14/21  0604 02/13/21  1215   SODIUM mmol/L 135* 136   POTASSIUM mmol/L 5.3* 4.5   CHLORIDE mmol/L 104 102   CO2 mmol/L 23.7 25.6   BUN mg/dL 60* 52*   CREATININE mg/dL 3.40* 2.93*   CALCIUM mg/dL 8.3* 9.5   BILIRUBIN mg/dL  --  0.4   ALK PHOS U/L  --  58   ALT (SGPT) U/L  --  9   AST (SGOT) U/L  --  16   GLUCOSE mg/dL 110* 115*     Results from last 7 days   Lab Units 02/14/21  0604 02/13/21  1215   WBC 10*3/mm3 5.47 7.60   HEMOGLOBIN g/dL 7.6* 9.2*   HEMATOCRIT % 24.4* 29.3*   PLATELETS 10*3/mm3 176 202         Results from last 7 days   Lab Units 02/13/21  1215   TROPONIN T ng/mL <0.010               I have reviewed the patient's laboratory results.    Radiology results:    Imaging Results (Last 24 Hours)     Procedure Component Value Units Date/Time    FL C Arm During Surgery [[id number]] Resulted: 02/14/21 1248     Updated: 02/14/21 1248    Narrative:      This procedure was auto-finalized with no dictation required.    [facility] OR Procedure [[id number]] Resulted: 02/14/21 1025     Updated: 02/14/21 1025          I have reviewed the patient's radiology reports.    Medication Review:     I have reviewed the patient's active and prn medications.       Closed fracture of left hip (CMS/[facility])      Assessment:    Left hip fracture  secondary to mechanical fall, POA  CKD stage IV  Hypertension  Hyperlipidemia  Osteoarthritis  Osteoporosis  Age-related chronic debility    Plan:    Continue to monitor patient in hospital.  Taken down for surgical intervention on 2/14 by Dr. Loyd.  Have ordered 1 unit PRBC transfusion in anticipation of blood loss as prior to surgery patient's hemoglobin is 7.3.  Continue supportive care.  Pain control and antiemetics as needed.  Heparin for DVT prophylaxis to start post surgery.  PT/OT.  Have asked nephrology to consult given CKD stage IV.  Further orders as clinical course dictates.    Stepan Peters DO  02/14/21  14:11 EST    Dictated utilizing Dragon dictation.      Electronically signed by Stepan Peters DO at 02/14/21 1415          Consult Notes (last 48 hours) (Notes from 02/13/21 1035 through 02/15/21 1035)      Deniz Rodrigez MD at 02/14/21 1518      Consult Orders    1. Inpatient Nephrology Consult [[id number]] ordered by Gabriele Loyd MD at 02/14/21 1013                 Referring Provider: Gabriele Watson MD  Reason for Consultation: Evaluation of patient with known chronic kidney disease    Subjective     Chief complaint   Chief Complaint   Patient presents with   • Fall   • Hip Injury       History of present illness:    This is a 80-year-old  female was admitted after she sustained a fall when her dog pulled her when she was holding the dog on a leash and she fell down and fractured her left hip she just underwent ORIF left femoral fracture.  The patient has history of chronic kidney disease has been under the care of Dr. Schilling for the past 20 years at least.  History of chronic anemia, degenerative joint disease, chronic back pain, coronary artery disease, chronic kidney disease stage IV, GERD, dyslipidemia, hypothyroidism, secondary hyperparathyroidism, history of vitamin D deficiency and vitamin B12 deficiency.  Her baseline creatinine has been between  "2.65-2.75.  On admission yesterday was 2.93 and this morning 3.4.  And her potassium noted to be 5.3.    The patient is complaining of left hip pain related to surgery in the fall, denies any chest pain, no shortness of air, no orthopnea or PND, no nausea or vomiting, her appetite has been borderline, no dysuria or gross hematuria, no bladder outlet symptoms.  Currently has a Aguayo catheter anchored in place.  Past Medical History:   Diagnosis Date   • Anemia 1998   • Anemia    • Arthritis    • Back pain    • Bleeding from anus    • Bronchitis     STATES HAS BRONCHITIS CURRENTLY (12/5/17).     • Bronchitis 12/2017   • CAD (coronary artery disease)    • Cataract, bilateral    • Chronic kidney disease     STAGE 4.  SEES TERA   • Chronic kidney failure     REPORTS STAGE IV   • Difficulty swallowing solids    • Disease of thyroid gland    • Fracture, radius 2016    right distal radius and ulna, healed.   • GERD (gastroesophageal reflux disease)    • Gout    • High cholesterol    • History of blood transfusion 2019   • History of nuclear stress test 2014    DR. CORTEZ.  \"IT WAS OK\"   • Hyperparathyroidism (CMS/HCC)    • Hyperparathyroidism (CMS/HCC)    • Hypertension    • Iron deficiency    • Osteoarthritis    • Osteoarthritis of knees, bilateral     Both knees Supartz injection series August, 2015   • Osteoporosis    • Palpitations    • Personal history of cardiac murmur    • PONV (postoperative nausea and vomiting)    • Presence of pessary    • Problems with swallowing     WITH FOOD OCCASSIONALLY   • Rheumatic heart disease     Personal history   • Rotator cuff tendonitis     right    • Rupture of right proximal biceps tendon     chronic   • Seasonal allergies    • Sinus problem    • Spinal headache     REPORTS AFTER DELIVERY OF SON   • Subacromial bursitis     right    • Valvular heart disease    • Vision problems    • Vitamin B12 deficiency    • Vitamin D deficiency    • Wears glasses      Past Surgical History: "   Procedure Laterality Date   • CATARACT EXTRACTION Bilateral    • CERVICAL POLYPECTOMY     • COLONOSCOPY  2011   • COLONOSCOPY N/A 1/9/2018    Procedure: COLONOSCOPY with endoscopic mucosal resection (hot snare), normal saline submucosal injection, argon thermal ablation, resolution clip placement x4, and cold biopsy polypectomy;  Surgeon: Pieter Concepcion MD;  Location: [facility] ENDOSCOPY;  Service:    • ENDOSCOPY N/A 12/6/2017    Procedure: ESOPHAGOGASTRODUODENOSCOPY with biopsies and hot snare polypectomies;  Surgeon: Pieter Concepcion MD;  Location: [facility] ENDOSCOPY;  Service:    • ENDOSCOPY N/A 1/15/2019    Procedure: ESOPHAGOGASTRODUODENOSCOPY WITH BIOPSIES AND HOT SNARE POLYPECTOMIES X10;  Surgeon: Pieter Concepcion MD;  Location: [facility] ENDOSCOPY;  Service: Gastroenterology   • UPPER GASTROINTESTINAL ENDOSCOPY  08/18/2014     Family History   Problem Relation Age of Onset   • Liver cancer Maternal Grandmother    • Gout Other    • Osteoporosis Other    • Stroke Other    • Ulcers Other    • Asthma Other    • Hypertension Other    • Heart disease Other    • Osteoarthritis Other    • Colon cancer Neg Hx        Social History     Tobacco Use   • Smoking status: Never Smoker   • Smokeless tobacco: Never Used   Substance Use Topics   • Alcohol use: Never     Frequency: Never   • Drug use: Never     Medications Prior to Admission   Medication Sig Dispense Refill Last Dose   • allopurinol (ZYLOPRIM) 100 MG tablet Take 1 tablet by mouth daily.   2/13/2021 at 2100   • aspirin 81 MG EC tablet Take 81 mg by mouth Daily.   2/13/2021 at 0800   • budesonide-formoterol (SYMBICORT) 80-4.5 MCG/ACT inhaler Inhale 2 puffs 2 (Two) Times a Day.   2/13/2021 at 0800   • calcitriol (ROCALTROL) 0.25 MCG capsule 2 (Two) Times a Day.   2/13/2021 at 0800   • carboxymethylcellulose (REFRESH PLUS) 0.5 % solution 3 (Three) Times a Day As Needed for Dry Eyes.   2/13/2021 at 0800   • carvedilol (COREG) 25 MG tablet Take 25 mg by mouth 2 (Two) Times a  Day With Meals.   2/13/2021 at 0800   • cholecalciferol (VITAMIN D3) 1000 UNITS tablet Take 1,000 Units by mouth Daily.   2/13/2021 at 0800   • Cyanocobalamin (VITAMIN B-12 PO) Take 500 mg by mouth 2 (Two) Times a Day.   2/13/2021 at 0800   • epoetin ovidio (PROCRIT) 64459 UNIT/ML injection Inject 40,000 Units under the skin into the appropriate area as directed. Every 2 weeks.  Due Wednesday, Feb 17th   Past Month at Unknown time   • irbesartan (AVAPRO) 150 MG tablet Take 150 mg by mouth Daily.   2/13/2021 at 0800   • omeprazole (PriLOSEC) 40 MG capsule Take 40 mg by mouth Daily.   2/13/2021 at 0800   • docusate sodium (COLACE) 100 MG capsule Take 1 capsule by mouth 2 (Two) Times a Day. (Patient taking differently: Take 100 mg by mouth 2 (Two) Times a Day As Needed for Constipation.) 60 capsule 5 Unknown at Unknown time   • erythromycin (ROMYCIN) 5 MG/GM ophthalmic ointment Administer  into the left eye Every Night.   Unknown at 2100   • estradiol (ESTRACE) 0.1 MG/GM vaginal cream Massage 1 gram in vaginal opening twice weekly 42.5 g 2 Unknown at Unknown time   • fluticasone (FLONASE) 50 MCG/ACT nasal spray 2 sprays into each nostril Daily As Needed.   Unknown at 2100   • ondansetron ODT (ZOFRAN-ODT) 4 MG disintegrating tablet Place 4 mg on the tongue Every 8 (Eight) Hours As Needed.   Unknown at Unknown time   • Probiotic Product (PROBIOTIC DAILY PO) Take  by mouth Daily As Needed.   Unknown at Unknown time   • rosuvastatin (CRESTOR) 10 MG tablet Take 10 mg by mouth Daily.   Unknown at 2100   • torsemide (DEMADEX) 10 MG tablet Take 5 mg by mouth Daily. Takes Monday, Wednesday, Fridays   Unknown at Unknown time     Allergies:  Ferrlecit [na ferric gluc cplx in sucrose], Ranitidine hcl, Levofloxacin, and Lisinopril    Review of Systems  14 points review of system was performed and it was negative other than what noted above in the HPI    Objective     Vital Signs  Temp:  [97.5 °F (36.4 °C)-98.6 °F (37 °C)] 98.2 °F  "(36.8 °C)  Heart Rate:  [58-95] 67  Resp:  [14-19] 18  BP: ()/(52-90) 147/90    Flowsheet Rows      First Filed Value   Admission Height  149.9 cm (59\") Documented at 02/13/2021 1053   Admission Weight  50.3 kg (111 lb) Documented at 02/13/2021 1053         I/O this shift:  In: 200 [I.V.:200]  Out: 200 [Urine:200]  I/O last 3 completed shifts:  In: 1108.3 [I.V.:108.3; IV Piggyback:1000]  Out: 350 [Urine:350]    Intake/Output Summary (Last 24 hours) at 2/14/2021 1518  Last data filed at 2/14/2021 1400  Gross per 24 hour   Intake 1308.33 ml   Output 550 ml   Net 758.33 ml       Physical Exam:  General Appearance: alert, oriented x 3, no acute distress, appears to be chronically ill and frail  Skin: warm and dry  HEENT: pupils round and reactive to light, oral mucosa dry, nonicteric sclera  Neck: No JVD, trachea midline, no cervical or supraclavicular lymphadenopathy, I did not appreciate carotid bruit  Lungs: CTA, unlabored breathing effort  Heart: RRR, normal S1 and S2, no S3, no rub  Abdomen: soft, nontender, normoactive bowels  : no palpable bladder, Aguayo catheter is anchored in place  Extremities: no edema, cyanosis or clubbing  Neuro: normal speech and mental status    Results Review:  Results for orders placed or performed during the hospital encounter of 02/13/21   COVID-19,[facility] IN-HOUSE,Nasal Swab No Transport Media 3-4 HR TAT - Swab, Nasal Cavity    Specimen: Nasal Cavity; Swab   Result Value Ref Range    COVID19 Not Detected Not Detected - Ref. Range   Comprehensive Metabolic Panel    Specimen: Blood   Result Value Ref Range    Glucose 115 (H) 65 - 99 mg/dL    BUN 52 (H) 8 - 23 mg/dL    Creatinine 2.93 (H) 0.57 - 1.00 mg/dL    Sodium 136 136 - 145 mmol/L    Potassium 4.5 3.5 - 5.2 mmol/L    Chloride 102 98 - 107 mmol/L    CO2 25.6 22.0 - 29.0 mmol/L    Calcium 9.5 8.6 - 10.5 mg/dL    Total Protein 6.0 6.0 - 8.5 g/dL    Albumin 3.40 (L) 3.50 - 5.20 g/dL    ALT (SGPT) 9 1 - 33 U/L    AST (SGOT) 16 " 1 - 32 U/L    Alkaline Phosphatase 58 39 - 117 U/L    Total Bilirubin 0.4 0.0 - 1.2 mg/dL    eGFR Non African Amer 16 (L) >60 mL/min/1.73    Globulin 2.6 gm/dL    A/G Ratio 1.3 g/dL    BUN/Creatinine Ratio 17.7 7.0 - 25.0    Anion Gap 8.4 5.0 - 15.0 mmol/L   Troponin    Specimen: Blood   Result Value Ref Range    Troponin T <0.010 0.000 - 0.030 ng/mL   CBC Auto Differential    Specimen: Blood   Result Value Ref Range    WBC 7.60 3.40 - 10.80 10*3/mm3    RBC 3.06 (L) 3.77 - 5.28 10*6/mm3    Hemoglobin 9.2 (L) 12.0 - 15.9 g/dL    Hematocrit 29.3 (L) 34.0 - 46.6 %    MCV 95.8 79.0 - 97.0 fL    MCH 30.1 26.6 - 33.0 pg    MCHC 31.4 (L) 31.5 - 35.7 g/dL    RDW 17.3 (H) 12.3 - 15.4 %    RDW-SD 60.1 (H) 37.0 - 54.0 fl    MPV 10.0 6.0 - 12.0 fL    Platelets 202 140 - 450 10*3/mm3    Neutrophil % 76.0 42.7 - 76.0 %    Lymphocyte % 11.1 (L) 19.6 - 45.3 %    Monocyte % 7.5 5.0 - 12.0 %    Eosinophil % 4.9 0.3 - 6.2 %    Basophil % 0.1 0.0 - 1.5 %    Immature Grans % 0.4 0.0 - 0.5 %    Neutrophils, Absolute 5.78 1.70 - 7.00 10*3/mm3    Lymphocytes, Absolute 0.84 0.70 - 3.10 10*3/mm3    Monocytes, Absolute 0.57 0.10 - 0.90 10*3/mm3    Eosinophils, Absolute 0.37 0.00 - 0.40 10*3/mm3    Basophils, Absolute 0.01 0.00 - 0.20 10*3/mm3    Immature Grans, Absolute 0.03 0.00 - 0.05 10*3/mm3    nRBC 0.0 0.0 - 0.2 /100 WBC   Urinalysis With Culture If Indicated - Urine, Clean Catch    Specimen: Urine, Clean Catch   Result Value Ref Range    Color, UA Yellow Yellow, Straw    Appearance, UA Clear Clear    pH, UA <=5.0 5.0 - 8.0    Specific Gravity, UA 1.015 1.005 - 1.030    Glucose, UA Negative Negative    Ketones, UA Negative Negative    Bilirubin, UA Negative Negative    Blood, UA Negative Negative    Protein, UA 30 mg/dL (1+) (A) Negative    Leuk Esterase, UA Negative Negative    Nitrite, UA Negative Negative    Urobilinogen, UA 0.2 E.U./dL 0.2 - 1.0 E.U./dL   Urinalysis, Microscopic Only - Urine, Clean Catch    Specimen: Urine, Clean  Catch   Result Value Ref Range    RBC, UA None Seen None Seen /HPF    WBC, UA None Seen None Seen /HPF    Bacteria, UA Trace (A) None Seen /HPF    Squamous Epithelial Cells, UA 0-2 None Seen, 0-2 /HPF    Hyaline Casts, UA None Seen None Seen /LPF    Methodology Manual Light Microscopy    Basic Metabolic Panel    Specimen: Blood   Result Value Ref Range    Glucose 110 (H) 65 - 99 mg/dL    BUN 60 (H) 8 - 23 mg/dL    Creatinine 3.40 (H) 0.57 - 1.00 mg/dL    Sodium 135 (L) 136 - 145 mmol/L    Potassium 5.3 (H) 3.5 - 5.2 mmol/L    Chloride 104 98 - 107 mmol/L    CO2 23.7 22.0 - 29.0 mmol/L    Calcium 8.3 (L) 8.6 - 10.5 mg/dL    eGFR  African Amer      eGFR Non African Amer 13 (L) >60 mL/min/1.73    BUN/Creatinine Ratio 17.6 7.0 - 25.0    Anion Gap 7.3 5.0 - 15.0 mmol/L   CBC Auto Differential    Specimen: Blood   Result Value Ref Range    WBC 5.47 3.40 - 10.80 10*3/mm3    RBC 2.60 (L) 3.77 - 5.28 10*6/mm3    Hemoglobin 7.6 (L) 12.0 - 15.9 g/dL    Hematocrit 24.4 (L) 34.0 - 46.6 %    MCV 93.8 79.0 - 97.0 fL    MCH 29.2 26.6 - 33.0 pg    MCHC 31.1 (L) 31.5 - 35.7 g/dL    RDW 17.2 (H) 12.3 - 15.4 %    RDW-SD 58.9 (H) 37.0 - 54.0 fl    MPV 10.4 6.0 - 12.0 fL    Platelets 176 140 - 450 10*3/mm3    Neutrophil % 62.8 42.7 - 76.0 %    Lymphocyte % 19.4 (L) 19.6 - 45.3 %    Monocyte % 13.9 (H) 5.0 - 12.0 %    Eosinophil % 3.3 0.3 - 6.2 %    Basophil % 0.2 0.0 - 1.5 %    Immature Grans % 0.4 0.0 - 0.5 %    Neutrophils, Absolute 3.44 1.70 - 7.00 10*3/mm3    Lymphocytes, Absolute 1.06 0.70 - 3.10 10*3/mm3    Monocytes, Absolute 0.76 0.10 - 0.90 10*3/mm3    Eosinophils, Absolute 0.18 0.00 - 0.40 10*3/mm3    Basophils, Absolute 0.01 0.00 - 0.20 10*3/mm3    Immature Grans, Absolute 0.02 0.00 - 0.05 10*3/mm3    nRBC 0.0 0.0 - 0.2 /100 WBC   POC Glucose Once    Specimen: Blood   Result Value Ref Range    Glucose 110 70 - 130 mg/dL   Type & Screen    Specimen: Arm, Right; Blood   Result Value Ref Range    ABO Type O     RH type Positive      Antibody Screen Negative     T&S Expiration Date 2/16/2021 11:59:59 PM    Prepare RBC, 1 Units   Result Value Ref Range    Product Code G1582H75     Unit Number [id number]     UNIT  ABO O     UNIT  RH POS     Crossmatch Interpretation Compatible     Dispense Status IS     Blood Expiration Date 202103092359     Blood Type Barcode 5100      Imaging Results (Last 72 Hours)     Procedure Component Value Units Date/Time    FL C Arm During Surgery [[id number]] Resulted: 02/14/21 1248     Updated: 02/14/21 1248    Narrative:      This procedure was auto-finalized with no dictation required.    [facility] OR Procedure [[id number]] Resulted: 02/14/21 1025     Updated: 02/14/21 1025    XR Chest 1 View [[id number]] Collected: 02/13/21 1206     Updated: 02/13/21 1211    Narrative:      PORTABLE CHEST    2/13/2021 11:48 AM      HISTORY: Fall     COMPARISON: None.     FINDINGS: Heart size upper normal. Lungs and pleural spaces are clear.  No acute fracture. Chronic right rotator cuff tear.           Impression:      No acute cardiopulmonary process .              AP PELVIS, 2V LEFT HIP     HISTORY: Fall.      FINDINGS:  A three view exam demonstrates an acute comminuted  intertrochanteric fracture of the proximal left femur with angulation.  Femoral head remains in the acetabulum. Pelvis is otherwise intact.  Severe degenerative change at L5-S1.        IMPRESSION: Acute intertrochanteric fracture of the left hip.     This report was finalized on 2/13/2021 12:09 PM by Dr Chencho Hughes DO.    XR Hip With or Without Pelvis 2 - 3 View Left [[id number]] Collected: 02/13/21 1206     Updated: 02/13/21 1211    Narrative:      PORTABLE CHEST    2/13/2021 11:48 AM      HISTORY: Fall     COMPARISON: None.     FINDINGS: Heart size upper normal. Lungs and pleural spaces are clear.  No acute fracture. Chronic right rotator cuff tear.           Impression:      No acute cardiopulmonary process .              AP PELVIS, 2V LEFT HIP      HISTORY: Fall.      FINDINGS:  A three view exam demonstrates an acute comminuted  intertrochanteric fracture of the proximal left femur with angulation.  Femoral head remains in the acetabulum. Pelvis is otherwise intact.  Severe degenerative change at L5-S1.        IMPRESSION: Acute intertrochanteric fracture of the left hip.     This report was finalized on 2/13/2021 12:09 PM by Dr Chencho Hughes DO.            budesonide-formoterol, 2 puff, Inhalation, BID - RT  carvedilol, 25 mg, Oral, BID With Meals  ceFAZolin, 1 g, Intravenous, Q8H  [START ON 2/15/2021] enoxaparin, 30 mg, Subcutaneous, Daily  erythromycin, , Left Eye, Nightly  losartan, 50 mg, Oral, Q24H  pantoprazole, 40 mg, Oral, QAM  rosuvastatin, 10 mg, Oral, Nightly  sodium chloride, 10 mL, Intravenous, Q12H  sodium chloride, 3 mL, Intravenous, Q12H  [START ON 2/15/2021] torsemide, 5 mg, Oral, Daily           Assessment/Plan   1.  Acute kidney injury on chronic kidney disease stage IV the etiology not very clear at this time she had 1 measurement of blood pressure was in the 98 last night currently has Aguayo catheter anchored in place I am not certain if she had urinary retention before that.  The patient has been on ARB and if she had any hemodynamic changes the ARB presents will not allow the kidney to autoregulate.  2.  Chronic kidney disease stage IV baseline creatinine 2.5-2.75 associated with hypertension  3.  Mechanical fall with fractured left femur patient underwent ORIF fractured left femur today  4.  Anemia on admission hemoglobin 9.2 and this morning was 7.6  5.  Hypertension, controlled  6.  Gout quiescent      Plan:  1.  Check random urine for sodium, chloride and protein to creatinine ratio  2.  Discontinue the ARB and the diuretics for now  3.  Reassess volume status and renal function and adjust diuretics and other medication tomorrow.  4.  Surveillance labs    Thank you for asking me to see this patient in consultation       I discussed  the patient's findings and my recommendations with the patient and her son at the bedside.    Deniz Rodrigez MD  02/14/21  15:18 EST      Much of this encounter note is an electronic transcription/translation of spoken language to printed text. The electronic translation of spoken language may permit erroneous, or at times, nonsensical words or phrases to be inadvertently transcribed; Although I have reviewed the note for such errors, some may still exist        Electronically signed by Deniz Rodrigez MD at 02/14/21 1528          Physical Therapy Notes (last 24 hours) (Notes from 02/14/21 1035 through 02/15/21 1035)      Mildred Acosta PT at 02/14/21 1324  Version 1 of 1       Pt presently in surgery. Will hold PT evalution until tomorrow and new orthopedic post op orders received.       Electronically signed by Mildred Acosta PT at 02/14/21 1329       Occupational Therapy Notes (last 24 hours) (Notes from 02/14/21 1035 through 02/15/21 1035)    No notes exist for this encounter.         Speech Language Pathology Notes (last 24 hours) (Notes from 02/14/21 1035 through 02/15/21 1035)    No notes exist for this encounter.         Respiratory Therapy Notes (last 24 hours) (Notes from 02/14/21 1035 through 02/15/21 1035)    No notes exist for this encounter.         Discharge Summary    No notes of this type exist for this encounter.

## 2021-02-15 NOTE — THERAPY EVALUATION
"Patient Name: Carolyne Martins  : 1940    MRN: [id number]                              Today's Date: 2/15/2021       Admit Date: 2021    Visit Dx:     ICD-10-CM ICD-9-CM   1. Closed fracture of left hip, initial encounter (CMS/HCC)  S72.002A 820.8     Patient Active Problem List   Diagnosis   • Arthralgia of shoulder region   • Tendinopathy of rotator cuff   • Osteoarthritis of right acromioclavicular joint   • Anemia due to stage 4 chronic kidney disease treated with erythropoietin (CMS/[facility])   • Anemia   • Gastric polyp   • Heartburn   • Melena   • Constipation   • Change in bowel habits   • Colon cancer screening   • Epigastric pain   • Weight loss   • Senile osteoporosis   • Left lower quadrant abdominal pain   • Personal history of colonic polyps   • Closed fracture of left hip (CMS/[facility])     Past Medical History:   Diagnosis Date   • Anemia    • Anemia    • Arthritis    • Back pain    • Bleeding from anus    • Bronchitis     STATES HAS BRONCHITIS CURRENTLY (17).     • Bronchitis 2017   • CAD (coronary artery disease)    • Cataract, bilateral    • Chronic kidney disease     STAGE 4.  SEES TERA   • Chronic kidney failure     REPORTS STAGE IV   • Difficulty swallowing solids    • Disease of thyroid gland    • Fracture, radius 2016    right distal radius and ulna, healed.   • GERD (gastroesophageal reflux disease)    • Gout    • High cholesterol    • History of blood transfusion    • History of nuclear stress test     DR. CORTEZ.  \"IT WAS OK\"   • Hyperparathyroidism (CMS/[facility])    • Hyperparathyroidism (CMS/HCC)    • Hypertension    • Iron deficiency    • Osteoarthritis    • Osteoarthritis of knees, bilateral     Both knees Supartz injection series    • Osteoporosis    • Palpitations    • Personal history of cardiac murmur    • PONV (postoperative nausea and vomiting)    • Presence of pessary    • Problems with swallowing     WITH FOOD OCCASSIONALLY   • Rheumatic heart " disease     Personal history   • Rotator cuff tendonitis     right    • Rupture of right proximal biceps tendon     chronic   • Seasonal allergies    • Sinus problem    • Spinal headache     REPORTS AFTER DELIVERY OF SON   • Subacromial bursitis     right    • Valvular heart disease    • Vision problems    • Vitamin B12 deficiency    • Vitamin D deficiency    • Wears glasses      Past Surgical History:   Procedure Laterality Date   • CATARACT EXTRACTION Bilateral    • CERVICAL POLYPECTOMY     • COLONOSCOPY  2011   • COLONOSCOPY N/A 1/9/2018    Procedure: COLONOSCOPY with endoscopic mucosal resection (hot snare), normal saline submucosal injection, argon thermal ablation, resolution clip placement x4, and cold biopsy polypectomy;  Surgeon: Pieter Concepcion MD;  Location: [facility] ENDOSCOPY;  Service:    • ENDOSCOPY N/A 12/6/2017    Procedure: ESOPHAGOGASTRODUODENOSCOPY with biopsies and hot snare polypectomies;  Surgeon: Pieter Concepcion MD;  Location: [facility] ENDOSCOPY;  Service:    • ENDOSCOPY N/A 1/15/2019    Procedure: ESOPHAGOGASTRODUODENOSCOPY WITH BIOPSIES AND HOT SNARE POLYPECTOMIES X10;  Surgeon: Pieter Concepcion MD;  Location: [facility] ENDOSCOPY;  Service: Gastroenterology   • HIP TROCHANTERIC NAILING WITH INTRAMEDULLARY HIP SCREW Left 2/14/2021    Procedure: HIP TROCHANTER NAIL SHORT WITH INTRAMEDULLARY HIP SCREW, LEFT;  Surgeon: Gabriele Loyd MD;  Location: [facility] OR;  Service: Orthopedics;  Laterality: Left;   • UPPER GASTROINTESTINAL ENDOSCOPY  08/18/2014     General Information     Row Name 02/15/21 1257          OT Time and Intention    Document Type  evaluation  -     Mode of Treatment  occupational therapy  -     Row Name 02/15/21 1257          General Information    Patient Profile Reviewed  yes  -     Prior Level of Function  independent:;community mobility;ADL's  -     Existing Precautions/Restrictions  fall  -     Row Name 02/15/21 1257          Occupational Profile    Reason for  Services/Referral (Occupational Profile)  ADL decline  -[facility] Name 02/15/21 1257          Living Environment    Lives With  alone  -[facility] Name 02/15/21 1257          Home Main Entrance    Number of Stairs, Main Entrance  two has a ramp available, but states it gets slick in bad weather  -[facility] Name 02/15/21 1257          Stairs Within Home, Primary    Stairs, Within Home, Primary  to basement for laundry room  -     Number of Stairs, Within Home, Primary  other (see comments) 12 steps  -[facility] Name 02/15/21 1257          Cognition    Orientation Status (Cognition)  oriented x 4  -[facility] Name 02/15/21 1257          Safety Issues, Functional Mobility    Safety Issues Affecting Function (Mobility)  safety precaution awareness;safety precautions follow-through/compliance  -     Impairments Affecting Function (Mobility)  balance;pain;strength;range of motion (ROM);endurance/activity tolerance  -       User Key  (r) = Recorded By, (t) = Taken By, (c) = Cosigned By    Initials Name Provider Type     Kim Wick Occupational Therapist          Mobility/ADL's     [facility] Name 02/15/21 1300          Bed Mobility    Bed Mobility  supine-sit;sit-supine  -     Supine-Sit [place] (Bed Mobility)  maximum assist (25% patient effort);2 person assist  -     Sit-Supine [place] (Bed Mobility)  maximum assist (25% patient effort);2 person assist  -     Bed Mobility, Safety Issues  decreased use of arms for pushing/pulling;decreased use of legs for bridging/pushing;impaired trunk control for bed mobility  -     Assistive Device (Bed Mobility)  head of bed elevated;draw sheet  -[facility] Name 02/15/21 1300          Transfers    Transfers  sit-stand transfer  -     Sit-Stand [place] (Transfers)  moderate assist (50% patient effort);2 person assist  -[facility] Name 02/15/21 1300          Sit-Stand Transfer    Assistive Device (Sit-Stand Transfers)  walker, front-wheeled  -[facility]  Name 02/15/21 1300          Functional Mobility    Functional Mobility- Ind. Level  unable to perform  -AH     Row Name 02/15/21 1300          Activities of Daily Living    BADL Assessment/Intervention  bathing;upper body dressing;lower body dressing;grooming;feeding;toileting  -AH     Row Name 02/15/21 1300          Bathing Assessment/Intervention    [place] Level (Bathing)  moderate assist (50% patient effort)  -AH     Row Name 02/15/21 1300          Upper Body Dressing Assessment/Training    [place] Level (Upper Body Dressing)  set up  -AH     Row Name 02/15/21 1300          Lower Body Dressing Assessment/Training    [place] Level (Lower Body Dressing)  maximum assist (25% patient effort)  -AH     Row Name 02/15/21 1300          Grooming Assessment/Training    [place] Level (Grooming)  set up  -AH     Row Name 02/15/21 1300          Self-Feeding Assessment/Training    [place] Level (Feeding)  set up  -AH     Row Name 02/15/21 1300          Toileting Assessment/Training    [place] Level (Toileting)  maximum assist (25% patient effort)  -       User Key  (r) = Recorded By, (t) = Taken By, (c) = Cosigned By    Initials Name Provider Type    Kim Vigil Occupational Therapist        Obj/Interventions     [facility] Name 02/15/21 1301          Vision Assessment/Intervention    Visual Impairment/Limitations  WFL  -AH     Row Name 02/15/21 1301          Range of Motion Comprehensive    Comment, General Range of Motion  RUE WFL, LUE WFL, but pt reports having shoulder problems  -AH     Row Name 02/15/21 1301          Strength Comprehensive (MMT)    Comment, General Manual Muscle Testing (MMT) Assessment  RUE WFL, LUE 4-/5  -       User Key  (r) = Recorded By, (t) = Taken By, (c) = Cosigned By    Initials Name Provider Type    Kim Vigil Occupational Therapist        Goals/Plan     [facility] Name 02/15/21 1307          Bed Mobility Goal 1 (OT)    Activity/Assistive Device (Bed Mobility  Goal 1, OT)  supine to sit  -     [place] Level/Cues Needed (Bed Mobility Goal 1, OT)  minimum assist (75% or more patient effort)  -     Time Frame (Bed Mobility Goal 1, OT)  by discharge  -     Progress/Outcomes (Bed Mobility Goal 1, OT)  goal ongoing  -[facility] Name 02/15/21 1307          Transfer Goal 1 (OT)    Activity/Assistive Device (Transfer Goal 1, OT)  sit-to-stand/stand-to-sit;walker, rolling  -     [place] Level/Cues Needed (Transfer Goal 1, OT)  minimum assist (75% or more patient effort)  -     Time Frame (Transfer Goal 1, OT)  by discharge  -     Progress/Outcome (Transfer Goal 1, OT)  goal ongoing  -[facility] Name 02/15/21 1307          Dressing Goal 1 (OT)    Activity/Device (Dressing Goal 1, OT)  lower body dressing;reacher;sock-aid  -     [place]/Cues Needed (Dressing Goal 1, OT)  minimum assist (75% or more patient effort)  -     Time Frame (Dressing Goal 1, OT)  long term goal (LTG);5 days  -     Progress/Outcome (Dressing Goal 1, OT)  goal ongoing  -[facility] Name 02/15/21 1307          Toileting Goal 1 (OT)    Activity/Device (Toileting Goal 1, OT)  adjust/manage clothing;perform perineal hygiene  -     [place] Level/Cues Needed (Toileting Goal 1, OT)  minimum assist (75% or more patient effort)  -     Time Frame (Toileting Goal 1, OT)  by discharge  -     Progress/Outcome (Toileting Goal 1, OT)  goal ongoing  -       User Key  (r) = Recorded By, (t) = Taken By, (c) = Cosigned By    Initials Name Provider Type    Kim Vigil Occupational Therapist        Clinical Impression     Row Name 02/15/21 1487          Pain Assessment    Additional Documentation  Pain Scale: Numbers Pre/Post-Treatment (Group)  -AH     Row Name 02/15/21 6669          Pain Scale: Numbers Pre/Post-Treatment    Pretreatment Pain Rating  0/10 - no pain  -     Posttreatment Pain Rating  8/10  -AH     Pain Location - Side  Left  -     Pain Location  hip  -     Pain  Intervention(s)  Repositioned;Ambulation/increased activity  -     Row Name 02/15/21 1305          Plan of Care Review    Plan of Care Reviewed With  patient  -     Progress  no change  -     Outcome Summary  Pt seen for OT evaluation today.  Pt presents with pain, weakness and decreased independence with self care and functional mobiltiy tasks.  Pt is expected to benefit from skilled OT to improve her strength and independence with ADL tasks.  -[facility] Name 02/15/21 1305          Therapy Assessment/Plan (OT)    Patient/Family Therapy Goal Statement (OT)  d/c to rehab  -     Rehab Potential (OT)  good, to achieve stated therapy goals  -     Criteria for Skilled Therapeutic Interventions Met (OT)  yes;skilled treatment is necessary  -     Therapy Frequency (OT)  daily Mon-Fri  -[facility] Name 02/15/21 1307          Therapy Plan Review/Discharge Plan (OT)    Anticipated Discharge Disposition (OT)  inpatient rehabilitation facility  -[facility] Name 02/15/21 1303          Positioning and Restraints    Pre-Treatment Position  in bed  -     Post Treatment Position  bed  -     In Bed  fowlers;call light within reach;encouraged to call for assist;exit alarm on  [facility]       User Key  (r) = Recorded By, (t) = Taken By, (c) = Cosigned By    Initials Name Provider Type     Kim Wick Occupational Therapist        Outcome Measures     Row Name 02/15/21 4583          How much help from another is currently needed...    Putting on and taking off regular lower body clothing?  2  -AH     Bathing (including washing, rinsing, and drying)  2  -AH     Toileting (which includes using toilet bed pan or urinal)  2  -AH     Putting on and taking off regular upper body clothing  3  -AH     Taking care of personal grooming (such as brushing teeth)  3  -AH     Eating meals  3  -AH     AM-PAC 6 Clicks Score (OT)  15  -     Row Name 02/15/21 1308          Functional Assessment    Outcome Measure Options  AM-PAC 6  Clicks Daily Activity (OT)  -       User Key  (r) = Recorded By, (t) = Taken By, (c) = Cosigned By    Initials Name Provider Type     Kim Wick Occupational Therapist        Occupational Therapy Education                 Title: PT OT SLP Therapies (In Progress)     Topic: Occupational Therapy (In Progress)     Point: ADL training (Done)     Description:   Instruct learner(s) on proper safety adaptation and remediation techniques during self care or transfers.   Instruct in proper use of assistive devices.              Learning Progress Summary           Patient Acceptance, E,TB, VU by  at 2/15/2021 0208    Comment: Role of OT/POC                   Point: Home exercise program (Not Started)     Description:   Instruct learner(s) on appropriate technique for monitoring, assisting and/or progressing therapeutic exercises/activities.              Learner Progress:  Not documented in this visit.          Point: Precautions (Not Started)     Description:   Instruct learner(s) on prescribed precautions during self-care and functional transfers.              Learner Progress:  Not documented in this visit.          Point: Body mechanics (Not Started)     Description:   Instruct learner(s) on proper positioning and spine alignment during self-care, functional mobility activities and/or exercises.              Learner Progress:  Not documented in this visit.                      User Key     Initials Effective Dates Name Provider Type Discipline     03/07/18 -  Kim Wick Occupational Therapist OT              OT Recommendation and Plan  Therapy Frequency (OT): daily(Mon-Fri)  Plan of Care Review  Plan of Care Reviewed With: patient  Progress: no change  Outcome Summary: Pt seen for OT evaluation today.  Pt presents with pain, weakness and decreased independence with self care and functional mobiltiy tasks.  Pt is expected to benefit from skilled OT to improve her strength and independence with ADL tasks.      Time Calculation:   Time Calculation- OT     Row Name 02/15/21 1309             Time Calculation- OT    OT Start Time  1135  -      OT Received On  02/15/21  -      OT Goal Re-Cert Due Date  02/25/21  -        User Key  (r) = Recorded By, (t) = Taken By, (c) = Cosigned By    Initials Name Provider Type    Kim Vigil Occupational Therapist        Therapy Charges for Today     Code Description Service Date Service Provider Modifiers Qty    [id number]  OT EVAL LOW COMPLEXITY 3 2/15/2021 Kim Wick GO 1               Kim Wick  2/15/2021

## 2021-02-15 NOTE — PLAN OF CARE
Goal Outcome Evaluation:  Plan of Care Reviewed With: patient  Progress: improving  Outcome Summary: VSS,  PT TOLERATING PAIN WITH ORAL MEDS,  NEURVASCULAR STATUS TO LLE-WDL

## 2021-02-15 NOTE — THERAPY EVALUATION
"Patient Name: Carolyne Martins  : 1940    MRN: [id number]                              Today's Date: 2/15/2021       Admit Date: 2021    Visit Dx:     ICD-10-CM ICD-9-CM   1. Closed fracture of left hip, initial encounter (CMS/HCC)  S72.002A 820.8     Patient Active Problem List   Diagnosis   • Arthralgia of shoulder region   • Tendinopathy of rotator cuff   • Osteoarthritis of right acromioclavicular joint   • Anemia due to stage 4 chronic kidney disease treated with erythropoietin (CMS/[facility])   • Anemia   • Gastric polyp   • Heartburn   • Melena   • Constipation   • Change in bowel habits   • Colon cancer screening   • Epigastric pain   • Weight loss   • Senile osteoporosis   • Left lower quadrant abdominal pain   • Personal history of colonic polyps   • Closed fracture of left hip (CMS/[facility])     Past Medical History:   Diagnosis Date   • Anemia    • Anemia    • Arthritis    • Back pain    • Bleeding from anus    • Bronchitis     STATES HAS BRONCHITIS CURRENTLY (17).     • Bronchitis 2017   • CAD (coronary artery disease)    • Cataract, bilateral    • Chronic kidney disease     STAGE 4.  SEES TERA   • Chronic kidney failure     REPORTS STAGE IV   • Difficulty swallowing solids    • Disease of thyroid gland    • Fracture, radius 2016    right distal radius and ulna, healed.   • GERD (gastroesophageal reflux disease)    • Gout    • High cholesterol    • History of blood transfusion    • History of nuclear stress test     DR. CORTEZ.  \"IT WAS OK\"   • Hyperparathyroidism (CMS/[facility])    • Hyperparathyroidism (CMS/HCC)    • Hypertension    • Impaired functional mobility, balance, gait, and endurance    • Iron deficiency    • Osteoarthritis    • Osteoarthritis of knees, bilateral     Both knees Supartz injection series    • Osteoporosis    • Palpitations    • Personal history of cardiac murmur    • PONV (postoperative nausea and vomiting)    • Presence of pessary    • Problems " with swallowing     WITH FOOD OCCASSIONALLY   • Rheumatic heart disease     Personal history   • Rotator cuff tendonitis     right    • Rupture of right proximal biceps tendon     chronic   • Seasonal allergies    • Sinus problem    • Spinal headache     REPORTS AFTER DELIVERY OF SON   • Subacromial bursitis     right    • Valvular heart disease    • Vision problems    • Vitamin B12 deficiency    • Vitamin D deficiency    • Wears glasses      Past Surgical History:   Procedure Laterality Date   • CATARACT EXTRACTION Bilateral    • CERVICAL POLYPECTOMY     • COLONOSCOPY  2011   • COLONOSCOPY N/A 1/9/2018    Procedure: COLONOSCOPY with endoscopic mucosal resection (hot snare), normal saline submucosal injection, argon thermal ablation, resolution clip placement x4, and cold biopsy polypectomy;  Surgeon: Pieter Concepcion MD;  Location: [facility] ENDOSCOPY;  Service:    • ENDOSCOPY N/A 12/6/2017    Procedure: ESOPHAGOGASTRODUODENOSCOPY with biopsies and hot snare polypectomies;  Surgeon: Pieter Concepcion MD;  Location: [facility] ENDOSCOPY;  Service:    • ENDOSCOPY N/A 1/15/2019    Procedure: ESOPHAGOGASTRODUODENOSCOPY WITH BIOPSIES AND HOT SNARE POLYPECTOMIES X10;  Surgeon: Pieter Concepcion MD;  Location: [facility] ENDOSCOPY;  Service: Gastroenterology   • HIP TROCHANTERIC NAILING WITH INTRAMEDULLARY HIP SCREW Left 2/14/2021    Procedure: HIP TROCHANTER NAIL SHORT WITH INTRAMEDULLARY HIP SCREW, LEFT;  Surgeon: Gabriele Loyd MD;  Location: [facility] OR;  Service: Orthopedics;  Laterality: Left;   • UPPER GASTROINTESTINAL ENDOSCOPY  08/18/2014     General Information     Row Name 02/15/21 1136          Physical Therapy Time and Intention    Document Type  evaluation  -JR     Mode of Treatment  physical therapy  -JR     Row Name 02/15/21 1136          General Information    Patient Profile Reviewed  yes  -JR     Prior Level of Function  independent:;community mobility  -JR     Existing Precautions/Restrictions  fall  -JR     Barriers  to Rehab  none identified  -     Row Name 02/15/21 1136          Living Environment    Lives With  alone  -     Row Name 02/15/21 1136          Home Main Entrance    Number of Stairs, Main Entrance  two ramp when it is not slick  -     Stair Railings, Main Entrance  railings on both sides of stairs  -     Row Name 02/15/21 1136          Stairs Within Home, Primary    Stairs, Within Home, Primary  Basement for laundry  -     Number of Stairs, Within Home, Primary  -- full flight  -     Row Name 02/15/21 1136          Cognition    Orientation Status (Cognition)  oriented x 4  -[facility] Name 02/15/21 1136          Safety Issues, Functional Mobility    Safety Issues Affecting Function (Mobility)  safety precautions follow-through/compliance;safety precaution awareness  -     Impairments Affecting Function (Mobility)  strength;balance;pain;endurance/activity tolerance;range of motion (ROM)  -       User Key  (r) = Recorded By, (t) = Taken By, (c) = Cosigned By    Initials Name Provider Type    Elizabeth De Los Santos, PT Physical Therapist        Mobility     [facility] Name 02/15/21 1136          Bed Mobility    Bed Mobility  supine-sit;sit-supine  -     Supine-Sit [place] (Bed Mobility)  maximum assist (25% patient effort);2 person assist  -     Sit-Supine [place] (Bed Mobility)  maximum assist (25% patient effort);2 person assist  -     Assistive Device (Bed Mobility)  head of bed elevated;draw sheet  -[facility] Name 02/15/21 1136          Sit-Stand Transfer    Sit-Stand [place] (Transfers)  moderate assist (50% patient effort);2 person assist  -     Assistive Device (Sit-Stand Transfers)  walker, front-wheeled  -[facility] Name 02/15/21 1136          Gait/Stairs (Locomotion)    Distance in Feet (Gait)  unable to take steps, she states she was in too much pain and thought she was going to pass out  -[facility] Name 02/15/21 1136          Mobility    Extremity Weight-bearing Status  left  lower extremity  -JR     Left Lower Extremity (Weight-bearing Status)  weight-bearing as tolerated (WBAT)  -       User Key  (r) = Recorded By, (t) = Taken By, (c) = Cosigned By    Initials Name Provider Type    Elizabeth De Los Santos PT Physical Therapist        Obj/Interventions     Row Name 02/15/21 1136          Range of Motion Comprehensive    Comment, General Range of Motion  LLE is WFL  -JR     Row Name 02/15/21 1136          Strength Comprehensive (MMT)    Comment, General Manual Muscle Testing (MMT) Assessment  LLE=2/5  -JR     Row Name 02/15/21 1136          Balance    Balance Assessment  sitting static balance;sitting dynamic balance;standing static balance;standing dynamic balance  -JR     Static Sitting Balance  WFL  -JR     Dynamic Sitting Balance  WFL  -JR     Static Standing Balance  mild impairment  -     Balance Interventions  sit to stand;standing;supported;static;dynamic  -       User Key  (r) = Recorded By, (t) = Taken By, (c) = Cosigned By    Initials Name Provider Type    Elizabeth De Los Santos, PT Physical Therapist        Goals/Plan     Row Name 02/15/21 1136          Bed Mobility Goal 1 (PT)    Activity/Assistive Device (Bed Mobility Goal 1, PT)  bed mobility activities, all  -JR     [place] Level/Cues Needed (Bed Mobility Goal 1, PT)  minimum assist (75% or more patient effort)  -JR     Time Frame (Bed Mobility Goal 1, PT)  short term goal (STG)  -JR     Progress/Outcomes (Bed Mobility Goal 1, PT)  goal ongoing  -JR     Row Name 02/15/21 1136          Transfer Goal 1 (PT)    Activity/Assistive Device (Transfer Goal 1, PT)  sit-to-stand/stand-to-sit;bed-to-chair/chair-to-bed  -JR     [place] Level/Cues Needed (Transfer Goal 1, PT)  minimum assist (75% or more patient effort)  -JR     Time Frame (Transfer Goal 1, PT)  short term goal (STG)  -JR     Progress/Outcome (Transfer Goal 1, PT)  goal ongoing  -JR     Row Name 02/15/21 1136          Gait Training Goal 1 (PT)    Activity/Assistive  Device (Gait Training Goal 1, PT)  gait (walking locomotion);assistive device use;walker, rolling  -JR     [place] Level (Gait Training Goal 1, PT)  minimum assist (75% or more patient effort)  -JR     Distance (Gait Training Goal 1, PT)  50  -JR     Time Frame (Gait Training Goal 1, PT)  long term goal (LTG)  -JR     Progress/Outcome (Gait Training Goal 1, PT)  goal ongoing  -JR     Row Name 02/15/21 1136          Patient Education Goal (PT)    Activity (Patient Education Goal, PT)  Perform LLE exercises x 10  -JR     [place]/Cues/Accuracy (Memory Goal 2, PT)  independent  -JR     Time Frame (Patient Education Goal, PT)  1 week  -JR     Progress/Outcome (Patient Education Goal, PT)  goal ongoing  -JR       User Key  (r) = Recorded By, (t) = Taken By, (c) = Cosigned By    Initials Name Provider Type    Elizabeth De Los Santos, PT Physical Therapist        Clinical Impression     Row Name 02/15/21 1136          Pain    Additional Documentation  Pain Scale: Numbers Pre/Post-Treatment (Group)  -[facility] Name 02/15/21 1136          Pain Scale: Numbers Pre/Post-Treatment    Pretreatment Pain Rating  0/10 - no pain  -JR     Posttreatment Pain Rating  8/10  -JR     Pain Location - Side  Left  -JR     Pain Location  hip  -JR     Pain Intervention(s)  Repositioned;Ambulation/increased activity  -     Row Name 02/15/21 1136          Plan of Care Review    Plan of Care Reviewed With  patient  -JR     Progress  no change  -JR     Outcome Summary  Patient participated in PT evaluation and demonstrates decreased strength, transfers, balance and gait.  She complains of severe pain in her hip and feeling like she is going to pass out when she stands.  She is expected to benefit from additional PT services while hospitalized and upon discharge to inpatient rehab.  -     Row Name 02/15/21 1136          Therapy Assessment/Plan (PT)    Patient/Family Therapy Goals Statement (PT)  Patient wants to be able to go to rehab and then  home  -JR     Rehab Potential (PT)  good, to achieve stated therapy goals  -     Criteria for Skilled Interventions Met (PT)  yes;meets criteria;skilled treatment is necessary  -     Row Name 02/15/21 1136          Positioning and Restraints    Pre-Treatment Position  in bed  -JR     Post Treatment Position  bed  -JR     In Bed  sitting;call light within reach;encouraged to call for assist;exit alarm on;notified nsg bed in chair position  -       User Key  (r) = Recorded By, (t) = Taken By, (c) = Cosigned By    Initials Name Provider Type    Elizabeth De Los Santos, OSCAR Physical Therapist        Outcome Measures     Row Name 02/15/21 1136          How much help from another person do you currently need...    Turning from your back to your side while in flat bed without using bedrails?  2  -JR     Moving from lying on back to sitting on the side of a flat bed without bedrails?  2  -JR     Moving to and from a bed to a chair (including a wheelchair)?  1  -JR     Standing up from a chair using your arms (e.g., wheelchair, bedside chair)?  1  -JR     Climbing 3-5 steps with a railing?  1  -JR     To walk in hospital room?  1  -JR     AM-PAC 6 Clicks Score (PT)  8  -     Row Name 02/15/21 1136          Functional Assessment    Outcome Measure Options  AM-PAC 6 Clicks Basic Mobility (PT)  -       User Key  (r) = Recorded By, (t) = Taken By, (c) = Cosigned By    Initials Name Provider Type    Elizabeth De Los Santos PT Physical Therapist        Physical Therapy Education                 Title: PT OT SLP Therapies (In Progress)     Topic: Physical Therapy (In Progress)     Point: Mobility training (Done)     Learning Progress Summary           Patient Acceptance, E,TB, VU by  at 2/15/2021 3784    Comment: Importance of mobility to recovery.  Role of PT and POC                   Point: Home exercise program (Not Started)     Learner Progress:  Not documented in this visit.          Point: Body mechanics (Not Started)     Learner  Progress:  Not documented in this visit.          Point: Precautions (Not Started)     Learner Progress:  Not documented in this visit.                      User Key     Initials Effective Dates Name Provider Type [facility] 04/03/18 -  Elizabeth Matamoros PT Physical Therapist PT              PT Recommendation and Plan  Planned Therapy Interventions (PT): strengthening, balance training, bed mobility training, transfer training, gait training, home exercise program, patient/family education  Plan of Care Reviewed With: patient  Progress: no change  Outcome Summary: Patient participated in PT evaluation and demonstrates decreased strength, transfers, balance and gait.  She complains of severe pain in her hip and feeling like she is going to pass out when she stands.  She is expected to benefit from additional PT services while hospitalized and upon discharge to inpatient rehab.     Time Calculation:   PT Charges     Row Name 02/15/21 1425             Time Calculation    Start Time  1136  -      PT Received On  02/15/21  -      PT Goal Re-Cert Due Date  02/25/21  -        User Key  (r) = Recorded By, (t) = Taken By, (c) = Cosigned By    Initials Name Provider Type     Elziabeth Matamoros PT Physical Therapist        Therapy Charges for Today     Code Description Service Date Service Provider Modifiers Qty    [id number] HC PT EVAL LOW COMPLEXITY 4 2/15/2021 Elizabeth Matamoros, PT GP 1          PT G-Codes  Outcome Measure Options: AM-PAC 6 Clicks Daily Activity (OT)  AM-PAC 6 Clicks Score (PT): 8  AM-PAC 6 Clicks Score (OT): 15    Elizabeth Matamoros PT  2/15/2021

## 2021-02-15 NOTE — PROGRESS NOTES
"Continued Stay Note  [facility]     Patient Name: Carolyne Martins  MRN: [id number]  Today's Date: 2/15/2021    Admit Date: 2/13/2021    Discharge Plan     Row Name 02/15/21 0902       Plan    Plan  Called and lm# for [facility] related to [facility] consult  10:59 EST  Faxed information to [facility] [phone], confirmed  11:12 EST  [facility] called back and stated bed available for pt, but now will confirm financials and let us know if accepted; needs at least one negative covid testing since admission  .13:46 EST  Per dr. mehta pt now wants to go to [facility]; went and spoke with pt and informed that [facility] would accept as long as she has source for Procrit; pt stated I go to [facility] to get my Procrit every two weeks only but I would prefer to go to [facility] but would take [facility] if bed not available at [facility]; per pt \"I heard [facility] is not as good as it used to be\"; informed pt I would reach out to [facility] based on her request; called and lm# for [facility]; faxed referral  14:22 EST  Spoke with Jessie/[facility] stated would look over referral and let us know        Discharge Codes    No documentation.             Aranza Venegas RN    "

## 2021-02-15 NOTE — PLAN OF CARE
Goal Outcome Evaluation:  Plan of Care Reviewed With: patient  Progress: no change  Outcome Summary: Patient participated in PT evaluation and demonstrates decreased strength, transfers, balance and gait.  She complains of severe pain in her hip and feeling like she is going to pass out when she stands.  She is expected to benefit from additional PT services while hospitalized and upon discharge to inpatient rehab.

## 2021-02-16 LAB
ANION GAP SERPL CALCULATED.3IONS-SCNC: 7.2 MMOL/L (ref 5–15)
BUN SERPL-MCNC: 56 MG/DL (ref 8–23)
BUN/CREAT SERPL: 17.2 (ref 7–25)
CALCIUM SPEC-SCNC: 8 MG/DL (ref 8.6–10.5)
CHLORIDE SERPL-SCNC: 104 MMOL/L (ref 98–107)
CO2 SERPL-SCNC: 19.8 MMOL/L (ref 22–29)
CREAT SERPL-MCNC: 3.25 MG/DL (ref 0.57–1)
DEPRECATED RDW RBC AUTO: 54.9 FL (ref 37–54)
ERYTHROCYTE [DISTWIDTH] IN BLOOD BY AUTOMATED COUNT: 16.5 % (ref 12.3–15.4)
GFR SERPL CREATININE-BSD FRML MDRD: 14 ML/MIN/1.73
GFR SERPL CREATININE-BSD FRML MDRD: ABNORMAL ML/MIN/{1.73_M2}
GLUCOSE SERPL-MCNC: 120 MG/DL (ref 65–99)
HCT VFR BLD AUTO: 23.7 % (ref 34–46.6)
HGB BLD-MCNC: 7.4 G/DL (ref 12–15.9)
MCH RBC QN AUTO: 29.1 PG (ref 26.6–33)
MCHC RBC AUTO-ENTMCNC: 31.2 G/DL (ref 31.5–35.7)
MCV RBC AUTO: 93.3 FL (ref 79–97)
PLATELET # BLD AUTO: 135 10*3/MM3 (ref 140–450)
PMV BLD AUTO: 10.7 FL (ref 6–12)
POTASSIUM SERPL-SCNC: 4.5 MMOL/L (ref 3.5–5.2)
RBC # BLD AUTO: 2.54 10*6/MM3 (ref 3.77–5.28)
SODIUM SERPL-SCNC: 131 MMOL/L (ref 136–145)
WBC # BLD AUTO: 6.12 10*3/MM3 (ref 3.4–10.8)

## 2021-02-16 PROCEDURE — 80048 BASIC METABOLIC PNL TOTAL CA: CPT | Performed by: FAMILY MEDICINE

## 2021-02-16 PROCEDURE — 99232 SBSQ HOSP IP/OBS MODERATE 35: CPT | Performed by: FAMILY MEDICINE

## 2021-02-16 PROCEDURE — P9016 RBC LEUKOCYTES REDUCED: HCPCS

## 2021-02-16 PROCEDURE — 97110 THERAPEUTIC EXERCISES: CPT

## 2021-02-16 PROCEDURE — 94799 UNLISTED PULMONARY SVC/PX: CPT

## 2021-02-16 PROCEDURE — 97530 THERAPEUTIC ACTIVITIES: CPT

## 2021-02-16 PROCEDURE — 25010000002 FUROSEMIDE PER 20 MG: Performed by: INTERNAL MEDICINE

## 2021-02-16 PROCEDURE — 86900 BLOOD TYPING SEROLOGIC ABO: CPT

## 2021-02-16 PROCEDURE — 25010000002 ENOXAPARIN PER 10 MG: Performed by: ORTHOPAEDIC SURGERY

## 2021-02-16 PROCEDURE — 85027 COMPLETE CBC AUTOMATED: CPT | Performed by: FAMILY MEDICINE

## 2021-02-16 PROCEDURE — 36430 TRANSFUSION BLD/BLD COMPNT: CPT

## 2021-02-16 RX ORDER — CYCLOBENZAPRINE HCL 10 MG
5 TABLET ORAL 3 TIMES DAILY PRN
Status: DISCONTINUED | OUTPATIENT
Start: 2021-02-16 | End: 2021-02-18 | Stop reason: HOSPADM

## 2021-02-16 RX ORDER — FUROSEMIDE 10 MG/ML
40 INJECTION INTRAMUSCULAR; INTRAVENOUS ONCE
Status: COMPLETED | OUTPATIENT
Start: 2021-02-16 | End: 2021-02-16

## 2021-02-16 RX ADMIN — BUDESONIDE AND FORMOTEROL FUMARATE DIHYDRATE 2 PUFF: 80; 4.5 AEROSOL RESPIRATORY (INHALATION) at 08:21

## 2021-02-16 RX ADMIN — SODIUM CHLORIDE, PRESERVATIVE FREE 10 ML: 5 INJECTION INTRAVENOUS at 08:49

## 2021-02-16 RX ADMIN — CARVEDILOL 25 MG: 25 TABLET, FILM COATED ORAL at 08:49

## 2021-02-16 RX ADMIN — MULTIPLE VITAMINS W/ MINERALS TAB 1 TABLET: TAB at 08:49

## 2021-02-16 RX ADMIN — HYDROCODONE BITARTRATE AND ACETAMINOPHEN 1 TABLET: 5; 325 TABLET ORAL at 13:05

## 2021-02-16 RX ADMIN — CALCITRIOL CAPSULES 0.25 MCG 0.5 MCG: 0.25 CAPSULE ORAL at 08:49

## 2021-02-16 RX ADMIN — HYDROCODONE BITARTRATE AND ACETAMINOPHEN 1 TABLET: 5; 325 TABLET ORAL at 20:18

## 2021-02-16 RX ADMIN — FUROSEMIDE 40 MG: 10 INJECTION, SOLUTION INTRAMUSCULAR; INTRAVENOUS at 08:59

## 2021-02-16 RX ADMIN — ERYTHROMYCIN: 5 OINTMENT OPHTHALMIC at 20:18

## 2021-02-16 RX ADMIN — ENOXAPARIN SODIUM 30 MG: 30 INJECTION SUBCUTANEOUS at 08:49

## 2021-02-16 RX ADMIN — SODIUM CHLORIDE, PRESERVATIVE FREE 3 ML: 5 INJECTION INTRAVENOUS at 20:18

## 2021-02-16 RX ADMIN — ROSUVASTATIN CALCIUM 10 MG: 5 TABLET, FILM COATED ORAL at 20:17

## 2021-02-16 RX ADMIN — CYCLOBENZAPRINE HYDROCHLORIDE 5 MG: 10 TABLET, FILM COATED ORAL at 06:29

## 2021-02-16 RX ADMIN — PANTOPRAZOLE SODIUM 40 MG: 40 TABLET, DELAYED RELEASE ORAL at 06:29

## 2021-02-16 RX ADMIN — BUDESONIDE AND FORMOTEROL FUMARATE DIHYDRATE 2 PUFF: 80; 4.5 AEROSOL RESPIRATORY (INHALATION) at 19:51

## 2021-02-16 RX ADMIN — HYDROCODONE BITARTRATE AND ACETAMINOPHEN 1 TABLET: 5; 325 TABLET ORAL at 09:00

## 2021-02-16 RX ADMIN — SODIUM CHLORIDE, PRESERVATIVE FREE 10 ML: 5 INJECTION INTRAVENOUS at 21:57

## 2021-02-16 RX ADMIN — CYANOCOBALAMIN TAB 500 MCG 500 MCG: 500 TAB at 20:17

## 2021-02-16 RX ADMIN — CYANOCOBALAMIN TAB 500 MCG 500 MCG: 500 TAB at 08:49

## 2021-02-16 RX ADMIN — CARVEDILOL 25 MG: 25 TABLET, FILM COATED ORAL at 17:27

## 2021-02-16 NOTE — THERAPY TREATMENT NOTE
"Patient Name: Carolyne Martins  : 1940    MRN: [id number]                              Today's Date: 2021       Admit Date: 2021    Visit Dx:     ICD-10-CM ICD-9-CM   1. Closed fracture of left hip, initial encounter (CMS/HCC)  S72.002A 820.8     Patient Active Problem List   Diagnosis   • Arthralgia of shoulder region   • Tendinopathy of rotator cuff   • Osteoarthritis of right acromioclavicular joint   • Anemia due to stage 4 chronic kidney disease treated with erythropoietin (CMS/[facility])   • Anemia   • Gastric polyp   • Heartburn   • Melena   • Constipation   • Change in bowel habits   • Colon cancer screening   • Epigastric pain   • Weight loss   • Senile osteoporosis   • Left lower quadrant abdominal pain   • Personal history of colonic polyps   • Closed fracture of left hip (CMS/[facility])     Past Medical History:   Diagnosis Date   • Anemia    • Anemia    • Arthritis    • Back pain    • Bleeding from anus    • Bronchitis     STATES HAS BRONCHITIS CURRENTLY (17).     • Bronchitis 2017   • CAD (coronary artery disease)    • Cataract, bilateral    • Chronic kidney disease     STAGE 4.  SEES TERA   • Chronic kidney failure     REPORTS STAGE IV   • Difficulty swallowing solids    • Disease of thyroid gland    • Fracture, radius 2016    right distal radius and ulna, healed.   • GERD (gastroesophageal reflux disease)    • Gout    • High cholesterol    • History of blood transfusion    • History of nuclear stress test     DR. CORTEZ.  \"IT WAS OK\"   • Hyperparathyroidism (CMS/[facility])    • Hyperparathyroidism (CMS/HCC)    • Hypertension    • Impaired functional mobility, balance, gait, and endurance    • Iron deficiency    • Osteoarthritis    • Osteoarthritis of knees, bilateral     Both knees Supartz injection series    • Osteoporosis    • Palpitations    • Personal history of cardiac murmur    • PONV (postoperative nausea and vomiting)    • Presence of pessary    • Problems " with swallowing     WITH FOOD OCCASSIONALLY   • Rheumatic heart disease     Personal history   • Rotator cuff tendonitis     right    • Rupture of right proximal biceps tendon     chronic   • Seasonal allergies    • Sinus problem    • Spinal headache     REPORTS AFTER DELIVERY OF SON   • Subacromial bursitis     right    • Valvular heart disease    • Vision problems    • Vitamin B12 deficiency    • Vitamin D deficiency    • Wears glasses      Past Surgical History:   Procedure Laterality Date   • CATARACT EXTRACTION Bilateral    • CERVICAL POLYPECTOMY     • COLONOSCOPY  2011   • COLONOSCOPY N/A 1/9/2018    Procedure: COLONOSCOPY with endoscopic mucosal resection (hot snare), normal saline submucosal injection, argon thermal ablation, resolution clip placement x4, and cold biopsy polypectomy;  Surgeon: Pieter Concepcion MD;  Location: [facility] ENDOSCOPY;  Service:    • ENDOSCOPY N/A 12/6/2017    Procedure: ESOPHAGOGASTRODUODENOSCOPY with biopsies and hot snare polypectomies;  Surgeon: Pieter Concepcion MD;  Location: [facility] ENDOSCOPY;  Service:    • ENDOSCOPY N/A 1/15/2019    Procedure: ESOPHAGOGASTRODUODENOSCOPY WITH BIOPSIES AND HOT SNARE POLYPECTOMIES X10;  Surgeon: Pieter Concepcion MD;  Location: [facility] ENDOSCOPY;  Service: Gastroenterology   • HIP TROCHANTERIC NAILING WITH INTRAMEDULLARY HIP SCREW Left 2/14/2021    Procedure: HIP TROCHANTER NAIL SHORT WITH INTRAMEDULLARY HIP SCREW, LEFT;  Surgeon: Gabriele Loyd MD;  Location: [facility] OR;  Service: Orthopedics;  Laterality: Left;   • UPPER GASTROINTESTINAL ENDOSCOPY  08/18/2014     General Information     Row Name 02/16/21 1419          OT Time and Intention    Document Type  therapy note (daily note)  -TL     Mode of Treatment  occupational therapy;individual therapy  -TL     Row Name 02/16/21 1419          General Information    Patient Profile Reviewed  yes  -TL     Existing Precautions/Restrictions  fall  -TL     Row Name 02/16/21 1419          Safety Issues,  Functional Mobility    Safety Issues Affecting Function (Mobility)  safety precaution awareness;safety precautions follow-through/compliance  -TL     Impairments Affecting Function (Mobility)  endurance/activity tolerance;range of motion (ROM);strength  -TL       User Key  (r) = Recorded By, (t) = Taken By, (c) = Cosigned By    Initials Name Provider Type    TL Dorota Saldana OTR Occupational Therapist          Mobility/ADL's    No documentation.       Obj/Interventions     Row Name 02/16/21 1420          Shoulder (Therapeutic Exercise)    Shoulder (Therapeutic Exercise)  strengthening exercise  -TL     Shoulder Strengthening (Therapeutic Exercise)  bilateral;flexion;extension;sitting;resistance band;yellow;10 repetitions  -TL     Row Name 02/16/21 1420          Elbow/Forearm (Therapeutic Exercise)    Elbow/Forearm (Therapeutic Exercise)  strengthening exercise  -TL     Elbow/Forearm Strengthening (Therapeutic Exercise)  bilateral;flexion;extension;resistance band;yellow;10 repetitions  -TL     Row Name 02/16/21 1420          Therapeutic Exercise    Therapeutic Exercise  shoulder;elbow/forearm  -TL       User Key  (r) = Recorded By, (t) = Taken By, (c) = Cosigned By    Initials Name Provider Type    TL Dorota Saldana OTR Occupational Therapist        Goals/Plan    No documentation.       Clinical Impression     Row Name 02/16/21 1422          Pain Scale: Numbers Pre/Post-Treatment    Pretreatment Pain Rating  5/10  -TL     Posttreatment Pain Rating  5/10  -TL     Pain Location - Side  Left  -TL     Pain Location  ankle  -TL     Pre/Posttreatment Pain Comment  Patient reports no increased pain with therapeutic exercise  -TL     Pain Intervention(s)  Ambulation/increased activity;Repositioned  -TL     Row Name 02/16/21 1422          Plan of Care Review    Plan of Care Reviewed With  patient  -TL     Progress  improving  -TL     Outcome Summary  Patient found up in bedside chair and expressed fatigue and 5/10 pain in L  ankle.  Patient participated in B shoulder and elbow yellow theraband exercise 10 reps each while sitting up in bedside chair.  Patient educated on PLB and pacing with therapeutic exercise and required moderate cuing for technique.  Continue with OT focusing on increasing ADL independence and coordination of breathing with activity to maintain oxygen saturation.  -TL     Row Name 02/16/21 1422          Therapy Assessment/Plan (OT)    Rehab Potential (OT)  good, to achieve stated therapy goals  -TL     Criteria for Skilled Therapeutic Interventions Met (OT)  yes  -TL     Row Name 02/16/21 1422          Vital Signs    Pre Systolic BP Rehab  107  -TL     Pre Treatment Diastolic BP  54  -TL     Pretreatment Heart Rate (beats/min)  90  -TL     Posttreatment Heart Rate (beats/min)  85  -TL     Pre SpO2 (%)  99  -TL     O2 Delivery Pre Treatment  room air  -TL     Intra SpO2 (%)  82  -TL     O2 Delivery Intra Treatment  room air  -TL     Post SpO2 (%)  98  -TL     O2 Delivery Post Treatment  room air  -TL     Pre Patient Position  Sitting  -TL     Intra Patient Position  Sitting  -TL     Post Patient Position  Sitting  -TL     Recovery Time  Patient required recovery between exercises to focus on PLB to increase oxygen levels  -TL     Rest Breaks   4  -TL     Row Name 02/16/21 1422          Positioning and Restraints    Pre-Treatment Position  sitting in chair/recliner  -TL     Post Treatment Position  chair  -TL     In Chair  reclined;call light within reach;encouraged to call for assist;heels elevated  -TL       User Key  (r) = Recorded By, (t) = Taken By, (c) = Cosigned By    Initials Name Provider Type    TL Les, Dorota, OTR Occupational Therapist        Outcome Measures     Row Name 02/16/21 1424          How much help from another is currently needed...    Putting on and taking off regular lower body clothing?  2  -TL     Bathing (including washing, rinsing, and drying)  2  -TL     Toileting (which includes using  toilet bed pan or urinal)  2  -TL     Putting on and taking off regular upper body clothing  3  -TL     Taking care of personal grooming (such as brushing teeth)  3  -TL     Eating meals  4  -TL     AM-PAC 6 Clicks Score (OT)  16  -TL     Row Name 02/16/21 1429          Functional Assessment    Outcome Measure Options  AM-PAC 6 Clicks Daily Activity (OT)  -TL       User Key  (r) = Recorded By, (t) = Taken By, (c) = Cosigned By    Initials Name Provider Type    Dorota Arana OTR Occupational Therapist        Occupational Therapy Education                 Title: PT OT SLP Therapies (In Progress)     Topic: Occupational Therapy (Done)     Point: ADL training (Done)     Description:   Instruct learner(s) on proper safety adaptation and remediation techniques during self care or transfers.   Instruct in proper use of assistive devices.              Learning Progress Summary           Patient Acceptance, E,TB, VU by  at 2/15/2021 1308    Comment: Role of OT/POC                   Point: Home exercise program (Done)     Description:   Instruct learner(s) on appropriate technique for monitoring, assisting and/or progressing therapeutic exercises/activities.              Learning Progress Summary           Patient Acceptance, E,D, DU,NR by  at 2/16/2021 1429    Comment: Patient educated on the coordination of PLB and pacing with therapeutic exercise.  Patient required moderate verbal cuing for correct technique.                   Point: Precautions (Done)     Description:   Instruct learner(s) on prescribed precautions during self-care and functional transfers.              Learning Progress Summary           Patient Acceptance, E,D, DU,NR by TL at 2/16/2021 1429    Comment: Patient educated on the coordination of PLB and pacing with therapeutic exercise.  Patient required moderate verbal cuing for correct technique.                   Point: Body mechanics (Done)     Description:   Instruct learner(s) on proper  positioning and spine alignment during self-care, functional mobility activities and/or exercises.              Learning Progress Summary           Patient Acceptance, E,D, DU,NR by TL at 2/16/2021 9639    Comment: Patient educated on the coordination of PLB and pacing with therapeutic exercise.  Patient required moderate verbal cuing for correct technique.                               User Key     Initials Effective Dates Name Provider Type Discipline     03/07/18 -  Kim Wick Occupational Therapist OT     01/21/17 -  Dorota Saldana OTR Occupational Therapist OT              OT Recommendation and Plan     Plan of Care Review  Plan of Care Reviewed With: patient  Progress: improving  Outcome Summary: Patient found up in bedside chair and expressed fatigue and 5/10 pain in L ankle.  Patient participated in B shoulder and elbow yellow theraband exercise 10 reps each while sitting up in bedside chair.  Patient educated on PLB and pacing with therapeutic exercise and required moderate cuing for technique.  Continue with OT focusing on increasing ADL independence and coordination of breathing with activity to maintain oxygen saturation.     Time Calculation:   Time Calculation- OT     Row Name 02/16/21 1431             Time Calculation- OT    OT Start Time  1404  -TL      OT Received On  02/16/21  -      OT Goal Re-Cert Due Date  02/25/21  -         Timed Charges    84615 - OT Therapeutic Exercise Minutes  15  -TL        User Key  (r) = Recorded By, (t) = Taken By, (c) = Cosigned By    Initials Name Provider Type    TL Dorota Saldana OTR Occupational Therapist        Therapy Charges for Today     Code Description Service Date Service Provider Modifiers Qty    79635386640 HC OT THER PROC EA 15 MIN 2/16/2021 Dorota Saldana OTR GO 1               KEKE Scott  2/16/2021

## 2021-02-16 NOTE — PLAN OF CARE
Goal Outcome Evaluation:  Plan of Care Reviewed With: patient  Progress: improving  Outcome Summary: Pt tolerated increased activity with decreased assistance for mobility.  Pt was min a with draw sheet for supine to sit and min a for sit to stand with rw. Pt did require mod a to pivot bed to chair with rw. Pt also performed TE in supine.  See flowsheet for details.

## 2021-02-16 NOTE — PLAN OF CARE
Goal Outcome Evaluation:  Plan of Care Reviewed With: patient  Progress: improving  Outcome Summary: No acute events during the night. Vital Signs Stable. Patient rested well during the night. Some complaints of pain during the night, See MAR for medication administration. Will continue to monitor patient and provide care per MD order.

## 2021-02-16 NOTE — PROGRESS NOTES
[facility]    Name:  Carolyne Martins   Age:  80 y.o.  Sex:  female  :  1940  MRN:  [id number]   Visit Number:  [id number]  Admission Date:  2021  Date Of Service:  21  Primary Care Physician:  Jerald Dawkins MD     LOS: 3 days :  Patient Care Team:  Jerald Dawkins MD as PCP - General  Daryn Spears MD as Consulting Physician (General Surgery):    Chief Complaint:      Follow-up on hip fracture    Subjective / Interval History:     Patient seen evaluate again this morning.  No acute events overnight.  She notes physical therapy has been hard.  Discussed with her it would take time to get back to her regular activities.  Denies any fevers, chills, dysuria, abdominal pain.  She is agreeable to rehab upon discharge.    Prior work-up:  80-year-old female who unfortunately sustained a mechanical fall at home outside while trying to walk her dog.  She had evidence of a left hip comminuted displaced and unstable intertrochanteric fracture with subsequent ORIF on 2021.    Review of Systems:     General ROS: Patient denies any fevers, chills or loss of consciousness.  Respiratory ROS: Denies cough or shortness of breath.  Cardiovascular ROS: Denies chest pain or palpitations. No history of exertional chest pain.  Gastrointestinal ROS: Denies nausea and vomiting. Denies any abdominal pain. No diarrhea.  Neurological ROS: Denies any focal weakness. No loss of consciousness. Denies any numbness.  Dermatological ROS: Denies any redness or pruritis.    Vital Signs:    Temp:  [97.8 °F (36.6 °C)-99.7 °F (37.6 °C)] 97.9 °F (36.6 °C)  Heart Rate:  [] 85  Resp:  [15-22] 18  BP: ()/(54-73) 107/54    Intake and output:    I/O last 3 completed shifts:  In: 960 [P.O.:960]  Out: 1050 [Urine:1050]  I/O this shift:  In: 540 [P.O.:240; Blood:300]  Out: 200 [Urine:200]    Physical Examination:    General Appearance:  Alert and cooperative, not in any acute  distress.  Elderly-appearing female.   Head:  Atraumatic and normocephalic, without obvious abnormality.   Eyes:          PERRLA, conjunctivae and sclerae normal, no Icterus. No pallor. Extraocular movements are within normal limits.   Neck: Supple, trachea midline, no thyromegaly.   Lungs:   Breath sounds heard bilaterally equally.  No crackles or wheezing. No Pleural rub or bronchial breathing.   Heart:  Normal S1 and S2, no murmur, no gallop, no rub. No JVD   Abdomen:   Normal bowel sounds, no masses, no organomegaly. Soft, non-tender, non-distended, no guarding, no rebound tenderness.   Extremities:  Movement of the left lower extremity is normal.  There is some mild edema laterally.  Pulses are intact distally.  Bandage appears clean.   Skin: No bleeding, bruising or rash.   Neurologic: Awake, alert and oriented times 3. Moves all 4 extremities equally.     Laboratory results:    Results from last 7 days   Lab Units 02/16/21  0533 02/15/21  0537 02/14/21  0604 02/13/21  1215   SODIUM mmol/L 131* 136 135* 136   POTASSIUM mmol/L 4.5 5.1 5.3* 4.5   CHLORIDE mmol/L 104 107 104 102   CO2 mmol/L 19.8* 21.4* 23.7 25.6   BUN mg/dL 56* 56* 60* 52*   CREATININE mg/dL 3.25* 3.33* 3.40* 2.93*   CALCIUM mg/dL 8.0* 7.9* 8.3* 9.5   BILIRUBIN mg/dL  --  0.3  --  0.4   ALK PHOS U/L  --  45  --  58   ALT (SGPT) U/L  --  5  --  9   AST (SGOT) U/L  --  17  --  16   GLUCOSE mg/dL 120* 95 110* 115*     Results from last 7 days   Lab Units 02/16/21  0532 02/15/21  0537 02/14/21  0604   WBC 10*3/mm3 6.12 5.80 5.47   HEMOGLOBIN g/dL 7.4* 8.3* 7.6*   HEMATOCRIT % 23.7* 26.6* 24.4*   PLATELETS 10*3/mm3 135* 136* 176         Results from last 7 days   Lab Units 02/13/21  1215   TROPONIN T ng/mL <0.010               I have reviewed the patient's laboratory results.    Radiology results:    Imaging Results (Last 24 Hours)     ** No results found for the last 24 hours. **          I have reviewed the patient's radiology  reports.    Medication Review:     I have reviewed the patient's active and prn medications.       Closed fracture of left hip (CMS/[facility])      Assessment:    Left hip fracture secondary to mechanical fall, POA  CKD stage IV  Hypertension  Hyperlipidemia  Osteoarthritis  Osteoporosis  Age-related chronic debility    Plan:    Patient is overall hemodynamically stable.  Labs are stable.  There is some mild decreased hemoglobin of 7.4, discussed with nephrology, will get 1 unit of blood today.  She is due for her Procrit and will receive it in the hospital.  Encourage patient to work with physical therapy and to take pain medication so she can be more active.  Continue with prophylaxis for VTE.  Anticipate discharge to rehab facility in the next 1 to 2 days once arranged.    Kyung Ingram,   02/16/21  15:54 EST    Dictated utilizing Dragon dictation.

## 2021-02-16 NOTE — PROGRESS NOTES
Continued Stay Note   [facility]     Patient Name: Carolyne Martins  MRN: [id number]  Today's Date: 2/16/2021    Admit Date: 2/13/2021    Discharge Plan     Row Name 02/16/21 1518       Plan    Plan  Called and # for [facility]        Discharge Codes    No documentation.             Aranza Venegas RN

## 2021-02-16 NOTE — PLAN OF CARE
Goal Outcome Evaluation:  Plan of Care Reviewed With: patient  Progress: improving  Outcome Summary: VSS. Pain controlled with pain medication.  Patient hesitant to move much.  1 UPRBC given today.

## 2021-02-16 NOTE — PLAN OF CARE
Problem: Adult Inpatient Plan of Care  Goal: Plan of Care Review  Flowsheets (Taken 2/16/2021 1422)  Progress: improving  Plan of Care Reviewed With: patient  Outcome Summary: Patient found up in bedside chair and expressed fatigue and 5/10 pain in L ankle.  Patient participated in B shoulder and elbow yellow theraband exercise 10 reps each while sitting up in bedside chair.  Patient educated on PLB and pacing with therapeutic exercise and required moderate cuing for technique.  Continue with OT focusing on increasing ADL independence and coordination of breathing with activity to maintain oxygen saturation.   Goal Outcome Evaluation:  Plan of Care Reviewed With: patient  Progress: improving  Outcome Summary: Patient found up in bedside chair and expressed fatigue and 5/10 pain in L ankle.  Patient participated in B shoulder and elbow yellow theraband exercise 10 reps each while sitting up in bedside chair.  Patient educated on PLB and pacing with therapeutic exercise and required moderate cuing for technique.  Continue with OT focusing on increasing ADL independence and coordination of breathing with activity to maintain oxygen saturation.

## 2021-02-16 NOTE — PROGRESS NOTES
"Nephrology Progress Note.    LOS: 3 days    Patient Care Team:  Jerald Dawkins MD as PCP - General  Daryn Spears MD as Consulting Physician (General Surgery)    Chief Complaint:    Chief Complaint   Patient presents with   • Fall   • Hip Injury       Subjective:   Follow up for JASMEET and Chronic Kidney disease stage 4  Interval History:   Patient Complaints: none  Patient seen and examined this morning.  Events from last 24 hours and over the weekend noted.  Patient denies having any fevers chills.  No nausea or vomiting no abdominal pain.  Denies any chest pain, shortness of breath or cough and sputum production.  There is no significant edema.   Patient also denies having new onset weakness of numbness of either extremity.  She is still complaining of pain and said she has not been out of bed.  She is being evaluated by the physical therapy and while I was in the room they were helping her get out of bed.  History taken from: patient    Objective:    Vital Signs  /62 (BP Location: Left arm, Patient Position: Lying)   Pulse 99   Temp 98.4 °F (36.9 °C) (Oral)   Resp 18   Ht 149.9 cm (59\")   Wt 49.1 kg (108 lb 3.9 oz)   LMP  (LMP Unknown)   SpO2 98%   BMI 21.86 kg/m²     No intake/output data recorded.    Intake/Output Summary (Last 24 hours) at 2/16/2021 0817  Last data filed at 2/16/2021 0356  Gross per 24 hour   Intake 720 ml   Output 650 ml   Net 70 ml       Physical Exam:  General Appearance: alert, oriented x 3, no acute distress,   HEENT: Oral mucosa dry, extra occular movements intact. Sclera clear.  Skin: Warm and dry  Neck: supple, no JVD, trachea midline  Lungs:Chest shape is normal. Breath sounds heard bilaterally equally. No crackles, No wheezing.   Heart: regular rate and rhythm. normal S1 and S2, no S3, no rub, 2/6 systolic ejection murmur.  Peripheral pulses weak but palpable.  Abdomen: soft, non-tender,  present bowel sounds to auscultation  : no palpable bladder.  Extremities: " Trace edema, no cyanosis or clubbing.   Neuro: normal speech and mental status, grossly non focal.     Results Review:   Results from last 7 days   Lab Units 02/16/21  0533 02/15/21  0537 02/14/21  0604 02/13/21  1215   SODIUM mmol/L 131* 136 135* 136   POTASSIUM mmol/L 4.5 5.1 5.3* 4.5   CHLORIDE mmol/L 104 107 104 102   CO2 mmol/L 19.8* 21.4* 23.7 25.6   BUN mg/dL 56* 56* 60* 52*   CREATININE mg/dL 3.25* 3.33* 3.40* 2.93*   CALCIUM mg/dL 8.0* 7.9* 8.3* 9.5   ALBUMIN g/dL  --  2.70*  --  3.40*   BILIRUBIN mg/dL  --  0.3  --  0.4   ALK PHOS U/L  --  45  --  58   ALT (SGPT) U/L  --  5  --  9   AST (SGOT) U/L  --  17  --  16   GLUCOSE mg/dL 120* 95 110* 115*     Estimated Creatinine Clearance: 11.1 mL/min (A) (by C-G formula based on SCr of 3.25 mg/dL (H)).  Results from last 7 days   Lab Units 02/15/21  0537   MAGNESIUM mg/dL 1.7   PHOSPHORUS mg/dL 4.6*     Results from last 7 days   Lab Units 02/15/21  0537   URIC ACID mg/dL 5.8*     Results from last 7 days   Lab Units 02/16/21  0532 02/15/21  0537 02/14/21  0604 02/13/21  1215   WBC 10*3/mm3 6.12 5.80 5.47 7.60   HEMOGLOBIN g/dL 7.4* 8.3* 7.6* 9.2*   PLATELETS 10*3/mm3 135* 136* 176 202         Brief Urine Lab Results  (Last result in the past 365 days)      Color   Clarity   Blood   Leuk Est   Nitrite   Protein   CREAT   Urine HCG        02/14/21 1541             81.1           Protein/Creatinine Ratio, Urine   Date Value Ref Range Status   02/14/2021 234.3 (H) 0.0 - 200.0 mg/G Crea Final     Imaging Results (Last 24 Hours)     Procedure Component Value Units Date/Time    US Venous Doppler Lower Extremity Left (duplex) [[id number]] Collected: 02/15/21 1046     Updated: 02/15/21 1048    Narrative:      PROCEDURE: US VENOUS DOPPLER LOWER EXTREMITY LEFT (DUPLEX)-     HISTORY: s.p left hip surgery now with left calf pain; S72.002A-Fracture  of unspecified part of neck of left femur, initial encounter for closed  fracture     PROCEDURE: Multiple transverse and  longitudinal scans were performed of  the femoropopliteal deep venous system, with augmentation and  compression maneuvers.     FINDINGS: Normal phasic flow was noted in the visualized deep venous  system. No intraluminal increased echogenicity is noted to suggest  thrombus. There is normal compression and augmentation of the venous  structures.  No abnormal venous collaterals are seen.       Impression:      No evidence of deep venous thrombosis.     This report was finalized on 2/15/2021 10:46 AM by Sergio Bassett M.D..        budesonide-formoterol, 2 puff, Inhalation, BID - RT  calcitriol, 0.5 mcg, Oral, Daily  carvedilol, 25 mg, Oral, BID With Meals  enoxaparin, 30 mg, Subcutaneous, Daily  erythromycin, , Left Eye, Nightly  multivitamin with minerals, 1 tablet, Oral, Daily  pantoprazole, 40 mg, Oral, QAM  rosuvastatin, 10 mg, Oral, Nightly  sodium chloride, 10 mL, Intravenous, Q12H  sodium chloride, 3 mL, Intravenous, Q12H  vitamin B-12, 500 mcg, Oral, BID             Medication Review:   Current Facility-Administered Medications   Medication Dose Route Frequency Provider Last Rate Last Admin   • budesonide-formoterol (SYMBICORT) 80-4.5 MCG/ACT inhaler 2 puff  2 puff Inhalation BID - RT Gabriele Loyd MD   2 puff at 02/15/21 1930   • calcitriol (ROCALTROL) capsule 0.5 mcg  0.5 mcg Oral Daily Clay Schilling MD, FASN   0.5 mcg at 02/15/21 1428   • calcium carbonate (TUMS) chewable tablet 500 mg (200 mg elemental)  2 tablet Oral BID PRN Gabriele Loyd MD   2 tablet at 02/15/21 1843   • carvedilol (COREG) tablet 25 mg  25 mg Oral BID With Meals Gabriele Loyd MD   25 mg at 02/15/21 1822   • cyclobenzaprine (FLEXERIL) tablet 5 mg  5 mg Oral TID PRN Kyung Ingram DO       • enoxaparin (LOVENOX) syringe 30 mg  30 mg Subcutaneous Daily Gabriele Loyd MD   30 mg at 02/15/21 0839   • erythromycin (ROMYCIN) ophthalmic ointment   Left Eye Nightly Gabriele Loyd MD   Given at 02/15/21  2139   • HYDROcodone-acetaminophen (NORCO) 5-325 MG per tablet 1 tablet  1 tablet Oral Q4H PRN Gabriele Loyd MD   1 tablet at 02/15/21 1210   • melatonin tablet 5 mg  5 mg Oral Nightly PRN aGbriele Loyd MD       • Morphine sulfate (PF) injection 1 mg  1 mg Intravenous Q2H PRN Kyung Ingram DO        And   • naloxone (NARCAN) injection 0.4 mg  0.4 mg Intravenous Q5 Min PRN Kyung Ingram DO       • multivitamin with minerals 1 tablet  1 tablet Oral Daily Clay Schilling MD, FASN   1 tablet at 02/15/21 1245   • ondansetron (ZOFRAN) tablet 4 mg  4 mg Oral Q6H PRN Gabriele Loyd MD        Or   • ondansetron (ZOFRAN) injection 4 mg  4 mg Intravenous Q6H PRN Gabriele Loyd MD       • pantoprazole (PROTONIX) EC tablet 40 mg  40 mg Oral QAM Gabriele Loyd MD   40 mg at 02/16/21 0629   • rosuvastatin (CRESTOR) tablet 10 mg  10 mg Oral Nightly Gabriele Loyd MD   10 mg at 02/15/21 2139   • sodium chloride 0.9 % flush 1-10 mL  1-10 mL Intravenous PRN Gabriele Loyd MD       • sodium chloride 0.9 % flush 10 mL  10 mL Intravenous Q12H Gabriele Loyd MD   10 mL at 02/15/21 2140   • sodium chloride 0.9 % flush 3 mL  3 mL Intravenous Q12H Gabriele Loyd MD   3 mL at 02/15/21 2139   • vitamin B-12 (CYANOCOBALAMIN) tablet 500 mcg  500 mcg Oral BID Clay Schilling MD, FASN   500 mcg at 02/15/21 2139       Assessment/Plan:  1. Acute kidney injury   2. Chronic kidney disease stage IV: baseline creatinine 2.51-2.66  3. Anemia of CKD: patient on outpatient [facility]U  4. Hypertension with CKD  5. Secondary hyperparathyroidism: patient on calcitriol  6. Vitamin D Deficiency  7. Closed fracture of left hip (CMS/HCC): followed by Dr. Loyd  8. GERD  9. Gout  10. Vitamin B12 Deficiency  11. COPD  12. Dyslipidemia  13. Osteoarthritis  14. Osteoporosis     Plan:  · Her hemoglobin dropped overnight, will go ahead and transfuse her 1 more unit of packed RBC.  I will give her Lasix 20 mg  IV after the transfusion.  · Physical therapy evaluation is in progress, she has agreed to go to [facility] rehab.  · Details were discussed with the patient no family in the room.    · Details were also discussed with the hospitalist service.   · Continue with rest of the current treatment plan and surveillance labs.  · Further recommendations will depend on clinical course of the patient during the current hospitalization.    · I also discussed the details with the nursing staff.  · Rest as ordered.    Clay Schilling MD, FASN  02/16/21  08:17 EST    Dictated utilizing Dragon dictation.

## 2021-02-17 ENCOUNTER — APPOINTMENT (OUTPATIENT)
Dept: INFUSION THERAPY | Facility: HOSPITAL | Age: 81
End: 2021-02-17

## 2021-02-17 LAB
ABO GROUP BLD: NORMAL
ALBUMIN SERPL-MCNC: 2.7 G/DL (ref 3.5–5.2)
ALBUMIN/GLOB SERPL: 1.1 G/DL
ALP SERPL-CCNC: 52 U/L (ref 39–117)
ALT SERPL W P-5'-P-CCNC: <5 U/L (ref 1–33)
ANION GAP SERPL CALCULATED.3IONS-SCNC: 7.1 MMOL/L (ref 5–15)
AST SERPL-CCNC: 16 U/L (ref 1–32)
BASOPHILS # BLD AUTO: 0.02 10*3/MM3 (ref 0–0.2)
BASOPHILS NFR BLD AUTO: 0.4 % (ref 0–1.5)
BH BB BLOOD EXPIRATION DATE: NORMAL
BH BB BLOOD TYPE BARCODE: 5100
BH BB DISPENSE STATUS: NORMAL
BH BB PRODUCT CODE: NORMAL
BH BB UNIT NUMBER: NORMAL
BILIRUB SERPL-MCNC: 0.3 MG/DL (ref 0–1.2)
BLD GP AB SCN SERPL QL: NEGATIVE
BUN SERPL-MCNC: 59 MG/DL (ref 8–23)
BUN/CREAT SERPL: 17.6 (ref 7–25)
CALCIUM SPEC-SCNC: 8.1 MG/DL (ref 8.6–10.5)
CHLORIDE SERPL-SCNC: 103 MMOL/L (ref 98–107)
CO2 SERPL-SCNC: 21.9 MMOL/L (ref 22–29)
CREAT SERPL-MCNC: 3.35 MG/DL (ref 0.57–1)
CROSSMATCH INTERPRETATION: NORMAL
DEPRECATED RDW RBC AUTO: 54.7 FL (ref 37–54)
EOSINOPHIL # BLD AUTO: 0.27 10*3/MM3 (ref 0–0.4)
EOSINOPHIL NFR BLD AUTO: 4.7 % (ref 0.3–6.2)
ERYTHROCYTE [DISTWIDTH] IN BLOOD BY AUTOMATED COUNT: 16.9 % (ref 12.3–15.4)
GFR SERPL CREATININE-BSD FRML MDRD: 13 ML/MIN/1.73
GFR SERPL CREATININE-BSD FRML MDRD: ABNORMAL ML/MIN/{1.73_M2}
GLOBULIN UR ELPH-MCNC: 2.4 GM/DL
GLUCOSE SERPL-MCNC: 111 MG/DL (ref 65–99)
HCT VFR BLD AUTO: 24.3 % (ref 34–46.6)
HGB BLD-MCNC: 7.8 G/DL (ref 12–15.9)
IMM GRANULOCYTES # BLD AUTO: 0.04 10*3/MM3 (ref 0–0.05)
IMM GRANULOCYTES NFR BLD AUTO: 0.7 % (ref 0–0.5)
LYMPHOCYTES # BLD AUTO: 0.92 10*3/MM3 (ref 0.7–3.1)
LYMPHOCYTES NFR BLD AUTO: 16.2 % (ref 19.6–45.3)
MCH RBC QN AUTO: 29.5 PG (ref 26.6–33)
MCHC RBC AUTO-ENTMCNC: 32.1 G/DL (ref 31.5–35.7)
MCV RBC AUTO: 92 FL (ref 79–97)
MONOCYTES # BLD AUTO: 0.79 10*3/MM3 (ref 0.1–0.9)
MONOCYTES NFR BLD AUTO: 13.9 % (ref 5–12)
NEUTROPHILS NFR BLD AUTO: 3.65 10*3/MM3 (ref 1.7–7)
NEUTROPHILS NFR BLD AUTO: 64.1 % (ref 42.7–76)
NRBC BLD AUTO-RTO: 0 /100 WBC (ref 0–0.2)
PLATELET # BLD AUTO: 150 10*3/MM3 (ref 140–450)
PMV BLD AUTO: 11.1 FL (ref 6–12)
POTASSIUM SERPL-SCNC: 4.5 MMOL/L (ref 3.5–5.2)
PROT SERPL-MCNC: 5.1 G/DL (ref 6–8.5)
RBC # BLD AUTO: 2.64 10*6/MM3 (ref 3.77–5.28)
RH BLD: POSITIVE
SARS-COV-2 RNA PNL SPEC NAA+PROBE: NOT DETECTED
SODIUM SERPL-SCNC: 132 MMOL/L (ref 136–145)
T&S EXPIRATION DATE: NORMAL
UNIT  ABO: NORMAL
UNIT  RH: NORMAL
WBC # BLD AUTO: 5.69 10*3/MM3 (ref 3.4–10.8)

## 2021-02-17 PROCEDURE — 85025 COMPLETE CBC W/AUTO DIFF WBC: CPT | Performed by: FAMILY MEDICINE

## 2021-02-17 PROCEDURE — 36430 TRANSFUSION BLD/BLD COMPNT: CPT

## 2021-02-17 PROCEDURE — 97116 GAIT TRAINING THERAPY: CPT

## 2021-02-17 PROCEDURE — 97530 THERAPEUTIC ACTIVITIES: CPT

## 2021-02-17 PROCEDURE — 86850 RBC ANTIBODY SCREEN: CPT | Performed by: INTERNAL MEDICINE

## 2021-02-17 PROCEDURE — 86920 COMPATIBILITY TEST SPIN: CPT

## 2021-02-17 PROCEDURE — 94799 UNLISTED PULMONARY SVC/PX: CPT

## 2021-02-17 PROCEDURE — 97535 SELF CARE MNGMENT TRAINING: CPT

## 2021-02-17 PROCEDURE — 80053 COMPREHEN METABOLIC PANEL: CPT | Performed by: FAMILY MEDICINE

## 2021-02-17 PROCEDURE — 86900 BLOOD TYPING SEROLOGIC ABO: CPT

## 2021-02-17 PROCEDURE — 99232 SBSQ HOSP IP/OBS MODERATE 35: CPT | Performed by: FAMILY MEDICINE

## 2021-02-17 PROCEDURE — P9016 RBC LEUKOCYTES REDUCED: HCPCS

## 2021-02-17 PROCEDURE — 97110 THERAPEUTIC EXERCISES: CPT

## 2021-02-17 PROCEDURE — 86901 BLOOD TYPING SEROLOGIC RH(D): CPT | Performed by: INTERNAL MEDICINE

## 2021-02-17 PROCEDURE — 25010000002 ENOXAPARIN PER 10 MG: Performed by: ORTHOPAEDIC SURGERY

## 2021-02-17 PROCEDURE — 87635 SARS-COV-2 COVID-19 AMP PRB: CPT | Performed by: FAMILY MEDICINE

## 2021-02-17 PROCEDURE — 86900 BLOOD TYPING SEROLOGIC ABO: CPT | Performed by: INTERNAL MEDICINE

## 2021-02-17 RX ORDER — POLYETHYLENE GLYCOL 3350 17 G/17G
17 POWDER, FOR SOLUTION ORAL DAILY
Status: DISCONTINUED | OUTPATIENT
Start: 2021-02-18 | End: 2021-02-18 | Stop reason: HOSPADM

## 2021-02-17 RX ORDER — DOCUSATE SODIUM 100 MG/1
100 CAPSULE, LIQUID FILLED ORAL 2 TIMES DAILY
Status: DISCONTINUED | OUTPATIENT
Start: 2021-02-17 | End: 2021-02-18 | Stop reason: HOSPADM

## 2021-02-17 RX ADMIN — ROSUVASTATIN CALCIUM 10 MG: 5 TABLET, FILM COATED ORAL at 20:29

## 2021-02-17 RX ADMIN — SODIUM CHLORIDE, PRESERVATIVE FREE 3 ML: 5 INJECTION INTRAVENOUS at 20:28

## 2021-02-17 RX ADMIN — PANTOPRAZOLE SODIUM 40 MG: 40 TABLET, DELAYED RELEASE ORAL at 06:08

## 2021-02-17 RX ADMIN — SODIUM CHLORIDE, PRESERVATIVE FREE 3 ML: 5 INJECTION INTRAVENOUS at 08:35

## 2021-02-17 RX ADMIN — DOCUSATE SODIUM 100 MG: 100 CAPSULE ORAL at 20:29

## 2021-02-17 RX ADMIN — BUDESONIDE AND FORMOTEROL FUMARATE DIHYDRATE 2 PUFF: 80; 4.5 AEROSOL RESPIRATORY (INHALATION) at 08:34

## 2021-02-17 RX ADMIN — CYANOCOBALAMIN TAB 500 MCG 500 MCG: 500 TAB at 08:33

## 2021-02-17 RX ADMIN — SODIUM CHLORIDE, PRESERVATIVE FREE 10 ML: 5 INJECTION INTRAVENOUS at 20:27

## 2021-02-17 RX ADMIN — Medication 5 MG: at 20:29

## 2021-02-17 RX ADMIN — HYDROCODONE BITARTRATE AND ACETAMINOPHEN 1 TABLET: 5; 325 TABLET ORAL at 13:54

## 2021-02-17 RX ADMIN — CARVEDILOL 25 MG: 25 TABLET, FILM COATED ORAL at 20:49

## 2021-02-17 RX ADMIN — DOCUSATE SODIUM 100 MG: 100 CAPSULE ORAL at 12:28

## 2021-02-17 RX ADMIN — BUDESONIDE AND FORMOTEROL FUMARATE DIHYDRATE 2 PUFF: 80; 4.5 AEROSOL RESPIRATORY (INHALATION) at 21:34

## 2021-02-17 RX ADMIN — HYDROCODONE BITARTRATE AND ACETAMINOPHEN 1 TABLET: 5; 325 TABLET ORAL at 17:57

## 2021-02-17 RX ADMIN — CARVEDILOL 25 MG: 25 TABLET, FILM COATED ORAL at 08:33

## 2021-02-17 RX ADMIN — CYANOCOBALAMIN TAB 500 MCG 500 MCG: 500 TAB at 20:29

## 2021-02-17 RX ADMIN — SODIUM CHLORIDE, PRESERVATIVE FREE 10 ML: 5 INJECTION INTRAVENOUS at 08:34

## 2021-02-17 RX ADMIN — ERYTHROMYCIN 1 APPLICATION: 5 OINTMENT OPHTHALMIC at 21:08

## 2021-02-17 RX ADMIN — CALCITRIOL CAPSULES 0.25 MCG 0.5 MCG: 0.25 CAPSULE ORAL at 08:33

## 2021-02-17 RX ADMIN — MULTIPLE VITAMINS W/ MINERALS TAB 1 TABLET: TAB at 08:33

## 2021-02-17 RX ADMIN — ENOXAPARIN SODIUM 30 MG: 30 INJECTION SUBCUTANEOUS at 08:33

## 2021-02-17 NOTE — THERAPY TREATMENT NOTE
Patient unavailable for PT tx this am d/t receiving a blood transfusion. PT will follow up with patient later and progress activity as tolerated.

## 2021-02-17 NOTE — PLAN OF CARE
Goal Outcome Evaluation:  Plan of Care Reviewed With: patient  Progress: improving  Outcome Summary: OT tx completed. Assisted patient in bathing/dressing/grooming tasks; required mod A for LB self care, mod A for tf with VCs for safety and sequencing. Continue OT POC

## 2021-02-17 NOTE — THERAPY TREATMENT NOTE
"Patient Name: Carolyne Martins  : 1940    MRN: [id number]                              Today's Date: 2021       Admit Date: 2021    Visit Dx:     ICD-10-CM ICD-9-CM   1. Closed fracture of left hip, initial encounter (CMS/[facility])  S72.002A 820.8     Patient Active Problem List   Diagnosis   • Arthralgia of shoulder region   • Tendinopathy of rotator cuff   • Osteoarthritis of right acromioclavicular joint   • Anemia due to stage 4 chronic kidney disease treated with erythropoietin (CMS/[facility])   • Anemia   • Gastric polyp   • Heartburn   • Melena   • Constipation   • Change in bowel habits   • Colon cancer screening   • Epigastric pain   • Weight loss   • Senile osteoporosis   • Left lower quadrant abdominal pain   • Personal history of colonic polyps   • Closed fracture of left hip (CMS/[facility])     Past Medical History:   Diagnosis Date   • Anemia    • Anemia    • Arthritis    • Back pain    • Bleeding from anus    • Bronchitis     STATES HAS BRONCHITIS CURRENTLY (17).     • Bronchitis 2017   • CAD (coronary artery disease)    • Cataract, bilateral    • Chronic kidney disease     STAGE 4.  SEES TERA   • Chronic kidney failure     REPORTS STAGE IV   • Difficulty swallowing solids    • Disease of thyroid gland    • Fracture, radius 2016    right distal radius and ulna, healed.   • GERD (gastroesophageal reflux disease)    • Gout    • High cholesterol    • History of blood transfusion    • History of nuclear stress test     DR. COTREZ.  \"IT WAS OK\"   • Hyperparathyroidism (CMS/[facility])    • Hyperparathyroidism (CMS/[facility])    • Hypertension    • Impaired functional mobility, balance, gait, and endurance    • Iron deficiency    • Osteoarthritis    • Osteoarthritis of knees, bilateral     Both knees Supartz injection series    • Osteoporosis    • Palpitations    • Personal history of cardiac murmur    • PONV (postoperative nausea and vomiting)    • Presence of pessary    • Problems " with swallowing     WITH FOOD OCCASSIONALLY   • Rheumatic heart disease     Personal history   • Rotator cuff tendonitis     right    • Rupture of right proximal biceps tendon     chronic   • Seasonal allergies    • Sinus problem    • Spinal headache     REPORTS AFTER DELIVERY OF SON   • Subacromial bursitis     right    • Valvular heart disease    • Vision problems    • Vitamin B12 deficiency    • Vitamin D deficiency    • Wears glasses      Past Surgical History:   Procedure Laterality Date   • CATARACT EXTRACTION Bilateral    • CERVICAL POLYPECTOMY     • COLONOSCOPY  2011   • COLONOSCOPY N/A 1/9/2018    Procedure: COLONOSCOPY with endoscopic mucosal resection (hot snare), normal saline submucosal injection, argon thermal ablation, resolution clip placement x4, and cold biopsy polypectomy;  Surgeon: Pieter Concepcion MD;  Location: [facility] ENDOSCOPY;  Service:    • ENDOSCOPY N/A 12/6/2017    Procedure: ESOPHAGOGASTRODUODENOSCOPY with biopsies and hot snare polypectomies;  Surgeon: Pieter Concepcion MD;  Location: [facility] ENDOSCOPY;  Service:    • ENDOSCOPY N/A 1/15/2019    Procedure: ESOPHAGOGASTRODUODENOSCOPY WITH BIOPSIES AND HOT SNARE POLYPECTOMIES X10;  Surgeon: Pieter Concepcion MD;  Location: [facility] ENDOSCOPY;  Service: Gastroenterology   • HIP TROCHANTERIC NAILING WITH INTRAMEDULLARY HIP SCREW Left 2/14/2021    Procedure: HIP TROCHANTER NAIL SHORT WITH INTRAMEDULLARY HIP SCREW, LEFT;  Surgeon: Gabriele Loyd MD;  Location: [facility] OR;  Service: Orthopedics;  Laterality: Left;   • UPPER GASTROINTESTINAL ENDOSCOPY  08/18/2014     General Information     Row Name 02/17/21 1554          OT Time and Intention    Document Type  therapy note (daily note)  -SD     Mode of Treatment  occupational therapy  -SD       User Key  (r) = Recorded By, (t) = Taken By, (c) = Cosigned By    Initials Name Provider Type    Ирина Perkins OT Occupational Therapist          Mobility/ADL's     Row Name 02/17/21 1556           Transfers    Transfers  sit-stand transfer  -SD     Sit-Stand [place] (Transfers)  minimum assist (75% patient effort);moderate assist (50% patient effort)  -SD     Row Name 02/17/21 1554          Sit-Stand Transfer    Assistive Device (Sit-Stand Transfers)  walker, front-wheeled  -SD     Row Name 02/17/21 1554          Bathing Assessment/Intervention    [place] Level (Bathing)  lower body;moderate assist (50% patient effort)  -SD     Row Name 02/17/21 1554          Upper Body Dressing Assessment/Training    [place] Level (Upper Body Dressing)  set up  -SD     Row Name 02/17/21 1554          Lower Body Dressing Assessment/Training    [place] Level (Lower Body Dressing)  don;socks;pants/bottoms;moderate assist (50% patient effort)  -SD     Row Name 02/17/21 1554          Grooming Assessment/Training    [place] Level (Grooming)  hair care, combing/brushing;oral care regimen;wash face, hands;set up  -SD       User Key  (r) = Recorded By, (t) = Taken By, (c) = Cosigned By    Initials Name Provider Type    Ирина Perkins OT Occupational Therapist        Obj/Interventions    No documentation.       Goals/Plan    No documentation.       Clinical Impression     Row Name 02/17/21 1555          Pain Scale: Numbers Pre/Post-Treatment    Pretreatment Pain Rating  8/10  -SD     Posttreatment Pain Rating  6/10  -SD     Pain Location - Side  Left  -SD     Pain Location - Orientation  lower  -SD     Pain Location  extremity  -SD     Pain Intervention(s)  Repositioned  -SD     Row Name 02/17/21 1557          Plan of Care Review    Plan of Care Reviewed With  patient  -SD     Progress  improving  -SD     Outcome Summary  OT tx completed. Assisted patient in bathing/dressing/grooming tasks; required mod A for LB self care, mod A for tf with VCs for safety and sequencing. Continue OT POC  -SD     Row Name 02/17/21 1550          Positioning and Restraints    Pre-Treatment Position  sitting in  chair/recliner  -SD     Post Treatment Position  chair  -SD     In Chair  reclined;call light within reach;encouraged to call for assist;exit alarm on;notified nsg  -SD       User Key  (r) = Recorded By, (t) = Taken By, (c) = Cosigned By    Initials Name Provider Type    Ирина Perkins OT Occupational Therapist        Outcome Measures     Row Name 02/17/21 1557          How much help from another is currently needed...    Putting on and taking off regular lower body clothing?  2  -SD     Bathing (including washing, rinsing, and drying)  2  -SD     Toileting (which includes using toilet bed pan or urinal)  2  -SD     Putting on and taking off regular upper body clothing  3  -SD     Taking care of personal grooming (such as brushing teeth)  3  -SD     Eating meals  4  -SD     AM-PAC 6 Clicks Score (OT)  16  -SD     Row Name 02/17/21 1557          Functional Assessment    Outcome Measure Options  AM-PAC 6 Clicks Daily Activity (OT)  -SD       User Key  (r) = Recorded By, (t) = Taken By, (c) = Cosigned By    Initials Name Provider Type    Ирина Perkins OT Occupational Therapist        Occupational Therapy Education                 Title: PT OT SLP Therapies (Done)     Topic: Occupational Therapy (Done)     Point: ADL training (Done)     Description:   Instruct learner(s) on proper safety adaptation and remediation techniques during self care or transfers.   Instruct in proper use of assistive devices.              Learning Progress Summary           Patient Acceptance, E,TB, VU by SD at 2/17/2021 1558    Comment: Safety and sequencing during functional transfers.    Acceptance, E,TB, VU by ALCIRA at 2/16/2021 2339    Acceptance, E,TB, VU by SHELIA at 2/15/2021 1308    Comment: Role of OT/POC                   Point: Home exercise program (Done)     Description:   Instruct learner(s) on appropriate technique for monitoring, assisting and/or progressing therapeutic exercises/activities.              Learning Progress  Summary           Patient Acceptance, E,TB, VU by JL at 2/16/2021 2339    Acceptance, E,D, DU,NR by TL at 2/16/2021 1429    Comment: Patient educated on the coordination of PLB and pacing with therapeutic exercise.  Patient required moderate verbal cuing for correct technique.                   Point: Precautions (Done)     Description:   Instruct learner(s) on prescribed precautions during self-care and functional transfers.              Learning Progress Summary           Patient Acceptance, E,TB, VU by JL at 2/16/2021 2339    Acceptance, E,D, DU,NR by TL at 2/16/2021 1429    Comment: Patient educated on the coordination of PLB and pacing with therapeutic exercise.  Patient required moderate verbal cuing for correct technique.                   Point: Body mechanics (Done)     Description:   Instruct learner(s) on proper positioning and spine alignment during self-care, functional mobility activities and/or exercises.              Learning Progress Summary           Patient Acceptance, E,TB, VU by ALCIRA at 2/16/2021 2339    Acceptance, E,D, DU,NR by TL at 2/16/2021 1429    Comment: Patient educated on the coordination of PLB and pacing with therapeutic exercise.  Patient required moderate verbal cuing for correct technique.                               User Key     Initials Effective Dates Name Provider Type Discipline     03/07/18 -  Kim Wick Occupational Therapist OT    TL 01/21/17 -  Dorota Saldana OTR Occupational Therapist OT    SD 03/07/18 -  Ирина Delaney OT Occupational Therapist OT    JL 11/23/20 -  Thien Crawford RN Registered Nurse Nurse              OT Recommendation and Plan     Plan of Care Review  Plan of Care Reviewed With: patient  Progress: improving  Outcome Summary: OT tx completed. Assisted patient in bathing/dressing/grooming tasks; required mod A for LB self care, mod A for tf with VCs for safety and sequencing. Continue OT POC     Time Calculation:   Time Calculation- OT     Row Name  02/17/21 1559             Time Calculation- OT    OT Start Time  1507  -SD      OT Stop Time  1537  -SD      OT Time Calculation (min)  30 min  -SD      OT Received On  02/17/21  -SD      OT Goal Re-Cert Due Date  02/25/21  -SD         Timed Charges    40499 - OT Therapeutic Activity Minutes  10  -SD      29689 - OT Self Care/Mgmt Minutes  20  -SD        User Key  (r) = Recorded By, (t) = Taken By, (c) = Cosigned By    Initials Name Provider Type    Ирина Perkins OT Occupational Therapist        Therapy Charges for Today     Code Description Service Date Service Provider Modifiers Qty    [id number]  OT THERAPEUTIC ACT EA 15 MIN 2/17/2021 Ирина Delaney OT GO 1    [id number] HC OT SELF CARE/MGMT/TRAIN EA 15 MIN 2/17/2021 Ирина Delaney OT GO 1               Ирина Delaney OT  2/17/2021

## 2021-02-17 NOTE — PROGRESS NOTES
"Nephrology Progress Note.    LOS: 4 days    Patient Care Team:  Jerald Dawkins MD as PCP - General  Daryn Spears MD as Consulting Physician (General Surgery)    Chief Complaint:    Chief Complaint   Patient presents with   • Fall   • Hip Injury       Subjective:   Follow up for JASMEET and Chronic Kidney disease stage 4  Interval History:   Patient Complaints: none  Patient seen and examined this morning.  Events from last 24 hours noted.  Patient denies having any fevers chills.  No nausea or vomiting no abdominal pain.  Denies any chest pain, shortness of breath or cough and sputum production.  There is no significant edema.   Patient also denies having new onset weakness of numbness of either extremity.  She is still complaining of pain, it is bearable.  History taken from: patient    Objective:    Vital Signs  /61 (BP Location: Right arm, Patient Position: Lying)   Pulse 75   Temp 97.9 °F (36.6 °C) (Oral)   Resp 18   Ht 149.9 cm (59\")   Wt 49.1 kg (108 lb 3.9 oz)   LMP  (LMP Unknown)   SpO2 97%   BMI 21.86 kg/m²     No intake/output data recorded.    Intake/Output Summary (Last 24 hours) at 2/17/2021 0737  Last data filed at 2/17/2021 0415  Gross per 24 hour   Intake 660 ml   Output 1200 ml   Net -540 ml       Physical Exam:  General Appearance: alert, oriented x 3, no acute distress,   HEENT: Oral mucosa dry, extra occular movements intact. Sclera clear.  Skin: Warm and dry  Neck: supple, no JVD, trachea midline  Lungs:Chest shape is normal. Breath sounds heard bilaterally equally. No crackles, No wheezing.   Heart: regular rate and rhythm. normal S1 and S2, no S3, no rub, 2/6 systolic ejection murmur.  Peripheral pulses weak but palpable.  Abdomen: soft, non-tender,  present bowel sounds to auscultation  : no palpable bladder.  Extremities: Trace edema, no cyanosis or clubbing.   Neuro: normal speech and mental status, grossly non focal.     Results Review:   Results from last 7 days   Lab " Units 02/17/21  0525 02/16/21  0533 02/15/21  0537  02/13/21  1215   SODIUM mmol/L 132* 131* 136   < > 136   POTASSIUM mmol/L 4.5 4.5 5.1   < > 4.5   CHLORIDE mmol/L 103 104 107   < > 102   CO2 mmol/L 21.9* 19.8* 21.4*   < > 25.6   BUN mg/dL 59* 56* 56*   < > 52*   CREATININE mg/dL 3.35* 3.25* 3.33*   < > 2.93*   CALCIUM mg/dL 8.1* 8.0* 7.9*   < > 9.5   ALBUMIN g/dL 2.70*  --  2.70*  --  3.40*   BILIRUBIN mg/dL 0.3  --  0.3  --  0.4   ALK PHOS U/L 52  --  45  --  58   ALT (SGPT) U/L <5  --  5  --  9   AST (SGOT) U/L 16  --  17  --  16   GLUCOSE mg/dL 111* 120* 95   < > 115*    < > = values in this interval not displayed.     Estimated Creatinine Clearance: 10.7 mL/min (A) (by C-G formula based on SCr of 3.35 mg/dL (H)).  Results from last 7 days   Lab Units 02/15/21  0537   MAGNESIUM mg/dL 1.7   PHOSPHORUS mg/dL 4.6*     Results from last 7 days   Lab Units 02/15/21  0537   URIC ACID mg/dL 5.8*     Results from last 7 days   Lab Units 02/17/21  0525 02/16/21  0532 02/15/21  0537 02/14/21  0604 02/13/21  1215   WBC 10*3/mm3 5.69 6.12 5.80 5.47 7.60   HEMOGLOBIN g/dL 7.8* 7.4* 8.3* 7.6* 9.2*   PLATELETS 10*3/mm3 150 135* 136* 176 202         Brief Urine Lab Results  (Last result in the past 365 days)      Color   Clarity   Blood   Leuk Est   Nitrite   Protein   CREAT   Urine HCG        02/14/21 1541             81.1           Protein/Creatinine Ratio, Urine   Date Value Ref Range Status   02/14/2021 234.3 (H) 0.0 - 200.0 mg/G Crea Final     Imaging Results (Last 24 Hours)     ** No results found for the last 24 hours. **        budesonide-formoterol, 2 puff, Inhalation, BID - RT  calcitriol, 0.5 mcg, Oral, Daily  carvedilol, 25 mg, Oral, BID With Meals  enoxaparin, 30 mg, Subcutaneous, Daily  erythromycin, , Left Eye, Nightly  multivitamin with minerals, 1 tablet, Oral, Daily  pantoprazole, 40 mg, Oral, QAM  rosuvastatin, 10 mg, Oral, Nightly  sodium chloride, 10 mL, Intravenous, Q12H  sodium chloride, 3 mL,  Intravenous, Q12H  vitamin B-12, 500 mcg, Oral, BID             Medication Review:   Current Facility-Administered Medications   Medication Dose Route Frequency Provider Last Rate Last Admin   • budesonide-formoterol (SYMBICORT) 80-4.5 MCG/ACT inhaler 2 puff  2 puff Inhalation BID - RT Gabriele Loyd MD   2 puff at 02/16/21 1951   • calcitriol (ROCALTROL) capsule 0.5 mcg  0.5 mcg Oral Daily Clay Schilling MD, FASN   0.5 mcg at 02/16/21 0849   • calcium carbonate (TUMS) chewable tablet 500 mg (200 mg elemental)  2 tablet Oral BID PRN Gabriele Loyd MD   2 tablet at 02/15/21 1843   • carvedilol (COREG) tablet 25 mg  25 mg Oral BID With Meals Gabriele Loyd MD   25 mg at 02/16/21 1727   • cyclobenzaprine (FLEXERIL) tablet 5 mg  5 mg Oral TID PRN Kyung Ingram DO       • enoxaparin (LOVENOX) syringe 30 mg  30 mg Subcutaneous Daily Gabriele Loyd MD   30 mg at 02/16/21 0849   • erythromycin (ROMYCIN) ophthalmic ointment   Left Eye Nightly Gabriele Loyd MD   Given at 02/16/21 2018   • HYDROcodone-acetaminophen (NORCO) 5-325 MG per tablet 1 tablet  1 tablet Oral Q4H PRN Gabriele Loyd MD   1 tablet at 02/16/21 2018   • melatonin tablet 5 mg  5 mg Oral Nightly PRN Gabriele Loyd MD       • Morphine sulfate (PF) injection 1 mg  1 mg Intravenous Q2H PRN Kyung Ingram DO        And   • naloxone (NARCAN) injection 0.4 mg  0.4 mg Intravenous Q5 Min PRN Kyung Ingram DO       • multivitamin with minerals 1 tablet  1 tablet Oral Daily Clay Schilling MD, FASN   1 tablet at 02/16/21 0849   • ondansetron (ZOFRAN) tablet 4 mg  4 mg Oral Q6H PRN Gabriele Loyd MD        Or   • ondansetron (ZOFRAN) injection 4 mg  4 mg Intravenous Q6H PRN Gabriele Loyd MD       • pantoprazole (PROTONIX) EC tablet 40 mg  40 mg Oral QAM Gabriele Loyd MD   40 mg at 02/17/21 0608   • rosuvastatin (CRESTOR) tablet 10 mg  10 mg Oral Nightly Gabriele Loyd MD   10 mg at  02/16/21 2017   • sodium chloride 0.9 % flush 1-10 mL  1-10 mL Intravenous PRN Gabriele Loyd MD       • sodium chloride 0.9 % flush 10 mL  10 mL Intravenous Q12H Gabriele Loyd MD   10 mL at 02/16/21 2157   • sodium chloride 0.9 % flush 3 mL  3 mL Intravenous Q12H Gabriele Loyd MD   3 mL at 02/16/21 2018   • vitamin B-12 (CYANOCOBALAMIN) tablet 500 mcg  500 mcg Oral BID Clay Schilling MD, POLA   500 mcg at 02/16/21 2017       Assessment/Plan:  1. Acute kidney injury   2. Chronic kidney disease stage IV: baseline creatinine 2.51-2.66  3. Anemia of CKD: patient on outpatient [facility]  4. Hypertension with CKD  5. Secondary hyperparathyroidism: patient on calcitriol  6. Vitamin D Deficiency  7. Closed fracture of left hip (CMS/[facility]): followed by Dr. Loyd  8. GERD  9. Gout  10. Vitamin B12 Deficiency  11. COPD  12. Dyslipidemia  13. Osteoarthritis  14. Osteoporosis     Plan:  · Her hemoglobin did not improve much we will go ahead and transfuse another unit of blood.  · Physical therapy evaluation is in progress, she has agreed to go to [facility] rehab.  Still awaiting bed availability and transfer whenever bed becomes available.  · Details were discussed with the patient no family in the room.    · Details were also discussed with the hospitalist service.   · Continue with rest of the current treatment plan and surveillance labs.  · Further recommendations will depend on clinical course of the patient during the current hospitalization.    · I also discussed the details with the nursing staff.  · Rest as ordered.    Clay Schilling MD, POLA  02/17/21  07:37 EST    Dictated utilizing Dragon dictation.

## 2021-02-17 NOTE — PROGRESS NOTES
[facility]    Name:  Carolyne Martins   Age:  80 y.o.  Sex:  female  :  1940  MRN:  [id number]   Visit Number:  [id number]  Admission Date:  2021  Date Of Service:  21  Primary Care Physician:  Jerald Dawkins MD     LOS: 4 days :  Patient Care Team:  Jerald Dawkins MD as PCP - General  Daryn Spears MD as Consulting Physician (General Surgery):    Chief Complaint:      Follow-up on hip fracture    Subjective / Interval History:     Patient seen and evaluated this morning.  No acute changes overnight.  Still not getting much with physical therapy.  Notes little pain when at rest, some with movement.  Somewhat constipated.  Discussed another blood transfusion today.    Prior work-up:  80-year-old female who unfortunately sustained a mechanical fall at home outside while trying to walk her dog.  She had evidence of a left hip comminuted displaced and unstable intertrochanteric fracture with subsequent ORIF on 2021.    Review of Systems:     General ROS: Patient denies any fevers, chills or loss of consciousness.  Respiratory ROS: Denies cough or shortness of breath.  Cardiovascular ROS: Denies chest pain or palpitations. No history of exertional chest pain.  Gastrointestinal ROS: Denies nausea and vomiting. Denies any abdominal pain. No diarrhea.  Neurological ROS: Denies any focal weakness. No loss of consciousness. Denies any numbness.  Dermatological ROS: Denies any redness or pruritis.    Vital Signs:    Temp:  [97.5 °F (36.4 °C)-98.7 °F (37.1 °C)] 98 °F (36.7 °C)  Heart Rate:  [75-98] 92  Resp:  [18] 18  BP: ()/(53-71) 125/71    Intake and output:    I/O last 3 completed shifts:  In: 900 [P.O.:600; Blood:300]  Out: 1200 [Urine:1200]  I/O this shift:  In: 420 [P.O.:120; Blood:300]  Out: -     Physical Examination:    General Appearance:  Alert and cooperative, not in any acute distress.  Elderly-appearing female.   Head:  Atraumatic  and normocephalic, without obvious abnormality.   Eyes:          PERRLA, conjunctivae and sclerae normal, no Icterus. No pallor. Extraocular movements are within normal limits.   Neck: Supple, trachea midline, no thyromegaly.   Lungs:   Breath sounds heard bilaterally equally.  No crackles or wheezing. No Pleural rub or bronchial breathing.   Heart:  Normal S1 and S2, no murmur, no gallop, no rub. No JVD   Abdomen:   Normal bowel sounds, no masses, no organomegaly. Soft, non-tender, non-distended, no guarding, no rebound tenderness.   Extremities:  Movement of the left lower extremity is normal.  There is edema that is improving pulses are intact distally.  Bandage appears clean.   Skin: No bleeding, bruising or rash.   Neurologic: Awake, alert and oriented times 3. Moves all 4 extremities equally.     Laboratory results:    Results from last 7 days   Lab Units 02/17/21  0525 02/16/21  0533 02/15/21  0537  02/13/21  1215   SODIUM mmol/L 132* 131* 136   < > 136   POTASSIUM mmol/L 4.5 4.5 5.1   < > 4.5   CHLORIDE mmol/L 103 104 107   < > 102   CO2 mmol/L 21.9* 19.8* 21.4*   < > 25.6   BUN mg/dL 59* 56* 56*   < > 52*   CREATININE mg/dL 3.35* 3.25* 3.33*   < > 2.93*   CALCIUM mg/dL 8.1* 8.0* 7.9*   < > 9.5   BILIRUBIN mg/dL 0.3  --  0.3  --  0.4   ALK PHOS U/L 52  --  45  --  58   ALT (SGPT) U/L <5  --  5  --  9   AST (SGOT) U/L 16  --  17  --  16   GLUCOSE mg/dL 111* 120* 95   < > 115*    < > = values in this interval not displayed.     Results from last 7 days   Lab Units 02/17/21  0525 02/16/21  0532 02/15/21  0537   WBC 10*3/mm3 5.69 6.12 5.80   HEMOGLOBIN g/dL 7.8* 7.4* 8.3*   HEMATOCRIT % 24.3* 23.7* 26.6*   PLATELETS 10*3/mm3 150 135* 136*         Results from last 7 days   Lab Units 02/13/21  1215   TROPONIN T ng/mL <0.010               I have reviewed the patient's laboratory results.    Radiology results:    Imaging Results (Last 24 Hours)     ** No results found for the last 24 hours. **          I have  reviewed the patient's radiology reports.    Medication Review:     I have reviewed the patient's active and prn medications.       Closed fracture of left hip (CMS/[facility])      Assessment:    Left hip fracture secondary to mechanical fall, POA  CKD stage IV  Hypertension  Hyperlipidemia  Osteoarthritis  Osteoporosis  Age-related chronic debility    Plan:    Patient overall hemodynamically stable.  1 more unit of blood today per Dr. Schilling.  He did note patient typically does not do well with Procrit, remains anemic at baseline.  We will continue with physical therapy evaluations.  Pain control.  It seems.  [facility] and rehab has accepted the patient.  We will give blood transfusion today, physical therapy, have placement Covid testing performed.  Anticipate discharge tomorrow.    Kyung Ingram DO  02/17/21  12:13 EST    Dictated utilizing Dragon dictation.

## 2021-02-17 NOTE — PLAN OF CARE
Goal Outcome Evaluation:  Plan of Care Reviewed With: patient  Progress: no change  Outcome Summary: vital signs stable no acute events overnight prn pain medication given. will continue to monitor

## 2021-02-17 NOTE — PLAN OF CARE
Goal Outcome Evaluation:  Plan of Care Reviewed With: patient  Progress: improving  Outcome Summary: PT tx completed. Patient requires mod A for sup to sit, sit to stand, transfer to bedside commode and to ambulate 4' with RW. Very impulsive with mobility d/t perceived pain despite verbal and tacile cues. Patient performs LE ther ex as indicated on care map. Cont to goals.

## 2021-02-17 NOTE — PLAN OF CARE
Goal Outcome Evaluation:      Spoke with pt as she sat in a bedside recliner.  I provided support as pt talked about going to [facility] for rehabilitation following discharge, and her mother's experience with home health.  Pt seems to agree that having assistance as she recovers is a good decision.  I will continue to follow.

## 2021-02-17 NOTE — PROGRESS NOTES
Continued Stay Note  CLEVE Gayle     Patient Name: Carolyne Martins  MRN: [id number]  Today's Date: 2/17/2021    Admit Date: 2/13/2021    Discharge Plan     Row Name 02/17/21 0937       Plan    Plan  [facility] has accpt[facility] pt for rehab Will need a COVID test No more than 72 prior to DC   Spoke to pt regarding plan she is accepting of this will need EMS  She will notify her son  Explained and gave her copy of [facility]         Discharge Codes    No documentation.             Kira Sosa RN

## 2021-02-18 VITALS
HEIGHT: 59 IN | DIASTOLIC BLOOD PRESSURE: 68 MMHG | BODY MASS INDEX: 21.82 KG/M2 | OXYGEN SATURATION: 96 % | RESPIRATION RATE: 16 BRPM | TEMPERATURE: 99 F | HEART RATE: 84 BPM | WEIGHT: 108.25 LBS | SYSTOLIC BLOOD PRESSURE: 130 MMHG

## 2021-02-18 LAB
BH BB BLOOD EXPIRATION DATE: NORMAL
BH BB BLOOD TYPE BARCODE: 5100
BH BB DISPENSE STATUS: NORMAL
BH BB PRODUCT CODE: NORMAL
BH BB UNIT NUMBER: NORMAL
CROSSMATCH INTERPRETATION: NORMAL
HCT VFR BLD AUTO: 26.6 % (ref 34–46.6)
HGB BLD-MCNC: 8.6 G/DL (ref 12–15.9)
UNIT  ABO: NORMAL
UNIT  RH: NORMAL

## 2021-02-18 PROCEDURE — 99239 HOSP IP/OBS DSCHRG MGMT >30: CPT | Performed by: FAMILY MEDICINE

## 2021-02-18 PROCEDURE — 25010000002 ENOXAPARIN PER 10 MG: Performed by: ORTHOPAEDIC SURGERY

## 2021-02-18 PROCEDURE — 85018 HEMOGLOBIN: CPT | Performed by: FAMILY MEDICINE

## 2021-02-18 PROCEDURE — 94799 UNLISTED PULMONARY SVC/PX: CPT

## 2021-02-18 PROCEDURE — 85014 HEMATOCRIT: CPT | Performed by: FAMILY MEDICINE

## 2021-02-18 RX ORDER — HYDROCODONE BITARTRATE AND ACETAMINOPHEN 5; 325 MG/1; MG/1
1 TABLET ORAL EVERY 4 HOURS PRN
Qty: 15 TABLET | Refills: 0 | Status: SHIPPED | OUTPATIENT
Start: 2021-02-18 | End: 2021-02-23

## 2021-02-18 RX ADMIN — BUDESONIDE AND FORMOTEROL FUMARATE DIHYDRATE 2 PUFF: 80; 4.5 AEROSOL RESPIRATORY (INHALATION) at 09:01

## 2021-02-18 RX ADMIN — PANTOPRAZOLE SODIUM 40 MG: 40 TABLET, DELAYED RELEASE ORAL at 06:46

## 2021-02-18 RX ADMIN — CALCITRIOL CAPSULES 0.25 MCG 0.5 MCG: 0.25 CAPSULE ORAL at 09:00

## 2021-02-18 RX ADMIN — HYDROCODONE BITARTRATE AND ACETAMINOPHEN 1 TABLET: 5; 325 TABLET ORAL at 06:46

## 2021-02-18 RX ADMIN — CYANOCOBALAMIN TAB 500 MCG 500 MCG: 500 TAB at 09:00

## 2021-02-18 RX ADMIN — DOCUSATE SODIUM 100 MG: 100 CAPSULE ORAL at 09:00

## 2021-02-18 RX ADMIN — CARVEDILOL 25 MG: 25 TABLET, FILM COATED ORAL at 09:00

## 2021-02-18 RX ADMIN — MULTIPLE VITAMINS W/ MINERALS TAB 1 TABLET: TAB at 09:00

## 2021-02-18 RX ADMIN — POLYETHYLENE GLYCOL 3350 17 G: 17 POWDER, FOR SOLUTION ORAL at 09:00

## 2021-02-18 RX ADMIN — ENOXAPARIN SODIUM 30 MG: 30 INJECTION SUBCUTANEOUS at 08:59

## 2021-02-18 RX ADMIN — SODIUM CHLORIDE, PRESERVATIVE FREE 3 ML: 5 INJECTION INTRAVENOUS at 09:01

## 2021-02-18 RX ADMIN — SODIUM CHLORIDE, PRESERVATIVE FREE 10 ML: 5 INJECTION INTRAVENOUS at 09:01

## 2021-02-18 NOTE — PROGRESS NOTES
"Nephrology Progress Note.    LOS: 5 days    Patient Care Team:  Jerald Dawkins MD as PCP - General  Daryn Spears MD as Consulting Physician (General Surgery)    Chief Complaint:    Chief Complaint   Patient presents with   • Fall   • Hip Injury       Subjective:   Follow up for JASMEET and Chronic Kidney disease stage 4  Interval History:   Patient Complaints: none  Patient seen and examined this morning.  Events from last 24 hours noted.  Patient denies having any fevers chills.  No nausea or vomiting no abdominal pain.  Denies any chest pain, shortness of breath or cough and sputum production.  There is no significant edema.   Patient also denies having new onset weakness of numbness of either extremity.  She is still complaining of pain, it is bearable.  Likely transfer to rehab today.  History taken from: patient    Objective:    Vital Signs  /59 (BP Location: Left arm, Patient Position: Sitting)   Pulse 87   Temp 99 °F (37.2 °C) (Oral)   Resp 16   Ht 149.9 cm (59\")   Wt 49.1 kg (108 lb 3.9 oz)   LMP  (LMP Unknown)   SpO2 96%   BMI 21.86 kg/m²     I/O this shift:  In: 120 [P.O.:120]  Out: -     Intake/Output Summary (Last 24 hours) at 2/18/2021 0908  Last data filed at 2/18/2021 0745  Gross per 24 hour   Intake 1320 ml   Output 700 ml   Net 620 ml       Physical Exam:  General Appearance: alert, oriented x 3, no acute distress,   HEENT: Oral mucosa dry, extra occular movements intact. Sclera clear.  Skin: Warm and dry  Neck: supple, no JVD, trachea midline  Lungs:Chest shape is normal. Breath sounds heard bilaterally equally. No crackles, No wheezing.   Heart: regular rate and rhythm. normal S1 and S2, no S3, no rub, 2/6 systolic ejection murmur.  Peripheral pulses weak but palpable.  Abdomen: soft, non-tender,  present bowel sounds to auscultation  : no palpable bladder.  Extremities: Trace edema, no cyanosis or clubbing.   Neuro: normal speech and mental status, grossly non focal.     Results " Review:   Results from last 7 days   Lab Units 02/17/21  0525 02/16/21  0533 02/15/21  0537  02/13/21  1215   SODIUM mmol/L 132* 131* 136   < > 136   POTASSIUM mmol/L 4.5 4.5 5.1   < > 4.5   CHLORIDE mmol/L 103 104 107   < > 102   CO2 mmol/L 21.9* 19.8* 21.4*   < > 25.6   BUN mg/dL 59* 56* 56*   < > 52*   CREATININE mg/dL 3.35* 3.25* 3.33*   < > 2.93*   CALCIUM mg/dL 8.1* 8.0* 7.9*   < > 9.5   ALBUMIN g/dL 2.70*  --  2.70*  --  3.40*   BILIRUBIN mg/dL 0.3  --  0.3  --  0.4   ALK PHOS U/L 52  --  45  --  58   ALT (SGPT) U/L <5  --  5  --  9   AST (SGOT) U/L 16  --  17  --  16   GLUCOSE mg/dL 111* 120* 95   < > 115*    < > = values in this interval not displayed.     Estimated Creatinine Clearance: 10.7 mL/min (A) (by C-G formula based on SCr of 3.35 mg/dL (H)).  Results from last 7 days   Lab Units 02/15/21  0537   MAGNESIUM mg/dL 1.7   PHOSPHORUS mg/dL 4.6*     Results from last 7 days   Lab Units 02/15/21  0537   URIC ACID mg/dL 5.8*     Results from last 7 days   Lab Units 02/18/21  0557 02/17/21  0525 02/16/21  0532 02/15/21  0537 02/14/21  0604 02/13/21  1215   WBC 10*3/mm3  --  5.69 6.12 5.80 5.47 7.60   HEMOGLOBIN g/dL 8.6* 7.8* 7.4* 8.3* 7.6* 9.2*   PLATELETS 10*3/mm3  --  150 135* 136* 176 202         Brief Urine Lab Results  (Last result in the past 365 days)      Color   Clarity   Blood   Leuk Est   Nitrite   Protein   CREAT   Urine HCG        02/14/21 1541             81.1           No results found for: UTPCR  Imaging Results (Last 24 Hours)     ** No results found for the last 24 hours. **        budesonide-formoterol, 2 puff, Inhalation, BID - RT  calcitriol, 0.5 mcg, Oral, Daily  carvedilol, 25 mg, Oral, BID With Meals  docusate sodium, 100 mg, Oral, BID  enoxaparin, 30 mg, Subcutaneous, Daily  erythromycin, , Left Eye, Nightly  multivitamin with minerals, 1 tablet, Oral, Daily  pantoprazole, 40 mg, Oral, QAM  polyethylene glycol, 17 g, Oral, Daily  rosuvastatin, 10 mg, Oral, Nightly  sodium chloride,  10 mL, Intravenous, Q12H  sodium chloride, 3 mL, Intravenous, Q12H  vitamin B-12, 500 mcg, Oral, BID             Medication Review:   Current Facility-Administered Medications   Medication Dose Route Frequency Provider Last Rate Last Admin   • budesonide-formoterol (SYMBICORT) 80-4.5 MCG/ACT inhaler 2 puff  2 puff Inhalation BID - RT Gabriele Loyd MD   2 puff at 02/18/21 0901   • calcitriol (ROCALTROL) capsule 0.5 mcg  0.5 mcg Oral Daily Clay Schilling MD, FASN   0.5 mcg at 02/18/21 0900   • calcium carbonate (TUMS) chewable tablet 500 mg (200 mg elemental)  2 tablet Oral BID PRN Gabriele Loyd MD   2 tablet at 02/15/21 1843   • carvedilol (COREG) tablet 25 mg  25 mg Oral BID With Meals Gabriele Loyd MD   25 mg at 02/18/21 0900   • cyclobenzaprine (FLEXERIL) tablet 5 mg  5 mg Oral TID PRN Kyung Ingram DO       • docusate sodium (COLACE) capsule 100 mg  100 mg Oral BID Kyung Ingram DO   100 mg at 02/18/21 0900   • enoxaparin (LOVENOX) syringe 30 mg  30 mg Subcutaneous Daily Gabriele Loyd MD   30 mg at 02/18/21 0859   • erythromycin (ROMYCIN) ophthalmic ointment   Left Eye Nightly Gabriele Loyd MD   1 application at 02/17/21 2108   • HYDROcodone-acetaminophen (NORCO) 5-325 MG per tablet 1 tablet  1 tablet Oral Q4H PRN Gabriele Loyd MD   1 tablet at 02/18/21 0646   • melatonin tablet 5 mg  5 mg Oral Nightly PRN Gabriele Loyd MD   5 mg at 02/17/21 2029   • Morphine sulfate (PF) injection 1 mg  1 mg Intravenous Q2H PRN Kyung Ingram DO        And   • naloxone (NARCAN) injection 0.4 mg  0.4 mg Intravenous Q5 Min PRN Kyung Ingram DO       • multivitamin with minerals 1 tablet  1 tablet Oral Daily Clay Schilling MD, FASN   1 tablet at 02/18/21 0900   • ondansetron (ZOFRAN) tablet 4 mg  4 mg Oral Q6H PRN Gabriele Loyd MD        Or   • ondansetron (ZOFRAN) injection 4 mg  4 mg Intravenous Q6H PRN Gabriele Loyd MD       •  pantoprazole (PROTONIX) EC tablet 40 mg  40 mg Oral QAM Gabriele Loyd MD   40 mg at 02/18/21 0646   • polyethylene glycol (MIRALAX) packet 17 g  17 g Oral Daily Kyung Ingram DO   17 g at 02/18/21 0900   • rosuvastatin (CRESTOR) tablet 10 mg  10 mg Oral Nightly Gabriele Loyd MD   10 mg at 02/17/21 2029   • sodium chloride 0.9 % flush 1-10 mL  1-10 mL Intravenous PRN Gabriele Loyd MD       • sodium chloride 0.9 % flush 10 mL  10 mL Intravenous Q12H Gabriele Loyd MD   10 mL at 02/18/21 0901   • sodium chloride 0.9 % flush 3 mL  3 mL Intravenous Q12H Gabriele Loyd MD   3 mL at 02/18/21 0901   • vitamin B-12 (CYANOCOBALAMIN) tablet 500 mcg  500 mcg Oral BID Clay Schilling MD, FASN   500 mcg at 02/18/21 0900       Assessment/Plan:  1. Acute kidney injury   2. Chronic kidney disease stage IV: baseline creatinine 2.51-2.66  3. Anemia of CKD: patient on outpatient [facility]  4. Hypertension with CKD  5. Secondary hyperparathyroidism: patient on calcitriol  6. Vitamin D Deficiency  7. Closed fracture of left hip (CMS/HCC): followed by Dr. Loyd  8. GERD  9. Gout  10. Vitamin B12 Deficiency  11. COPD  12. Dyslipidemia  13. Osteoarthritis  14. Osteoporosis     Plan:  · And hemoglobin is stable, she received 2 units of blood this admission and also got a dose of Procrit.  She will be due for another dose in 2 weeks, she is scheduled at the outpatient clinic.  · Physical therapy evaluation is in progress, she has agreed to go to [facility] rehab.  Still awaiting bed availability and transfer whenever bed becomes available.  Continue all medications except Avapro at the time of discharge.  · Details were discussed with the patient no family in the room.    · Details were also discussed with the hospitalist service.  I have told the patient to call us on the day she is being discharged from the rehab to make a follow-up appointment.  · Continue with rest of the current treatment plan and  surveillance labs.  · Further recommendations will depend on clinical course of the patient during the current hospitalization.    · I also discussed the details with the nursing staff.  · Rest as ordered.    Clay Schilling MD, POLA  02/18/21  09:08 EST    Dictated utilizing Dragon dictation.

## 2021-02-18 NOTE — PROGRESS NOTES
Case Management Discharge Note      Final Note: Discharged home         Selected Continued Care - Admitted Since 2/13/2021     Destination Coordination complete    Service Provider Selected Services Address Phone Fax Patient Preferred    [facility]  Skilled Nursing [address] [address] [phone] --          Durable Medical Equipment    No services have been selected for the patient.              Dialysis/Infusion    No services have been selected for the patient.              Home Medical Care    No services have been selected for the patient.              Therapy    No services have been selected for the patient.              Community Resources    No services have been selected for the patient.                  Transportation Services  Ambulance: [facility]    Final Discharge Disposition Code: 03 - skilled nursing facility (SNF)

## 2021-02-18 NOTE — DISCHARGE SUMMARY
[facility]      Name:  Carolyne Martins   Age:  80 y.o.  Sex:  female  :  1940  MRN:  [id number]   Visit Number:  [id number]    Admission Date:  2021  Date of Discharge:  2021  Primary Care Physician:  Jerald Dawkins MD    Important issues to note:    Patient will be discharged to rehab facility.  Will need follow-up with nephrology.  Follow-up with orthopedics in 2 weeks.  Can likely resume irbesartan in the next 2 to 3 days.    Discharge Diagnoses:     Left hip fracture secondary to mechanical fall, POA  CKD stage IV  Hypertension  Hyperlipidemia  Osteoarthritis  Osteoporosis  Age-related chronic debility      Closed fracture of left hip (CMS/[facility]ly [facility])      Presenting Problem:    Closed fracture of left hip, initial encounter (CMS/Formerly [facility]) [S72.002A]     Consults:     Consults     Date and Time Order Name Status Description    2021 1437 Inpatient Consult to Hospitalist      2021 1013 Inpatient Nephrology Consult Completed     2021 1538 Inpatient Orthopedic Surgery Consult          Consulting Physician(s)     Provider Relationship Specialty    Clay Schilling MD, POLA Consulting Physician Nephrology    Gabriele Loyd MD Consulting Physician Orthopedic Surgery    Stepan Peters DO Consulting Physician Internal Medicine          Procedures Performed:    Procedure(s):  HIP TROCHANTER NAIL SHORT WITH INTRAMEDULLARY HIP SCREW, LEFT       History of presenting illness:    No acute changes overnight.  Blood counts improved.  Advised patient she will need to work with physical therapy moving forward.  Agreeable to rehab.  Follow-up with Tonie as directed.    Hospital Course:    80-year-old female with history of chronic kidney disease stage IV, hypertension, who had sustained a mechanical fall at home and had evidence of a left displaced and unstable intertrochanteric fracture.  Orthopedics and nephrology consulted on presentation.   She underwent open reduction internal fixation on 2/14/2021.  Has been seen and evaluated by physical therapy services.  Recommendation for short-term rehab.  Kidney function is overall been stable and managed with Dr. Schilling's recommendations.  She does have chronic anemia, receives Procrit injections.  Was given 2 units of packed red blood cells while in the hospital with hemoglobin greater than 8 today.  No evidence thus far of any postop complications.    Patient will need short-term rehab, has been arranged by case management.  Will need to be on Eliquis 2.5 twice a day for a week.  Continue with physical therapy upon discharge to rehab facility.  Recommend close follow-up with nephrology in 1 week.  Recommend following up with orthopedics in 2 weeks.    Vital Signs:    Temp:  [97.6 °F (36.4 °C)-99 °F (37.2 °C)] 99 °F (37.2 °C)  Heart Rate:  [82-98] 87  Resp:  [14-18] 16  BP: (118-133)/(59-83) 124/59    Physical Exam:    General Appearance:  Alert and cooperative, not in any acute distress.   Head:  Atraumatic and normocephalic, without obvious abnormality.   Eyes:          PERRLA, conjunctivae and sclerae normal, no icterus. No pallor. Extraocular movements are within normal limits.   Ears:  Ears appear intact with no abnormalities noted.   Throat: No oral lesions, no thrush, oral mucosa moist.   Neck: Supple, trachea midline, no thyromegaly, no carotid bruit.   Back:   No kyphoscoliosis present. No tenderness to palpation,   range of motion normal.   Lungs:   Chest shape is normal. Breath sounds heard bilaterally equally.  No crackles or wheezing. No Pleural rub or bronchial breathing.   Heart:  Normal S1 and S2, no murmur, no gallop, no rub. No JVD.   Abdomen:   Normal bowel sounds, no masses, no organomegaly. Soft, nontender, nondistended, no guarding, no rebound tenderness.   Extremities:  Left lower extremity with mild edema, range of motion intact.  Somewhat limited secondary to pain.  No cyanosis, no  clubbing.   Pulses: Pulses palpable and equal bilaterally.   Skin: No bleeding, bruising or rash.   Neurologic: Alert and oriented x 3. Moves all four limbs equally. No tremors. No facial asymmetry.     Pertinent Lab Results:     Results from last 7 days   Lab Units 02/17/21  0525 02/16/21  0533 02/15/21  0537  02/13/21  1215   SODIUM mmol/L 132* 131* 136   < > 136   POTASSIUM mmol/L 4.5 4.5 5.1   < > 4.5   CHLORIDE mmol/L 103 104 107   < > 102   CO2 mmol/L 21.9* 19.8* 21.4*   < > 25.6   BUN mg/dL 59* 56* 56*   < > 52*   CREATININE mg/dL 3.35* 3.25* 3.33*   < > 2.93*   CALCIUM mg/dL 8.1* 8.0* 7.9*   < > 9.5   BILIRUBIN mg/dL 0.3  --  0.3  --  0.4   ALK PHOS U/L 52  --  45  --  58   ALT (SGPT) U/L <5  --  5  --  9   AST (SGOT) U/L 16  --  17  --  16   GLUCOSE mg/dL 111* 120* 95   < > 115*    < > = values in this interval not displayed.     Results from last 7 days   Lab Units 02/18/21  0557 02/17/21  0525 02/16/21  0532 02/15/21  0537   WBC 10*3/mm3  --  5.69 6.12 5.80   HEMOGLOBIN g/dL 8.6* 7.8* 7.4* 8.3*   HEMATOCRIT % 26.6* 24.3* 23.7* 26.6*   PLATELETS 10*3/mm3  --  150 135* 136*         Results from last 7 days   Lab Units 02/13/21  1215   TROPONIN T ng/mL <0.010                     Results from last 7 days   Lab Units 02/13/21  1910   COLOR UA  Yellow   GLUCOSE UA  Negative   KETONES UA  Negative   LEUKOCYTES UA  Negative   PH, URINE  <=5.0   BILIRUBIN UA  Negative   UROBILINOGEN UA  0.2 E.U./dL     Pain Management Panel     Pain Management Panel Latest Ref Rng & Units 2/14/2021 10/15/2019    CREATININE UR mg/dL 81.1 135.4              Pertinent Radiology Results:    Imaging Results (All)     Procedure Component Value Units Date/Time    US Venous Doppler Lower Extremity Left (duplex) [[id number]] Collected: 02/15/21 1046     Updated: 02/15/21 1048    Narrative:      PROCEDURE: US VENOUS DOPPLER LOWER EXTREMITY LEFT (DUPLEX)-     HISTORY: s.p left hip surgery now with left calf pain; S72.002A-Fracture  of  unspecified part of neck of left femur, initial encounter for closed  fracture     PROCEDURE: Multiple transverse and longitudinal scans were performed of  the femoropopliteal deep venous system, with augmentation and  compression maneuvers.     FINDINGS: Normal phasic flow was noted in the visualized deep venous  system. No intraluminal increased echogenicity is noted to suggest  thrombus. There is normal compression and augmentation of the venous  structures.  No abnormal venous collaterals are seen.       Impression:      No evidence of deep venous thrombosis.     This report was finalized on 2/15/2021 10:46 AM by Sergio Bassett M.D..    FL C Arm During Surgery [[id number]] Resulted: 02/14/21 1248     Updated: 02/14/21 1248    Narrative:      This procedure was auto-finalized with no dictation required.    US [facility] OR Procedure [[id number]] Resulted: 02/14/21 1025     Updated: 02/14/21 1025    XR Chest 1 View [[id number]] Collected: 02/13/21 1206     Updated: 02/13/21 1211    Narrative:      PORTABLE CHEST    2/13/2021 11:48 AM      HISTORY: Fall     COMPARISON: None.     FINDINGS: Heart size upper normal. Lungs and pleural spaces are clear.  No acute fracture. Chronic right rotator cuff tear.           Impression:      No acute cardiopulmonary process .              AP PELVIS, 2V LEFT HIP     HISTORY: Fall.      FINDINGS:  A three view exam demonstrates an acute comminuted  intertrochanteric fracture of the proximal left femur with angulation.  Femoral head remains in the acetabulum. Pelvis is otherwise intact.  Severe degenerative change at L5-S1.        IMPRESSION: Acute intertrochanteric fracture of the left hip.     This report was finalized on 2/13/2021 12:09 PM by Dr Chencho Hughes DO.    XR Hip With or Without Pelvis 2 - 3 View Left [[id number]] Collected: 02/13/21 1206     Updated: 02/13/21 1211    Narrative:      PORTABLE CHEST    2/13/2021 11:48 AM      HISTORY: Fall     COMPARISON: None.     FINDINGS:  Heart size upper normal. Lungs and pleural spaces are clear.  No acute fracture. Chronic right rotator cuff tear.           Impression:      No acute cardiopulmonary process .              AP PELVIS, 2V LEFT HIP     HISTORY: Fall.      FINDINGS:  A three view exam demonstrates an acute comminuted  intertrochanteric fracture of the proximal left femur with angulation.  Femoral head remains in the acetabulum. Pelvis is otherwise intact.  Severe degenerative change at L5-S1.        IMPRESSION: Acute intertrochanteric fracture of the left hip.     This report was finalized on 2/13/2021 12:09 PM by Dr Chencho Hughes DO.          Echo:         Condition on Discharge:      Stable.    Code status during the hospital stay:    Code Status and Medical Interventions:   Ordered at: 02/14/21 1437     Code Status:    CPR     Medical Interventions (Level of Support Prior to Arrest):    Full       Discharge Disposition:    Rehab Facility or Unit (DC - External)    Discharge Medications:       Discharge Medications      New Medications      Instructions Start Date   apixaban 2.5 MG tablet tablet  Commonly known as: ELIQUIS   2.5 mg, Oral, Every 12 Hours Scheduled      HYDROcodone-acetaminophen 5-325 MG per tablet  Commonly known as: NORCO   1 tablet, Oral, Every 4 Hours PRN         Changes to Medications      Instructions Start Date   docusate sodium 100 MG capsule  Commonly known as: COLACE  What changed:   · when to take this  · reasons to take this   100 mg, Oral, 2 Times Daily         Continue These Medications      Instructions Start Date   allopurinol 100 MG tablet  Commonly known as: ZYLOPRIM   1 tablet, Oral, Daily      aspirin 81 MG EC tablet   81 mg, Oral, Daily      budesonide-formoterol 80-4.5 MCG/ACT inhaler  Commonly known as: SYMBICORT   2 puffs, Inhalation, Daily      calcitriol 0.25 MCG capsule  Commonly known as: ROCALTROL   0.5 mcg, Oral, Daily      carboxymethylcellulose 0.5 % solution  Commonly known as: REFRESH  PLUS   3 Times Daily PRN      carvedilol 25 MG tablet  Commonly known as: COREG   25 mg, Oral, 2 Times Daily With Meals      cholecalciferol 25 MCG (1000 UT) tablet  Commonly known as: VITAMIN D3   1,000 Units, Oral, Daily      erythromycin 5 MG/GM ophthalmic ointment  Commonly known as: ROMYCIN   Left Eye, Nightly      estradiol 0.1 MG/GM vaginal cream  Commonly known as: ESTRACE   Massage 1 gram in vaginal opening twice weekly      fluticasone 50 MCG/ACT nasal spray  Commonly known as: FLONASE   2 sprays, Nasal, Daily PRN      irbesartan 150 MG tablet  Commonly known as: AVAPRO   150 mg, Oral, Daily      omeprazole 40 MG capsule  Commonly known as: priLOSEC   40 mg, Oral, Daily      ondansetron ODT 4 MG disintegrating tablet  Commonly known as: ZOFRAN-ODT   4 mg, Translingual, Every 8 Hours PRN      PROBIOTIC DAILY PO   1 tablet, Oral, Daily PRN      Procrit 19117 UNIT/ML injection  Generic drug: epoetin ovidio   Inject 40,000 Units under the skin into the appropriate area as directed. Every 2 weeks.  Due Wednesday, Feb 17th      rosuvastatin 10 MG tablet  Commonly known as: CRESTOR   10 mg, Oral, Daily      torsemide 10 MG tablet  Commonly known as: DEMADEX   5 mg, Oral, Takes 5 mg on  Monday, Wednesday, Fridays      VITAMIN B-12 PO   500 mg, Oral, 2 Times Daily             Discharge Diet:     As tolerated, renal    Activity at Discharge:     As tolerated    Follow-up Appointments:    Follow-up Information     Jerald Dawkins MD .    Specialty: Family Medicine  Contact information:  [address] [place] [id number] [phone]                   Future Appointments   Date Time Provider Department Center   3/4/2021  1:10 PM Yaz Woods PA-C [facility] [place] [facility]           Test Results Pending at Discharge:           Kyung Ingram DO  02/18/21  09:15 EST    Time spent: 45 minutes    Dictated utilizing Dragon dictation.

## 2021-02-18 NOTE — PLAN OF CARE
Goal Outcome Evaluation:  Plan of Care Reviewed With: patient  Progress: improving   Report given to [place] at [facility]. VSS. No acute changes to report. Will continue to monitor until EMS arrives.

## 2021-02-18 NOTE — PLAN OF CARE
Problem: Adjustment to Injury (Hip Fracture)  Goal: Optimal Coping with Change in Health Status  Outcome: Ongoing, Progressing   Goal Outcome Evaluation:  Plan of Care Reviewed With: patient     Outcome Summary: PO PAIN MEDICATION  EFFECTIVE.RESTED WELL.

## 2021-02-18 NOTE — DISCHARGE PLACEMENT REQUEST
"    Kira Sosa -[phone] fax [phone]  Carolyne Lopes (80 y.o. Female)     Date of Birth Social Security Number Address Home Phone MRN    1940  [address] [place] [address] [phone] [id number]    [employer] Marital Status          Scientology        Admission Date Admission Type Admitting Provider Attending Provider Department, Room/Bed    2/13/21 Emergency Kyung Ingram DO [place], Shandy Marcus, DO [facility] SURG  3, 320/1    Discharge Date Discharge Disposition Discharge Destination         Rehab Facility or Unit (DC - External)              Attending Provider: Kyung Ingram DO    Allergies: Ferrlecit [Na Ferric Gluc Cplx In Sucrose], Ranitidine Hcl, Levofloxacin, Lisinopril    Isolation: None   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)   Code Status: CPR    Ht: 149.9 cm (59\")   Wt: 49.1 kg (108 lb 3.9 oz)    Admission Cmt: None   Principal Problem: None                Active Insurance as of 2/13/2021     Primary Coverage     Payor Plan Insurance Group Employer/Plan Group    MEDICARE MEDICARE A & B      Payor Plan Address Payor Plan Phone Number Payor Plan Fax Number Effective Dates    [address] [phone]  4/1/2007 - None Entered    [facility] [address]       Subscriber Name Subscriber Birth Date Member ID       CAROLYNE LOPES 1940 [id number]           Secondary Coverage     Payor Plan Insurance Group Employer/Plan Group    [facility] SUPP [id number]     Payor Plan Address Payor Plan Phone Number Payor Plan Fax Number Effective Dates    [address]   12/1/2016 - None Entered    [facility] [id number]       Subscriber Name Subscriber Birth Date Member ID       CAROLYNE LOPES 1940 [id number]                 Emergency Contacts      (Rel.) Home Phone Work Phone Mobile Phone    Magno Lopes (Son) [phone] -- [phone]    Linda Banda (Friend) [phone] -- --             "   Discharge Summary      Kyung Ingram DO at 21 0915              [facility]   DISCHARGE SUMMARY      Name:  Carolyne Martins   Age:  80 y.o.  Sex:  female  :  1940  MRN:  [id number]   Visit Number:  [id number]    Admission Date:  2021  Date of Discharge:  2021  Primary Care Physician:  Jerald Dawkins MD    Important issues to note:    Patient will be discharged to rehab facility.  Will need follow-up with nephrology.  Follow-up with orthopedics in 2 weeks.  Can likely resume irbesartan in the next 2 to 3 days.    Discharge Diagnoses:     Left hip fracture secondary to mechanical fall, POA  CKD stage IV  Hypertension  Hyperlipidemia  Osteoarthritis  Osteoporosis  Age-related chronic debility      Closed fracture of left hip (CMS/[facility])      Presenting Problem:    Closed fracture of left hip, initial encounter (CMS/[facility]) [S72.002A]     Consults:     Consults     Date and Time Order Name Status Description    2021 1437 Inpatient Consult to Hospitalist      2021 1013 Inpatient Nephrology Consult Completed     2021 1538 Inpatient Orthopedic Surgery Consult          Consulting Physician(s)     Provider Relationship Specialty    Clay Schilling MD, POLA Consulting Physician Nephrology    Gabriele Loyd MD Consulting Physician Orthopedic Surgery    Stepan Peters DO Consulting Physician Internal Medicine          Procedures Performed:    Procedure(s):  HIP TROCHANTER NAIL SHORT WITH INTRAMEDULLARY HIP SCREW, LEFT       History of presenting illness:    No acute changes overnight.  Blood counts improved.  Advised patient she will need to work with physical therapy moving forward.  Agreeable to rehab.  Follow-up with Tonie as directed.    Hospital Course:    80-year-old female with history of chronic kidney disease stage IV, hypertension, who had sustained a mechanical fall at home and had evidence of a left displaced and unstable  intertrochanteric fracture.  Orthopedics and nephrology consulted on presentation.  She underwent open reduction internal fixation on 2/14/2021.  Has been seen and evaluated by physical therapy services.  Recommendation for short-term rehab.  Kidney function is overall been stable and managed with Dr. Schilling's recommendations.  She does have chronic anemia, receives Procrit injections.  Was given 2 units of packed red blood cells while in the hospital with hemoglobin greater than 8 today.  No evidence thus far of any postop complications.    Patient will need short-term rehab, has been arranged by case management.  Will need to be on Eliquis 2.5 twice a day for a week.  Continue with physical therapy upon discharge to rehab facility.  Recommend close follow-up with nephrology in 1 week.  Recommend following up with orthopedics in 2 weeks.    Vital Signs:    Temp:  [97.6 °F (36.4 °C)-99 °F (37.2 °C)] 99 °F (37.2 °C)  Heart Rate:  [82-98] 87  Resp:  [14-18] 16  BP: (118-133)/(59-83) 124/59    Physical Exam:    General Appearance:  Alert and cooperative, not in any acute distress.   Head:  Atraumatic and normocephalic, without obvious abnormality.   Eyes:          PERRLA, conjunctivae and sclerae normal, no icterus. No pallor. Extraocular movements are within normal limits.   Ears:  Ears appear intact with no abnormalities noted.   Throat: No oral lesions, no thrush, oral mucosa moist.   Neck: Supple, trachea midline, no thyromegaly, no carotid bruit.   Back:   No kyphoscoliosis present. No tenderness to palpation,   range of motion normal.   Lungs:   Chest shape is normal. Breath sounds heard bilaterally equally.  No crackles or wheezing. No Pleural rub or bronchial breathing.   Heart:  Normal S1 and S2, no murmur, no gallop, no rub. No JVD.   Abdomen:   Normal bowel sounds, no masses, no organomegaly. Soft, nontender, nondistended, no guarding, no rebound tenderness.   Extremities:  Left lower extremity with mild  edema, range of motion intact.  Somewhat limited secondary to pain.  No cyanosis, no clubbing.   Pulses: Pulses palpable and equal bilaterally.   Skin: No bleeding, bruising or rash.   Neurologic: Alert and oriented x 3. Moves all four limbs equally. No tremors. No facial asymmetry.     Pertinent Lab Results:     Results from last 7 days   Lab Units 02/17/21  0525 02/16/21  0533 02/15/21  0537  02/13/21  1215   SODIUM mmol/L 132* 131* 136   < > 136   POTASSIUM mmol/L 4.5 4.5 5.1   < > 4.5   CHLORIDE mmol/L 103 104 107   < > 102   CO2 mmol/L 21.9* 19.8* 21.4*   < > 25.6   BUN mg/dL 59* 56* 56*   < > 52*   CREATININE mg/dL 3.35* 3.25* 3.33*   < > 2.93*   CALCIUM mg/dL 8.1* 8.0* 7.9*   < > 9.5   BILIRUBIN mg/dL 0.3  --  0.3  --  0.4   ALK PHOS U/L 52  --  45  --  58   ALT (SGPT) U/L <5  --  5  --  9   AST (SGOT) U/L 16  --  17  --  16   GLUCOSE mg/dL 111* 120* 95   < > 115*    < > = values in this interval not displayed.     Results from last 7 days   Lab Units 02/18/21  0557 02/17/21  0525 02/16/21  0532 02/15/21  0537   WBC 10*3/mm3  --  5.69 6.12 5.80   HEMOGLOBIN g/dL 8.6* 7.8* 7.4* 8.3*   HEMATOCRIT % 26.6* 24.3* 23.7* 26.6*   PLATELETS 10*3/mm3  --  150 135* 136*         Results from last 7 days   Lab Units 02/13/21  1215   TROPONIN T ng/mL <0.010                     Results from last 7 days   Lab Units 02/13/21  1910   COLOR UA  Yellow   GLUCOSE UA  Negative   KETONES UA  Negative   LEUKOCYTES UA  Negative   PH, URINE  <=5.0   BILIRUBIN UA  Negative   UROBILINOGEN UA  0.2 E.U./dL     Pain Management Panel     Pain Management Panel Latest Ref Rng & Units 2/14/2021 10/15/2019    CREATININE UR mg/dL 81.1 135.4              Pertinent Radiology Results:    Imaging Results (All)     Procedure Component Value Units Date/Time    US Venous Doppler Lower Extremity Left (duplex) [[id number]] Collected: 02/15/21 1046     Updated: 02/15/21 1048    Narrative:      PROCEDURE: US VENOUS DOPPLER LOWER EXTREMITY LEFT (DUPLEX)-      HISTORY: s.p left hip surgery now with left calf pain; S72.002A-Fracture  of unspecified part of neck of left femur, initial encounter for closed  fracture     PROCEDURE: Multiple transverse and longitudinal scans were performed of  the femoropopliteal deep venous system, with augmentation and  compression maneuvers.     FINDINGS: Normal phasic flow was noted in the visualized deep venous  system. No intraluminal increased echogenicity is noted to suggest  thrombus. There is normal compression and augmentation of the venous  structures.  No abnormal venous collaterals are seen.       Impression:      No evidence of deep venous thrombosis.     This report was finalized on 2/15/2021 10:46 AM by Sergio Bassett M.D..    FL C Arm During Surgery [[id number]] Resulted: 02/14/21 1248     Updated: 02/14/21 1248    Narrative:      This procedure was auto-finalized with no dictation required.     [facility] OR Procedure [[id number]] Resulted: 02/14/21 1025     Updated: 02/14/21 1025    XR Chest 1 View [[id number]] Collected: 02/13/21 1206     Updated: 02/13/21 1211    Narrative:      PORTABLE CHEST    2/13/2021 11:48 AM      HISTORY: Fall     COMPARISON: None.     FINDINGS: Heart size upper normal. Lungs and pleural spaces are clear.  No acute fracture. Chronic right rotator cuff tear.           Impression:      No acute cardiopulmonary process .              AP PELVIS, 2V LEFT HIP     HISTORY: Fall.      FINDINGS:  A three view exam demonstrates an acute comminuted  intertrochanteric fracture of the proximal left femur with angulation.  Femoral head remains in the acetabulum. Pelvis is otherwise intact.  Severe degenerative change at L5-S1.        IMPRESSION: Acute intertrochanteric fracture of the left hip.     This report was finalized on 2/13/2021 12:09 PM by Dr Chencho Hughes DO.    XR Hip With or Without Pelvis 2 - 3 View Left [[id number]] Collected: 02/13/21 1206     Updated: 02/13/21 1211    Narrative:      PORTABLE  CHEST    2/13/2021 11:48 AM      HISTORY: Fall     COMPARISON: None.     FINDINGS: Heart size upper normal. Lungs and pleural spaces are clear.  No acute fracture. Chronic right rotator cuff tear.           Impression:      No acute cardiopulmonary process .              AP PELVIS, 2V LEFT HIP     HISTORY: Fall.      FINDINGS:  A three view exam demonstrates an acute comminuted  intertrochanteric fracture of the proximal left femur with angulation.  Femoral head remains in the acetabulum. Pelvis is otherwise intact.  Severe degenerative change at L5-S1.        IMPRESSION: Acute intertrochanteric fracture of the left hip.     This report was finalized on 2/13/2021 12:09 PM by Dr Chencho Hughes DO.          Echo:         Condition on Discharge:      Stable.    Code status during the hospital stay:    Code Status and Medical Interventions:   Ordered at: 02/14/21 1437     Code Status:    CPR     Medical Interventions (Level of Support Prior to Arrest):    Full       Discharge Disposition:    Rehab Facility or Unit (DC - External)    Discharge Medications:       Discharge Medications      New Medications      Instructions Start Date   apixaban 2.5 MG tablet tablet  Commonly known as: ELIQUIS   2.5 mg, Oral, Every 12 Hours Scheduled      HYDROcodone-acetaminophen 5-325 MG per tablet  Commonly known as: NORCO   1 tablet, Oral, Every 4 Hours PRN         Changes to Medications      Instructions Start Date   docusate sodium 100 MG capsule  Commonly known as: COLACE  What changed:   · when to take this  · reasons to take this   100 mg, Oral, 2 Times Daily         Continue These Medications      Instructions Start Date   allopurinol 100 MG tablet  Commonly known as: ZYLOPRIM   1 tablet, Oral, Daily      aspirin 81 MG EC tablet   81 mg, Oral, Daily      budesonide-formoterol 80-4.5 MCG/ACT inhaler  Commonly known as: SYMBICORT   2 puffs, Inhalation, Daily      calcitriol 0.25 MCG capsule  Commonly known as: ROCALTROL   0.5 mcg,  Oral, Daily      carboxymethylcellulose 0.5 % solution  Commonly known as: REFRESH PLUS   3 Times Daily PRN      carvedilol 25 MG tablet  Commonly known as: COREG   25 mg, Oral, 2 Times Daily With Meals      cholecalciferol 25 MCG (1000 UT) tablet  Commonly known as: VITAMIN D3   1,000 Units, Oral, Daily      erythromycin 5 MG/GM ophthalmic ointment  Commonly known as: ROMYCIN   Left Eye, Nightly      estradiol 0.1 MG/GM vaginal cream  Commonly known as: ESTRACE   Massage 1 gram in vaginal opening twice weekly      fluticasone 50 MCG/ACT nasal spray  Commonly known as: FLONASE   2 sprays, Nasal, Daily PRN      irbesartan 150 MG tablet  Commonly known as: AVAPRO   150 mg, Oral, Daily      omeprazole 40 MG capsule  Commonly known as: priLOSEC   40 mg, Oral, Daily      ondansetron ODT 4 MG disintegrating tablet  Commonly known as: ZOFRAN-ODT   4 mg, Translingual, Every 8 Hours PRN      PROBIOTIC DAILY PO   1 tablet, Oral, Daily PRN      Procrit 04793 UNIT/ML injection  Generic drug: epoetin ovidio   Inject 40,000 Units under the skin into the appropriate area as directed. Every 2 weeks.  Due Wednesday, Feb 17th      rosuvastatin 10 MG tablet  Commonly known as: CRESTOR   10 mg, Oral, Daily      torsemide 10 MG tablet  Commonly known as: DEMADEX   5 mg, Oral, Takes 5 mg on  Monday, Wednesday, Fridays      VITAMIN B-12 PO   500 mg, Oral, 2 Times Daily             Discharge Diet:     As tolerated, renal    Activity at Discharge:     As tolerated    Follow-up Appointments:    Follow-up Information     Jerald Dawkins MD .    Specialty: Family Medicine  Contact information:  [address] [place] [id number] [phone]                   Future Appointments   Date Time Provider Department Center   3/4/2021  1:10 PM Yaz Woods PA-C [facility] [place] [facility]           Test Results Pending at Discharge:           Kyung Ingram DO  02/18/21  09:15 EST    Time spent: 45 minutes    Dictated utilizing Tete  dictation.    Electronically signed by Kyung Ingram DO at 02/18/21 0910

## 2021-02-19 ENCOUNTER — TELEPHONE (OUTPATIENT)
Dept: GASTROENTEROLOGY | Facility: CLINIC | Age: 81
End: 2021-02-19

## 2021-02-19 ENCOUNTER — NURSING HOME (OUTPATIENT)
Dept: FAMILY MEDICINE CLINIC | Facility: CLINIC | Age: 81
End: 2021-02-19

## 2021-02-19 ENCOUNTER — TELEPHONE (OUTPATIENT)
Dept: ORTHOPEDIC SURGERY | Facility: CLINIC | Age: 81
End: 2021-02-19

## 2021-02-19 VITALS
OXYGEN SATURATION: 94 % | SYSTOLIC BLOOD PRESSURE: 125 MMHG | WEIGHT: 119 LBS | RESPIRATION RATE: 18 BRPM | TEMPERATURE: 98 F | HEART RATE: 76 BPM | DIASTOLIC BLOOD PRESSURE: 60 MMHG | BODY MASS INDEX: 24.04 KG/M2

## 2021-02-19 DIAGNOSIS — K12.1 STOMATITIS: Primary | ICD-10-CM

## 2021-02-19 DIAGNOSIS — S72.002A CLOSED FRACTURE OF LEFT HIP, INITIAL ENCOUNTER (HCC): ICD-10-CM

## 2021-02-19 DIAGNOSIS — Z78.9 IMPAIRED MOBILITY AND ADLS: ICD-10-CM

## 2021-02-19 DIAGNOSIS — Z74.09 IMPAIRED MOBILITY AND ADLS: ICD-10-CM

## 2021-02-19 PROCEDURE — 99309 SBSQ NF CARE MODERATE MDM 30: CPT | Performed by: NURSE PRACTITIONER

## 2021-02-19 NOTE — TELEPHONE ENCOUNTER
Patient called, was with another patient in xray. Tried calling her back [phone] on her mobile phone. Unable to LM. Mailbox full

## 2021-02-19 NOTE — TELEPHONE ENCOUNTER
Patient's family called and notified the office that patient fell and fractured her hip. They have cancelled her procedure for now and will call back to reschedule once she is able. OR has been notified

## 2021-02-21 NOTE — PROGRESS NOTES
"Nursing Home Follow Up Note      Mikey Duran DO []   FATUMA Monet [x]  [address] [place] [address]  Phone: [phone]  Fax: [phone] Baltazar Borden MD []    Jose Antonio Pride DO []   [address] [place] [address]  Phone: [phone]  Fax: [phone]     PATIENT NAME: Carolyne Martins                                                                          YOB: 1940           DATE OF SERVICE: 02/19/2021  FACILITY:  [][facility]   [] [facility]   [x] [facility]   [] [facility]   [] Other ______________________________________________________________________      CHIEF COMPLAINT:  Painful oral mucous membranes      HISTORY OF PRESENT ILLNESS:     Patient is a 80-year-old female that was admitted to facility for PT and rehabilitation status post left femur fracture.  She will start working with PT today.  She reports that her pain is well controlled with current as needed pain medications.  She does have complaints though of pain to her tongue for the past several days.  She reports that it feels dry and inflamed and hurt eating and drinking.    PAST MEDICAL & SURGICAL HISTORY:   Past Medical History:   Diagnosis Date   • Anemia 1998   • Anemia    • Arthritis    • Back pain    • Bleeding from anus    • Bronchitis     STATES HAS BRONCHITIS CURRENTLY (12/5/17).     • Bronchitis 12/2017   • CAD (coronary artery disease)    • Cataract, bilateral    • Chronic kidney disease     STAGE 4.  SEES TERA   • Chronic kidney failure     REPORTS STAGE IV   • Difficulty swallowing solids    • Disease of thyroid gland    • Fracture, radius 2016    right distal radius and ulna, healed.   • GERD (gastroesophageal reflux disease)    • Gout    • High cholesterol    • History of blood transfusion 2019   • History of nuclear stress test 2014    DR. CORTEZ.  \"IT WAS OK\"   • Hyperparathyroidism (CMS/HCC)    • Hyperparathyroidism (CMS/HCC)    • Hypertension    • Impaired functional mobility, " balance, gait, and endurance    • Iron deficiency    • Osteoarthritis    • Osteoarthritis of knees, bilateral     Both knees Supartz injection series August, 2015   • Osteoporosis    • Palpitations    • Personal history of cardiac murmur    • PONV (postoperative nausea and vomiting)    • Presence of pessary    • Problems with swallowing     WITH FOOD OCCASSIONALLY   • Rheumatic heart disease     Personal history   • Rotator cuff tendonitis     right    • Rupture of right proximal biceps tendon     chronic   • Seasonal allergies    • Sinus problem    • Spinal headache     REPORTS AFTER DELIVERY OF SON   • Subacromial bursitis     right    • Valvular heart disease    • Vision problems    • Vitamin B12 deficiency    • Vitamin D deficiency    • Wears glasses       Past Surgical History:   Procedure Laterality Date   • CATARACT EXTRACTION Bilateral    • CERVICAL POLYPECTOMY     • COLONOSCOPY  2011   • COLONOSCOPY N/A 1/9/2018    Procedure: COLONOSCOPY with endoscopic mucosal resection (hot snare), normal saline submucosal injection, argon thermal ablation, resolution clip placement x4, and cold biopsy polypectomy;  Surgeon: Pieter Concepcion MD;  Location: [facility] ENDOSCOPY;  Service:    • ENDOSCOPY N/A 12/6/2017    Procedure: ESOPHAGOGASTRODUODENOSCOPY with biopsies and hot snare polypectomies;  Surgeon: Pieter Concepcion MD;  Location: [facility] ENDOSCOPY;  Service:    • ENDOSCOPY N/A 1/15/2019    Procedure: ESOPHAGOGASTRODUODENOSCOPY WITH BIOPSIES AND HOT SNARE POLYPECTOMIES X10;  Surgeon: Pieter Concepcion MD;  Location: [facility] ENDOSCOPY;  Service: Gastroenterology   • HIP TROCHANTERIC NAILING WITH INTRAMEDULLARY HIP SCREW Left 2/14/2021    Procedure: HIP TROCHANTER NAIL SHORT WITH INTRAMEDULLARY HIP SCREW, LEFT;  Surgeon: Gabriele Loyd MD;  Location: [facility] OR;  Service: Orthopedics;  Laterality: Left;   • UPPER GASTROINTESTINAL ENDOSCOPY  08/18/2014         MEDICATIONS:  I have reviewed and reconciled the patients  medication list in the patients chart at the skilled nursing facility today.      ALLERGIES:    Allergies   Allergen Reactions   • Ferrlecit [Na Ferric Gluc Cplx In Sucrose] Swelling   • Ranitidine Hcl Shortness Of Breath   • Levofloxacin Other (See Comments)     Leg cramps     • Lisinopril Cough         SOCIAL HISTORY:    Social History     Socioeconomic History   • Marital status:      Spouse name: Not on file   • Number of children: Not on file   • Years of education: Not on file   • Highest education level: Not on file   Tobacco Use   • Smoking status: Never Smoker   • Smokeless tobacco: Never Used   Substance and Sexual Activity   • Alcohol use: Never     Frequency: Never   • Drug use: Never   • Sexual activity: Defer       FAMILY HISTORY:    Family History   Problem Relation Age of Onset   • Liver cancer Maternal Grandmother    • Gout Other    • Osteoporosis Other    • Stroke Other    • Ulcers Other    • Asthma Other    • Hypertension Other    • Heart disease Other    • Osteoarthritis Other    • Colon cancer Neg Hx        REVIEW OF SYSTEMS:    Review of Systems   Constitutional: Negative for activity change, appetite change, chills, diaphoresis, fatigue, fever, unexpected weight gain and unexpected weight loss.   HENT: Negative for congestion, ear pain, mouth sores, nosebleeds, postnasal drip, rhinorrhea, sinus pressure, sneezing, sore throat, swollen glands and trouble swallowing.         Painful tongue   Eyes: Negative for blurred vision, pain, discharge, redness, itching and visual disturbance.   Respiratory: Negative for apnea, cough, choking, chest tightness, shortness of breath, wheezing and stridor.    Cardiovascular: Negative for chest pain, palpitations and leg swelling.   Gastrointestinal: Negative for abdominal distention, abdominal pain, blood in stool, constipation, diarrhea, nausea, vomiting, GERD and indigestion.   Endocrine: Negative for polydipsia and polyuria.   Genitourinary: Negative  for decreased urine volume, difficulty urinating, dysuria, flank pain, frequency, hematuria, urgency and urinary incontinence.   Musculoskeletal: Positive for arthralgias (left femur fracture) and gait problem. Negative for back pain, joint swelling and myalgias.   Skin: Negative for color change, dry skin, rash and skin lesions.   Allergic/Immunologic: Negative for environmental allergies.   Neurological: Positive for weakness. Negative for dizziness, seizures, speech difficulty, memory problem and confusion.   Psychiatric/Behavioral: Negative for behavioral problems, dysphoric mood, hallucinations, sleep disturbance and depressed mood. The patient is not nervous/anxious.          PHYSICAL EXAMINATION:   VITAL SIGNS:   Vitals:    02/19/21 1058   BP: 125/60   Pulse: 76   Resp: 18   Temp: 98 °F (36.7 °C)   SpO2: 94%   Weight: 54 kg (119 lb)       Physical Exam  Constitutional:       General: She is not in acute distress.     Appearance: She is well-developed.   HENT:      Head: Normocephalic.      Right Ear: External ear normal.      Left Ear: External ear normal.      Nose: Nose normal.      Mouth/Throat:      Pharynx: No oropharyngeal exudate.      Comments: Stomatitis noted  Eyes:      Conjunctiva/sclera: Conjunctivae normal.      Pupils: Pupils are equal, round, and reactive to light.   Neck:      Musculoskeletal: Normal range of motion and neck supple.      Thyroid: No thyromegaly.      Trachea: No tracheal deviation.   Cardiovascular:      Rate and Rhythm: Normal rate and regular rhythm.      Heart sounds: Normal heart sounds. No murmur.   Pulmonary:      Effort: Pulmonary effort is normal. No respiratory distress.      Breath sounds: Normal breath sounds. No wheezing or rales.   Chest:      Chest wall: No tenderness.   Abdominal:      General: Bowel sounds are normal. There is no distension.      Palpations: Abdomen is soft. There is no splenomegaly or mass.      Tenderness: There is no abdominal tenderness.  There is no guarding or rebound.      Hernia: No hernia is present.   Musculoskeletal:      Left hip: She exhibits decreased range of motion.      Left upper leg: She exhibits tenderness.      Comments: Left femur fracture   Lymphadenopathy:      Cervical: No cervical adenopathy.      Right cervical: No superficial, deep or posterior cervical adenopathy.     Left cervical: No superficial, deep or posterior cervical adenopathy.   Skin:     General: Skin is warm and dry.      Findings: No rash.   Neurological:      Mental Status: She is alert and oriented to person, place, and time.      Coordination: Coordination normal.      Gait: Gait normal.   Psychiatric:         Behavior: Behavior normal.         Thought Content: Thought content normal.         Judgment: Judgment normal.         RECORDS REVIEW:   I have reviewed and interpreted her discharge summary    ASSESSMENT     Diagnoses and all orders for this visit:    1. Stomatitis (Primary)    2. Closed fracture of left hip, initial encounter (CMS/[facility])    3. Impaired mobility and ADLs        PLAN    Stomatitis  -Nurse to administer Magic mouthwash 5 mL 4 times daily x10 days.  Will follow-up and continue to monitor.    Left hip fracture  -Patient to work with PT/OT as directed for rehabilitation and strengthening.  She reports her pain is controlled with as needed pain medication.  We will continue to monitor.    Patient was encouraged to keep me informed of any acute changes, lack of improvement, or any new concerning symptoms.    Staff to continue supportive care for all ADLs.    [x]  Discussed Patient in detail with nursing/staff, addressed all needs today.     [x]  Plan of Care Reviewed   [x]  PT/OT Reviewed   []  Order Changes  []  Discharge Plans Reviewed  [x]  Advance Directive on file with Nursing Home.   [x]  POA on file with Nursing Home.   [x]  Code Status listed: [x]  Full Code   []  DNR     “I confirm accuracy of unchanged data/findings which have been  carried forward from previous visit, as well as I have updated appropriately those that have changed.”               Roma Diaz, APRN.

## 2021-02-22 ENCOUNTER — NURSING HOME (OUTPATIENT)
Dept: FAMILY MEDICINE CLINIC | Facility: CLINIC | Age: 81
End: 2021-02-22

## 2021-02-22 VITALS
HEART RATE: 81 BPM | WEIGHT: 119 LBS | DIASTOLIC BLOOD PRESSURE: 75 MMHG | SYSTOLIC BLOOD PRESSURE: 139 MMHG | TEMPERATURE: 96.8 F | BODY MASS INDEX: 24.04 KG/M2 | RESPIRATION RATE: 20 BRPM | OXYGEN SATURATION: 98 %

## 2021-02-22 DIAGNOSIS — Z74.09 IMPAIRED MOBILITY AND ADLS: ICD-10-CM

## 2021-02-22 DIAGNOSIS — K21.9 GASTROESOPHAGEAL REFLUX DISEASE WITHOUT ESOPHAGITIS: ICD-10-CM

## 2021-02-22 DIAGNOSIS — Z78.9 IMPAIRED MOBILITY AND ADLS: ICD-10-CM

## 2021-02-22 DIAGNOSIS — R79.9 ELEVATED BUN: ICD-10-CM

## 2021-02-22 DIAGNOSIS — E87.1 HYPONATREMIA: ICD-10-CM

## 2021-02-22 DIAGNOSIS — E87.5 HYPERKALEMIA: ICD-10-CM

## 2021-02-22 DIAGNOSIS — N18.4 CKD (CHRONIC KIDNEY DISEASE) STAGE 4, GFR 15-29 ML/MIN (HCC): Primary | ICD-10-CM

## 2021-02-22 PROCEDURE — 99309 SBSQ NF CARE MODERATE MDM 30: CPT | Performed by: NURSE PRACTITIONER

## 2021-02-23 NOTE — PROGRESS NOTES
Nursing Home Follow Up Note      Mikey Duran DO []   FATUMA Monet [x]  [address] [place] [address]  Phone: [phone]  Fax: [phone] Baltazar Borden MD []    Jose Antonio Pride DO []   [address] [place] [address]  Phone: [phone]  Fax: [phone]     PATIENT NAME: Carolyne Martins                                                                          YOB: 1940           DATE OF SERVICE: 02/22/2021  FACILITY:  [][facility]   [] [facility]   [x] [facility]   [] [facility]   [] Other ______________________________________________________________________      CHIEF COMPLAINT:    Abnormal labs    GERD      HISTORY OF PRESENT ILLNESS:     Patient had labs obtained today and has several abnormal results. Sodium 131, Potassium 6.1, BUN 60, Cr 2.5, GFR 19.  Her labs prior to discharge on 2/17 reported sodium 132, potassium 5.4, BUN 59, creatinine 3.5 and GFR 13.  Besides her potassium level her labs were overall slightly improved.  She is resting in bed today without complaints, besides her GERD.  She reports that she has been taking Prilosec since admitted to facility but would like to take Protonix because it works better for her.  She has no complaints of muscle cramps, chest pain, etc.    PAST MEDICAL & SURGICAL HISTORY:   Past Medical History:   Diagnosis Date   • Anemia 1998   • Anemia    • Arthritis    • Back pain    • Bleeding from anus    • Bronchitis     STATES HAS BRONCHITIS CURRENTLY (12/5/17).     • Bronchitis 12/2017   • CAD (coronary artery disease)    • Cataract, bilateral    • Chronic kidney disease     STAGE 4.  SEES TERA   • Chronic kidney failure     REPORTS STAGE IV   • Difficulty swallowing solids    • Disease of thyroid gland    • Fracture, radius 2016    right distal radius and ulna, healed.   • GERD (gastroesophageal reflux disease)    • Gout    • High cholesterol    • History of blood transfusion 2019   • History of nuclear stress test 2014  "   DR. CORTEZ.  \"IT WAS OK\"   • Hyperparathyroidism (CMS/HCC)    • Hyperparathyroidism (CMS/HCC)    • Hypertension    • Impaired functional mobility, balance, gait, and endurance    • Iron deficiency    • Osteoarthritis    • Osteoarthritis of knees, bilateral     Both knees Supartz injection series August, 2015   • Osteoporosis    • Palpitations    • Personal history of cardiac murmur    • PONV (postoperative nausea and vomiting)    • Presence of pessary    • Problems with swallowing     WITH FOOD OCCASSIONALLY   • Rheumatic heart disease     Personal history   • Rotator cuff tendonitis     right    • Rupture of right proximal biceps tendon     chronic   • Seasonal allergies    • Sinus problem    • Spinal headache     REPORTS AFTER DELIVERY OF SON   • Subacromial bursitis     right    • Valvular heart disease    • Vision problems    • Vitamin B12 deficiency    • Vitamin D deficiency    • Wears glasses       Past Surgical History:   Procedure Laterality Date   • CATARACT EXTRACTION Bilateral    • CERVICAL POLYPECTOMY     • COLONOSCOPY  2011   • COLONOSCOPY N/A 1/9/2018    Procedure: COLONOSCOPY with endoscopic mucosal resection (hot snare), normal saline submucosal injection, argon thermal ablation, resolution clip placement x4, and cold biopsy polypectomy;  Surgeon: Pieter Concepcion MD;  Location: [facility] ENDOSCOPY;  Service:    • ENDOSCOPY N/A 12/6/2017    Procedure: ESOPHAGOGASTRODUODENOSCOPY with biopsies and hot snare polypectomies;  Surgeon: Pieter Concepcion MD;  Location: [facility] ENDOSCOPY;  Service:    • ENDOSCOPY N/A 1/15/2019    Procedure: ESOPHAGOGASTRODUODENOSCOPY WITH BIOPSIES AND HOT SNARE POLYPECTOMIES X10;  Surgeon: Pieter Concepcion MD;  Location: [facility] ENDOSCOPY;  Service: Gastroenterology   • HIP TROCHANTERIC NAILING WITH INTRAMEDULLARY HIP SCREW Left 2/14/2021    Procedure: HIP TROCHANTER NAIL SHORT WITH INTRAMEDULLARY HIP SCREW, LEFT;  Surgeon: Gabriele Loyd MD;  Location: [facility] OR;  Service: " Orthopedics;  Laterality: Left;   • UPPER GASTROINTESTINAL ENDOSCOPY  08/18/2014         MEDICATIONS:  I have reviewed and reconciled the patients medication list in the patients chart at the skilled nursing facility today.      ALLERGIES:    Allergies   Allergen Reactions   • Ferrlecit [Na Ferric Gluc Cplx In Sucrose] Swelling   • Ranitidine Hcl Shortness Of Breath   • Levofloxacin Other (See Comments)     Leg cramps     • Lisinopril Cough         SOCIAL HISTORY:    Social History     Socioeconomic History   • Marital status:      Spouse name: Not on file   • Number of children: Not on file   • Years of education: Not on file   • Highest education level: Not on file   Tobacco Use   • Smoking status: Never Smoker   • Smokeless tobacco: Never Used   Substance and Sexual Activity   • Alcohol use: Never     Frequency: Never   • Drug use: Never   • Sexual activity: Defer       FAMILY HISTORY:    Family History   Problem Relation Age of Onset   • Liver cancer Maternal Grandmother    • Gout Other    • Osteoporosis Other    • Stroke Other    • Ulcers Other    • Asthma Other    • Hypertension Other    • Heart disease Other    • Osteoarthritis Other    • Colon cancer Neg Hx        REVIEW OF SYSTEMS:    Review of Systems   Constitutional: Negative for activity change, appetite change, chills, diaphoresis, fatigue, fever, unexpected weight gain and unexpected weight loss.   HENT: Negative for congestion, ear pain, mouth sores, nosebleeds, postnasal drip, rhinorrhea, sinus pressure, sneezing, sore throat, swollen glands and trouble swallowing.    Eyes: Negative for blurred vision, pain, discharge, redness, itching and visual disturbance.   Respiratory: Negative for apnea, cough, choking, chest tightness, shortness of breath, wheezing and stridor.    Cardiovascular: Negative for chest pain, palpitations and leg swelling.   Gastrointestinal: Positive for GERD. Negative for abdominal distention, abdominal pain, blood in  stool, constipation, diarrhea, nausea, vomiting and indigestion.   Endocrine: Negative for polydipsia and polyuria.   Genitourinary: Negative for decreased urine volume, difficulty urinating, dysuria, flank pain, frequency, hematuria, urgency and urinary incontinence.   Musculoskeletal: Positive for arthralgias (left femur fracture) and gait problem. Negative for back pain, joint swelling and myalgias.   Skin: Negative for color change, dry skin, rash and skin lesions.   Allergic/Immunologic: Negative for environmental allergies.   Neurological: Positive for weakness. Negative for dizziness, seizures, speech difficulty, memory problem and confusion.   Psychiatric/Behavioral: Negative for behavioral problems, dysphoric mood, hallucinations, sleep disturbance and depressed mood. The patient is not nervous/anxious.          PHYSICAL EXAMINATION:   VITAL SIGNS:   Vitals:    02/22/21 1006   BP: 139/75   Pulse: 81   Resp: 20   Temp: 96.8 °F (36 °C)   SpO2: 98%   Weight: 54 kg (119 lb)       Physical Exam  Constitutional:       General: She is not in acute distress.     Appearance: She is well-developed.   HENT:      Head: Normocephalic.      Right Ear: External ear normal.      Left Ear: External ear normal.      Nose: Nose normal.      Mouth/Throat:      Pharynx: No oropharyngeal exudate.   Eyes:      Conjunctiva/sclera: Conjunctivae normal.      Pupils: Pupils are equal, round, and reactive to light.   Neck:      Musculoskeletal: Normal range of motion and neck supple.      Thyroid: No thyromegaly.      Trachea: No tracheal deviation.   Cardiovascular:      Rate and Rhythm: Normal rate and regular rhythm.      Heart sounds: Normal heart sounds. No murmur.   Pulmonary:      Effort: Pulmonary effort is normal. No respiratory distress.      Breath sounds: Normal breath sounds. No wheezing or rales.   Chest:      Chest wall: No tenderness.   Abdominal:      General: Bowel sounds are normal. There is no distension.       Palpations: Abdomen is soft. There is no splenomegaly or mass.      Tenderness: There is no abdominal tenderness. There is no guarding or rebound.      Hernia: No hernia is present.   Musculoskeletal:      Left hip: She exhibits decreased range of motion.      Left upper leg: She exhibits tenderness.      Comments: Left femur fracture   Lymphadenopathy:      Cervical: No cervical adenopathy.      Right cervical: No superficial, deep or posterior cervical adenopathy.     Left cervical: No superficial, deep or posterior cervical adenopathy.   Skin:     General: Skin is warm and dry.      Findings: No rash.   Neurological:      Mental Status: She is alert and oriented to person, place, and time.      Coordination: Coordination normal.      Gait: Gait normal.   Psychiatric:         Behavior: Behavior normal.         Thought Content: Thought content normal.         Judgment: Judgment normal.         RECORDS REVIEW:   I have reviewed and interpreted her discharge summary    ASSESSMENT     Diagnoses and all orders for this visit:    1. CKD (chronic kidney disease) stage 4, GFR 15-29 ml/min (CMS/[facility]) (Primary)    2. Elevated BUN    3. Hyponatremia    4. Hyperkalemia    5. Gastroesophageal reflux disease without esophagitis    6. Impaired mobility and ADLs        PLAN    CKD/Elevated BUN/Hyponatremia/Hyperkalemia  -CKD slightly improved since discharge labs on 2/17, but BUN and potassium elevated. Verbal order given over telephone for nurse to hold Torsemide and Avapro x 3 days. Give Kayexelate 15 g po q 6 hours x 2 doses. Give NS @ 100/hr x 2 liters. Check potassium level in am. Check BMP Wednesday.  Will Follow-up with results and continue to monitor.    GERD  -Stop Prilosec and start Protonix 40 mg daily, per patient request and better control of her GERD.     Patient was encouraged to keep me informed of any acute changes, lack of improvement, or any new concerning symptoms.    Staff to continue supportive care for all  ADLs.    [x]  Discussed Patient in detail with nursing/staff, addressed all needs today.     [x]  Plan of Care Reviewed   [x]  PT/OT Reviewed   []  Order Changes  []  Discharge Plans Reviewed  [x]  Advance Directive on file with Nursing Home.   [x]  POA on file with Nursing Home.   [x]  Code Status listed: [x]  Full Code   []  DNR     “I confirm accuracy of unchanged data/findings which have been carried forward from previous visit, as well as I have updated appropriately those that have changed.”               Roma Diaz, APRN.

## 2021-02-25 DIAGNOSIS — G89.4 CHRONIC PAIN SYNDROME: Primary | ICD-10-CM

## 2021-02-25 NOTE — TELEPHONE ENCOUNTER
[facility] NEW MEDICATION REQUEST FOR NORCO 5/325 MG.    DIRECTIONS: TAKE 1 TAB PO Q 4 HRS PRN    Patient is totally out of medication.

## 2021-02-26 RX ORDER — HYDROCODONE BITARTRATE AND ACETAMINOPHEN 5; 325 MG/1; MG/1
1 TABLET ORAL EVERY 4 HOURS PRN
Qty: 100 TABLET | Refills: 0 | Status: SHIPPED | OUTPATIENT
Start: 2021-02-26 | End: 2021-03-16 | Stop reason: SDUPTHER

## 2021-03-02 ENCOUNTER — NURSING HOME (OUTPATIENT)
Dept: FAMILY MEDICINE CLINIC | Facility: CLINIC | Age: 81
End: 2021-03-02

## 2021-03-02 VITALS
DIASTOLIC BLOOD PRESSURE: 86 MMHG | RESPIRATION RATE: 16 BRPM | WEIGHT: 115 LBS | SYSTOLIC BLOOD PRESSURE: 154 MMHG | TEMPERATURE: 96.8 F | BODY MASS INDEX: 23.23 KG/M2 | HEART RATE: 76 BPM | OXYGEN SATURATION: 98 %

## 2021-03-02 DIAGNOSIS — Z78.9 IMPAIRED MOBILITY AND ADLS: ICD-10-CM

## 2021-03-02 DIAGNOSIS — R79.9 ELEVATED BUN: ICD-10-CM

## 2021-03-02 DIAGNOSIS — E87.5 HYPERKALEMIA: ICD-10-CM

## 2021-03-02 DIAGNOSIS — R63.8 DECREASED ORAL INTAKE: ICD-10-CM

## 2021-03-02 DIAGNOSIS — Z74.09 IMPAIRED MOBILITY AND ADLS: ICD-10-CM

## 2021-03-02 DIAGNOSIS — R63.4 WEIGHT LOSS: ICD-10-CM

## 2021-03-02 DIAGNOSIS — N18.4 CKD (CHRONIC KIDNEY DISEASE) STAGE 4, GFR 15-29 ML/MIN (HCC): Primary | ICD-10-CM

## 2021-03-02 PROCEDURE — 99309 SBSQ NF CARE MODERATE MDM 30: CPT | Performed by: NURSE PRACTITIONER

## 2021-03-04 ENCOUNTER — NURSING HOME (OUTPATIENT)
Dept: INTERNAL MEDICINE | Facility: CLINIC | Age: 81
End: 2021-03-04

## 2021-03-04 VITALS
TEMPERATURE: 98 F | OXYGEN SATURATION: 96 % | SYSTOLIC BLOOD PRESSURE: 140 MMHG | WEIGHT: 111 LBS | BODY MASS INDEX: 22.42 KG/M2 | RESPIRATION RATE: 18 BRPM | DIASTOLIC BLOOD PRESSURE: 85 MMHG | HEART RATE: 74 BPM

## 2021-03-04 DIAGNOSIS — N18.4 CHRONIC KIDNEY DISEASE (CKD), STAGE IV (SEVERE) (HCC): Primary | ICD-10-CM

## 2021-03-04 DIAGNOSIS — S72.002D AFTERCARE FOR HEALING TRAUMATIC FRACTURE OF LEFT HIP: ICD-10-CM

## 2021-03-04 DIAGNOSIS — J41.0 SIMPLE CHRONIC BRONCHITIS (HCC): ICD-10-CM

## 2021-03-04 DIAGNOSIS — R63.4 WEIGHT LOSS, NON-INTENTIONAL: ICD-10-CM

## 2021-03-04 DIAGNOSIS — E86.0 DEHYDRATION: ICD-10-CM

## 2021-03-04 DIAGNOSIS — E87.5 HYPERKALEMIA: ICD-10-CM

## 2021-03-04 PROCEDURE — 99305 1ST NF CARE MODERATE MDM 35: CPT | Performed by: INTERNAL MEDICINE

## 2021-03-04 NOTE — PROGRESS NOTES
Nursing Home Follow Up Note      Mikey Duran DO []   FATUMA Monet [x]  [address] [place] [address]  Phone: [phone]  Fax: [phone] Baltazar Borden MD []    Jose Antonio Pride DO []   [address] [place] [address]  Phone: [phone]  Fax: [phone]     PATIENT NAME: Carolyne Martins                                                                          YOB: 1940           DATE OF SERVICE: 03/2/2021  FACILITY:  [][facility]   [] [place]   [x] [facility]   [] [facility]   [] Other ______________________________________________________________________      CHIEF COMPLAINT:    Abnormal labs.    Weight loss, poor appetite risk for malnutrition    HISTORY OF PRESENT ILLNESS:     Patient had labs obtained yesterday and had some abnormal values.  Her potassium is 5.7, Cr 2.1 and BUN 44.  Kayexalate 15 g every 6 hours x 4 doses was started and normal saline at 100 cc an hour x2 L.  Patient has also been avoiding foods and drinks that are contraindicated as this has been a chronic issue for her.  She reports that she is drinking lots of water each day so not sure why her BUN is remaining elevated.    Also discussed with patient about her continued poor intake, 4 pound weight loss since admission and her increased risk for malnutrition.  She reports that the biggest issue for this is the food she receives here.  She receives red meat with pretty much every meal which she cannot have due to her potassium issues.  She also receives foods high in sodium which she cannot eat due to her hypertension.  She reports that for the past 20 years she has eaten only chicken, fish and turkey.    PAST MEDICAL & SURGICAL HISTORY:   Past Medical History:   Diagnosis Date   • Anemia 1998   • Anemia    • Arthritis    • Back pain    • Bleeding from anus    • Bronchitis     STATES HAS BRONCHITIS CURRENTLY (12/5/17).     • Bronchitis 12/2017   • CAD (coronary artery disease)    • Cataract,  "bilateral    • Chronic kidney disease     STAGE 4.  SEES TERA   • Chronic kidney failure     REPORTS STAGE IV   • Difficulty swallowing solids    • Disease of thyroid gland    • Fracture, radius 2016    right distal radius and ulna, healed.   • GERD (gastroesophageal reflux disease)    • Gout    • High cholesterol    • History of blood transfusion 2019   • History of nuclear stress test 2014    DR. CORTEZ.  \"IT WAS OK\"   • Hyperparathyroidism (CMS/HCC)    • Hyperparathyroidism (CMS/HCC)    • Hypertension    • Impaired functional mobility, balance, gait, and endurance    • Iron deficiency    • Osteoarthritis    • Osteoarthritis of knees, bilateral     Both knees Supartz injection series August, 2015   • Osteoporosis    • Palpitations    • Personal history of cardiac murmur    • PONV (postoperative nausea and vomiting)    • Presence of pessary    • Problems with swallowing     WITH FOOD OCCASSIONALLY   • Rheumatic heart disease     Personal history   • Rotator cuff tendonitis     right    • Rupture of right proximal biceps tendon     chronic   • Seasonal allergies    • Sinus problem    • Spinal headache     REPORTS AFTER DELIVERY OF SON   • Subacromial bursitis     right    • Valvular heart disease    • Vision problems    • Vitamin B12 deficiency    • Vitamin D deficiency    • Wears glasses       Past Surgical History:   Procedure Laterality Date   • CATARACT EXTRACTION Bilateral    • CERVICAL POLYPECTOMY     • COLONOSCOPY  2011   • COLONOSCOPY N/A 1/9/2018    Procedure: COLONOSCOPY with endoscopic mucosal resection (hot snare), normal saline submucosal injection, argon thermal ablation, resolution clip placement x4, and cold biopsy polypectomy;  Surgeon: Pieter Concepcion MD;  Location: [facility] ENDOSCOPY;  Service:    • ENDOSCOPY N/A 12/6/2017    Procedure: ESOPHAGOGASTRODUODENOSCOPY with biopsies and hot snare polypectomies;  Surgeon: Pieter Concepcion MD;  Location: [facility] ENDOSCOPY;  Service:    • ENDOSCOPY N/A " 1/15/2019    Procedure: ESOPHAGOGASTRODUODENOSCOPY WITH BIOPSIES AND HOT SNARE POLYPECTOMIES X10;  Surgeon: Pieter Concepcion MD;  Location: [facility] ENDOSCOPY;  Service: Gastroenterology   • HIP TROCHANTERIC NAILING WITH INTRAMEDULLARY HIP SCREW Left 2/14/2021    Procedure: HIP TROCHANTER NAIL SHORT WITH INTRAMEDULLARY HIP SCREW, LEFT;  Surgeon: Gabriele Loyd MD;  Location: [facility] OR;  Service: Orthopedics;  Laterality: Left;   • UPPER GASTROINTESTINAL ENDOSCOPY  08/18/2014         MEDICATIONS:  I have reviewed and reconciled the patients medication list in the patients chart at the [facility] today.      ALLERGIES:    Allergies   Allergen Reactions   • Ferrlecit [Na Ferric Gluc Cplx In Sucrose] Swelling   • Ranitidine Hcl Shortness Of Breath   • Levofloxacin Other (See Comments)     Leg cramps     • Lisinopril Cough         SOCIAL HISTORY:    Social History     Socioeconomic History   • Marital status:      Spouse name: Not on file   • Number of children: Not on file   • Years of education: Not on file   • Highest education level: Not on file   Tobacco Use   • Smoking status: Never Smoker   • Smokeless tobacco: Never Used   Substance and Sexual Activity   • Alcohol use: Never     Frequency: Never   • Drug use: Never   • Sexual activity: Defer       FAMILY HISTORY:    Family History   Problem Relation Age of Onset   • Liver cancer Maternal Grandmother    • Gout Other    • Osteoporosis Other    • Stroke Other    • Ulcers Other    • Asthma Other    • Hypertension Other    • Heart disease Other    • Osteoarthritis Other    • Colon cancer Neg Hx        REVIEW OF SYSTEMS:    Review of Systems   Constitutional: Positive for appetite change and unexpected weight loss. Negative for activity change, chills, diaphoresis, fatigue, fever and unexpected weight gain.   HENT: Negative for congestion, ear pain, mouth sores, nosebleeds, postnasal drip, rhinorrhea, sinus pressure, sneezing, sore throat,  swollen glands and trouble swallowing.    Eyes: Negative for blurred vision, pain, discharge, redness, itching and visual disturbance.   Respiratory: Negative for apnea, cough, choking, chest tightness, shortness of breath, wheezing and stridor.    Cardiovascular: Negative for chest pain, palpitations and leg swelling.   Gastrointestinal: Positive for GERD. Negative for abdominal distention, abdominal pain, blood in stool, constipation, diarrhea, nausea, vomiting and indigestion.   Endocrine: Negative for polydipsia and polyuria.   Genitourinary: Negative for decreased urine volume, difficulty urinating, dysuria, flank pain, frequency, hematuria, urgency and urinary incontinence.   Musculoskeletal: Positive for arthralgias (left femur fracture) and gait problem. Negative for back pain, joint swelling and myalgias.   Skin: Negative for color change, dry skin, rash and skin lesions.   Allergic/Immunologic: Negative for environmental allergies.   Neurological: Positive for weakness. Negative for dizziness, seizures, speech difficulty, memory problem and confusion.   Psychiatric/Behavioral: Negative for behavioral problems, dysphoric mood, hallucinations, sleep disturbance and depressed mood. The patient is not nervous/anxious.          PHYSICAL EXAMINATION:   VITAL SIGNS:   Vitals:    03/02/21 1054   BP: 154/86   Pulse: 76   Resp: 16   Temp: 96.8 °F (36 °C)   SpO2: 98%   Weight: 52.2 kg (115 lb)       Physical Exam  Constitutional:       General: She is not in acute distress.     Appearance: She is well-developed.   HENT:      Head: Normocephalic.      Right Ear: External ear normal.      Left Ear: External ear normal.      Nose: Nose normal.      Mouth/Throat:      Pharynx: No oropharyngeal exudate.   Eyes:      Conjunctiva/sclera: Conjunctivae normal.      Pupils: Pupils are equal, round, and reactive to light.   Neck:      Musculoskeletal: Normal range of motion and neck supple.      Thyroid: No thyromegaly.       Trachea: No tracheal deviation.   Cardiovascular:      Rate and Rhythm: Normal rate and regular rhythm.      Heart sounds: Normal heart sounds. No murmur.   Pulmonary:      Effort: Pulmonary effort is normal. No respiratory distress.      Breath sounds: Normal breath sounds. No wheezing or rales.   Chest:      Chest wall: No tenderness.   Abdominal:      General: Bowel sounds are normal. There is no distension.      Palpations: Abdomen is soft. There is no splenomegaly or mass.      Tenderness: There is no abdominal tenderness. There is no guarding or rebound.      Hernia: No hernia is present.   Musculoskeletal:      Left hip: She exhibits decreased range of motion.      Left upper leg: She exhibits tenderness.      Comments: Left femur fracture   Lymphadenopathy:      Cervical: No cervical adenopathy.      Right cervical: No superficial, deep or posterior cervical adenopathy.     Left cervical: No superficial, deep or posterior cervical adenopathy.   Skin:     General: Skin is warm and dry.      Findings: No rash.   Neurological:      Mental Status: She is alert and oriented to person, place, and time.      Coordination: Coordination normal.      Gait: Gait normal.   Psychiatric:         Behavior: Behavior normal.         Thought Content: Thought content normal.         Judgment: Judgment normal.         RECORDS REVIEW:   I have reviewed and interpreted her discharge summary    ASSESSMENT     Diagnoses and all orders for this visit:    1. CKD (chronic kidney disease) stage 4, GFR 15-29 ml/min (CMS/[facility]) (Primary)    2. Hyperkalemia    3. Elevated BUN    4. Decreased oral intake    5. Weight loss    6. Impaired mobility and ADLs        PLAN    CKD/Elevated BUN/Hyponatremia/Hyperkalemia  -Creatinine 2.1 which is still slightly elevated but better than prior to admission and is at baseline for patient.  BUN 44 so patient to receive normal saline at 100 cc an hour x2 L.  Potassium 5.7 and patient to receive Kayexalate  15 g every 6 hours x 4 doses.  Repeat BMP Wednesday.  Will follow-up with labs tomorrow and likely stop Avapro and start Norvasc for her hypertension since the Avapro is contributing to her hyperkalemia.  Will have nursing contact Dr. Schilling's office about changing torsemide due to her chronic dehydration, will as discuss medication changes from Avapro to Norvasc.  Will follow-up with results.    Decreased appetite/weight loss  -We will have nursing place order for no red meat and to send patient chicken, fish or turkey only.  Did advise patient that it was okay for her to eat soups even if high in sodium, to meet her nutritional needs.  Patient also reports she will drink the renal Ensure to improve her nutrition.  Will follow-up and continue to monitor    Patient was encouraged to keep me informed of any acute changes, lack of improvement, or any new concerning symptoms.    Staff to continue supportive care for all ADLs.    [x]  Discussed Patient in detail with nursing/staff, addressed all needs today.     [x]  Plan of Care Reviewed   [x]  PT/OT Reviewed   [x]  Order Changes  []  Discharge Plans Reviewed  [x]  Advance Directive on file with Nursing Home.   [x]  POA on file with Nursing Home.   [x]  Code Status listed: [x]  Full Code   []  DNR     “I confirm accuracy of unchanged data/findings which have been carried forward from previous visit, as well as I have updated appropriately those that have changed.”             Roma Diaz, APRN.

## 2021-03-09 ENCOUNTER — NURSING HOME (OUTPATIENT)
Dept: FAMILY MEDICINE CLINIC | Facility: CLINIC | Age: 81
End: 2021-03-09

## 2021-03-09 VITALS
WEIGHT: 112 LBS | BODY MASS INDEX: 22.62 KG/M2 | OXYGEN SATURATION: 94 % | DIASTOLIC BLOOD PRESSURE: 68 MMHG | RESPIRATION RATE: 18 BRPM | TEMPERATURE: 97.2 F | SYSTOLIC BLOOD PRESSURE: 132 MMHG | HEART RATE: 78 BPM

## 2021-03-09 DIAGNOSIS — Z78.9 IMPAIRED MOBILITY AND ADLS: ICD-10-CM

## 2021-03-09 DIAGNOSIS — R79.9 ELEVATED BUN: ICD-10-CM

## 2021-03-09 DIAGNOSIS — N18.4 CKD (CHRONIC KIDNEY DISEASE) STAGE 4, GFR 15-29 ML/MIN (HCC): ICD-10-CM

## 2021-03-09 DIAGNOSIS — Z74.09 IMPAIRED MOBILITY AND ADLS: ICD-10-CM

## 2021-03-09 DIAGNOSIS — R63.2 APPETITE INCREASE: ICD-10-CM

## 2021-03-09 DIAGNOSIS — I10 ESSENTIAL HYPERTENSION: ICD-10-CM

## 2021-03-09 DIAGNOSIS — R63.5 WEIGHT INCREASE: ICD-10-CM

## 2021-03-09 PROCEDURE — 99309 SBSQ NF CARE MODERATE MDM 30: CPT | Performed by: NURSE PRACTITIONER

## 2021-03-10 NOTE — PROGRESS NOTES
Nursing Home Follow Up Note      Mikey Duran DO []   FATUMA Monet [x]  [address], [place] [address]  Phone: [phone]  Fax: [phone] Baltazar Borden MD []    Jose Antonio Pride DO []   [address] [place] [address]  Phone: [phone]  Fax: [phone]     PATIENT NAME: Carolyne Martins                                                                          YOB: 1940           DATE OF SERVICE: 03/9/2021  FACILITY:  [][facility]   [] [place]   [x] [facility]   [] [facility]   [] Other ______________________________________________________________________      CHIEF COMPLAINT:    Follow up abnormal labs    Follow up appetite    HISTORY OF PRESENT ILLNESS:     Patient with history of hypokalemia last week it was corrected with Kayexalate.  Her Avapro was also stopped and Norvasc started for treatment of her hypertension.  She had labs repeated yesterday and results are available today.  Her blood pressure has been doing very well with Norvasc.    Patient had been having poor p.o. intake and weight loss since admission so diet changes made last week.  Orders were given for patient to receive chicken, turkey or fish only for meats and no green leafy vegetables.  Facility also purchased renal Ensure for patient and she has been drinking these very well every day and has gained 1 pound in the past week.    Patient is very pleasant today and reports that she is feeling much better.  She is excited that she is going home next Tuesday.  She has an appointment with Ortho Monday and is expected to go home Tuesday.      PAST MEDICAL & SURGICAL HISTORY:   Past Medical History:   Diagnosis Date   • Anemia 1998   • Anemia    • Arthritis    • Back pain    • Bleeding from anus    • Bronchitis     STATES HAS BRONCHITIS CURRENTLY (12/5/17).     • Bronchitis 12/2017   • CAD (coronary artery disease)    • Cataract, bilateral    • Chronic kidney disease     STAGE 4.  SEES TERA   •  "Chronic kidney failure     REPORTS STAGE IV   • Difficulty swallowing solids    • Disease of thyroid gland    • Fracture, radius 2016    right distal radius and ulna, healed.   • GERD (gastroesophageal reflux disease)    • Gout    • High cholesterol    • History of blood transfusion 2019   • History of nuclear stress test 2014    DR. CORTEZ.  \"IT WAS OK\"   • Hyperparathyroidism (CMS/HCC)    • Hyperparathyroidism (CMS/HCC)    • Hypertension    • Impaired functional mobility, balance, gait, and endurance    • Iron deficiency    • Osteoarthritis    • Osteoarthritis of knees, bilateral     Both knees Supartz injection series August, 2015   • Osteoporosis    • Palpitations    • Personal history of cardiac murmur    • PONV (postoperative nausea and vomiting)    • Presence of pessary    • Problems with swallowing     WITH FOOD OCCASSIONALLY   • Rheumatic heart disease     Personal history   • Rotator cuff tendonitis     right    • Rupture of right proximal biceps tendon     chronic   • Seasonal allergies    • Sinus problem    • Spinal headache     REPORTS AFTER DELIVERY OF SON   • Subacromial bursitis     right    • Valvular heart disease    • Vision problems    • Vitamin B12 deficiency    • Vitamin D deficiency    • Wears glasses       Past Surgical History:   Procedure Laterality Date   • CATARACT EXTRACTION Bilateral    • CERVICAL POLYPECTOMY     • COLONOSCOPY  2011   • COLONOSCOPY N/A 1/9/2018    Procedure: COLONOSCOPY with endoscopic mucosal resection (hot snare), normal saline submucosal injection, argon thermal ablation, resolution clip placement x4, and cold biopsy polypectomy;  Surgeon: Pieter Concepcion MD;  Location: [facility] ENDOSCOPY;  Service:    • ENDOSCOPY N/A 12/6/2017    Procedure: ESOPHAGOGASTRODUODENOSCOPY with biopsies and hot snare polypectomies;  Surgeon: Pieter Concepcion MD;  Location: [facility] ENDOSCOPY;  Service:    • ENDOSCOPY N/A 1/15/2019    Procedure: ESOPHAGOGASTRODUODENOSCOPY WITH BIOPSIES AND HOT " SNARE POLYPECTOMIES X10;  Surgeon: Pieter Concepcion MD;  Location: [facility] ENDOSCOPY;  Service: Gastroenterology   • HIP TROCHANTERIC NAILING WITH INTRAMEDULLARY HIP SCREW Left 2/14/2021    Procedure: HIP TROCHANTER NAIL SHORT WITH INTRAMEDULLARY HIP SCREW, LEFT;  Surgeon: Gabriele Loyd MD;  Location: [facility] OR;  Service: Orthopedics;  Laterality: Left;   • UPPER GASTROINTESTINAL ENDOSCOPY  08/18/2014         MEDICATIONS:  I have reviewed and reconciled the patients medication list in the patients chart at the skilled nursing facility today.      ALLERGIES:    Allergies   Allergen Reactions   • Ferrlecit [Na Ferric Gluc Cplx In Sucrose] Swelling   • Ranitidine Hcl Shortness Of Breath   • Levofloxacin Other (See Comments)     Leg cramps     • Lisinopril Cough         SOCIAL HISTORY:    Social History     Socioeconomic History   • Marital status:      Spouse name: Not on file   • Number of children: Not on file   • Years of education: Not on file   • Highest education level: Not on file   Tobacco Use   • Smoking status: Never Smoker   • Smokeless tobacco: Never Used   Substance and Sexual Activity   • Alcohol use: Never   • Drug use: Never   • Sexual activity: Defer       FAMILY HISTORY:    Family History   Problem Relation Age of Onset   • Liver cancer Maternal Grandmother    • Gout Other    • Osteoporosis Other    • Stroke Other    • Ulcers Other    • Asthma Other    • Hypertension Other    • Heart disease Other    • Osteoarthritis Other    • Colon cancer Neg Hx        REVIEW OF SYSTEMS:    Review of Systems   Constitutional: Negative for activity change, appetite change, chills, diaphoresis, fatigue, fever, unexpected weight gain and unexpected weight loss.   HENT: Negative for congestion, ear pain, mouth sores, nosebleeds, postnasal drip, rhinorrhea, sinus pressure, sneezing, sore throat, swollen glands and trouble swallowing.    Eyes: Negative for blurred vision, pain, discharge, redness, itching and  visual disturbance.   Respiratory: Negative for apnea, cough, choking, chest tightness, shortness of breath, wheezing and stridor.    Cardiovascular: Negative for chest pain, palpitations and leg swelling.   Gastrointestinal: Positive for GERD. Negative for abdominal distention, abdominal pain, blood in stool, constipation, diarrhea, nausea, vomiting and indigestion.   Endocrine: Negative for polydipsia and polyuria.   Genitourinary: Negative for decreased urine volume, difficulty urinating, dysuria, flank pain, frequency, hematuria, urgency and urinary incontinence.   Musculoskeletal: Positive for arthralgias (left femur fracture) and gait problem. Negative for back pain, joint swelling and myalgias.   Skin: Negative for color change, dry skin, rash and skin lesions.   Allergic/Immunologic: Negative for environmental allergies.   Neurological: Positive for weakness. Negative for dizziness, seizures, speech difficulty, memory problem and confusion.   Psychiatric/Behavioral: Negative for behavioral problems, dysphoric mood, hallucinations, sleep disturbance and depressed mood. The patient is not nervous/anxious.          PHYSICAL EXAMINATION:   VITAL SIGNS:   Vitals:    03/09/21 1314   BP: 132/68   Pulse: 78   Resp: 18   Temp: 97.2 °F (36.2 °C)   SpO2: 94%   Weight: 50.8 kg (112 lb)       Physical Exam  Constitutional:       General: She is not in acute distress.     Appearance: She is well-developed.   HENT:      Head: Normocephalic.      Right Ear: External ear normal.      Left Ear: External ear normal.      Nose: Nose normal.      Mouth/Throat:      Pharynx: No oropharyngeal exudate.   Eyes:      Conjunctiva/sclera: Conjunctivae normal.      Pupils: Pupils are equal, round, and reactive to light.   Neck:      Thyroid: No thyromegaly.      Trachea: No tracheal deviation.   Cardiovascular:      Rate and Rhythm: Normal rate and regular rhythm.      Heart sounds: Normal heart sounds. No murmur.   Pulmonary:       Effort: Pulmonary effort is normal. No respiratory distress.      Breath sounds: Normal breath sounds. No wheezing or rales.   Chest:      Chest wall: No tenderness.   Abdominal:      General: Bowel sounds are normal. There is no distension.      Palpations: Abdomen is soft. There is no splenomegaly or mass.      Tenderness: There is no abdominal tenderness. There is no guarding or rebound.      Hernia: No hernia is present.   Musculoskeletal:      Cervical back: Normal range of motion and neck supple.      Left hip: Decreased range of motion.      Left upper leg: Tenderness present.      Comments: Left femur fracture   Lymphadenopathy:      Cervical: No cervical adenopathy.      Right cervical: No superficial, deep or posterior cervical adenopathy.     Left cervical: No superficial, deep or posterior cervical adenopathy.   Skin:     General: Skin is warm and dry.      Findings: No rash.   Neurological:      Mental Status: She is alert and oriented to person, place, and time.      Coordination: Coordination normal.      Gait: Gait normal.   Psychiatric:         Behavior: Behavior normal.         Thought Content: Thought content normal.         Judgment: Judgment normal.         RECORDS REVIEW:   I have reviewed and interpreted her discharge summary    ASSESSMENT     Diagnoses and all orders for this visit:    1. CKD (chronic kidney disease) stage 4, GFR 15-29 ml/min (CMS/HCC)    2. Elevated BUN    3. Essential hypertension    4. Appetite increase    5. Weight increase    6. Impaired mobility and ADLs        PLAN    CKD/Elevated BUN/Hyperkalemia  -Creatinine 1.8, GFR 27 and BUN 36.  These are much improved from previous labs.  Potassium 4.3, normal.  Patient is drinking much better and encouraged to continue this.  Her blood pressure is well controlled with Norvasc.  Check BMP Monday.  Will follow-up and continue to monitor.    Increased appetite/weight increase  -Appetite has been much improved with diet changes and  patient has been drinking renal Ensure daily.  She has gained 1 pound in the past week.  Will follow-up and continue to monitor    Patient was encouraged to keep me informed of any acute changes, lack of improvement, or any new concerning symptoms.    Staff to continue supportive care for all ADLs.    [x]  Discussed Patient in detail with nursing/staff, addressed all needs today.     [x]  Plan of Care Reviewed   [x]  PT/OT Reviewed   [x]  Order Changes  []  Discharge Plans Reviewed  [x]  Advance Directive on file with Nursing Home.   [x]  POA on file with Nursing Home.   [x]  Code Status listed: [x]  Full Code   []  DNR     “I confirm accuracy of unchanged data/findings which have been carried forward from previous visit, as well as I have updated appropriately those that have changed.”             Roma Diaz, APRN.

## 2021-03-11 PROBLEM — J41.0 SIMPLE CHRONIC BRONCHITIS (HCC): Status: ACTIVE | Noted: 2021-03-11

## 2021-03-11 NOTE — PROGRESS NOTES
Nursing Home Progress Note        Mikey Duran DO []  FATUMA Monet []  [address] [place] [address]  Phone: [phone]  Fax: [phone] Baltazar Borden MD []  Jose Antonio Pride DO [x]   [address] [place] [address]  Phone: [phone]  Fax: [phone]     PATIENT NAME: Carolyne Martins                                                                          YOB: 1940           DATE OF SERVICE: 03/04/2021  FACILITY:  [] [facility]   [] [place]   [x] [facility]   [] [facility]  []  [facility]  [] Other ______________________________________________________________________     CHIEF COMPLAINT:  Chronic medical management      HISTORY OF PRESENT ILLNESS:   Patient is a 80-year-old white female with a history of CKD stage IV, hypertension, osteoporosis, and hyperlipidemia who was transferred to the nursing facility after hospitalization for left hip fracture with ORIF on 2/14/2021.  Hospitalization was complicated by renal disease as well as blood loss anemia which required 2 units packed red blood cells.    Over the last month since her admission, patient has frequently become dehydrated and has required IV fluids as well as medications and treatments to address hyperkalemia.  She has been obtaining BMPs weekly and is currently on torsemide Monday Wednesday Friday 5 mg.  Despite current efforts, her weight has been trending downwards.    PAST MEDICAL & SURGICAL HISTORY:   Past Medical History:   Diagnosis Date   • Anemia 1998   • Anemia    • Arthritis    • Back pain    • Bleeding from anus    • Bronchitis     STATES HAS BRONCHITIS CURRENTLY (12/5/17).     • Bronchitis 12/2017   • CAD (coronary artery disease)    • Cataract, bilateral    • Chronic kidney disease     STAGE 4.  SEES TERA   • Chronic kidney failure     REPORTS STAGE IV   • Difficulty swallowing solids    • Disease of thyroid gland    • Fracture, radius 2016    right distal radius and ulna,  "healed.   • GERD (gastroesophageal reflux disease)    • Gout    • High cholesterol    • History of blood transfusion 2019   • History of nuclear stress test 2014    DR. CORTEZ.  \"IT WAS OK\"   • Hyperparathyroidism (CMS/HCC)    • Hyperparathyroidism (CMS/HCC)    • Hypertension    • Impaired functional mobility, balance, gait, and endurance    • Iron deficiency    • Osteoarthritis    • Osteoarthritis of knees, bilateral     Both knees Supartz injection series August, 2015   • Osteoporosis    • Palpitations    • Personal history of cardiac murmur    • PONV (postoperative nausea and vomiting)    • Presence of pessary    • Problems with swallowing     WITH FOOD OCCASSIONALLY   • Rheumatic heart disease     Personal history   • Rotator cuff tendonitis     right    • Rupture of right proximal biceps tendon     chronic   • Seasonal allergies    • Sinus problem    • Spinal headache     REPORTS AFTER DELIVERY OF SON   • Subacromial bursitis     right    • Valvular heart disease    • Vision problems    • Vitamin B12 deficiency    • Vitamin D deficiency    • Wears glasses       Past Surgical History:   Procedure Laterality Date   • CATARACT EXTRACTION Bilateral    • CERVICAL POLYPECTOMY     • COLONOSCOPY  2011   • COLONOSCOPY N/A 1/9/2018    Procedure: COLONOSCOPY with endoscopic mucosal resection (hot snare), normal saline submucosal injection, argon thermal ablation, resolution clip placement x4, and cold biopsy polypectomy;  Surgeon: Pieter Concepcion MD;  Location: [facility] ENDOSCOPY;  Service:    • ENDOSCOPY N/A 12/6/2017    Procedure: ESOPHAGOGASTRODUODENOSCOPY with biopsies and hot snare polypectomies;  Surgeon: Pieter Concepcion MD;  Location: [facility] ENDOSCOPY;  Service:    • ENDOSCOPY N/A 1/15/2019    Procedure: ESOPHAGOGASTRODUODENOSCOPY WITH BIOPSIES AND HOT SNARE POLYPECTOMIES X10;  Surgeon: Pieter Concepcion MD;  Location: [facility] ENDOSCOPY;  Service: Gastroenterology   • HIP TROCHANTERIC NAILING WITH INTRAMEDULLARY HIP SCREW " Left 2/14/2021    Procedure: HIP TROCHANTER NAIL SHORT WITH INTRAMEDULLARY HIP SCREW, LEFT;  Surgeon: Gabriele Loyd MD;  Location: [facility];  Service: Orthopedics;  Laterality: Left;   • UPPER GASTROINTESTINAL ENDOSCOPY  08/18/2014         MEDICATIONS:  I have reviewed and reconciled the patients medication list in the patients chart at the skilled nursing facility today, 03/04/2021.      ALLERGIES:  Allergies   Allergen Reactions   • Ferrlecit [Na Ferric Gluc Cplx In Sucrose] Swelling   • Ranitidine Hcl Shortness Of Breath   • Levofloxacin Other (See Comments)     Leg cramps     • Lisinopril Cough         SOCIAL HISTORY:  Social History     Socioeconomic History   • Marital status:      Spouse name: Not on file   • Number of children: Not on file   • Years of education: Not on file   • Highest education level: Not on file   Tobacco Use   • Smoking status: Never Smoker   • Smokeless tobacco: Never Used   Substance and Sexual Activity   • Alcohol use: Never   • Drug use: Never   • Sexual activity: Defer       FAMILY HISTORY:  Family History   Problem Relation Age of Onset   • Liver cancer Maternal Grandmother    • Gout Other    • Osteoporosis Other    • Stroke Other    • Ulcers Other    • Asthma Other    • Hypertension Other    • Heart disease Other    • Osteoarthritis Other    • Colon cancer Neg Hx        REVIEW OF SYSTEMS:  Review of Systems   Constitutional: Negative for chills, fatigue and fever.   HENT: Negative for congestion, ear pain, rhinorrhea, sinus pressure and sore throat.    Eyes: Negative for visual disturbance.   Respiratory: Negative for cough, chest tightness, shortness of breath and wheezing.    Cardiovascular: Negative for chest pain, palpitations and leg swelling.   Gastrointestinal: Negative for abdominal pain, blood in stool, constipation, diarrhea, nausea and vomiting.   Endocrine: Negative for polydipsia and polyuria.   Genitourinary: Negative for dysuria and hematuria.    Musculoskeletal: Negative for arthralgias and back pain.   Skin: Negative for rash.   Neurological: Negative for dizziness, light-headedness, numbness and headaches.   Psychiatric/Behavioral: Negative for dysphoric mood and sleep disturbance. The patient is not nervous/anxious.           PHYSICAL EXAMINATION:     VITAL SIGNS:  /85   Pulse 74   Temp 98 °F (36.7 °C)   Resp 18   Wt 50.3 kg (111 lb)   LMP  (LMP Unknown)   SpO2 96%   BMI 22.42 kg/m²     Physical Exam  Vitals and nursing note reviewed.   Constitutional:       Appearance: Normal appearance. She is well-developed.      Comments: Frail elderly female resting comfortably in bed   HENT:      Head: Normocephalic and atraumatic.      Nose: Nose normal.      Mouth/Throat:      Mouth: Mucous membranes are moist.      Pharynx: No oropharyngeal exudate.   Eyes:      General: No scleral icterus.     Conjunctiva/sclera: Conjunctivae normal.      Pupils: Pupils are equal, round, and reactive to light.   Neck:      Thyroid: No thyromegaly.   Cardiovascular:      Rate and Rhythm: Normal rate and regular rhythm.      Heart sounds: Normal heart sounds. No murmur. No friction rub. No gallop.    Pulmonary:      Effort: Pulmonary effort is normal. No respiratory distress.      Breath sounds: Normal breath sounds. No wheezing.   Abdominal:      General: Bowel sounds are normal. There is no distension.      Palpations: Abdomen is soft.      Tenderness: There is no abdominal tenderness.   Musculoskeletal:         General: No deformity or signs of injury.      Cervical back: Normal range of motion and neck supple.   Lymphadenopathy:      Cervical: No cervical adenopathy.   Skin:     General: Skin is warm and dry.      Findings: No rash.   Neurological:      Mental Status: She is alert and oriented to person, place, and time.   Psychiatric:         Mood and Affect: Mood normal.         Behavior: Behavior normal.         RECORDS REVIEW:   [facility]  discharge summary 2/18/2021    ASSESSMENT     Diagnoses and all orders for this visit:    1. Chronic kidney disease (CKD), stage IV (severe) (CMS/HCC) (Primary)    2. Dehydration    3. Weight loss, non-intentional    4. Hyperkalemia    5. Simple chronic bronchitis (CMS/HCC)        PLAN    CKD stage IV  -Currently following with nephrology.  BMP is being checked weekly.  Creatinine has been at baseline at 2.1 recently.  Intermittently due to poor p.o. intake, she does need IV fluids.    Hyperkalemia  -Avapro was discontinued recently and amlodipine was started in place for blood pressure control    Hypertension  -Avapro changed to amlodipine 5 mg.    Unintentional weight loss  -Likely due to progression of renal disease or dementia.  -Currently on Nepro shakes to improve nutrition.    Left hip fracture  -Patient appears to be recovering fairly well.  Continue physical therapy.    [x]  Discussed Patient in detail with nursing/staff, addressed all needs today.     [x]  Plan of Care Reviewed   [x]  PT/OT Reviewed   []  Order Changes  []  Discharge Plans Reviewed  [x]  Advance Directive on file with Nursing Home.   [x]  POA on file with Nursing Home.    [x]  Code Status listed and reviewed.          Jose Antonio Pride DO.  3/11/2021

## 2021-03-15 ENCOUNTER — OFFICE VISIT (OUTPATIENT)
Dept: ORTHOPEDIC SURGERY | Facility: CLINIC | Age: 81
End: 2021-03-15

## 2021-03-15 VITALS — WEIGHT: 112 LBS | HEIGHT: 59 IN | BODY MASS INDEX: 22.58 KG/M2 | RESPIRATION RATE: 18 BRPM | TEMPERATURE: 97.7 F

## 2021-03-15 DIAGNOSIS — S72.002A CLOSED FRACTURE OF LEFT HIP, INITIAL ENCOUNTER (HCC): Primary | ICD-10-CM

## 2021-03-15 PROCEDURE — 99024 POSTOP FOLLOW-UP VISIT: CPT | Performed by: PHYSICIAN ASSISTANT

## 2021-03-15 RX ORDER — SODIUM POLYSTYRENE SULFONATE 15 G/60ML
SUSPENSION ORAL; RECTAL
Status: ON HOLD | COMMUNITY
Start: 2021-03-02 | End: 2021-03-30

## 2021-03-15 NOTE — PROGRESS NOTES
"Subjective   Patient ID: Carolyne Martins is a 80 y.o. right hand dominant female is here today for a post-operative visit.  Post-op of the Left Hip (ORIF left proximal femur 2/14/21, presents from [facility] for staple removal)          CHIEF COMPLAINT:     post op follow up    History of Present Illness      Pain controlled: [] no   [x] yes   Medication refill requested: [x] no   [] yes    Patient compliant with instructions: [] no   [x] yes   Other: Patient denies CP or SOA.  She states the pain has decreased overall.     Past Medical History:   Diagnosis Date   • Anemia 1998   • Anemia    • Arthritis    • Back pain    • Bleeding from anus    • Bronchitis     STATES HAS BRONCHITIS CURRENTLY (12/5/17).     • Bronchitis 12/2017   • CAD (coronary artery disease)    • Cataract, bilateral    • Chronic kidney disease     STAGE 4.  SEEREBA HALEY   • Chronic kidney failure     REPORTS STAGE IV   • Difficulty swallowing solids    • Disease of thyroid gland    • Fracture, radius 2016    right distal radius and ulna, healed.   • GERD (gastroesophageal reflux disease)    • Gout    • High cholesterol    • History of blood transfusion 2019   • History of nuclear stress test 2014    DR. CORTEZ.  \"IT WAS OK\"   • Hyperparathyroidism (CMS/HCC)    • Hyperparathyroidism (CMS/HCC)    • Hypertension    • Impaired functional mobility, balance, gait, and endurance    • Iron deficiency    • Osteoarthritis    • Osteoarthritis of knees, bilateral     Both knees Supartz injection series August, 2015   • Osteoporosis    • Palpitations    • Personal history of cardiac murmur    • PONV (postoperative nausea and vomiting)    • Presence of pessary    • Problems with swallowing     WITH FOOD OCCASSIONALLY   • Rheumatic heart disease     Personal history   • Rotator cuff tendonitis     right    • Rupture of right proximal biceps tendon     chronic   • Seasonal allergies    • Sinus problem    • Spinal headache     REPORTS AFTER DELIVERY OF SON   • " Subacromial bursitis     right    • Valvular heart disease    • Vision problems    • Vitamin B12 deficiency    • Vitamin D deficiency    • Wears glasses         Past Surgical History:   Procedure Laterality Date   • CATARACT EXTRACTION Bilateral    • CERVICAL POLYPECTOMY     • COLONOSCOPY  2011   • COLONOSCOPY N/A 1/9/2018    Procedure: COLONOSCOPY with endoscopic mucosal resection (hot snare), normal saline submucosal injection, argon thermal ablation, resolution clip placement x4, and cold biopsy polypectomy;  Surgeon: Pieter Concepcion MD;  Location: [facility] ENDOSCOPY;  Service:    • ENDOSCOPY N/A 12/6/2017    Procedure: ESOPHAGOGASTRODUODENOSCOPY with biopsies and hot snare polypectomies;  Surgeon: Pieter Concepcion MD;  Location: [facility] ENDOSCOPY;  Service:    • ENDOSCOPY N/A 1/15/2019    Procedure: ESOPHAGOGASTRODUODENOSCOPY WITH BIOPSIES AND HOT SNARE POLYPECTOMIES X10;  Surgeon: Pieter Concepcion MD;  Location: [facility] ENDOSCOPY;  Service: Gastroenterology   • HIP TROCHANTERIC NAILING WITH INTRAMEDULLARY HIP SCREW Left 2/14/2021    Procedure: HIP TROCHANTER NAIL SHORT WITH INTRAMEDULLARY HIP SCREW, LEFT;  Surgeon: Gabriele Loyd MD;  Location: [facility] OR;  Service: Orthopedics;  Laterality: Left;   • UPPER GASTROINTESTINAL ENDOSCOPY  08/18/2014       Allergies   Allergen Reactions   • Ferrlecit [Na Ferric Gluc Cplx In Sucrose] Swelling   • Ranitidine Hcl Shortness Of Breath   • Levofloxacin Other (See Comments)     Leg cramps     • Lisinopril Cough       Review of Systems   Constitutional: Negative for diaphoresis, fever and unexpected weight change.   HENT: Negative for dental problem and sore throat.    Eyes: Negative for visual disturbance.   Respiratory: Negative for shortness of breath.    Cardiovascular: Negative for chest pain.   Gastrointestinal: Negative for abdominal pain, constipation, diarrhea, nausea and vomiting.   Genitourinary: Negative for difficulty urinating and frequency.   Musculoskeletal:  "Positive for arthralgias (left hip, mild) and gait problem (presents in wheelchair).   Neurological: Negative for headaches.   Hematological: Does not bruise/bleed easily.     I have reviewed the medical and surgical history, family history, social history, medications, and/or allergies, and the review of systems of this report.    Objective   Temp 97.7 °F (36.5 °C) (Skin)   Resp 18   Ht 149.9 cm (59\")   Wt 50.8 kg (112 lb)   LMP  (LMP Unknown)   BMI 22.62 kg/m²       Signs of infection: [x] no                    [] yes   Drainage: [x] no                    [] yes   Incision: [x] healing well     []healed well   Motor exam intact: [] no                    [x] yes   Neurovascular exam intact: [] no                    [x] yes   Signs of compartment syndrome: [x] no                    [] yes   Signs of DVT: [x] no                    [] yes   Other:      Physical Exam  Ortho Exam     Extremity DVT signs are negative on physical exam with negative Daniele sign, no calf pain, no palpable cords and no skin tone change  Neurologic Exam    Assessment/Plan     Independent Review of Radiographic Studies:    X-ray of the left hip 2 view performed in the office for the evaluation of left femur fracture healing.  Comparison films are available and reviewed.  No evidence of hardware loosening.  There has been superior migration of the lesser trochanter when compared to prior imaging             Procedures     Diagnoses and all orders for this visit:    1. Closed fracture of left hip, initial encounter (CMS/[facility]) (Primary)         Recommendations/Plan:     Sutures Staples or Pins [x] Removed today  [] At prior visit  [] Plan removal later   Physical therapy: [x]rehab facility  []outpatient referral  [] therapy ongoing   Ultrasound: [x]not ordered         []order given to patient   Labs: [x]not ordered         []order given to patient   Weight Bearing status: []Full [x]WBAT []PWB []NWB []Other     Weight bearing parameters " reviewed  Physical therapy program ongoing  Take prescribed medications as instructed only as tolerated    Follow-up in 5 weeks x-ray on arrival  Patient is encouraged and agreeable to call or return sooner for any issues or concerns.

## 2021-03-16 ENCOUNTER — NURSING HOME (OUTPATIENT)
Dept: FAMILY MEDICINE CLINIC | Facility: CLINIC | Age: 81
End: 2021-03-16

## 2021-03-16 VITALS
TEMPERATURE: 97.3 F | SYSTOLIC BLOOD PRESSURE: 156 MMHG | OXYGEN SATURATION: 96 % | HEART RATE: 78 BPM | WEIGHT: 112 LBS | DIASTOLIC BLOOD PRESSURE: 87 MMHG | BODY MASS INDEX: 22.62 KG/M2 | RESPIRATION RATE: 16 BRPM

## 2021-03-16 DIAGNOSIS — N18.4 ANEMIA DUE TO STAGE 4 CHRONIC KIDNEY DISEASE TREATED WITH ERYTHROPOIETIN (HCC): ICD-10-CM

## 2021-03-16 DIAGNOSIS — S72.002S CLOSED FRACTURE OF LEFT HIP, SEQUELA: ICD-10-CM

## 2021-03-16 DIAGNOSIS — G89.4 CHRONIC PAIN SYNDROME: ICD-10-CM

## 2021-03-16 DIAGNOSIS — Z74.09 IMPAIRED MOBILITY AND ADLS: ICD-10-CM

## 2021-03-16 DIAGNOSIS — M81.0 SENILE OSTEOPOROSIS: ICD-10-CM

## 2021-03-16 DIAGNOSIS — I10 ESSENTIAL HYPERTENSION: ICD-10-CM

## 2021-03-16 DIAGNOSIS — D63.1 ANEMIA DUE TO STAGE 4 CHRONIC KIDNEY DISEASE TREATED WITH ERYTHROPOIETIN (HCC): ICD-10-CM

## 2021-03-16 DIAGNOSIS — Z78.9 IMPAIRED MOBILITY AND ADLS: ICD-10-CM

## 2021-03-16 PROCEDURE — 99316 NF DSCHRG MGMT 30 MIN+: CPT | Performed by: NURSE PRACTITIONER

## 2021-03-16 RX ORDER — HYDROCODONE BITARTRATE AND ACETAMINOPHEN 5; 325 MG/1; MG/1
1 TABLET ORAL EVERY 4 HOURS PRN
Qty: 60 TABLET | Refills: 0 | Status: SHIPPED | OUTPATIENT
Start: 2021-03-16 | End: 2021-03-31 | Stop reason: HOSPADM

## 2021-03-17 PROBLEM — R10.13 EPIGASTRIC PAIN: Status: RESOLVED | Noted: 2019-01-04 | Resolved: 2021-03-17

## 2021-03-17 PROBLEM — R10.32 LEFT LOWER QUADRANT ABDOMINAL PAIN: Status: RESOLVED | Noted: 2020-12-16 | Resolved: 2021-03-17

## 2021-03-17 PROBLEM — K59.00 CONSTIPATION: Status: RESOLVED | Noted: 2017-12-04 | Resolved: 2021-03-17

## 2021-03-17 PROBLEM — R63.4 WEIGHT LOSS: Status: RESOLVED | Noted: 2019-01-04 | Resolved: 2021-03-17

## 2021-03-17 PROBLEM — I10 ESSENTIAL HYPERTENSION: Status: ACTIVE | Noted: 2021-03-17

## 2021-03-18 NOTE — PROGRESS NOTES
Nursing Home Follow Up Note      Mikey Duran DO []   FATUMA Monet [x]  [address] [place] [address]  Phone: [phone]  Fax: [phone] Baltazar Borden MD []    Jose Antonio Pride DO []   [address] [place] [address]  Phone: [phone]  Fax: [phone]     PATIENT NAME: Carolyne Martins                                                                          YOB: 1940           DATE OF SERVICE: 03/16/2021  FACILITY:  [][facility]   [] [facility]   [x] [facility]   [] [facility]   [] Other ______________________________________________________________________      CHIEF COMPLAINT:    Discharging home    HISTORY OF PRESENT ILLNESS:      Patient is a 80-year-old white female with a history of CKD stage IV, hypertension, osteoporosis, and hyperlipidemia who was transferred to the nursing facility after hospitalization for left hip fracture with ORIF on 2/14/2021.  Hospitalization was complicated by renal disease as well as blood loss anemia which required 2 units packed red blood cells.     Over the last month since her admission, patient has frequently become dehydrated and has required IV fluids as well as medications and treatments to address hyperkalemia.  Her Losartan was stopped, due to hyperkalemia, and Norvasc started. This resolved her hyperkalemia. Patient had her Torsemide held, and IVF's given as needed, for dehydration. Patient was gotten some Bark[place]ukes, per her request, because she did not like the food in the facility. This helped her maintain her hydration better and maintain her weight. She has been followed by Nephrology while in the facility, for her CKD, who has agreed with all treatment. She will follow up with them as directed when discharged.    She progressed well with PT while in facility, and reached goals to go home. She will be going home later today with her . She will continue outpatient PT as directed, and follow up with  "Ortho as directed. She will follow up with her PCP for management of her chronic conditions.        PAST MEDICAL & SURGICAL HISTORY:   Past Medical History:   Diagnosis Date   • Anemia 1998   • Anemia    • Arthritis    • Back pain    • Bleeding from anus    • Bronchitis     STATES HAS BRONCHITIS CURRENTLY (12/5/17).     • Bronchitis 12/2017   • CAD (coronary artery disease)    • Cataract, bilateral    • Chronic kidney disease     STAGE 4.  SEES TERA   • Chronic kidney failure     REPORTS STAGE IV   • Difficulty swallowing solids    • Disease of thyroid gland    • Fracture, radius 2016    right distal radius and ulna, healed.   • GERD (gastroesophageal reflux disease)    • Gout    • High cholesterol    • History of blood transfusion 2019   • History of nuclear stress test 2014    DR. CORTEZ.  \"IT WAS OK\"   • Hyperparathyroidism (CMS/HCC)    • Hyperparathyroidism (CMS/HCC)    • Hypertension    • Impaired functional mobility, balance, gait, and endurance    • Iron deficiency    • Osteoarthritis    • Osteoarthritis of knees, bilateral     Both knees Supartz injection series August, 2015   • Osteoporosis    • Palpitations    • Personal history of cardiac murmur    • PONV (postoperative nausea and vomiting)    • Presence of pessary    • Problems with swallowing     WITH FOOD OCCASSIONALLY   • Rheumatic heart disease     Personal history   • Rotator cuff tendonitis     right    • Rupture of right proximal biceps tendon     chronic   • Seasonal allergies    • Sinus problem    • Spinal headache     REPORTS AFTER DELIVERY OF SON   • Subacromial bursitis     right    • Valvular heart disease    • Vision problems    • Vitamin B12 deficiency    • Vitamin D deficiency    • Wears glasses       Past Surgical History:   Procedure Laterality Date   • CATARACT EXTRACTION Bilateral    • CERVICAL POLYPECTOMY     • COLONOSCOPY  2011   • COLONOSCOPY N/A 1/9/2018    Procedure: COLONOSCOPY with endoscopic mucosal resection (hot snare), " normal saline submucosal injection, argon thermal ablation, resolution clip placement x4, and cold biopsy polypectomy;  Surgeon: Pieter Concepcion MD;  Location: [facility] ENDOSCOPY;  Service:    • ENDOSCOPY N/A 12/6/2017    Procedure: ESOPHAGOGASTRODUODENOSCOPY with biopsies and hot snare polypectomies;  Surgeon: Pieter Concepcion MD;  Location: [facility] ENDOSCOPY;  Service:    • ENDOSCOPY N/A 1/15/2019    Procedure: ESOPHAGOGASTRODUODENOSCOPY WITH BIOPSIES AND HOT SNARE POLYPECTOMIES X10;  Surgeon: Pieter Concepcion MD;  Location: [facility] ENDOSCOPY;  Service: Gastroenterology   • HIP TROCHANTERIC NAILING WITH INTRAMEDULLARY HIP SCREW Left 2/14/2021    Procedure: HIP TROCHANTER NAIL SHORT WITH INTRAMEDULLARY HIP SCREW, LEFT;  Surgeon: Gabriele Loyd MD;  Location: [facility] OR;  Service: Orthopedics;  Laterality: Left;   • UPPER GASTROINTESTINAL ENDOSCOPY  08/18/2014         MEDICATIONS:  I have reviewed and reconciled the patients medication list in the patients chart at the skilled nursing facility today.      ALLERGIES:    Allergies   Allergen Reactions   • Ferrlecit [Na Ferric Gluc Cplx In Sucrose] Swelling   • Ranitidine Hcl Shortness Of Breath   • Levofloxacin Other (See Comments)     Leg cramps     • Lisinopril Cough         SOCIAL HISTORY:    Social History     Socioeconomic History   • Marital status:      Spouse name: Not on file   • Number of children: Not on file   • Years of education: Not on file   • Highest education level: Not on file   Tobacco Use   • Smoking status: Never Smoker   • Smokeless tobacco: Never Used   Substance and Sexual Activity   • Alcohol use: Never   • Drug use: Never   • Sexual activity: Defer       FAMILY HISTORY:    Family History   Problem Relation Age of Onset   • Liver cancer Maternal Grandmother    • Gout Other    • Osteoporosis Other    • Stroke Other    • Ulcers Other    • Asthma Other    • Hypertension Other    • Heart disease Other    • Osteoarthritis Other    • Colon  cancer Neg Hx        REVIEW OF SYSTEMS:    Review of Systems   Constitutional: Negative for activity change, appetite change, chills, diaphoresis, fatigue, fever, unexpected weight gain and unexpected weight loss.   HENT: Negative for congestion, ear pain, mouth sores, nosebleeds, postnasal drip, rhinorrhea, sinus pressure, sneezing, sore throat, swollen glands and trouble swallowing.    Eyes: Negative for blurred vision, pain, discharge, redness, itching and visual disturbance.   Respiratory: Negative for apnea, cough, choking, chest tightness, shortness of breath, wheezing and stridor.    Cardiovascular: Negative for chest pain, palpitations and leg swelling.   Gastrointestinal: Negative for abdominal distention, abdominal pain, blood in stool, constipation, diarrhea, nausea, vomiting, GERD and indigestion.   Endocrine: Negative for polydipsia and polyuria.   Genitourinary: Negative for decreased urine volume, difficulty urinating, dysuria, flank pain, frequency, hematuria, urgency and urinary incontinence.   Musculoskeletal: Positive for arthralgias (left femur fracture) and gait problem. Negative for back pain, joint swelling and myalgias.   Skin: Negative for color change, dry skin, rash and skin lesions.   Allergic/Immunologic: Negative for environmental allergies.   Neurological: Positive for weakness. Negative for dizziness, seizures, speech difficulty, memory problem and confusion.   Psychiatric/Behavioral: Negative for behavioral problems, dysphoric mood, hallucinations, sleep disturbance and depressed mood. The patient is not nervous/anxious.          PHYSICAL EXAMINATION:   VITAL SIGNS:   Vitals:    03/16/21 1235   BP: 156/87   Pulse: 78   Resp: 16   Temp: 97.3 °F (36.3 °C)   SpO2: 96%   Weight: 50.8 kg (112 lb)       Physical Exam  Constitutional:       General: She is not in acute distress.     Appearance: She is well-developed.   HENT:      Head: Normocephalic.      Right Ear: External ear normal.       Left Ear: External ear normal.      Nose: Nose normal.      Mouth/Throat:      Pharynx: No oropharyngeal exudate.   Eyes:      Conjunctiva/sclera: Conjunctivae normal.      Pupils: Pupils are equal, round, and reactive to light.   Neck:      Thyroid: No thyromegaly.      Trachea: No tracheal deviation.   Cardiovascular:      Rate and Rhythm: Normal rate and regular rhythm.      Heart sounds: Normal heart sounds. No murmur heard.     Pulmonary:      Effort: Pulmonary effort is normal. No respiratory distress.      Breath sounds: Normal breath sounds. No wheezing or rales.   Chest:      Chest wall: No tenderness.   Abdominal:      General: Bowel sounds are normal. There is no distension.      Palpations: Abdomen is soft. There is no splenomegaly or mass.      Tenderness: There is no abdominal tenderness. There is no guarding or rebound.      Hernia: No hernia is present.   Musculoskeletal:      Cervical back: Normal range of motion and neck supple.      Left hip: Decreased range of motion.      Left upper leg: Tenderness present.      Comments: Left femur fracture   Lymphadenopathy:      Cervical: No cervical adenopathy.      Right cervical: No superficial, deep or posterior cervical adenopathy.     Left cervical: No superficial, deep or posterior cervical adenopathy.   Skin:     General: Skin is warm and dry.      Findings: No rash.   Neurological:      Mental Status: She is alert and oriented to person, place, and time.      Coordination: Coordination normal.      Gait: Gait normal.   Psychiatric:         Behavior: Behavior normal.         Thought Content: Thought content normal.         Judgment: Judgment normal.         RECORDS REVIEW:   I have reviewed and interpreted her discharge summary    ASSESSMENT     Diagnoses and all orders for this visit:    1. Closed fracture of left hip, sequela    2. Anemia due to stage 4 chronic kidney disease treated with erythropoietin (CMS/[facility])    3. Essential hypertension    4.  Chronic pain syndrome  -     HYDROcodone-acetaminophen (NORCO) 5-325 MG per tablet; Take 1 tablet by mouth Every 4 (Four) Hours As Needed for Moderate Pain  or Severe Pain  for up to 15 days.  Dispense: 60 tablet; Refill: 0    5. Senile osteoporosis    6. Impaired mobility and ADLs        PLAN    Patient is a 80-year-old white female with a history of CKD stage IV, hypertension, osteoporosis, and hyperlipidemia who was transferred to the nursing facility after hospitalization for left hip fracture with ORIF on 2/14/2021.  Hospitalization was complicated by renal disease as well as blood loss anemia which required 2 units packed red blood cells.     Over the last month since her admission, patient has frequently become dehydrated and has required IV fluids as well as medications and treatments to address hyperkalemia.  Her Losartan was stopped, due to hyperkalemia, and Norvasc started. This resolved her hyperkalemia. Patient had her Torsemide held, and IVF's given as needed, for dehydration. Patient was gotten some [employer], per her request, because she did not like the food in the facility. This helped her maintain her hydration better and maintain her weight. She has been followed by Nephrology while in the facility, for her CKD, who has agreed with all treatment. She will follow up with them as directed when discharged.    She progressed well with PT while in facility, and reached goals to go home. She will be going home later today with her . She will continue outpatient PT as directed, and follow up with Ortho as directed. She will follow up with her PCP for management of her chronic conditions.      Patient was encouraged to keep me informed of any acute changes, lack of improvement, or any new concerning symptoms.    Staff to continue supportive care for all ADLs.    [x]  Discussed Patient in detail with nursing/staff, addressed all needs today.     [x]  Plan of Care Reviewed   [x]  PT/OT  Reviewed   [x]  Order Changes  []  Discharge Plans Reviewed  [x]  Advance Directive on file with Nursing Home.   [x]  POA on file with Nursing Home.   [x]  Code Status listed: [x]  Full Code   []  DNR          I spent 35 minutes caring for Mrs Martins on this date of service. This time includes time spent by me in the following activities:preparing for the visit, performing a medically appropriate examination and/or evaluation , counseling and educating the patient/family/caregiver about discharge instructions and medications, ordering medications,  documenting information in the medical record and care coordination        “I confirm accuracy of unchanged data/findings which have been carried forward from previous visit, as well as I have updated appropriately those that have changed.”                 Roma Diaz, APRN.

## 2021-03-29 ENCOUNTER — APPOINTMENT (OUTPATIENT)
Dept: GENERAL RADIOLOGY | Facility: HOSPITAL | Age: 81
End: 2021-03-29

## 2021-03-29 ENCOUNTER — HOSPITAL ENCOUNTER (OUTPATIENT)
Facility: HOSPITAL | Age: 81
Setting detail: OBSERVATION
Discharge: HOME | End: 2021-03-31
Attending: EMERGENCY MEDICINE | Admitting: FAMILY MEDICINE

## 2021-03-29 ENCOUNTER — APPOINTMENT (OUTPATIENT)
Dept: ULTRASOUND IMAGING | Facility: HOSPITAL | Age: 81
End: 2021-03-29

## 2021-03-29 DIAGNOSIS — R60.0 LOWER EXTREMITY EDEMA: Primary | ICD-10-CM

## 2021-03-29 DIAGNOSIS — E87.1 HYPONATREMIA: ICD-10-CM

## 2021-03-29 LAB
ALBUMIN SERPL-MCNC: 3.1 G/DL (ref 3.5–5.2)
ALBUMIN/GLOB SERPL: 1.1 G/DL
ALP SERPL-CCNC: 130 U/L (ref 39–117)
ALT SERPL W P-5'-P-CCNC: 8 U/L (ref 1–33)
ANION GAP SERPL CALCULATED.3IONS-SCNC: 14.1 MMOL/L (ref 5–15)
AST SERPL-CCNC: 17 U/L (ref 1–32)
BASOPHILS # BLD AUTO: 0.01 10*3/MM3 (ref 0–0.2)
BASOPHILS NFR BLD AUTO: 0.2 % (ref 0–1.5)
BILIRUB SERPL-MCNC: 0.3 MG/DL (ref 0–1.2)
BUN SERPL-MCNC: 57 MG/DL (ref 8–23)
BUN/CREAT SERPL: 23.8 (ref 7–25)
CALCIUM SPEC-SCNC: 8.7 MG/DL (ref 8.6–10.5)
CHLORIDE SERPL-SCNC: 90 MMOL/L (ref 98–107)
CO2 SERPL-SCNC: 19.9 MMOL/L (ref 22–29)
CREAT SERPL-MCNC: 2.39 MG/DL (ref 0.57–1)
DEPRECATED RDW RBC AUTO: 65.1 FL (ref 37–54)
EOSINOPHIL # BLD AUTO: 0.11 10*3/MM3 (ref 0–0.4)
EOSINOPHIL NFR BLD AUTO: 2.3 % (ref 0.3–6.2)
ERYTHROCYTE [DISTWIDTH] IN BLOOD BY AUTOMATED COUNT: 18.8 % (ref 12.3–15.4)
GFR SERPL CREATININE-BSD FRML MDRD: 20 ML/MIN/1.73
GLOBULIN UR ELPH-MCNC: 2.9 GM/DL
GLUCOSE SERPL-MCNC: 74 MG/DL (ref 65–99)
HCT VFR BLD AUTO: 29.6 % (ref 34–46.6)
HGB BLD-MCNC: 9.6 G/DL (ref 12–15.9)
IMM GRANULOCYTES # BLD AUTO: 0.02 10*3/MM3 (ref 0–0.05)
IMM GRANULOCYTES NFR BLD AUTO: 0.4 % (ref 0–0.5)
LYMPHOCYTES # BLD AUTO: 1.43 10*3/MM3 (ref 0.7–3.1)
LYMPHOCYTES NFR BLD AUTO: 29.6 % (ref 19.6–45.3)
MCH RBC QN AUTO: 30.7 PG (ref 26.6–33)
MCHC RBC AUTO-ENTMCNC: 32.4 G/DL (ref 31.5–35.7)
MCV RBC AUTO: 94.6 FL (ref 79–97)
MONOCYTES # BLD AUTO: 0.4 10*3/MM3 (ref 0.1–0.9)
MONOCYTES NFR BLD AUTO: 8.3 % (ref 5–12)
NEUTROPHILS NFR BLD AUTO: 2.86 10*3/MM3 (ref 1.7–7)
NEUTROPHILS NFR BLD AUTO: 59.2 % (ref 42.7–76)
NRBC BLD AUTO-RTO: 0 /100 WBC (ref 0–0.2)
NT-PROBNP SERPL-MCNC: 3115 PG/ML (ref 0–1800)
PLATELET # BLD AUTO: 183 10*3/MM3 (ref 140–450)
PMV BLD AUTO: 9.5 FL (ref 6–12)
POTASSIUM SERPL-SCNC: 4.6 MMOL/L (ref 3.5–5.2)
PROT SERPL-MCNC: 6 G/DL (ref 6–8.5)
RBC # BLD AUTO: 3.13 10*6/MM3 (ref 3.77–5.28)
SARS-COV-2 RNA PNL SPEC NAA+PROBE: NOT DETECTED
SODIUM SERPL-SCNC: 124 MMOL/L (ref 136–145)
WBC # BLD AUTO: 4.83 10*3/MM3 (ref 3.4–10.8)

## 2021-03-29 PROCEDURE — 85025 COMPLETE CBC W/AUTO DIFF WBC: CPT | Performed by: PHYSICIAN ASSISTANT

## 2021-03-29 PROCEDURE — 80053 COMPREHEN METABOLIC PANEL: CPT | Performed by: PHYSICIAN ASSISTANT

## 2021-03-29 PROCEDURE — 96372 THER/PROPH/DIAG INJ SC/IM: CPT

## 2021-03-29 PROCEDURE — 71045 X-RAY EXAM CHEST 1 VIEW: CPT

## 2021-03-29 PROCEDURE — 87635 SARS-COV-2 COVID-19 AMP PRB: CPT | Performed by: EMERGENCY MEDICINE

## 2021-03-29 PROCEDURE — 25010000002 FUROSEMIDE PER 20 MG: Performed by: INTERNAL MEDICINE

## 2021-03-29 PROCEDURE — 97161 PT EVAL LOW COMPLEX 20 MIN: CPT

## 2021-03-29 PROCEDURE — C9803 HOPD COVID-19 SPEC COLLECT: HCPCS

## 2021-03-29 PROCEDURE — 99220 PR INITIAL OBSERVATION CARE/DAY 70 MINUTES: CPT | Performed by: FAMILY MEDICINE

## 2021-03-29 PROCEDURE — G0378 HOSPITAL OBSERVATION PER HR: HCPCS

## 2021-03-29 PROCEDURE — 99283 EMERGENCY DEPT VISIT LOW MDM: CPT

## 2021-03-29 PROCEDURE — 93970 EXTREMITY STUDY: CPT

## 2021-03-29 PROCEDURE — 96361 HYDRATE IV INFUSION ADD-ON: CPT

## 2021-03-29 PROCEDURE — 96374 THER/PROPH/DIAG INJ IV PUSH: CPT

## 2021-03-29 PROCEDURE — 25010000002 ENOXAPARIN PER 10 MG: Performed by: FAMILY MEDICINE

## 2021-03-29 PROCEDURE — 83880 ASSAY OF NATRIURETIC PEPTIDE: CPT | Performed by: PHYSICIAN ASSISTANT

## 2021-03-29 RX ORDER — SODIUM CHLORIDE 0.9 % (FLUSH) 0.9 %
10 SYRINGE (ML) INJECTION AS NEEDED
Status: DISCONTINUED | OUTPATIENT
Start: 2021-03-29 | End: 2021-03-31 | Stop reason: HOSPADM

## 2021-03-29 RX ORDER — ACETAMINOPHEN 325 MG/1
650 TABLET ORAL EVERY 4 HOURS PRN
Status: DISCONTINUED | OUTPATIENT
Start: 2021-03-29 | End: 2021-03-31 | Stop reason: HOSPADM

## 2021-03-29 RX ORDER — FUROSEMIDE 10 MG/ML
20 INJECTION INTRAMUSCULAR; INTRAVENOUS EVERY 8 HOURS
Status: DISCONTINUED | OUTPATIENT
Start: 2021-03-29 | End: 2021-03-29

## 2021-03-29 RX ORDER — SODIUM CHLORIDE 0.9 % (FLUSH) 0.9 %
10 SYRINGE (ML) INJECTION EVERY 12 HOURS SCHEDULED
Status: DISCONTINUED | OUTPATIENT
Start: 2021-03-29 | End: 2021-03-31 | Stop reason: HOSPADM

## 2021-03-29 RX ORDER — CARVEDILOL 12.5 MG/1
12.5 TABLET ORAL EVERY 12 HOURS SCHEDULED
Status: DISCONTINUED | OUTPATIENT
Start: 2021-03-29 | End: 2021-03-31 | Stop reason: HOSPADM

## 2021-03-29 RX ORDER — ACETAMINOPHEN 650 MG/1
650 SUPPOSITORY RECTAL EVERY 4 HOURS PRN
Status: DISCONTINUED | OUTPATIENT
Start: 2021-03-29 | End: 2021-03-31 | Stop reason: HOSPADM

## 2021-03-29 RX ORDER — ONDANSETRON 4 MG/1
4 TABLET, ORALLY DISINTEGRATING ORAL EVERY 8 HOURS PRN
Status: DISCONTINUED | OUTPATIENT
Start: 2021-03-29 | End: 2021-03-31 | Stop reason: HOSPADM

## 2021-03-29 RX ORDER — CALCITRIOL 0.25 UG/1
0.5 CAPSULE, LIQUID FILLED ORAL DAILY
Status: DISCONTINUED | OUTPATIENT
Start: 2021-03-30 | End: 2021-03-29

## 2021-03-29 RX ORDER — FLUTICASONE PROPIONATE 50 MCG
2 SPRAY, SUSPENSION (ML) NASAL DAILY PRN
Status: DISCONTINUED | OUTPATIENT
Start: 2021-03-29 | End: 2021-03-31 | Stop reason: HOSPADM

## 2021-03-29 RX ORDER — ACETAMINOPHEN 160 MG/5ML
650 SOLUTION ORAL EVERY 4 HOURS PRN
Status: DISCONTINUED | OUTPATIENT
Start: 2021-03-29 | End: 2021-03-31 | Stop reason: HOSPADM

## 2021-03-29 RX ORDER — ASPIRIN 81 MG/1
81 TABLET ORAL DAILY
Status: DISCONTINUED | OUTPATIENT
Start: 2021-03-30 | End: 2021-03-31 | Stop reason: HOSPADM

## 2021-03-29 RX ORDER — ALLOPURINOL 100 MG/1
100 TABLET ORAL DAILY
Status: DISCONTINUED | OUTPATIENT
Start: 2021-03-30 | End: 2021-03-31 | Stop reason: HOSPADM

## 2021-03-29 RX ORDER — FUROSEMIDE 10 MG/ML
20 INJECTION INTRAMUSCULAR; INTRAVENOUS EVERY 4 HOURS
Status: COMPLETED | OUTPATIENT
Start: 2021-03-29 | End: 2021-03-30

## 2021-03-29 RX ORDER — ROSUVASTATIN CALCIUM 5 MG/1
10 TABLET, COATED ORAL DAILY
Status: DISCONTINUED | OUTPATIENT
Start: 2021-03-29 | End: 2021-03-31 | Stop reason: HOSPADM

## 2021-03-29 RX ORDER — SODIUM CHLORIDE 9 MG/ML
100 INJECTION, SOLUTION INTRAVENOUS CONTINUOUS
Status: DISCONTINUED | OUTPATIENT
Start: 2021-03-29 | End: 2021-03-31 | Stop reason: HOSPADM

## 2021-03-29 RX ORDER — BUDESONIDE AND FORMOTEROL FUMARATE DIHYDRATE 80; 4.5 UG/1; UG/1
2 AEROSOL RESPIRATORY (INHALATION) DAILY
Status: DISCONTINUED | OUTPATIENT
Start: 2021-03-29 | End: 2021-03-31 | Stop reason: HOSPADM

## 2021-03-29 RX ADMIN — CARVEDILOL 12.5 MG: 12.5 TABLET, FILM COATED ORAL at 20:43

## 2021-03-29 RX ADMIN — ROSUVASTATIN CALCIUM 10 MG: 5 TABLET, FILM COATED ORAL at 18:13

## 2021-03-29 RX ADMIN — ENOXAPARIN SODIUM 30 MG: 30 INJECTION SUBCUTANEOUS at 18:13

## 2021-03-29 RX ADMIN — SODIUM CHLORIDE 50 ML/HR: 9 INJECTION, SOLUTION INTRAVENOUS at 18:06

## 2021-03-29 RX ADMIN — SODIUM CHLORIDE, PRESERVATIVE FREE 10 ML: 5 INJECTION INTRAVENOUS at 20:48

## 2021-03-29 RX ADMIN — FUROSEMIDE 20 MG: 10 INJECTION, SOLUTION INTRAMUSCULAR; INTRAVENOUS at 20:44

## 2021-03-30 ENCOUNTER — APPOINTMENT (OUTPATIENT)
Dept: CARDIOLOGY | Facility: HOSPITAL | Age: 81
End: 2021-03-30

## 2021-03-30 LAB
ALBUMIN SERPL-MCNC: 3 G/DL (ref 3.5–5.2)
ALBUMIN/GLOB SERPL: 1.2 G/DL
ALP SERPL-CCNC: 125 U/L (ref 39–117)
ALT SERPL W P-5'-P-CCNC: 9 U/L (ref 1–33)
ANION GAP SERPL CALCULATED.3IONS-SCNC: 12.6 MMOL/L (ref 5–15)
AST SERPL-CCNC: 17 U/L (ref 1–32)
BH CV ECHO MEAS - % IVS THICK: 20 %
BH CV ECHO MEAS - % LVPW THICK: 15.5 %
BH CV ECHO MEAS - AO MAX PG (FULL): 5.6 MMHG
BH CV ECHO MEAS - AO MAX PG: 12 MMHG
BH CV ECHO MEAS - AO MEAN PG (FULL): 3 MMHG
BH CV ECHO MEAS - AO MEAN PG: 6 MMHG
BH CV ECHO MEAS - AO ROOT AREA (BSA CORRECTED): 2.3
BH CV ECHO MEAS - AO ROOT AREA: 8.2 CM^2
BH CV ECHO MEAS - AO ROOT DIAM: 3.2 CM
BH CV ECHO MEAS - AO V2 MAX: 171.7 CM/SEC
BH CV ECHO MEAS - AO V2 MEAN: 114.3 CM/SEC
BH CV ECHO MEAS - AO V2 VTI: 38.3 CM
BH CV ECHO MEAS - AVA(I,A): 1.3 CM^2
BH CV ECHO MEAS - AVA(I,D): 1.3 CM^2
BH CV ECHO MEAS - AVA(V,A): 1.5 CM^2
BH CV ECHO MEAS - AVA(V,D): 1.5 CM^2
BH CV ECHO MEAS - BSA(HAYCOCK): 1.4 M^2
BH CV ECHO MEAS - BSA: 1.4 M^2
BH CV ECHO MEAS - BZI_BMI: 21.8 KILOGRAMS/M^2
BH CV ECHO MEAS - BZI_METRIC_HEIGHT: 149.9 CM
BH CV ECHO MEAS - BZI_METRIC_WEIGHT: 49 KG
BH CV ECHO MEAS - EDV(CUBED): 69.4 ML
BH CV ECHO MEAS - EDV(MOD-SP2): 97.1 ML
BH CV ECHO MEAS - EDV(MOD-SP4): 110 ML
BH CV ECHO MEAS - EDV(TEICH): 74.7 ML
BH CV ECHO MEAS - EF(CUBED): 67.7 %
BH CV ECHO MEAS - EF(MOD-BP): 66 %
BH CV ECHO MEAS - EF(MOD-SP2): 47.1 %
BH CV ECHO MEAS - EF(MOD-SP4): 49.5 %
BH CV ECHO MEAS - EF(TEICH): 59.7 %
BH CV ECHO MEAS - ESV(CUBED): 22.4 ML
BH CV ECHO MEAS - ESV(MOD-SP2): 51.4 ML
BH CV ECHO MEAS - ESV(MOD-SP4): 55.6 ML
BH CV ECHO MEAS - ESV(TEICH): 30.1 ML
BH CV ECHO MEAS - FS: 31.4 %
BH CV ECHO MEAS - IVS/LVPW: 0.95
BH CV ECHO MEAS - IVSD: 1.1 CM
BH CV ECHO MEAS - IVSS: 1.3 CM
BH CV ECHO MEAS - LA DIMENSION: 4.3 CM
BH CV ECHO MEAS - LA/AO: 1.3
BH CV ECHO MEAS - LAD MAJOR: 5.4 CM
BH CV ECHO MEAS - LAT PEAK E' VEL: 9 CM/SEC
BH CV ECHO MEAS - LATERAL E/E' RATIO: 11
BH CV ECHO MEAS - LV DIASTOLIC VOL/BSA (35-75): 77.5 ML/M^2
BH CV ECHO MEAS - LV MASS(C)D: 157.9 GRAMS
BH CV ECHO MEAS - LV MASS(C)DI: 111.3 GRAMS/M^2
BH CV ECHO MEAS - LV MASS(C)S: 118.9 GRAMS
BH CV ECHO MEAS - LV MASS(C)SI: 83.8 GRAMS/M^2
BH CV ECHO MEAS - LV MAX PG: 6.4 MMHG
BH CV ECHO MEAS - LV MEAN PG: 3 MMHG
BH CV ECHO MEAS - LV SYSTOLIC VOL/BSA (12-30): 39.2 ML/M^2
BH CV ECHO MEAS - LV V1 MAX: 126.5 CM/SEC
BH CV ECHO MEAS - LV V1 MEAN: 78.3 CM/SEC
BH CV ECHO MEAS - LV V1 VTI: 25.2 CM
BH CV ECHO MEAS - LVIDD: 4.1 CM
BH CV ECHO MEAS - LVIDS: 2.8 CM
BH CV ECHO MEAS - LVLD AP2: 7.8 CM
BH CV ECHO MEAS - LVLD AP4: 8 CM
BH CV ECHO MEAS - LVLS AP2: 6.6 CM
BH CV ECHO MEAS - LVLS AP4: 6.3 CM
BH CV ECHO MEAS - LVOT AREA (M): 2 CM^2
BH CV ECHO MEAS - LVOT AREA: 2 CM^2
BH CV ECHO MEAS - LVOT DIAM: 1.6 CM
BH CV ECHO MEAS - LVPWD: 1.2 CM
BH CV ECHO MEAS - LVPWS: 1.3 CM
BH CV ECHO MEAS - MED PEAK E' VEL: 7.5 CM/SEC
BH CV ECHO MEAS - MEDIAL E/E' RATIO: 13.3
BH CV ECHO MEAS - MV A MAX VEL: 93.8 CM/SEC
BH CV ECHO MEAS - MV DEC TIME: 0.17 SEC
BH CV ECHO MEAS - MV E MAX VEL: 99.1 CM/SEC
BH CV ECHO MEAS - MV E/A: 1.1
BH CV ECHO MEAS - MV MAX PG: 4.8 MMHG
BH CV ECHO MEAS - MV MEAN PG: 2 MMHG
BH CV ECHO MEAS - MV V2 MAX: 110 CM/SEC
BH CV ECHO MEAS - MV V2 MEAN: 68.3 CM/SEC
BH CV ECHO MEAS - MV V2 VTI: 35.2 CM
BH CV ECHO MEAS - MVA(VTI): 1.4 CM^2
BH CV ECHO MEAS - PA ACC TIME: 0.13 SEC
BH CV ECHO MEAS - PA MAX PG (FULL): 6.2 MMHG
BH CV ECHO MEAS - PA MAX PG: 7.2 MMHG
BH CV ECHO MEAS - PA MEAN PG (FULL): 3 MMHG
BH CV ECHO MEAS - PA MEAN PG: 4 MMHG
BH CV ECHO MEAS - PA PR(ACCEL): 18.7 MMHG
BH CV ECHO MEAS - PA V2 MAX: 134 CM/SEC
BH CV ECHO MEAS - PA V2 MEAN: 90.1 CM/SEC
BH CV ECHO MEAS - PA V2 VTI: 31 CM
BH CV ECHO MEAS - PI END-D VEL: 113.3 CM/SEC
BH CV ECHO MEAS - PULM A REVS DUR: 0.11 SEC
BH CV ECHO MEAS - PULM A REVS VEL: 32.7 CM/SEC
BH CV ECHO MEAS - PULM DIAS VEL: 43.4 CM/SEC
BH CV ECHO MEAS - PULM S/D: 1.4
BH CV ECHO MEAS - PULM SYS VEL: 60.5 CM/SEC
BH CV ECHO MEAS - RAP SYSTOLE: 3 MMHG
BH CV ECHO MEAS - RV MAX PG: 1 MMHG
BH CV ECHO MEAS - RV MEAN PG: 1 MMHG
BH CV ECHO MEAS - RV V1 MAX: 50.4 CM/SEC
BH CV ECHO MEAS - RV V1 MEAN: 34.9 CM/SEC
BH CV ECHO MEAS - RV V1 VTI: 12.1 CM
BH CV ECHO MEAS - RVSP: 29 MMHG
BH CV ECHO MEAS - SI(AO): 222.3 ML/M^2
BH CV ECHO MEAS - SI(CUBED): 33.1 ML/M^2
BH CV ECHO MEAS - SI(LVOT): 35.2 ML/M^2
BH CV ECHO MEAS - SI(MOD-SP2): 32.2 ML/M^2
BH CV ECHO MEAS - SI(MOD-SP4): 38.3 ML/M^2
BH CV ECHO MEAS - SI(TEICH): 31.4 ML/M^2
BH CV ECHO MEAS - SV(AO): 315.5 ML
BH CV ECHO MEAS - SV(CUBED): 47 ML
BH CV ECHO MEAS - SV(LVOT): 49.9 ML
BH CV ECHO MEAS - SV(MOD-SP2): 45.7 ML
BH CV ECHO MEAS - SV(MOD-SP4): 54.4 ML
BH CV ECHO MEAS - SV(TEICH): 44.6 ML
BH CV ECHO MEAS - TAPSE (>1.6): 2.4 CM
BH CV ECHO MEAS - TR MAX PG: 26 MMHG
BH CV ECHO MEAS - TR MAX VEL: 253 CM/SEC
BH CV ECHO MEASUREMENTS AVERAGE E/E' RATIO: 12.01
BH CV XLRA - RV BASE: 4.3 CM
BH CV XLRA - RV LENGTH: 5.3 CM
BH CV XLRA - RV MID: 3.4 CM
BH CV XLRA - TDI S': 11 CM/SEC
BILIRUB SERPL-MCNC: 0.3 MG/DL (ref 0–1.2)
BUN SERPL-MCNC: 56 MG/DL (ref 8–23)
BUN/CREAT SERPL: 25.9 (ref 7–25)
CALCIUM SPEC-SCNC: 8.8 MG/DL (ref 8.6–10.5)
CHLORIDE SERPL-SCNC: 96 MMOL/L (ref 98–107)
CO2 SERPL-SCNC: 22.4 MMOL/L (ref 22–29)
CREAT SERPL-MCNC: 2.16 MG/DL (ref 0.57–1)
GFR SERPL CREATININE-BSD FRML MDRD: 22 ML/MIN/1.73
GLOBULIN UR ELPH-MCNC: 2.6 GM/DL
GLUCOSE SERPL-MCNC: 68 MG/DL (ref 65–99)
LEFT ATRIUM VOLUME INDEX: 48.2 ML/M^2
LEFT ATRIUM VOLUME: 68.4 ML
LV EF 2D ECHO EST: 68 %
MAXIMAL PREDICTED HEART RATE: 142 BPM
POTASSIUM SERPL-SCNC: 4.6 MMOL/L (ref 3.5–5.2)
PROT SERPL-MCNC: 5.6 G/DL (ref 6–8.5)
SODIUM SERPL-SCNC: 131 MMOL/L (ref 136–145)
STRESS TARGET HR: 121 BPM

## 2021-03-30 PROCEDURE — 25010000002 ENOXAPARIN PER 10 MG: Performed by: FAMILY MEDICINE

## 2021-03-30 PROCEDURE — 99225 PR SBSQ OBSERVATION CARE/DAY 25 MINUTES: CPT | Performed by: FAMILY MEDICINE

## 2021-03-30 PROCEDURE — 93306 TTE W/DOPPLER COMPLETE: CPT | Performed by: INTERNAL MEDICINE

## 2021-03-30 PROCEDURE — G0378 HOSPITAL OBSERVATION PER HR: HCPCS

## 2021-03-30 PROCEDURE — 96376 TX/PRO/DX INJ SAME DRUG ADON: CPT

## 2021-03-30 PROCEDURE — 93306 TTE W/DOPPLER COMPLETE: CPT

## 2021-03-30 PROCEDURE — 94640 AIRWAY INHALATION TREATMENT: CPT

## 2021-03-30 PROCEDURE — 96361 HYDRATE IV INFUSION ADD-ON: CPT

## 2021-03-30 PROCEDURE — 25010000002 FUROSEMIDE PER 20 MG: Performed by: INTERNAL MEDICINE

## 2021-03-30 PROCEDURE — 96372 THER/PROPH/DIAG INJ SC/IM: CPT

## 2021-03-30 PROCEDURE — 80053 COMPREHEN METABOLIC PANEL: CPT | Performed by: FAMILY MEDICINE

## 2021-03-30 RX ORDER — FUROSEMIDE 10 MG/ML
40 INJECTION INTRAMUSCULAR; INTRAVENOUS EVERY 4 HOURS
Status: COMPLETED | OUTPATIENT
Start: 2021-03-30 | End: 2021-03-30

## 2021-03-30 RX ADMIN — SODIUM CHLORIDE, PRESERVATIVE FREE 10 ML: 5 INJECTION INTRAVENOUS at 21:10

## 2021-03-30 RX ADMIN — FUROSEMIDE 40 MG: 10 INJECTION, SOLUTION INTRAMUSCULAR; INTRAVENOUS at 10:20

## 2021-03-30 RX ADMIN — FUROSEMIDE 20 MG: 10 INJECTION, SOLUTION INTRAMUSCULAR; INTRAVENOUS at 05:00

## 2021-03-30 RX ADMIN — BUDESONIDE AND FORMOTEROL FUMARATE DIHYDRATE 2 PUFF: 80; 4.5 AEROSOL RESPIRATORY (INHALATION) at 06:54

## 2021-03-30 RX ADMIN — ASPIRIN 81 MG: 81 TABLET, COATED ORAL at 08:08

## 2021-03-30 RX ADMIN — CARVEDILOL 12.5 MG: 12.5 TABLET, FILM COATED ORAL at 08:08

## 2021-03-30 RX ADMIN — CARVEDILOL 12.5 MG: 12.5 TABLET, FILM COATED ORAL at 21:11

## 2021-03-30 RX ADMIN — ENOXAPARIN SODIUM 30 MG: 30 INJECTION SUBCUTANEOUS at 18:03

## 2021-03-30 RX ADMIN — ROSUVASTATIN CALCIUM 10 MG: 5 TABLET, FILM COATED ORAL at 08:08

## 2021-03-30 RX ADMIN — SODIUM CHLORIDE, PRESERVATIVE FREE 10 ML: 5 INJECTION INTRAVENOUS at 08:08

## 2021-03-30 RX ADMIN — FUROSEMIDE 40 MG: 10 INJECTION, SOLUTION INTRAMUSCULAR; INTRAVENOUS at 21:10

## 2021-03-30 RX ADMIN — ALLOPURINOL 100 MG: 100 TABLET ORAL at 08:08

## 2021-03-30 RX ADMIN — FUROSEMIDE 40 MG: 10 INJECTION, SOLUTION INTRAMUSCULAR; INTRAVENOUS at 18:03

## 2021-03-30 RX ADMIN — FUROSEMIDE 40 MG: 10 INJECTION, SOLUTION INTRAMUSCULAR; INTRAVENOUS at 14:21

## 2021-03-31 VITALS
SYSTOLIC BLOOD PRESSURE: 131 MMHG | BODY MASS INDEX: 20.8 KG/M2 | RESPIRATION RATE: 16 BRPM | OXYGEN SATURATION: 100 % | HEIGHT: 59 IN | TEMPERATURE: 98.9 F | DIASTOLIC BLOOD PRESSURE: 65 MMHG | WEIGHT: 103.17 LBS | HEART RATE: 58 BPM

## 2021-03-31 LAB
ALBUMIN SERPL-MCNC: 3.1 G/DL (ref 3.5–5.2)
ANION GAP SERPL CALCULATED.3IONS-SCNC: 10 MMOL/L (ref 5–15)
BUN SERPL-MCNC: 55 MG/DL (ref 8–23)
BUN/CREAT SERPL: 24 (ref 7–25)
CALCIUM SPEC-SCNC: 8.8 MG/DL (ref 8.6–10.5)
CHLORIDE SERPL-SCNC: 95 MMOL/L (ref 98–107)
CO2 SERPL-SCNC: 26 MMOL/L (ref 22–29)
CREAT SERPL-MCNC: 2.29 MG/DL (ref 0.57–1)
GFR SERPL CREATININE-BSD FRML MDRD: 21 ML/MIN/1.73
GLUCOSE SERPL-MCNC: 92 MG/DL (ref 65–99)
PHOSPHATE SERPL-MCNC: 3.1 MG/DL (ref 2.5–4.5)
POTASSIUM SERPL-SCNC: 4 MMOL/L (ref 3.5–5.2)
SODIUM SERPL-SCNC: 131 MMOL/L (ref 136–145)

## 2021-03-31 PROCEDURE — 80069 RENAL FUNCTION PANEL: CPT | Performed by: FAMILY MEDICINE

## 2021-03-31 PROCEDURE — 99217 PR OBSERVATION CARE DISCHARGE MANAGEMENT: CPT | Performed by: FAMILY MEDICINE

## 2021-03-31 PROCEDURE — G0378 HOSPITAL OBSERVATION PER HR: HCPCS

## 2021-03-31 PROCEDURE — 94799 UNLISTED PULMONARY SVC/PX: CPT

## 2021-03-31 RX ORDER — TORSEMIDE 10 MG/1
TABLET ORAL
Qty: 15 TABLET | Refills: 0 | Status: SHIPPED | OUTPATIENT
Start: 2021-03-31 | End: 2023-01-18 | Stop reason: HOSPADM

## 2021-03-31 RX ADMIN — SODIUM CHLORIDE, PRESERVATIVE FREE 10 ML: 5 INJECTION INTRAVENOUS at 08:52

## 2021-03-31 RX ADMIN — ALLOPURINOL 100 MG: 100 TABLET ORAL at 08:52

## 2021-03-31 RX ADMIN — ACETAMINOPHEN 650 MG: 325 TABLET ORAL at 09:00

## 2021-03-31 RX ADMIN — BUDESONIDE AND FORMOTEROL FUMARATE DIHYDRATE 2 PUFF: 80; 4.5 AEROSOL RESPIRATORY (INHALATION) at 07:59

## 2021-03-31 RX ADMIN — ACETAMINOPHEN 650 MG: 325 TABLET ORAL at 13:32

## 2021-03-31 RX ADMIN — CARVEDILOL 12.5 MG: 12.5 TABLET, FILM COATED ORAL at 08:51

## 2021-03-31 RX ADMIN — ROSUVASTATIN CALCIUM 10 MG: 5 TABLET, FILM COATED ORAL at 08:51

## 2021-03-31 RX ADMIN — ASPIRIN 81 MG: 81 TABLET, COATED ORAL at 08:51

## 2021-04-01 ENCOUNTER — READMISSION MANAGEMENT (OUTPATIENT)
Dept: CALL CENTER | Facility: HOSPITAL | Age: 81
End: 2021-04-01

## 2021-04-01 NOTE — OUTREACH NOTE
Prep Survey      Responses   [facility] patient discharged from?  [facility]   Is LACE score < 7 ?  No   Emergency Room discharge w/ pulse ox?  No   Eligibility  Readm Mgmt   Discharge diagnosis  Volume overload with lower extremity edema, present on admission   Does the patient have one of the following disease processes/diagnoses(primary or secondary)?  Other   Does the patient have Home health ordered?  No   Is there a DME ordered?  No   Prep survey completed?  Yes          Mary Casey RN

## 2021-04-02 ENCOUNTER — READMISSION MANAGEMENT (OUTPATIENT)
Dept: CALL CENTER | Facility: HOSPITAL | Age: 81
End: 2021-04-02

## 2021-04-02 NOTE — OUTREACH NOTE
Medical Week 1 Survey      Responses   [facility] patient discharged from?  [facility]   Does the patient have one of the following disease processes/diagnoses(primary or secondary)?  Other   Week 1 attempt successful?  Yes   Call start time  1658   Call end time  1700   Discharge diagnosis  Volume overload with lower extremity edema, present on admission   Is patient permission given to speak with other caregiver?  Yes   List who call center can speak with  son   Meds reviewed with patient/caregiver?  Yes   Is the patient having any side effects they believe may be caused by any medication additions or changes?  No   Does the patient have all medications ordered at discharge?  Yes   Is the patient taking all medications as directed (includes completed medication regime)?  Yes   Does the patient have a primary care provider?   Yes   Does the patient have an appointment with their PCP within 7 days of discharge?  Yes   Has the patient kept scheduled appointments due by today?  Yes   Psychosocial issues?  No   Comments  Pt reports her edema in the LE has resolved.    Did the patient receive a copy of their discharge instructions?  Yes   Nursing interventions  Reviewed instructions with patient   What is the patient's perception of their health status since discharge?  Improving   Is the patient/caregiver able to teach back signs and symptoms related to disease process for when to call PCP?  Yes   Is the patient/caregiver able to teach back signs and symptoms related to disease process for when to call 911?  Yes   Is the patient/caregiver able to teach back the hierarchy of who to call/visit for symptoms/problems? PCP, Specialist, Home health nurse, Urgent Care, ED, 911  Yes   If the patient is a current smoker, are they able to teach back resources for cessation?  Not a smoker   Week 1 call completed?  Yes          Deb Floyd RN

## 2021-04-04 ENCOUNTER — NURSE TRIAGE (OUTPATIENT)
Dept: CALL CENTER | Facility: HOSPITAL | Age: 81
End: 2021-04-04

## 2021-04-04 NOTE — TELEPHONE ENCOUNTER
"    Reason for Disposition  • General information question, no triage required and triager able to answer question    Additional Information  • Negative: [1] Caller is not with the adult (patient) AND [2] reporting urgent symptoms  • Negative: Lab result questions  • Negative: Medication questions  • Negative: Caller can't be reached by phone  • Negative: Caller has already spoken to PCP or another triager  • Negative: RN needs further essential information from caller in order to complete triage  • Negative: Requesting regular office appointment  • Negative: [1] Caller requesting NON-URGENT health information AND [2] PCP's office is the best resource  • Negative: Health Information question, no triage required and triager able to answer question    Answer Assessment - Initial Assessment Questions  1. REASON FOR CALL or QUESTION: \"What is your reason for calling today?\" or \"How can I best help you?\" or \"What question do you have that I can help answer?\"      Gave called number to scheduling for  [facility]    Protocols used: INFORMATION ONLY CALL - NO TRIAGE-ADULT-      "

## 2021-04-05 ENCOUNTER — APPOINTMENT (OUTPATIENT)
Dept: INFUSION THERAPY | Facility: HOSPITAL | Age: 81
End: 2021-04-05

## 2021-04-07 ENCOUNTER — HOSPITAL ENCOUNTER (OUTPATIENT)
Dept: INFUSION THERAPY | Facility: HOSPITAL | Age: 81
Discharge: HOME | End: 2021-04-07
Admitting: INTERNAL MEDICINE

## 2021-04-07 VITALS
TEMPERATURE: 98 F | SYSTOLIC BLOOD PRESSURE: 172 MMHG | DIASTOLIC BLOOD PRESSURE: 77 MMHG | OXYGEN SATURATION: 98 % | HEART RATE: 62 BPM | RESPIRATION RATE: 16 BRPM

## 2021-04-07 DIAGNOSIS — D63.1 ANEMIA DUE TO STAGE 4 CHRONIC KIDNEY DISEASE TREATED WITH ERYTHROPOIETIN (HCC): Primary | ICD-10-CM

## 2021-04-07 DIAGNOSIS — N18.4 ANEMIA DUE TO STAGE 4 CHRONIC KIDNEY DISEASE TREATED WITH ERYTHROPOIETIN (HCC): Primary | ICD-10-CM

## 2021-04-07 LAB
ALBUMIN SERPL-MCNC: 3.2 G/DL (ref 3.5–5.2)
ANION GAP SERPL CALCULATED.3IONS-SCNC: 12.6 MMOL/L (ref 5–15)
BASOPHILS # BLD AUTO: 0.01 10*3/MM3 (ref 0–0.2)
BASOPHILS NFR BLD AUTO: 0.2 % (ref 0–1.5)
BUN SERPL-MCNC: 51 MG/DL (ref 8–23)
BUN/CREAT SERPL: 22.8 (ref 7–25)
CALCIUM SPEC-SCNC: 9.3 MG/DL (ref 8.6–10.5)
CHLORIDE SERPL-SCNC: 96 MMOL/L (ref 98–107)
CO2 SERPL-SCNC: 20.4 MMOL/L (ref 22–29)
CREAT SERPL-MCNC: 2.24 MG/DL (ref 0.57–1)
DEPRECATED RDW RBC AUTO: 68.3 FL (ref 37–54)
EOSINOPHIL # BLD AUTO: 0.07 10*3/MM3 (ref 0–0.4)
EOSINOPHIL NFR BLD AUTO: 1.2 % (ref 0.3–6.2)
ERYTHROCYTE [DISTWIDTH] IN BLOOD BY AUTOMATED COUNT: 19.5 % (ref 12.3–15.4)
GFR SERPL CREATININE-BSD FRML MDRD: 21 ML/MIN/1.73
GLUCOSE SERPL-MCNC: 89 MG/DL (ref 65–99)
HCT VFR BLD AUTO: 27 % (ref 34–46.6)
HGB BLD-MCNC: 8.8 G/DL (ref 12–15.9)
IMM GRANULOCYTES # BLD AUTO: 0.03 10*3/MM3 (ref 0–0.05)
IMM GRANULOCYTES NFR BLD AUTO: 0.5 % (ref 0–0.5)
IRON 24H UR-MRATE: 17 MCG/DL (ref 37–145)
IRON SATN MFR SERPL: 8 % (ref 20–50)
LYMPHOCYTES # BLD AUTO: 1.39 10*3/MM3 (ref 0.7–3.1)
LYMPHOCYTES NFR BLD AUTO: 23.7 % (ref 19.6–45.3)
MCH RBC QN AUTO: 31.3 PG (ref 26.6–33)
MCHC RBC AUTO-ENTMCNC: 32.6 G/DL (ref 31.5–35.7)
MCV RBC AUTO: 96.1 FL (ref 79–97)
MONOCYTES # BLD AUTO: 0.63 10*3/MM3 (ref 0.1–0.9)
MONOCYTES NFR BLD AUTO: 10.8 % (ref 5–12)
NEUTROPHILS NFR BLD AUTO: 3.73 10*3/MM3 (ref 1.7–7)
NEUTROPHILS NFR BLD AUTO: 63.6 % (ref 42.7–76)
NRBC BLD AUTO-RTO: 0 /100 WBC (ref 0–0.2)
PHOSPHATE SERPL-MCNC: 4 MG/DL (ref 2.5–4.5)
PLATELET # BLD AUTO: 214 10*3/MM3 (ref 140–450)
PMV BLD AUTO: 9.1 FL (ref 6–12)
POTASSIUM SERPL-SCNC: 4.8 MMOL/L (ref 3.5–5.2)
RBC # BLD AUTO: 2.81 10*6/MM3 (ref 3.77–5.28)
SODIUM SERPL-SCNC: 129 MMOL/L (ref 136–145)
TIBC SERPL-MCNC: 224 MCG/DL (ref 298–536)
TRANSFERRIN SERPL-MCNC: 150 MG/DL (ref 200–360)
URATE SERPL-MCNC: 5.3 MG/DL (ref 2.4–5.7)
WBC # BLD AUTO: 5.86 10*3/MM3 (ref 3.4–10.8)

## 2021-04-07 PROCEDURE — 80069 RENAL FUNCTION PANEL: CPT | Performed by: INTERNAL MEDICINE

## 2021-04-07 PROCEDURE — 85025 COMPLETE CBC W/AUTO DIFF WBC: CPT | Performed by: INTERNAL MEDICINE

## 2021-04-07 PROCEDURE — 36415 COLL VENOUS BLD VENIPUNCTURE: CPT

## 2021-04-07 PROCEDURE — 96372 THER/PROPH/DIAG INJ SC/IM: CPT

## 2021-04-07 PROCEDURE — 83540 ASSAY OF IRON: CPT | Performed by: INTERNAL MEDICINE

## 2021-04-07 PROCEDURE — 84466 ASSAY OF TRANSFERRIN: CPT | Performed by: INTERNAL MEDICINE

## 2021-04-07 PROCEDURE — 84550 ASSAY OF BLOOD/URIC ACID: CPT | Performed by: INTERNAL MEDICINE

## 2021-04-07 PROCEDURE — 25010000002 EPOETIN ALFA PER 1000 UNITS: Performed by: NURSE PRACTITIONER

## 2021-04-07 RX ADMIN — ERYTHROPOIETIN 40000 UNITS: 40000 INJECTION, SOLUTION INTRAVENOUS; SUBCUTANEOUS at 14:02

## 2021-04-09 ENCOUNTER — READMISSION MANAGEMENT (OUTPATIENT)
Dept: CALL CENTER | Facility: HOSPITAL | Age: 81
End: 2021-04-09

## 2021-04-09 NOTE — OUTREACH NOTE
Medical Week 2 Survey      Responses   [facility] patient discharged from?  [facility]   Does the patient have one of the following disease processes/diagnoses(primary or secondary)?  Other   Week 2 attempt successful?  No   Unsuccessful attempts  Attempt 1          Kim Hu RN

## 2021-04-13 ENCOUNTER — READMISSION MANAGEMENT (OUTPATIENT)
Dept: CALL CENTER | Facility: HOSPITAL | Age: 81
End: 2021-04-13

## 2021-04-13 NOTE — OUTREACH NOTE
Medical Week 2 Survey      Responses   [facility] patient discharged from?  [facility]   Does the patient have one of the following disease processes/diagnoses(primary or secondary)?  Other   Week 2 attempt successful?  Yes   Call start time  1524   Call end time  1526   Meds reviewed with patient/caregiver?  Yes   Is the patient taking all medications as directed (includes completed medication regime)?  Yes   Has the patient kept scheduled appointments due by today?  Yes   Comments  uses walker, HH comes 2 times a week   What is the patient's perception of their health status since discharge?  Improving   Week 2 Call Completed?  Yes   Wrap up additional comments  She says is doing well, no questions or concerns today          Anamaria Roman RN

## 2021-04-19 ENCOUNTER — OFFICE VISIT (OUTPATIENT)
Dept: ORTHOPEDIC SURGERY | Facility: CLINIC | Age: 81
End: 2021-04-19

## 2021-04-19 ENCOUNTER — READMISSION MANAGEMENT (OUTPATIENT)
Dept: CALL CENTER | Facility: HOSPITAL | Age: 81
End: 2021-04-19

## 2021-04-19 VITALS — WEIGHT: 103 LBS | HEIGHT: 59 IN | BODY MASS INDEX: 20.76 KG/M2 | TEMPERATURE: 97.1 F

## 2021-04-19 DIAGNOSIS — S72.002A CLOSED FRACTURE OF LEFT HIP, INITIAL ENCOUNTER (HCC): Primary | ICD-10-CM

## 2021-04-19 PROCEDURE — 99024 POSTOP FOLLOW-UP VISIT: CPT | Performed by: PHYSICIAN ASSISTANT

## 2021-04-19 NOTE — OUTREACH NOTE
Medical Week 3 Survey      Responses   [facility] patient discharged from?  [facility]   Does the patient have one of the following disease processes/diagnoses(primary or secondary)?  Other   Week 3 attempt successful?  No   Unsuccessful attempts  Attempt 1          Kenyetta Beck RN

## 2021-04-19 NOTE — PROGRESS NOTES
"Subjective   Patient ID: Carolyne Martins is a 80 y.o. right hand dominant female  Post-op of the Left Hip (Status post open reduction and internal fixation of Left proximal femur fracture 2/4/21.)         History of Present Illness   Patient presents for a postop visit regarding left hip ORIF.  Date of surgery 2/4/2021.  Patient states she is doing well.  Denies calf pain, CP or SOA.                                                  Past Medical History:   Diagnosis Date   • Anemia 1998   • Anemia    • Arthritis    • Back pain    • Bleeding from anus    • Bronchitis     STATES HAS BRONCHITIS CURRENTLY (12/5/17).     • Bronchitis 12/2017   • CAD (coronary artery disease)    • Cataract, bilateral    • Chronic kidney disease     STAGE 4.  SEES TERA   • Chronic kidney failure     REPORTS STAGE IV   • Difficulty swallowing solids    • Disease of thyroid gland    • Fracture, radius 2016    right distal radius and ulna, healed.   • GERD (gastroesophageal reflux disease)    • Gout    • High cholesterol    • History of blood transfusion 2019   • History of nuclear stress test 2014    DR. CORTEZ.  \"IT WAS OK\"   • Hyperparathyroidism (CMS/HCC)    • Hyperparathyroidism (CMS/HCC)    • Hypertension    • Impaired functional mobility, balance, gait, and endurance    • Iron deficiency    • Osteoarthritis    • Osteoarthritis of knees, bilateral     Both knees Supartz injection series August, 2015   • Osteoporosis    • Palpitations    • Personal history of cardiac murmur    • PONV (postoperative nausea and vomiting)    • Presence of pessary    • Problems with swallowing     WITH FOOD OCCASSIONALLY   • Rheumatic heart disease     Personal history   • Rotator cuff tendonitis     right    • Rupture of right proximal biceps tendon     chronic   • Seasonal allergies    • Sinus problem    • Spinal headache     REPORTS AFTER DELIVERY OF SON   • Subacromial bursitis     right    • Valvular heart disease    • Vision problems    • Vitamin B12 " deficiency    • Vitamin D deficiency    • Wears glasses         Past Surgical History:   Procedure Laterality Date   • CATARACT EXTRACTION Bilateral    • CERVICAL POLYPECTOMY     • COLONOSCOPY  2011   • COLONOSCOPY N/A 1/9/2018    Procedure: COLONOSCOPY with endoscopic mucosal resection (hot snare), normal saline submucosal injection, argon thermal ablation, resolution clip placement x4, and cold biopsy polypectomy;  Surgeon: Pieter Concepcion MD;  Location: [facility] ENDOSCOPY;  Service:    • ENDOSCOPY N/A 12/6/2017    Procedure: ESOPHAGOGASTRODUODENOSCOPY with biopsies and hot snare polypectomies;  Surgeon: Pieter Concepcion MD;  Location: [facility] ENDOSCOPY;  Service:    • ENDOSCOPY N/A 1/15/2019    Procedure: ESOPHAGOGASTRODUODENOSCOPY WITH BIOPSIES AND HOT SNARE POLYPECTOMIES X10;  Surgeon: Pieter Concepcion MD;  Location: [facility] ENDOSCOPY;  Service: Gastroenterology   • HIP TROCHANTERIC NAILING WITH INTRAMEDULLARY HIP SCREW Left 2/14/2021    Procedure: HIP TROCHANTER NAIL SHORT WITH INTRAMEDULLARY HIP SCREW, LEFT;  Surgeon: Gabriele Loyd MD;  Location: [facility] OR;  Service: Orthopedics;  Laterality: Left;   • UPPER GASTROINTESTINAL ENDOSCOPY  08/18/2014       Family History   Problem Relation Age of Onset   • Liver cancer Maternal Grandmother    • Gout Other    • Osteoporosis Other    • Stroke Other    • Ulcers Other    • Asthma Other    • Hypertension Other    • Heart disease Other    • Osteoarthritis Other    • Colon cancer Neg Hx        Social History     Socioeconomic History   • Marital status:      Spouse name: Not on file   • Number of children: Not on file   • Years of education: Not on file   • Highest education level: Not on file   Tobacco Use   • Smoking status: Never Smoker   • Smokeless tobacco: Never Used   Substance and Sexual Activity   • Alcohol use: Never   • Drug use: Never   • Sexual activity: Defer         Current Outpatient Medications:   •  allopurinol (ZYLOPRIM) 100 MG tablet, Take 1  tablet by mouth daily., Disp: , Rfl:   •  aspirin 81 MG EC tablet, Take 81 mg by mouth Daily., Disp: , Rfl:   •  budesonide-formoterol (SYMBICORT) 80-4.5 MCG/ACT inhaler, Inhale 2 puffs Daily., Disp: , Rfl:   •  carboxymethylcellulose (REFRESH PLUS) 0.5 % solution, Administer 1 drop to both eyes 3 (Three) Times a Day As Needed for Dry Eyes., Disp: , Rfl:   •  carvedilol (COREG) 25 MG tablet, Take 25 mg by mouth 2 (Two) Times a Day With Meals., Disp: , Rfl:   •  cholecalciferol (VITAMIN D3) 1000 UNITS tablet, Take 1,000 Units by mouth Daily., Disp: , Rfl:   •  docusate sodium (COLACE) 100 MG capsule, Take 1 capsule by mouth 2 (Two) Times a Day. (Patient taking differently: Take 100 mg by mouth 2 (Two) Times a Day As Needed for Constipation.), Disp: 60 capsule, Rfl: 5  •  epoetin ovidio (PROCRIT) 32515 UNIT/ML injection, Inject 40,000 Units under the skin into the appropriate area as directed. Every 2 weeks.  Due Wednesday, Feb 17th, Disp: , Rfl:   •  estradiol (ESTRACE) 0.1 MG/GM vaginal cream, Massage 1 gram in vaginal opening twice weekly, Disp: 42.5 g, Rfl: 2  •  fluticasone (FLONASE) 50 MCG/ACT nasal spray, 2 sprays into each nostril Daily As Needed., Disp: , Rfl:   •  irbesartan (AVAPRO) 150 MG tablet, Take 150 mg by mouth Daily., Disp: , Rfl:   •  omeprazole (PriLOSEC) 40 MG capsule, Take 40 mg by mouth Daily., Disp: , Rfl:   •  Probiotic Product (PROBIOTIC DAILY PO), Take 1 tablet by mouth Daily As Needed., Disp: , Rfl:   •  torsemide (DEMADEX) 10 MG tablet, Take 1 tablet by mouth on Thursday and Monday, take as needed otherwise, Disp: 15 tablet, Rfl: 0    Allergies   Allergen Reactions   • Ferrlecit [Na Ferric Gluc Cplx In Sucrose] Swelling   • Ranitidine Hcl Shortness Of Breath   • Levofloxacin Other (See Comments)     Leg cramps     • Lisinopril Cough       Review of Systems   Constitutional: Negative for diaphoresis, fever and unexpected weight change.   HENT: Negative for dental problem and sore throat.   "  Eyes: Negative for visual disturbance.   Respiratory: Negative for shortness of breath.    Cardiovascular: Negative for chest pain.   Gastrointestinal: Negative for abdominal pain, constipation, diarrhea, nausea and vomiting.   Genitourinary: Negative for difficulty urinating and frequency.   Musculoskeletal: Positive for arthralgias.   Neurological: Negative for headaches.   Hematological: Does not bruise/bleed easily.   All other systems reviewed and are negative.      I have reviewed the medical and surgical history, family history, social history, medications, and/or allergies, and the review of systems of this report.    Objective   Temp 97.1 °F (36.2 °C)   Ht 149.9 cm (59\")   Wt 46.7 kg (103 lb)   LMP  (LMP Unknown)   BMI 20.80 kg/m²    Physical Exam  Vitals and nursing note reviewed.   Constitutional:       Appearance: Normal appearance.   Pulmonary:      Effort: Pulmonary effort is normal. No respiratory distress.   Musculoskeletal:      Left hip: No tenderness, bony tenderness or crepitus. Normal range of motion. Normal strength.   Neurological:      Mental Status: She is alert and oriented to person, place, and time.       Ortho Exam   Extremity DVT signs are negative on physical exam with negative Daniele sign, no calf pain, no palpable cords and no skin tone change   Neurologic Exam     Mental Status   Oriented to person, place, and time.            Assessment/Plan   Independent Review of Radiographic Studies:    AP and lateral of the left hip, indication to evaluate fracture healing, and compared with prior imaging, shows interim fracture healing, callus and or periostitis with good maintained reduction and alignment and intact position of fracture fixation hardware.      Procedures       Diagnoses and all orders for this visit:    1. Closed fracture of left hip, initial encounter (CMS/[facility]) (Primary)  -     XR Hip With or Without Pelvis 2 - 3 View Left; Future       Discussion of orthopedic " goals  Risk, benefits, and merits of treatment alternatives reviewed with the patient and questions answered  Ice, heat, and/or modalities as beneficial    Recommendations/Plan:  Exercise, medications, injections, other patient advice, and return appointment as noted.  Patient is encouraged to call or return for any issues or concerns.    Follow-up in 5 weeks x-ray on arrival    Patient agreeable to call or return sooner for any concerns.             EMR Dragon-transcription disclaimer:  This encounter note is an electronic transcription of spoken language to printed text.  Electronic transcription of spoken language may permit erroneous or at times nonsensical words or phrases to be inadvertently transcribed.  Although I have reviewed the note for such errors, some may still exist

## 2021-04-20 ENCOUNTER — READMISSION MANAGEMENT (OUTPATIENT)
Dept: CALL CENTER | Facility: HOSPITAL | Age: 81
End: 2021-04-20

## 2021-04-20 NOTE — OUTREACH NOTE
Medical Week 3 Survey      Responses   [facility] patient discharged from?  [place]   Does the patient have one of the following disease processes/diagnoses(primary or secondary)?  Other   Week 3 attempt successful?  No   Unsuccessful attempts  Attempt 2          Macey Swenson RN

## 2021-04-29 ENCOUNTER — APPOINTMENT (OUTPATIENT)
Dept: GENERAL RADIOLOGY | Facility: HOSPITAL | Age: 81
End: 2021-04-29

## 2021-04-29 ENCOUNTER — HOSPITAL ENCOUNTER (EMERGENCY)
Facility: HOSPITAL | Age: 81
Discharge: HOME | End: 2021-04-29
Attending: EMERGENCY MEDICINE | Admitting: EMERGENCY MEDICINE

## 2021-04-29 ENCOUNTER — HOSPITAL ENCOUNTER (OUTPATIENT)
Dept: INFUSION THERAPY | Facility: HOSPITAL | Age: 81
Discharge: HOME | End: 2021-04-29
Admitting: NURSE PRACTITIONER

## 2021-04-29 VITALS
OXYGEN SATURATION: 100 % | DIASTOLIC BLOOD PRESSURE: 97 MMHG | TEMPERATURE: 98.2 F | SYSTOLIC BLOOD PRESSURE: 206 MMHG | HEART RATE: 58 BPM | RESPIRATION RATE: 16 BRPM

## 2021-04-29 VITALS
BODY MASS INDEX: 20.76 KG/M2 | RESPIRATION RATE: 16 BRPM | TEMPERATURE: 97.6 F | HEIGHT: 59 IN | HEART RATE: 60 BPM | SYSTOLIC BLOOD PRESSURE: 186 MMHG | DIASTOLIC BLOOD PRESSURE: 84 MMHG | OXYGEN SATURATION: 96 % | WEIGHT: 103 LBS

## 2021-04-29 DIAGNOSIS — D63.1 ANEMIA DUE TO STAGE 4 CHRONIC KIDNEY DISEASE TREATED WITH ERYTHROPOIETIN (HCC): Primary | ICD-10-CM

## 2021-04-29 DIAGNOSIS — I10 HYPERTENSION, UNSPECIFIED TYPE: Primary | ICD-10-CM

## 2021-04-29 DIAGNOSIS — N18.4 ANEMIA DUE TO STAGE 4 CHRONIC KIDNEY DISEASE TREATED WITH ERYTHROPOIETIN (HCC): Primary | ICD-10-CM

## 2021-04-29 LAB
ALBUMIN SERPL-MCNC: 3.3 G/DL (ref 3.5–5.2)
ANION GAP SERPL CALCULATED.3IONS-SCNC: 9.5 MMOL/L (ref 5–15)
BUN SERPL-MCNC: 46 MG/DL (ref 8–23)
BUN/CREAT SERPL: 16.8 (ref 7–25)
CALCIUM SPEC-SCNC: 11.5 MG/DL (ref 8.6–10.5)
CHLORIDE SERPL-SCNC: 98 MMOL/L (ref 98–107)
CO2 SERPL-SCNC: 27.5 MMOL/L (ref 22–29)
CREAT SERPL-MCNC: 2.73 MG/DL (ref 0.57–1)
DEPRECATED RDW RBC AUTO: 72 FL (ref 37–54)
ERYTHROCYTE [DISTWIDTH] IN BLOOD BY AUTOMATED COUNT: 19.6 % (ref 12.3–15.4)
GFR SERPL CREATININE-BSD FRML MDRD: 17 ML/MIN/1.73
GLUCOSE SERPL-MCNC: 104 MG/DL (ref 65–99)
HCT VFR BLD AUTO: 30.3 % (ref 34–46.6)
HGB BLD-MCNC: 9.6 G/DL (ref 12–15.9)
HOLD SPECIMEN: NORMAL
MCH RBC QN AUTO: 31.7 PG (ref 26.6–33)
MCHC RBC AUTO-ENTMCNC: 31.7 G/DL (ref 31.5–35.7)
MCV RBC AUTO: 100 FL (ref 79–97)
PHOSPHATE SERPL-MCNC: 4.6 MG/DL (ref 2.5–4.5)
PLATELET # BLD AUTO: 243 10*3/MM3 (ref 140–450)
PMV BLD AUTO: 10 FL (ref 6–12)
POTASSIUM SERPL-SCNC: 4.6 MMOL/L (ref 3.5–5.2)
RBC # BLD AUTO: 3.03 10*6/MM3 (ref 3.77–5.28)
SODIUM SERPL-SCNC: 135 MMOL/L (ref 136–145)
WBC # BLD AUTO: 4.57 10*3/MM3 (ref 3.4–10.8)
WHOLE BLOOD HOLD SPECIMEN: NORMAL
WHOLE BLOOD HOLD SPECIMEN: NORMAL

## 2021-04-29 PROCEDURE — 96372 THER/PROPH/DIAG INJ SC/IM: CPT

## 2021-04-29 PROCEDURE — 25010000002 DIPHENHYDRAMINE PER 50 MG: Performed by: INTERNAL MEDICINE

## 2021-04-29 PROCEDURE — A9270 NON-COVERED ITEM OR SERVICE: HCPCS | Performed by: INTERNAL MEDICINE

## 2021-04-29 PROCEDURE — 25010000002 FERRIC CARBOXYMALTOSE 750 MG/15ML SOLUTION 15 ML VIAL: Performed by: NURSE PRACTITIONER

## 2021-04-29 PROCEDURE — 85027 COMPLETE CBC AUTOMATED: CPT | Performed by: INTERNAL MEDICINE

## 2021-04-29 PROCEDURE — 96375 TX/PRO/DX INJ NEW DRUG ADDON: CPT

## 2021-04-29 PROCEDURE — 96365 THER/PROPH/DIAG IV INF INIT: CPT

## 2021-04-29 PROCEDURE — 25010000002 EPOETIN ALFA PER 1000 UNITS: Performed by: NURSE PRACTITIONER

## 2021-04-29 PROCEDURE — 80069 RENAL FUNCTION PANEL: CPT | Performed by: INTERNAL MEDICINE

## 2021-04-29 PROCEDURE — 93005 ELECTROCARDIOGRAM TRACING: CPT | Performed by: EMERGENCY MEDICINE

## 2021-04-29 PROCEDURE — 63710000001 ACETAMINOPHEN 500 MG TABLET: Performed by: INTERNAL MEDICINE

## 2021-04-29 PROCEDURE — 99283 EMERGENCY DEPT VISIT LOW MDM: CPT

## 2021-04-29 PROCEDURE — 71045 X-RAY EXAM CHEST 1 VIEW: CPT

## 2021-04-29 RX ORDER — SODIUM CHLORIDE 9 MG/ML
250 INJECTION, SOLUTION INTRAVENOUS CONTINUOUS
Status: CANCELLED | OUTPATIENT
Start: 2021-05-13

## 2021-04-29 RX ORDER — ACETAMINOPHEN 500 MG
1000 TABLET ORAL EVERY 6 HOURS PRN
Status: DISCONTINUED | OUTPATIENT
Start: 2021-04-29 | End: 2021-04-30 | Stop reason: HOSPADM

## 2021-04-29 RX ORDER — SODIUM CHLORIDE 9 MG/ML
250 INJECTION, SOLUTION INTRAVENOUS CONTINUOUS
Status: DISCONTINUED | OUTPATIENT
Start: 2021-04-29 | End: 2021-04-30 | Stop reason: HOSPADM

## 2021-04-29 RX ORDER — SODIUM CHLORIDE 0.9 % (FLUSH) 0.9 %
10 SYRINGE (ML) INJECTION AS NEEDED
Status: DISCONTINUED | OUTPATIENT
Start: 2021-04-29 | End: 2021-04-29 | Stop reason: HOSPADM

## 2021-04-29 RX ORDER — DIPHENHYDRAMINE HYDROCHLORIDE 50 MG/ML
25 INJECTION INTRAMUSCULAR; INTRAVENOUS ONCE
Status: COMPLETED | OUTPATIENT
Start: 2021-04-29 | End: 2021-04-29

## 2021-04-29 RX ADMIN — DIPHENHYDRAMINE HYDROCHLORIDE 25 MG: 50 INJECTION, SOLUTION INTRAMUSCULAR; INTRAVENOUS at 14:30

## 2021-04-29 RX ADMIN — SODIUM CHLORIDE 250 ML: 9 INJECTION, SOLUTION INTRAVENOUS at 14:11

## 2021-04-29 RX ADMIN — ERYTHROPOIETIN 40000 UNITS: 40000 INJECTION, SOLUTION INTRAVENOUS; SUBCUTANEOUS at 13:44

## 2021-04-29 RX ADMIN — FERRIC CARBOXYMALTOSE INJECTION 750 MG: 50 INJECTION, SOLUTION INTRAVENOUS at 13:46

## 2021-04-29 RX ADMIN — ACETAMINOPHEN 1000 MG: 500 TABLET ORAL at 14:30

## 2021-04-29 NOTE — CODE DOCUMENTATION
Patient complained of mild shortness of breath and mild chest discomfort. Iron infusion stopped, Vital signs obtained, 250 ml of normal saline initiated and dr. schilling called. Per dr. Schilling, give patient 1000mg of tylenol, 25mg of benadryl and monitor patient for 30 minutes. Per dr. Schilling, if patient still has chest pain, send to ER.

## 2021-05-05 ENCOUNTER — TRANSCRIBE ORDERS (OUTPATIENT)
Dept: LAB | Facility: HOSPITAL | Age: 81
End: 2021-05-05

## 2021-05-05 DIAGNOSIS — N18.4 CHRONIC KIDNEY DISEASE, STAGE IV (SEVERE) (HCC): Primary | ICD-10-CM

## 2021-05-14 ENCOUNTER — HOSPITAL ENCOUNTER (EMERGENCY)
Facility: HOSPITAL | Age: 81
Discharge: HOME | End: 2021-05-14
Attending: EMERGENCY MEDICINE | Admitting: EMERGENCY MEDICINE

## 2021-05-14 ENCOUNTER — APPOINTMENT (OUTPATIENT)
Dept: CT IMAGING | Facility: HOSPITAL | Age: 81
End: 2021-05-14

## 2021-05-14 VITALS
DIASTOLIC BLOOD PRESSURE: 87 MMHG | TEMPERATURE: 98 F | BODY MASS INDEX: 23.18 KG/M2 | OXYGEN SATURATION: 97 % | SYSTOLIC BLOOD PRESSURE: 172 MMHG | RESPIRATION RATE: 16 BRPM | HEART RATE: 64 BPM | WEIGHT: 115 LBS | HEIGHT: 59 IN

## 2021-05-14 DIAGNOSIS — S20.211A CONTUSION OF RIGHT CHEST WALL, INITIAL ENCOUNTER: Primary | ICD-10-CM

## 2021-05-14 PROCEDURE — 25010000002 FENTANYL CITRATE (PF) 100 MCG/2ML SOLUTION: Performed by: EMERGENCY MEDICINE

## 2021-05-14 PROCEDURE — 96372 THER/PROPH/DIAG INJ SC/IM: CPT

## 2021-05-14 PROCEDURE — 99283 EMERGENCY DEPT VISIT LOW MDM: CPT

## 2021-05-14 PROCEDURE — 71250 CT THORAX DX C-: CPT

## 2021-05-14 RX ORDER — LIDOCAINE 50 MG/G
1 PATCH TOPICAL EVERY 24 HOURS
Qty: 6 PATCH | Refills: 0 | Status: SHIPPED | OUTPATIENT
Start: 2021-05-14 | End: 2023-02-22 | Stop reason: HOSPADM

## 2021-05-14 RX ORDER — FENTANYL CITRATE 50 UG/ML
50 INJECTION, SOLUTION INTRAMUSCULAR; INTRAVENOUS ONCE
Status: COMPLETED | OUTPATIENT
Start: 2021-05-14 | End: 2021-05-14

## 2021-05-14 RX ADMIN — FENTANYL CITRATE 50 MCG: 50 INJECTION, SOLUTION INTRAMUSCULAR; INTRAVENOUS at 12:29

## 2021-05-14 RX ADMIN — FENTANYL CITRATE 50 MCG: 50 INJECTION, SOLUTION INTRAMUSCULAR; INTRAVENOUS at 11:28

## 2021-05-20 ENCOUNTER — APPOINTMENT (OUTPATIENT)
Dept: INFUSION THERAPY | Facility: HOSPITAL | Age: 81
End: 2021-05-20

## 2021-05-25 ENCOUNTER — HOSPITAL ENCOUNTER (OUTPATIENT)
Dept: INFUSION THERAPY | Facility: HOSPITAL | Age: 81
Discharge: HOME | End: 2021-05-25
Admitting: PHYSICIAN ASSISTANT

## 2021-05-25 VITALS
SYSTOLIC BLOOD PRESSURE: 128 MMHG | BODY MASS INDEX: 23.23 KG/M2 | OXYGEN SATURATION: 99 % | TEMPERATURE: 98 F | WEIGHT: 115 LBS | HEART RATE: 64 BPM | RESPIRATION RATE: 18 BRPM | DIASTOLIC BLOOD PRESSURE: 67 MMHG

## 2021-05-25 DIAGNOSIS — N18.4 ANEMIA DUE TO STAGE 4 CHRONIC KIDNEY DISEASE TREATED WITH ERYTHROPOIETIN (HCC): ICD-10-CM

## 2021-05-25 DIAGNOSIS — D63.1 ANEMIA DUE TO STAGE 4 CHRONIC KIDNEY DISEASE TREATED WITH ERYTHROPOIETIN (HCC): ICD-10-CM

## 2021-05-25 DIAGNOSIS — N18.4 CHRONIC KIDNEY DISEASE, STAGE IV (SEVERE) (HCC): Primary | ICD-10-CM

## 2021-05-25 LAB
25(OH)D3 SERPL-MCNC: 58 NG/ML (ref 30–100)
ALBUMIN SERPL-MCNC: 3.6 G/DL (ref 3.5–5.2)
ANION GAP SERPL CALCULATED.3IONS-SCNC: 11.2 MMOL/L (ref 5–15)
BUN SERPL-MCNC: 57 MG/DL (ref 8–23)
BUN/CREAT SERPL: 17.3 (ref 7–25)
CALCIUM SPEC-SCNC: 12.2 MG/DL (ref 8.6–10.5)
CHLORIDE SERPL-SCNC: 101 MMOL/L (ref 98–107)
CO2 SERPL-SCNC: 23.8 MMOL/L (ref 22–29)
CREAT SERPL-MCNC: 3.3 MG/DL (ref 0.57–1)
DEPRECATED RDW RBC AUTO: 64.1 FL (ref 37–54)
ERYTHROCYTE [DISTWIDTH] IN BLOOD BY AUTOMATED COUNT: 17 % (ref 12.3–15.4)
GFR SERPL CREATININE-BSD FRML MDRD: 13 ML/MIN/1.73
GFR SERPL CREATININE-BSD FRML MDRD: ABNORMAL ML/MIN/{1.73_M2}
GLUCOSE SERPL-MCNC: 123 MG/DL (ref 65–99)
HCT VFR BLD AUTO: 31.2 % (ref 34–46.6)
HGB BLD-MCNC: 9.8 G/DL (ref 12–15.9)
IRON 24H UR-MRATE: 97 MCG/DL (ref 37–145)
IRON SATN MFR SERPL: 38 % (ref 20–50)
MCH RBC QN AUTO: 32.8 PG (ref 26.6–33)
MCHC RBC AUTO-ENTMCNC: 31.4 G/DL (ref 31.5–35.7)
MCV RBC AUTO: 104.3 FL (ref 79–97)
PHOSPHATE SERPL-MCNC: 4.3 MG/DL (ref 2.5–4.5)
PLATELET # BLD AUTO: 213 10*3/MM3 (ref 140–450)
PMV BLD AUTO: 10.2 FL (ref 6–12)
POTASSIUM SERPL-SCNC: 4.3 MMOL/L (ref 3.5–5.2)
RBC # BLD AUTO: 2.99 10*6/MM3 (ref 3.77–5.28)
SODIUM SERPL-SCNC: 136 MMOL/L (ref 136–145)
TIBC SERPL-MCNC: 256 MCG/DL (ref 298–536)
TRANSFERRIN SERPL-MCNC: 172 MG/DL (ref 200–360)
URATE SERPL-MCNC: 5.4 MG/DL (ref 2.4–5.7)
WBC # BLD AUTO: 3.71 10*3/MM3 (ref 3.4–10.8)

## 2021-05-25 PROCEDURE — 84466 ASSAY OF TRANSFERRIN: CPT | Performed by: PHYSICIAN ASSISTANT

## 2021-05-25 PROCEDURE — 82306 VITAMIN D 25 HYDROXY: CPT | Performed by: PHYSICIAN ASSISTANT

## 2021-05-25 PROCEDURE — 36415 COLL VENOUS BLD VENIPUNCTURE: CPT

## 2021-05-25 PROCEDURE — 82330 ASSAY OF CALCIUM: CPT | Performed by: PHYSICIAN ASSISTANT

## 2021-05-25 PROCEDURE — 83970 ASSAY OF PARATHORMONE: CPT | Performed by: PHYSICIAN ASSISTANT

## 2021-05-25 PROCEDURE — 25010000002 EPOETIN ALFA PER 1000 UNITS: Performed by: NURSE PRACTITIONER

## 2021-05-25 PROCEDURE — 84550 ASSAY OF BLOOD/URIC ACID: CPT | Performed by: PHYSICIAN ASSISTANT

## 2021-05-25 PROCEDURE — 96372 THER/PROPH/DIAG INJ SC/IM: CPT

## 2021-05-25 PROCEDURE — 80069 RENAL FUNCTION PANEL: CPT | Performed by: PHYSICIAN ASSISTANT

## 2021-05-25 PROCEDURE — 82310 ASSAY OF CALCIUM: CPT | Performed by: PHYSICIAN ASSISTANT

## 2021-05-25 PROCEDURE — 85027 COMPLETE CBC AUTOMATED: CPT | Performed by: PHYSICIAN ASSISTANT

## 2021-05-25 PROCEDURE — 83540 ASSAY OF IRON: CPT | Performed by: PHYSICIAN ASSISTANT

## 2021-05-25 RX ADMIN — ERYTHROPOIETIN 40000 UNITS: 40000 INJECTION, SOLUTION INTRAVENOUS; SUBCUTANEOUS at 13:48

## 2021-05-26 LAB
CA-I BLD-MCNC: 6.8 MG/DL (ref 4.6–5.4)
CA-I SERPL ISE-MCNC: 1.7 MMOL/L (ref 1.15–1.35)
CALCIUM SPEC-SCNC: 11.5 MG/DL (ref 8.6–10.5)
PTH-INTACT SERPL-MCNC: 23 PG/ML (ref 15–65)

## 2021-06-02 ENCOUNTER — TRANSCRIBE ORDERS (OUTPATIENT)
Dept: ADMINISTRATIVE | Facility: HOSPITAL | Age: 81
End: 2021-06-02

## 2021-06-02 DIAGNOSIS — N28.1 ACQUIRED BILATERAL RENAL CYSTS: Primary | ICD-10-CM

## 2021-06-08 ENCOUNTER — HOSPITAL ENCOUNTER (OUTPATIENT)
Dept: INFUSION THERAPY | Facility: HOSPITAL | Age: 81
Discharge: HOME | End: 2021-06-08
Admitting: INTERNAL MEDICINE

## 2021-06-08 VITALS
DIASTOLIC BLOOD PRESSURE: 71 MMHG | OXYGEN SATURATION: 99 % | HEART RATE: 66 BPM | TEMPERATURE: 98 F | SYSTOLIC BLOOD PRESSURE: 131 MMHG | RESPIRATION RATE: 18 BRPM

## 2021-06-08 DIAGNOSIS — D63.1 ANEMIA DUE TO STAGE 4 CHRONIC KIDNEY DISEASE TREATED WITH ERYTHROPOIETIN (HCC): ICD-10-CM

## 2021-06-08 DIAGNOSIS — N18.4 ANEMIA DUE TO STAGE 4 CHRONIC KIDNEY DISEASE TREATED WITH ERYTHROPOIETIN (HCC): ICD-10-CM

## 2021-06-08 LAB
ALBUMIN SERPL-MCNC: 3.4 G/DL (ref 3.5–5.2)
ANION GAP SERPL CALCULATED.3IONS-SCNC: 11.4 MMOL/L (ref 5–15)
BUN SERPL-MCNC: 42 MG/DL (ref 8–23)
BUN/CREAT SERPL: 11.6 (ref 7–25)
CALCIUM SPEC-SCNC: 10.9 MG/DL (ref 8.6–10.5)
CHLORIDE SERPL-SCNC: 101 MMOL/L (ref 98–107)
CO2 SERPL-SCNC: 22.6 MMOL/L (ref 22–29)
CREAT SERPL-MCNC: 3.61 MG/DL (ref 0.57–1)
DEPRECATED RDW RBC AUTO: 65.3 FL (ref 37–54)
ERYTHROCYTE [DISTWIDTH] IN BLOOD BY AUTOMATED COUNT: 16.9 % (ref 12.3–15.4)
GFR SERPL CREATININE-BSD FRML MDRD: 12 ML/MIN/1.73
GFR SERPL CREATININE-BSD FRML MDRD: ABNORMAL ML/MIN/{1.73_M2}
GLUCOSE SERPL-MCNC: 92 MG/DL (ref 65–99)
HCT VFR BLD AUTO: 33.5 % (ref 34–46.6)
HGB BLD-MCNC: 10.7 G/DL (ref 12–15.9)
IRON 24H UR-MRATE: 84 MCG/DL (ref 37–145)
IRON SATN MFR SERPL: 37 % (ref 20–50)
MCH RBC QN AUTO: 33.9 PG (ref 26.6–33)
MCHC RBC AUTO-ENTMCNC: 31.9 G/DL (ref 31.5–35.7)
MCV RBC AUTO: 106 FL (ref 79–97)
PHOSPHATE SERPL-MCNC: 5.3 MG/DL (ref 2.5–4.5)
PLATELET # BLD AUTO: 177 10*3/MM3 (ref 140–450)
PMV BLD AUTO: 10 FL (ref 6–12)
POTASSIUM SERPL-SCNC: 4.4 MMOL/L (ref 3.5–5.2)
RBC # BLD AUTO: 3.16 10*6/MM3 (ref 3.77–5.28)
SODIUM SERPL-SCNC: 135 MMOL/L (ref 136–145)
TIBC SERPL-MCNC: 229 MCG/DL (ref 298–536)
TRANSFERRIN SERPL-MCNC: 154 MG/DL (ref 200–360)
WBC # BLD AUTO: 4.81 10*3/MM3 (ref 3.4–10.8)

## 2021-06-08 PROCEDURE — 80069 RENAL FUNCTION PANEL: CPT | Performed by: INTERNAL MEDICINE

## 2021-06-08 PROCEDURE — 36415 COLL VENOUS BLD VENIPUNCTURE: CPT

## 2021-06-08 PROCEDURE — 84466 ASSAY OF TRANSFERRIN: CPT | Performed by: INTERNAL MEDICINE

## 2021-06-08 PROCEDURE — 83540 ASSAY OF IRON: CPT | Performed by: INTERNAL MEDICINE

## 2021-06-08 PROCEDURE — 85027 COMPLETE CBC AUTOMATED: CPT | Performed by: INTERNAL MEDICINE

## 2021-06-15 ENCOUNTER — HOSPITAL ENCOUNTER (OUTPATIENT)
Dept: INFUSION THERAPY | Facility: HOSPITAL | Age: 81
End: 2021-06-15

## 2021-06-18 DIAGNOSIS — D63.1 ANEMIA DUE TO STAGE 4 CHRONIC KIDNEY DISEASE TREATED WITH ERYTHROPOIETIN (HCC): Primary | ICD-10-CM

## 2021-06-18 DIAGNOSIS — N18.4 ANEMIA DUE TO STAGE 4 CHRONIC KIDNEY DISEASE TREATED WITH ERYTHROPOIETIN (HCC): Primary | ICD-10-CM

## 2021-06-21 ENCOUNTER — HOSPITAL ENCOUNTER (OUTPATIENT)
Dept: INFUSION THERAPY | Facility: HOSPITAL | Age: 81
Discharge: HOME | End: 2021-06-21
Admitting: INTERNAL MEDICINE

## 2021-06-21 VITALS
TEMPERATURE: 98 F | SYSTOLIC BLOOD PRESSURE: 138 MMHG | OXYGEN SATURATION: 97 % | RESPIRATION RATE: 16 BRPM | DIASTOLIC BLOOD PRESSURE: 65 MMHG | HEART RATE: 65 BPM

## 2021-06-21 DIAGNOSIS — D63.1 ANEMIA DUE TO STAGE 4 CHRONIC KIDNEY DISEASE TREATED WITH ERYTHROPOIETIN (HCC): Primary | ICD-10-CM

## 2021-06-21 DIAGNOSIS — N18.4 ANEMIA DUE TO STAGE 4 CHRONIC KIDNEY DISEASE TREATED WITH ERYTHROPOIETIN (HCC): Primary | ICD-10-CM

## 2021-06-21 LAB
25(OH)D3 SERPL-MCNC: 56.1 NG/ML (ref 30–100)
ALBUMIN SERPL-MCNC: 3.6 G/DL (ref 3.5–5.2)
ANION GAP SERPL CALCULATED.3IONS-SCNC: 7.8 MMOL/L (ref 5–15)
BUN SERPL-MCNC: 49 MG/DL (ref 8–23)
BUN/CREAT SERPL: 15.7 (ref 7–25)
CALCIUM SPEC-SCNC: 10 MG/DL (ref 8.6–10.5)
CALCIUM SPEC-SCNC: 9.1 MG/DL (ref 8.6–10.5)
CHLORIDE SERPL-SCNC: 101 MMOL/L (ref 98–107)
CO2 SERPL-SCNC: 24.2 MMOL/L (ref 22–29)
CREAT SERPL-MCNC: 3.13 MG/DL (ref 0.57–1)
DEPRECATED RDW RBC AUTO: 58.8 FL (ref 37–54)
ERYTHROCYTE [DISTWIDTH] IN BLOOD BY AUTOMATED COUNT: 15.4 % (ref 12.3–15.4)
GFR SERPL CREATININE-BSD FRML MDRD: 14 ML/MIN/1.73
GFR SERPL CREATININE-BSD FRML MDRD: ABNORMAL ML/MIN/{1.73_M2}
GLUCOSE SERPL-MCNC: 110 MG/DL (ref 65–99)
HCT VFR BLD AUTO: 31.8 % (ref 34–46.6)
HGB BLD-MCNC: 10 G/DL (ref 12–15.9)
MCH RBC QN AUTO: 33.1 PG (ref 26.6–33)
MCHC RBC AUTO-ENTMCNC: 31.4 G/DL (ref 31.5–35.7)
MCV RBC AUTO: 105.3 FL (ref 79–97)
PHOSPHATE SERPL-MCNC: 5.1 MG/DL (ref 2.5–4.5)
PLATELET # BLD AUTO: 195 10*3/MM3 (ref 140–450)
PMV BLD AUTO: 9.9 FL (ref 6–12)
POTASSIUM SERPL-SCNC: 4.8 MMOL/L (ref 3.5–5.2)
PTH-INTACT SERPL-MCNC: 40.9 PG/ML (ref 15–65)
RBC # BLD AUTO: 3.02 10*6/MM3 (ref 3.77–5.28)
SODIUM SERPL-SCNC: 133 MMOL/L (ref 136–145)
URATE SERPL-MCNC: 4.3 MG/DL (ref 2.4–5.7)
WBC # BLD AUTO: 4.31 10*3/MM3 (ref 3.4–10.8)

## 2021-06-21 PROCEDURE — 85027 COMPLETE CBC AUTOMATED: CPT | Performed by: PHYSICIAN ASSISTANT

## 2021-06-21 PROCEDURE — 25010000002 EPOETIN ALFA PER 1000 UNITS: Performed by: PHYSICIAN ASSISTANT

## 2021-06-21 PROCEDURE — 96372 THER/PROPH/DIAG INJ SC/IM: CPT

## 2021-06-21 PROCEDURE — 82306 VITAMIN D 25 HYDROXY: CPT | Performed by: INTERNAL MEDICINE

## 2021-06-21 PROCEDURE — 82310 ASSAY OF CALCIUM: CPT | Performed by: INTERNAL MEDICINE

## 2021-06-21 PROCEDURE — 80069 RENAL FUNCTION PANEL: CPT | Performed by: PHYSICIAN ASSISTANT

## 2021-06-21 PROCEDURE — 36415 COLL VENOUS BLD VENIPUNCTURE: CPT

## 2021-06-21 PROCEDURE — 84550 ASSAY OF BLOOD/URIC ACID: CPT | Performed by: INTERNAL MEDICINE

## 2021-06-21 PROCEDURE — 83970 ASSAY OF PARATHORMONE: CPT | Performed by: INTERNAL MEDICINE

## 2021-06-21 RX ADMIN — ERYTHROPOIETIN 40000 UNITS: 40000 INJECTION, SOLUTION INTRAVENOUS; SUBCUTANEOUS at 14:11

## 2021-07-02 ENCOUNTER — HOSPITAL ENCOUNTER (EMERGENCY)
Facility: HOSPITAL | Age: 81
Discharge: LEFT BEFORE DISCHARGE | End: 2021-07-02

## 2021-07-02 VITALS
SYSTOLIC BLOOD PRESSURE: 155 MMHG | TEMPERATURE: 97.4 F | BODY MASS INDEX: 20.76 KG/M2 | OXYGEN SATURATION: 98 % | DIASTOLIC BLOOD PRESSURE: 94 MMHG | HEART RATE: 60 BPM | RESPIRATION RATE: 16 BRPM | WEIGHT: 103 LBS | HEIGHT: 59 IN

## 2021-07-02 PROCEDURE — 99211 OFF/OP EST MAY X REQ PHY/QHP: CPT

## 2021-07-02 RX ORDER — AMLODIPINE BESYLATE 5 MG/1
TABLET ORAL
Qty: 90 TABLET | OUTPATIENT
Start: 2021-07-02

## 2021-07-12 ENCOUNTER — HOSPITAL ENCOUNTER (OUTPATIENT)
Dept: INFUSION THERAPY | Facility: HOSPITAL | Age: 81
Setting detail: INFUSION SERIES
Discharge: HOME | End: 2021-07-12

## 2021-07-12 ENCOUNTER — OFFICE VISIT (OUTPATIENT)
Dept: OBSTETRICS AND GYNECOLOGY | Facility: CLINIC | Age: 81
End: 2021-07-12

## 2021-07-12 VITALS
HEART RATE: 70 BPM | OXYGEN SATURATION: 99 % | SYSTOLIC BLOOD PRESSURE: 156 MMHG | RESPIRATION RATE: 18 BRPM | DIASTOLIC BLOOD PRESSURE: 68 MMHG | TEMPERATURE: 98 F

## 2021-07-12 VITALS
DIASTOLIC BLOOD PRESSURE: 72 MMHG | WEIGHT: 105 LBS | BODY MASS INDEX: 20.62 KG/M2 | SYSTOLIC BLOOD PRESSURE: 144 MMHG | HEIGHT: 60 IN

## 2021-07-12 DIAGNOSIS — D63.1 ANEMIA DUE TO STAGE 4 CHRONIC KIDNEY DISEASE TREATED WITH ERYTHROPOIETIN (HCC): Primary | ICD-10-CM

## 2021-07-12 DIAGNOSIS — N81.11 CYSTOCELE, MIDLINE: ICD-10-CM

## 2021-07-12 DIAGNOSIS — N18.4 ANEMIA DUE TO STAGE 4 CHRONIC KIDNEY DISEASE TREATED WITH ERYTHROPOIETIN (HCC): Primary | ICD-10-CM

## 2021-07-12 DIAGNOSIS — Z46.89 PESSARY MAINTENANCE: Primary | ICD-10-CM

## 2021-07-12 LAB
ALBUMIN SERPL-MCNC: 3.5 G/DL (ref 3.5–5.2)
ANION GAP SERPL CALCULATED.3IONS-SCNC: 9.4 MMOL/L (ref 5–15)
BUN SERPL-MCNC: 57 MG/DL (ref 8–23)
BUN/CREAT SERPL: 19.6 (ref 7–25)
CALCIUM SPEC-SCNC: 9.8 MG/DL (ref 8.6–10.5)
CHLORIDE SERPL-SCNC: 102 MMOL/L (ref 98–107)
CO2 SERPL-SCNC: 21.6 MMOL/L (ref 22–29)
CREAT SERPL-MCNC: 2.91 MG/DL (ref 0.57–1)
DEPRECATED RDW RBC AUTO: 61.8 FL (ref 37–54)
ERYTHROCYTE [DISTWIDTH] IN BLOOD BY AUTOMATED COUNT: 15.8 % (ref 12.3–15.4)
GFR SERPL CREATININE-BSD FRML MDRD: 16 ML/MIN/1.73
GLUCOSE SERPL-MCNC: 116 MG/DL (ref 65–99)
HCT VFR BLD AUTO: 31 % (ref 34–46.6)
HGB BLD-MCNC: 9.6 G/DL (ref 12–15.9)
MCH RBC QN AUTO: 33.3 PG (ref 26.6–33)
MCHC RBC AUTO-ENTMCNC: 31 G/DL (ref 31.5–35.7)
MCV RBC AUTO: 107.6 FL (ref 79–97)
PHOSPHATE SERPL-MCNC: 4 MG/DL (ref 2.5–4.5)
PLATELET # BLD AUTO: 197 10*3/MM3 (ref 140–450)
PMV BLD AUTO: 9.6 FL (ref 6–12)
POTASSIUM SERPL-SCNC: 5.1 MMOL/L (ref 3.5–5.2)
RBC # BLD AUTO: 2.88 10*6/MM3 (ref 3.77–5.28)
SODIUM SERPL-SCNC: 133 MMOL/L (ref 136–145)
WBC # BLD AUTO: 3.97 10*3/MM3 (ref 3.4–10.8)

## 2021-07-12 PROCEDURE — 99212 OFFICE O/P EST SF 10 MIN: CPT | Performed by: PHYSICIAN ASSISTANT

## 2021-07-12 PROCEDURE — 80069 RENAL FUNCTION PANEL: CPT | Performed by: PHYSICIAN ASSISTANT

## 2021-07-12 PROCEDURE — 25010000002 EPOETIN ALFA PER 1000 UNITS: Performed by: PHYSICIAN ASSISTANT

## 2021-07-12 PROCEDURE — 96372 THER/PROPH/DIAG INJ SC/IM: CPT

## 2021-07-12 PROCEDURE — 36415 COLL VENOUS BLD VENIPUNCTURE: CPT

## 2021-07-12 PROCEDURE — 85027 COMPLETE CBC AUTOMATED: CPT | Performed by: PHYSICIAN ASSISTANT

## 2021-07-12 RX ADMIN — ERYTHROPOIETIN 40000 UNITS: 40000 INJECTION, SOLUTION INTRAVENOUS; SUBCUTANEOUS at 13:46

## 2021-07-12 NOTE — PROGRESS NOTES
"Subjective   Chief Complaint   Patient presents with   • Pessary Check     doing well       Carolyne Martins is a 81 y.o. year old  presenting to be seen for routine pessary check   having no problems or concerns  Is voiding well. No symptoms of urinary tract infections  Bowel movements OK. No vaginal bleeding  No urinary incontinence  Using estradiol cream    Past Medical History:   Diagnosis Date   • Anemia    • Anemia    • Arthritis    • Back pain    • Bleeding from anus    • Bronchitis     STATES HAS BRONCHITIS CURRENTLY (17).     • Bronchitis 2017   • CAD (coronary artery disease)    • Cataract, bilateral    • Chronic kidney disease     STAGE 4.  SEES TERA   • Chronic kidney failure     REPORTS STAGE IV   • Difficulty swallowing solids    • Disease of thyroid gland    • Fracture, radius 2016    right distal radius and ulna, healed.   • GERD (gastroesophageal reflux disease)    • Gout    • High cholesterol    • History of blood transfusion    • History of nuclear stress test     DR. CORTEZ.  \"IT WAS OK\"   • Hyperparathyroidism (CMS/HCC)    • Hyperparathyroidism (CMS/HCC)    • Hypertension    • Impaired functional mobility, balance, gait, and endurance    • Iron deficiency    • Osteoarthritis    • Osteoarthritis of knees, bilateral     Both knees Supartz injection series    • Osteoporosis    • Palpitations    • Personal history of cardiac murmur    • Pessary maintenance    • PONV (postoperative nausea and vomiting)    • Presence of pessary    • Problems with swallowing     WITH FOOD OCCASSIONALLY   • Rheumatic heart disease     Personal history   • Rotator cuff tendonitis     right    • Rupture of right proximal biceps tendon     chronic   • Seasonal allergies    • Sinus problem    • Spinal headache     REPORTS AFTER DELIVERY OF SON   • Subacromial bursitis     right    • Valvular heart disease    • Vision problems    • Vitamin B12 deficiency    • Vitamin D deficiency    • " Wears glasses         Current Outpatient Medications:   •  allopurinol (ZYLOPRIM) 100 MG tablet, Take 1 tablet by mouth daily., Disp: , Rfl:   •  aspirin 81 MG EC tablet, Take 81 mg by mouth Daily., Disp: , Rfl:   •  budesonide-formoterol (SYMBICORT) 80-4.5 MCG/ACT inhaler, Inhale 2 puffs Daily., Disp: , Rfl:   •  carboxymethylcellulose (REFRESH PLUS) 0.5 % solution, Administer 1 drop to both eyes 3 (Three) Times a Day As Needed for Dry Eyes., Disp: , Rfl:   •  carvedilol (COREG) 25 MG tablet, Take 25 mg by mouth 2 (Two) Times a Day With Meals., Disp: , Rfl:   •  cholecalciferol (VITAMIN D3) 1000 UNITS tablet, Take 1,000 Units by mouth Daily., Disp: , Rfl:   •  docusate sodium (COLACE) 100 MG capsule, Take 1 capsule by mouth 2 (Two) Times a Day. (Patient taking differently: Take 100 mg by mouth 2 (Two) Times a Day As Needed for Constipation.), Disp: 60 capsule, Rfl: 5  •  epoetin ovidio (PROCRIT) 53321 UNIT/ML injection, Inject 40,000 Units under the skin into the appropriate area as directed. Every 2 weeks.  Due Wednesday, Feb 17th, Disp: , Rfl:   •  estradiol (ESTRACE) 0.1 MG/GM vaginal cream, Massage 1 gram in vaginal opening twice weekly, Disp: 42.5 g, Rfl: 2  •  fluticasone (FLONASE) 50 MCG/ACT nasal spray, 2 sprays into each nostril Daily As Needed., Disp: , Rfl:   •  irbesartan (AVAPRO) 150 MG tablet, Take 150 mg by mouth Daily., Disp: , Rfl:   •  lidocaine (LIDODERM) 5 %, Place 1 patch on the skin as directed by provider Daily. Remove & Discard patch within 12 hours or as directed by MD, Disp: 6 patch, Rfl: 0  •  omeprazole (PriLOSEC) 40 MG capsule, Take 40 mg by mouth Daily., Disp: , Rfl:   •  Probiotic Product (PROBIOTIC DAILY PO), Take 1 tablet by mouth Daily As Needed., Disp: , Rfl:   •  torsemide (DEMADEX) 10 MG tablet, Take 1 tablet by mouth on Thursday and Monday, take as needed otherwise, Disp: 15 tablet, Rfl: 0  No current facility-administered medications for this visit.   Allergies   Allergen  Reactions   • Ferrlecit [Na Ferric Gluc Cplx In Sucrose] Swelling   • Ranitidine Hcl Shortness Of Breath   • Levofloxacin Other (See Comments)     Leg cramps     • Lisinopril Cough      Past Surgical History:   Procedure Laterality Date   • CATARACT EXTRACTION Bilateral    • CERVICAL POLYPECTOMY     • COLONOSCOPY  2011   • COLONOSCOPY N/A 1/9/2018    Procedure: COLONOSCOPY with endoscopic mucosal resection (hot snare), normal saline submucosal injection, argon thermal ablation, resolution clip placement x4, and cold biopsy polypectomy;  Surgeon: Pieter Concepcion MD;  Location: [facility] ENDOSCOPY;  Service:    • ENDOSCOPY N/A 12/6/2017    Procedure: ESOPHAGOGASTRODUODENOSCOPY with biopsies and hot snare polypectomies;  Surgeon: Pieter Concepcion MD;  Location: [facility] ENDOSCOPY;  Service:    • ENDOSCOPY N/A 1/15/2019    Procedure: ESOPHAGOGASTRODUODENOSCOPY WITH BIOPSIES AND HOT SNARE POLYPECTOMIES X10;  Surgeon: Pieter Concepcion MD;  Location: [facility] ENDOSCOPY;  Service: Gastroenterology   • HIP TROCHANTERIC NAILING WITH INTRAMEDULLARY HIP SCREW Left 2/14/2021    Procedure: HIP TROCHANTER NAIL SHORT WITH INTRAMEDULLARY HIP SCREW, LEFT;  Surgeon: Gabriele Loyd MD;  Location: [facility] OR;  Service: Orthopedics;  Laterality: Left;   • UPPER GASTROINTESTINAL ENDOSCOPY  08/18/2014      Social History     Socioeconomic History   • Marital status:      Spouse name: Not on file   • Number of children: Not on file   • Years of education: Not on file   • Highest education level: Not on file   Tobacco Use   • Smoking status: Never Smoker   • Smokeless tobacco: Never Used   Vaping Use   • Vaping Use: Never used   Substance and Sexual Activity   • Alcohol use: Never   • Drug use: Never   • Sexual activity: Not Currently     Birth control/protection: Post-menopausal      Family History   Problem Relation Age of Onset   • Liver cancer Maternal Grandmother    • Gout Other    • Osteoporosis Other    • Stroke Other    • Ulcers  "Other    • Asthma Other    • Hypertension Other    • Heart disease Other    • Osteoarthritis Other    • Colon cancer Neg Hx        Review of Systems   Constitutional: Negative for chills, diaphoresis and fever.   Gastrointestinal: Negative for abdominal pain, constipation, diarrhea, nausea and vomiting.   Genitourinary: Negative for difficulty urinating, dysuria, pelvic pain, vaginal bleeding, vaginal discharge and vaginal pain.           Objective   /72   Ht 152.4 cm (60\")   Wt 47.6 kg (105 lb)   LMP  (LMP Unknown)   Breastfeeding No   BMI 20.51 kg/m²     Physical Exam  Constitutional:       Appearance: Normal appearance. She is well-developed.   Abdominal:      Palpations: Abdomen is soft.      Tenderness: There is no abdominal tenderness. There is no guarding.   Genitourinary:     Labia:         Right: No rash, tenderness or lesion.         Left: No rash, tenderness or lesion.       Urethra: No prolapse, urethral pain, urethral swelling or urethral lesion.      Vagina: No vaginal discharge, erythema, tenderness or bleeding.      Comments: Ring with support pessary removed, cleaned and reinserted  No vaginal wall erosion or abrasions  Grade 2 cystocele  Skin:     General: Skin is warm and dry.      Findings: No bruising or lesion.   Neurological:      Mental Status: She is alert.   Psychiatric:         Attention and Perception: Attention normal.         Mood and Affect: Mood normal.         Speech: Speech normal.         Behavior: Behavior is cooperative.            Result Review :                   Assessment and Plan  Diagnoses and all orders for this visit:    1. Pessary maintenance (Primary)    2. Cystocele, midline      Patient Instructions   Follow up 6 months or prn             This note was electronically signed.    Yaz Woods PA-C   July 12, 2021  "

## 2021-08-04 ENCOUNTER — TRANSCRIBE ORDERS (OUTPATIENT)
Dept: ADMINISTRATIVE | Facility: HOSPITAL | Age: 81
End: 2021-08-04

## 2021-08-05 ENCOUNTER — TRANSCRIBE ORDERS (OUTPATIENT)
Dept: ADMINISTRATIVE | Facility: HOSPITAL | Age: 81
End: 2021-08-05

## 2021-08-05 DIAGNOSIS — N28.1 RENAL CYSTS, ACQUIRED, BILATERAL: Primary | ICD-10-CM

## 2021-08-09 ENCOUNTER — HOSPITAL ENCOUNTER (OUTPATIENT)
Dept: INFUSION THERAPY | Facility: HOSPITAL | Age: 81
Setting detail: INFUSION SERIES
End: 2021-08-09

## 2021-08-20 ENCOUNTER — APPOINTMENT (OUTPATIENT)
Dept: INFUSION THERAPY | Facility: HOSPITAL | Age: 81
End: 2021-08-20

## 2021-08-31 ENCOUNTER — HOSPITAL ENCOUNTER (OUTPATIENT)
Dept: INFUSION THERAPY | Facility: HOSPITAL | Age: 81
Discharge: HOME | End: 2021-08-31
Admitting: INTERNAL MEDICINE

## 2021-08-31 VITALS
SYSTOLIC BLOOD PRESSURE: 139 MMHG | HEIGHT: 59 IN | WEIGHT: 110 LBS | DIASTOLIC BLOOD PRESSURE: 69 MMHG | OXYGEN SATURATION: 97 % | TEMPERATURE: 97.8 F | HEART RATE: 69 BPM | BODY MASS INDEX: 22.18 KG/M2

## 2021-08-31 DIAGNOSIS — D63.1 ANEMIA DUE TO STAGE 4 CHRONIC KIDNEY DISEASE TREATED WITH ERYTHROPOIETIN (HCC): Primary | ICD-10-CM

## 2021-08-31 DIAGNOSIS — N18.4 ANEMIA DUE TO STAGE 4 CHRONIC KIDNEY DISEASE TREATED WITH ERYTHROPOIETIN (HCC): Primary | ICD-10-CM

## 2021-08-31 LAB
ALBUMIN SERPL-MCNC: 3.6 G/DL (ref 3.5–5.2)
ANION GAP SERPL CALCULATED.3IONS-SCNC: 11.7 MMOL/L (ref 5–15)
BUN SERPL-MCNC: 39 MG/DL (ref 8–23)
BUN/CREAT SERPL: 13.8 (ref 7–25)
CALCIUM SPEC-SCNC: 10 MG/DL (ref 8.6–10.5)
CHLORIDE SERPL-SCNC: 106 MMOL/L (ref 98–107)
CO2 SERPL-SCNC: 22.3 MMOL/L (ref 22–29)
CREAT SERPL-MCNC: 2.82 MG/DL (ref 0.57–1)
DEPRECATED RDW RBC AUTO: 57.9 FL (ref 37–54)
ERYTHROCYTE [DISTWIDTH] IN BLOOD BY AUTOMATED COUNT: 15.3 % (ref 12.3–15.4)
GFR SERPL CREATININE-BSD FRML MDRD: 16 ML/MIN/1.73
GLUCOSE SERPL-MCNC: 102 MG/DL (ref 65–99)
HCT VFR BLD AUTO: 30.6 % (ref 34–46.6)
HGB BLD-MCNC: 9.8 G/DL (ref 12–15.9)
MCH RBC QN AUTO: 33.1 PG (ref 26.6–33)
MCHC RBC AUTO-ENTMCNC: 32 G/DL (ref 31.5–35.7)
MCV RBC AUTO: 103.4 FL (ref 79–97)
PHOSPHATE SERPL-MCNC: 4.1 MG/DL (ref 2.5–4.5)
PLATELET # BLD AUTO: 185 10*3/MM3 (ref 140–450)
PMV BLD AUTO: 9.7 FL (ref 6–12)
POTASSIUM SERPL-SCNC: 4.4 MMOL/L (ref 3.5–5.2)
RBC # BLD AUTO: 2.96 10*6/MM3 (ref 3.77–5.28)
SODIUM SERPL-SCNC: 140 MMOL/L (ref 136–145)
WBC # BLD AUTO: 4.18 10*3/MM3 (ref 3.4–10.8)

## 2021-08-31 PROCEDURE — 36415 COLL VENOUS BLD VENIPUNCTURE: CPT

## 2021-08-31 PROCEDURE — 85027 COMPLETE CBC AUTOMATED: CPT | Performed by: PHYSICIAN ASSISTANT

## 2021-08-31 PROCEDURE — 25010000002 EPOETIN ALFA PER 1000 UNITS: Performed by: PHYSICIAN ASSISTANT

## 2021-08-31 PROCEDURE — 80069 RENAL FUNCTION PANEL: CPT | Performed by: PHYSICIAN ASSISTANT

## 2021-08-31 PROCEDURE — 96372 THER/PROPH/DIAG INJ SC/IM: CPT

## 2021-08-31 RX ADMIN — ERYTHROPOIETIN 40000 UNITS: 40000 INJECTION, SOLUTION INTRAVENOUS; SUBCUTANEOUS at 13:30

## 2021-09-03 ENCOUNTER — TRANSCRIBE ORDERS (OUTPATIENT)
Dept: LAB | Facility: HOSPITAL | Age: 81
End: 2021-09-03

## 2021-09-03 DIAGNOSIS — N18.4 CHRONIC KIDNEY DISEASE, STAGE IV (SEVERE) (HCC): Primary | ICD-10-CM

## 2021-09-21 ENCOUNTER — APPOINTMENT (OUTPATIENT)
Dept: INFUSION THERAPY | Facility: HOSPITAL | Age: 81
End: 2021-09-21

## 2021-09-27 ENCOUNTER — HOSPITAL ENCOUNTER (OUTPATIENT)
Dept: INFUSION THERAPY | Facility: HOSPITAL | Age: 81
Discharge: HOME | End: 2021-09-27
Admitting: PHYSICIAN ASSISTANT

## 2021-09-27 VITALS
RESPIRATION RATE: 18 BRPM | DIASTOLIC BLOOD PRESSURE: 80 MMHG | SYSTOLIC BLOOD PRESSURE: 189 MMHG | HEART RATE: 64 BPM | OXYGEN SATURATION: 96 % | TEMPERATURE: 98.4 F

## 2021-09-27 DIAGNOSIS — D63.1 ANEMIA DUE TO STAGE 4 CHRONIC KIDNEY DISEASE TREATED WITH ERYTHROPOIETIN (HCC): Primary | ICD-10-CM

## 2021-09-27 DIAGNOSIS — N18.4 ANEMIA DUE TO STAGE 4 CHRONIC KIDNEY DISEASE TREATED WITH ERYTHROPOIETIN (HCC): ICD-10-CM

## 2021-09-27 DIAGNOSIS — D63.1 ANEMIA DUE TO STAGE 4 CHRONIC KIDNEY DISEASE TREATED WITH ERYTHROPOIETIN (HCC): ICD-10-CM

## 2021-09-27 DIAGNOSIS — N18.4 CHRONIC KIDNEY DISEASE, STAGE IV (SEVERE) (HCC): ICD-10-CM

## 2021-09-27 DIAGNOSIS — N18.4 ANEMIA DUE TO STAGE 4 CHRONIC KIDNEY DISEASE TREATED WITH ERYTHROPOIETIN (HCC): Primary | ICD-10-CM

## 2021-09-27 LAB
ALBUMIN SERPL-MCNC: 4 G/DL (ref 3.5–5.2)
ANION GAP SERPL CALCULATED.3IONS-SCNC: 9.9 MMOL/L (ref 5–15)
BUN SERPL-MCNC: 35 MG/DL (ref 8–23)
BUN/CREAT SERPL: 13.4 (ref 7–25)
CALCIUM SPEC-SCNC: 10.7 MG/DL (ref 8.6–10.5)
CHLORIDE SERPL-SCNC: 104 MMOL/L (ref 98–107)
CO2 SERPL-SCNC: 24.1 MMOL/L (ref 22–29)
CREAT SERPL-MCNC: 2.62 MG/DL (ref 0.57–1)
DEPRECATED RDW RBC AUTO: 64.4 FL (ref 37–54)
ERYTHROCYTE [DISTWIDTH] IN BLOOD BY AUTOMATED COUNT: 16.1 % (ref 12.3–15.4)
GFR SERPL CREATININE-BSD FRML MDRD: 18 ML/MIN/1.73
GLUCOSE SERPL-MCNC: 107 MG/DL (ref 65–99)
HCT VFR BLD AUTO: 30.1 % (ref 34–46.6)
HGB BLD-MCNC: 9.6 G/DL (ref 12–15.9)
IRON 24H UR-MRATE: 81 MCG/DL (ref 37–145)
IRON SATN MFR SERPL: 33 % (ref 20–50)
MCH RBC QN AUTO: 34 PG (ref 26.6–33)
MCHC RBC AUTO-ENTMCNC: 31.9 G/DL (ref 31.5–35.7)
MCV RBC AUTO: 106.7 FL (ref 79–97)
PHOSPHATE SERPL-MCNC: 4.5 MG/DL (ref 2.5–4.5)
PLATELET # BLD AUTO: 200 10*3/MM3 (ref 140–450)
PMV BLD AUTO: 9.2 FL (ref 6–12)
POTASSIUM SERPL-SCNC: 4.6 MMOL/L (ref 3.5–5.2)
RBC # BLD AUTO: 2.82 10*6/MM3 (ref 3.77–5.28)
SODIUM SERPL-SCNC: 138 MMOL/L (ref 136–145)
TIBC SERPL-MCNC: 244 MCG/DL (ref 298–536)
TRANSFERRIN SERPL-MCNC: 164 MG/DL (ref 200–360)
URATE SERPL-MCNC: 5 MG/DL (ref 2.4–5.7)
WBC # BLD AUTO: 3.92 10*3/MM3 (ref 3.4–10.8)

## 2021-09-27 PROCEDURE — 83540 ASSAY OF IRON: CPT | Performed by: INTERNAL MEDICINE

## 2021-09-27 PROCEDURE — 25010000002 EPOETIN ALFA PER 1000 UNITS: Performed by: PHYSICIAN ASSISTANT

## 2021-09-27 PROCEDURE — 85027 COMPLETE CBC AUTOMATED: CPT | Performed by: INTERNAL MEDICINE

## 2021-09-27 PROCEDURE — 80069 RENAL FUNCTION PANEL: CPT | Performed by: INTERNAL MEDICINE

## 2021-09-27 PROCEDURE — 84550 ASSAY OF BLOOD/URIC ACID: CPT | Performed by: INTERNAL MEDICINE

## 2021-09-27 PROCEDURE — 96372 THER/PROPH/DIAG INJ SC/IM: CPT

## 2021-09-27 PROCEDURE — 84466 ASSAY OF TRANSFERRIN: CPT | Performed by: INTERNAL MEDICINE

## 2021-09-27 PROCEDURE — A9270 NON-COVERED ITEM OR SERVICE: HCPCS | Performed by: PHYSICIAN ASSISTANT

## 2021-09-27 PROCEDURE — 36415 COLL VENOUS BLD VENIPUNCTURE: CPT

## 2021-09-27 PROCEDURE — 63710000001 FUROSEMIDE 20 MG TABLET: Performed by: PHYSICIAN ASSISTANT

## 2021-09-27 RX ORDER — FUROSEMIDE 20 MG/1
20 TABLET ORAL ONCE
Status: COMPLETED | OUTPATIENT
Start: 2021-09-27 | End: 2021-09-27

## 2021-09-27 RX ORDER — FUROSEMIDE 20 MG/1
20 TABLET ORAL ONCE
Status: DISCONTINUED | OUTPATIENT
Start: 2021-09-27 | End: 2021-09-27

## 2021-09-27 RX ADMIN — ERYTHROPOIETIN 40000 UNITS: 40000 INJECTION, SOLUTION INTRAVENOUS; SUBCUTANEOUS at 14:49

## 2021-09-27 RX ADMIN — FUROSEMIDE 20 MG: 20 TABLET ORAL at 14:51

## 2021-09-27 NOTE — CODE DOCUMENTATION
Per maría elena burleson , patient is to get procrit today and to give patient 20mg of lasix po here. Patient advised to monitor blood pressure at home and go to ER for worsening symptoms. Patient verbalized understanding.

## 2021-09-27 NOTE — CODE DOCUMENTATION
Attempted to call dr. hurt office multiple times with no success or answer. Attempted to call dr. zimmer cell phone x2 and left voicemail with no returned call or answer. lana complains of minor headache and minor blurred vision.

## 2021-10-04 ENCOUNTER — APPOINTMENT (OUTPATIENT)
Dept: INFUSION THERAPY | Facility: HOSPITAL | Age: 81
End: 2021-10-04

## 2021-10-18 ENCOUNTER — APPOINTMENT (OUTPATIENT)
Dept: INFUSION THERAPY | Facility: HOSPITAL | Age: 81
End: 2021-10-18

## 2021-10-22 ENCOUNTER — HOSPITAL ENCOUNTER (OUTPATIENT)
Dept: INFUSION THERAPY | Facility: HOSPITAL | Age: 81
Discharge: HOME | End: 2021-10-22
Admitting: NURSE PRACTITIONER

## 2021-10-22 VITALS
DIASTOLIC BLOOD PRESSURE: 90 MMHG | OXYGEN SATURATION: 98 % | HEART RATE: 67 BPM | RESPIRATION RATE: 18 BRPM | TEMPERATURE: 98 F | SYSTOLIC BLOOD PRESSURE: 172 MMHG

## 2021-10-22 DIAGNOSIS — D63.1 ANEMIA DUE TO STAGE 4 CHRONIC KIDNEY DISEASE TREATED WITH ERYTHROPOIETIN (HCC): Primary | ICD-10-CM

## 2021-10-22 DIAGNOSIS — N18.4 ANEMIA DUE TO STAGE 4 CHRONIC KIDNEY DISEASE TREATED WITH ERYTHROPOIETIN (HCC): Primary | ICD-10-CM

## 2021-10-22 LAB
ALBUMIN SERPL-MCNC: 3.8 G/DL (ref 3.5–5.2)
ANION GAP SERPL CALCULATED.3IONS-SCNC: 9.5 MMOL/L (ref 5–15)
BUN SERPL-MCNC: 44 MG/DL (ref 8–23)
BUN/CREAT SERPL: 16.7 (ref 7–25)
CALCIUM SPEC-SCNC: 10.3 MG/DL (ref 8.6–10.5)
CHLORIDE SERPL-SCNC: 103 MMOL/L (ref 98–107)
CO2 SERPL-SCNC: 24.5 MMOL/L (ref 22–29)
CREAT SERPL-MCNC: 2.64 MG/DL (ref 0.57–1)
DEPRECATED RDW RBC AUTO: 62.7 FL (ref 37–54)
ERYTHROCYTE [DISTWIDTH] IN BLOOD BY AUTOMATED COUNT: 15.7 % (ref 12.3–15.4)
FERRITIN SERPL-MCNC: 325.3 NG/ML (ref 13–150)
GFR SERPL CREATININE-BSD FRML MDRD: 17 ML/MIN/1.73
GLUCOSE SERPL-MCNC: 123 MG/DL (ref 65–99)
HCT VFR BLD AUTO: 31.1 % (ref 34–46.6)
HGB BLD-MCNC: 10 G/DL (ref 12–15.9)
IRON 24H UR-MRATE: 95 MCG/DL (ref 37–145)
IRON SATN MFR SERPL: 37 % (ref 20–50)
MCH RBC QN AUTO: 35.1 PG (ref 26.6–33)
MCHC RBC AUTO-ENTMCNC: 32.2 G/DL (ref 31.5–35.7)
MCV RBC AUTO: 109.1 FL (ref 79–97)
PHOSPHATE SERPL-MCNC: 4.5 MG/DL (ref 2.5–4.5)
PLATELET # BLD AUTO: 190 10*3/MM3 (ref 140–450)
PMV BLD AUTO: 9.5 FL (ref 6–12)
POTASSIUM SERPL-SCNC: 4.8 MMOL/L (ref 3.5–5.2)
RBC # BLD AUTO: 2.85 10*6/MM3 (ref 3.77–5.28)
SODIUM SERPL-SCNC: 137 MMOL/L (ref 136–145)
TIBC SERPL-MCNC: 259 MCG/DL (ref 298–536)
TRANSFERRIN SERPL-MCNC: 174 MG/DL (ref 200–360)
URATE SERPL-MCNC: 5.2 MG/DL (ref 2.4–5.7)
WBC # BLD AUTO: 3.91 10*3/MM3 (ref 3.4–10.8)

## 2021-10-22 PROCEDURE — 85027 COMPLETE CBC AUTOMATED: CPT | Performed by: INTERNAL MEDICINE

## 2021-10-22 PROCEDURE — 84466 ASSAY OF TRANSFERRIN: CPT | Performed by: INTERNAL MEDICINE

## 2021-10-22 PROCEDURE — 82728 ASSAY OF FERRITIN: CPT | Performed by: INTERNAL MEDICINE

## 2021-10-22 PROCEDURE — 96372 THER/PROPH/DIAG INJ SC/IM: CPT

## 2021-10-22 PROCEDURE — 36415 COLL VENOUS BLD VENIPUNCTURE: CPT

## 2021-10-22 PROCEDURE — 83540 ASSAY OF IRON: CPT | Performed by: INTERNAL MEDICINE

## 2021-10-22 PROCEDURE — 25010000002 EPOETIN ALFA PER 1000 UNITS: Performed by: NURSE PRACTITIONER

## 2021-10-22 PROCEDURE — 80069 RENAL FUNCTION PANEL: CPT | Performed by: INTERNAL MEDICINE

## 2021-10-22 PROCEDURE — 84550 ASSAY OF BLOOD/URIC ACID: CPT | Performed by: INTERNAL MEDICINE

## 2021-10-22 RX ADMIN — ERYTHROPOIETIN 40000 UNITS: 40000 INJECTION, SOLUTION INTRAVENOUS; SUBCUTANEOUS at 14:39

## 2021-11-12 ENCOUNTER — APPOINTMENT (OUTPATIENT)
Dept: INFUSION THERAPY | Facility: HOSPITAL | Age: 81
End: 2021-11-12

## 2021-11-18 ENCOUNTER — APPOINTMENT (OUTPATIENT)
Dept: INFUSION THERAPY | Facility: HOSPITAL | Age: 81
End: 2021-11-18

## 2021-11-23 ENCOUNTER — HOSPITAL ENCOUNTER (OUTPATIENT)
Dept: INFUSION THERAPY | Facility: HOSPITAL | Age: 81
Discharge: HOME | End: 2021-11-23
Admitting: INTERNAL MEDICINE

## 2021-11-23 VITALS
SYSTOLIC BLOOD PRESSURE: 196 MMHG | TEMPERATURE: 98.4 F | HEART RATE: 72 BPM | OXYGEN SATURATION: 98 % | DIASTOLIC BLOOD PRESSURE: 98 MMHG

## 2021-11-23 DIAGNOSIS — N18.4 ANEMIA DUE TO STAGE 4 CHRONIC KIDNEY DISEASE TREATED WITH ERYTHROPOIETIN (HCC): Primary | ICD-10-CM

## 2021-11-23 DIAGNOSIS — D63.1 ANEMIA DUE TO STAGE 4 CHRONIC KIDNEY DISEASE TREATED WITH ERYTHROPOIETIN (HCC): Primary | ICD-10-CM

## 2021-11-23 LAB
ALBUMIN SERPL-MCNC: 3.8 G/DL (ref 3.5–5.2)
ANION GAP SERPL CALCULATED.3IONS-SCNC: 9.6 MMOL/L (ref 5–15)
ANISOCYTOSIS BLD QL: NORMAL
BASOPHILS # BLD AUTO: 0.01 10*3/MM3 (ref 0–0.2)
BASOPHILS NFR BLD AUTO: 0.2 % (ref 0–1.5)
BUN SERPL-MCNC: 40 MG/DL (ref 8–23)
BUN/CREAT SERPL: 16.1 (ref 7–25)
CALCIUM SPEC-SCNC: 10.5 MG/DL (ref 8.6–10.5)
CHLORIDE SERPL-SCNC: 103 MMOL/L (ref 98–107)
CO2 SERPL-SCNC: 23.4 MMOL/L (ref 22–29)
CREAT SERPL-MCNC: 2.48 MG/DL (ref 0.57–1)
DEPRECATED RDW RBC AUTO: 56.1 FL (ref 37–54)
EOSINOPHIL # BLD AUTO: 0.34 10*3/MM3 (ref 0–0.4)
EOSINOPHIL NFR BLD AUTO: 5.9 % (ref 0.3–6.2)
ERYTHROCYTE [DISTWIDTH] IN BLOOD BY AUTOMATED COUNT: 14.6 % (ref 12.3–15.4)
GFR SERPL CREATININE-BSD FRML MDRD: 19 ML/MIN/1.73
GLUCOSE SERPL-MCNC: 117 MG/DL (ref 65–99)
HCT VFR BLD AUTO: 31.9 % (ref 34–46.6)
HGB BLD-MCNC: 10.3 G/DL (ref 12–15.9)
IMM GRANULOCYTES # BLD AUTO: 0.02 10*3/MM3 (ref 0–0.05)
IMM GRANULOCYTES NFR BLD AUTO: 0.3 % (ref 0–0.5)
LYMPHOCYTES # BLD AUTO: 1.39 10*3/MM3 (ref 0.7–3.1)
LYMPHOCYTES NFR BLD AUTO: 24 % (ref 19.6–45.3)
MACROCYTES BLD QL SMEAR: NORMAL
MCH RBC QN AUTO: 34.6 PG (ref 26.6–33)
MCHC RBC AUTO-ENTMCNC: 32.3 G/DL (ref 31.5–35.7)
MCV RBC AUTO: 107 FL (ref 79–97)
MONOCYTES # BLD AUTO: 0.56 10*3/MM3 (ref 0.1–0.9)
MONOCYTES NFR BLD AUTO: 9.7 % (ref 5–12)
NEUTROPHILS NFR BLD AUTO: 3.48 10*3/MM3 (ref 1.7–7)
NEUTROPHILS NFR BLD AUTO: 59.9 % (ref 42.7–76)
NRBC BLD AUTO-RTO: 0 /100 WBC (ref 0–0.2)
PHOSPHATE SERPL-MCNC: 3.8 MG/DL (ref 2.5–4.5)
PLATELET # BLD AUTO: 220 10*3/MM3 (ref 140–450)
PMV BLD AUTO: 9.4 FL (ref 6–12)
POTASSIUM SERPL-SCNC: 4.3 MMOL/L (ref 3.5–5.2)
RBC # BLD AUTO: 2.98 10*6/MM3 (ref 3.77–5.28)
SMALL PLATELETS BLD QL SMEAR: ADEQUATE
SODIUM SERPL-SCNC: 136 MMOL/L (ref 136–145)
WBC MORPH BLD: NORMAL
WBC NRBC COR # BLD: 5.8 10*3/MM3 (ref 3.4–10.8)

## 2021-11-23 PROCEDURE — 85007 BL SMEAR W/DIFF WBC COUNT: CPT | Performed by: NURSE PRACTITIONER

## 2021-11-23 PROCEDURE — 80069 RENAL FUNCTION PANEL: CPT | Performed by: PHYSICIAN ASSISTANT

## 2021-11-23 PROCEDURE — 85025 COMPLETE CBC W/AUTO DIFF WBC: CPT | Performed by: NURSE PRACTITIONER

## 2021-11-23 PROCEDURE — 36415 COLL VENOUS BLD VENIPUNCTURE: CPT

## 2021-11-23 PROCEDURE — 63710000001 CLONIDINE 0.1 MG TABLET: Performed by: INTERNAL MEDICINE

## 2021-11-23 PROCEDURE — 25010000002 EPOETIN ALFA PER 1000 UNITS: Performed by: NURSE PRACTITIONER

## 2021-11-23 PROCEDURE — 96372 THER/PROPH/DIAG INJ SC/IM: CPT

## 2021-11-23 PROCEDURE — A9270 NON-COVERED ITEM OR SERVICE: HCPCS | Performed by: INTERNAL MEDICINE

## 2021-11-23 RX ORDER — CLONIDINE HYDROCHLORIDE 0.1 MG/1
0.1 TABLET ORAL EVERY 12 HOURS SCHEDULED
Status: DISCONTINUED | OUTPATIENT
Start: 2021-11-23 | End: 2021-11-23

## 2021-11-23 RX ORDER — CLONIDINE HYDROCHLORIDE 0.1 MG/1
0.1 TABLET ORAL ONCE
Status: COMPLETED | OUTPATIENT
Start: 2021-11-23 | End: 2021-11-23

## 2021-11-23 RX ADMIN — ERYTHROPOIETIN 40000 UNITS: 40000 INJECTION, SOLUTION INTRAVENOUS; SUBCUTANEOUS at 16:17

## 2021-11-23 RX ADMIN — CLONIDINE HYDROCHLORIDE 0.1 MG: 0.1 TABLET ORAL at 15:53

## 2021-11-23 RX ADMIN — CLONIDINE HYDROCHLORIDE 0.1 MG: 0.1 TABLET ORAL at 15:04

## 2021-11-23 NOTE — CODE DOCUMENTATION
Spoke with Dr Schilling's office regarding elevated blood pressures.  Orders received for clonidine 0.1 mg po now.  Repeat BP in 20 min

## 2021-11-23 NOTE — CODE DOCUMENTATION
Spoke with Summer at Dr. Schilling's office regarding pts elevated B/P. Order received to repeat order of clonidine 0.1mg PO now and pt is able to receive procrit injection.

## 2021-11-23 NOTE — CODE DOCUMENTATION
Pt and son was advised to follow up with PCP, DR Bonilla regarding elevated blood  Pressures. Pt states she has not been taking her B/P meds because she was running low.Pt and son was told that Her pressure has been elevated the last 3 visits.

## 2021-11-23 NOTE — ADDENDUM NOTE
Encounter addended by: Jessica Edward on: 11/23/2021 6:14 PM   Actions taken: Pharmacy for encounter modified

## 2021-12-08 ENCOUNTER — APPOINTMENT (OUTPATIENT)
Dept: INFUSION THERAPY | Facility: HOSPITAL | Age: 81
End: 2021-12-08

## 2022-01-17 ENCOUNTER — HOSPITAL ENCOUNTER (OUTPATIENT)
Dept: INFUSION THERAPY | Facility: HOSPITAL | Age: 82
Discharge: HOME | End: 2022-01-17

## 2022-01-17 DIAGNOSIS — D63.1 ANEMIA DUE TO STAGE 4 CHRONIC KIDNEY DISEASE TREATED WITH ERYTHROPOIETIN: Primary | ICD-10-CM

## 2022-01-17 DIAGNOSIS — N18.4 ANEMIA DUE TO STAGE 4 CHRONIC KIDNEY DISEASE TREATED WITH ERYTHROPOIETIN: Primary | ICD-10-CM

## 2022-01-24 ENCOUNTER — HOSPITAL ENCOUNTER (OUTPATIENT)
Dept: INFUSION THERAPY | Facility: HOSPITAL | Age: 82
Discharge: HOME | End: 2022-01-24
Admitting: NURSE PRACTITIONER

## 2022-01-24 VITALS
RESPIRATION RATE: 18 BRPM | TEMPERATURE: 98.4 F | SYSTOLIC BLOOD PRESSURE: 167 MMHG | OXYGEN SATURATION: 95 % | DIASTOLIC BLOOD PRESSURE: 80 MMHG | HEART RATE: 83 BPM

## 2022-01-24 DIAGNOSIS — N18.4 ANEMIA DUE TO STAGE 4 CHRONIC KIDNEY DISEASE TREATED WITH ERYTHROPOIETIN: Primary | ICD-10-CM

## 2022-01-24 DIAGNOSIS — D63.1 ANEMIA DUE TO STAGE 4 CHRONIC KIDNEY DISEASE TREATED WITH ERYTHROPOIETIN: Primary | ICD-10-CM

## 2022-01-24 LAB
ALBUMIN SERPL-MCNC: 3.8 G/DL (ref 3.5–5.2)
ANION GAP SERPL CALCULATED.3IONS-SCNC: 11 MMOL/L (ref 5–15)
BASOPHILS # BLD AUTO: 0.02 10*3/MM3 (ref 0–0.2)
BASOPHILS NFR BLD AUTO: 0.3 % (ref 0–1.5)
BUN SERPL-MCNC: 42 MG/DL (ref 8–23)
BUN/CREAT SERPL: 15.3 (ref 7–25)
CALCIUM SPEC-SCNC: 10.6 MG/DL (ref 8.6–10.5)
CHLORIDE SERPL-SCNC: 102 MMOL/L (ref 98–107)
CO2 SERPL-SCNC: 22 MMOL/L (ref 22–29)
CREAT SERPL-MCNC: 2.75 MG/DL (ref 0.57–1)
DEPRECATED RDW RBC AUTO: 58.5 FL (ref 37–54)
EOSINOPHIL # BLD AUTO: 0.32 10*3/MM3 (ref 0–0.4)
EOSINOPHIL NFR BLD AUTO: 5.6 % (ref 0.3–6.2)
ERYTHROCYTE [DISTWIDTH] IN BLOOD BY AUTOMATED COUNT: 15.4 % (ref 12.3–15.4)
FERRITIN SERPL-MCNC: 385.8 NG/ML (ref 13–150)
GFR SERPL CREATININE-BSD FRML MDRD: 17 ML/MIN/1.73
GLUCOSE SERPL-MCNC: 138 MG/DL (ref 65–99)
HCT VFR BLD AUTO: 29.3 % (ref 34–46.6)
HGB BLD-MCNC: 9.4 G/DL (ref 12–15.9)
IMM GRANULOCYTES # BLD AUTO: 0.02 10*3/MM3 (ref 0–0.05)
IMM GRANULOCYTES NFR BLD AUTO: 0.3 % (ref 0–0.5)
IRON 24H UR-MRATE: 49 MCG/DL (ref 37–145)
IRON SATN MFR SERPL: 20 % (ref 20–50)
LYMPHOCYTES # BLD AUTO: 1.12 10*3/MM3 (ref 0.7–3.1)
LYMPHOCYTES NFR BLD AUTO: 19.5 % (ref 19.6–45.3)
MCH RBC QN AUTO: 33.3 PG (ref 26.6–33)
MCHC RBC AUTO-ENTMCNC: 32.1 G/DL (ref 31.5–35.7)
MCV RBC AUTO: 103.9 FL (ref 79–97)
MONOCYTES # BLD AUTO: 0.44 10*3/MM3 (ref 0.1–0.9)
MONOCYTES NFR BLD AUTO: 7.7 % (ref 5–12)
NEUTROPHILS NFR BLD AUTO: 3.83 10*3/MM3 (ref 1.7–7)
NEUTROPHILS NFR BLD AUTO: 66.6 % (ref 42.7–76)
NRBC BLD AUTO-RTO: 0 /100 WBC (ref 0–0.2)
PHOSPHATE SERPL-MCNC: 4 MG/DL (ref 2.5–4.5)
PLATELET # BLD AUTO: 235 10*3/MM3 (ref 140–450)
PMV BLD AUTO: 9.7 FL (ref 6–12)
POTASSIUM SERPL-SCNC: 4.3 MMOL/L (ref 3.5–5.2)
RBC # BLD AUTO: 2.82 10*6/MM3 (ref 3.77–5.28)
SODIUM SERPL-SCNC: 135 MMOL/L (ref 136–145)
TIBC SERPL-MCNC: 249 MCG/DL (ref 298–536)
TRANSFERRIN SERPL-MCNC: 167 MG/DL (ref 200–360)
WBC NRBC COR # BLD: 5.75 10*3/MM3 (ref 3.4–10.8)

## 2022-01-24 PROCEDURE — 84466 ASSAY OF TRANSFERRIN: CPT | Performed by: NURSE PRACTITIONER

## 2022-01-24 PROCEDURE — 80069 RENAL FUNCTION PANEL: CPT | Performed by: NURSE PRACTITIONER

## 2022-01-24 PROCEDURE — 96372 THER/PROPH/DIAG INJ SC/IM: CPT

## 2022-01-24 PROCEDURE — 85025 COMPLETE CBC W/AUTO DIFF WBC: CPT | Performed by: NURSE PRACTITIONER

## 2022-01-24 PROCEDURE — 82728 ASSAY OF FERRITIN: CPT | Performed by: NURSE PRACTITIONER

## 2022-01-24 PROCEDURE — 36415 COLL VENOUS BLD VENIPUNCTURE: CPT

## 2022-01-24 PROCEDURE — 83540 ASSAY OF IRON: CPT | Performed by: NURSE PRACTITIONER

## 2022-01-24 PROCEDURE — 25010000002 EPOETIN ALFA PER 1000 UNITS: Performed by: NURSE PRACTITIONER

## 2022-01-24 RX ADMIN — ERYTHROPOIETIN 40000 UNITS: 40000 INJECTION, SOLUTION INTRAVENOUS; SUBCUTANEOUS at 15:20

## 2022-02-07 RX ORDER — DOCUSATE SODIUM 100 MG/1
100 CAPSULE, LIQUID FILLED ORAL 2 TIMES DAILY PRN
Qty: 60 CAPSULE | Refills: 3 | Status: SHIPPED | OUTPATIENT
Start: 2022-02-07

## 2022-02-14 ENCOUNTER — APPOINTMENT (OUTPATIENT)
Dept: INFUSION THERAPY | Facility: HOSPITAL | Age: 82
End: 2022-02-14

## 2022-04-08 ENCOUNTER — HOSPITAL ENCOUNTER (EMERGENCY)
Facility: HOSPITAL | Age: 82
Discharge: HOME | End: 2022-04-08
Attending: EMERGENCY MEDICINE | Admitting: EMERGENCY MEDICINE

## 2022-04-08 ENCOUNTER — APPOINTMENT (OUTPATIENT)
Dept: GENERAL RADIOLOGY | Facility: HOSPITAL | Age: 82
End: 2022-04-08

## 2022-04-08 VITALS
SYSTOLIC BLOOD PRESSURE: 138 MMHG | HEART RATE: 66 BPM | DIASTOLIC BLOOD PRESSURE: 78 MMHG | WEIGHT: 110 LBS | TEMPERATURE: 97.9 F | HEIGHT: 62 IN | OXYGEN SATURATION: 98 % | RESPIRATION RATE: 16 BRPM | BODY MASS INDEX: 20.24 KG/M2

## 2022-04-08 DIAGNOSIS — N39.0 ACUTE UTI: Primary | ICD-10-CM

## 2022-04-08 LAB
ALBUMIN SERPL-MCNC: 3.5 G/DL (ref 3.5–5.2)
ALBUMIN/GLOB SERPL: 1.2 G/DL
ALP SERPL-CCNC: 131 U/L (ref 39–117)
ALT SERPL W P-5'-P-CCNC: 9 U/L (ref 1–33)
ANION GAP SERPL CALCULATED.3IONS-SCNC: 10.3 MMOL/L (ref 5–15)
AST SERPL-CCNC: 17 U/L (ref 1–32)
B PARAPERT DNA SPEC QL NAA+PROBE: NOT DETECTED
B PERT DNA SPEC QL NAA+PROBE: NOT DETECTED
BACTERIA UR QL AUTO: ABNORMAL /HPF
BASOPHILS # BLD AUTO: 0.02 10*3/MM3 (ref 0–0.2)
BASOPHILS NFR BLD AUTO: 0.4 % (ref 0–1.5)
BILIRUB SERPL-MCNC: 0.3 MG/DL (ref 0–1.2)
BILIRUB UR QL STRIP: NEGATIVE
BUN SERPL-MCNC: 38 MG/DL (ref 8–23)
BUN/CREAT SERPL: 13.2 (ref 7–25)
C PNEUM DNA NPH QL NAA+NON-PROBE: NOT DETECTED
CALCIUM SPEC-SCNC: 9.8 MG/DL (ref 8.6–10.5)
CHLORIDE SERPL-SCNC: 102 MMOL/L (ref 98–107)
CLARITY UR: CLEAR
CO2 SERPL-SCNC: 22.7 MMOL/L (ref 22–29)
COLOR UR: YELLOW
CREAT SERPL-MCNC: 2.88 MG/DL (ref 0.57–1)
DEPRECATED RDW RBC AUTO: 55 FL (ref 37–54)
EGFRCR SERPLBLD CKD-EPI 2021: 16.1 ML/MIN/1.73
EOSINOPHIL # BLD AUTO: 0.34 10*3/MM3 (ref 0–0.4)
EOSINOPHIL NFR BLD AUTO: 6.5 % (ref 0.3–6.2)
ERYTHROCYTE [DISTWIDTH] IN BLOOD BY AUTOMATED COUNT: 14.4 % (ref 12.3–15.4)
FLUAV SUBTYP SPEC NAA+PROBE: NOT DETECTED
FLUBV RNA ISLT QL NAA+PROBE: NOT DETECTED
GLOBULIN UR ELPH-MCNC: 2.9 GM/DL
GLUCOSE SERPL-MCNC: 97 MG/DL (ref 65–99)
GLUCOSE UR STRIP-MCNC: NEGATIVE MG/DL
HADV DNA SPEC NAA+PROBE: NOT DETECTED
HCOV 229E RNA SPEC QL NAA+PROBE: NOT DETECTED
HCOV HKU1 RNA SPEC QL NAA+PROBE: NOT DETECTED
HCOV NL63 RNA SPEC QL NAA+PROBE: NOT DETECTED
HCOV OC43 RNA SPEC QL NAA+PROBE: NOT DETECTED
HCT VFR BLD AUTO: 25.1 % (ref 34–46.6)
HGB BLD-MCNC: 8.2 G/DL (ref 12–15.9)
HGB UR QL STRIP.AUTO: NEGATIVE
HMPV RNA NPH QL NAA+NON-PROBE: NOT DETECTED
HPIV1 RNA ISLT QL NAA+PROBE: NOT DETECTED
HPIV2 RNA SPEC QL NAA+PROBE: NOT DETECTED
HPIV3 RNA NPH QL NAA+PROBE: NOT DETECTED
HPIV4 P GENE NPH QL NAA+PROBE: NOT DETECTED
HYALINE CASTS UR QL AUTO: ABNORMAL /LPF
IMM GRANULOCYTES # BLD AUTO: 0.01 10*3/MM3 (ref 0–0.05)
IMM GRANULOCYTES NFR BLD AUTO: 0.2 % (ref 0–0.5)
KETONES UR QL STRIP: NEGATIVE
LEUKOCYTE ESTERASE UR QL STRIP.AUTO: ABNORMAL
LYMPHOCYTES # BLD AUTO: 1.18 10*3/MM3 (ref 0.7–3.1)
LYMPHOCYTES NFR BLD AUTO: 22.5 % (ref 19.6–45.3)
M PNEUMO IGG SER IA-ACNC: NOT DETECTED
MAGNESIUM SERPL-MCNC: 1.8 MG/DL (ref 1.6–2.4)
MCH RBC QN AUTO: 34 PG (ref 26.6–33)
MCHC RBC AUTO-ENTMCNC: 32.7 G/DL (ref 31.5–35.7)
MCV RBC AUTO: 104.1 FL (ref 79–97)
MONOCYTES # BLD AUTO: 0.56 10*3/MM3 (ref 0.1–0.9)
MONOCYTES NFR BLD AUTO: 10.7 % (ref 5–12)
NEUTROPHILS NFR BLD AUTO: 3.13 10*3/MM3 (ref 1.7–7)
NEUTROPHILS NFR BLD AUTO: 59.7 % (ref 42.7–76)
NITRITE UR QL STRIP: NEGATIVE
NRBC BLD AUTO-RTO: 0 /100 WBC (ref 0–0.2)
NT-PROBNP SERPL-MCNC: 4406 PG/ML (ref 0–1800)
PH UR STRIP.AUTO: 6.5 [PH] (ref 5–8)
PLATELET # BLD AUTO: 195 10*3/MM3 (ref 140–450)
PMV BLD AUTO: 9.6 FL (ref 6–12)
POTASSIUM SERPL-SCNC: 4.5 MMOL/L (ref 3.5–5.2)
PROT SERPL-MCNC: 6.4 G/DL (ref 6–8.5)
PROT UR QL STRIP: ABNORMAL
RBC # BLD AUTO: 2.41 10*6/MM3 (ref 3.77–5.28)
RBC # UR STRIP: ABNORMAL /HPF
REF LAB TEST METHOD: ABNORMAL
RHINOVIRUS RNA SPEC NAA+PROBE: NOT DETECTED
RSV RNA NPH QL NAA+NON-PROBE: NOT DETECTED
SARS-COV-2 RNA NPH QL NAA+NON-PROBE: NOT DETECTED
SODIUM SERPL-SCNC: 135 MMOL/L (ref 136–145)
SP GR UR STRIP: 1.01 (ref 1–1.03)
SQUAMOUS #/AREA URNS HPF: ABNORMAL /HPF
TROPONIN T SERPL-MCNC: 0.02 NG/ML (ref 0–0.03)
TSH SERPL DL<=0.05 MIU/L-ACNC: 3.05 UIU/ML (ref 0.27–4.2)
UROBILINOGEN UR QL STRIP: ABNORMAL
WBC # UR STRIP: ABNORMAL /HPF
WBC NRBC COR # BLD: 5.24 10*3/MM3 (ref 3.4–10.8)

## 2022-04-08 PROCEDURE — 83880 ASSAY OF NATRIURETIC PEPTIDE: CPT | Performed by: NURSE PRACTITIONER

## 2022-04-08 PROCEDURE — 84484 ASSAY OF TROPONIN QUANT: CPT | Performed by: NURSE PRACTITIONER

## 2022-04-08 PROCEDURE — 85025 COMPLETE CBC W/AUTO DIFF WBC: CPT | Performed by: NURSE PRACTITIONER

## 2022-04-08 PROCEDURE — 93005 ELECTROCARDIOGRAM TRACING: CPT | Performed by: NURSE PRACTITIONER

## 2022-04-08 PROCEDURE — 84443 ASSAY THYROID STIM HORMONE: CPT | Performed by: NURSE PRACTITIONER

## 2022-04-08 PROCEDURE — 87086 URINE CULTURE/COLONY COUNT: CPT | Performed by: NURSE PRACTITIONER

## 2022-04-08 PROCEDURE — 71045 X-RAY EXAM CHEST 1 VIEW: CPT

## 2022-04-08 PROCEDURE — 80053 COMPREHEN METABOLIC PANEL: CPT | Performed by: NURSE PRACTITIONER

## 2022-04-08 PROCEDURE — 83735 ASSAY OF MAGNESIUM: CPT | Performed by: NURSE PRACTITIONER

## 2022-04-08 PROCEDURE — 99283 EMERGENCY DEPT VISIT LOW MDM: CPT

## 2022-04-08 PROCEDURE — 0202U NFCT DS 22 TRGT SARS-COV-2: CPT | Performed by: NURSE PRACTITIONER

## 2022-04-08 PROCEDURE — 81001 URINALYSIS AUTO W/SCOPE: CPT | Performed by: NURSE PRACTITIONER

## 2022-04-08 PROCEDURE — 36415 COLL VENOUS BLD VENIPUNCTURE: CPT

## 2022-04-08 RX ORDER — CEPHALEXIN 500 MG/1
500 CAPSULE ORAL 2 TIMES DAILY
Qty: 14 CAPSULE | Refills: 0 | Status: SHIPPED | OUTPATIENT
Start: 2022-04-08 | End: 2023-01-12

## 2022-04-08 RX ORDER — SODIUM CHLORIDE 0.9 % (FLUSH) 0.9 %
10 SYRINGE (ML) INJECTION AS NEEDED
Status: DISCONTINUED | OUTPATIENT
Start: 2022-04-08 | End: 2022-04-08 | Stop reason: HOSPADM

## 2022-04-08 RX ADMIN — SODIUM CHLORIDE 500 ML: 9 INJECTION, SOLUTION INTRAVENOUS at 16:26

## 2022-04-08 NOTE — ED PROVIDER NOTES
"Subjective   History of Present Illness  Is a 81-year-old female who is brought in by her son for complaints of sick all over.  She reports she called her son over to come and get her and take her to the emergency department because she felt like she was dying.  She states that she could barely dress herself.  Once he got her into the car her symptoms completely resolved and she states she feels fine now.  When asked her what exactly was killing her she states she does not know.  She reports that she is nearly 82 years old and everything hurts.  Her son reports that it could possibly all the in her head but she denies this.  At this time she denies weakness, chest pain, nausea, vomiting, fever, chills, cough or congestion.  Her son reports that she supposed to be getting a Procrit injection monthly.  However, she refuses to go get that has not had that in several months.  Review of Systems   Constitutional: Negative.    HENT: Negative.    Eyes: Negative.    Respiratory: Negative.    Cardiovascular: Negative.    Gastrointestinal: Negative.    Genitourinary: Negative.    Skin: Negative.    Neurological: Negative.    Psychiatric/Behavioral: Negative.        Past Medical History:   Diagnosis Date   • Anemia 1998   • Anemia    • Arthritis    • Back pain    • Bleeding from anus    • Bronchitis     STATES HAS BRONCHITIS CURRENTLY (12/5/17).     • Bronchitis 12/2017   • CAD (coronary artery disease)    • Cataract, bilateral    • Chronic kidney disease     STAGE 4.  SEEREBA HALEY   • Chronic kidney failure     REPORTS STAGE IV   • Difficulty swallowing solids    • Disease of thyroid gland    • Fracture, radius 2016    right distal radius and ulna, healed.   • GERD (gastroesophageal reflux disease)    • Gout    • High cholesterol    • History of blood transfusion 2019   • History of nuclear stress test 2014    DR. CORTEZ.  \"IT WAS OK\"   • Hyperparathyroidism (HCC)    • Hyperparathyroidism (HCC)    • Hypertension    • Impaired " functional mobility, balance, gait, and endurance    • Iron deficiency    • Osteoarthritis    • Osteoarthritis of knees, bilateral     Both knees Supartz injection series August, 2015   • Osteoporosis    • Palpitations    • Personal history of cardiac murmur    • Pessary maintenance    • PONV (postoperative nausea and vomiting)    • Presence of pessary    • Problems with swallowing     WITH FOOD OCCASSIONALLY   • Rheumatic heart disease     Personal history   • Rotator cuff tendonitis     right    • Rupture of right proximal biceps tendon     chronic   • Seasonal allergies    • Sinus problem    • Spinal headache     REPORTS AFTER DELIVERY OF SON   • Subacromial bursitis     right    • Valvular heart disease    • Vision problems    • Vitamin B12 deficiency    • Vitamin D deficiency    • Wears glasses        Allergies   Allergen Reactions   • Ferrlecit [Na Ferric Gluc Cplx In Sucrose] Swelling   • Ranitidine Hcl Shortness Of Breath   • Levofloxacin Other (See Comments)     Leg cramps     • Lisinopril Cough       Past Surgical History:   Procedure Laterality Date   • CATARACT EXTRACTION Bilateral    • CERVICAL POLYPECTOMY     • COLONOSCOPY  2011   • COLONOSCOPY N/A 1/9/2018    Procedure: COLONOSCOPY with endoscopic mucosal resection (hot snare), normal saline submucosal injection, argon thermal ablation, resolution clip placement x4, and cold biopsy polypectomy;  Surgeon: Pieter Concepcion MD;  Location: [facility] ENDOSCOPY;  Service:    • ENDOSCOPY N/A 12/6/2017    Procedure: ESOPHAGOGASTRODUODENOSCOPY with biopsies and hot snare polypectomies;  Surgeon: Pieter Concepcion MD;  Location: [facility] ENDOSCOPY;  Service:    • ENDOSCOPY N/A 1/15/2019    Procedure: ESOPHAGOGASTRODUODENOSCOPY WITH BIOPSIES AND HOT SNARE POLYPECTOMIES X10;  Surgeon: Pieter Concepcion MD;  Location: [facility] ENDOSCOPY;  Service: Gastroenterology   • HIP TROCHANTERIC NAILING WITH INTRAMEDULLARY HIP SCREW Left 2/14/2021    Procedure: HIP TROCHANTER NAIL SHORT  WITH INTRAMEDULLARY HIP SCREW, LEFT;  Surgeon: Gabriele Loyd MD;  Location: [facility];  Service: Orthopedics;  Laterality: Left;   • UPPER GASTROINTESTINAL ENDOSCOPY  08/18/2014       Family History   Problem Relation Age of Onset   • Liver cancer Maternal Grandmother    • Gout Other    • Osteoporosis Other    • Stroke Other    • Ulcers Other    • Asthma Other    • Hypertension Other    • Heart disease Other    • Osteoarthritis Other    • Colon cancer Neg Hx        Social History     Socioeconomic History   • Marital status:    Tobacco Use   • Smoking status: Never Smoker   • Smokeless tobacco: Never Used   Vaping Use   • Vaping Use: Never used   Substance and Sexual Activity   • Alcohol use: Never   • Drug use: Never   • Sexual activity: Not Currently     Birth control/protection: Post-menopausal           Objective   Physical Exam  Vitals and nursing note reviewed.   Constitutional:       Appearance: Normal appearance. She is normal weight.   Neurological:      Mental Status: She is alert.     GEN: No acute distress  Head: Normocephalic, atraumatic  Eyes: Pupils equal round reactive to light  ENT: Posterior pharynx normal in appearance, oral mucosa is moist  Chest: Nontender to palpation  Cardiovascular: Regular rate  Lungs: Clear to auscultation bilaterally  Abdomen: Soft, nontender, nondistended, no peritoneal signs  Extremities: No edema, normal appearance  Neuro: GCS 15  Psych: Mood and affect are appropriate      Procedures           ED Course  ED Course as of 04/08/22 1728   Fri Apr 08, 2022 1643 I discussed the findings with the patient and her son.  Have advised him to follow-up with her primary care provider in the next 24 to 48 hours.  I given him strict return to care instructions and they are agreeable to this plan of care. [TW]      ED Course User Index  [TW] Alexia Acosta, APRN                                                 MDM  Number of Diagnoses or Management  Options  Diagnosis management comments: After lengthy discussion with her and her son we will do basic labs, EKG, chest x-ray and a urinalysis.       Amount and/or Complexity of Data Reviewed  Clinical lab tests: ordered and reviewed  Tests in the radiology section of CPT®: ordered and reviewed  Review and summarize past medical records: yes  Discuss the patient with other providers: yes  Independent visualization of images, tracings, or specimens: yes    Risk of Complications, Morbidity, and/or Mortality  Presenting problems: moderate  Diagnostic procedures: moderate  Management options: moderate        Final diagnoses:   Acute UTI       ED Disposition  ED Disposition     ED Disposition   Discharge    Condition   Stable    Comment   --             Jerald Dawkins MD  [address] [place], [place] [id number] [phone]    Schedule an appointment as soon as possible for a visit            Medication List      New Prescriptions    cephalexin 500 MG capsule  Commonly known as: KEFLEX  Take 1 capsule by mouth 2 (Two) Times a Day.           Where to Get Your Medications      These medications were sent to [facility] #[id number] - [address] AT [facility] - [phone]  - [phone] [address] [address]    Phone: [phone]   · cephalexin 500 MG capsule          Alexia Acosta, APRN  04/08/22 3259

## 2022-04-09 LAB — BACTERIA SPEC AEROBE CULT: NORMAL

## 2022-06-06 ENCOUNTER — HOSPITAL ENCOUNTER (OUTPATIENT)
Dept: INFUSION THERAPY | Facility: HOSPITAL | Age: 82
Discharge: HOME | End: 2022-06-06
Admitting: NURSE PRACTITIONER

## 2022-06-06 VITALS
TEMPERATURE: 98.2 F | DIASTOLIC BLOOD PRESSURE: 78 MMHG | SYSTOLIC BLOOD PRESSURE: 144 MMHG | HEART RATE: 89 BPM | OXYGEN SATURATION: 95 % | RESPIRATION RATE: 18 BRPM

## 2022-06-06 DIAGNOSIS — N18.4 ANEMIA DUE TO STAGE 4 CHRONIC KIDNEY DISEASE TREATED WITH ERYTHROPOIETIN: Primary | ICD-10-CM

## 2022-06-06 DIAGNOSIS — D63.1 ANEMIA DUE TO STAGE 4 CHRONIC KIDNEY DISEASE TREATED WITH ERYTHROPOIETIN: Primary | ICD-10-CM

## 2022-06-06 LAB
ALBUMIN SERPL-MCNC: 3.6 G/DL (ref 3.5–5.2)
ANION GAP SERPL CALCULATED.3IONS-SCNC: 12.5 MMOL/L (ref 5–15)
BASOPHILS # BLD AUTO: 0.03 10*3/MM3 (ref 0–0.2)
BASOPHILS NFR BLD AUTO: 0.5 % (ref 0–1.5)
BUN SERPL-MCNC: 41 MG/DL (ref 8–23)
BUN/CREAT SERPL: 12.2 (ref 7–25)
CALCIUM SPEC-SCNC: 10 MG/DL (ref 8.6–10.5)
CHLORIDE SERPL-SCNC: 100 MMOL/L (ref 98–107)
CO2 SERPL-SCNC: 20.5 MMOL/L (ref 22–29)
CREAT SERPL-MCNC: 3.37 MG/DL (ref 0.57–1)
DEPRECATED RDW RBC AUTO: 53.1 FL (ref 37–54)
EGFRCR SERPLBLD CKD-EPI 2021: 13.3 ML/MIN/1.73
EOSINOPHIL # BLD AUTO: 0.34 10*3/MM3 (ref 0–0.4)
EOSINOPHIL NFR BLD AUTO: 5.4 % (ref 0.3–6.2)
ERYTHROCYTE [DISTWIDTH] IN BLOOD BY AUTOMATED COUNT: 14.4 % (ref 12.3–15.4)
GLUCOSE SERPL-MCNC: 122 MG/DL (ref 65–99)
HCT VFR BLD AUTO: 27.7 % (ref 34–46.6)
HGB BLD-MCNC: 9.3 G/DL (ref 12–15.9)
IMM GRANULOCYTES # BLD AUTO: 0.02 10*3/MM3 (ref 0–0.05)
IMM GRANULOCYTES NFR BLD AUTO: 0.3 % (ref 0–0.5)
LYMPHOCYTES # BLD AUTO: 1 10*3/MM3 (ref 0.7–3.1)
LYMPHOCYTES NFR BLD AUTO: 15.9 % (ref 19.6–45.3)
MCH RBC QN AUTO: 34.2 PG (ref 26.6–33)
MCHC RBC AUTO-ENTMCNC: 33.6 G/DL (ref 31.5–35.7)
MCV RBC AUTO: 101.8 FL (ref 79–97)
MONOCYTES # BLD AUTO: 0.47 10*3/MM3 (ref 0.1–0.9)
MONOCYTES NFR BLD AUTO: 7.5 % (ref 5–12)
NEUTROPHILS NFR BLD AUTO: 4.42 10*3/MM3 (ref 1.7–7)
NEUTROPHILS NFR BLD AUTO: 70.4 % (ref 42.7–76)
NRBC BLD AUTO-RTO: 0 /100 WBC (ref 0–0.2)
PHOSPHATE SERPL-MCNC: 3.6 MG/DL (ref 2.5–4.5)
PLATELET # BLD AUTO: 209 10*3/MM3 (ref 140–450)
PMV BLD AUTO: 9.5 FL (ref 6–12)
POTASSIUM SERPL-SCNC: 4.5 MMOL/L (ref 3.5–5.2)
RBC # BLD AUTO: 2.72 10*6/MM3 (ref 3.77–5.28)
SODIUM SERPL-SCNC: 133 MMOL/L (ref 136–145)
WBC NRBC COR # BLD: 6.28 10*3/MM3 (ref 3.4–10.8)

## 2022-06-06 PROCEDURE — 80069 RENAL FUNCTION PANEL: CPT | Performed by: NURSE PRACTITIONER

## 2022-06-06 PROCEDURE — 25010000002 EPOETIN ALFA PER 1000 UNITS: Performed by: NURSE PRACTITIONER

## 2022-06-06 PROCEDURE — 96372 THER/PROPH/DIAG INJ SC/IM: CPT

## 2022-06-06 PROCEDURE — 36415 COLL VENOUS BLD VENIPUNCTURE: CPT

## 2022-06-06 PROCEDURE — 85025 COMPLETE CBC W/AUTO DIFF WBC: CPT | Performed by: NURSE PRACTITIONER

## 2022-06-06 RX ADMIN — ERYTHROPOIETIN 40000 UNITS: 40000 INJECTION, SOLUTION INTRAVENOUS; SUBCUTANEOUS at 13:47

## 2022-06-06 NOTE — CODE DOCUMENTATION
"Patient complained of feeling sick to her stomach and dizzy. Patient stated, \"I didn't sleep well last night and we have been going to dr. Appointments all day today. I also haven't ate yet and that makes me feel sick to my stomach. I do this from time to time.\" Gave patient a sandwich and patient states she started to feel somewhat better and the dizziness was going away. Advised patient that she should go to the ER if feeling dizzy. Patient refused to go to ER. Educated patient and patients son about symptoms and when to go to ER. Patient and son verbalized understanding.  "

## 2022-07-12 ENCOUNTER — APPOINTMENT (OUTPATIENT)
Dept: GENERAL RADIOLOGY | Facility: HOSPITAL | Age: 82
End: 2022-07-12

## 2022-07-12 ENCOUNTER — HOSPITAL ENCOUNTER (EMERGENCY)
Facility: HOSPITAL | Age: 82
Discharge: HOME | End: 2022-07-12
Attending: EMERGENCY MEDICINE | Admitting: EMERGENCY MEDICINE

## 2022-07-12 ENCOUNTER — APPOINTMENT (OUTPATIENT)
Dept: CT IMAGING | Facility: HOSPITAL | Age: 82
End: 2022-07-12

## 2022-07-12 VITALS
BODY MASS INDEX: 22.18 KG/M2 | OXYGEN SATURATION: 99 % | DIASTOLIC BLOOD PRESSURE: 84 MMHG | HEART RATE: 98 BPM | SYSTOLIC BLOOD PRESSURE: 150 MMHG | TEMPERATURE: 98.1 F | HEIGHT: 59 IN | WEIGHT: 110 LBS | RESPIRATION RATE: 14 BRPM

## 2022-07-12 DIAGNOSIS — M25.552 HIP PAIN, ACUTE, LEFT: Primary | ICD-10-CM

## 2022-07-12 LAB
ALBUMIN SERPL-MCNC: 3.5 G/DL (ref 3.5–5.2)
ALBUMIN/GLOB SERPL: 1.1 G/DL
ALP SERPL-CCNC: 144 U/L (ref 39–117)
ALT SERPL W P-5'-P-CCNC: 13 U/L (ref 1–33)
ANION GAP SERPL CALCULATED.3IONS-SCNC: 15 MMOL/L (ref 5–15)
AST SERPL-CCNC: 31 U/L (ref 1–32)
BACTERIA UR QL AUTO: ABNORMAL /HPF
BASOPHILS # BLD AUTO: 0.02 10*3/MM3 (ref 0–0.2)
BASOPHILS NFR BLD AUTO: 0.3 % (ref 0–1.5)
BILIRUB SERPL-MCNC: 0.6 MG/DL (ref 0–1.2)
BILIRUB UR QL STRIP: NEGATIVE
BUN SERPL-MCNC: 46 MG/DL (ref 8–23)
BUN/CREAT SERPL: 13.6 (ref 7–25)
CALCIUM SPEC-SCNC: 9.7 MG/DL (ref 8.6–10.5)
CHLORIDE SERPL-SCNC: 101 MMOL/L (ref 98–107)
CK SERPL-CCNC: 501 U/L (ref 20–180)
CLARITY UR: CLEAR
CO2 SERPL-SCNC: 19 MMOL/L (ref 22–29)
COLOR UR: YELLOW
CREAT SERPL-MCNC: 3.37 MG/DL (ref 0.57–1)
DEPRECATED RDW RBC AUTO: 53.1 FL (ref 37–54)
EGFRCR SERPLBLD CKD-EPI 2021: 13.3 ML/MIN/1.73
EOSINOPHIL # BLD AUTO: 0.12 10*3/MM3 (ref 0–0.4)
EOSINOPHIL NFR BLD AUTO: 1.6 % (ref 0.3–6.2)
ERYTHROCYTE [DISTWIDTH] IN BLOOD BY AUTOMATED COUNT: 14.3 % (ref 12.3–15.4)
GLOBULIN UR ELPH-MCNC: 3.1 GM/DL
GLUCOSE SERPL-MCNC: 110 MG/DL (ref 65–99)
GLUCOSE UR STRIP-MCNC: NEGATIVE MG/DL
HCT VFR BLD AUTO: 26.9 % (ref 34–46.6)
HGB BLD-MCNC: 9 G/DL (ref 12–15.9)
HGB UR QL STRIP.AUTO: NEGATIVE
HOLD SPECIMEN: NORMAL
HOLD SPECIMEN: NORMAL
HYALINE CASTS UR QL AUTO: ABNORMAL /LPF
IMM GRANULOCYTES # BLD AUTO: 0.03 10*3/MM3 (ref 0–0.05)
IMM GRANULOCYTES NFR BLD AUTO: 0.4 % (ref 0–0.5)
KETONES UR QL STRIP: NEGATIVE
LEUKOCYTE ESTERASE UR QL STRIP.AUTO: ABNORMAL
LYMPHOCYTES # BLD AUTO: 1.06 10*3/MM3 (ref 0.7–3.1)
LYMPHOCYTES NFR BLD AUTO: 14.6 % (ref 19.6–45.3)
MAGNESIUM SERPL-MCNC: 1.7 MG/DL (ref 1.6–2.4)
MCH RBC QN AUTO: 34.4 PG (ref 26.6–33)
MCHC RBC AUTO-ENTMCNC: 33.5 G/DL (ref 31.5–35.7)
MCV RBC AUTO: 102.7 FL (ref 79–97)
MONOCYTES # BLD AUTO: 0.68 10*3/MM3 (ref 0.1–0.9)
MONOCYTES NFR BLD AUTO: 9.3 % (ref 5–12)
NEUTROPHILS NFR BLD AUTO: 5.37 10*3/MM3 (ref 1.7–7)
NEUTROPHILS NFR BLD AUTO: 73.8 % (ref 42.7–76)
NITRITE UR QL STRIP: NEGATIVE
NRBC BLD AUTO-RTO: 0 /100 WBC (ref 0–0.2)
PH UR STRIP.AUTO: <=5 [PH] (ref 5–8)
PLATELET # BLD AUTO: 208 10*3/MM3 (ref 140–450)
PMV BLD AUTO: 9.6 FL (ref 6–12)
POTASSIUM SERPL-SCNC: 4.2 MMOL/L (ref 3.5–5.2)
PROT SERPL-MCNC: 6.6 G/DL (ref 6–8.5)
PROT UR QL STRIP: ABNORMAL
RBC # BLD AUTO: 2.62 10*6/MM3 (ref 3.77–5.28)
RBC # UR STRIP: ABNORMAL /HPF
REF LAB TEST METHOD: ABNORMAL
SODIUM SERPL-SCNC: 135 MMOL/L (ref 136–145)
SP GR UR STRIP: 1.01 (ref 1–1.03)
SQUAMOUS #/AREA URNS HPF: ABNORMAL /HPF
TROPONIN T SERPL-MCNC: 0.05 NG/ML (ref 0–0.03)
TROPONIN T SERPL-MCNC: 0.05 NG/ML (ref 0–0.03)
UROBILINOGEN UR QL STRIP: ABNORMAL
WBC # UR STRIP: ABNORMAL /HPF
WBC NRBC COR # BLD: 7.28 10*3/MM3 (ref 3.4–10.8)
WHOLE BLOOD HOLD COAG: NORMAL
WHOLE BLOOD HOLD SPECIMEN: NORMAL

## 2022-07-12 PROCEDURE — 36415 COLL VENOUS BLD VENIPUNCTURE: CPT

## 2022-07-12 PROCEDURE — 73502 X-RAY EXAM HIP UNI 2-3 VIEWS: CPT

## 2022-07-12 PROCEDURE — 80053 COMPREHEN METABOLIC PANEL: CPT

## 2022-07-12 PROCEDURE — 85025 COMPLETE CBC W/AUTO DIFF WBC: CPT

## 2022-07-12 PROCEDURE — 81001 URINALYSIS AUTO W/SCOPE: CPT

## 2022-07-12 PROCEDURE — 72131 CT LUMBAR SPINE W/O DYE: CPT

## 2022-07-12 PROCEDURE — 83735 ASSAY OF MAGNESIUM: CPT

## 2022-07-12 PROCEDURE — 84484 ASSAY OF TROPONIN QUANT: CPT

## 2022-07-12 PROCEDURE — 82550 ASSAY OF CK (CPK): CPT

## 2022-07-12 PROCEDURE — 99283 EMERGENCY DEPT VISIT LOW MDM: CPT

## 2022-07-12 PROCEDURE — 93005 ELECTROCARDIOGRAM TRACING: CPT

## 2022-07-12 RX ORDER — SODIUM CHLORIDE 0.9 % (FLUSH) 0.9 %
10 SYRINGE (ML) INJECTION AS NEEDED
Status: DISCONTINUED | OUTPATIENT
Start: 2022-07-12 | End: 2022-07-13 | Stop reason: HOSPADM

## 2022-08-22 DIAGNOSIS — N18.4 CKD (CHRONIC KIDNEY DISEASE) STAGE 4, GFR 15-29 ML/MIN: Primary | ICD-10-CM

## 2022-08-23 ENCOUNTER — HOSPITAL ENCOUNTER (OUTPATIENT)
Dept: INFUSION THERAPY | Facility: HOSPITAL | Age: 82
Discharge: HOME | End: 2022-08-23

## 2022-08-23 VITALS
HEART RATE: 89 BPM | RESPIRATION RATE: 18 BRPM | TEMPERATURE: 98 F | OXYGEN SATURATION: 97 % | DIASTOLIC BLOOD PRESSURE: 65 MMHG | SYSTOLIC BLOOD PRESSURE: 145 MMHG

## 2022-08-23 DIAGNOSIS — N18.4 CKD (CHRONIC KIDNEY DISEASE) STAGE 4, GFR 15-29 ML/MIN: ICD-10-CM

## 2022-08-23 LAB
ALBUMIN SERPL-MCNC: 3.5 G/DL (ref 3.5–5.2)
ALBUMIN/GLOB SERPL: 1.1 G/DL
ALP SERPL-CCNC: 183 U/L (ref 39–117)
ALT SERPL W P-5'-P-CCNC: 8 U/L (ref 1–33)
ANION GAP SERPL CALCULATED.3IONS-SCNC: 8.8 MMOL/L (ref 5–15)
AST SERPL-CCNC: 18 U/L (ref 1–32)
BASOPHILS # BLD AUTO: 0.01 10*3/MM3 (ref 0–0.2)
BASOPHILS NFR BLD AUTO: 0.2 % (ref 0–1.5)
BILIRUB SERPL-MCNC: 0.3 MG/DL (ref 0–1.2)
BUN SERPL-MCNC: 56 MG/DL (ref 8–23)
BUN/CREAT SERPL: 15.9 (ref 7–25)
CALCIUM SPEC-SCNC: 10.1 MG/DL (ref 8.6–10.5)
CHLORIDE SERPL-SCNC: 101 MMOL/L (ref 98–107)
CO2 SERPL-SCNC: 25.2 MMOL/L (ref 22–29)
CREAT SERPL-MCNC: 3.53 MG/DL (ref 0.57–1)
DEPRECATED RDW RBC AUTO: 54.4 FL (ref 37–54)
EGFRCR SERPLBLD CKD-EPI 2021: 12.6 ML/MIN/1.73
EOSINOPHIL # BLD AUTO: 0.41 10*3/MM3 (ref 0–0.4)
EOSINOPHIL NFR BLD AUTO: 6.8 % (ref 0.3–6.2)
ERYTHROCYTE [DISTWIDTH] IN BLOOD BY AUTOMATED COUNT: 14.5 % (ref 12.3–15.4)
FERRITIN SERPL-MCNC: 303.3 NG/ML (ref 13–150)
GLOBULIN UR ELPH-MCNC: 3.2 GM/DL
GLUCOSE SERPL-MCNC: 108 MG/DL (ref 65–99)
HCT VFR BLD AUTO: 27.2 % (ref 34–46.6)
HGB BLD-MCNC: 9 G/DL (ref 12–15.9)
IMM GRANULOCYTES # BLD AUTO: 0.02 10*3/MM3 (ref 0–0.05)
IMM GRANULOCYTES NFR BLD AUTO: 0.3 % (ref 0–0.5)
IRON 24H UR-MRATE: 53 MCG/DL (ref 37–145)
IRON SATN MFR SERPL: 23 % (ref 20–50)
LYMPHOCYTES # BLD AUTO: 0.99 10*3/MM3 (ref 0.7–3.1)
LYMPHOCYTES NFR BLD AUTO: 16.4 % (ref 19.6–45.3)
MCH RBC QN AUTO: 34.5 PG (ref 26.6–33)
MCHC RBC AUTO-ENTMCNC: 33.1 G/DL (ref 31.5–35.7)
MCV RBC AUTO: 104.2 FL (ref 79–97)
MONOCYTES # BLD AUTO: 0.48 10*3/MM3 (ref 0.1–0.9)
MONOCYTES NFR BLD AUTO: 8 % (ref 5–12)
NEUTROPHILS NFR BLD AUTO: 4.12 10*3/MM3 (ref 1.7–7)
NEUTROPHILS NFR BLD AUTO: 68.3 % (ref 42.7–76)
NRBC BLD AUTO-RTO: 0 /100 WBC (ref 0–0.2)
PLATELET # BLD AUTO: 207 10*3/MM3 (ref 140–450)
PMV BLD AUTO: 9.6 FL (ref 6–12)
POTASSIUM SERPL-SCNC: 4.7 MMOL/L (ref 3.5–5.2)
PROT SERPL-MCNC: 6.7 G/DL (ref 6–8.5)
RBC # BLD AUTO: 2.61 10*6/MM3 (ref 3.77–5.28)
SODIUM SERPL-SCNC: 135 MMOL/L (ref 136–145)
T4 FREE SERPL-MCNC: 0.96 NG/DL (ref 0.93–1.7)
TIBC SERPL-MCNC: 235 MCG/DL (ref 298–536)
TRANSFERRIN SERPL-MCNC: 158 MG/DL (ref 200–360)
TSH SERPL DL<=0.05 MIU/L-ACNC: 3.36 UIU/ML (ref 0.27–4.2)
URATE SERPL-MCNC: 6.6 MG/DL (ref 2.4–5.7)
WBC NRBC COR # BLD: 6.03 10*3/MM3 (ref 3.4–10.8)

## 2022-08-23 PROCEDURE — 84466 ASSAY OF TRANSFERRIN: CPT | Performed by: INTERNAL MEDICINE

## 2022-08-23 PROCEDURE — 85025 COMPLETE CBC W/AUTO DIFF WBC: CPT | Performed by: INTERNAL MEDICINE

## 2022-08-23 PROCEDURE — 83540 ASSAY OF IRON: CPT | Performed by: INTERNAL MEDICINE

## 2022-08-23 PROCEDURE — 80053 COMPREHEN METABOLIC PANEL: CPT | Performed by: INTERNAL MEDICINE

## 2022-08-23 PROCEDURE — 84550 ASSAY OF BLOOD/URIC ACID: CPT | Performed by: INTERNAL MEDICINE

## 2022-08-23 PROCEDURE — 36415 COLL VENOUS BLD VENIPUNCTURE: CPT

## 2022-08-23 PROCEDURE — 84443 ASSAY THYROID STIM HORMONE: CPT | Performed by: INTERNAL MEDICINE

## 2022-08-23 PROCEDURE — 82728 ASSAY OF FERRITIN: CPT | Performed by: INTERNAL MEDICINE

## 2022-08-23 PROCEDURE — 83970 ASSAY OF PARATHORMONE: CPT | Performed by: INTERNAL MEDICINE

## 2022-08-23 PROCEDURE — 84439 ASSAY OF FREE THYROXINE: CPT | Performed by: INTERNAL MEDICINE

## 2022-08-23 PROCEDURE — 82306 VITAMIN D 25 HYDROXY: CPT | Performed by: INTERNAL MEDICINE

## 2022-08-24 LAB
25(OH)D3 SERPL-MCNC: 34.8 NG/ML (ref 30–100)
PTH-INTACT SERPL-MCNC: 64.1 PG/ML (ref 15–65)

## 2022-09-27 ENCOUNTER — HOSPITAL ENCOUNTER (OUTPATIENT)
Dept: INFUSION THERAPY | Facility: HOSPITAL | Age: 82
Discharge: HOME | End: 2022-09-27
Admitting: NURSE PRACTITIONER

## 2022-09-27 VITALS
HEART RATE: 72 BPM | TEMPERATURE: 98.2 F | RESPIRATION RATE: 18 BRPM | SYSTOLIC BLOOD PRESSURE: 150 MMHG | OXYGEN SATURATION: 95 % | DIASTOLIC BLOOD PRESSURE: 70 MMHG

## 2022-09-27 DIAGNOSIS — D63.1 ANEMIA DUE TO STAGE 4 CHRONIC KIDNEY DISEASE TREATED WITH ERYTHROPOIETIN: Primary | ICD-10-CM

## 2022-09-27 DIAGNOSIS — N18.4 ANEMIA DUE TO STAGE 4 CHRONIC KIDNEY DISEASE TREATED WITH ERYTHROPOIETIN: Primary | ICD-10-CM

## 2022-09-27 LAB
ALBUMIN SERPL-MCNC: 3.4 G/DL (ref 3.5–5.2)
ANION GAP SERPL CALCULATED.3IONS-SCNC: 11.6 MMOL/L (ref 5–15)
BUN SERPL-MCNC: 44 MG/DL (ref 8–23)
BUN/CREAT SERPL: 13.4 (ref 7–25)
CALCIUM SPEC-SCNC: 9.9 MG/DL (ref 8.6–10.5)
CHLORIDE SERPL-SCNC: 103 MMOL/L (ref 98–107)
CO2 SERPL-SCNC: 22.4 MMOL/L (ref 22–29)
CREAT SERPL-MCNC: 3.29 MG/DL (ref 0.57–1)
DEPRECATED RDW RBC AUTO: 54.2 FL (ref 37–54)
EGFRCR SERPLBLD CKD-EPI 2021: 13.7 ML/MIN/1.73
ERYTHROCYTE [DISTWIDTH] IN BLOOD BY AUTOMATED COUNT: 14.6 % (ref 12.3–15.4)
GLUCOSE SERPL-MCNC: 111 MG/DL (ref 65–99)
HCT VFR BLD AUTO: 26.1 % (ref 34–46.6)
HGB BLD-MCNC: 8.7 G/DL (ref 12–15.9)
MCH RBC QN AUTO: 34.1 PG (ref 26.6–33)
MCHC RBC AUTO-ENTMCNC: 33.3 G/DL (ref 31.5–35.7)
MCV RBC AUTO: 102.4 FL (ref 79–97)
PHOSPHATE SERPL-MCNC: 4.4 MG/DL (ref 2.5–4.5)
PLATELET # BLD AUTO: 210 10*3/MM3 (ref 140–450)
PMV BLD AUTO: 9.4 FL (ref 6–12)
POTASSIUM SERPL-SCNC: 4.3 MMOL/L (ref 3.5–5.2)
RBC # BLD AUTO: 2.55 10*6/MM3 (ref 3.77–5.28)
SODIUM SERPL-SCNC: 137 MMOL/L (ref 136–145)
WBC NRBC COR # BLD: 5.57 10*3/MM3 (ref 3.4–10.8)

## 2022-09-27 PROCEDURE — 36415 COLL VENOUS BLD VENIPUNCTURE: CPT

## 2022-09-27 PROCEDURE — 96372 THER/PROPH/DIAG INJ SC/IM: CPT

## 2022-09-27 PROCEDURE — 85027 COMPLETE CBC AUTOMATED: CPT | Performed by: NURSE PRACTITIONER

## 2022-09-27 PROCEDURE — 25010000002 EPOETIN ALFA PER 1000 UNITS: Performed by: NURSE PRACTITIONER

## 2022-09-27 PROCEDURE — 80069 RENAL FUNCTION PANEL: CPT | Performed by: NURSE PRACTITIONER

## 2022-09-27 RX ADMIN — ERYTHROPOIETIN 40000 UNITS: 40000 INJECTION, SOLUTION INTRAVENOUS; SUBCUTANEOUS at 14:23

## 2022-10-18 ENCOUNTER — APPOINTMENT (OUTPATIENT)
Dept: INFUSION THERAPY | Facility: HOSPITAL | Age: 82
End: 2022-10-18

## 2022-11-04 ENCOUNTER — HOSPITAL ENCOUNTER (OUTPATIENT)
Dept: INFUSION THERAPY | Facility: HOSPITAL | Age: 82
Discharge: HOME | End: 2022-11-04

## 2022-11-04 DIAGNOSIS — N18.4 ANEMIA DUE TO STAGE 4 CHRONIC KIDNEY DISEASE TREATED WITH ERYTHROPOIETIN: Primary | ICD-10-CM

## 2022-11-04 DIAGNOSIS — D63.1 ANEMIA DUE TO STAGE 4 CHRONIC KIDNEY DISEASE TREATED WITH ERYTHROPOIETIN: Primary | ICD-10-CM

## 2022-11-08 ENCOUNTER — APPOINTMENT (OUTPATIENT)
Dept: INFUSION THERAPY | Facility: HOSPITAL | Age: 82
End: 2022-11-08

## 2022-11-15 ENCOUNTER — HOSPITAL ENCOUNTER (OUTPATIENT)
Dept: INFUSION THERAPY | Facility: HOSPITAL | Age: 82
Discharge: HOME | End: 2022-11-15
Admitting: NURSE PRACTITIONER

## 2022-11-15 VITALS
HEART RATE: 73 BPM | TEMPERATURE: 97.5 F | RESPIRATION RATE: 18 BRPM | OXYGEN SATURATION: 98 % | SYSTOLIC BLOOD PRESSURE: 133 MMHG | DIASTOLIC BLOOD PRESSURE: 65 MMHG

## 2022-11-15 DIAGNOSIS — D63.1 ANEMIA DUE TO STAGE 4 CHRONIC KIDNEY DISEASE TREATED WITH ERYTHROPOIETIN: Primary | ICD-10-CM

## 2022-11-15 DIAGNOSIS — N18.4 ANEMIA DUE TO STAGE 4 CHRONIC KIDNEY DISEASE TREATED WITH ERYTHROPOIETIN: Primary | ICD-10-CM

## 2022-11-15 LAB
ALBUMIN SERPL-MCNC: 3.5 G/DL (ref 3.5–5.2)
ANION GAP SERPL CALCULATED.3IONS-SCNC: 13.2 MMOL/L (ref 5–15)
BASOPHILS # BLD AUTO: 0.02 10*3/MM3 (ref 0–0.2)
BASOPHILS NFR BLD AUTO: 0.3 % (ref 0–1.5)
BUN SERPL-MCNC: 47 MG/DL (ref 8–23)
BUN/CREAT SERPL: 14.4 (ref 7–25)
CALCIUM SPEC-SCNC: 9.6 MG/DL (ref 8.6–10.5)
CHLORIDE SERPL-SCNC: 100 MMOL/L (ref 98–107)
CO2 SERPL-SCNC: 20.8 MMOL/L (ref 22–29)
CREAT SERPL-MCNC: 3.27 MG/DL (ref 0.57–1)
DEPRECATED RDW RBC AUTO: 55.1 FL (ref 37–54)
EGFRCR SERPLBLD CKD-EPI 2021: 13.8 ML/MIN/1.73
EOSINOPHIL # BLD AUTO: 0.38 10*3/MM3 (ref 0–0.4)
EOSINOPHIL NFR BLD AUTO: 5.9 % (ref 0.3–6.2)
ERYTHROCYTE [DISTWIDTH] IN BLOOD BY AUTOMATED COUNT: 14.6 % (ref 12.3–15.4)
GLUCOSE SERPL-MCNC: 133 MG/DL (ref 65–99)
HCT VFR BLD AUTO: 27.4 % (ref 34–46.6)
HGB BLD-MCNC: 8.9 G/DL (ref 12–15.9)
IMM GRANULOCYTES # BLD AUTO: 0.02 10*3/MM3 (ref 0–0.05)
IMM GRANULOCYTES NFR BLD AUTO: 0.3 % (ref 0–0.5)
IRON 24H UR-MRATE: 61 MCG/DL (ref 37–145)
IRON SATN MFR SERPL: 28 % (ref 20–50)
LYMPHOCYTES # BLD AUTO: 1.15 10*3/MM3 (ref 0.7–3.1)
LYMPHOCYTES NFR BLD AUTO: 17.8 % (ref 19.6–45.3)
MCH RBC QN AUTO: 33.7 PG (ref 26.6–33)
MCHC RBC AUTO-ENTMCNC: 32.5 G/DL (ref 31.5–35.7)
MCV RBC AUTO: 103.8 FL (ref 79–97)
MONOCYTES # BLD AUTO: 0.49 10*3/MM3 (ref 0.1–0.9)
MONOCYTES NFR BLD AUTO: 7.6 % (ref 5–12)
NEUTROPHILS NFR BLD AUTO: 4.39 10*3/MM3 (ref 1.7–7)
NEUTROPHILS NFR BLD AUTO: 68.1 % (ref 42.7–76)
NRBC BLD AUTO-RTO: 0 /100 WBC (ref 0–0.2)
PHOSPHATE SERPL-MCNC: 4.9 MG/DL (ref 2.5–4.5)
PLATELET # BLD AUTO: 202 10*3/MM3 (ref 140–450)
PMV BLD AUTO: 9.5 FL (ref 6–12)
POTASSIUM SERPL-SCNC: 4.1 MMOL/L (ref 3.5–5.2)
RBC # BLD AUTO: 2.64 10*6/MM3 (ref 3.77–5.28)
SODIUM SERPL-SCNC: 134 MMOL/L (ref 136–145)
TIBC SERPL-MCNC: 219 MCG/DL (ref 298–536)
TRANSFERRIN SERPL-MCNC: 147 MG/DL (ref 200–360)
WBC NRBC COR # BLD: 6.45 10*3/MM3 (ref 3.4–10.8)

## 2022-11-15 PROCEDURE — 96372 THER/PROPH/DIAG INJ SC/IM: CPT

## 2022-11-15 PROCEDURE — 36415 COLL VENOUS BLD VENIPUNCTURE: CPT

## 2022-11-15 PROCEDURE — 85025 COMPLETE CBC W/AUTO DIFF WBC: CPT | Performed by: NURSE PRACTITIONER

## 2022-11-15 PROCEDURE — 83540 ASSAY OF IRON: CPT | Performed by: NURSE PRACTITIONER

## 2022-11-15 PROCEDURE — 80069 RENAL FUNCTION PANEL: CPT | Performed by: NURSE PRACTITIONER

## 2022-11-15 PROCEDURE — 84466 ASSAY OF TRANSFERRIN: CPT | Performed by: NURSE PRACTITIONER

## 2022-11-15 PROCEDURE — 25010000002 EPOETIN ALFA PER 1000 UNITS: Performed by: NURSE PRACTITIONER

## 2022-11-15 RX ADMIN — ERYTHROPOIETIN 40000 UNITS: 40000 INJECTION, SOLUTION INTRAVENOUS; SUBCUTANEOUS at 13:29

## 2022-11-17 ENCOUNTER — APPOINTMENT (OUTPATIENT)
Dept: INFUSION THERAPY | Facility: HOSPITAL | Age: 82
End: 2022-11-17

## 2022-12-07 ENCOUNTER — APPOINTMENT (OUTPATIENT)
Dept: INFUSION THERAPY | Facility: HOSPITAL | Age: 82
End: 2022-12-07

## 2022-12-08 ENCOUNTER — APPOINTMENT (OUTPATIENT)
Dept: INFUSION THERAPY | Facility: HOSPITAL | Age: 82
End: 2022-12-08

## 2022-12-14 ENCOUNTER — TRANSCRIBE ORDERS (OUTPATIENT)
Dept: INFUSION THERAPY | Facility: HOSPITAL | Age: 82
End: 2022-12-14

## 2022-12-14 ENCOUNTER — HOSPITAL ENCOUNTER (OUTPATIENT)
Dept: INFUSION THERAPY | Facility: HOSPITAL | Age: 82
Discharge: HOME | End: 2022-12-14
Admitting: NURSE PRACTITIONER

## 2022-12-14 VITALS
OXYGEN SATURATION: 98 % | HEART RATE: 71 BPM | DIASTOLIC BLOOD PRESSURE: 66 MMHG | SYSTOLIC BLOOD PRESSURE: 115 MMHG | TEMPERATURE: 97.8 F | RESPIRATION RATE: 18 BRPM

## 2022-12-14 DIAGNOSIS — D63.1 ANEMIA DUE TO STAGE 4 CHRONIC KIDNEY DISEASE TREATED WITH ERYTHROPOIETIN: Primary | ICD-10-CM

## 2022-12-14 DIAGNOSIS — N18.4 CKD (CHRONIC KIDNEY DISEASE) STAGE 4, GFR 15-29 ML/MIN: ICD-10-CM

## 2022-12-14 DIAGNOSIS — N18.4 ANEMIA DUE TO STAGE 4 CHRONIC KIDNEY DISEASE TREATED WITH ERYTHROPOIETIN: ICD-10-CM

## 2022-12-14 DIAGNOSIS — D63.1 ANEMIA DUE TO STAGE 4 CHRONIC KIDNEY DISEASE TREATED WITH ERYTHROPOIETIN: ICD-10-CM

## 2022-12-14 DIAGNOSIS — N18.4 ANEMIA DUE TO STAGE 4 CHRONIC KIDNEY DISEASE TREATED WITH ERYTHROPOIETIN: Primary | ICD-10-CM

## 2022-12-14 LAB
ALBUMIN SERPL-MCNC: 3.4 G/DL (ref 3.5–5.2)
ANION GAP SERPL CALCULATED.3IONS-SCNC: 9.6 MMOL/L (ref 5–15)
BUN SERPL-MCNC: 51 MG/DL (ref 8–23)
BUN/CREAT SERPL: 14.4 (ref 7–25)
CALCIUM SPEC-SCNC: 9.1 MG/DL (ref 8.6–10.5)
CHLORIDE SERPL-SCNC: 103 MMOL/L (ref 98–107)
CO2 SERPL-SCNC: 22.4 MMOL/L (ref 22–29)
CREAT SERPL-MCNC: 3.54 MG/DL (ref 0.57–1)
DEPRECATED RDW RBC AUTO: 55.5 FL (ref 37–54)
DEPRECATED RDW RBC AUTO: 55.5 FL (ref 37–54)
EGFRCR SERPLBLD CKD-EPI 2021: 12.5 ML/MIN/1.73
EOSINOPHIL # BLD MANUAL: 0.12 10*3/MM3 (ref 0–0.4)
EOSINOPHIL NFR BLD MANUAL: 2 % (ref 0.3–6.2)
ERYTHROCYTE [DISTWIDTH] IN BLOOD BY AUTOMATED COUNT: 14.8 % (ref 12.3–15.4)
ERYTHROCYTE [DISTWIDTH] IN BLOOD BY AUTOMATED COUNT: 14.8 % (ref 12.3–15.4)
FERRITIN SERPL-MCNC: 256.5 NG/ML (ref 13–150)
GLUCOSE SERPL-MCNC: 112 MG/DL (ref 65–99)
HCT VFR BLD AUTO: 28.8 % (ref 34–46.6)
HCT VFR BLD AUTO: 28.8 % (ref 34–46.6)
HGB BLD-MCNC: 9.5 G/DL (ref 12–15.9)
HGB BLD-MCNC: 9.5 G/DL (ref 12–15.9)
IRON 24H UR-MRATE: 71 MCG/DL (ref 37–145)
IRON SATN MFR SERPL: 33 % (ref 20–50)
LYMPHOCYTES # BLD MANUAL: 0.72 10*3/MM3 (ref 0.7–3.1)
LYMPHOCYTES NFR BLD MANUAL: 8 % (ref 5–12)
MCH RBC QN AUTO: 34.2 PG (ref 26.6–33)
MCH RBC QN AUTO: 34.3 PG (ref 26.6–33)
MCHC RBC AUTO-ENTMCNC: 33 G/DL (ref 31.5–35.7)
MCHC RBC AUTO-ENTMCNC: 33 G/DL (ref 31.5–35.7)
MCV RBC AUTO: 103.6 FL (ref 79–97)
MCV RBC AUTO: 104 FL (ref 79–97)
MONOCYTES # BLD: 0.48 10*3/MM3 (ref 0.1–0.9)
NEUTROPHILS # BLD AUTO: 4.71 10*3/MM3 (ref 1.7–7)
NEUTROPHILS NFR BLD MANUAL: 78 % (ref 42.7–76)
PHOSPHATE SERPL-MCNC: 4.9 MG/DL (ref 2.5–4.5)
PLATELET # BLD AUTO: 221 10*3/MM3 (ref 140–450)
PLATELET # BLD AUTO: 234 10*3/MM3 (ref 140–450)
PMV BLD AUTO: 10 FL (ref 6–12)
PMV BLD AUTO: 9.4 FL (ref 6–12)
POTASSIUM SERPL-SCNC: 4.3 MMOL/L (ref 3.5–5.2)
RBC # BLD AUTO: 2.77 10*6/MM3 (ref 3.77–5.28)
RBC # BLD AUTO: 2.78 10*6/MM3 (ref 3.77–5.28)
RBC MORPH BLD: NORMAL
RETICS # AUTO: 0.03 10*6/MM3 (ref 0.02–0.13)
RETICS/RBC NFR AUTO: 0.9 % (ref 0.7–1.9)
SMALL PLATELETS BLD QL SMEAR: ADEQUATE
SODIUM SERPL-SCNC: 135 MMOL/L (ref 136–145)
TIBC SERPL-MCNC: 216 MCG/DL (ref 298–536)
TRANSFERRIN SERPL-MCNC: 145 MG/DL (ref 200–360)
VARIANT LYMPHS NFR BLD MANUAL: 12 % (ref 19.6–45.3)
WBC MORPH BLD: NORMAL
WBC NRBC COR # BLD: 6.04 10*3/MM3 (ref 3.4–10.8)
WBC NRBC COR # BLD: 6.1 10*3/MM3 (ref 3.4–10.8)

## 2022-12-14 PROCEDURE — 36415 COLL VENOUS BLD VENIPUNCTURE: CPT

## 2022-12-14 PROCEDURE — 85027 COMPLETE CBC AUTOMATED: CPT | Performed by: INTERNAL MEDICINE

## 2022-12-14 PROCEDURE — 83540 ASSAY OF IRON: CPT | Performed by: NURSE PRACTITIONER

## 2022-12-14 PROCEDURE — 84466 ASSAY OF TRANSFERRIN: CPT | Performed by: NURSE PRACTITIONER

## 2022-12-14 PROCEDURE — 82728 ASSAY OF FERRITIN: CPT | Performed by: NURSE PRACTITIONER

## 2022-12-14 PROCEDURE — 96372 THER/PROPH/DIAG INJ SC/IM: CPT

## 2022-12-14 PROCEDURE — 85007 BL SMEAR W/DIFF WBC COUNT: CPT | Performed by: INTERNAL MEDICINE

## 2022-12-14 PROCEDURE — 85045 AUTOMATED RETICULOCYTE COUNT: CPT | Performed by: INTERNAL MEDICINE

## 2022-12-14 PROCEDURE — 80069 RENAL FUNCTION PANEL: CPT | Performed by: NURSE PRACTITIONER

## 2022-12-14 PROCEDURE — 85027 COMPLETE CBC AUTOMATED: CPT | Performed by: NURSE PRACTITIONER

## 2022-12-14 PROCEDURE — 25010000002 EPOETIN ALFA PER 1000 UNITS: Performed by: NURSE PRACTITIONER

## 2022-12-14 RX ADMIN — ERYTHROPOIETIN 40000 UNITS: 40000 INJECTION, SOLUTION INTRAVENOUS; SUBCUTANEOUS at 15:40

## 2023-01-01 ENCOUNTER — NURSING HOME (OUTPATIENT)
Dept: FAMILY MEDICINE CLINIC | Facility: CLINIC | Age: 83
End: 2023-01-01
Payer: MEDICARE

## 2023-01-01 VITALS
BODY MASS INDEX: 17.34 KG/M2 | OXYGEN SATURATION: 90 % | DIASTOLIC BLOOD PRESSURE: 74 MMHG | WEIGHT: 94.8 LBS | TEMPERATURE: 97.6 F | SYSTOLIC BLOOD PRESSURE: 139 MMHG

## 2023-01-01 DIAGNOSIS — Z51.5 PALLIATIVE CARE STATUS: Primary | ICD-10-CM

## 2023-01-01 DIAGNOSIS — R63.8 DECREASED ORAL INTAKE: ICD-10-CM

## 2023-01-01 DIAGNOSIS — Z78.9 IMPAIRED MOBILITY AND ADLS: ICD-10-CM

## 2023-01-01 DIAGNOSIS — Z74.09 IMPAIRED MOBILITY AND ADLS: ICD-10-CM

## 2023-01-01 DIAGNOSIS — F03.C0 SEVERE DEMENTIA WITHOUT BEHAVIORAL DISTURBANCE, PSYCHOTIC DISTURBANCE, MOOD DISTURBANCE, OR ANXIETY, UNSPECIFIED DEMENTIA TYPE: ICD-10-CM

## 2023-01-01 DIAGNOSIS — R53.81 DECLINING FUNCTIONAL STATUS: ICD-10-CM

## 2023-01-01 RX ORDER — MORPHINE SULFATE 100 MG/5ML
SOLUTION ORAL
Qty: 30 ML | Refills: 0 | Status: SHIPPED | OUTPATIENT
Start: 2023-01-01

## 2023-01-04 ENCOUNTER — APPOINTMENT (OUTPATIENT)
Dept: INFUSION THERAPY | Facility: HOSPITAL | Age: 83
End: 2023-01-04

## 2023-01-12 ENCOUNTER — HOSPITAL ENCOUNTER (INPATIENT)
Facility: HOSPITAL | Age: 83
LOS: 6 days | Discharge: NURSING, REHAB OR LONG-TERM CARE | DRG: 690 | End: 2023-01-18
Attending: EMERGENCY MEDICINE | Admitting: STUDENT IN AN ORGANIZED HEALTH CARE EDUCATION/TRAINING PROGRAM
Payer: MEDICARE

## 2023-01-12 ENCOUNTER — APPOINTMENT (OUTPATIENT)
Dept: CT IMAGING | Facility: HOSPITAL | Age: 83
DRG: 690 | End: 2023-01-12
Payer: MEDICARE

## 2023-01-12 ENCOUNTER — APPOINTMENT (OUTPATIENT)
Dept: GENERAL RADIOLOGY | Facility: HOSPITAL | Age: 83
DRG: 690 | End: 2023-01-12
Payer: MEDICARE

## 2023-01-12 DIAGNOSIS — N18.9 CHRONIC KIDNEY DISEASE, UNSPECIFIED CKD STAGE: ICD-10-CM

## 2023-01-12 DIAGNOSIS — R53.81 DEBILITY: ICD-10-CM

## 2023-01-12 DIAGNOSIS — N30.00 ACUTE CYSTITIS WITHOUT HEMATURIA: Primary | ICD-10-CM

## 2023-01-12 PROBLEM — N17.9 AKI (ACUTE KIDNEY INJURY): Status: ACTIVE | Noted: 2023-01-12

## 2023-01-12 PROBLEM — S89.92XA LEFT LEG INJURY: Status: ACTIVE | Noted: 2023-01-12

## 2023-01-12 LAB
ALBUMIN SERPL-MCNC: 3.2 G/DL (ref 3.5–5.2)
ALBUMIN/GLOB SERPL: 0.9 G/DL
ALP SERPL-CCNC: 134 U/L (ref 39–117)
ALT SERPL W P-5'-P-CCNC: 13 U/L (ref 1–33)
ANION GAP SERPL CALCULATED.3IONS-SCNC: 11.8 MMOL/L (ref 5–15)
AST SERPL-CCNC: 30 U/L (ref 1–32)
BACTERIA UR QL AUTO: ABNORMAL /HPF
BASOPHILS # BLD AUTO: 0.02 10*3/MM3 (ref 0–0.2)
BASOPHILS NFR BLD AUTO: 0.2 % (ref 0–1.5)
BILIRUB SERPL-MCNC: 0.3 MG/DL (ref 0–1.2)
BILIRUB UR QL STRIP: NEGATIVE
BUN SERPL-MCNC: 46 MG/DL (ref 8–23)
BUN/CREAT SERPL: 12 (ref 7–25)
CALCIUM SPEC-SCNC: 8.9 MG/DL (ref 8.6–10.5)
CHLORIDE SERPL-SCNC: 102 MMOL/L (ref 98–107)
CLARITY UR: ABNORMAL
CLUMPED PLATELETS: NORMAL
CO2 SERPL-SCNC: 19.2 MMOL/L (ref 22–29)
COLOR UR: YELLOW
CREAT SERPL-MCNC: 3.82 MG/DL (ref 0.57–1)
D-LACTATE SERPL-SCNC: 0.7 MMOL/L (ref 0.5–2)
DEPRECATED RDW RBC AUTO: 59.5 FL (ref 37–54)
EGFRCR SERPLBLD CKD-EPI 2021: 11.4 ML/MIN/1.73
EOSINOPHIL # BLD AUTO: 0.34 10*3/MM3 (ref 0–0.4)
EOSINOPHIL NFR BLD AUTO: 4.2 % (ref 0.3–6.2)
ERYTHROCYTE [DISTWIDTH] IN BLOOD BY AUTOMATED COUNT: 15.6 % (ref 12.3–15.4)
FLUAV RNA RESP QL NAA+PROBE: NOT DETECTED
FLUBV RNA RESP QL NAA+PROBE: NOT DETECTED
GLOBULIN UR ELPH-MCNC: 3.4 GM/DL
GLUCOSE SERPL-MCNC: 106 MG/DL (ref 65–99)
GLUCOSE UR STRIP-MCNC: NEGATIVE MG/DL
HCT VFR BLD AUTO: 28.7 % (ref 34–46.6)
HGB BLD-MCNC: 9.4 G/DL (ref 12–15.9)
HGB UR QL STRIP.AUTO: ABNORMAL
HYALINE CASTS UR QL AUTO: ABNORMAL /LPF
IMM GRANULOCYTES # BLD AUTO: 0.04 10*3/MM3 (ref 0–0.05)
IMM GRANULOCYTES NFR BLD AUTO: 0.5 % (ref 0–0.5)
KETONES UR QL STRIP: NEGATIVE
LEUKOCYTE ESTERASE UR QL STRIP.AUTO: ABNORMAL
LYMPHOCYTES # BLD AUTO: 1.43 10*3/MM3 (ref 0.7–3.1)
LYMPHOCYTES NFR BLD AUTO: 17.6 % (ref 19.6–45.3)
MCH RBC QN AUTO: 34.3 PG (ref 26.6–33)
MCHC RBC AUTO-ENTMCNC: 32.8 G/DL (ref 31.5–35.7)
MCV RBC AUTO: 104.7 FL (ref 79–97)
MONOCYTES # BLD AUTO: 0.69 10*3/MM3 (ref 0.1–0.9)
MONOCYTES NFR BLD AUTO: 8.5 % (ref 5–12)
NEUTROPHILS NFR BLD AUTO: 5.62 10*3/MM3 (ref 1.7–7)
NEUTROPHILS NFR BLD AUTO: 69 % (ref 42.7–76)
NITRITE UR QL STRIP: NEGATIVE
NRBC BLD AUTO-RTO: 0 /100 WBC (ref 0–0.2)
PH UR STRIP.AUTO: <=5 [PH] (ref 5–8)
PLATELET # BLD AUTO: 203 10*3/MM3 (ref 140–450)
PMV BLD AUTO: 10.4 FL (ref 6–12)
POTASSIUM SERPL-SCNC: 5.1 MMOL/L (ref 3.5–5.2)
PROT SERPL-MCNC: 6.6 G/DL (ref 6–8.5)
PROT UR QL STRIP: ABNORMAL
RBC # BLD AUTO: 2.74 10*6/MM3 (ref 3.77–5.28)
RBC # UR STRIP: ABNORMAL /HPF
RBC MORPH BLD: NORMAL
REF LAB TEST METHOD: ABNORMAL
SARS-COV-2 RNA RESP QL NAA+PROBE: NOT DETECTED
SMALL PLATELETS BLD QL SMEAR: ADEQUATE
SODIUM SERPL-SCNC: 133 MMOL/L (ref 136–145)
SP GR UR STRIP: 1.01 (ref 1–1.03)
SQUAMOUS #/AREA URNS HPF: ABNORMAL /HPF
UROBILINOGEN UR QL STRIP: ABNORMAL
WBC # UR STRIP: ABNORMAL /HPF
WBC CLUMPS # UR AUTO: ABNORMAL /HPF
WBC MORPH BLD: NORMAL
WBC NRBC COR # BLD: 8.14 10*3/MM3 (ref 3.4–10.8)

## 2023-01-12 PROCEDURE — 85025 COMPLETE CBC W/AUTO DIFF WBC: CPT | Performed by: EMERGENCY MEDICINE

## 2023-01-12 PROCEDURE — 99284 EMERGENCY DEPT VISIT MOD MDM: CPT

## 2023-01-12 PROCEDURE — 87636 SARSCOV2 & INF A&B AMP PRB: CPT | Performed by: EMERGENCY MEDICINE

## 2023-01-12 PROCEDURE — 74176 CT ABD & PELVIS W/O CONTRAST: CPT

## 2023-01-12 PROCEDURE — 87086 URINE CULTURE/COLONY COUNT: CPT | Performed by: EMERGENCY MEDICINE

## 2023-01-12 PROCEDURE — 99223 1ST HOSP IP/OBS HIGH 75: CPT | Performed by: STUDENT IN AN ORGANIZED HEALTH CARE EDUCATION/TRAINING PROGRAM

## 2023-01-12 PROCEDURE — 87040 BLOOD CULTURE FOR BACTERIA: CPT | Performed by: STUDENT IN AN ORGANIZED HEALTH CARE EDUCATION/TRAINING PROGRAM

## 2023-01-12 PROCEDURE — 85007 BL SMEAR W/DIFF WBC COUNT: CPT | Performed by: EMERGENCY MEDICINE

## 2023-01-12 PROCEDURE — 36415 COLL VENOUS BLD VENIPUNCTURE: CPT

## 2023-01-12 PROCEDURE — 72131 CT LUMBAR SPINE W/O DYE: CPT

## 2023-01-12 PROCEDURE — 25010000002 HEPARIN (PORCINE) PER 1000 UNITS: Performed by: STUDENT IN AN ORGANIZED HEALTH CARE EDUCATION/TRAINING PROGRAM

## 2023-01-12 PROCEDURE — 71045 X-RAY EXAM CHEST 1 VIEW: CPT

## 2023-01-12 PROCEDURE — 83605 ASSAY OF LACTIC ACID: CPT | Performed by: STUDENT IN AN ORGANIZED HEALTH CARE EDUCATION/TRAINING PROGRAM

## 2023-01-12 PROCEDURE — 25010000002 CEFTRIAXONE SODIUM-DEXTROSE 1-3.74 GM-%(50ML) RECONSTITUTED SOLUTION: Performed by: EMERGENCY MEDICINE

## 2023-01-12 PROCEDURE — 80053 COMPREHEN METABOLIC PANEL: CPT | Performed by: EMERGENCY MEDICINE

## 2023-01-12 PROCEDURE — 81001 URINALYSIS AUTO W/SCOPE: CPT | Performed by: EMERGENCY MEDICINE

## 2023-01-12 RX ORDER — CEFTRIAXONE 1 G/50ML
1 INJECTION, SOLUTION INTRAVENOUS EVERY 24 HOURS
Status: DISCONTINUED | OUTPATIENT
Start: 2023-01-13 | End: 2023-01-15

## 2023-01-12 RX ORDER — SODIUM CHLORIDE 0.9 % (FLUSH) 0.9 %
10 SYRINGE (ML) INJECTION EVERY 12 HOURS SCHEDULED
Status: DISCONTINUED | OUTPATIENT
Start: 2023-01-12 | End: 2023-01-18 | Stop reason: HOSPADM

## 2023-01-12 RX ORDER — SODIUM CHLORIDE 9 MG/ML
40 INJECTION, SOLUTION INTRAVENOUS AS NEEDED
Status: DISCONTINUED | OUTPATIENT
Start: 2023-01-12 | End: 2023-01-18 | Stop reason: HOSPADM

## 2023-01-12 RX ORDER — PANTOPRAZOLE SODIUM 40 MG/1
40 TABLET, DELAYED RELEASE ORAL
Status: DISCONTINUED | OUTPATIENT
Start: 2023-01-13 | End: 2023-01-18 | Stop reason: HOSPADM

## 2023-01-12 RX ORDER — ASPIRIN 81 MG/1
81 TABLET ORAL DAILY
Status: DISCONTINUED | OUTPATIENT
Start: 2023-01-12 | End: 2023-01-18 | Stop reason: HOSPADM

## 2023-01-12 RX ORDER — TORSEMIDE 10 MG/1
5 TABLET ORAL DAILY
Status: DISCONTINUED | OUTPATIENT
Start: 2023-01-12 | End: 2023-01-13

## 2023-01-12 RX ORDER — CEPHALEXIN 500 MG/1
500 CAPSULE ORAL 2 TIMES DAILY
Qty: 14 CAPSULE | Refills: 0 | Status: SHIPPED | OUTPATIENT
Start: 2023-01-12 | End: 2023-01-19

## 2023-01-12 RX ORDER — HEPARIN SODIUM 5000 [USP'U]/ML
5000 INJECTION, SOLUTION INTRAVENOUS; SUBCUTANEOUS EVERY 8 HOURS SCHEDULED
Status: DISCONTINUED | OUTPATIENT
Start: 2023-01-12 | End: 2023-01-18 | Stop reason: HOSPADM

## 2023-01-12 RX ORDER — ACETAMINOPHEN 325 MG/1
650 TABLET ORAL EVERY 4 HOURS PRN
Status: DISCONTINUED | OUTPATIENT
Start: 2023-01-12 | End: 2023-01-18 | Stop reason: HOSPADM

## 2023-01-12 RX ORDER — ONDANSETRON 2 MG/ML
4 INJECTION INTRAMUSCULAR; INTRAVENOUS EVERY 6 HOURS PRN
Status: DISCONTINUED | OUTPATIENT
Start: 2023-01-12 | End: 2023-01-18 | Stop reason: HOSPADM

## 2023-01-12 RX ORDER — CARVEDILOL 25 MG/1
25 TABLET ORAL 2 TIMES DAILY WITH MEALS
Status: DISCONTINUED | OUTPATIENT
Start: 2023-01-12 | End: 2023-01-18 | Stop reason: HOSPADM

## 2023-01-12 RX ORDER — ACETAMINOPHEN 160 MG/5ML
650 SOLUTION ORAL EVERY 4 HOURS PRN
Status: DISCONTINUED | OUTPATIENT
Start: 2023-01-12 | End: 2023-01-18 | Stop reason: HOSPADM

## 2023-01-12 RX ORDER — LOSARTAN POTASSIUM 50 MG/1
50 TABLET ORAL
Status: DISCONTINUED | OUTPATIENT
Start: 2023-01-12 | End: 2023-01-18 | Stop reason: HOSPADM

## 2023-01-12 RX ORDER — ACETAMINOPHEN 650 MG/1
650 SUPPOSITORY RECTAL EVERY 4 HOURS PRN
Status: DISCONTINUED | OUTPATIENT
Start: 2023-01-12 | End: 2023-01-18 | Stop reason: HOSPADM

## 2023-01-12 RX ORDER — VITAMIN B COMPLEX
1 CAPSULE ORAL DAILY
COMMUNITY

## 2023-01-12 RX ORDER — SODIUM CHLORIDE 0.9 % (FLUSH) 0.9 %
10 SYRINGE (ML) INJECTION AS NEEDED
Status: DISCONTINUED | OUTPATIENT
Start: 2023-01-12 | End: 2023-01-18 | Stop reason: HOSPADM

## 2023-01-12 RX ORDER — CEFTRIAXONE 1 G/50ML
1 INJECTION, SOLUTION INTRAVENOUS ONCE
Status: COMPLETED | OUTPATIENT
Start: 2023-01-12 | End: 2023-01-12

## 2023-01-12 RX ADMIN — SODIUM CHLORIDE 500 ML: 9 INJECTION, SOLUTION INTRAVENOUS at 09:30

## 2023-01-12 RX ADMIN — ACETAMINOPHEN 650 MG: 325 TABLET, FILM COATED ORAL at 21:27

## 2023-01-12 RX ADMIN — Medication 10 ML: at 21:19

## 2023-01-12 RX ADMIN — CEFTRIAXONE 1 G: 1 INJECTION, SOLUTION INTRAVENOUS at 12:43

## 2023-01-12 RX ADMIN — HEPARIN SODIUM 5000 UNITS: 5000 INJECTION INTRAVENOUS; SUBCUTANEOUS at 21:18

## 2023-01-12 RX ADMIN — LOSARTAN POTASSIUM 50 MG: 50 TABLET, FILM COATED ORAL at 18:12

## 2023-01-12 RX ADMIN — CARVEDILOL 25 MG: 25 TABLET, FILM COATED ORAL at 18:12

## 2023-01-12 RX ADMIN — ASPIRIN 81 MG: 81 TABLET, COATED ORAL at 18:12

## 2023-01-12 NOTE — ED PROVIDER NOTES
"Subjective   History of Present Illness  82-year-old chronically ill female presents to the ED with chief complaint of just not feeling well.  Patient denies specific complaints but states that she just has felt slightly fatigued and has not felt well for 3 or 4 days.  She has not had a cough.  No wheeze.  No nausea vomiting diarrhea or abdominal pain.  No chest pain or shortness of breath.  No cough.  No other complaints at this time.        Review of Systems   Constitutional: Positive for chills and fatigue.   Respiratory: Negative for cough, shortness of breath and wheezing.    All other systems reviewed and are negative.      Past Medical History:   Diagnosis Date   • Anemia 1998   • Anemia    • Arthritis    • Back pain    • Bleeding from anus    • Bronchitis     STATES HAS BRONCHITIS CURRENTLY (12/5/17).     • Bronchitis 12/2017   • CAD (coronary artery disease)    • Cataract, bilateral    • Chronic kidney disease     STAGE 4.  SEES TERA   • Chronic kidney failure     REPORTS STAGE IV   • Difficulty swallowing solids    • Disease of thyroid gland    • Fracture, radius 2016    right distal radius and ulna, healed.   • GERD (gastroesophageal reflux disease)    • Gout    • High cholesterol    • History of blood transfusion 2019   • History of nuclear stress test 2014    DR. CORTEZ.  \"IT WAS OK\"   • Hyperparathyroidism (HCC)    • Hyperparathyroidism (HCC)    • Hypertension    • Impaired functional mobility, balance, gait, and endurance    • Iron deficiency    • Osteoarthritis    • Osteoarthritis of knees, bilateral     Both knees Supartz injection series August, 2015   • Osteoporosis    • Palpitations    • Personal history of cardiac murmur    • Pessary maintenance    • PONV (postoperative nausea and vomiting)    • Presence of pessary    • Problems with swallowing     WITH FOOD OCCASSIONALLY   • Rheumatic heart disease     Personal history   • Rotator cuff tendonitis     right    • Rupture of right proximal " biceps tendon     chronic   • Seasonal allergies    • Sinus problem    • Spinal headache     REPORTS AFTER DELIVERY OF SON   • Subacromial bursitis     right    • Valvular heart disease    • Vision problems    • Vitamin B12 deficiency    • Vitamin D deficiency    • Wears glasses        Allergies   Allergen Reactions   • Ferrlecit [Na Ferric Gluc Cplx In Sucrose] Swelling   • Ranitidine Hcl Shortness Of Breath   • Levofloxacin Other (See Comments)     Leg cramps     • Lisinopril Cough       Past Surgical History:   Procedure Laterality Date   • CATARACT EXTRACTION Bilateral    • CERVICAL POLYPECTOMY     • COLONOSCOPY  2011   • COLONOSCOPY N/A 1/9/2018    Procedure: COLONOSCOPY with endoscopic mucosal resection (hot snare), normal saline submucosal injection, argon thermal ablation, resolution clip placement x4, and cold biopsy polypectomy;  Surgeon: Pieter Concepcion MD;  Location: [facility] ENDOSCOPY;  Service:    • ENDOSCOPY N/A 12/6/2017    Procedure: ESOPHAGOGASTRODUODENOSCOPY with biopsies and hot snare polypectomies;  Surgeon: Pieter Concepcion MD;  Location: [facility] ENDOSCOPY;  Service:    • ENDOSCOPY N/A 1/15/2019    Procedure: ESOPHAGOGASTRODUODENOSCOPY WITH BIOPSIES AND HOT SNARE POLYPECTOMIES X10;  Surgeon: Pieter Concepcion MD;  Location: [facility] ENDOSCOPY;  Service: Gastroenterology   • HIP TROCHANTERIC NAILING WITH INTRAMEDULLARY HIP SCREW Left 2/14/2021    Procedure: HIP TROCHANTER NAIL SHORT WITH INTRAMEDULLARY HIP SCREW, LEFT;  Surgeon: Gabriele Loyd MD;  Location: [facility] OR;  Service: Orthopedics;  Laterality: Left;   • UPPER GASTROINTESTINAL ENDOSCOPY  08/18/2014       Family History   Problem Relation Age of Onset   • Liver cancer Maternal Grandmother    • Gout Other    • Osteoporosis Other    • Stroke Other    • Ulcers Other    • Asthma Other    • Hypertension Other    • Heart disease Other    • Osteoarthritis Other    • Colon cancer Neg Hx        Social History     Socioeconomic History   • Marital  status:    Tobacco Use   • Smoking status: Never   • Smokeless tobacco: Never   Vaping Use   • Vaping Use: Never used   Substance and Sexual Activity   • Alcohol use: Never   • Drug use: Never   • Sexual activity: Not Currently     Birth control/protection: Post-menopausal           Objective   Physical Exam  Vitals and nursing note reviewed.   Constitutional:       General: She is not in acute distress.     Appearance: She is well-developed. She is not diaphoretic.      Comments: Elderly-appearing, thin.  Cachectic.   HENT:      Head: Normocephalic and atraumatic.      Nose: Nose normal.   Eyes:      Conjunctiva/sclera: Conjunctivae normal.   Cardiovascular:      Rate and Rhythm: Normal rate and regular rhythm.   Pulmonary:      Effort: Pulmonary effort is normal. No respiratory distress.      Breath sounds: Normal breath sounds.   Abdominal:      General: There is no distension.      Palpations: Abdomen is soft.      Tenderness: There is no abdominal tenderness. There is no guarding.   Musculoskeletal:         General: No deformity.   Neurological:      Mental Status: She is alert and oriented to person, place, and time.      Cranial Nerves: No cranial nerve deficit.         Procedures           ED Course  ED Course as of 01/12/23 1810   Thu Jan 12, 2023   1032 Hemoglobin at baseline. [CG]      ED Course User Index  [CG] Maciel Beltran, DO                                           MDM  Chief complaint: Generalized illness/fatigue    Initial impression of presenting illness: Likely exacerbation of chronic issue    Comorbidities requiring evaluation consideration and management: Chronic kidney disease, chronic bronchitis, chronic anemia, coronary artery disease, others      Differential diagnosis includes but not limited to: Urinary tract infection, upper respiratory infection, anemia, acute on chronic kidney disease, other    Patient arrives hemodynamically stable stable, afebrile, without respiratory  distress with initial vitals interpreted by myself.  Pertinent initial vitals include temp of 98.2, pulse ox 100% on room air, blood pressure 142/80, heart rate 87    Pertinent features from physical exam: Chronically ill-appearing, thin, cachectic, breath sounds clear and equal bilaterally, normal respiratory effort.  Heart regular rate and rhythm.  Abdomen soft nontender nondistended    Initial diagnostic plan: CBC, CMP, COVID/flu, urinalysis, chest x-ray     Results from initial plan were reviewed and interpreted by myself and include: CBC shows chronic anemia but no significant drop in her hemoglobin, CMP reassuring but does show acute on chronic kidney injury COVID flu negative.  Urinalysis shows numerous white blood cells and bacteria consistent with a urinary tract infection.      Diagnostic information from other sources includes: Review of previous visits, prior labs, prior imaging, available notes from prior evaluations or visits with specialists, medication list, allergies, past medical history, past surgical history    Interventions in the ED included: IV fluid rehydration with 500 cc normal saline, antibiotics-IV Rocephin 1 g    Reevaluation: On reevaluation the patient is feeling better after IV fluid rehydration and antibiotics but the son states that she lives at home has not been ambulatory for about a year and has been unable to take care of her self.  The son cannot provide care for her.  They note that she had had a fall 2 to 3 days ago and is complaining of some low back pain.  For this reason added more imaging that included a CT abdomen pelvis and CT lumbar spine which were both negative for acute process.  Had the patient seen and evaluated by social work, unable to place in a nursing home today.  She will be admitted for further evaluation medical management.    Results/clinical rationale were discussed with patient and son at bedside    Consultations and discussions of results with other  physicians: Dr. Palomares, hospitalist, patient will be admitted for further evaluation medical management.    Disposition plan: Admission      Final diagnoses:   Acute cystitis without hematuria   Chronic kidney disease, unspecified CKD stage   Debility       ED Disposition  ED Disposition     ED Disposition   Decision to Admit    Condition   --    Comment   Level of Care: Med/Surg [1]   Diagnosis: Acute cystitis without hematuria [488368]   Admitting Physician: BENNY PALOMARES [[id number]]   Attending Physician: BENNY PALOMARES [[id number]]   Certification: I Certify That Inpatient Hospital Services Are Medically Necessary For Greater Than 2 Midnights               Jerald Dawkins MD  [address] [place], [place] [id number] [phone]               Medication List      New Prescriptions    cephalexin 500 MG capsule  Commonly known as: KEFLEX  Take 1 capsule by mouth 2 (Two) Times a Day for 7 days.        Changed    torsemide 10 MG tablet  Commonly known as: DEMADEX  Take 1 tablet by mouth on Thursday and Monday, take as needed otherwise  What changed:   · how much to take  · how to take this  · when to take this  · additional instructions           Where to Get Your Medications      These medications were sent to [facility] #[id number] [address] AT [facility] - [phone]  - [phone] [address] [address]    Phone: [phone]   · cephalexin 500 MG capsule          Maciel Beltran,   01/12/23 1814

## 2023-01-12 NOTE — CASE MANAGEMENT/SOCIAL WORK
Case Management/Social Work    Patient Name:  Carolyne Martins  YOB: 1940  MRN: [id number]  Admit Date:  1/12/2023      Received call from Vanesa in ED. States she is unsure if pt has a safe discharge plan. JR went to ED and met with pts son, Magno. Magno states pt lives alone and has been steadily declining.  At this point pt spends most of her time in a wheelchair. Pt son states he works at least 40 hours a week at the [employer] and is away for home a lot.  While he does live across the street from the pt he feels she would greatly benefit from STR placement.  At this time JR has updated Dr. Palomares and Dr. Beltran about pt. This JR has requested pt be a social admit for STR placement. JR will continue to follow.       Electronically signed by:  Yaz Carrizales  01/12/23 14:17 EST

## 2023-01-12 NOTE — CASE MANAGEMENT/SOCIAL WORK
Case Management/Social Work    Patient Name:  Carolyne Martins  YOB: 1940  MRN: [id number]  Admit Date:  1/12/2023        SW received call from ED stating pt and family are asking for home resources. SW went and met with pt at bedside. Pts son, Magno, was also present. Pt lives alone but has son come visit her daily after work. Pt and son were asking for more resources to help out at home. SW provided sitter list options for pt through private pay to help come assist. SW also mentioned Home Health through PCP. SW gave son resource booklet for [employer] for transportation needs. SW also mentioned reaching out to pts insurance company to see what services are provided to her through insurance. SW spoke with pt and family about [facility] program to call and fill out application to see if she qualifies for program. Pt and son appreciative of resources.     Electronically signed by:  STEVIE Turner  01/12/23 11:25 EST

## 2023-01-12 NOTE — H&P
[facility]   HISTORY AND PHYSICAL      Name:  Carolyne Martins   Age:  82 y.o.  Sex:  female  :  1940  MRN:  [id number]   Visit Number:  [id number]  Admission Date:  2023  Date Of Service:  23  Primary Care Physician:  Jerald Dawkins MD    Chief Complaint:     Generalized weakness, left leg pain    History Of Presenting Illness:      Patient is an 82-year-old woman with past medical history of CKD follows with nephrology Dr. Fitzgerald, coronary artery disease follows with Dr. Acuña, arthritis, back pain, hyperparathyroidism, hypertension, anemia, osteoarthritis, rheumatic heart disease, valvular heart disease.  Presented to [facility] ED on 2023 feeling generally weak progressively worse over a few days.  Son at bedside said its been more difficult for her to transfer in and out of the wheelchair, she is had some falls recently.  She has some pain in her left leg.  She denied any shortness of air, chest pain, nausea, vomiting, abdominal pain, cough, dysuria.  Patient lives alone, son lives across the street and helps with some things but he works at least 40 hours a week is away from home a lot.    ED summary: Afebrile, vital signs stable on room air.  Blood glucose 106, sodium 133, creatinine 3.82 baseline may be around 3.3-3.5, chronic anemia hemoglobin 9.4, pattern of infection on urinalysis, urine culture collected, COVID/flu negative CXR unremarkable CT abdomen/pelvis new, mild pericardial effusion, small bilateral renal cystic lesions and hepatic cystic lesion compared to prior exam, suspected hematoma formation left inferior medial buttock.  CT lumbar spine no acute abnormality, multilevel lumbar degenerative disc disease present.  She was provided Rocephin and is normal saline 500 mL bolus.  ED personnel did attempt to discharge, but patient not able to take care of herself at home with current resources.  Social work has recommended at least social admit for  short-term rehab placement.    Review Of Systems:    All systems were reviewed and negative except as mentioned in history of presenting illness, assessment and plan.    Past Medical History: Patient  has a past medical history of Anemia (1998), Anemia, Arthritis, Back pain, Bleeding from anus, Bronchitis, Bronchitis (12/2017), CAD (coronary artery disease), Cataract, bilateral, Chronic kidney disease, Chronic kidney failure, Difficulty swallowing solids, Disease of thyroid gland, Fracture, radius (2016), GERD (gastroesophageal reflux disease), Gout, High cholesterol, History of blood transfusion (2019), History of nuclear stress test (2014), Hyperparathyroidism (HCC), Hyperparathyroidism (HCC), Hypertension, Impaired functional mobility, balance, gait, and endurance, Iron deficiency, Osteoarthritis, Osteoarthritis of knees, bilateral, Osteoporosis, Palpitations, Personal history of cardiac murmur, Pessary maintenance, PONV (postoperative nausea and vomiting), Presence of pessary, Problems with swallowing, Rheumatic heart disease, Rotator cuff tendonitis, Rupture of right proximal biceps tendon, Seasonal allergies, Sinus problem, Spinal headache, Subacromial bursitis, Valvular heart disease, Vision problems, Vitamin B12 deficiency, Vitamin D deficiency, and Wears glasses.    Past Surgical History: Patient  has a past surgical history that includes Cataract Extraction (Bilateral); Upper gastrointestinal endoscopy (08/18/2014); Colonoscopy (2011); Esophagogastroduodenoscopy (N/A, 12/6/2017); Colonoscopy (N/A, 1/9/2018); Esophagogastroduodenoscopy (N/A, 1/15/2019); Cervical polypectomy; and Hip Trochanteric Nailing (Left, 2/14/2021).    Social History: Patient  reports that she has never smoked. She has never used smokeless tobacco. She reports that she does not drink alcohol and does not use drugs.    Family History:  Patient's family history has been reviewed and found to be noncontributory.     Allergies:       Ferrlecit [na ferric gluc cplx in sucrose], Ranitidine hcl, Levofloxacin, and Lisinopril    Home Medications:    Prior to Admission Medications     Prescriptions Last Dose Informant Patient Reported? Taking?    allopurinol (ZYLOPRIM) 100 MG tablet 1/11/2023 Self Yes Yes    Take 1 tablet by mouth daily.    aspirin 81 MG EC tablet 1/11/2023  Yes Yes    Take 81 mg by mouth Daily.    B Complex Vitamins (vitamin b complex) capsule capsule 1/11/2023  Yes Yes    Take  by mouth Daily.    carvedilol (COREG) 25 MG tablet 1/11/2023  Yes Yes    Take 25 mg by mouth 2 (Two) Times a Day With Meals.    docusate sodium (COLACE) 100 MG capsule 1/11/2023  No Yes    Take 1 capsule by mouth 2 (Two) Times a Day As Needed for Constipation.    epoetin ovidio (EPOGEN,PROCRIT) 93835 UNIT/ML injection 1/3/2023 Self Yes Yes    Inject 40,000 Units under the skin into the appropriate area as directed. Every 2 weeks.  Due Wednesday, Feb 17th    irbesartan (AVAPRO) 150 MG tablet 1/11/2023 Self Yes Yes    Take 150 mg by mouth Daily.    budesonide-formoterol (SYMBICORT) 80-4.5 MCG/ACT inhaler Not Taking Self Yes No    Inhale 2 puffs Daily.    Patient not taking:  Reported on 1/12/2023    carboxymethylcellulose (REFRESH PLUS) 0.5 % solution Unknown  Yes No    Administer 1 drop to both eyes 3 (Three) Times a Day As Needed for Dry Eyes.    cholecalciferol (VITAMIN D3) 1000 UNITS tablet Not Taking Self Yes No    Take 1,000 Units by mouth Daily.    Patient not taking:  Reported on 1/12/2023    estradiol (ESTRACE) 0.1 MG/GM vaginal cream Not Taking  No No    Massage 1 gram in vaginal opening twice weekly    Patient not taking:  Reported on 1/12/2023    fluticasone (FLONASE) 50 MCG/ACT nasal spray Not Taking Self Yes No    2 sprays into each nostril Daily As Needed.    Patient not taking:  Reported on 1/12/2023    lidocaine (LIDODERM) 5 % Not Taking  No No    Place 1 patch on the skin as directed by provider Daily. Remove & Discard patch within 12 hours  "or as directed by MD    Patient not taking:  Reported on 1/12/2023    omeprazole (PriLOSEC) 40 MG capsule  Self Yes No    Take 40 mg by mouth Daily.    Probiotic Product (PROBIOTIC DAILY PO) Not Taking  Yes No    Take 1 tablet by mouth Daily As Needed.    Patient not taking:  Reported on 1/12/2023    torsemide (DEMADEX) 10 MG tablet   No No    Take 1 tablet by mouth on Thursday and Monday, take as needed otherwise    Patient taking differently:  Take 5 mg by mouth Daily.        ED Medications:    Medications   sodium chloride 0.9 % bolus 500 mL (0 mL Intravenous Stopped 1/12/23 1006)   cefTRIAXone (ROCEPHIN) IVPB 1 g/50ml dextrose (premix) (0 g Intravenous Stopped 1/12/23 1313)     Vital Signs:  Temp:  [98.2 °F (36.8 °C)] 98.2 °F (36.8 °C)  Heart Rate:  [87-92] 92  Resp:  [18] 18  BP: (128-142)/(72-80) 128/72        01/12/23  0836   Weight: 49.9 kg (110 lb)     Body mass index is 22.22 kg/m².    Physical Exam:     Most recent vital Signs: /72   Pulse 92   Temp 98.2 °F (36.8 °C) (Oral)   Resp 18   Ht 149.9 cm (59\")   Wt 49.9 kg (110 lb)   LMP  (LMP Unknown)   SpO2 99%   BMI 22.22 kg/m²     Physical Exam  Constitutional:       General: She is in acute distress.      Appearance: She is not toxic-appearing.   HENT:      Mouth/Throat:      Mouth: Mucous membranes are moist.   Eyes:      Extraocular Movements: Extraocular movements intact.   Cardiovascular:      Rate and Rhythm: Normal rate and regular rhythm.      Pulses: Normal pulses.      Heart sounds: Normal heart sounds.   Pulmonary:      Effort: Pulmonary effort is normal.      Breath sounds: Normal breath sounds.   Abdominal:      Palpations: Abdomen is soft.      Tenderness: There is no abdominal tenderness.   Musculoskeletal:         General: Tenderness present.      Right lower leg: No edema.      Left lower leg: No edema.      Comments: Left knee, tip/fib, ankle generally tender without bleeding, significant bruising, or swelling.   Skin:     " General: Skin is warm.   Neurological:      General: No focal deficit present.      Mental Status: She is alert and oriented to person, place, and time.   Psychiatric:         Mood and Affect: Mood normal.         Thought Content: Thought content normal.         Laboratory data:    I have reviewed the labs done in the emergency room.    Results from last 7 days   Lab Units 01/12/23  0937   SODIUM mmol/L 133*   POTASSIUM mmol/L 5.1   CHLORIDE mmol/L 102   CO2 mmol/L 19.2*   BUN mg/dL 46*   CREATININE mg/dL 3.82*   CALCIUM mg/dL 8.9   BILIRUBIN mg/dL 0.3   ALK PHOS U/L 134*   ALT (SGPT) U/L 13   AST (SGOT) U/L 30   GLUCOSE mg/dL 106*     Results from last 7 days   Lab Units 01/12/23  0937   WBC 10*3/mm3 8.14   HEMOGLOBIN g/dL 9.4*   HEMATOCRIT % 28.7*   PLATELETS 10*3/mm3 203                             Results from last 7 days   Lab Units 01/12/23  0937   COLOR UA  Yellow   GLUCOSE UA  Negative   KETONES UA  Negative   LEUKOCYTES UA  Large (3+)*   PH, URINE  <=5.0   BILIRUBIN UA  Negative   UROBILINOGEN UA  0.2 E.U./dL   RBC UA /HPF 0-2*   WBC UA /HPF 31-50*       Pain Management Panel     Pain Management Panel Latest Ref Rng & Units 2/14/2021 10/15/2019    CREATININE UR mg/dL 81.1 135.4          EKG:      none    Radiology:    CT Abdomen Pelvis Without Contrast    Result Date: 1/12/2023  PROCEDURE: CT ABDOMEN PELVIS WO CONTRAST-  HISTORY: UTI; N30.00-Acute cystitis without hematuria; N18.9-Chronic kidney disease, unspecified  COMPARISON: 11/20/2019 and left hip image of 07/12/2022...  PROCEDURE: Axial images were obtained from the lung bases to the pubic symphysis by computed tomography. This study was performed with techniques to keep radiation doses as low as reasonably achievable, (ALARA). Individualized dose reduction techniques using automated exposure control or adjustment of mA and/or kV according to the patient size were employed.  FINDINGS:  ABDOMEN: The lung bases are clear. The heart is mildly enlarged  with mild pericardial effusion, new from prior exam. The limited non-contrast images of the liver demonstrate a stable, 11 mm, hypodense lesion measuring less than 20 Hounsfield units consistent with a cyst. Liver otherwise appears normal. Gallbladder present with no CT visible stones. The spleen is unremarkable. No adrenal masses are seen. The aorta is normal in caliber. There is no significant free fluid or adenopathy. There is no nephrolithiasis. There is no hydronephrosis. There are bilateral, circumscribed, isodense renal lesions likely cysts and not significant change from the prior exam. The largest measures 86 mm in the craniocaudad dimension and has a rim calcification, stable from prior. Streak artifact from patient's arm position noted. Vascular calcifications noted.  PELVIS: The GI tract demonstrate no obstruction. The appendix is identified and appears normal. The urinary bladder is partially filled and appears unremarkable. Uterus is midline. There is no fluid or adenopathy.  There is streak artifact from fixation hardware in the left proximal femur. As seen on recent radiographs there is extension of the compression screw into the left hip joint with adjacent remodeling of the superior acetabulum; appearance is similar to the prior exam. There is also nonunion of the previous set intertrochanteric fracture. Diffuse osteopenia noted. Best seen on series 3 image 103 and series 4 image 69 are areas of increased attenuation in the fat of the medial inferior left buttock likely representing hematoma from previous fall.Pessary is noted.      New, mild pericardial effusion.  Stable, bilateral, simple appearing renal cystic lesions and hepatic cystic lesion compared to prior exam.  Suspected hematoma formation in the left inferior medial buttock as described.  Stable chronic changes involving fixation hardware of the proximal left femur compared to 07/12/2022.  This report was signed and finalized on  1/12/2023 12:43 PM by Angela Pablo MD.    CT Lumbar Spine Without Contrast    Result Date: 1/12/2023   PROCEDURE: CT LUMBAR SPINE WO CONTRAST-  HISTORY: fall; N30.00-Acute cystitis without hematuria; N18.9-Chronic kidney disease, unspecified, compare 07/12/2022.  PROCEDURE: Axial images were obtained through the lumbar spine by computed tomography. Reconstruction images were also performed. This study was performed with techniques to keep radiation doses as low as reasonably achievable, (ALARA). Individualized dose reduction techniques using automated exposure control or adjustment of mA and/or kV according to the patient size were employed.  FINDINGS: Again noted is minimal anterolisthesis of L4 and L5 and grade 1 anterolisthesis of L5 on S1; this is stable from the prior exam. The vertebral bodies are of proper height. The facets overlap at all levels. Moderate, diffuse osteopenia noted. Vascular calcifications noted. Small osteophytes noted at all levels.  L1-L2: No significant disc disease is present. There is no stenosis.  L2-L3: No significant disc disease is present. There is no stenosis.  L3-L4: No significant disc disease is present. There is no stenosis.  L4-L5: There is moderate annular bulge causing mild spinal stenosis. There is moderate, bilateral neural foraminal narrowing.  L5-S1: There is moderate annular bulge causing mild spinal stenosis. There is moderate, bilateral neural foraminal narrowing.      No acute abnormality.  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    This report was signed and finalized on 1/12/2023 12:28 PM by Angela Pablo MD.    XR Chest 1 View    Result Date: 1/12/2023  PROCEDURE: XR CHEST 1 VW-  HISTORY: illness, malaise  COMPARISON: 04/08/2022..  FINDINGS: The heart is mildly enlarged, stable. There is mild tortuosity of the thoracic aorta with aortic mural calcifications.. The mediastinum is unremarkable. Mild interstitial disease bilaterally is stable..  There is no pneumothorax.  There are no acute osseous abnormalities.      Stable chest..    This report was signed and finalized on 1/12/2023 10:03 AM by Angela Pablo MD.      Assessment/Plan:    Inpatient general floor admission 1/12/2023 for UTI requiring IV antibiotics, JASMEET on CKD, acute on chronic debility and impaired ADLs.    UTI   -Rocephin started 1/12  -Urine culture collected 1/12.    JASMEET on CKD  -Nephrology consultation, patient follows with Dr. Fitzgerald.    Left Leg pain  -XR left knee, tib/fib, ankle    Acute on chronic debility  -PT/OT.    Chronic:  CKD follows with nephrology Dr. Fitzgerald, coronary artery disease follows with Dr. Acuña, arthritis, back pain, hyperparathyroidism, hypertension, anemia, osteoarthritis, rheumatic heart disease, valvular heart disease.    Continue home medications.    Risk Assessment: Moderate  DVT Prophylaxis: Heparin  Code Status: Full code.  Patient says she needs to think about it, would benefit from further discussion of CODE STATUS.  Diet: Renal    Advance Care Planning   ACP discussion was held with the patient during this visit. Patient does not have an advance directive, declines further assistance.           Brian Joseph Kerley,   01/12/23  15:55 EST    Dictated utilizing Dragon dictation.

## 2023-01-12 NOTE — PLAN OF CARE
Goal Outcome Evaluation:  Plan of Care Reviewed With: patient           Outcome Evaluation: Patient admitted to room 320.

## 2023-01-12 NOTE — H&P
[facility]   HISTORY AND PHYSICAL      Name:  Carolyne Martins   Age:  82 y.o.  Sex:  female  :  1940  MRN:  [id number]   Visit Number:  [id number]  Admission Date:  2023  Date Of Service:  23  Primary Care Physician:  Jerald Dawkins MD    Chief Complaint:     Generalized weakness    History Of Presenting Illness:      Ms. Martins is an 82 year old female with past medical history of anemia, chronic back pain, chronic kidney disease, GERD, hyperlipidemia, hypertension, impaired functional mobility, osteoarthritis who presents to the ED with weakness and fatigue for about 2 days which has worsened. Patient is also complaining of left hip and leg pain. She reports falling about a week ago. Her son, Magno, is at bedside and reports being her prominent caretaker. Patient lives alone but her son goes over daily to check on her. Patient wishes to gain strength and mobility so she can continue living at home. Denies any chest pain or shortness of breath. Denies any other symptoms at this time. Patient is not on oxygen at home.     ED Summary: Patient was brought to the ED via EMS on 23. In the emergency room, vitals were temperature of 98.2, oxygen 100% on room air, blood pressure 142/80, and pulse of 87. CBC and CMP remarkable for glucose of 106, sodium of 133, creatinine of 3.52, BUN 46, GFR 11.4, Alkphos 134, albumin 3.2, hemoglobin 9.4, .7, RDW 59.5, and RBC 2.74. COVID and Flu negative. CXR within normal limits. U/A remarkable for +1 blood, +3 leukocytes, 30 protein, and 31-50 WBCs. CT abdomen shows new pericardial effusion, bilateral renal cystic lesions and hepatic lesion, suspected hematoma formation in left inferior medial buttock, and fixation hardware of left femur 22. CT lumbar spine is unremarkable. Given saline bolus, ceftriaxone, and keflex in ED. Aguayo catheter in place.     Review Of Systems:    All systems were reviewed and negative except as  mentioned in history of presenting illness, assessment and plan.    Past Medical History: Patient  has a past medical history of Anemia (1998), Anemia, Arthritis, Back pain, Bleeding from anus, Bronchitis, Bronchitis (12/2017), CAD (coronary artery disease), Cataract, bilateral, Chronic kidney disease, Chronic kidney failure, Difficulty swallowing solids, Disease of thyroid gland, Fracture, radius (2016), GERD (gastroesophageal reflux disease), Gout, High cholesterol, History of blood transfusion (2019), History of nuclear stress test (2014), Hyperparathyroidism (HCC), Hyperparathyroidism (HCC), Hypertension, Impaired functional mobility, balance, gait, and endurance, Iron deficiency, Osteoarthritis, Osteoarthritis of knees, bilateral, Osteoporosis, Palpitations, Personal history of cardiac murmur, Pessary maintenance, PONV (postoperative nausea and vomiting), Presence of pessary, Problems with swallowing, Rheumatic heart disease, Rotator cuff tendonitis, Rupture of right proximal biceps tendon, Seasonal allergies, Sinus problem, Spinal headache, Subacromial bursitis, Valvular heart disease, Vision problems, Vitamin B12 deficiency, Vitamin D deficiency, and Wears glasses.    Past Surgical History: Patient  has a past surgical history that includes Cataract Extraction (Bilateral); Upper gastrointestinal endoscopy (08/18/2014); Colonoscopy (2011); Esophagogastroduodenoscopy (N/A, 12/6/2017); Colonoscopy (N/A, 1/9/2018); Esophagogastroduodenoscopy (N/A, 1/15/2019); Cervical polypectomy; and Hip Trochanteric Nailing (Left, 2/14/2021).    Social History: Patient  reports that she has never smoked. She has never used smokeless tobacco. She reports that she does not drink alcohol and does not use drugs.    Family History:  Patient's family history has been reviewed and found to be noncontributory.     Allergies:      Ferrlecit [na ferric gluc cplx in sucrose], Ranitidine hcl, Levofloxacin, and Lisinopril    Home  Medications:    Prior to Admission Medications     Prescriptions Last Dose Informant Patient Reported? Taking?    allopurinol (ZYLOPRIM) 100 MG tablet  Self Yes No    Take 1 tablet by mouth daily.    aspirin 81 MG EC tablet   Yes No    Take 81 mg by mouth Daily.    budesonide-formoterol (SYMBICORT) 80-4.5 MCG/ACT inhaler  Self Yes No    Inhale 2 puffs Daily.    carboxymethylcellulose (REFRESH PLUS) 0.5 % solution   Yes No    Administer 1 drop to both eyes 3 (Three) Times a Day As Needed for Dry Eyes.    carvedilol (COREG) 25 MG tablet   Yes No    Take 25 mg by mouth 2 (Two) Times a Day With Meals.    cholecalciferol (VITAMIN D3) 1000 UNITS tablet  Self Yes No    Take 1,000 Units by mouth Daily.    docusate sodium (COLACE) 100 MG capsule   No No    Take 1 capsule by mouth 2 (Two) Times a Day As Needed for Constipation.    epoetin ovidio (EPOGEN,PROCRIT) 73588 UNIT/ML injection  Self Yes No    Inject 40,000 Units under the skin into the appropriate area as directed. Every 2 weeks.  Due Wednesday, Feb 17th    estradiol (ESTRACE) 0.1 MG/GM vaginal cream   No No    Massage 1 gram in vaginal opening twice weekly    fluticasone (FLONASE) 50 MCG/ACT nasal spray  Self Yes No    2 sprays into each nostril Daily As Needed.    irbesartan (AVAPRO) 150 MG tablet  Self Yes No    Take 150 mg by mouth Daily.    lidocaine (LIDODERM) 5 %   No No    Place 1 patch on the skin as directed by provider Daily. Remove & Discard patch within 12 hours or as directed by MD    omeprazole (PriLOSEC) 40 MG capsule  Self Yes No    Take 40 mg by mouth Daily.    Probiotic Product (PROBIOTIC DAILY PO)   Yes No    Take 1 tablet by mouth Daily As Needed.    torsemide (DEMADEX) 10 MG tablet   No No    Take 1 tablet by mouth on Thursday and Monday, take as needed otherwise    Patient taking differently:  Take 1 tablet by mouth on Thursday and Monday, take as needed otherwise        ED Medications:    Medications   sodium chloride 0.9 % bolus 500 mL (0 mL  "Intravenous Stopped 1/12/23 1006)   cefTRIAXone (ROCEPHIN) IVPB 1 g/50ml dextrose (premix) (0 g Intravenous Stopped 1/12/23 1313)     Vital Signs:  Temp:  [98.2 °F (36.8 °C)] 98.2 °F (36.8 °C)  Heart Rate:  [87-92] 92  Resp:  [18] 18  BP: (128-142)/(72-80) 128/72        01/12/23  0836   Weight: 49.9 kg (110 lb)     Body mass index is 22.22 kg/m².    Physical Exam:     Most recent vital Signs: /72   Pulse 92   Temp 98.2 °F (36.8 °C) (Oral)   Resp 18   Ht 149.9 cm (59\")   Wt 49.9 kg (110 lb)   LMP  (LMP Unknown)   SpO2 97%   BMI 22.22 kg/m²     Physical Exam  Vitals and nursing note reviewed.   Constitutional:       General: She is not in acute distress.     Appearance: She is well-developed. She is not diaphoretic.      Comments: Elderly-appearing, thin.  Cachectic.   HENT:      Head: Normocephalic and atraumatic.      Nose: Nose normal.   Eyes:      Conjunctiva/sclera: Conjunctivae normal.   Cardiovascular:      Rate and Rhythm: Normal rate and regular rhythm.   Pulmonary:      Effort: Pulmonary effort is normal. No respiratory distress.      Breath sounds: Normal breath sounds.   Abdominal:      General: There is no distension.      Palpations: Abdomen is soft.      Tenderness: There is no abdominal tenderness. There is no guarding.   Musculoskeletal:         General: No deformity.   Neurological:      Mental Status: She is alert and oriented to person, place, and time.      Cranial Nerves: No cranial nerve deficit.     Laboratory data:    I have reviewed the labs done in the emergency room.    Results from last 7 days   Lab Units 01/12/23  0937   SODIUM mmol/L 133*   POTASSIUM mmol/L 5.1   CHLORIDE mmol/L 102   CO2 mmol/L 19.2*   BUN mg/dL 46*   CREATININE mg/dL 3.82*   CALCIUM mg/dL 8.9   BILIRUBIN mg/dL 0.3   ALK PHOS U/L 134*   ALT (SGPT) U/L 13   AST (SGOT) U/L 30   GLUCOSE mg/dL 106*     Results from last 7 days   Lab Units 01/12/23  0937   WBC 10*3/mm3 8.14   HEMOGLOBIN g/dL 9.4*   HEMATOCRIT % " 28.7*   PLATELETS 10*3/mm3 203                             Results from last 7 days   Lab Units 01/12/23  0937   COLOR UA  Yellow   GLUCOSE UA  Negative   KETONES UA  Negative   LEUKOCYTES UA  Large (3+)*   PH, URINE  <=5.0   BILIRUBIN UA  Negative   UROBILINOGEN UA  0.2 E.U./dL   RBC UA /HPF 0-2*   WBC UA /HPF 31-50*       Pain Management Panel     Pain Management Panel Latest Ref Rng & Units 2/14/2021 10/15/2019    CREATININE UR mg/dL 81.1 135.4          EKG:      Pending    Radiology:    CT Abdomen Pelvis Without Contrast    Result Date: 1/12/2023  PROCEDURE: CT ABDOMEN PELVIS WO CONTRAST-  HISTORY: UTI; N30.00-Acute cystitis without hematuria; N18.9-Chronic kidney disease, unspecified  COMPARISON: 11/20/2019 and left hip image of 07/12/2022...  PROCEDURE: Axial images were obtained from the lung bases to the pubic symphysis by computed tomography. This study was performed with techniques to keep radiation doses as low as reasonably achievable, (ALARA). Individualized dose reduction techniques using automated exposure control or adjustment of mA and/or kV according to the patient size were employed.  FINDINGS:  ABDOMEN: The lung bases are clear. The heart is mildly enlarged with mild pericardial effusion, new from prior exam. The limited non-contrast images of the liver demonstrate a stable, 11 mm, hypodense lesion measuring less than 20 Hounsfield units consistent with a cyst. Liver otherwise appears normal. Gallbladder present with no CT visible stones. The spleen is unremarkable. No adrenal masses are seen. The aorta is normal in caliber. There is no significant free fluid or adenopathy. There is no nephrolithiasis. There is no hydronephrosis. There are bilateral, circumscribed, isodense renal lesions likely cysts and not significant change from the prior exam. The largest measures 86 mm in the craniocaudad dimension and has a rim calcification, stable from prior. Streak artifact from patient's arm position  noted. Vascular calcifications noted.  PELVIS: The GI tract demonstrate no obstruction. The appendix is identified and appears normal. The urinary bladder is partially filled and appears unremarkable. Uterus is midline. There is no fluid or adenopathy.  There is streak artifact from fixation hardware in the left proximal femur. As seen on recent radiographs there is extension of the compression screw into the left hip joint with adjacent remodeling of the superior acetabulum; appearance is similar to the prior exam. There is also nonunion of the previous set intertrochanteric fracture. Diffuse osteopenia noted. Best seen on series 3 image 103 and series 4 image 69 are areas of increased attenuation in the fat of the medial inferior left buttock likely representing hematoma from previous fall.Pessary is noted.      New, mild pericardial effusion.  Stable, bilateral, simple appearing renal cystic lesions and hepatic cystic lesion compared to prior exam.  Suspected hematoma formation in the left inferior medial buttock as described.  Stable chronic changes involving fixation hardware of the proximal left femur compared to 07/12/2022.  This report was signed and finalized on 1/12/2023 12:43 PM by Angela Pablo MD.    CT Lumbar Spine Without Contrast    Result Date: 1/12/2023   PROCEDURE: CT LUMBAR SPINE WO CONTRAST-  HISTORY: fall; N30.00-Acute cystitis without hematuria; N18.9-Chronic kidney disease, unspecified, compare 07/12/2022.  PROCEDURE: Axial images were obtained through the lumbar spine by computed tomography. Reconstruction images were also performed. This study was performed with techniques to keep radiation doses as low as reasonably achievable, (ALARA). Individualized dose reduction techniques using automated exposure control or adjustment of mA and/or kV according to the patient size were employed.  FINDINGS: Again noted is minimal anterolisthesis of L4 and L5 and grade 1 anterolisthesis of L5 on S1; this  is stable from the prior exam. The vertebral bodies are of proper height. The facets overlap at all levels. Moderate, diffuse osteopenia noted. Vascular calcifications noted. Small osteophytes noted at all levels.  L1-L2: No significant disc disease is present. There is no stenosis.  L2-L3: No significant disc disease is present. There is no stenosis.  L3-L4: No significant disc disease is present. There is no stenosis.  L4-L5: There is moderate annular bulge causing mild spinal stenosis. There is moderate, bilateral neural foraminal narrowing.  L5-S1: There is moderate annular bulge causing mild spinal stenosis. There is moderate, bilateral neural foraminal narrowing.      No acute abnormality.  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    This report was signed and finalized on 1/12/2023 12:28 PM by Angela Pablo MD.    XR Chest 1 View    Result Date: 1/12/2023  PROCEDURE: XR CHEST 1 VW-  HISTORY: illness, malaise  COMPARISON: 04/08/2022..  FINDINGS: The heart is mildly enlarged, stable. There is mild tortuosity of the thoracic aorta with aortic mural calcifications.. The mediastinum is unremarkable. Mild interstitial disease bilaterally is stable.. There is no pneumothorax.  There are no acute osseous abnormalities.      Stable chest..    This report was signed and finalized on 1/12/2023 10:03 AM by Angela Pablo MD.      Assessment:    1. Generalized weakness, POA   2. Fatigue, POA   3. JASMEET on CKD, POA   4. UTI, POA   5. Back pain, POA  6. Leg pain, POA   7. Hypertension   8. Osteoarthritis  9. GERD    Plan:    Generalized weakness/fatigue/musculoskeletal pain  IV fluids  Vitamin supplements   Will work with PT/OT and arrange rehab or home-health   CT scans negative for acute injury post fall     JASMEET on CKD/UTI   Creatinine 3.52 on admission   Patient sees Dr. Schilling outpatient   Continue Keflex and Rocephin     HTN, osteoarthritis, GERD  Continue home meds as warranted    Risk Assessment:  Moderate  DVT Prophylaxis: Heparin  Code Status: Full  Diet: Regular      Mary Ellen Colmenares, Medical Student  01/12/23  15:08 EST    Dictated utilizing Dragon dictation.

## 2023-01-13 ENCOUNTER — APPOINTMENT (OUTPATIENT)
Dept: GENERAL RADIOLOGY | Facility: HOSPITAL | Age: 83
DRG: 690 | End: 2023-01-13
Payer: MEDICARE

## 2023-01-13 LAB
ANION GAP SERPL CALCULATED.3IONS-SCNC: 11.1 MMOL/L (ref 5–15)
ANISOCYTOSIS BLD QL: NORMAL
BACTERIA SPEC AEROBE CULT: NORMAL
BASOPHILS # BLD AUTO: 0.02 10*3/MM3 (ref 0–0.2)
BASOPHILS NFR BLD AUTO: 0.4 % (ref 0–1.5)
BUN SERPL-MCNC: 48 MG/DL (ref 8–23)
BUN/CREAT SERPL: 12.8 (ref 7–25)
CALCIUM SPEC-SCNC: 9 MG/DL (ref 8.6–10.5)
CHLORIDE SERPL-SCNC: 105 MMOL/L (ref 98–107)
CO2 SERPL-SCNC: 19.9 MMOL/L (ref 22–29)
CREAT SERPL-MCNC: 3.74 MG/DL (ref 0.57–1)
DACRYOCYTES BLD QL SMEAR: NORMAL
DEPRECATED RDW RBC AUTO: 58.4 FL (ref 37–54)
EGFRCR SERPLBLD CKD-EPI 2021: 11.7 ML/MIN/1.73
EOSINOPHIL # BLD AUTO: 0.28 10*3/MM3 (ref 0–0.4)
EOSINOPHIL NFR BLD AUTO: 6.3 % (ref 0.3–6.2)
ERYTHROCYTE [DISTWIDTH] IN BLOOD BY AUTOMATED COUNT: 15.6 % (ref 12.3–15.4)
FERRITIN SERPL-MCNC: 274.7 NG/ML (ref 13–150)
GLUCOSE SERPL-MCNC: 83 MG/DL (ref 65–99)
HCT VFR BLD AUTO: 24.8 % (ref 34–46.6)
HGB BLD-MCNC: 8 G/DL (ref 12–15.9)
IMM GRANULOCYTES # BLD AUTO: 0.01 10*3/MM3 (ref 0–0.05)
IMM GRANULOCYTES NFR BLD AUTO: 0.2 % (ref 0–0.5)
IRON 24H UR-MRATE: 56 MCG/DL (ref 37–145)
IRON SATN MFR SERPL: 34 % (ref 20–50)
LYMPHOCYTES # BLD AUTO: 1.49 10*3/MM3 (ref 0.7–3.1)
LYMPHOCYTES NFR BLD AUTO: 33.5 % (ref 19.6–45.3)
MACROCYTES BLD QL SMEAR: NORMAL
MCH RBC QN AUTO: 33.9 PG (ref 26.6–33)
MCHC RBC AUTO-ENTMCNC: 32.3 G/DL (ref 31.5–35.7)
MCV RBC AUTO: 105.1 FL (ref 79–97)
MONOCYTES # BLD AUTO: 0.44 10*3/MM3 (ref 0.1–0.9)
MONOCYTES NFR BLD AUTO: 9.9 % (ref 5–12)
NEUTROPHILS NFR BLD AUTO: 2.21 10*3/MM3 (ref 1.7–7)
NEUTROPHILS NFR BLD AUTO: 49.7 % (ref 42.7–76)
NRBC BLD AUTO-RTO: 0 /100 WBC (ref 0–0.2)
PLATELET # BLD AUTO: 209 10*3/MM3 (ref 140–450)
PMV BLD AUTO: 10.2 FL (ref 6–12)
POIKILOCYTOSIS BLD QL SMEAR: NORMAL
POTASSIUM SERPL-SCNC: 4.3 MMOL/L (ref 3.5–5.2)
RBC # BLD AUTO: 2.36 10*6/MM3 (ref 3.77–5.28)
RETICS # AUTO: 0.03 10*6/MM3 (ref 0.02–0.13)
RETICS/RBC NFR AUTO: 1.07 % (ref 0.7–1.9)
SMALL PLATELETS BLD QL SMEAR: ADEQUATE
SODIUM SERPL-SCNC: 136 MMOL/L (ref 136–145)
TIBC SERPL-MCNC: 167 MCG/DL (ref 298–536)
TRANSFERRIN SERPL-MCNC: 112 MG/DL (ref 200–360)
WBC MORPH BLD: NORMAL
WBC NRBC COR # BLD: 4.45 10*3/MM3 (ref 3.4–10.8)

## 2023-01-13 PROCEDURE — 85045 AUTOMATED RETICULOCYTE COUNT: CPT | Performed by: INTERNAL MEDICINE

## 2023-01-13 PROCEDURE — 80048 BASIC METABOLIC PNL TOTAL CA: CPT | Performed by: STUDENT IN AN ORGANIZED HEALTH CARE EDUCATION/TRAINING PROGRAM

## 2023-01-13 PROCEDURE — 85007 BL SMEAR W/DIFF WBC COUNT: CPT | Performed by: STUDENT IN AN ORGANIZED HEALTH CARE EDUCATION/TRAINING PROGRAM

## 2023-01-13 PROCEDURE — 73562 X-RAY EXAM OF KNEE 3: CPT

## 2023-01-13 PROCEDURE — 84466 ASSAY OF TRANSFERRIN: CPT | Performed by: INTERNAL MEDICINE

## 2023-01-13 PROCEDURE — 99232 SBSQ HOSP IP/OBS MODERATE 35: CPT | Performed by: STUDENT IN AN ORGANIZED HEALTH CARE EDUCATION/TRAINING PROGRAM

## 2023-01-13 PROCEDURE — 73590 X-RAY EXAM OF LOWER LEG: CPT

## 2023-01-13 PROCEDURE — 97161 PT EVAL LOW COMPLEX 20 MIN: CPT

## 2023-01-13 PROCEDURE — 83540 ASSAY OF IRON: CPT | Performed by: INTERNAL MEDICINE

## 2023-01-13 PROCEDURE — 85025 COMPLETE CBC W/AUTO DIFF WBC: CPT | Performed by: STUDENT IN AN ORGANIZED HEALTH CARE EDUCATION/TRAINING PROGRAM

## 2023-01-13 PROCEDURE — 25010000002 HEPARIN (PORCINE) PER 1000 UNITS: Performed by: STUDENT IN AN ORGANIZED HEALTH CARE EDUCATION/TRAINING PROGRAM

## 2023-01-13 PROCEDURE — 25010000002 EPOETIN ALFA-EPBX 20000 UNIT/ML SOLUTION: Performed by: INTERNAL MEDICINE

## 2023-01-13 PROCEDURE — 73610 X-RAY EXAM OF ANKLE: CPT

## 2023-01-13 PROCEDURE — 97165 OT EVAL LOW COMPLEX 30 MIN: CPT

## 2023-01-13 PROCEDURE — 82728 ASSAY OF FERRITIN: CPT | Performed by: INTERNAL MEDICINE

## 2023-01-13 PROCEDURE — 25010000002 CEFTRIAXONE SODIUM-DEXTROSE 1-3.74 GM-%(50ML) RECONSTITUTED SOLUTION: Performed by: STUDENT IN AN ORGANIZED HEALTH CARE EDUCATION/TRAINING PROGRAM

## 2023-01-13 PROCEDURE — 73502 X-RAY EXAM HIP UNI 2-3 VIEWS: CPT

## 2023-01-13 RX ORDER — DEXTROSE AND SODIUM CHLORIDE 5; .9 G/100ML; G/100ML
50 INJECTION, SOLUTION INTRAVENOUS CONTINUOUS
Status: ACTIVE | OUTPATIENT
Start: 2023-01-13 | End: 2023-01-14

## 2023-01-13 RX ADMIN — ASPIRIN 81 MG: 81 TABLET, COATED ORAL at 09:01

## 2023-01-13 RX ADMIN — ACETAMINOPHEN 650 MG: 325 TABLET, FILM COATED ORAL at 06:03

## 2023-01-13 RX ADMIN — HEPARIN SODIUM 5000 UNITS: 5000 INJECTION INTRAVENOUS; SUBCUTANEOUS at 15:35

## 2023-01-13 RX ADMIN — CARVEDILOL 25 MG: 25 TABLET, FILM COATED ORAL at 17:13

## 2023-01-13 RX ADMIN — TORSEMIDE 5 MG: 10 TABLET ORAL at 09:00

## 2023-01-13 RX ADMIN — Medication 10 ML: at 13:23

## 2023-01-13 RX ADMIN — CEFTRIAXONE 1 G: 1 INJECTION, SOLUTION INTRAVENOUS at 13:22

## 2023-01-13 RX ADMIN — EPOETIN ALFA-EPBX 20000 UNITS: 20000 INJECTION, SOLUTION INTRAVENOUS; SUBCUTANEOUS at 13:23

## 2023-01-13 RX ADMIN — HEPARIN SODIUM 5000 UNITS: 5000 INJECTION INTRAVENOUS; SUBCUTANEOUS at 21:15

## 2023-01-13 RX ADMIN — Medication 10 ML: at 21:16

## 2023-01-13 RX ADMIN — HEPARIN SODIUM 5000 UNITS: 5000 INJECTION INTRAVENOUS; SUBCUTANEOUS at 05:42

## 2023-01-13 RX ADMIN — DEXTROSE AND SODIUM CHLORIDE 50 ML/HR: 5; 900 INJECTION, SOLUTION INTRAVENOUS at 13:22

## 2023-01-13 RX ADMIN — PANTOPRAZOLE SODIUM 40 MG: 40 TABLET, DELAYED RELEASE ORAL at 05:42

## 2023-01-13 RX ADMIN — CARVEDILOL 25 MG: 25 TABLET, FILM COATED ORAL at 09:01

## 2023-01-13 RX ADMIN — LOSARTAN POTASSIUM 50 MG: 50 TABLET, FILM COATED ORAL at 09:01

## 2023-01-13 NOTE — THERAPY EVALUATION
"Patient Name: Carolyne Martins  : 1940    MRN: [id number]                              Today's Date: 2023       Admit Date: 2023    Visit Dx:     ICD-10-CM ICD-9-CM   1. Acute cystitis without hematuria  N30.00 595.0   2. Chronic kidney disease, unspecified CKD stage  N18.9 585.9   3. Debility  R53.81 799.3     Patient Active Problem List   Diagnosis   • Arthralgia of shoulder region   • Tendinopathy of rotator cuff   • Osteoarthritis of right acromioclavicular joint   • Anemia due to stage 4 chronic kidney disease treated with erythropoietin ([facility])   • Anemia   • Gastric polyp   • Gastroesophageal reflux disease without esophagitis   • Melena   • Change in bowel habits   • Colon cancer screening   • Senile osteoporosis   • Personal history of colonic polyps   • Closed fracture of left hip ([facility])   • Impaired mobility and ADLs   • CKD (chronic kidney disease) stage 4, GFR 15-29 ml/min ([facility])   • Simple chronic bronchitis ([facility])   • Essential hypertension   • Lower extremity edema   • Acute cystitis without hematuria   • Debility   • Left leg injury   • JASMEET (acute kidney injury) ([facility])     Past Medical History:   Diagnosis Date   • Anemia    • Anemia    • Arthritis    • Back pain    • Bleeding from anus    • Bronchitis     STATES HAS BRONCHITIS CURRENTLY (17).     • Bronchitis 2017   • CAD (coronary artery disease)    • Cataract, bilateral    • Chronic kidney disease     STAGE 4.  SEES TERA   • Chronic kidney failure     REPORTS STAGE IV   • Difficulty swallowing solids    • Disease of thyroid gland    • Fracture, radius 2016    right distal radius and ulna, healed.   • GERD (gastroesophageal reflux disease)    • Gout    • High cholesterol    • History of blood transfusion    • History of nuclear stress test 2014    DR. CORTEZ.  \"IT WAS OK\"   • Hyperparathyroidism ([facility])    • Hyperparathyroidism ([facility])    • Hypertension    • Impaired functional mobility, balance, gait, and endurance    • " Iron deficiency    • Osteoarthritis    • Osteoarthritis of knees, bilateral     Both knees Supartz injection series August, 2015   • Osteoporosis    • Palpitations    • Personal history of cardiac murmur    • Pessary maintenance    • PONV (postoperative nausea and vomiting)    • Presence of pessary    • Problems with swallowing     WITH FOOD OCCASSIONALLY   • Rheumatic heart disease     Personal history   • Rotator cuff tendonitis     right    • Rupture of right proximal biceps tendon     chronic   • Seasonal allergies    • Sinus problem    • Spinal headache     REPORTS AFTER DELIVERY OF SON   • Subacromial bursitis     right    • Valvular heart disease    • Vision problems    • Vitamin B12 deficiency    • Vitamin D deficiency    • Wears glasses      Past Surgical History:   Procedure Laterality Date   • CATARACT EXTRACTION Bilateral    • CERVICAL POLYPECTOMY     • COLONOSCOPY  2011   • COLONOSCOPY N/A 1/9/2018    Procedure: COLONOSCOPY with endoscopic mucosal resection (hot snare), normal saline submucosal injection, argon thermal ablation, resolution clip placement x4, and cold biopsy polypectomy;  Surgeon: Pieter Concepcion MD;  Location: [facility] ENDOSCOPY;  Service:    • ENDOSCOPY N/A 12/6/2017    Procedure: ESOPHAGOGASTRODUODENOSCOPY with biopsies and hot snare polypectomies;  Surgeon: Pieter Concepcion MD;  Location: [facility] ENDOSCOPY;  Service:    • ENDOSCOPY N/A 1/15/2019    Procedure: ESOPHAGOGASTRODUODENOSCOPY WITH BIOPSIES AND HOT SNARE POLYPECTOMIES X10;  Surgeon: Pieter Concepcion MD;  Location: [facility] ENDOSCOPY;  Service: Gastroenterology   • HIP TROCHANTERIC NAILING WITH INTRAMEDULLARY HIP SCREW Left 2/14/2021    Procedure: HIP TROCHANTER NAIL SHORT WITH INTRAMEDULLARY HIP SCREW, LEFT;  Surgeon: Gabriele Loyd MD;  Location: [facility] OR;  Service: Orthopedics;  Laterality: Left;   • UPPER GASTROINTESTINAL ENDOSCOPY  08/18/2014      General Information     Row Name 01/13/23 8268          OT Time and Intention     Document Type evaluation  -SD     Mode of Treatment occupational therapy  -SD     Row Name 01/13/23 1405          General Information    Patient Profile Reviewed yes  -SD     Prior Level of Function independent:;transfer;ADL's;w/c or scooter  Patient states she's used her wheelchair on and off since she had her hip surgery. Also has a RW and BB. Son lives across the street and does all the IADLs.  -SD     Existing Precautions/Restrictions fall  -SD     Barriers to Rehab medically complex;previous functional deficit  -SD     Row Name 01/13/23 1405          Living Environment    People in Home alone  -SD     Row Name 01/13/23 1405          Home Main Entrance    Number of Stairs, Main Entrance one  -SD     Row Name 01/13/23 1405          Stairs Within Home, Primary    Number of Stairs, Within Home, Primary none  -SD     Row Name 01/13/23 1405          Cognition    Orientation Status (Cognition) oriented x 4  -SD     Row Name 01/13/23 1405          Safety Issues, Functional Mobility    Safety Issues Affecting Function (Mobility) safety precautions follow-through/compliance;safety precaution awareness  -SD     Impairments Affecting Function (Mobility) balance;endurance/activity tolerance;strength;pain;range of motion (ROM)  -SD           User Key  (r) = Recorded By, (t) = Taken By, (c) = Cosigned By    Initials Name Provider Type    SD Ирина Delaney OT Occupational Therapist                 Mobility/ADL's     Row Name 01/13/23 1408          Bed Mobility    Bed Mobility supine-sit;sit-supine;scooting/bridging  -SD     Scooting/Bridging [place] (Bed Mobility) contact guard  -SD     Supine-Sit [place] (Bed Mobility) moderate assist (50% patient effort)  -SD     Sit-Supine [place] (Bed Mobility) moderate assist (50% patient effort)  -SD     Bed Mobility, Safety Issues decreased use of arms for pushing/pulling;decreased use of legs for bridging/pushing;impaired trunk control for bed mobility  -SD      Assistive Device (Bed Mobility) bed rails;draw sheet;head of bed elevated  -SD     Row Name 01/13/23 1409          Transfers    Comment, (Transfers) NT due to hip pain  -SD     Row Name 01/13/23 1409          Functional Mobility    Functional Mobility- Ind. Level not appropriate to assess  -SD     Row Name 01/13/23 1409          Activities of Daily Living    BADL Assessment/Intervention bathing;upper body dressing;lower body dressing;grooming;feeding;toileting  -SD     Row Name 01/13/23 1409          Bathing Assessment/Intervention    [place] Level (Bathing) upper extremities;minimum assist (75% patient effort);lower body;maximum assist (25% patient effort)  -SD     Row Name 01/13/23 1409          Upper Body Dressing Assessment/Training    [place] Level (Upper Body Dressing) minimum assist (75% patient effort)  -SD     Row Name 01/13/23 1409          Lower Body Dressing Assessment/Training    [place] Level (Lower Body Dressing) don;pants/bottoms;socks;maximum assist (25% patient effort)  -SD     Row Name 01/13/23 1409          Grooming Assessment/Training    [place] Level (Grooming) minimum assist (75% patient effort)  -SD     Row Name 01/13/23 1409          Self-Feeding Assessment/Training    [place] Level (Feeding) supervision  -SD     Row Name 01/13/23 1409          Toileting Assessment/Training    [place] Level (Toileting) maximum assist (25% patient effort)  -SD           User Key  (r) = Recorded By, (t) = Taken By, (c) = Cosigned By    Initials Name Provider Type    SD Ирина Delaney OT Occupational Therapist               Obj/Interventions     Row Name 01/13/23 1412          Sensory Assessment (Somatosensory)    Sensory Assessment (Somatosensory) bilateral LE  -SD     Bilateral LE Sensory Assessment general sensation  -SD     Sensory Subjective Reports numbness  -SD     Row Name 01/13/23 1412          Range of Motion Comprehensive    General Range of Motion upper extremity  range of motion deficits identified  -SD     Comment, General Range of Motion L shoulder flexion deficit, RUE WFL  -SD     Row Name 01/13/23 1412          Strength Comprehensive (MMT)    General Manual Muscle Testing (MMT) Assessment upper extremity strength deficits identified  -SD     Comment, General Manual Muscle Testing (MMT) Assessment 3-/5 - 3+/5  -SD           User Key  (r) = Recorded By, (t) = Taken By, (c) = Cosigned By    Initials Name Provider Type    Ирина Perkins OT Occupational Therapist               Goals/Plan    No documentation.                Clinical Impression     Row Name 01/13/23 1413          Pain Assessment    Pretreatment Pain Rating 7/10  -SD     Posttreatment Pain Rating 7/10  -SD     Pain Location - Side/Orientation Left  -SD     Pain Location - hip;shoulder  -SD     Pain Intervention(s) Repositioned;Ambulation/increased activity  -SD     Row Name 01/13/23 1413          Plan of Care Review    Plan of Care Reviewed With patient  -SD     Progress no change  -SD     Outcome Evaluation OT eval completed. Patient supine in bed complaining of L shoulder and L hip pain. Patient moved to EOB with mod A, able to sit EOB 10 mins. Patient requires increased assist for LB self care and toileting d/t mobility limited by pain. Transfer deferred d/t L hip pain, patient was able to scoot up toward head of bed and required mod A to return to supine. Notified RN chart is showing xray of L knee and ankle, but no hip xray and she was going to look into it. Patient is expected to benefit from continued OT services prior to DC and would benefit from STR.  -SD     Row Name 01/13/23 1413          Therapy Assessment/Plan (OT)    Patient/Family Therapy Goal Statement (OT) rehab  -SD     Rehab Potential (OT) good, to achieve stated therapy goals  -SD     Criteria for Skilled Therapeutic Interventions Met (OT) skilled treatment is necessary  -SD     Therapy Frequency (OT) 3 times/wk  -SD     Row Name  01/13/23 1413          Therapy Plan Review/Discharge Plan (OT)    Anticipated Discharge Disposition (OT) inpatient rehabilitation facility  -SD     Row Name 01/13/23 1413          Vital Signs    O2 Delivery Pre Treatment room air  -SD     O2 Delivery Intra Treatment room air  -SD     O2 Delivery Post Treatment room air  -SD     Row Name 01/13/23 1413          Positioning and Restraints    Pre-Treatment Position in bed  -SD     Post Treatment Position bed  -SD     In Bed supine;call light within reach;encouraged to call for assist;exit alarm on;notified nsg  -SD           User Key  (r) = Recorded By, (t) = Taken By, (c) = Cosigned By    Initials Name Provider Type    Ирина Perkins OT Occupational Therapist               Outcome Measures     Row Name 01/13/23 1425          How much help from another is currently needed...    Putting on and taking off regular lower body clothing? 2  -SD     Bathing (including washing, rinsing, and drying) 2  -SD     Toileting (which includes using toilet bed pan or urinal) 2  -SD     Putting on and taking off regular upper body clothing 3  -SD     Taking care of personal grooming (such as brushing teeth) 3  -SD     Eating meals 4  -SD     AM-PAC 6 Clicks Score (OT) 16  -SD     Row Name 01/13/23 1425          Functional Assessment    Outcome Measure Options AM-PAC 6 Clicks Daily Activity (OT)  -SD           User Key  (r) = Recorded By, (t) = Taken By, (c) = Cosigned By    Initials Name Provider Type    Ирина Perkins OT Occupational Therapist                Occupational Therapy Education     Title: PT OT SLP Therapies (In Progress)     Topic: Occupational Therapy (In Progress)     Point: ADL training (Done)     Description:   Instruct learner(s) on proper safety adaptation and remediation techniques during self care or transfers.   Instruct in proper use of assistive devices.              Learning Progress Summary           Patient Acceptance, E,TB, VU by SD at 1/13/2023  1426    Comment: OT POC                   Point: Home exercise program (Not Started)     Description:   Instruct learner(s) on appropriate technique for monitoring, assisting and/or progressing therapeutic exercises/activities.              Learner Progress:  Not documented in this visit.          Point: Precautions (Not Started)     Description:   Instruct learner(s) on prescribed precautions during self-care and functional transfers.              Learner Progress:  Not documented in this visit.          Point: Body mechanics (Not Started)     Description:   Instruct learner(s) on proper positioning and spine alignment during self-care, functional mobility activities and/or exercises.              Learner Progress:  Not documented in this visit.                      User Key     Initials Effective Dates Name Provider Type Discipline    SD 06/16/21 -  Ирина Delaney OT Occupational Therapist OT              OT Recommendation and Plan  Therapy Frequency (OT): 3 times/wk  Plan of Care Review  Plan of Care Reviewed With: patient  Progress: no change  Outcome Evaluation: OT eval completed. Patient supine in bed complaining of L shoulder and L hip pain. Patient moved to EOB with mod A, able to sit EOB 10 mins. Patient requires increased assist for LB self care and toileting d/t mobility limited by pain. Transfer deferred d/t L hip pain, patient was able to scoot up toward head of bed and required mod A to return to supine. Notified RN chart is showing xray of L knee and ankle, but no hip xray and she was going to look into it. Patient is expected to benefit from continued OT services prior to DC and would benefit from STR.     Time Calculation:    Time Calculation- OT     Row Name 01/13/23 1426             Time Calculation- OT    OT Start Time 1332  -SD      OT Received On 01/13/23  -SD      OT Goal Re-Cert Due Date 01/25/23  -SD         Untimed Charges    OT Eval/Re-eval Minutes 30  -SD         Total Minutes     Untimed Charges Total Minutes 30  -SD       Total Minutes 30  -SD            User Key  (r) = Recorded By, (t) = Taken By, (c) = Cosigned By    Initials Name Provider Type    Ирина Perkins OT Occupational Therapist              Therapy Charges for Today     Code Description Service Date Service Provider Modifiers Qty    [id number]  OT EVAL LOW COMPLEXITY 2 1/13/2023 Ирина Delaney OT GO 1               Ирина Delaney OT  1/13/2023

## 2023-01-13 NOTE — PLAN OF CARE
Goal Outcome Evaluation:  Plan of Care Reviewed With: patient           Outcome Evaluation: Patient had no acute events today. xrays of L knee, hip and ankle negative for acute fracture.

## 2023-01-13 NOTE — PROGRESS NOTES
[facility]    Name:  Carolyne Martins   Age:  82 y.o.  Sex:  female  :  1940  MRN:  [id number]   Visit Number:  [id number]  Admission Date:  2023  Date Of Service:  23  Primary Care Physician:  Jerald Dawkins MD     LOS: 1 day :    Chief Complaint:      Follow-up; generalized weakness, left leg pain    Subjective:    Still feeling generally weak today.  Still having pain in her left leg.    Hospital Course:    Patient is an 82-year-old woman with past medical history of CKD follows with nephrology Dr. Fitzgerald, coronary artery disease follows with Dr. Acuña, arthritis, back pain, hyperparathyroidism, hypertension, anemia, osteoarthritis, rheumatic heart disease, valvular heart disease.  Presented to [facility] ED on 2023 feeling generally weak progressively worse over a few days.  Son at bedside said its been more difficult for her to transfer in and out of the wheelchair, she is had some falls recently.  She has some pain in her left leg.  She denied any shortness of air, chest pain, nausea, vomiting, abdominal pain, cough, dysuria.  Patient lives alone, son lives across the street and helps with some things but he works at least 40 hours a week is away from home a lot.     ED summary: Afebrile, vital signs stable on room air.  Blood glucose 106, sodium 133, creatinine 3.82 baseline may be around 3.3-3.5, chronic anemia hemoglobin 9.4, pattern of infection on urinalysis, urine culture collected, COVID/flu negative CXR unremarkable CT abdomen/pelvis new, mild pericardial effusion, small bilateral renal cystic lesions and hepatic cystic lesion compared to prior exam, suspected hematoma formation left inferior medial buttock.  CT lumbar spine no acute abnormality, multilevel lumbar degenerative disc disease present.  She was provided Rocephin and is normal saline 500 mL bolus.  ED personnel did attempt to discharge, but patient not able to take care of  herself at home with current resources.  Social work has recommended at least social admit for short-term rehab placement.    Review of Systems:     All systems were reviewed and negative except as mentioned in subjective, assessment and plan.    Vital Signs:    Temp:  [97.5 °F (36.4 °C)-98.4 °F (36.9 °C)] 97.9 °F (36.6 °C)  Heart Rate:  [83-95] 95  Resp:  [16-18] 17  BP: (119-151)/(61-78) 137/76    Intake and output:    I/O last 3 completed shifts:  In: 790 [P.O.:240; IV Piggyback:550]  Out: 500 [Urine:500]  I/O this shift:  In: 240 [P.O.:240]  Out: -     Physical Examination:    General Appearance:  Alert and cooperative.    Head:  Atraumatic and normocephalic.   Eyes: Conjunctivae and sclerae normal, no icterus. No pallor.   Throat: No oral lesions, no thrush, oral mucosa moist.   Neck: Supple, trachea midline, no thyromegaly.   Lungs:   Breath sounds heard bilaterally equally.  No wheezing or crackles. No Pleural rub or bronchial breathing.   Heart:  Normal S1 and S2, no murmur, no gallop, no rub. No JVD.   Abdomen:   Normal bowel sounds, no masses, no organomegaly. Soft, nontender, nondistended, no rebound tenderness.   Extremities:  Some mild tenderness left knee and distal.   Skin:  Warm   Neurologic: Alert and oriented x 3. No facial asymmetry. Moves all four limbs. No tremors.      Laboratory results:    Results from last 7 days   Lab Units 01/13/23  0555 01/12/23  0937   SODIUM mmol/L 136 133*   POTASSIUM mmol/L 4.3 5.1   CHLORIDE mmol/L 105 102   CO2 mmol/L 19.9* 19.2*   BUN mg/dL 48* 46*   CREATININE mg/dL 3.74* 3.82*   CALCIUM mg/dL 9.0 8.9   BILIRUBIN mg/dL  --  0.3   ALK PHOS U/L  --  134*   ALT (SGPT) U/L  --  13   AST (SGOT) U/L  --  30   GLUCOSE mg/dL 83 106*     Results from last 7 days   Lab Units 01/13/23  0555 01/12/23  0937   WBC 10*3/mm3 4.45 8.14   HEMOGLOBIN g/dL 8.0* 9.4*   HEMATOCRIT % 24.8* 28.7*   PLATELETS 10*3/mm3 209 203                 No results for input(s): PHART, CAB0LUS,  PO2ART, YUZ3BQC, BASEEXCESS in the last 8760 hours.   I have reviewed the patient's laboratory results.    Radiology results:    CT Abdomen Pelvis Without Contrast    Result Date: 1/12/2023  PROCEDURE: CT ABDOMEN PELVIS WO CONTRAST-  HISTORY: UTI; N30.00-Acute cystitis without hematuria; N18.9-Chronic kidney disease, unspecified  COMPARISON: 11/20/2019 and left hip image of 07/12/2022...  PROCEDURE: Axial images were obtained from the lung bases to the pubic symphysis by computed tomography. This study was performed with techniques to keep radiation doses as low as reasonably achievable, (ALARA). Individualized dose reduction techniques using automated exposure control or adjustment of mA and/or kV according to the patient size were employed.  FINDINGS:  ABDOMEN: The lung bases are clear. The heart is mildly enlarged with mild pericardial effusion, new from prior exam. The limited non-contrast images of the liver demonstrate a stable, 11 mm, hypodense lesion measuring less than 20 Hounsfield units consistent with a cyst. Liver otherwise appears normal. Gallbladder present with no CT visible stones. The spleen is unremarkable. No adrenal masses are seen. The aorta is normal in caliber. There is no significant free fluid or adenopathy. There is no nephrolithiasis. There is no hydronephrosis. There are bilateral, circumscribed, isodense renal lesions likely cysts and not significant change from the prior exam. The largest measures 86 mm in the craniocaudad dimension and has a rim calcification, stable from prior. Streak artifact from patient's arm position noted. Vascular calcifications noted.  PELVIS: The GI tract demonstrate no obstruction. The appendix is identified and appears normal. The urinary bladder is partially filled and appears unremarkable. Uterus is midline. There is no fluid or adenopathy.  There is streak artifact from fixation hardware in the left proximal femur. As seen on recent radiographs there is  extension of the compression screw into the left hip joint with adjacent remodeling of the superior acetabulum; appearance is similar to the prior exam. There is also nonunion of the previous set intertrochanteric fracture. Diffuse osteopenia noted. Best seen on series 3 image 103 and series 4 image 69 are areas of increased attenuation in the fat of the medial inferior left buttock likely representing hematoma from previous fall.Pessary is noted.      Impression: New, mild pericardial effusion.  Stable, bilateral, simple appearing renal cystic lesions and hepatic cystic lesion compared to prior exam.  Suspected hematoma formation in the left inferior medial buttock as described.  Stable chronic changes involving fixation hardware of the proximal left femur compared to 07/12/2022.  This report was signed and finalized on 1/12/2023 12:43 PM by Angela Pablo MD.    CT Lumbar Spine Without Contrast    Result Date: 1/12/2023   PROCEDURE: CT LUMBAR SPINE WO CONTRAST-  HISTORY: fall; N30.00-Acute cystitis without hematuria; N18.9-Chronic kidney disease, unspecified, compare 07/12/2022.  PROCEDURE: Axial images were obtained through the lumbar spine by computed tomography. Reconstruction images were also performed. This study was performed with techniques to keep radiation doses as low as reasonably achievable, (ALARA). Individualized dose reduction techniques using automated exposure control or adjustment of mA and/or kV according to the patient size were employed.  FINDINGS: Again noted is minimal anterolisthesis of L4 and L5 and grade 1 anterolisthesis of L5 on S1; this is stable from the prior exam. The vertebral bodies are of proper height. The facets overlap at all levels. Moderate, diffuse osteopenia noted. Vascular calcifications noted. Small osteophytes noted at all levels.  L1-L2: No significant disc disease is present. There is no stenosis.  L2-L3: No significant disc disease is present. There is no stenosis.   L3-L4: No significant disc disease is present. There is no stenosis.  L4-L5: There is moderate annular bulge causing mild spinal stenosis. There is moderate, bilateral neural foraminal narrowing.  L5-S1: There is moderate annular bulge causing mild spinal stenosis. There is moderate, bilateral neural foraminal narrowing.      Impression: No acute abnormality.  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    This report was signed and finalized on 1/12/2023 12:28 PM by Angela Pablo MD.    XR Chest 1 View    Result Date: 1/12/2023  PROCEDURE: XR CHEST 1 VW-  HISTORY: illness, malaise  COMPARISON: 04/08/2022..  FINDINGS: The heart is mildly enlarged, stable. There is mild tortuosity of the thoracic aorta with aortic mural calcifications.. The mediastinum is unremarkable. Mild interstitial disease bilaterally is stable.. There is no pneumothorax.  There are no acute osseous abnormalities.      Impression: Stable chest..    This report was signed and finalized on 1/12/2023 10:03 AM by Angela Pablo MD.    I have reviewed the patient's radiology reports.    Medication Review:     I have reviewed the patient's active and prn medications.     Problem List:      Acute cystitis without hematuria    Debility    Left leg injury    JASMEET (acute kidney injury) ([facility])      Assessment/Plan:    Inpatient general floor admission 1/12/2023 for UTI requiring IV antibiotics, JASMEET on CKD, acute on chronic debility and impaired ADLs.    1/13  -Afebrile, vital signs stable on room air.  Creatinine slightly improved to 3.74.  Hemoglobin 8.0.     UTI   -Rocephin started 1/12  -Urine culture collected 1/12.     JASMEET on CKD  -Nephrology consultation, patient follows with Dr. Fitzgerald.     Mechanical fall  Left Leg pain  Hematoma, left inferior medial buttock  -XR left knee, tib/fib, ankle     Acute on chronic debility  -PT/OT.    Renal cystic lesions  Hepatic cystic lesions  -Follow-up outpatient     Chronic:  CKD follows with  nephrology Dr. Fitzgerald, coronary artery disease follows with Dr. Acuña, arthritis, back pain, hyperparathyroidism, hypertension, anemia, osteoarthritis, rheumatic heart disease, valvular heart disease.     Continue home medications.     Risk Assessment: Moderate  DVT Prophylaxis: Heparin  Code Status:  DNR/DNI  Diet: Renal, nutrition recommendations; boost twice daily.  Discharge Plan: PT/OT, placement    Brian Joseph Kerley,   01/13/23  09:20 EST    Dictated utilizing Dragon dictation.

## 2023-01-13 NOTE — PLAN OF CARE
Goal Outcome Evaluation:  Plan of Care Reviewed With: patient        Progress: no change  Outcome Evaluation: VSS, pain controlled with tylenol and movement restrictions, maintaining o2 sats >90% on RA

## 2023-01-13 NOTE — PAYOR COMM NOTE
"Carolyne Martins (82 y.o. Female)     Date of Birth   1940    Social Security Number       Address   [address] [place] [address] [place] [address]    Home Phone   [phone]    MRN   [id number]       Pentecostal   Judaism    Marital Status                               Admission Date   1/12/23    Admission Type   Emergency    Admitting Provider   Baldomero Palomares MD    Attending Provider   Kerley, Brian Joseph, DO    Department, Room/Bed   [facility] TELEMETRY 3, 320/1       Discharge Date       Discharge Disposition       Discharge Destination                               Attending Provider: Kerley, Brian Joseph, DO    Allergies: Ferrlecit [Na Ferric Gluc Cplx In Sucrose], Ranitidine Hcl, Levofloxacin, Lisinopril    Isolation: None   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)   Code Status: CPR    Ht: 149.9 cm (59\")   Wt: 48.6 kg (107 lb 2.3 oz)    Admission Cmt: None   Principal Problem: Acute cystitis without hematuria [N30.00]                 Active Insurance as of 1/12/2023     Primary Coverage     Payor Plan Insurance Group Employer/Plan Group    ANTHEM MEDICARE REPLACEMENT ANTH MEDICARE ADVANTAGE [id number]     Payor Plan Address Payor Plan Phone Number Payor Plan Fax Number Effective Dates    [address] [phone]  7/1/2022 - None Entered    [facility] [id number]       Subscriber Name Subscriber Birth Date Member ID       CAROLYNE MARTINS 1940 [id number]           Secondary Coverage     Payor Plan Insurance Group Employer/Plan Group    ANTHEM BLUE CROSS ANTHEM [facility] SUPP [id number]     Payor Plan Address Payor Plan Phone Number Payor Plan Fax Number Effective Dates    [address]   12/1/2016 - None Entered    [facility] [id number]       Subscriber Name Subscriber Birth Date Member ID       CAROLYNE MARTINS 1940 [id number]                 Emergency Contacts      (Rel.) Home Phone Work Phone Mobile Phone    Magno Martins (Son) [phone] -- [phone] "    Linda Banda (Friend) [phone] -- --               History & Physical      Kerley, Brian Joseph, DO at 23 1554            [facility]   HISTORY AND PHYSICAL      Name:  Carolyne Martins   Age:  82 y.o.  Sex:  female  :  1940  MRN:  [id number]   Visit Number:  [id number]  Admission Date:  2023  Date Of Service:  23  Primary Care Physician:  Jerald Dawkins MD    Chief Complaint:     Generalized weakness, left leg pain    History Of Presenting Illness:      Patient is an 82-year-old woman with past medical history of CKD follows with nephrology Dr. Fitzgerald, coronary artery disease follows with Dr. Acuña, arthritis, back pain, hyperparathyroidism, hypertension, anemia, osteoarthritis, rheumatic heart disease, valvular heart disease.  Presented to [facility] ED on 2023 feeling generally weak progressively worse over a few days.  Son at bedside said its been more difficult for her to transfer in and out of the wheelchair, she is had some falls recently.  She has some pain in her left leg.  She denied any shortness of air, chest pain, nausea, vomiting, abdominal pain, cough, dysuria.  Patient lives alone, son lives across the street and helps with some things but he works at least 40 hours a week is away from home a lot.    ED summary: Afebrile, vital signs stable on room air.  Blood glucose 106, sodium 133, creatinine 3.82 baseline may be around 3.3-3.5, chronic anemia hemoglobin 9.4, pattern of infection on urinalysis, urine culture collected, COVID/flu negative CXR unremarkable CT abdomen/pelvis new, mild pericardial effusion, small bilateral renal cystic lesions and hepatic cystic lesion compared to prior exam, suspected hematoma formation left inferior medial buttock.  CT lumbar spine no acute abnormality, multilevel lumbar degenerative disc disease present.  She was provided Rocephin and is normal saline 500 mL bolus.  ED personnel did attempt to discharge, but  patient not able to take care of herself at home with current resources.  Social work has recommended at least social admit for short-term rehab placement.    Review Of Systems:    All systems were reviewed and negative except as mentioned in history of presenting illness, assessment and plan.    Past Medical History: Patient  has a past medical history of Anemia (1998), Anemia, Arthritis, Back pain, Bleeding from anus, Bronchitis, Bronchitis (12/2017), CAD (coronary artery disease), Cataract, bilateral, Chronic kidney disease, Chronic kidney failure, Difficulty swallowing solids, Disease of thyroid gland, Fracture, radius (2016), GERD (gastroesophageal reflux disease), Gout, High cholesterol, History of blood transfusion (2019), History of nuclear stress test (2014), Hyperparathyroidism (HCC), Hyperparathyroidism (HCC), Hypertension, Impaired functional mobility, balance, gait, and endurance, Iron deficiency, Osteoarthritis, Osteoarthritis of knees, bilateral, Osteoporosis, Palpitations, Personal history of cardiac murmur, Pessary maintenance, PONV (postoperative nausea and vomiting), Presence of pessary, Problems with swallowing, Rheumatic heart disease, Rotator cuff tendonitis, Rupture of right proximal biceps tendon, Seasonal allergies, Sinus problem, Spinal headache, Subacromial bursitis, Valvular heart disease, Vision problems, Vitamin B12 deficiency, Vitamin D deficiency, and Wears glasses.    Past Surgical History: Patient  has a past surgical history that includes Cataract Extraction (Bilateral); Upper gastrointestinal endoscopy (08/18/2014); Colonoscopy (2011); Esophagogastroduodenoscopy (N/A, 12/6/2017); Colonoscopy (N/A, 1/9/2018); Esophagogastroduodenoscopy (N/A, 1/15/2019); Cervical polypectomy; and Hip Trochanteric Nailing (Left, 2/14/2021).    Social History: Patient  reports that she has never smoked. She has never used smokeless tobacco. She reports that she does not drink alcohol and does not use  drugs.    Family History:  Patient's family history has been reviewed and found to be noncontributory.     Allergies:      Ferrlecit [na ferric gluc cplx in sucrose], Ranitidine hcl, Levofloxacin, and Lisinopril    Home Medications:    Prior to Admission Medications     Prescriptions Last Dose Informant Patient Reported? Taking?    allopurinol (ZYLOPRIM) 100 MG tablet 1/11/2023 Self Yes Yes    Take 1 tablet by mouth daily.    aspirin 81 MG EC tablet 1/11/2023  Yes Yes    Take 81 mg by mouth Daily.    B Complex Vitamins (vitamin b complex) capsule capsule 1/11/2023  Yes Yes    Take  by mouth Daily.    carvedilol (COREG) 25 MG tablet 1/11/2023  Yes Yes    Take 25 mg by mouth 2 (Two) Times a Day With Meals.    docusate sodium (COLACE) 100 MG capsule 1/11/2023  No Yes    Take 1 capsule by mouth 2 (Two) Times a Day As Needed for Constipation.    epoetin ovidio (EPOGEN,PROCRIT) 57441 UNIT/ML injection 1/3/2023 Self Yes Yes    Inject 40,000 Units under the skin into the appropriate area as directed. Every 2 weeks.  Due Wednesday, Feb 17th    irbesartan (AVAPRO) 150 MG tablet 1/11/2023 Self Yes Yes    Take 150 mg by mouth Daily.    budesonide-formoterol (SYMBICORT) 80-4.5 MCG/ACT inhaler Not Taking Self Yes No    Inhale 2 puffs Daily.    Patient not taking:  Reported on 1/12/2023    carboxymethylcellulose (REFRESH PLUS) 0.5 % solution Unknown  Yes No    Administer 1 drop to both eyes 3 (Three) Times a Day As Needed for Dry Eyes.    cholecalciferol (VITAMIN D3) 1000 UNITS tablet Not Taking Self Yes No    Take 1,000 Units by mouth Daily.    Patient not taking:  Reported on 1/12/2023    estradiol (ESTRACE) 0.1 MG/GM vaginal cream Not Taking  No No    Massage 1 gram in vaginal opening twice weekly    Patient not taking:  Reported on 1/12/2023    fluticasone (FLONASE) 50 MCG/ACT nasal spray Not Taking Self Yes No    2 sprays into each nostril Daily As Needed.    Patient not taking:  Reported on 1/12/2023    lidocaine (LIDODERM) 5  "% Not Taking  No No    Place 1 patch on the skin as directed by provider Daily. Remove & Discard patch within 12 hours or as directed by MD    Patient not taking:  Reported on 1/12/2023    omeprazole (PriLOSEC) 40 MG capsule  Self Yes No    Take 40 mg by mouth Daily.    Probiotic Product (PROBIOTIC DAILY PO) Not Taking  Yes No    Take 1 tablet by mouth Daily As Needed.    Patient not taking:  Reported on 1/12/2023    torsemide (DEMADEX) 10 MG tablet   No No    Take 1 tablet by mouth on Thursday and Monday, take as needed otherwise    Patient taking differently:  Take 5 mg by mouth Daily.        ED Medications:    Medications   sodium chloride 0.9 % bolus 500 mL (0 mL Intravenous Stopped 1/12/23 1006)   cefTRIAXone (ROCEPHIN) IVPB 1 g/50ml dextrose (premix) (0 g Intravenous Stopped 1/12/23 1313)     Vital Signs:  Temp:  [98.2 °F (36.8 °C)] 98.2 °F (36.8 °C)  Heart Rate:  [87-92] 92  Resp:  [18] 18  BP: (128-142)/(72-80) 128/72        01/12/23  0836   Weight: 49.9 kg (110 lb)     Body mass index is 22.22 kg/m².    Physical Exam:     Most recent vital Signs: /72   Pulse 92   Temp 98.2 °F (36.8 °C) (Oral)   Resp 18   Ht 149.9 cm (59\")   Wt 49.9 kg (110 lb)   LMP  (LMP Unknown)   SpO2 99%   BMI 22.22 kg/m²     Physical Exam  Constitutional:       General: She is in acute distress.      Appearance: She is not toxic-appearing.   HENT:      Mouth/Throat:      Mouth: Mucous membranes are moist.   Eyes:      Extraocular Movements: Extraocular movements intact.   Cardiovascular:      Rate and Rhythm: Normal rate and regular rhythm.      Pulses: Normal pulses.      Heart sounds: Normal heart sounds.   Pulmonary:      Effort: Pulmonary effort is normal.      Breath sounds: Normal breath sounds.   Abdominal:      Palpations: Abdomen is soft.      Tenderness: There is no abdominal tenderness.   Musculoskeletal:         General: Tenderness present.      Right lower leg: No edema.      Left lower leg: No edema.      " Comments: Left knee, tip/fib, ankle generally tender without bleeding, significant bruising, or swelling.   Skin:     General: Skin is warm.   Neurological:      General: No focal deficit present.      Mental Status: She is alert and oriented to person, place, and time.   Psychiatric:         Mood and Affect: Mood normal.         Thought Content: Thought content normal.         Laboratory data:    I have reviewed the labs done in the emergency room.    Results from last 7 days   Lab Units 01/12/23  0937   SODIUM mmol/L 133*   POTASSIUM mmol/L 5.1   CHLORIDE mmol/L 102   CO2 mmol/L 19.2*   BUN mg/dL 46*   CREATININE mg/dL 3.82*   CALCIUM mg/dL 8.9   BILIRUBIN mg/dL 0.3   ALK PHOS U/L 134*   ALT (SGPT) U/L 13   AST (SGOT) U/L 30   GLUCOSE mg/dL 106*     Results from last 7 days   Lab Units 01/12/23  0937   WBC 10*3/mm3 8.14   HEMOGLOBIN g/dL 9.4*   HEMATOCRIT % 28.7*   PLATELETS 10*3/mm3 203                             Results from last 7 days   Lab Units 01/12/23  0937   COLOR UA  Yellow   GLUCOSE UA  Negative   KETONES UA  Negative   LEUKOCYTES UA  Large (3+)*   PH, URINE  <=5.0   BILIRUBIN UA  Negative   UROBILINOGEN UA  0.2 E.U./dL   RBC UA /HPF 0-2*   WBC UA /HPF 31-50*       Pain Management Panel     Pain Management Panel Latest Ref Rng & Units 2/14/2021 10/15/2019    CREATININE UR mg/dL 81.1 135.4          EKG:      none    Radiology:    CT Abdomen Pelvis Without Contrast    Result Date: 1/12/2023  PROCEDURE: CT ABDOMEN PELVIS WO CONTRAST-  HISTORY: UTI; N30.00-Acute cystitis without hematuria; N18.9-Chronic kidney disease, unspecified  COMPARISON: 11/20/2019 and left hip image of 07/12/2022...  PROCEDURE: Axial images were obtained from the lung bases to the pubic symphysis by computed tomography. This study was performed with techniques to keep radiation doses as low as reasonably achievable, (ALARA). Individualized dose reduction techniques using automated exposure control or adjustment of mA and/or kV  according to the patient size were employed.  FINDINGS:  ABDOMEN: The lung bases are clear. The heart is mildly enlarged with mild pericardial effusion, new from prior exam. The limited non-contrast images of the liver demonstrate a stable, 11 mm, hypodense lesion measuring less than 20 Hounsfield units consistent with a cyst. Liver otherwise appears normal. Gallbladder present with no CT visible stones. The spleen is unremarkable. No adrenal masses are seen. The aorta is normal in caliber. There is no significant free fluid or adenopathy. There is no nephrolithiasis. There is no hydronephrosis. There are bilateral, circumscribed, isodense renal lesions likely cysts and not significant change from the prior exam. The largest measures 86 mm in the craniocaudad dimension and has a rim calcification, stable from prior. Streak artifact from patient's arm position noted. Vascular calcifications noted.  PELVIS: The GI tract demonstrate no obstruction. The appendix is identified and appears normal. The urinary bladder is partially filled and appears unremarkable. Uterus is midline. There is no fluid or adenopathy.  There is streak artifact from fixation hardware in the left proximal femur. As seen on recent radiographs there is extension of the compression screw into the left hip joint with adjacent remodeling of the superior acetabulum; appearance is similar to the prior exam. There is also nonunion of the previous set intertrochanteric fracture. Diffuse osteopenia noted. Best seen on series 3 image 103 and series 4 image 69 are areas of increased attenuation in the fat of the medial inferior left buttock likely representing hematoma from previous fall.Pessary is noted.      New, mild pericardial effusion.  Stable, bilateral, simple appearing renal cystic lesions and hepatic cystic lesion compared to prior exam.  Suspected hematoma formation in the left inferior medial buttock as described.  Stable chronic changes  involving fixation hardware of the proximal left femur compared to 07/12/2022.  This report was signed and finalized on 1/12/2023 12:43 PM by Angela Pablo MD.    CT Lumbar Spine Without Contrast    Result Date: 1/12/2023   PROCEDURE: CT LUMBAR SPINE WO CONTRAST-  HISTORY: fall; N30.00-Acute cystitis without hematuria; N18.9-Chronic kidney disease, unspecified, compare 07/12/2022.  PROCEDURE: Axial images were obtained through the lumbar spine by computed tomography. Reconstruction images were also performed. This study was performed with techniques to keep radiation doses as low as reasonably achievable, (ALARA). Individualized dose reduction techniques using automated exposure control or adjustment of mA and/or kV according to the patient size were employed.  FINDINGS: Again noted is minimal anterolisthesis of L4 and L5 and grade 1 anterolisthesis of L5 on S1; this is stable from the prior exam. The vertebral bodies are of proper height. The facets overlap at all levels. Moderate, diffuse osteopenia noted. Vascular calcifications noted. Small osteophytes noted at all levels.  L1-L2: No significant disc disease is present. There is no stenosis.  L2-L3: No significant disc disease is present. There is no stenosis.  L3-L4: No significant disc disease is present. There is no stenosis.  L4-L5: There is moderate annular bulge causing mild spinal stenosis. There is moderate, bilateral neural foraminal narrowing.  L5-S1: There is moderate annular bulge causing mild spinal stenosis. There is moderate, bilateral neural foraminal narrowing.      No acute abnormality.  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    This report was signed and finalized on 1/12/2023 12:28 PM by Angela Pablo MD.    XR Chest 1 View    Result Date: 1/12/2023  PROCEDURE: XR CHEST 1 VW-  HISTORY: illness, malaise  COMPARISON: 04/08/2022..  FINDINGS: The heart is mildly enlarged, stable. There is mild tortuosity of the thoracic aorta  with aortic mural calcifications.. The mediastinum is unremarkable. Mild interstitial disease bilaterally is stable.. There is no pneumothorax.  There are no acute osseous abnormalities.      Stable chest..    This report was signed and finalized on 1/12/2023 10:03 AM by Angela Pablo MD.      Assessment/Plan:    Inpatient general floor admission 1/12/2023 for UTI requiring IV antibiotics, JASMEET on CKD, acute on chronic debility and impaired ADLs.    UTI   -Rocephin started 1/12  -Urine culture collected 1/12.    JASMEET on CKD  -Nephrology consultation, patient follows with Dr. Fitzgerald.    Left Leg pain  -XR left knee, tib/fib, ankle    Acute on chronic debility  -PT/OT.    Chronic:  CKD follows with nephrology Dr. Fitzgerald, coronary artery disease follows with Dr. Acuña, arthritis, back pain, hyperparathyroidism, hypertension, anemia, osteoarthritis, rheumatic heart disease, valvular heart disease.    Continue home medications.    Risk Assessment: Moderate  DVT Prophylaxis: Heparin  Code Status: Full code.  Patient says she needs to think about it, would benefit from further discussion of CODE STATUS.  Diet: Renal    Advance Care Planning   ACP discussion was held with the patient during this visit. Patient does not have an advance directive, declines further assistance.          Brian Joseph Kerley, DO  01/12/23  15:55 EST    Dictated utilizing Dragon dictation.    Electronically signed by Kerley, Brian Joseph, DO at 01/12/23 1606          Emergency Department Notes      Maciel Beltran,  at 01/12/23 0822          Subjective   History of Present Illness  82-year-old chronically ill female presents to the ED with chief complaint of just not feeling well.  Patient denies specific complaints but states that she just has felt slightly fatigued and has not felt well for 3 or 4 days.  She has not had a cough.  No wheeze.  No nausea vomiting diarrhea or abdominal pain.  No chest pain or shortness of breath.  No cough.  No other  "complaints at this time.        Review of Systems   Constitutional: Positive for chills and fatigue.   Respiratory: Negative for cough, shortness of breath and wheezing.    All other systems reviewed and are negative.      Past Medical History:   Diagnosis Date   • Anemia 1998   • Anemia    • Arthritis    • Back pain    • Bleeding from anus    • Bronchitis     STATES HAS BRONCHITIS CURRENTLY (12/5/17).     • Bronchitis 12/2017   • CAD (coronary artery disease)    • Cataract, bilateral    • Chronic kidney disease     STAGE 4.  SEES TERA   • Chronic kidney failure     REPORTS STAGE IV   • Difficulty swallowing solids    • Disease of thyroid gland    • Fracture, radius 2016    right distal radius and ulna, healed.   • GERD (gastroesophageal reflux disease)    • Gout    • High cholesterol    • History of blood transfusion 2019   • History of nuclear stress test 2014    DR. CORTEZ.  \"IT WAS OK\"   • Hyperparathyroidism (HCC)    • Hyperparathyroidism (HCC)    • Hypertension    • Impaired functional mobility, balance, gait, and endurance    • Iron deficiency    • Osteoarthritis    • Osteoarthritis of knees, bilateral     Both knees Supartz injection series August, 2015   • Osteoporosis    • Palpitations    • Personal history of cardiac murmur    • Pessary maintenance    • PONV (postoperative nausea and vomiting)    • Presence of pessary    • Problems with swallowing     WITH FOOD OCCASSIONALLY   • Rheumatic heart disease     Personal history   • Rotator cuff tendonitis     right    • Rupture of right proximal biceps tendon     chronic   • Seasonal allergies    • Sinus problem    • Spinal headache     REPORTS AFTER DELIVERY OF SON   • Subacromial bursitis     right    • Valvular heart disease    • Vision problems    • Vitamin B12 deficiency    • Vitamin D deficiency    • Wears glasses        Allergies   Allergen Reactions   • Ferrlecit [Na Ferric Gluc Cplx In Sucrose] Swelling   • Ranitidine Hcl Shortness Of Breath   • " Levofloxacin Other (See Comments)     Leg cramps     • Lisinopril Cough       Past Surgical History:   Procedure Laterality Date   • CATARACT EXTRACTION Bilateral    • CERVICAL POLYPECTOMY     • COLONOSCOPY  2011   • COLONOSCOPY N/A 1/9/2018    Procedure: COLONOSCOPY with endoscopic mucosal resection (hot snare), normal saline submucosal injection, argon thermal ablation, resolution clip placement x4, and cold biopsy polypectomy;  Surgeon: Pieter Concepcion MD;  Location: [facility] ENDOSCOPY;  Service:    • ENDOSCOPY N/A 12/6/2017    Procedure: ESOPHAGOGASTRODUODENOSCOPY with biopsies and hot snare polypectomies;  Surgeon: Pieter Concepcion MD;  Location: [facility] ENDOSCOPY;  Service:    • ENDOSCOPY N/A 1/15/2019    Procedure: ESOPHAGOGASTRODUODENOSCOPY WITH BIOPSIES AND HOT SNARE POLYPECTOMIES X10;  Surgeon: Pieter Concepcion MD;  Location: [facility] ENDOSCOPY;  Service: Gastroenterology   • HIP TROCHANTERIC NAILING WITH INTRAMEDULLARY HIP SCREW Left 2/14/2021    Procedure: HIP TROCHANTER NAIL SHORT WITH INTRAMEDULLARY HIP SCREW, LEFT;  Surgeon: Gabriele Loyd MD;  Location: [facility] OR;  Service: Orthopedics;  Laterality: Left;   • UPPER GASTROINTESTINAL ENDOSCOPY  08/18/2014       Family History   Problem Relation Age of Onset   • Liver cancer Maternal Grandmother    • Gout Other    • Osteoporosis Other    • Stroke Other    • Ulcers Other    • Asthma Other    • Hypertension Other    • Heart disease Other    • Osteoarthritis Other    • Colon cancer Neg Hx        Social History     Socioeconomic History   • Marital status:    Tobacco Use   • Smoking status: Never   • Smokeless tobacco: Never   Vaping Use   • Vaping Use: Never used   Substance and Sexual Activity   • Alcohol use: Never   • Drug use: Never   • Sexual activity: Not Currently     Birth control/protection: Post-menopausal           Objective   Physical Exam  Vitals and nursing note reviewed.   Constitutional:       General: She is not in acute distress.      Appearance: She is well-developed. She is not diaphoretic.      Comments: Elderly-appearing, thin.  Cachectic.   HENT:      Head: Normocephalic and atraumatic.      Nose: Nose normal.   Eyes:      Conjunctiva/sclera: Conjunctivae normal.   Cardiovascular:      Rate and Rhythm: Normal rate and regular rhythm.   Pulmonary:      Effort: Pulmonary effort is normal. No respiratory distress.      Breath sounds: Normal breath sounds.   Abdominal:      General: There is no distension.      Palpations: Abdomen is soft.      Tenderness: There is no abdominal tenderness. There is no guarding.   Musculoskeletal:         General: No deformity.   Neurological:      Mental Status: She is alert and oriented to person, place, and time.      Cranial Nerves: No cranial nerve deficit.         Procedures          ED Course  ED Course as of 01/12/23 1810   Thu Jan 12, 2023   1032 Hemoglobin at baseline. [CG]      ED Course User Index  [CG] Maciel Beltran, DO                                           MDM  Chief complaint: Generalized illness/fatigue    Initial impression of presenting illness: Likely exacerbation of chronic issue    Comorbidities requiring evaluation consideration and management: Chronic kidney disease, chronic bronchitis, chronic anemia, coronary artery disease, others      Differential diagnosis includes but not limited to: Urinary tract infection, upper respiratory infection, anemia, acute on chronic kidney disease, other    Patient arrives hemodynamically stable stable, afebrile, without respiratory distress with initial vitals interpreted by myself.  Pertinent initial vitals include temp of 98.2, pulse ox 100% on room air, blood pressure 142/80, heart rate 87    Pertinent features from physical exam: Chronically ill-appearing, thin, cachectic, breath sounds clear and equal bilaterally, normal respiratory effort.  Heart regular rate and rhythm.  Abdomen soft nontender nondistended    Initial diagnostic plan: CBC,  CMP, COVID/flu, urinalysis, chest x-ray     Results from initial plan were reviewed and interpreted by myself and include: CBC shows chronic anemia but no significant drop in her hemoglobin, CMP reassuring but does show acute on chronic kidney injury COVID flu negative.  Urinalysis shows numerous white blood cells and bacteria consistent with a urinary tract infection.      Diagnostic information from other sources includes: Review of previous visits, prior labs, prior imaging, available notes from prior evaluations or visits with specialists, medication list, allergies, past medical history, past surgical history    Interventions in the ED included: IV fluid rehydration with 500 cc normal saline, antibiotics-IV Rocephin 1 g    Reevaluation: On reevaluation the patient is feeling better after IV fluid rehydration and antibiotics but the son states that she lives at home has not been ambulatory for about a year and has been unable to take care of her self.  The son cannot provide care for her.  They note that she had had a fall 2 to 3 days ago and is complaining of some low back pain.  For this reason added more imaging that included a CT abdomen pelvis and CT lumbar spine which were both negative for acute process.  Had the patient seen and evaluated by social work, unable to place in a nursing home today.  She will be admitted for further evaluation medical management.    Results/clinical rationale were discussed with patient and son at bedside    Consultations and discussions of results with other physicians: Dr. Palomares, hospitalist, patient will be admitted for further evaluation medical management.    Disposition plan: Admission      Final diagnoses:   Acute cystitis without hematuria   Chronic kidney disease, unspecified CKD stage   Debility       ED Disposition  ED Disposition     ED Disposition   Decision to Admit    Condition   --    Comment   Level of Care: Med/Surg [1]   Diagnosis: Acute cystitis without  hematuria [982364]   Admitting Physician: BENNY IRELAND [[id number]]   Attending Physician: BENNY IRELAND [[id number]]   Certification: I Certify That Inpatient Hospital Services Are Medically Necessary For Greater Than 2 Midnights               Jerald Dawkins MD  [address] [place] [id number] [phone]               Medication List      New Prescriptions    cephalexin 500 MG capsule  Commonly known as: KEFLEX  Take 1 capsule by mouth 2 (Two) Times a Day for 7 days.        Changed    torsemide 10 MG tablet  Commonly known as: DEMADEX  Take 1 tablet by mouth on Thursday and Monday, take as needed otherwise  What changed:   · how much to take  · how to take this  · when to take this  · additional instructions           Where to Get Your Medications      These medications were sent to [id number] [address] AT [facility] OF [facility] - [phone]  - [phone] [address] [address]    Phone: [phone]   · cephalexin 500 MG capsule          Maciel Beltran DO  01/12/23 1810      Electronically signed by Maciel Beltran DO at 01/12/23 1810     Sidra Barnes at 01/12/23 0933        Called case management at this time. Waiting for call back.     Electronically signed by Sidra Barnes at 01/12/23 0933     Sidra Barnes at 01/12/23 1101        Called Case management at this time. Yaz states that she will notify Ani.     Electronically signed by Sidra Barnes at 01/12/23 1102         Current Facility-Administered Medications   Medication Dose Route Frequency Provider Last Rate Last Admin   • acetaminophen (TYLENOL) tablet 650 mg  650 mg Oral Q4H PRN Kerley, Brian Joseph, DO   650 mg at 01/13/23 0603    Or   • acetaminophen (TYLENOL) 160 MG/5ML solution 650 mg  650 mg Oral Q4H PRN Kerley, Brian Joseph, DO        Or   • acetaminophen (TYLENOL) suppository 650 mg  650 mg Rectal Q4H PRN Kerley, Brian Joseph, DO       • aspirin EC tablet 81 mg  81 mg  Oral Daily Kerley, Brian Joseph, DO   81 mg at 01/13/23 0901   • carvedilol (COREG) tablet 25 mg  25 mg Oral BID With Meals Kerley, Brian Joseph, DO   25 mg at 01/13/23 0901   • cefTRIAXone (ROCEPHIN) IVPB 1 g/50ml dextrose (premix)  1 g Intravenous Q24H Kerley, Brian Joseph, DO       • heparin (porcine) 5000 UNIT/ML injection 5,000 Units  5,000 Units Subcutaneous Q8H Kerley, Brian Joseph, DO   5,000 Units at 01/13/23 0542   • losartan (COZAAR) tablet 50 mg  50 mg Oral Q24H Kerley, Brian Joseph, DO   50 mg at 01/13/23 0901   • ondansetron (ZOFRAN) injection 4 mg  4 mg Intravenous Q6H PRN Kerley, Brian Joseph, DO       • pantoprazole (PROTONIX) EC tablet 40 mg  40 mg Oral Q AM Kerley, Brian Joseph, DO   40 mg at 01/13/23 0542   • sodium chloride 0.9 % flush 10 mL  10 mL Intravenous Q12H Kerley, Brian Joseph, DO   10 mL at 01/12/23 2119   • sodium chloride 0.9 % flush 10 mL  10 mL Intravenous PRN Kerley, Brian Joseph, DO       • sodium chloride 0.9 % infusion 40 mL  40 mL Intravenous PRN Kerley, Brian Joseph, DO       • torsemide (DEMADEX) tablet 5 mg  5 mg Oral Daily Kerley, Brian Joseph, DO   5 mg at 01/13/23 0900       Lab Results (last 24 hours)     Procedure Component Value Units Date/Time    CBC Auto Differential [[id number]]  (Abnormal) Collected: 01/13/23 0555    Specimen: Blood Updated: 01/13/23 0713     WBC 4.45 10*3/mm3      RBC 2.36 10*6/mm3      Hemoglobin 8.0 g/dL      Hematocrit 24.8 %      .1 fL      MCH 33.9 pg      MCHC 32.3 g/dL      RDW 15.6 %      RDW-SD 58.4 fl      MPV 10.2 fL      Platelets 209 10*3/mm3      Neutrophil % 49.7 %      Lymphocyte % 33.5 %      Monocyte % 9.9 %      Eosinophil % 6.3 %      Basophil % 0.4 %      Immature Grans % 0.2 %      Neutrophils, Absolute 2.21 10*3/mm3      Lymphocytes, Absolute 1.49 10*3/mm3      Monocytes, Absolute 0.44 10*3/mm3      Eosinophils, Absolute 0.28 10*3/mm3      Basophils, Absolute 0.02 10*3/mm3      Immature Grans, Absolute 0.01 10*3/mm3       nRBC 0.0 /100 WBC     Scan Slide [[id number]] Collected: 01/13/23 0555    Specimen: Blood Updated: 01/13/23 0713     Anisocytosis Slight/1+     Dacrocytes Slight/1+     Macrocytes Slight/1+     Poikilocytes Slight/1+     WBC Morphology Normal     Platelet Estimate Adequate    Basic Metabolic Panel [[id number]]  (Abnormal) Collected: 01/13/23 0555    Specimen: Blood Updated: 01/13/23 0646     Glucose 83 mg/dL      BUN 48 mg/dL      Creatinine 3.74 mg/dL      Sodium 136 mmol/L      Potassium 4.3 mmol/L      Chloride 105 mmol/L      CO2 19.9 mmol/L      Calcium 9.0 mg/dL      BUN/Creatinine Ratio 12.8     Anion Gap 11.1 mmol/L      eGFR 11.7 mL/min/1.73      Comment: <15 Indicative of kidney failure       Narrative:      GFR Normal >60  Chronic Kidney Disease <60  Kidney Failure <15    The GFR formula is only valid for adults with stable renal function between ages 18 and 70.    Lactic Acid, Plasma [[id number]]  (Normal) Collected: 01/12/23 1616    Specimen: Blood Updated: 01/12/23 1645     Lactate 0.7 mmol/L     Blood Culture - Blood, Arm, Right [[id number]] Collected: 01/12/23 1616    Specimen: Blood from Arm, Right Updated: 01/12/23 1621    Blood Culture - Blood, Arm, Left [[id number]] Collected: 01/12/23 1613    Specimen: Blood from Arm, Left Updated: 01/12/23 1621    Urine Culture - Urine, Urine, Clean Catch [[id number]] Collected: 01/12/23 0937    Specimen: Urine, Clean Catch Updated: 01/12/23 1040    COVID-19 and FLU A/B PCR - Swab, Nasopharynx [[id number]]  (Normal) Collected: 01/12/23 0937    Specimen: Swab from Nasopharynx Updated: 01/12/23 1033     COVID19 Not Detected     Influenza A PCR Not Detected     Influenza B PCR Not Detected    Narrative:      Fact sheet for providers: https://www.fda.gov/media/094683/download    Fact sheet for patients: https://www.fda.gov/media/502371/download    Test performed by PCR.    CBC & Differential [[id number]]  (Abnormal) Collected: 01/12/23 0937    Specimen: Blood Updated:  01/12/23 1007    Narrative:      The following orders were created for panel order CBC & Differential.  Procedure                               Abnormality         Status                     ---------                               -----------         ------                     CBC Auto Differential[[id number]]        Abnormal            Final result               Scan Slide[[id number]]                                       Final result                 Please view results for these tests on the individual orders.    Scan Slide [[id number]] Collected: 01/12/23 0937    Specimen: Blood Updated: 01/12/23 1007     RBC Morphology Normal     WBC Morphology Normal     Platelet Estimate Adequate     Clumped Platelets --     Comment: Tiny clump seen under microscopic exam. Count appeared as adequate.        CBC Auto Differential [[id number]]  (Abnormal) Collected: 01/12/23 0937    Specimen: Blood Updated: 01/12/23 1006     WBC 8.14 10*3/mm3      RBC 2.74 10*6/mm3      Hemoglobin 9.4 g/dL      Hematocrit 28.7 %      .7 fL      MCH 34.3 pg      MCHC 32.8 g/dL      RDW 15.6 %      RDW-SD 59.5 fl      MPV 10.4 fL      Platelets 203 10*3/mm3      Neutrophil % 69.0 %      Lymphocyte % 17.6 %      Monocyte % 8.5 %      Eosinophil % 4.2 %      Basophil % 0.2 %      Immature Grans % 0.5 %      Neutrophils, Absolute 5.62 10*3/mm3      Lymphocytes, Absolute 1.43 10*3/mm3      Monocytes, Absolute 0.69 10*3/mm3      Eosinophils, Absolute 0.34 10*3/mm3      Basophils, Absolute 0.02 10*3/mm3      Immature Grans, Absolute 0.04 10*3/mm3      nRBC 0.0 /100 WBC     Comprehensive Metabolic Panel [[id number]]  (Abnormal) Collected: 01/12/23 0937    Specimen: Blood Updated: 01/12/23 1001     Glucose 106 mg/dL      BUN 46 mg/dL      Creatinine 3.82 mg/dL      Sodium 133 mmol/L      Potassium 5.1 mmol/L      Comment: Slight hemolysis detected by analyzer. Results may be affected.        Chloride 102 mmol/L      CO2 19.2 mmol/L      Calcium 8.9  mg/dL      Total Protein 6.6 g/dL      Albumin 3.2 g/dL      ALT (SGPT) 13 U/L      AST (SGOT) 30 U/L      Comment: Slight hemolysis detected by analyzer. Results may be affected.        Alkaline Phosphatase 134 U/L      Total Bilirubin 0.3 mg/dL      Globulin 3.4 gm/dL      A/G Ratio 0.9 g/dL      BUN/Creatinine Ratio 12.0     Anion Gap 11.8 mmol/L      eGFR 11.4 mL/min/1.73      Comment: <15 Indicative of kidney failure       Narrative:      GFR Normal >60  Chronic Kidney Disease <60  Kidney Failure <15    The GFR formula is only valid for adults with stable renal function between ages 18 and 70.    Urinalysis, Microscopic Only - Urine, Clean Catch [[id number]]  (Abnormal) Collected: 01/12/23 0937    Specimen: Urine, Clean Catch Updated: 01/12/23 0955     RBC, UA 0-2 /HPF      WBC, UA 31-50 /HPF      Bacteria, UA Trace /HPF      Squamous Epithelial Cells, UA 0-2 /HPF      Hyaline Casts, UA 0-2 /LPF      WBC Clumps, UA Small/1+ /HPF      Methodology Manual Light Microscopy    Urinalysis With Microscopic If Indicated (No Culture) - Urine, Clean Catch [[id number]]  (Abnormal) Collected: 01/12/23 0937    Specimen: Urine, Clean Catch Updated: 01/12/23 0947     Color, UA Yellow     Appearance, UA Turbid     pH, UA <=5.0     Specific Gravity, UA 1.014     Glucose, UA Negative     Ketones, UA Negative     Bilirubin, UA Negative     Blood, UA Small (1+)     Protein, UA 30 mg/dL (1+)     Leuk Esterase, UA Large (3+)     Nitrite, UA Negative     Urobilinogen, UA 0.2 E.U./dL        Imaging Results (Last 24 Hours)     Procedure Component Value Units Date/Time    CT Abdomen Pelvis Without Contrast [[id number]] Collected: 01/12/23 1229     Updated: 01/12/23 1245    Narrative:      PROCEDURE: CT ABDOMEN PELVIS WO CONTRAST-     HISTORY: UTI; N30.00-Acute cystitis without hematuria; N18.9-Chronic  kidney disease, unspecified     COMPARISON: 11/20/2019 and left hip image of 07/12/2022...     PROCEDURE: Axial images were obtained from  the lung bases to the pubic  symphysis by computed tomography. This study was performed with  techniques to keep radiation doses as low as reasonably achievable,  (ALARA). Individualized dose reduction techniques using automated  exposure control or adjustment of mA and/or kV according to the patient  size were employed.     FINDINGS:      ABDOMEN: The lung bases are clear. The heart is mildly enlarged with  mild pericardial effusion, new from prior exam. The limited non-contrast  images of the liver demonstrate a stable, 11 mm, hypodense lesion  measuring less than 20 Hounsfield units consistent with a cyst. Liver  otherwise appears normal. Gallbladder present with no CT visible stones.  The spleen is unremarkable. No adrenal masses are seen. The aorta is  normal in caliber. There is no significant free fluid or adenopathy.   There is no nephrolithiasis. There is no hydronephrosis. There are  bilateral, circumscribed, isodense renal lesions likely cysts and not  significant change from the prior exam. The largest measures 86 mm in  the craniocaudad dimension and has a rim calcification, stable from  prior. Streak artifact from patient's arm position noted. Vascular  calcifications noted.     PELVIS: The GI tract demonstrate no obstruction. The appendix is  identified and appears normal. The urinary bladder is partially filled  and appears unremarkable. Uterus is midline. There is no fluid or  adenopathy.  There is streak artifact from fixation hardware in the left  proximal femur. As seen on recent radiographs there is extension of the  compression screw into the left hip joint with adjacent remodeling of  the superior acetabulum; appearance is similar to the prior exam. There  is also nonunion of the previous set intertrochanteric fracture. Diffuse  osteopenia noted. Best seen on series 3 image 103 and series 4 image 69  are areas of increased attenuation in the fat of the medial inferior  left buttock likely  representing hematoma from previous fall.Pessary is  noted.       Impression:      New, mild pericardial effusion.     Stable, bilateral, simple appearing renal cystic lesions and hepatic  cystic lesion compared to prior exam.     Suspected hematoma formation in the left inferior medial buttock as  described.     Stable chronic changes involving fixation hardware of the proximal left  femur compared to 07/12/2022.     This report was signed and finalized on 1/12/2023 12:43 PM by Angela Pablo MD.    CT Lumbar Spine Without Contrast [[id number]] Collected: 01/12/23 1226     Updated: 01/12/23 1230    Narrative:         PROCEDURE: CT LUMBAR SPINE WO CONTRAST-     HISTORY: fall; N30.00-Acute cystitis without hematuria; N18.9-Chronic  kidney disease, unspecified, compare 07/12/2022.     PROCEDURE: Axial images were obtained through the lumbar spine by  computed tomography. Reconstruction images were also performed. This  study was performed with techniques to keep radiation doses as low as  reasonably achievable, (ALARA). Individualized dose reduction techniques  using automated exposure control or adjustment of mA and/or kV according  to the patient size were employed.     FINDINGS: Again noted is minimal anterolisthesis of L4 and L5 and grade  1 anterolisthesis of L5 on S1; this is stable from the prior exam. The  vertebral bodies are of proper height. The facets overlap at all levels.  Moderate, diffuse osteopenia noted. Vascular calcifications noted. Small  osteophytes noted at all levels.     L1-L2: No significant disc disease is present. There is no stenosis.     L2-L3: No significant disc disease is present. There is no stenosis.     L3-L4: No significant disc disease is present. There is no stenosis.     L4-L5: There is moderate annular bulge causing mild spinal stenosis.  There is moderate, bilateral neural foraminal narrowing.     L5-S1: There is moderate annular bulge causing mild spinal stenosis.  There is  moderate, bilateral neural foraminal narrowing.       Impression:      No acute abnormality.     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           This report was signed and finalized on 1/12/2023 12:28 PM by Angela Pablo MD.    XR Chest 1 View [[id number]] Collected: 01/12/23 1002     Updated: 01/12/23 1005    Narrative:      PROCEDURE: XR CHEST 1 VW-     HISTORY: illness, malaise     COMPARISON: 04/08/2022..     FINDINGS: The heart is mildly enlarged, stable. There is mild tortuosity  of the thoracic aorta with aortic mural calcifications.. The mediastinum  is unremarkable. Mild interstitial disease bilaterally is stable.. There  is no pneumothorax.  There are no acute osseous abnormalities.       Impression:      Stable chest..           This report was signed and finalized on 1/12/2023 10:03 AM by Angela Pablo MD.

## 2023-01-13 NOTE — DISCHARGE PLACEMENT REQUEST
"STR>LTC  Carolyne Martins (82 y.o. Female)     Date of Birth   1940    Social Security Number       Address   [address] [place], [place] [address] [place] [address]    Home Phone   [phone]    MRN   [id number]       [place]   Rastafari    Marital Status                               Admission Date   1/12/23    Admission Type   Emergency    Admitting Provider   Baldomero Palomares MD    Attending Provider   Kerley, Brian Joseph, DO    Department, Room/Bed   [facility] TELEMETRY 3, 320/1       Discharge Date       Discharge Disposition       Discharge Destination                               Attending Provider: Kerley, Brian Joseph, DO    Allergies: Ferrlecit [Na Ferric Gluc Cplx In Sucrose], Ranitidine Hcl, Levofloxacin, Lisinopril    Isolation: None   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)   Code Status: CPR    Ht: 149.9 cm (59\")   Wt: 48.6 kg (107 lb 2.3 oz)    Admission Cmt: None   Principal Problem: Acute cystitis without hematuria [N30.00]                 Active Insurance as of 1/12/2023     Primary Coverage     Payor Plan Insurance Group Employer/Plan Group    ANTH MEDICARE REPLACEMENT [facility] MEDICARE ADVANTAGE [id number]     Payor Plan Address Payor Plan Phone Number Payor Plan Fax Number Effective Dates    [address] [phone]  7/1/2022 - None Entered    [facility] [id number]       Subscriber Name Subscriber Birth Date Member ID       CAROLYNE MARTINS 1940 [id number]           Secondary Coverage     Payor Plan Insurance Group Employer/Plan Group    [id number]     Payor Plan Address Payor Plan Phone Number Payor Plan Fax Number Effective Dates    [address]   12/1/2016 - None Entered    [facility] [id number]       Subscriber Name Subscriber Birth Date Member ID       CAROLYNE MARTINS 1940 [id number]                 Emergency Contacts      (Rel.) Home Phone Work Phone Mobile Phone    Magno Martins (Son) [phone] -- " [phone]    Linda Banda (Friend) [phone] -- --               History & Physical      Kerley, Brian Joseph, DO at 23 1554            [facility]   HISTORY AND PHYSICAL      Name:  Carolyne Martins   Age:  82 y.o.  Sex:  female  :  1940  MRN:  [id number]   Visit Number:  [id number]  Admission Date:  2023  Date Of Service:  23  Primary Care Physician:  Jerald Dawkins MD    Chief Complaint:     Generalized weakness, left leg pain    History Of Presenting Illness:      Patient is an 82-year-old woman with past medical history of CKD follows with nephrology Dr. Fitzgerald, coronary artery disease follows with Dr. Acuña, arthritis, back pain, hyperparathyroidism, hypertension, anemia, osteoarthritis, rheumatic heart disease, valvular heart disease.  Presented to [facility] ED on 2023 feeling generally weak progressively worse over a few days.  Son at bedside said its been more difficult for her to transfer in and out of the wheelchair, she is had some falls recently.  She has some pain in her left leg.  She denied any shortness of air, chest pain, nausea, vomiting, abdominal pain, cough, dysuria.  Patient lives alone, son lives across the street and helps with some things but he works at least 40 hours a week is away from home a lot.    ED summary: Afebrile, vital signs stable on room air.  Blood glucose 106, sodium 133, creatinine 3.82 baseline may be around 3.3-3.5, chronic anemia hemoglobin 9.4, pattern of infection on urinalysis, urine culture collected, COVID/flu negative CXR unremarkable CT abdomen/pelvis new, mild pericardial effusion, small bilateral renal cystic lesions and hepatic cystic lesion compared to prior exam, suspected hematoma formation left inferior medial buttock.  CT lumbar spine no acute abnormality, multilevel lumbar degenerative disc disease present.  She was provided Rocephin and is normal saline 500 mL bolus.  ED personnel did attempt to  discharge, but patient not able to take care of herself at home with current resources.  Social work has recommended at least social admit for short-term rehab placement.    Review Of Systems:    All systems were reviewed and negative except as mentioned in history of presenting illness, assessment and plan.    Past Medical History: Patient  has a past medical history of Anemia (1998), Anemia, Arthritis, Back pain, Bleeding from anus, Bronchitis, Bronchitis (12/2017), CAD (coronary artery disease), Cataract, bilateral, Chronic kidney disease, Chronic kidney failure, Difficulty swallowing solids, Disease of thyroid gland, Fracture, radius (2016), GERD (gastroesophageal reflux disease), Gout, High cholesterol, History of blood transfusion (2019), History of nuclear stress test (2014), Hyperparathyroidism (HCC), Hyperparathyroidism (HCC), Hypertension, Impaired functional mobility, balance, gait, and endurance, Iron deficiency, Osteoarthritis, Osteoarthritis of knees, bilateral, Osteoporosis, Palpitations, Personal history of cardiac murmur, Pessary maintenance, PONV (postoperative nausea and vomiting), Presence of pessary, Problems with swallowing, Rheumatic heart disease, Rotator cuff tendonitis, Rupture of right proximal biceps tendon, Seasonal allergies, Sinus problem, Spinal headache, Subacromial bursitis, Valvular heart disease, Vision problems, Vitamin B12 deficiency, Vitamin D deficiency, and Wears glasses.    Past Surgical History: Patient  has a past surgical history that includes Cataract Extraction (Bilateral); Upper gastrointestinal endoscopy (08/18/2014); Colonoscopy (2011); Esophagogastroduodenoscopy (N/A, 12/6/2017); Colonoscopy (N/A, 1/9/2018); Esophagogastroduodenoscopy (N/A, 1/15/2019); Cervical polypectomy; and Hip Trochanteric Nailing (Left, 2/14/2021).    Social History: Patient  reports that she has never smoked. She has never used smokeless tobacco. She reports that she does not drink alcohol  and does not use drugs.    Family History:  Patient's family history has been reviewed and found to be noncontributory.     Allergies:      Ferrlecit [na ferric gluc cplx in sucrose], Ranitidine hcl, Levofloxacin, and Lisinopril    Home Medications:    Prior to Admission Medications     Prescriptions Last Dose Informant Patient Reported? Taking?    allopurinol (ZYLOPRIM) 100 MG tablet 1/11/2023 Self Yes Yes    Take 1 tablet by mouth daily.    aspirin 81 MG EC tablet 1/11/2023  Yes Yes    Take 81 mg by mouth Daily.    B Complex Vitamins (vitamin b complex) capsule capsule 1/11/2023  Yes Yes    Take  by mouth Daily.    carvedilol (COREG) 25 MG tablet 1/11/2023  Yes Yes    Take 25 mg by mouth 2 (Two) Times a Day With Meals.    docusate sodium (COLACE) 100 MG capsule 1/11/2023  No Yes    Take 1 capsule by mouth 2 (Two) Times a Day As Needed for Constipation.    epoetin ovidio (EPOGEN,PROCRIT) 99981 UNIT/ML injection 1/3/2023 Self Yes Yes    Inject 40,000 Units under the skin into the appropriate area as directed. Every 2 weeks.  Due Wednesday, Feb 17th    irbesartan (AVAPRO) 150 MG tablet 1/11/2023 Self Yes Yes    Take 150 mg by mouth Daily.    budesonide-formoterol (SYMBICORT) 80-4.5 MCG/ACT inhaler Not Taking Self Yes No    Inhale 2 puffs Daily.    Patient not taking:  Reported on 1/12/2023    carboxymethylcellulose (REFRESH PLUS) 0.5 % solution Unknown  Yes No    Administer 1 drop to both eyes 3 (Three) Times a Day As Needed for Dry Eyes.    cholecalciferol (VITAMIN D3) 1000 UNITS tablet Not Taking Self Yes No    Take 1,000 Units by mouth Daily.    Patient not taking:  Reported on 1/12/2023    estradiol (ESTRACE) 0.1 MG/GM vaginal cream Not Taking  No No    Massage 1 gram in vaginal opening twice weekly    Patient not taking:  Reported on 1/12/2023    fluticasone (FLONASE) 50 MCG/ACT nasal spray Not Taking Self Yes No    2 sprays into each nostril Daily As Needed.    Patient not taking:  Reported on 1/12/2023     "lidocaine (LIDODERM) 5 % Not Taking  No No    Place 1 patch on the skin as directed by provider Daily. Remove & Discard patch within 12 hours or as directed by MD    Patient not taking:  Reported on 1/12/2023    omeprazole (PriLOSEC) 40 MG capsule  Self Yes No    Take 40 mg by mouth Daily.    Probiotic Product (PROBIOTIC DAILY PO) Not Taking  Yes No    Take 1 tablet by mouth Daily As Needed.    Patient not taking:  Reported on 1/12/2023    torsemide (DEMADEX) 10 MG tablet   No No    Take 1 tablet by mouth on Thursday and Monday, take as needed otherwise    Patient taking differently:  Take 5 mg by mouth Daily.        ED Medications:    Medications   sodium chloride 0.9 % bolus 500 mL (0 mL Intravenous Stopped 1/12/23 1006)   cefTRIAXone (ROCEPHIN) IVPB 1 g/50ml dextrose (premix) (0 g Intravenous Stopped 1/12/23 1313)     Vital Signs:  Temp:  [98.2 °F (36.8 °C)] 98.2 °F (36.8 °C)  Heart Rate:  [87-92] 92  Resp:  [18] 18  BP: (128-142)/(72-80) 128/72        01/12/23  0836   Weight: 49.9 kg (110 lb)     Body mass index is 22.22 kg/m².    Physical Exam:     Most recent vital Signs: /72   Pulse 92   Temp 98.2 °F (36.8 °C) (Oral)   Resp 18   Ht 149.9 cm (59\")   Wt 49.9 kg (110 lb)   LMP  (LMP Unknown)   SpO2 99%   BMI 22.22 kg/m²     Physical Exam  Constitutional:       General: She is in acute distress.      Appearance: She is not toxic-appearing.   HENT:      Mouth/Throat:      Mouth: Mucous membranes are moist.   Eyes:      Extraocular Movements: Extraocular movements intact.   Cardiovascular:      Rate and Rhythm: Normal rate and regular rhythm.      Pulses: Normal pulses.      Heart sounds: Normal heart sounds.   Pulmonary:      Effort: Pulmonary effort is normal.      Breath sounds: Normal breath sounds.   Abdominal:      Palpations: Abdomen is soft.      Tenderness: There is no abdominal tenderness.   Musculoskeletal:         General: Tenderness present.      Right lower leg: No edema.      Left lower " leg: No edema.      Comments: Left knee, tip/fib, ankle generally tender without bleeding, significant bruising, or swelling.   Skin:     General: Skin is warm.   Neurological:      General: No focal deficit present.      Mental Status: She is alert and oriented to person, place, and time.   Psychiatric:         Mood and Affect: Mood normal.         Thought Content: Thought content normal.         Laboratory data:    I have reviewed the labs done in the emergency room.    Results from last 7 days   Lab Units 01/12/23  0937   SODIUM mmol/L 133*   POTASSIUM mmol/L 5.1   CHLORIDE mmol/L 102   CO2 mmol/L 19.2*   BUN mg/dL 46*   CREATININE mg/dL 3.82*   CALCIUM mg/dL 8.9   BILIRUBIN mg/dL 0.3   ALK PHOS U/L 134*   ALT (SGPT) U/L 13   AST (SGOT) U/L 30   GLUCOSE mg/dL 106*     Results from last 7 days   Lab Units 01/12/23  0937   WBC 10*3/mm3 8.14   HEMOGLOBIN g/dL 9.4*   HEMATOCRIT % 28.7*   PLATELETS 10*3/mm3 203                             Results from last 7 days   Lab Units 01/12/23  0937   COLOR UA  Yellow   GLUCOSE UA  Negative   KETONES UA  Negative   LEUKOCYTES UA  Large (3+)*   PH, URINE  <=5.0   BILIRUBIN UA  Negative   UROBILINOGEN UA  0.2 E.U./dL   RBC UA /HPF 0-2*   WBC UA /HPF 31-50*       Pain Management Panel     Pain Management Panel Latest Ref Rng & Units 2/14/2021 10/15/2019    CREATININE UR mg/dL 81.1 135.4          EKG:      none    Radiology:    CT Abdomen Pelvis Without Contrast    Result Date: 1/12/2023  PROCEDURE: CT ABDOMEN PELVIS WO CONTRAST-  HISTORY: UTI; N30.00-Acute cystitis without hematuria; N18.9-Chronic kidney disease, unspecified  COMPARISON: 11/20/2019 and left hip image of 07/12/2022...  PROCEDURE: Axial images were obtained from the lung bases to the pubic symphysis by computed tomography. This study was performed with techniques to keep radiation doses as low as reasonably achievable, (ALARA). Individualized dose reduction techniques using automated exposure control or adjustment  of mA and/or kV according to the patient size were employed.  FINDINGS:  ABDOMEN: The lung bases are clear. The heart is mildly enlarged with mild pericardial effusion, new from prior exam. The limited non-contrast images of the liver demonstrate a stable, 11 mm, hypodense lesion measuring less than 20 Hounsfield units consistent with a cyst. Liver otherwise appears normal. Gallbladder present with no CT visible stones. The spleen is unremarkable. No adrenal masses are seen. The aorta is normal in caliber. There is no significant free fluid or adenopathy. There is no nephrolithiasis. There is no hydronephrosis. There are bilateral, circumscribed, isodense renal lesions likely cysts and not significant change from the prior exam. The largest measures 86 mm in the craniocaudad dimension and has a rim calcification, stable from prior. Streak artifact from patient's arm position noted. Vascular calcifications noted.  PELVIS: The GI tract demonstrate no obstruction. The appendix is identified and appears normal. The urinary bladder is partially filled and appears unremarkable. Uterus is midline. There is no fluid or adenopathy.  There is streak artifact from fixation hardware in the left proximal femur. As seen on recent radiographs there is extension of the compression screw into the left hip joint with adjacent remodeling of the superior acetabulum; appearance is similar to the prior exam. There is also nonunion of the previous set intertrochanteric fracture. Diffuse osteopenia noted. Best seen on series 3 image 103 and series 4 image 69 are areas of increased attenuation in the fat of the medial inferior left buttock likely representing hematoma from previous fall.Pessary is noted.      New, mild pericardial effusion.  Stable, bilateral, simple appearing renal cystic lesions and hepatic cystic lesion compared to prior exam.  Suspected hematoma formation in the left inferior medial buttock as described.  Stable chronic  changes involving fixation hardware of the proximal left femur compared to 07/12/2022.  This report was signed and finalized on 1/12/2023 12:43 PM by Angela Pablo MD.    CT Lumbar Spine Without Contrast    Result Date: 1/12/2023   PROCEDURE: CT LUMBAR SPINE WO CONTRAST-  HISTORY: fall; N30.00-Acute cystitis without hematuria; N18.9-Chronic kidney disease, unspecified, compare 07/12/2022.  PROCEDURE: Axial images were obtained through the lumbar spine by computed tomography. Reconstruction images were also performed. This study was performed with techniques to keep radiation doses as low as reasonably achievable, (ALARA). Individualized dose reduction techniques using automated exposure control or adjustment of mA and/or kV according to the patient size were employed.  FINDINGS: Again noted is minimal anterolisthesis of L4 and L5 and grade 1 anterolisthesis of L5 on S1; this is stable from the prior exam. The vertebral bodies are of proper height. The facets overlap at all levels. Moderate, diffuse osteopenia noted. Vascular calcifications noted. Small osteophytes noted at all levels.  L1-L2: No significant disc disease is present. There is no stenosis.  L2-L3: No significant disc disease is present. There is no stenosis.  L3-L4: No significant disc disease is present. There is no stenosis.  L4-L5: There is moderate annular bulge causing mild spinal stenosis. There is moderate, bilateral neural foraminal narrowing.  L5-S1: There is moderate annular bulge causing mild spinal stenosis. There is moderate, bilateral neural foraminal narrowing.      No acute abnormality.  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    This report was signed and finalized on 1/12/2023 12:28 PM by Angela Pablo MD.    XR Chest 1 View    Result Date: 1/12/2023  PROCEDURE: XR CHEST 1 VW-  HISTORY: illness, malaise  COMPARISON: 04/08/2022..  FINDINGS: The heart is mildly enlarged, stable. There is mild tortuosity of the  thoracic aorta with aortic mural calcifications.. The mediastinum is unremarkable. Mild interstitial disease bilaterally is stable.. There is no pneumothorax.  There are no acute osseous abnormalities.      Stable chest..    This report was signed and finalized on 1/12/2023 10:03 AM by Angela Pablo MD.      Assessment/Plan:    Inpatient general floor admission 1/12/2023 for UTI requiring IV antibiotics, AJSMEET on CKD, acute on chronic debility and impaired ADLs.    UTI   -Rocephin started 1/12  -Urine culture collected 1/12.    JASMEET on CKD  -Nephrology consultation, patient follows with Dr. Fitzgerald.    Left Leg pain  -XR left knee, tib/fib, ankle    Acute on chronic debility  -PT/OT.    Chronic:  CKD follows with nephrology Dr. Fitzgerald, coronary artery disease follows with Dr. Acuña, arthritis, back pain, hyperparathyroidism, hypertension, anemia, osteoarthritis, rheumatic heart disease, valvular heart disease.    Continue home medications.    Risk Assessment: Moderate  DVT Prophylaxis: Heparin  Code Status: Full code.  Patient says she needs to think about it, would benefit from further discussion of CODE STATUS.  Diet: Renal    Advance Care Planning   ACP discussion was held with the patient during this visit. Patient does not have an advance directive, declines further assistance.          Brian Joseph Kerley, DO  01/12/23  15:55 EST    Dictated utilizing Dragon dictation.    Electronically signed by Kerley, Brian Joseph, DO at 01/12/23 1606         Current Facility-Administered Medications   Medication Dose Route Frequency Provider Last Rate Last Admin   • acetaminophen (TYLENOL) tablet 650 mg  650 mg Oral Q4H PRN Kerley, Brian Joseph, DO   650 mg at 01/13/23 0603    Or   • acetaminophen (TYLENOL) 160 MG/5ML solution 650 mg  650 mg Oral Q4H PRN Kerley, Brian Joseph, DO        Or   • acetaminophen (TYLENOL) suppository 650 mg  650 mg Rectal Q4H PRN Kerley, Brian Joseph, DO       • aspirin EC tablet 81 mg  81 mg Oral  Daily Kerley, Brian Joseph, DO   81 mg at 01/12/23 1812   • carvedilol (COREG) tablet 25 mg  25 mg Oral BID With Meals Kerley, Brian Joseph, DO   25 mg at 01/12/23 1812   • cefTRIAXone (ROCEPHIN) IVPB 1 g/50ml dextrose (premix)  1 g Intravenous Q24H Kerley, Brian Joseph, DO       • heparin (porcine) 5000 UNIT/ML injection 5,000 Units  5,000 Units Subcutaneous Q8H Kerley, Brian Joseph, DO   5,000 Units at 01/13/23 0542   • losartan (COZAAR) tablet 50 mg  50 mg Oral Q24H Kerley, Brian Joseph, DO   50 mg at 01/12/23 1812   • ondansetron (ZOFRAN) injection 4 mg  4 mg Intravenous Q6H PRN Kerley, Brian Joseph, DO       • pantoprazole (PROTONIX) EC tablet 40 mg  40 mg Oral Q AM Kerley, Brian Joseph, DO   40 mg at 01/13/23 0542   • sodium chloride 0.9 % flush 10 mL  10 mL Intravenous Q12H Kerley, Brian Joseph, DO   10 mL at 01/12/23 2119   • sodium chloride 0.9 % flush 10 mL  10 mL Intravenous PRN Kerley, Brian Joseph, DO       • sodium chloride 0.9 % infusion 40 mL  40 mL Intravenous PRN Kerley, Brian Joseph, DO       • torsemide (DEMADEX) tablet 5 mg  5 mg Oral Daily Kerley, Brian Joseph, DO         Physician Progress Notes (last 24 hours)  Notes from 01/12/23 0835 through 01/13/23 0835   No notes of this type exist for this encounter.         Consult Notes (last 24 hours)  Notes from 01/12/23 0835 through 01/13/23 0835   No notes of this type exist for this encounter.         Physical Therapy Notes (last 24 hours)  Notes from 01/12/23 0835 through 01/13/23 0835   No notes exist for this encounter.         Occupational Therapy Notes (last 24 hours)  Notes from 01/12/23 0835 through 01/13/23 0835   No notes exist for this encounter.

## 2023-01-13 NOTE — PLAN OF CARE
Goal Outcome Evaluation:  Plan of Care Reviewed With: patient        Progress: no change  Outcome Evaluation: OT eval completed. Patient supine in bed complaining of L shoulder and L hip pain. Patient moved to EOB with mod A, able to sit EOB 10 mins. Patient requires increased assist for LB self care and toileting d/t mobility limited by pain. Transfer deferred d/t L hip pain, patient was able to scoot up toward head of bed and required mod A to return to supine. Notified RN chart is showing xray of L knee and ankle, but no hip xray and she was going to look into it. Patient is expected to benefit from continued OT services prior to DC and would benefit from STR.

## 2023-01-13 NOTE — CASE MANAGEMENT/SOCIAL WORK
"Discharge Planning Assessment  [facility]     Patient Name: Carolyne Martins  MRN: [id number]  Today's Date: 1/13/2023    Admit Date: 1/12/2023    Plan: pt resting in bed;   Discharge Needs Assessment     Row Name 01/13/23 1109       Living Environment    People in Home alone    Current Living Arrangements home    Primary Care Provided by self;child(jp)    Provides Primary Care For no one, unable/limited ability to care for self    Family Caregiver if Needed child(jp), adult    Quality of Family Relationships supportive    Able to Return to Prior Arrangements no    Living Arrangement Comments stated she is willing and wants to go to rehab facility       Resource/Environmental Concerns    Resource/Environmental Concerns none    Transportation Concerns none       Transition Planning    Patient/Family Anticipates Transition to inpatient rehabilitation facility    Patient/Family Anticipated Services at Transition skilled nursing    Transportation Anticipated family or friend will provide;health plan transportation       Discharge Needs Assessment    Readmission Within the Last 30 Days no previous admission in last 30 days    Equipment Currently Used at Home walker, rolling;wheelchair;shower chair    Concerns to be Addressed discharge planning    Anticipated Changes Related to Illness inability to care for self    Discharge Facility/Level of Care Needs rehabilitation facility    Patient's Choice of Community Agency(s) stated if possible would like to go back to the one in [place] that she was at before for rehab; if not nearest to [place]    Current Discharge Risk physical impairment               Discharge Plan     Row Name 01/13/23 1111       Plan    Plan pt resting in bed;   stated \"i hate it for my son, but he does everything for me\"; emotional support given; uses walker at home alone; stated she is willing to go to a rehab facility to get stronger to go back home; would like to go back to the one she had " "previously in [place], unsure of name; and if not somewhere close to her home in [place];  No lw/poa and no info wanted; cm info card given and explained; pt stated unsure if she can get in a car to go to rehab due to weakness or will need transportation by ems                       Continued Care and Services - Admitted Since 1/12/2023     Destination     Service Provider Request Status Selected Services Address Phone Fax Patient Preferred    [facility] Pending - Request Sent N/A [id number] [facility] [address] [phone] --    [facility] Pending - Request Sent N/A [address] [facility] [id number] [phone] --    [facility] AND REHABILITATION [place] Pending - Request Sent N/A [phone] [facility] [address] [phone] --                 Demographic Summary     Row Name 01/13/23 1106       General Information    Admission Type inpatient    Arrived From emergency department    Referral Source admission list    Reason for Consult discharge planning    Preferred Language English       Contact Information    Permission Granted to Share Info With family/designee  dane Kiran               Functional Status     Row Name 01/13/23 1108       Functional Status    Usual Activity Tolerance fair    Current Activity Tolerance fair       Functional Status, IADL    Medications completely dependent    Meal Preparation completely dependent    Housekeeping completely dependent    Laundry completely dependent    Shopping completely dependent    IADL Comments stated \"i hate it for my son, but he does everything for me\"; emotional support given; uses walker at home       Mental Status    General Appearance WDL WDL       Mental Status Summary    Recent Changes in Mental Status/Cognitive Functioning no changes                    Aranza Venegas RN    "

## 2023-01-13 NOTE — PLAN OF CARE
Goal Outcome Evaluation:  Plan of Care Reviewed With: patient        Progress: no change  Outcome Evaluation: Pt participated in PT eval this date. Pt transferred to the EOB with modA. Pt reported increased hip pain with mobility. notified RN of Pt's increased hip pain. Pt able to scoot to HOB with SBA. pt required modA to return to supine. Pt is expected to benefit from skilled PTx during this inpatient stay.

## 2023-01-13 NOTE — CASE MANAGEMENT/SOCIAL WORK
1445 Cm left vm with Indira/[facility] [employer] requesting call back to discuss referral.    1455 Cm spoke with son, Magno. He states [facility] is preferred facility due to close proximity to his home. If unable to get pt into this facility, [place] facilities would be his next preference. Cm called and left vm with Aileen/[facility] requesting call back to discuss referral.

## 2023-01-13 NOTE — THERAPY EVALUATION
"Patient Name: Carolyne Martins  : 1940    MRN: [id number]                              Today's Date: 2023       Admit Date: 2023    Visit Dx:     ICD-10-CM ICD-9-CM   1. Acute cystitis without hematuria  N30.00 595.0   2. Chronic kidney disease, unspecified CKD stage  N18.9 585.9   3. Debility  R53.81 799.3     Patient Active Problem List   Diagnosis   • Arthralgia of shoulder region   • Tendinopathy of rotator cuff   • Osteoarthritis of right acromioclavicular joint   • Anemia due to stage 4 chronic kidney disease treated with erythropoietin ([facility])   • Anemia   • Gastric polyp   • Gastroesophageal reflux disease without esophagitis   • Melena   • Change in bowel habits   • Colon cancer screening   • Senile osteoporosis   • Personal history of colonic polyps   • Closed fracture of left hip ([facility])   • Impaired mobility and ADLs   • CKD (chronic kidney disease) stage 4, GFR 15-29 ml/min ([facility])   • Simple chronic bronchitis ([facility])   • Essential hypertension   • Lower extremity edema   • Acute cystitis without hematuria   • Debility   • Left leg injury   • JASMEET (acute kidney injury) ([facility])     Past Medical History:   Diagnosis Date   • Anemia    • Anemia    • Arthritis    • Back pain    • Bleeding from anus    • Bronchitis     STATES HAS BRONCHITIS CURRENTLY (17).     • Bronchitis 2017   • CAD (coronary artery disease)    • Cataract, bilateral    • Chronic kidney disease     STAGE 4.  SEES TERA   • Chronic kidney failure     REPORTS STAGE IV   • Difficulty swallowing solids    • Disease of thyroid gland    • Fracture, radius 2016    right distal radius and ulna, healed.   • GERD (gastroesophageal reflux disease)    • Gout    • High cholesterol    • History of blood transfusion    • History of nuclear stress test 2014    DR. CORTEZ.  \"IT WAS OK\"   • Hyperparathyroidism ([facility])    • Hyperparathyroidism ([facility])    • Hypertension    • Impaired functional mobility, balance, gait, and endurance    • " Impaired mobility    • Iron deficiency    • Osteoarthritis    • Osteoarthritis of knees, bilateral     Both knees Supartz injection series August, 2015   • Osteoporosis    • Palpitations    • Personal history of cardiac murmur    • Pessary maintenance    • PONV (postoperative nausea and vomiting)    • Presence of pessary    • Problems with swallowing     WITH FOOD OCCASSIONALLY   • Rheumatic heart disease     Personal history   • Rotator cuff tendonitis     right    • Rupture of right proximal biceps tendon     chronic   • Seasonal allergies    • Sinus problem    • Spinal headache     REPORTS AFTER DELIVERY OF SON   • Subacromial bursitis     right    • Valvular heart disease    • Vision problems    • Vitamin B12 deficiency    • Vitamin D deficiency    • Wears glasses      Past Surgical History:   Procedure Laterality Date   • CATARACT EXTRACTION Bilateral    • CERVICAL POLYPECTOMY     • COLONOSCOPY  2011   • COLONOSCOPY N/A 1/9/2018    Procedure: COLONOSCOPY with endoscopic mucosal resection (hot snare), normal saline submucosal injection, argon thermal ablation, resolution clip placement x4, and cold biopsy polypectomy;  Surgeon: Pieter Concepcion MD;  Location: [facility] ENDOSCOPY;  Service:    • ENDOSCOPY N/A 12/6/2017    Procedure: ESOPHAGOGASTRODUODENOSCOPY with biopsies and hot snare polypectomies;  Surgeon: Pieter Concepcion MD;  Location: [facility] ENDOSCOPY;  Service:    • ENDOSCOPY N/A 1/15/2019    Procedure: ESOPHAGOGASTRODUODENOSCOPY WITH BIOPSIES AND HOT SNARE POLYPECTOMIES X10;  Surgeon: Pieter Concepcion MD;  Location: [facility] ENDOSCOPY;  Service: Gastroenterology   • HIP TROCHANTERIC NAILING WITH INTRAMEDULLARY HIP SCREW Left 2/14/2021    Procedure: HIP TROCHANTER NAIL SHORT WITH INTRAMEDULLARY HIP SCREW, LEFT;  Surgeon: Gabriele Loyd MD;  Location: [facility] OR;  Service: Orthopedics;  Laterality: Left;   • UPPER GASTROINTESTINAL ENDOSCOPY  08/18/2014      General Information     Row Name 01/13/23 1429           Physical Therapy Time and Intention    Document Type evaluation  -MS     Mode of Treatment physical therapy  -MS     Row Name 01/13/23 1429          General Information    Patient Profile Reviewed yes  -MS     Prior Level of Function independent:;transfer;w/c or scooter;all household mobility;gait  -MS     Existing Precautions/Restrictions fall  -MS     Barriers to Rehab medically complex;previous functional deficit  -MS     Row Name 01/13/23 1429          Living Environment    People in Home alone  -MS     Row Name 01/13/23 1429          Home Main Entrance    Number of Stairs, Main Entrance none  -MS     Row Name 01/13/23 1429          Stairs Within Home, Primary    Number of Stairs, Within Home, Primary none  -MS     Row Name 01/13/23 1429          Cognition    Orientation Status (Cognition) oriented x 4  -MS     Row Name 01/13/23 1429          Safety Issues, Functional Mobility    Safety Issues Affecting Function (Mobility) safety precautions follow-through/compliance;safety precaution awareness  -MS     Impairments Affecting Function (Mobility) balance;endurance/activity tolerance;strength;pain;range of motion (ROM)  -MS           User Key  (r) = Recorded By, (t) = Taken By, (c) = Cosigned By    Initials Name Provider Type    MS Prasanth Alvarado, PT Physical Therapist               Mobility     Row Name 01/13/23 1434          Bed Mobility    Bed Mobility supine-sit;sit-supine;scooting/bridging  -MS     Scooting/Bridging [place] (Bed Mobility) contact guard  -MS     Supine-Sit [place] (Bed Mobility) moderate assist (50% patient effort)  -MS     Sit-Supine [place] (Bed Mobility) moderate assist (50% patient effort)  -MS     Assistive Device (Bed Mobility) bed rails;draw sheet;head of bed elevated  -MS     Row Name 01/13/23 1434          Transfers    Comment, (Transfers) NT due to hip pain  -MS     Row Name 01/13/23 1434          Sit-Stand Transfer    Sit-Stand [place] (Transfers) not tested   -MS           User Key  (r) = Recorded By, (t) = Taken By, (c) = Cosigned By    Initials Name Provider Type    MS Prasanth Alvarado, PT Physical Therapist               Obj/Interventions     Row Name 01/13/23 1442          Range of Motion Comprehensive    General Range of Motion bilateral lower extremity ROM WFL  -MS     Row Name 01/13/23 1442          Strength Comprehensive (MMT)    General Manual Muscle Testing (MMT) Assessment lower extremity strength deficits identified  -MS     Comment, General Manual Muscle Testing (MMT) Assessment B LE NT; difficulty moving L LE d/t pain  -MS           User Key  (r) = Recorded By, (t) = Taken By, (c) = Cosigned By    Initials Name Provider Type    MS Prasanth Alvarado, PT Physical Therapist               Goals/Plan     Row Name 01/13/23 1451          Bed Mobility Goal 1 (PT)    Activity/Assistive Device (Bed Mobility Goal 1, PT) bed mobility activities, all  -MS     [place] Level/Cues Needed (Bed Mobility Goal 1, PT) modified independence  -MS     Time Frame (Bed Mobility Goal 1, PT) long term goal (LTG);2 weeks  -MS     Row Name 01/13/23 1451          Transfer Goal 1 (PT)    Activity/Assistive Device (Transfer Goal 1, PT) transfers, all;sit-to-stand/stand-to-sit  -MS     [place] Level/Cues Needed (Transfer Goal 1, PT) standby assist  -MS     Time Frame (Transfer Goal 1, PT) long term goal (LTG);2 weeks  -MS     Row Name 01/13/23 1451          Gait Training Goal 1 (PT)    Activity/Assistive Device (Gait Training Goal 1, PT) gait (walking locomotion);assistive device use  -MS     [place] Level (Gait Training Goal 1, PT) standby assist  -MS     Distance (Gait Training Goal 1, PT) 50 ft  -MS     Time Frame (Gait Training Goal 1, PT) long term goal (LTG);2 weeks  -MS     Row Name 01/13/23 1451          Patient Education Goal (PT)    Activity (Patient Education Goal, PT) ther exer x 15 reps ea B LE  -MS     [place]/Cues/Accuracy (Memory Goal 2, PT) demonstrates  adequately  -MS     Time Frame (Patient Education Goal, PT) long term goal (LTG);2 weeks  -MS     Row Name 01/13/23 1458          Therapy Assessment/Plan (PT)    Planned Therapy Interventions (PT) bed mobility training;balance training;gait training;ROM (range of motion);transfer training;stair training;strengthening;patient/family education;home exercise program  -MS           User Key  (r) = Recorded By, (t) = Taken By, (c) = Cosigned By    Initials Name Provider Type    MS Prasanth Alvarado, PT Physical Therapist               Clinical Impression     Row Name 01/13/23 1443          Pain    Pretreatment Pain Rating 7/10  -MS     Posttreatment Pain Rating 7/10  -MS     Pain Location - Side/Orientation Left  -MS     Pain Location - hip;shoulder  -MS     Pain Intervention(s) Ambulation/increased activity;Repositioned  -MS     Row Name 01/13/23 1448          Plan of Care Review    Plan of Care Reviewed With patient  -MS     Progress no change  -MS     Outcome Evaluation Pt participated in PT eval this date. Pt transferred to the EOB with modA. Pt reported increased hip pain with mobility. notified RN of Pt's increased hip pain. Pt able to scoot to HOB with SBA. pt required modA to return to supine. Pt is expected to benefit from skilled PTx during this inpatient stay.  -MS     Row Name 01/13/23 1443          Therapy Assessment/Plan (PT)    Rehab Potential (PT) good, to achieve stated therapy goals  -MS     Criteria for Skilled Interventions Met (PT) yes;meets criteria;skilled treatment is necessary  -MS     Therapy Frequency (PT) 5 times/wk  -MS     Row Name 01/13/23 1441          Vital Signs    O2 Delivery Pre Treatment room air  -MS     O2 Delivery Intra Treatment room air  -MS     O2 Delivery Post Treatment room air  -MS     Pre Patient Position Supine  -MS     Intra Patient Position Sitting  -MS     Post Patient Position Supine  -MS     Row Name 01/13/23 1447          Positioning and Restraints    Pre-Treatment  Position in bed  -MS     Post Treatment Position bed  -MS     In Bed supine;call light within reach;encouraged to call for assist;exit alarm on;notified nsg  -MS           User Key  (r) = Recorded By, (t) = Taken By, (c) = Cosigned By    Initials Name Provider Type    Prasanth Christy, OSCAR Physical Therapist               Outcome Measures     Row Name 01/13/23 1454          How much help from another person do you currently need...    Turning from your back to your side while in flat bed without using bedrails? 3  -MS     Moving from lying on back to sitting on the side of a flat bed without bedrails? 3  -MS     Moving to and from a bed to a chair (including a wheelchair)? 2  -MS     Standing up from a chair using your arms (e.g., wheelchair, bedside chair)? 2  -MS     Climbing 3-5 steps with a railing? 1  -MS     To walk in hospital room? 1  -MS     AM-PAC 6 Clicks Score (PT) 12  -MS     Highest level of mobility 4 --> Transferred to chair/commode  -MS     Row Name 01/13/23 1425          Functional Assessment    Outcome Measure Options AM-PAC 6 Clicks Daily Activity (OT)  -SD           User Key  (r) = Recorded By, (t) = Taken By, (c) = Cosigned By    Initials Name Provider Type    Ирина Perkins OT Occupational Therapist    Prasanth Christy, PT Physical Therapist                             Physical Therapy Education     Title: PT OT SLP Therapies (In Progress)     Topic: Physical Therapy (In Progress)     Point: Mobility training (Done)     Learning Progress Summary           Patient Acceptance, E, VU by MS at 1/13/2023 1455    Comment: importance of mobility                   Point: Home exercise program (Done)     Learning Progress Summary           Patient Acceptance, E, VU by MS at 1/13/2023 1455    Comment: importance of mobility                   Point: Body mechanics (Not Started)     Learner Progress:  Not documented in this visit.          Point: Precautions (Not Started)     Learner Progress:   Not documented in this visit.                      User Key     Initials Effective Dates Name Provider Type Discipline    MS 07/01/22 -  Prasanth Alvarado PT Physical Therapist PT              PT Recommendation and Plan  Planned Therapy Interventions (PT): bed mobility training, balance training, gait training, ROM (range of motion), transfer training, stair training, strengthening, patient/family education, home exercise program  Plan of Care Reviewed With: patient  Progress: no change  Outcome Evaluation: Pt participated in PT eval this date. Pt transferred to the EOB with modA. Pt reported increased hip pain with mobility. notified RN of Pt's increased hip pain. Pt able to scoot to HOB with SBA. pt required modA to return to supine. Pt is expected to benefit from skilled PTx during this inpatient stay.     Time Calculation:    PT Charges     Row Name 01/13/23 1455             Time Calculation    Start Time 1310  -MS      PT Received On 01/13/23  -MS      PT Goal Re-Cert Due Date 01/23/23  -MS         Untimed Charges    PT Eval/Re-eval Minutes 45  -MS         Total Minutes    Untimed Charges Total Minutes 45  -MS       Total Minutes 45  -MS            User Key  (r) = Recorded By, (t) = Taken By, (c) = Cosigned By    Initials Name Provider Type    MS Prasanth Alvarado PT Physical Therapist              Therapy Charges for Today     Code Description Service Date Service Provider Modifiers Qty    64154680176 HC PT EVAL LOW COMPLEXITY 3 1/13/2023 Prasanth Alvarado PT GP 1          PT G-Codes  Outcome Measure Options: AM-PAC 6 Clicks Daily Activity (OT)  AM-PAC 6 Clicks Score (PT): 12  AM-PAC 6 Clicks Score (OT): 16  PT Discharge Summary  Anticipated Discharge Disposition (PT): inpatient rehabilitation facility, skilled nursing facility    Prasanth Alvarado PT  1/13/2023

## 2023-01-13 NOTE — CONSULTS
"Adult Nutrition  Assessment/PES    Patient Name:  Carolyne Martins  YOB: 1940  MRN: [id number]  Admit Date:  1/12/2023    Assessment Date:  1/13/2023    Comments:    1. Continue current diet order as medically appropriate.   2. Encourage PO intake to meet 50% of estimated needs.   3. Will order Boost Plus BID.   4. Encourage intake of supplement ordered.     RD to follow-up and available PRN.      Reason for Assessment     Row Name 01/13/23 0744          Reason for Assessment    Reason For Assessment nurse/nurse practitioner consult;identified at risk by screening criteria;per organizational policy     Diagnosis renal disease;cardiac disease     Identified At Risk by Screening Criteria large or nonhealing wound, burn or pressure injury;MST SCORE 2+                  Labs/Tests/Procedures/Meds     Row Name 01/13/23 0744          Labs/Procedures/Meds    Lab Results Reviewed reviewed, pertinent     Lab Results Comments High: BUN and Cr        Medications    Pertinent Medications Reviewed reviewed, pertinent     Pertinent Medications Comments protonix, losartan                Physical Findings     Row Name 01/13/23 0745          Physical Findings    Overall Physical Appearance underwt for age, L perineum wound, L anterior plantar wound                Estimated/Assessed Needs - Anthropometrics     Row Name 01/13/23 0745 01/13/23 0458       Anthropometrics    Weight -- 48.6 kg (107 lb 2.3 oz)    Age for Calculations 82 --    Height for Calculation 1.499 m (4' 11\") --    Weight for Calculation 48.5 kg (107 lb) --       Estimated/Assessed Needs    Additional Documentation Estimated Calorie Needs (Group);KCAL/KG (Group);Protein Requirements (Group);Fluid Requirements (Group) --       Estimated Calorie Needs    Estimated Calorie Requirement (kcal/day) 1456 --       KCAL/KG    KCAL/KG 25 Kcal/Kg (kcal);30 Kcal/Kg (kcal) --    25 Kcal/Kg (kcal) 1213.375 --    30 Kcal/Kg (kcal) 1456.05 --       Protein " Requirements    Weight Used For Protein Calculations 48.5 kg (107 lb) --    Est Protein Requirement Amount (gms/kg) 1.0 gm protein --    Estimated Protein Requirements (gms/day) 48.54 --       Fluid Requirements    Fluid Requirements (mL/day) 1456  1mL/kcal --    RDA Method (mL) 1456 --               Nutrition Prescription Ordered     Row Name 01/13/23 0746          Nutrition Prescription PO    Current PO Diet Regular     Fluid Consistency Thin     Common Modifiers Renal;Low Sodium;Low Potassium                Evaluation of Received Nutrient/Fluid Intake     Row Name 01/13/23 0746          Intake Assessment    Energy/Calorie Requirement Assessment not meeting needs     Protein Requirement Assessment not meeting needs        PO Evaluation    Number of Days PO Intake Evaluated Insufficient Data                   Problem/Interventions:   Problem 1     Row Name 01/13/23 0747          Nutrition Diagnoses Problem 1    Problem 1 Increased Nutrient Needs     Macronutrient Kcal     Etiology (related to) Medical Diagnosis     Renal CKD     Signs/Symptoms (evidenced by) Other (comment)  need for oral nutrition supplement to meet increased estimated needs                      Intervention Goal     Row Name 01/13/23 0747          Intervention Goal    General Maintain nutrition;Improved nutrition related lab(s);Reduce/improve symptoms;Meet nutritional needs for age/condition;Disease management/therapy     PO Establish PO;Tolerate PO;Increase intake;Continue positive trend;Maintain intake;Meet estimated needs     Weight Maintain weight                Nutrition Intervention     Row Name 01/13/23 0747          Nutrition Intervention    RD/Tech Action Follow Tx progress;Care plan reviewd;Await begin PO;Encourage intake;Recommend/ordered     Recommended/Ordered Supplement                Nutrition Prescription     Row Name 01/13/23 0747          Nutrition Prescription PO    PO Prescription Begin/change supplement     Supplement Boost  Glucose Control     Supplement Frequency 2 times a day     New PO Prescription Ordered? Yes        Other Orders    Obtain Weight Daily     Obtain Weight Ordered? No, recommended     Supplement Vitamin mineral supplement     Supplement Ordered? No, recommended     Labs Mg++;Na+;K+     Labs Ordered? No, recommended                Education/Evaluation     Row Name 01/13/23 0748          Education    Education Education not appropriate at this time        Monitor/Evaluation    Monitor Per protocol;PO intake;Supplement intake;Pertinent labs;Weight;Skin status;Symptoms                 Electronically signed by:  Emily Mcafdden RD  01/13/23 07:48 EST

## 2023-01-14 LAB
ALBUMIN SERPL-MCNC: 2.7 G/DL (ref 3.5–5.2)
ANION GAP SERPL CALCULATED.3IONS-SCNC: 7.4 MMOL/L (ref 5–15)
ANISOCYTOSIS BLD QL: NORMAL
BASOPHILS # BLD AUTO: 0.01 10*3/MM3 (ref 0–0.2)
BASOPHILS NFR BLD AUTO: 0.2 % (ref 0–1.5)
BUN SERPL-MCNC: 46 MG/DL (ref 8–23)
BUN/CREAT SERPL: 15 (ref 7–25)
CALCIUM SPEC-SCNC: 9 MG/DL (ref 8.6–10.5)
CHLORIDE SERPL-SCNC: 107 MMOL/L (ref 98–107)
CO2 SERPL-SCNC: 20.6 MMOL/L (ref 22–29)
CREAT SERPL-MCNC: 3.07 MG/DL (ref 0.57–1)
DEPRECATED RDW RBC AUTO: 58.5 FL (ref 37–54)
EGFRCR SERPLBLD CKD-EPI 2021: 14.9 ML/MIN/1.73
EOSINOPHIL # BLD AUTO: 0.33 10*3/MM3 (ref 0–0.4)
EOSINOPHIL NFR BLD AUTO: 7.8 % (ref 0.3–6.2)
ERYTHROCYTE [DISTWIDTH] IN BLOOD BY AUTOMATED COUNT: 15.3 % (ref 12.3–15.4)
GLUCOSE SERPL-MCNC: 98 MG/DL (ref 65–99)
HCT VFR BLD AUTO: 26.5 % (ref 34–46.6)
HGB BLD-MCNC: 8.3 G/DL (ref 12–15.9)
IMM GRANULOCYTES # BLD AUTO: 0.02 10*3/MM3 (ref 0–0.05)
IMM GRANULOCYTES NFR BLD AUTO: 0.5 % (ref 0–0.5)
LYMPHOCYTES # BLD AUTO: 1.18 10*3/MM3 (ref 0.7–3.1)
LYMPHOCYTES NFR BLD AUTO: 28 % (ref 19.6–45.3)
MCH RBC QN AUTO: 33.3 PG (ref 26.6–33)
MCHC RBC AUTO-ENTMCNC: 31.3 G/DL (ref 31.5–35.7)
MCV RBC AUTO: 106.4 FL (ref 79–97)
MONOCYTES # BLD AUTO: 0.52 10*3/MM3 (ref 0.1–0.9)
MONOCYTES NFR BLD AUTO: 12.3 % (ref 5–12)
NEUTROPHILS NFR BLD AUTO: 2.16 10*3/MM3 (ref 1.7–7)
NEUTROPHILS NFR BLD AUTO: 51.2 % (ref 42.7–76)
NRBC BLD AUTO-RTO: 0 /100 WBC (ref 0–0.2)
PHOSPHATE SERPL-MCNC: 3.7 MG/DL (ref 2.5–4.5)
PHOSPHATE SERPL-MCNC: 3.7 MG/DL (ref 2.5–4.5)
PLAT MORPH BLD: NORMAL
PLATELET # BLD AUTO: 194 10*3/MM3 (ref 140–450)
PMV BLD AUTO: 9.3 FL (ref 6–12)
POTASSIUM SERPL-SCNC: 4.3 MMOL/L (ref 3.5–5.2)
PTH-INTACT SERPL-MCNC: 78 PG/ML (ref 15–65)
RBC # BLD AUTO: 2.49 10*6/MM3 (ref 3.77–5.28)
SODIUM SERPL-SCNC: 135 MMOL/L (ref 136–145)
WBC MORPH BLD: NORMAL
WBC NRBC COR # BLD: 4.22 10*3/MM3 (ref 3.4–10.8)

## 2023-01-14 PROCEDURE — 85007 BL SMEAR W/DIFF WBC COUNT: CPT | Performed by: STUDENT IN AN ORGANIZED HEALTH CARE EDUCATION/TRAINING PROGRAM

## 2023-01-14 PROCEDURE — 99232 SBSQ HOSP IP/OBS MODERATE 35: CPT | Performed by: STUDENT IN AN ORGANIZED HEALTH CARE EDUCATION/TRAINING PROGRAM

## 2023-01-14 PROCEDURE — 25010000002 CEFTRIAXONE SODIUM-DEXTROSE 1-3.74 GM-%(50ML) RECONSTITUTED SOLUTION: Performed by: STUDENT IN AN ORGANIZED HEALTH CARE EDUCATION/TRAINING PROGRAM

## 2023-01-14 PROCEDURE — 84100 ASSAY OF PHOSPHORUS: CPT | Performed by: STUDENT IN AN ORGANIZED HEALTH CARE EDUCATION/TRAINING PROGRAM

## 2023-01-14 PROCEDURE — 80069 RENAL FUNCTION PANEL: CPT | Performed by: INTERNAL MEDICINE

## 2023-01-14 PROCEDURE — 85025 COMPLETE CBC W/AUTO DIFF WBC: CPT | Performed by: STUDENT IN AN ORGANIZED HEALTH CARE EDUCATION/TRAINING PROGRAM

## 2023-01-14 PROCEDURE — 25010000002 HEPARIN (PORCINE) PER 1000 UNITS: Performed by: STUDENT IN AN ORGANIZED HEALTH CARE EDUCATION/TRAINING PROGRAM

## 2023-01-14 PROCEDURE — 83970 ASSAY OF PARATHORMONE: CPT | Performed by: STUDENT IN AN ORGANIZED HEALTH CARE EDUCATION/TRAINING PROGRAM

## 2023-01-14 RX ORDER — AMOXICILLIN 250 MG
2 CAPSULE ORAL 2 TIMES DAILY
Status: DISCONTINUED | OUTPATIENT
Start: 2023-01-14 | End: 2023-01-18 | Stop reason: HOSPADM

## 2023-01-14 RX ORDER — BISACODYL 10 MG
10 SUPPOSITORY, RECTAL RECTAL DAILY PRN
Status: DISCONTINUED | OUTPATIENT
Start: 2023-01-14 | End: 2023-01-18 | Stop reason: HOSPADM

## 2023-01-14 RX ORDER — BISACODYL 5 MG/1
5 TABLET, DELAYED RELEASE ORAL DAILY PRN
Status: DISCONTINUED | OUTPATIENT
Start: 2023-01-14 | End: 2023-01-18 | Stop reason: HOSPADM

## 2023-01-14 RX ORDER — POLYETHYLENE GLYCOL 3350 17 G/17G
17 POWDER, FOR SOLUTION ORAL DAILY PRN
Status: DISCONTINUED | OUTPATIENT
Start: 2023-01-14 | End: 2023-01-18 | Stop reason: HOSPADM

## 2023-01-14 RX ADMIN — CEFTRIAXONE 1 G: 1 INJECTION, SOLUTION INTRAVENOUS at 11:49

## 2023-01-14 RX ADMIN — SENNOSIDES AND DOCUSATE SODIUM 2 TABLET: 50; 8.6 TABLET ORAL at 21:38

## 2023-01-14 RX ADMIN — DEXTROSE AND SODIUM CHLORIDE 50 ML/HR: 5; 900 INJECTION, SOLUTION INTRAVENOUS at 10:42

## 2023-01-14 RX ADMIN — Medication 10 ML: at 09:03

## 2023-01-14 RX ADMIN — Medication 10 ML: at 21:38

## 2023-01-14 RX ADMIN — HEPARIN SODIUM 5000 UNITS: 5000 INJECTION INTRAVENOUS; SUBCUTANEOUS at 06:29

## 2023-01-14 RX ADMIN — PANTOPRAZOLE SODIUM 40 MG: 40 TABLET, DELAYED RELEASE ORAL at 06:29

## 2023-01-14 RX ADMIN — ASPIRIN 81 MG: 81 TABLET, COATED ORAL at 09:02

## 2023-01-14 RX ADMIN — CARVEDILOL 25 MG: 25 TABLET, FILM COATED ORAL at 17:01

## 2023-01-14 RX ADMIN — HEPARIN SODIUM 5000 UNITS: 5000 INJECTION INTRAVENOUS; SUBCUTANEOUS at 13:38

## 2023-01-14 RX ADMIN — HEPARIN SODIUM 5000 UNITS: 5000 INJECTION INTRAVENOUS; SUBCUTANEOUS at 21:38

## 2023-01-14 RX ADMIN — CARVEDILOL 25 MG: 25 TABLET, FILM COATED ORAL at 09:02

## 2023-01-14 RX ADMIN — LOSARTAN POTASSIUM 50 MG: 50 TABLET, FILM COATED ORAL at 09:02

## 2023-01-14 NOTE — PROGRESS NOTES
[facility]    Name:  Carolyne Martins   Age:  82 y.o.  Sex:  female  :  1940  MRN:  [id number]   Visit Number:  [id number]  Admission Date:  2023  Date Of Service:  23  Primary Care Physician:  Jerald Dawkins MD     LOS: 2 days :    Chief Complaint:      Follow-up; generalized weakness, left leg pain    Subjective:    Still generally painful over.    Hospital Course:    Patient is an 82-year-old woman with past medical history of CKD follows with nephrology Dr. Fitzgerald, coronary artery disease follows with Dr. Acuña, arthritis, back pain, hyperparathyroidism, hypertension, anemia, osteoarthritis, rheumatic heart disease, valvular heart disease.  Presented to [facility] ED on 2023 feeling generally weak progressively worse over a few days.  Son at bedside said its been more difficult for her to transfer in and out of the wheelchair, she is had some falls recently.  She has some pain in her left leg.  She denied any shortness of air, chest pain, nausea, vomiting, abdominal pain, cough, dysuria.  Patient lives alone, son lives across the street and helps with some things but he works at least 40 hours a week is away from home a lot.     ED summary: Afebrile, vital signs stable on room air.  Blood glucose 106, sodium 133, creatinine 3.82 baseline may be around 3.3-3.5, chronic anemia hemoglobin 9.4, pattern of infection on urinalysis, urine culture collected, COVID/flu negative CXR unremarkable CT abdomen/pelvis new, mild pericardial effusion, small bilateral renal cystic lesions and hepatic cystic lesion compared to prior exam, suspected hematoma formation left inferior medial buttock.  CT lumbar spine no acute abnormality, multilevel lumbar degenerative disc disease present.  She was provided Rocephin and is normal saline 500 mL bolus.  ED personnel did attempt to discharge, but patient not able to take care of herself at home with current resources.   Social work has recommended at least social admit for short-term rehab placement.    Review of Systems:     All systems were reviewed and negative except as mentioned in subjective, assessment and plan.    Vital Signs:    Temp:  [97.7 °F (36.5 °C)-98.5 °F (36.9 °C)] 98.5 °F (36.9 °C)  Heart Rate:  [78-99] 99  Resp:  [16-18] 18  BP: (116-162)/(67-88) 141/76    Intake and output:    I/O last 3 completed shifts:  In: 2071 [P.O.:1200; I.V.:871]  Out: 1400 [Urine:1400]  I/O this shift:  In: 240 [P.O.:240]  Out: 150 [Urine:150]    Physical Examination:    General Appearance:  Alert and cooperative.    Head:  Atraumatic and normocephalic.   Eyes: Conjunctivae and sclerae normal, no icterus. No pallor.   Throat: No oral lesions, no thrush, oral mucosa moist.   Neck: Supple, trachea midline, no thyromegaly.   Lungs:   Breath sounds heard bilaterally equally.  No wheezing or crackles. No Pleural rub or bronchial breathing.   Heart:  Normal S1 and S2, no murmur, no gallop, no rub. No JVD.   Abdomen:   Normal bowel sounds, no masses, no organomegaly. Soft, nontender, nondistended, no rebound tenderness.   Extremities:  Some mild tenderness left knee and distal.   Skin:  Warm   Neurologic: Alert and oriented x 3. No facial asymmetry. Moves all four limbs. No tremors.      Laboratory results:    Results from last 7 days   Lab Units 01/14/23  0733 01/13/23  0555 01/12/23  0937   SODIUM mmol/L 135* 136 133*   POTASSIUM mmol/L 4.3 4.3 5.1   CHLORIDE mmol/L 107 105 102   CO2 mmol/L 20.6* 19.9* 19.2*   BUN mg/dL 46* 48* 46*   CREATININE mg/dL 3.07* 3.74* 3.82*   CALCIUM mg/dL 9.0 9.0 8.9   BILIRUBIN mg/dL  --   --  0.3   ALK PHOS U/L  --   --  134*   ALT (SGPT) U/L  --   --  13   AST (SGOT) U/L  --   --  30   GLUCOSE mg/dL 98 83 106*     Results from last 7 days   Lab Units 01/14/23  0733 01/13/23  0555 01/12/23  0937   WBC 10*3/mm3 4.22 4.45 8.14   HEMOGLOBIN g/dL 8.3* 8.0* 9.4*   HEMATOCRIT % 26.5* 24.8* 28.7*   PLATELETS 10*3/mm3  194 209 203             Results from last 7 days   Lab Units 01/12/23  1616 01/12/23  1613 01/12/23  0937   BLOODCX  No growth at 24 hours No growth at 24 hours  --    URINECX   --   --  25,000 CFU/mL Mixed Leena Isolated     No results for input(s): PHART, DZA9ZDW, PO2ART, JDH0RAF, BASEEXCESS in the last 8760 hours.   I have reviewed the patient's laboratory results.    Radiology results:    CT Abdomen Pelvis Without Contrast    Result Date: 1/12/2023  PROCEDURE: CT ABDOMEN PELVIS WO CONTRAST-  HISTORY: UTI; N30.00-Acute cystitis without hematuria; N18.9-Chronic kidney disease, unspecified  COMPARISON: 11/20/2019 and left hip image of 07/12/2022...  PROCEDURE: Axial images were obtained from the lung bases to the pubic symphysis by computed tomography. This study was performed with techniques to keep radiation doses as low as reasonably achievable, (ALARA). Individualized dose reduction techniques using automated exposure control or adjustment of mA and/or kV according to the patient size were employed.  FINDINGS:  ABDOMEN: The lung bases are clear. The heart is mildly enlarged with mild pericardial effusion, new from prior exam. The limited non-contrast images of the liver demonstrate a stable, 11 mm, hypodense lesion measuring less than 20 Hounsfield units consistent with a cyst. Liver otherwise appears normal. Gallbladder present with no CT visible stones. The spleen is unremarkable. No adrenal masses are seen. The aorta is normal in caliber. There is no significant free fluid or adenopathy. There is no nephrolithiasis. There is no hydronephrosis. There are bilateral, circumscribed, isodense renal lesions likely cysts and not significant change from the prior exam. The largest measures 86 mm in the craniocaudad dimension and has a rim calcification, stable from prior. Streak artifact from patient's arm position noted. Vascular calcifications noted.  PELVIS: The GI tract demonstrate no obstruction. The appendix  is identified and appears normal. The urinary bladder is partially filled and appears unremarkable. Uterus is midline. There is no fluid or adenopathy.  There is streak artifact from fixation hardware in the left proximal femur. As seen on recent radiographs there is extension of the compression screw into the left hip joint with adjacent remodeling of the superior acetabulum; appearance is similar to the prior exam. There is also nonunion of the previous set intertrochanteric fracture. Diffuse osteopenia noted. Best seen on series 3 image 103 and series 4 image 69 are areas of increased attenuation in the fat of the medial inferior left buttock likely representing hematoma from previous fall.Pessary is noted.      Impression: New, mild pericardial effusion.  Stable, bilateral, simple appearing renal cystic lesions and hepatic cystic lesion compared to prior exam.  Suspected hematoma formation in the left inferior medial buttock as described.  Stable chronic changes involving fixation hardware of the proximal left femur compared to 07/12/2022.  This report was signed and finalized on 1/12/2023 12:43 PM by Angela Pablo MD.    XR Knee 3 View Left    Result Date: 1/13/2023  PROCEDURE: XR KNEE 3 VW LEFT  History: Chronic left knee pain; N30.00-Acute cystitis without hematuria; N18.9-Chronic kidney disease, unspecified; R53.81-Other malaise  COMPARISON: None.  FINDINGS:  A 3 view exam demonstrates no acute fracture or dislocation. There is diffuse osteopenia. There is narrowing of all three joint compartments. There is no joint effusion. No soft tissue abnormality is seen.      Impression: No acute fracture. Degenerative changes.     PROCEDURE:  XR TIBIA FIBULA 2 VW LEFT-  HISTORY: Left tibia pain  COMPARISON: None.  FINDINGS:  A 2 view exam demonstrates no acute fracture or dislocation. There is diffuse osteopenia. The joint spaces are preserved.  No soft tissue abnormality is seen.  IMPRESSION: No acute fracture.     PROCEDURE:  XR ANKLE 3+ VW LEFT  History: Left ankle pain  COMPARISON: None.  FINDINGS:  A 3 view exam demonstrates no acute fracture or dislocation. There is a remote medial malleolar fracture with nonunion. There is osteopenia. The joint spaces are preserved. No soft tissue abnormality is seen. There is no joint effusion.  IMPRESSION: No acute fracture.       Images were reviewed, interpreted, and dictated by Dr. Anegla Pablo MD Transcribed by Gayatri Forrest PA-C.  This report was signed and finalized on 1/13/2023 11:32 AM by Angela Pablo MD.    XR Tibia Fibula 2 View Left    Result Date: 1/13/2023  PROCEDURE: XR KNEE 3 VW LEFT  History: Chronic left knee pain; N30.00-Acute cystitis without hematuria; N18.9-Chronic kidney disease, unspecified; R53.81-Other malaise  COMPARISON: None.  FINDINGS:  A 3 view exam demonstrates no acute fracture or dislocation. There is diffuse osteopenia. There is narrowing of all three joint compartments. There is no joint effusion. No soft tissue abnormality is seen.      Impression: No acute fracture. Degenerative changes.     PROCEDURE:  XR TIBIA FIBULA 2 VW LEFT-  HISTORY: Left tibia pain  COMPARISON: None.  FINDINGS:  A 2 view exam demonstrates no acute fracture or dislocation. There is diffuse osteopenia. The joint spaces are preserved.  No soft tissue abnormality is seen.  IMPRESSION: No acute fracture.    PROCEDURE:  XR ANKLE 3+ VW LEFT  History: Left ankle pain  COMPARISON: None.  FINDINGS:  A 3 view exam demonstrates no acute fracture or dislocation. There is a remote medial malleolar fracture with nonunion. There is osteopenia. The joint spaces are preserved. No soft tissue abnormality is seen. There is no joint effusion.  IMPRESSION: No acute fracture.       Images were reviewed, interpreted, and dictated by Dr. Angela Pablo MD Transcribed by Gayatri Forrest PA-C.  This report was signed and finalized on 1/13/2023 11:32 AM by Angela Pablo MD.    XR Ankle 3+ View  Left    Result Date: 1/13/2023  PROCEDURE: XR KNEE 3 VW LEFT  History: Chronic left knee pain; N30.00-Acute cystitis without hematuria; N18.9-Chronic kidney disease, unspecified; R53.81-Other malaise  COMPARISON: None.  FINDINGS:  A 3 view exam demonstrates no acute fracture or dislocation. There is diffuse osteopenia. There is narrowing of all three joint compartments. There is no joint effusion. No soft tissue abnormality is seen.      Impression: No acute fracture. Degenerative changes.     PROCEDURE:  XR TIBIA FIBULA 2 VW LEFT-  HISTORY: Left tibia pain  COMPARISON: None.  FINDINGS:  A 2 view exam demonstrates no acute fracture or dislocation. There is diffuse osteopenia. The joint spaces are preserved.  No soft tissue abnormality is seen.  IMPRESSION: No acute fracture.    PROCEDURE:  XR ANKLE 3+ VW LEFT  History: Left ankle pain  COMPARISON: None.  FINDINGS:  A 3 view exam demonstrates no acute fracture or dislocation. There is a remote medial malleolar fracture with nonunion. There is osteopenia. The joint spaces are preserved. No soft tissue abnormality is seen. There is no joint effusion.  IMPRESSION: No acute fracture.       Images were reviewed, interpreted, and dictated by Dr. Angela Pablo MD Transcribed by Gayatri Forrest PA-C.  This report was signed and finalized on 1/13/2023 11:32 AM by Angela Pabol MD.    CT Lumbar Spine Without Contrast    Result Date: 1/12/2023   PROCEDURE: CT LUMBAR SPINE WO CONTRAST-  HISTORY: fall; N30.00-Acute cystitis without hematuria; N18.9-Chronic kidney disease, unspecified, compare 07/12/2022.  PROCEDURE: Axial images were obtained through the lumbar spine by computed tomography. Reconstruction images were also performed. This study was performed with techniques to keep radiation doses as low as reasonably achievable, (ALARA). Individualized dose reduction techniques using automated exposure control or adjustment of mA and/or kV according to the patient size were  employed.  FINDINGS: Again noted is minimal anterolisthesis of L4 and L5 and grade 1 anterolisthesis of L5 on S1; this is stable from the prior exam. The vertebral bodies are of proper height. The facets overlap at all levels. Moderate, diffuse osteopenia noted. Vascular calcifications noted. Small osteophytes noted at all levels.  L1-L2: No significant disc disease is present. There is no stenosis.  L2-L3: No significant disc disease is present. There is no stenosis.  L3-L4: No significant disc disease is present. There is no stenosis.  L4-L5: There is moderate annular bulge causing mild spinal stenosis. There is moderate, bilateral neural foraminal narrowing.  L5-S1: There is moderate annular bulge causing mild spinal stenosis. There is moderate, bilateral neural foraminal narrowing.      Impression: No acute abnormality.  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    This report was signed and finalized on 1/12/2023 12:28 PM by Angela Pablo MD.    XR Hip With or Without Pelvis 2 - 3 View Left    Result Date: 1/13/2023  PROCEDURE: XR HIP W OR WO PELVIS 2-3 VIEW LEFT-  HISTORY: pain hip; N30.00-Acute cystitis without hematuria; N18.9-Chronic kidney disease, unspecified; R53.81-Other malaise  COMPARISON: July 2022.  FINDINGS:  A three view exam demonstrates no acute fracture or dislocation.  Patient is status post ORIF of a prior left hip fracture. Again noted is a compression screw extending into the acetabulum with bony remodeling. There is lucency around the fixation screw consistent with loosening. There is osteopenia. No acute soft tissue abnormality is seen.      Impression: No acute fracture.  Stable chronic changes as described.     Images were reviewed, interpreted, and dictated by Dr. Angela Pablo MD Transcribed by Gayatri Forrest PA-C.  This report was signed and finalized on 1/13/2023 2:52 PM by Angela Pablo MD.    I have reviewed the patient's radiology reports.    Medication Review:      I have reviewed the patient's active and prn medications.     Problem List:      Acute cystitis without hematuria    Debility    Left leg injury    JASMEET (acute kidney injury) ([facility])      Assessment/Plan:    Inpatient general floor admission 1/12/2023 for UTI requiring IV antibiotics, JASMEET on CKD, acute on chronic debility and impaired ADLs.    1/13  -Afebrile, vital signs stable on room air.  Creatinine slightly improved to 3.74.  Hemoglobin 8.0.  1/14  -Afebrile, vital signs stable on room air.  Creatinine improved to 3.07, GFR 14.  Hemoglobin 8.3.     UTI   -Rocephin started 1/12  -Urine culture collected 1/12, mixed campos.  -Blood culture collected 1/12, no growth 24 hours.     JASMEET on CKD  -Nephrology consultation, patient follows with Dr. Fitzgerald.     Mechanical fall  Left Leg pain  Hematoma, left inferior medial buttock  -XR left knee, tib/fib, ankle, left hip; no acute fracture.    Inflammatory arthritis, chronic  -IV Solu-Medrol, then low-dose prednisone.  Patient's acute debility may be exacerbated by underlying inflammatory conditions.  -She reportedly has stopped her Plaquenil, was transitioned to low-dose prednisone with higher doses for flares.     Acute on chronic debility  -PT/OT.  -She may need long-term care, she has been progressively getting weaker and likely not able to take care of her self at home.    Hyperparathyroidism  -PTH, phosphorus.    Renal cystic lesions  Hepatic cystic lesions  -Follow-up outpatient     Chronic:  CKD follows with nephrology Dr. Fitzgerald, coronary artery disease follows with Dr. Acuña, arthritis, back pain, hyperparathyroidism, hypertension, anemia of chronic disease, osteoarthritis, rheumatic heart disease, valvular heart disease.     Continue home medications.       Risk Assessment: Moderate  DVT Prophylaxis: Heparin  Code Status:  DNR/DNI  Diet: Renal, nutrition recommendations; boost twice daily.  Discharge Plan: PT/OT, placement    Brian Joseph Kerley,  DO  01/14/23  09:50 EST    Dictated utilizing Dragon dictation.

## 2023-01-14 NOTE — PLAN OF CARE
Goal Outcome Evaluation:   VSS. Nontele. RA. Medications administered per MAR. Discharge is pending, waiting on placement.

## 2023-01-14 NOTE — PLAN OF CARE
"Goal Outcome Evaluation:  Plan of Care Reviewed With: patient        Progress: no change  Outcome Evaluation: VSS, pain controlled with positioning, maintaining o2 sats >90% on RA, Pt c/o generalized weakness and \"not feeling good\"  "

## 2023-01-14 NOTE — PROGRESS NOTES
[facility] Progress Note  [facility]. [place]        Patient Name: Carolyne Martins  : 1940  MRN: [id number]   LOS: 2 days    Patient Care Team:  Jerald Dawkins MD as PCP - General (Family Medicine)  Daryn Spears MD as Consulting Physician (General Surgery)    Chief Complaint:    Chief Complaint   Patient presents with   • Illness     Primary Care Physician:  Jerald Dawkins MD  Date of admission: 2023    Subjective     Interval History:   Follow-up chronic kidney disease stage V .  Events noted from last 24 hours.  Patient said she has not been feeling well and still does not feel good.  She cannot pinpoint but does have fairly advanced osteoarthritis as well as rheumatoid arthritis.  She may have some component of uremia as well that may be contributing.  When I asked her about dialysis, she could not answer the question.  I was unable to get hold of the son yesterday I will try again today.  I let them discuss further plans.  She denies any chest pain or shortness of breath.    Review of Systems:   As noted above.    Objective     Vitals:   Temp:  [97.7 °F (36.5 °C)-98.5 °F (36.9 °C)] 98.5 °F (36.9 °C)  Heart Rate:  [78-99] 99  Resp:  [16-18] 18  BP: (116-162)/(67-88) 141/76    Intake/Output Summary (Last 24 hours) at 2023 0906  Last data filed at 2023 0901  Gross per 24 hour   Intake 1591 ml   Output 1050 ml   Net 541 ml       Physical Exam:    General Appearance: alert, oriented x 3, no acute distress   Skin: warm and dry  HEENT: oral mucosa normal, nonicteric sclera  Neck: supple, no JVD  Lungs: CTA  Heart: RRR, normal S1 and S2, 2/6 systolic ejection murmur.  Abdomen: soft, nontender, nondistended, positive bowel sounds  : no palpable bladder  Extremities: Trace edema, no cyanosis or clubbing  Neuro: normal speech and mental status     Scheduled Meds:     Current Facility-Administered Medications   Medication Dose Route Frequency Provider  Last Rate Last Admin   • acetaminophen (TYLENOL) tablet 650 mg  650 mg Oral Q4H PRN Kerley, Brian Joseph, DO   650 mg at 01/13/23 0603    Or   • acetaminophen (TYLENOL) 160 MG/5ML solution 650 mg  650 mg Oral Q4H PRN Kerley, Brian Joseph, DO        Or   • acetaminophen (TYLENOL) suppository 650 mg  650 mg Rectal Q4H PRN Kerley, Brian Joseph, DO       • aspirin EC tablet 81 mg  81 mg Oral Daily Kerley, Brian Joseph, DO   81 mg at 01/14/23 0902   • carvedilol (COREG) tablet 25 mg  25 mg Oral BID With Meals Kerley, Brian Joseph, DO   25 mg at 01/14/23 0902   • cefTRIAXone (ROCEPHIN) IVPB 1 g/50ml dextrose (premix)  1 g Intravenous Q24H Kerley, Brian Joseph,  mL/hr at 01/13/23 1322 1 g at 01/13/23 1322   • dextrose 5 % and sodium chloride 0.9 % infusion  50 mL/hr Intravenous Continuous Clay Schilling MD, FASN 50 mL/hr at 01/14/23 0903 50 mL/hr at 01/14/23 0903   • heparin (porcine) 5000 UNIT/ML injection 5,000 Units  5,000 Units Subcutaneous Q8H Kerley, Brian Joseph, DO   5,000 Units at 01/14/23 0629   • losartan (COZAAR) tablet 50 mg  50 mg Oral Q24H Kerley, Brian Joseph, DO   50 mg at 01/14/23 0902   • ondansetron (ZOFRAN) injection 4 mg  4 mg Intravenous Q6H PRN Kerley, Brian Joseph, DO       • pantoprazole (PROTONIX) EC tablet 40 mg  40 mg Oral Q AM Kerley, Brian Joseph, DO   40 mg at 01/14/23 0629   • sodium chloride 0.9 % flush 10 mL  10 mL Intravenous Q12H Kerley, Brian Joseph, DO   10 mL at 01/14/23 0903   • sodium chloride 0.9 % flush 10 mL  10 mL Intravenous PRN Kerley, Brian Joseph, DO       • sodium chloride 0.9 % infusion 40 mL  40 mL Intravenous PRN Kerley, Brian Joseph, DO           aspirin, 81 mg, Oral, Daily  carvedilol, 25 mg, Oral, BID With Meals  cefTRIAXone, 1 g, Intravenous, Q24H  heparin (porcine), 5,000 Units, Subcutaneous, Q8H  losartan, 50 mg, Oral, Q24H  pantoprazole, 40 mg, Oral, Q AM  sodium chloride, 10 mL, Intravenous, Q12H        IV Meds:   dextrose 5 % and sodium chloride 0.9 %,  50 mL/hr, Last Rate: 50 mL/hr (01/14/23 0903)        Results Reviewed:   I have personally reviewed the results from the time of this admission to 1/14/2023 09:06 EST     Results from last 7 days   Lab Units 01/14/23  0733 01/13/23  0555 01/12/23  0937   SODIUM mmol/L 135* 136 133*   POTASSIUM mmol/L 4.3 4.3 5.1   CHLORIDE mmol/L 107 105 102   CO2 mmol/L 20.6* 19.9* 19.2*   BUN mg/dL 46* 48* 46*   CREATININE mg/dL 3.07* 3.74* 3.82*   CALCIUM mg/dL 9.0 9.0 8.9   BILIRUBIN mg/dL  --   --  0.3   ALK PHOS U/L  --   --  134*   ALT (SGPT) U/L  --   --  13   AST (SGOT) U/L  --   --  30   GLUCOSE mg/dL 98 83 106*       Estimated Creatinine Clearance: 11 mL/min (A) (by C-G formula based on SCr of 3.07 mg/dL (H)).    Results from last 7 days   Lab Units 01/14/23  0733   PHOSPHORUS mg/dL 3.7             Results from last 7 days   Lab Units 01/14/23  0733 01/13/23  0555 01/12/23  0937   WBC 10*3/mm3 4.22 4.45 8.14   HEMOGLOBIN g/dL 8.3* 8.0* 9.4*   PLATELETS 10*3/mm3 194 209 203             Brief Urine Lab Results  (Last result in the past 365 days)      Color   Clarity   Blood   Leuk Est   Nitrite   Protein   CREAT   Urine HCG        01/12/23 0937 Yellow   Turbid   Small (1+)   Large (3+)   Negative   30 mg/dL (1+)                 No results found for: UTPCR    Imaging Results (Last 24 Hours)     Procedure Component Value Units Date/Time    XR Hip With or Without Pelvis 2 - 3 View Left [[id number]] Collected: 01/13/23 1449     Updated: 01/13/23 1454    Narrative:      PROCEDURE: XR HIP W OR WO PELVIS 2-3 VIEW LEFT-     HISTORY: pain hip; N30.00-Acute cystitis without hematuria;  N18.9-Chronic kidney disease, unspecified; R53.81-Other malaise     COMPARISON: July 2022.     FINDINGS:  A three view exam demonstrates no acute fracture or  dislocation.  Patient is status post ORIF of a prior left hip fracture.  Again noted is a compression screw extending into the acetabulum with  bony remodeling. There is lucency around the  fixation screw consistent  with loosening. There is osteopenia. No acute soft tissue abnormality is  seen.       Impression:      No acute fracture.     Stable chronic changes as described.              Images were reviewed, interpreted, and dictated by Dr. Angela Pablo MD  Transcribed by Gayatri Forrest PA-C.     This report was signed and finalized on 1/13/2023 2:52 PM by Angela Pablo MD.    XR Tibia Fibula 2 View Left [[id number]] Collected: 01/13/23 1127     Updated: 01/13/23 1134    Narrative:      PROCEDURE: XR KNEE 3 VW LEFT     History: Chronic left knee pain; N30.00-Acute cystitis without  hematuria; N18.9-Chronic kidney disease, unspecified; R53.81-Other  malaise     COMPARISON: None.     FINDINGS:  A 3 view exam demonstrates no acute fracture or dislocation.  There is diffuse osteopenia. There is narrowing of all three joint  compartments. There is no joint effusion. No soft tissue abnormality is  seen.       Impression:      No acute fracture. Degenerative changes.              PROCEDURE:  XR TIBIA FIBULA 2 VW LEFT-     HISTORY: Left tibia pain     COMPARISON: None.     FINDINGS:  A 2 view exam demonstrates no acute fracture or dislocation.  There is diffuse osteopenia. The joint spaces are preserved.  No soft  tissue abnormality is seen.     IMPRESSION: No acute fracture.            PROCEDURE:  XR ANKLE 3+ VW LEFT     History: Left ankle pain     COMPARISON: None.     FINDINGS:  A 3 view exam demonstrates no acute fracture or dislocation.  There is a remote medial malleolar fracture with nonunion. There is  osteopenia. The joint spaces are preserved. No soft tissue abnormality  is seen. There is no joint effusion.     IMPRESSION: No acute fracture.                     Images were reviewed, interpreted, and dictated by Dr. Angela Pablo MD  Transcribed by Gayatri Forrest PA-C.     This report was signed and finalized on 1/13/2023 11:32 AM by Angela Pablo MD.    XR Ankle 3+ View Left [[id number]]  Collected: 01/13/23 1127     Updated: 01/13/23 1134    Narrative:      PROCEDURE: XR KNEE 3 VW LEFT     History: Chronic left knee pain; N30.00-Acute cystitis without  hematuria; N18.9-Chronic kidney disease, unspecified; R53.81-Other  malaise     COMPARISON: None.     FINDINGS:  A 3 view exam demonstrates no acute fracture or dislocation.  There is diffuse osteopenia. There is narrowing of all three joint  compartments. There is no joint effusion. No soft tissue abnormality is  seen.       Impression:      No acute fracture. Degenerative changes.              PROCEDURE:  XR TIBIA FIBULA 2 VW LEFT-     HISTORY: Left tibia pain     COMPARISON: None.     FINDINGS:  A 2 view exam demonstrates no acute fracture or dislocation.  There is diffuse osteopenia. The joint spaces are preserved.  No soft  tissue abnormality is seen.     IMPRESSION: No acute fracture.            PROCEDURE:  XR ANKLE 3+ VW LEFT     History: Left ankle pain     COMPARISON: None.     FINDINGS:  A 3 view exam demonstrates no acute fracture or dislocation.  There is a remote medial malleolar fracture with nonunion. There is  osteopenia. The joint spaces are preserved. No soft tissue abnormality  is seen. There is no joint effusion.     IMPRESSION: No acute fracture.                     Images were reviewed, interpreted, and dictated by Dr. Angela Pablo MD  Transcribed by Gayatri Forrest PA-C.     This report was signed and finalized on 1/13/2023 11:32 AM by Angela Pablo MD.    XR Knee 3 View Left [[id number]] Collected: 01/13/23 1127     Updated: 01/13/23 1134    Narrative:      PROCEDURE: XR KNEE 3 VW LEFT     History: Chronic left knee pain; N30.00-Acute cystitis without  hematuria; N18.9-Chronic kidney disease, unspecified; R53.81-Other  malaise     COMPARISON: None.     FINDINGS:  A 3 view exam demonstrates no acute fracture or dislocation.  There is diffuse osteopenia. There is narrowing of all three joint  compartments. There is no joint  effusion. No soft tissue abnormality is  seen.       Impression:      No acute fracture. Degenerative changes.              PROCEDURE:  XR TIBIA FIBULA 2 VW LEFT-     HISTORY: Left tibia pain     COMPARISON: None.     FINDINGS:  A 2 view exam demonstrates no acute fracture or dislocation.  There is diffuse osteopenia. The joint spaces are preserved.  No soft  tissue abnormality is seen.     IMPRESSION: No acute fracture.            PROCEDURE:  XR ANKLE 3+ VW LEFT     History: Left ankle pain     COMPARISON: None.     FINDINGS:  A 3 view exam demonstrates no acute fracture or dislocation.  There is a remote medial malleolar fracture with nonunion. There is  osteopenia. The joint spaces are preserved. No soft tissue abnormality  is seen. There is no joint effusion.     IMPRESSION: No acute fracture.                     Images were reviewed, interpreted, and dictated by Dr. Angela Pablo MD  Transcribed by Gayatri Forrest PA-C.     This report was signed and finalized on 1/13/2023 11:32 AM by Angela Pablo MD.              Assessment / Plan     ASSESSMENT:  1. Chronic kidney disease stage IV: Baseline chronic kidney disease stage IV secondary to longstanding atherosclerotic and glomerulosclerosis.  Patient has refused to talk about dialysis, son has always agreed to what ever she says.  No indications for acute dialysis at this point.  But she may have some worsening of her baseline chronic kidney disease may end up requiring dialysis in the near future.  2. Anemia of chronic kidney disease: Longstanding history of need for IV iron as well as LIU, missed multiple doses in the recent past.  3. Hyperparathyroidism: Check and follow PTH.  Check phosphorus.  4. Acute cystitis without hematuria: Treated as per hospitalist service  5. Hypertension with chronic kidney disease stage IV: Blood pressure appears to be under optimal control with current medications.  6. Coronary artery disease: Significant atherosclerotic  cardiovascular disease follows up with Dr. Acuña, she has missed her multiple recent appointments, last time she saw him was more than a year ago.  7. Valvular heart disease: Severe aortic sclerosis.  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  Then she was started on prednisone, she was on 2.5 mg a day for some time with some relief and for acute episodes she will go up to 10 mg a day for 3 days.  She finally decided against it and stopped taking it.  9. Rheumatoid arthritis: Same is true as noted above  10. Debility: Patient recognizes the debility and wants to go to a rehab.  11. Left leg injury      PLAN:  · Renal function not any worse.  Urine culture noted minimal growth of normal campos.  I will have ongoing discussion with the son, details discussed with the patient about dialysis.  I have elected however make the decision with the help of her son.  Once the IV fluids run out we will stop it.  · Her iron stores are adequate she got received a dose of Procrit, will continue the dose on weekly basis.  · I will consider starting low-dose prednisone, after an initial dose of IV Solu-Medrol.  · Details were discussed with the patient no family in the room.  I will call the son Magno today.  · Details were also discussed with the hospitalist service.  Patient has been getting weaker, unable to take care of her at home she will need the rehab placement.  She may end up long-term nursing home patient.  · Continue with rest of the current treatment plan, and monitor with surveillance labs.  · Further recommendations will depend on clinical course of the patient during the current hospitalization.     Thank you for involving us in the care of Carolyne Martins.  Please feel free to call with any questions.    lCay Schilling MD, FASN  01/14/23  09:06 [facility]    [facility]  [phone]      Part of this note may be an electronic  transcription/translation of spoken language to printed text using the Dragon Dictation System.

## 2023-01-14 NOTE — THERAPY TREATMENT NOTE
"Pt declined treatment reporting,\" I can't today . I don't know what's wrong with me?I'm just sick I guess.\" Will f/u with pt tomorrow.     "

## 2023-01-15 LAB
ALBUMIN SERPL-MCNC: 2.8 G/DL (ref 3.5–5.2)
ANION GAP SERPL CALCULATED.3IONS-SCNC: 9.7 MMOL/L (ref 5–15)
BASOPHILS # BLD AUTO: 0.02 10*3/MM3 (ref 0–0.2)
BASOPHILS NFR BLD AUTO: 0.4 % (ref 0–1.5)
BUN SERPL-MCNC: 42 MG/DL (ref 8–23)
BUN/CREAT SERPL: 15.4 (ref 7–25)
CALCIUM SPEC-SCNC: 9.7 MG/DL (ref 8.6–10.5)
CHLORIDE SERPL-SCNC: 108 MMOL/L (ref 98–107)
CO2 SERPL-SCNC: 19.3 MMOL/L (ref 22–29)
CREAT SERPL-MCNC: 2.72 MG/DL (ref 0.57–1)
CRP SERPL-MCNC: 1.13 MG/DL (ref 0–0.5)
DEPRECATED RDW RBC AUTO: 59.4 FL (ref 37–54)
EGFRCR SERPLBLD CKD-EPI 2021: 17.2 ML/MIN/1.73
EOSINOPHIL # BLD AUTO: 0.36 10*3/MM3 (ref 0–0.4)
EOSINOPHIL NFR BLD AUTO: 6.7 % (ref 0.3–6.2)
ERYTHROCYTE [DISTWIDTH] IN BLOOD BY AUTOMATED COUNT: 15.5 % (ref 12.3–15.4)
GLUCOSE SERPL-MCNC: 92 MG/DL (ref 65–99)
HCT VFR BLD AUTO: 26.7 % (ref 34–46.6)
HGB BLD-MCNC: 8.5 G/DL (ref 12–15.9)
IMM GRANULOCYTES # BLD AUTO: 0.09 10*3/MM3 (ref 0–0.05)
IMM GRANULOCYTES NFR BLD AUTO: 1.7 % (ref 0–0.5)
LYMPHOCYTES # BLD AUTO: 1.22 10*3/MM3 (ref 0.7–3.1)
LYMPHOCYTES NFR BLD AUTO: 22.7 % (ref 19.6–45.3)
MCH RBC QN AUTO: 33.3 PG (ref 26.6–33)
MCHC RBC AUTO-ENTMCNC: 31.8 G/DL (ref 31.5–35.7)
MCV RBC AUTO: 104.7 FL (ref 79–97)
MONOCYTES # BLD AUTO: 0.6 10*3/MM3 (ref 0.1–0.9)
MONOCYTES NFR BLD AUTO: 11.2 % (ref 5–12)
NEUTROPHILS NFR BLD AUTO: 3.08 10*3/MM3 (ref 1.7–7)
NEUTROPHILS NFR BLD AUTO: 57.3 % (ref 42.7–76)
NRBC BLD AUTO-RTO: 0.4 /100 WBC (ref 0–0.2)
NT-PROBNP SERPL-MCNC: 4834 PG/ML (ref 0–1800)
PHOSPHATE SERPL-MCNC: 3.6 MG/DL (ref 2.5–4.5)
PLATELET # BLD AUTO: 226 10*3/MM3 (ref 140–450)
PMV BLD AUTO: 9.9 FL (ref 6–12)
POTASSIUM SERPL-SCNC: 4.2 MMOL/L (ref 3.5–5.2)
RBC # BLD AUTO: 2.55 10*6/MM3 (ref 3.77–5.28)
SODIUM SERPL-SCNC: 137 MMOL/L (ref 136–145)
WBC NRBC COR # BLD: 5.37 10*3/MM3 (ref 3.4–10.8)

## 2023-01-15 PROCEDURE — 25010000002 METHYLPREDNISOLONE PER 125 MG: Performed by: INTERNAL MEDICINE

## 2023-01-15 PROCEDURE — 80069 RENAL FUNCTION PANEL: CPT | Performed by: INTERNAL MEDICINE

## 2023-01-15 PROCEDURE — 93005 ELECTROCARDIOGRAM TRACING: CPT | Performed by: STUDENT IN AN ORGANIZED HEALTH CARE EDUCATION/TRAINING PROGRAM

## 2023-01-15 PROCEDURE — 85025 COMPLETE CBC W/AUTO DIFF WBC: CPT | Performed by: STUDENT IN AN ORGANIZED HEALTH CARE EDUCATION/TRAINING PROGRAM

## 2023-01-15 PROCEDURE — 25010000002 CEFTRIAXONE SODIUM-DEXTROSE 1-3.74 GM-%(50ML) RECONSTITUTED SOLUTION: Performed by: STUDENT IN AN ORGANIZED HEALTH CARE EDUCATION/TRAINING PROGRAM

## 2023-01-15 PROCEDURE — 86140 C-REACTIVE PROTEIN: CPT | Performed by: INTERNAL MEDICINE

## 2023-01-15 PROCEDURE — 99232 SBSQ HOSP IP/OBS MODERATE 35: CPT | Performed by: STUDENT IN AN ORGANIZED HEALTH CARE EDUCATION/TRAINING PROGRAM

## 2023-01-15 PROCEDURE — 83880 ASSAY OF NATRIURETIC PEPTIDE: CPT | Performed by: INTERNAL MEDICINE

## 2023-01-15 PROCEDURE — 93010 ELECTROCARDIOGRAM REPORT: CPT | Performed by: INTERNAL MEDICINE

## 2023-01-15 PROCEDURE — 25010000002 HEPARIN (PORCINE) PER 1000 UNITS: Performed by: STUDENT IN AN ORGANIZED HEALTH CARE EDUCATION/TRAINING PROGRAM

## 2023-01-15 PROCEDURE — 25010000002 FUROSEMIDE PER 20 MG: Performed by: INTERNAL MEDICINE

## 2023-01-15 RX ORDER — METHYLPREDNISOLONE SODIUM SUCCINATE 125 MG/2ML
80 INJECTION, POWDER, LYOPHILIZED, FOR SOLUTION INTRAMUSCULAR; INTRAVENOUS DAILY
Status: COMPLETED | OUTPATIENT
Start: 2023-01-15 | End: 2023-01-16

## 2023-01-15 RX ORDER — FUROSEMIDE 10 MG/ML
20 INJECTION INTRAMUSCULAR; INTRAVENOUS ONCE
Status: COMPLETED | OUTPATIENT
Start: 2023-01-15 | End: 2023-01-15

## 2023-01-15 RX ORDER — QUETIAPINE FUMARATE 25 MG/1
12.5 TABLET, FILM COATED ORAL NIGHTLY
Status: DISCONTINUED | OUTPATIENT
Start: 2023-01-15 | End: 2023-01-18 | Stop reason: HOSPADM

## 2023-01-15 RX ORDER — CEFTRIAXONE 1 G/50ML
1 INJECTION, SOLUTION INTRAVENOUS EVERY 24 HOURS
Status: COMPLETED | OUTPATIENT
Start: 2023-01-15 | End: 2023-01-15

## 2023-01-15 RX ADMIN — SENNOSIDES AND DOCUSATE SODIUM 2 TABLET: 50; 8.6 TABLET ORAL at 08:00

## 2023-01-15 RX ADMIN — FUROSEMIDE 20 MG: 10 INJECTION, SOLUTION INTRAMUSCULAR; INTRAVENOUS at 17:05

## 2023-01-15 RX ADMIN — CARVEDILOL 25 MG: 25 TABLET, FILM COATED ORAL at 08:00

## 2023-01-15 RX ADMIN — QUETIAPINE FUMARATE 12.5 MG: 25 TABLET ORAL at 20:41

## 2023-01-15 RX ADMIN — METHYLPREDNISOLONE SODIUM SUCCINATE 80 MG: 125 INJECTION, POWDER, FOR SOLUTION INTRAMUSCULAR; INTRAVENOUS at 17:05

## 2023-01-15 RX ADMIN — Medication 10 ML: at 20:41

## 2023-01-15 RX ADMIN — Medication 10 ML: at 08:00

## 2023-01-15 RX ADMIN — HEPARIN SODIUM 5000 UNITS: 5000 INJECTION INTRAVENOUS; SUBCUTANEOUS at 14:05

## 2023-01-15 RX ADMIN — HEPARIN SODIUM 5000 UNITS: 5000 INJECTION INTRAVENOUS; SUBCUTANEOUS at 06:11

## 2023-01-15 RX ADMIN — HEPARIN SODIUM 5000 UNITS: 5000 INJECTION INTRAVENOUS; SUBCUTANEOUS at 23:00

## 2023-01-15 RX ADMIN — ASPIRIN 81 MG: 81 TABLET, COATED ORAL at 08:00

## 2023-01-15 RX ADMIN — LOSARTAN POTASSIUM 50 MG: 50 TABLET, FILM COATED ORAL at 08:00

## 2023-01-15 RX ADMIN — CARVEDILOL 25 MG: 25 TABLET, FILM COATED ORAL at 17:05

## 2023-01-15 RX ADMIN — CEFTRIAXONE 1 G: 1 INJECTION, SOLUTION INTRAVENOUS at 11:30

## 2023-01-15 RX ADMIN — PANTOPRAZOLE SODIUM 40 MG: 40 TABLET, DELAYED RELEASE ORAL at 06:11

## 2023-01-15 NOTE — PLAN OF CARE
Goal Outcome Evaluation:      VSS. Nontele. Pt maintaining o2 >90% on RA. Medications administered per MAR. Discharge is pending, waiting on placement.

## 2023-01-15 NOTE — PROGRESS NOTES
[facility]    Name:  Carolyne Martins   Age:  82 y.o.  Sex:  female  :  1940  MRN:  [id number]   Visit Number:  [id number]  Admission Date:  2023  Date Of Service:  01/15/23  Primary Care Physician:  Jerald Dawkins MD     LOS: 3 days :    Chief Complaint:      Follow-up; generalized weakness, left leg pain    Subjective:    Still generally painful over, nonspecific.    Hospital Course:    Patient is an 82-year-old woman with past medical history of CKD follows with nephrology Dr. Fitzgerald, coronary artery disease follows with Dr. Acuña, arthritis, back pain, hyperparathyroidism, hypertension, anemia, osteoarthritis, rheumatic heart disease, valvular heart disease.  Presented to [facility] ED on 2023 feeling generally weak progressively worse over a few days.  Son at bedside said its been more difficult for her to transfer in and out of the wheelchair, she is had some falls recently.  She has some pain in her left leg.  She denied any shortness of air, chest pain, nausea, vomiting, abdominal pain, cough, dysuria.  Patient lives alone, son lives across the street and helps with some things but he works at least 40 hours a week is away from home a lot.     ED summary: Afebrile, vital signs stable on room air.  Blood glucose 106, sodium 133, creatinine 3.82 baseline may be around 3.3-3.5, chronic anemia hemoglobin 9.4, pattern of infection on urinalysis, urine culture collected, COVID/flu negative CXR unremarkable CT abdomen/pelvis new, mild pericardial effusion, small bilateral renal cystic lesions and hepatic cystic lesion compared to prior exam, suspected hematoma formation left inferior medial buttock.  CT lumbar spine no acute abnormality, multilevel lumbar degenerative disc disease present.  She was provided Rocephin and is normal saline 500 mL bolus.  ED personnel did attempt to discharge, but patient not able to take care of herself at home with current  resources.  Social work has recommended at least social admit for short-term rehab placement.    Inpatient general floor admission 1/12/2023 for UTI requiring IV antibiotics Rocephin, JASMEET on CKD, acute on chronic debility and impaired ADLs.    Urine culture resulted mixed growth.  Nephrology-Dr. Fitzgerald is well familiar with patient for over 20 years, concern for needing dialysis in the near future.  Patient does appear to need long-term care placement, unable to care for self at home with declining ability to perform ADLs.  Do suspect acute decline in part contributed to inflammatory arthritis, therefore provided IV Solu-Medrol with plan to possibly start low-dose prednisone afterwards.      Review of Systems:     All systems were reviewed and negative except as mentioned in subjective, assessment and plan.    Vital Signs:    Temp:  [97.9 °F (36.6 °C)-99 °F (37.2 °C)] 98.8 °F (37.1 °C)  Heart Rate:  [] 93  Resp:  [16-17] 16  BP: (117-166)/(65-95) 166/95    Intake and output:    I/O last 3 completed shifts:  In: 2369.9 [P.O.:960; I.V.:1409.9]  Out: 1375 [Urine:1375]  I/O this shift:  In: -   Out: 200 [Urine:200]    Physical Examination:    General Appearance:  Alert and cooperative.    Head:  Atraumatic and normocephalic.   Eyes: Conjunctivae and sclerae normal, no icterus. No pallor.   Throat: No oral lesions, no thrush, oral mucosa moist.   Neck: Supple, trachea midline, no thyromegaly.   Lungs:   Breath sounds heard bilaterally equally.  No wheezing or crackles. No Pleural rub or bronchial breathing.   Heart:  Normal S1 and S2, no murmur, no gallop, no rub. No JVD.   Abdomen:   Normal bowel sounds, no masses, no organomegaly. Soft, nontender, nondistended, no rebound tenderness.   Extremities:  Some mild tenderness left knee and distal.   Skin:  Warm   Neurologic: Alert and oriented x 3. No facial asymmetry. Moves all four limbs. No tremors.      Laboratory results:    Results from last 7 days   Lab Units  01/15/23  0723 01/14/23  0733 01/13/23  0555 01/12/23  0937   SODIUM mmol/L 137 135* 136 133*   POTASSIUM mmol/L 4.2 4.3 4.3 5.1   CHLORIDE mmol/L 108* 107 105 102   CO2 mmol/L 19.3* 20.6* 19.9* 19.2*   BUN mg/dL 42* 46* 48* 46*   CREATININE mg/dL 2.72* 3.07* 3.74* 3.82*   CALCIUM mg/dL 9.7 9.0 9.0 8.9   BILIRUBIN mg/dL  --   --   --  0.3   ALK PHOS U/L  --   --   --  134*   ALT (SGPT) U/L  --   --   --  13   AST (SGOT) U/L  --   --   --  30   GLUCOSE mg/dL 92 98 83 106*     Results from last 7 days   Lab Units 01/15/23  0723 01/14/23  0733 01/13/23  0555   WBC 10*3/mm3 5.37 4.22 4.45   HEMOGLOBIN g/dL 8.5* 8.3* 8.0*   HEMATOCRIT % 26.7* 26.5* 24.8*   PLATELETS 10*3/mm3 226 194 209             Results from last 7 days   Lab Units 01/12/23  1616 01/12/23  1613 01/12/23  0937   BLOODCX  No growth at 2 days No growth at 2 days  --    URINECX   --   --  25,000 CFU/mL Mixed Leena Isolated     No results for input(s): PHART, VLA2SBE, PO2ART, CUO7BDI, BASEEXCESS in the last 8760 hours.   I have reviewed the patient's laboratory results.    Radiology results:    XR Knee 3 View Left    Result Date: 1/13/2023  PROCEDURE: XR KNEE 3 VW LEFT  History: Chronic left knee pain; N30.00-Acute cystitis without hematuria; N18.9-Chronic kidney disease, unspecified; R53.81-Other malaise  COMPARISON: None.  FINDINGS:  A 3 view exam demonstrates no acute fracture or dislocation. There is diffuse osteopenia. There is narrowing of all three joint compartments. There is no joint effusion. No soft tissue abnormality is seen.      Impression: No acute fracture. Degenerative changes.     PROCEDURE:  XR TIBIA FIBULA 2 VW LEFT-  HISTORY: Left tibia pain  COMPARISON: None.  FINDINGS:  A 2 view exam demonstrates no acute fracture or dislocation. There is diffuse osteopenia. The joint spaces are preserved.  No soft tissue abnormality is seen.  IMPRESSION: No acute fracture.    PROCEDURE:  XR ANKLE 3+ VW LEFT  History: Left ankle pain  COMPARISON:  None.  FINDINGS:  A 3 view exam demonstrates no acute fracture or dislocation. There is a remote medial malleolar fracture with nonunion. There is osteopenia. The joint spaces are preserved. No soft tissue abnormality is seen. There is no joint effusion.  IMPRESSION: No acute fracture.       Images were reviewed, interpreted, and dictated by Dr. Angela Pablo MD Transcribed by Gayatri Forrest PA-C.  This report was signed and finalized on 1/13/2023 11:32 AM by Angela Pabol MD.    XR Tibia Fibula 2 View Left    Result Date: 1/13/2023  PROCEDURE: XR KNEE 3 VW LEFT  History: Chronic left knee pain; N30.00-Acute cystitis without hematuria; N18.9-Chronic kidney disease, unspecified; R53.81-Other malaise  COMPARISON: None.  FINDINGS:  A 3 view exam demonstrates no acute fracture or dislocation. There is diffuse osteopenia. There is narrowing of all three joint compartments. There is no joint effusion. No soft tissue abnormality is seen.      Impression: No acute fracture. Degenerative changes.     PROCEDURE:  XR TIBIA FIBULA 2 VW LEFT-  HISTORY: Left tibia pain  COMPARISON: None.  FINDINGS:  A 2 view exam demonstrates no acute fracture or dislocation. There is diffuse osteopenia. The joint spaces are preserved.  No soft tissue abnormality is seen.  IMPRESSION: No acute fracture.    PROCEDURE:  XR ANKLE 3+ VW LEFT  History: Left ankle pain  COMPARISON: None.  FINDINGS:  A 3 view exam demonstrates no acute fracture or dislocation. There is a remote medial malleolar fracture with nonunion. There is osteopenia. The joint spaces are preserved. No soft tissue abnormality is seen. There is no joint effusion.  IMPRESSION: No acute fracture.       Images were reviewed, interpreted, and dictated by Dr. Angela Pablo MD Transcribed by Gayatri Forrest PA-C.  This report was signed and finalized on 1/13/2023 11:32 AM by Angela Pablo MD.    XR Ankle 3+ View Left    Result Date: 1/13/2023  PROCEDURE: XR KNEE 3 VW LEFT  History: Chronic  left knee pain; N30.00-Acute cystitis without hematuria; N18.9-Chronic kidney disease, unspecified; R53.81-Other malaise  COMPARISON: None.  FINDINGS:  A 3 view exam demonstrates no acute fracture or dislocation. There is diffuse osteopenia. There is narrowing of all three joint compartments. There is no joint effusion. No soft tissue abnormality is seen.      Impression: No acute fracture. Degenerative changes.     PROCEDURE:  XR TIBIA FIBULA 2 VW LEFT-  HISTORY: Left tibia pain  COMPARISON: None.  FINDINGS:  A 2 view exam demonstrates no acute fracture or dislocation. There is diffuse osteopenia. The joint spaces are preserved.  No soft tissue abnormality is seen.  IMPRESSION: No acute fracture.    PROCEDURE:  XR ANKLE 3+ VW LEFT  History: Left ankle pain  COMPARISON: None.  FINDINGS:  A 3 view exam demonstrates no acute fracture or dislocation. There is a remote medial malleolar fracture with nonunion. There is osteopenia. The joint spaces are preserved. No soft tissue abnormality is seen. There is no joint effusion.  IMPRESSION: No acute fracture.       Images were reviewed, interpreted, and dictated by Dr. Angela Pablo MD Transcribed by Gayatri Forrest PA-C.  This report was signed and finalized on 1/13/2023 11:32 AM by Angela Pablo MD.    XR Hip With or Without Pelvis 2 - 3 View Left    Result Date: 1/13/2023  PROCEDURE: XR HIP W OR WO PELVIS 2-3 VIEW LEFT-  HISTORY: pain hip; N30.00-Acute cystitis without hematuria; N18.9-Chronic kidney disease, unspecified; R53.81-Other malaise  COMPARISON: July 2022.  FINDINGS:  A three view exam demonstrates no acute fracture or dislocation.  Patient is status post ORIF of a prior left hip fracture. Again noted is a compression screw extending into the acetabulum with bony remodeling. There is lucency around the fixation screw consistent with loosening. There is osteopenia. No acute soft tissue abnormality is seen.      Impression: No acute fracture.  Stable chronic  changes as described.     Images were reviewed, interpreted, and dictated by Dr. Angela Pablo MD Transcribed by Gayatri Forrest PA-C.  This report was signed and finalized on 1/13/2023 2:52 PM by Angela Pablo MD.    I have reviewed the patient's radiology reports.    Medication Review:     I have reviewed the patient's active and prn medications.     Problem List:      Acute cystitis without hematuria    Debility    Left leg injury    JASMEET (acute kidney injury) ([facility])      Assessment/Plan:    Inpatient general floor admission 1/12/2023 for UTI requiring IV antibiotics Rocephin, JASMEET on CKD, acute on chronic debility and impaired ADLs.    Urine culture resulted mixed growth.  Nephrology-Dr. Fitzgerald is well familiar with patient for over 20 years, concern for needing dialysis in the near future but maybe not this hospitalization.  Patient does appear to need long-term care placement, unable to care for self at home with declining ability to perform ADLs.  Do suspect acute decline in part contributed to inflammatory arthritis, therefore provided IV Solu-Medrol with plan to possibly start low-dose prednisone afterwards.    1/13  -Afebrile, vital signs stable on room air.  Creatinine slightly improved to 3.74.  Hemoglobin 8.0.  1/14  -Afebrile, vital signs stable on room air.  Creatinine improved to 3.07, GFR 14.  Hemoglobin 8.3.  1/15  -Afebrile, vital signs stable on room air.  Creatinine 2.72.  Patient declined PT today, discussed with her importance of being evaluated for placement.  She voiced understanding and agreement.     UTI   -Rocephin started 1/12  -Urine culture collected 1/12, mixed campos.  -Blood culture collected 1/12, no growth 24 hours.     JASMEET on CKD  -Nephrology consultation, patient follows with Dr. Fitzgerald.     Mechanical fall  Left Leg pain  Hematoma, left inferior medial buttock  -XR left knee, tib/fib, ankle, left hip; no acute fracture.    Inflammatory arthritis, chronic  -IV Solu-Medrol, then  low-dose prednisone.  Patient's acute debility may be exacerbated by underlying inflammatory conditions.  -She reportedly has stopped her Plaquenil, was transitioned to low-dose prednisone with higher doses for flares.     Acute on chronic debility  -PT/OT.  -She may need long-term care, she has been progressively getting weaker and likely not able to take care of her self at home.    Hyperparathyroidism  -PTH, phosphorus.    Renal cystic lesions  Hepatic cystic lesions  -Follow-up outpatient     Chronic:  CKD follows with nephrology Dr. Fitzgerald, coronary artery disease follows with Dr. Acuña, arthritis, back pain, hyperparathyroidism, hypertension, anemia of chronic disease, osteoarthritis, rheumatic heart disease, valvular heart disease.     Continue home medications.       Risk Assessment: Moderate  DVT Prophylaxis: Heparin  Code Status:  DNR/DNI  Diet: Renal, nutrition recommendations; boost twice daily.  Discharge Plan: PT/OT, placement    -Follow-up; renal cystic lesions, hepatic cystic lesions.    Brian Joseph Kerley, DO  01/15/23  09:09 EST    Dictated utilizing Dragon dictation.

## 2023-01-15 NOTE — THERAPY TREATMENT NOTE
"Pt declined treatment reporting ,\" I feel so bad all over I can't.\"Will f/u with pt tomorrow.      "

## 2023-01-15 NOTE — PROGRESS NOTES
[facility] Progress Note  [facility]. [place]        Patient Name: Carolyne Martins  : 1940  MRN: [id number]   LOS: 3 days    Patient Care Team:  Jerald Dawkins MD as PCP - General (Family Medicine)  Daryn Spears MD as Consulting Physician (General Surgery)    Chief Complaint:    Chief Complaint   Patient presents with   • Illness     Primary Care Physician:  Jerald Dawkins MD  Date of admission: 2023    Subjective     Interval History:   Follow-up chronic kidney disease stage V .  Events noted from last 24 hours.  Patient said she has not been feeling well and still does not feel good.  She cannot pinpoint but does have fairly advanced osteoarthritis as well as rheumatoid arthritis.  She may have some component of uremia as well that may be contributing.  When I asked her about dialysis, she could not answer the question.  I was unable to get hold of the son yesterday I will try again today.  He finally came in to visit her and I had at length discussion with him.  Review of Systems:   As noted above.    Objective     Vitals:   Temp:  [97.9 °F (36.6 °C)-99 °F (37.2 °C)] 98.8 °F (37.1 °C)  Heart Rate:  [] 93  Resp:  [16-17] 16  BP: (117-166)/(65-95) 166/95    Intake/Output Summary (Last 24 hours) at 1/15/2023 0921  Last data filed at 1/15/2023 0740  Gross per 24 hour   Intake 1018.87 ml   Output 925 ml   Net 93.87 ml       Physical Exam:    General Appearance: alert, oriented x 3, no acute distress   Skin: warm and dry  HEENT: oral mucosa normal, nonicteric sclera  Neck: supple, no JVD  Lungs: CTA  Heart: RRR, normal S1 and S2, 2/6 systolic ejection murmur.  Abdomen: soft, nontender, nondistended, positive bowel sounds  : no palpable bladder  Extremities: Trace edema, no cyanosis or clubbing  Neuro: normal speech and mental status     Scheduled Meds:     Current Facility-Administered Medications   Medication Dose Route Frequency Provider Last Rate Last  Admin   • acetaminophen (TYLENOL) tablet 650 mg  650 mg Oral Q4H PRN Kerley, Brian Joseph, DO   650 mg at 01/13/23 0603    Or   • acetaminophen (TYLENOL) 160 MG/5ML solution 650 mg  650 mg Oral Q4H PRN Kerley, Brian Joseph, DO        Or   • acetaminophen (TYLENOL) suppository 650 mg  650 mg Rectal Q4H PRN Kerley, Brian Joseph, DO       • aspirin EC tablet 81 mg  81 mg Oral Daily Kerley, Brian Joseph, DO   81 mg at 01/15/23 0800   • sennosides-docusate (PERICOLACE) 8.6-50 MG per tablet 2 tablet  2 tablet Oral BID Kerley, Brian Joseph, DO   2 tablet at 01/15/23 0800    And   • polyethylene glycol (MIRALAX) packet 17 g  17 g Oral Daily PRN Kerley, Brian Joseph, DO        And   • bisacodyl (DULCOLAX) EC tablet 5 mg  5 mg Oral Daily PRN Kerley, Brian Joseph, DO        And   • bisacodyl (DULCOLAX) suppository 10 mg  10 mg Rectal Daily PRN Kerley, Brian Joseph, DO       • carvedilol (COREG) tablet 25 mg  25 mg Oral BID With Meals Kerley, Brian Joseph, DO   25 mg at 01/15/23 0800   • cefTRIAXone (ROCEPHIN) IVPB 1 g/50ml dextrose (premix)  1 g Intravenous Q24H Kerley, Brian Joseph,  mL/hr at 01/14/23 1149 1 g at 01/14/23 1149   • heparin (porcine) 5000 UNIT/ML injection 5,000 Units  5,000 Units Subcutaneous Q8H Kerley, Brian Joseph, DO   5,000 Units at 01/15/23 0611   • losartan (COZAAR) tablet 50 mg  50 mg Oral Q24H Kerley, Brian Joseph, DO   50 mg at 01/15/23 0800   • ondansetron (ZOFRAN) injection 4 mg  4 mg Intravenous Q6H PRN Kerley, Brian Joseph, DO       • pantoprazole (PROTONIX) EC tablet 40 mg  40 mg Oral Q AM Kerley, Brian Joseph, DO   40 mg at 01/15/23 0611   • sodium chloride 0.9 % flush 10 mL  10 mL Intravenous Q12H Kerley, Brian Joseph,    10 mL at 01/15/23 0800   • sodium chloride 0.9 % flush 10 mL  10 mL Intravenous PRN Kerley, Brian Joseph, DO       • sodium chloride 0.9 % infusion 40 mL  40 mL Intravenous PRN Kerley, Brian Joseph,            aspirin, 81 mg, Oral, Daily  carvedilol, 25 mg, Oral,  BID With Meals  cefTRIAXone, 1 g, Intravenous, Q24H  heparin (porcine), 5,000 Units, Subcutaneous, Q8H  losartan, 50 mg, Oral, Q24H  pantoprazole, 40 mg, Oral, Q AM  senna-docusate sodium, 2 tablet, Oral, BID  sodium chloride, 10 mL, Intravenous, Q12H        IV Meds:        Results Reviewed:   I have personally reviewed the results from the time of this admission to 1/15/2023 09:21 EST     Results from last 7 days   Lab Units 01/15/23  0723 01/14/23  0733 01/13/23  0555 01/12/23 0937   SODIUM mmol/L 137 135* 136 133*   POTASSIUM mmol/L 4.2 4.3 4.3 5.1   CHLORIDE mmol/L 108* 107 105 102   CO2 mmol/L 19.3* 20.6* 19.9* 19.2*   BUN mg/dL 42* 46* 48* 46*   CREATININE mg/dL 2.72* 3.07* 3.74* 3.82*   CALCIUM mg/dL 9.7 9.0 9.0 8.9   BILIRUBIN mg/dL  --   --   --  0.3   ALK PHOS U/L  --   --   --  134*   ALT (SGPT) U/L  --   --   --  13   AST (SGOT) U/L  --   --   --  30   GLUCOSE mg/dL 92 98 83 106*       Estimated Creatinine Clearance: 12.3 mL/min (A) (by C-G formula based on SCr of 2.72 mg/dL (H)).    Results from last 7 days   Lab Units 01/15/23  0723 01/14/23  0733   PHOSPHORUS mg/dL 3.6 3.7  3.7             Results from last 7 days   Lab Units 01/15/23  0723 01/14/23  0733 01/13/23  0555 01/12/23  0937   WBC 10*3/mm3 5.37 4.22 4.45 8.14   HEMOGLOBIN g/dL 8.5* 8.3* 8.0* 9.4*   PLATELETS 10*3/mm3 226 194 209 203             Brief Urine Lab Results  (Last result in the past 365 days)      Color   Clarity   Blood   Leuk Est   Nitrite   Protein   CREAT   Urine HCG        01/12/23 0937 Yellow   Turbid   Small (1+)   Large (3+)   Negative   30 mg/dL (1+)                 No results found for: UTPCR    Imaging Results (Last 24 Hours)     ** No results found for the last 24 hours. **              Assessment / Plan     ASSESSMENT:  1. Chronic kidney disease stage IV: Baseline chronic kidney disease stage IV secondary to longstanding atherosclerotic and glomerulosclerosis.  Patient has refused to talk about dialysis, son has  always agreed to what ever she says.  No indications for acute dialysis at this point.  But she may have some worsening of her baseline chronic kidney disease may end up requiring dialysis in the near future.  2. Anemia of chronic kidney disease: Longstanding history of need for IV iron as well as LIU, missed multiple doses in the recent past.  3. Hyperparathyroidism: Check and follow PTH.  Check phosphorus.  4. Acute cystitis without hematuria: Treated as per hospitalist service  5. Hypertension with chronic kidney disease stage IV: Blood pressure appears to be under optimal control with current medications.  6. Coronary artery disease: Significant atherosclerotic cardiovascular disease follows up with Dr. Acuña, she has missed her multiple recent appointments, last time she saw him was more than a year ago.  7. Valvular heart disease: Severe aortic sclerosis.  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  Then she was started on prednisone, she was on 2.5 mg a day for some time with some relief and for acute episodes she will go up to 10 mg a day for 3 days.  She finally decided against it and stopped taking it.  9. Rheumatoid arthritis: Same is true as noted above  10. Debility: Patient recognizes the debility and wants to go to a rehab.  11. Left leg injury      PLAN:  · Renal function is slightly better, stop the IV fluids.  Ongoing check BNP, give her a dose of Lasix 20 mg IV.  She had some blood pressure readings that are in the 1 40-1 60 range.  Continue to optimize blood pressure medications.  · I also encouraged her not to refuse physical therapy.  I will go ahead and start her on low-dose antipsychotic as she appears to be fairly anxious and will become more anxious with steroids.  · As far as dialysis is still not very clear if it comes to that point he will have to do it.  But he thinks that he will have some time to think about it it does not have to  be done in the next few days.  So he will let us know what ever the plan is as well as her dialysis.  · Her iron stores are adequate she got received a dose of Procrit, will continue the dose on weekly basis.  · I will go ahead and check ESR and CRP.  I will go ahead and give her prednisone 80 mg IV daily x2 days and then start her on 5 mg of prednisone daily.  Maybe after a week to 2 weeks decrease it to 2.5 a day.  · Details were discussed with the patient as well as her son Magno in the room.  Patient's son is fairly clear not to code her for any reason of already made her a DNR he reaffirms that he wants her to stay as a DNR.  · Details were also discussed with the hospitalist service.  Patient has been getting weaker, unable to take care of her at home she will need the rehab placement.  She may end up long-term nursing home patient.  · Continue with rest of the current treatment plan, and monitor with surveillance labs.  · Further recommendations will depend on clinical course of the patient during the current hospitalization.     Thank you for involving us in the care of Carolyne Martins.  Please feel free to call with any questions.    Clay Schilling MD, FASN  01/15/23  09:21 [facility]    [facility]  [phone]      Part of this note may be an electronic transcription/translation of spoken language to printed text using the Dragon Dictation System.

## 2023-01-16 LAB
ALBUMIN SERPL-MCNC: 3 G/DL (ref 3.5–5.2)
ANION GAP SERPL CALCULATED.3IONS-SCNC: 9.7 MMOL/L (ref 5–15)
BASOPHILS # BLD AUTO: 0.01 10*3/MM3 (ref 0–0.2)
BASOPHILS NFR BLD AUTO: 0.3 % (ref 0–1.5)
BUN SERPL-MCNC: 43 MG/DL (ref 8–23)
BUN/CREAT SERPL: 17.6 (ref 7–25)
CALCIUM SPEC-SCNC: 9.9 MG/DL (ref 8.6–10.5)
CHLORIDE SERPL-SCNC: 104 MMOL/L (ref 98–107)
CO2 SERPL-SCNC: 20.3 MMOL/L (ref 22–29)
CREAT SERPL-MCNC: 2.44 MG/DL (ref 0.57–1)
DEPRECATED RDW RBC AUTO: 57.2 FL (ref 37–54)
EGFRCR SERPLBLD CKD-EPI 2021: 19.6 ML/MIN/1.73
EOSINOPHIL # BLD AUTO: 0 10*3/MM3 (ref 0–0.4)
EOSINOPHIL NFR BLD AUTO: 0 % (ref 0.3–6.2)
ERYTHROCYTE [DISTWIDTH] IN BLOOD BY AUTOMATED COUNT: 15.4 % (ref 12.3–15.4)
ERYTHROCYTE [SEDIMENTATION RATE] IN BLOOD: 31 MM/HR (ref 0–30)
GLUCOSE SERPL-MCNC: 134 MG/DL (ref 65–99)
HCT VFR BLD AUTO: 28.3 % (ref 34–46.6)
HGB BLD-MCNC: 9.1 G/DL (ref 12–15.9)
IMM GRANULOCYTES # BLD AUTO: 0.05 10*3/MM3 (ref 0–0.05)
IMM GRANULOCYTES NFR BLD AUTO: 1.4 % (ref 0–0.5)
LYMPHOCYTES # BLD AUTO: 0.66 10*3/MM3 (ref 0.7–3.1)
LYMPHOCYTES NFR BLD AUTO: 18.3 % (ref 19.6–45.3)
MCH RBC QN AUTO: 33.5 PG (ref 26.6–33)
MCHC RBC AUTO-ENTMCNC: 32.2 G/DL (ref 31.5–35.7)
MCV RBC AUTO: 104 FL (ref 79–97)
MONOCYTES # BLD AUTO: 0.05 10*3/MM3 (ref 0.1–0.9)
MONOCYTES NFR BLD AUTO: 1.4 % (ref 5–12)
NEUTROPHILS NFR BLD AUTO: 2.84 10*3/MM3 (ref 1.7–7)
NEUTROPHILS NFR BLD AUTO: 78.6 % (ref 42.7–76)
NRBC BLD AUTO-RTO: 1.4 /100 WBC (ref 0–0.2)
PHOSPHATE SERPL-MCNC: 4.7 MG/DL (ref 2.5–4.5)
PLATELET # BLD AUTO: 222 10*3/MM3 (ref 140–450)
PMV BLD AUTO: 9.7 FL (ref 6–12)
POTASSIUM SERPL-SCNC: 5 MMOL/L (ref 3.5–5.2)
QT INTERVAL: 338 MS
QTC INTERVAL: 424 MS
RBC # BLD AUTO: 2.72 10*6/MM3 (ref 3.77–5.28)
SODIUM SERPL-SCNC: 134 MMOL/L (ref 136–145)
WBC NRBC COR # BLD: 3.61 10*3/MM3 (ref 3.4–10.8)

## 2023-01-16 PROCEDURE — 97110 THERAPEUTIC EXERCISES: CPT

## 2023-01-16 PROCEDURE — 85652 RBC SED RATE AUTOMATED: CPT | Performed by: INTERNAL MEDICINE

## 2023-01-16 PROCEDURE — 99232 SBSQ HOSP IP/OBS MODERATE 35: CPT | Performed by: STUDENT IN AN ORGANIZED HEALTH CARE EDUCATION/TRAINING PROGRAM

## 2023-01-16 PROCEDURE — 25010000002 HEPARIN (PORCINE) PER 1000 UNITS: Performed by: STUDENT IN AN ORGANIZED HEALTH CARE EDUCATION/TRAINING PROGRAM

## 2023-01-16 PROCEDURE — 25010000002 METHYLPREDNISOLONE PER 125 MG: Performed by: INTERNAL MEDICINE

## 2023-01-16 PROCEDURE — 97530 THERAPEUTIC ACTIVITIES: CPT

## 2023-01-16 PROCEDURE — 80069 RENAL FUNCTION PANEL: CPT | Performed by: INTERNAL MEDICINE

## 2023-01-16 PROCEDURE — 97535 SELF CARE MNGMENT TRAINING: CPT

## 2023-01-16 PROCEDURE — 85025 COMPLETE CBC W/AUTO DIFF WBC: CPT | Performed by: STUDENT IN AN ORGANIZED HEALTH CARE EDUCATION/TRAINING PROGRAM

## 2023-01-16 RX ORDER — HYDROXYCHLOROQUINE SULFATE 200 MG/1
200 TABLET, FILM COATED ORAL
Status: DISCONTINUED | OUTPATIENT
Start: 2023-01-16 | End: 2023-01-18 | Stop reason: HOSPADM

## 2023-01-16 RX ORDER — PREDNISONE 10 MG/1
10 TABLET ORAL
Status: DISCONTINUED | OUTPATIENT
Start: 2023-01-17 | End: 2023-01-18

## 2023-01-16 RX ADMIN — CARVEDILOL 25 MG: 25 TABLET, FILM COATED ORAL at 17:49

## 2023-01-16 RX ADMIN — METHYLPREDNISOLONE SODIUM SUCCINATE 80 MG: 125 INJECTION, POWDER, FOR SOLUTION INTRAMUSCULAR; INTRAVENOUS at 09:06

## 2023-01-16 RX ADMIN — SENNOSIDES AND DOCUSATE SODIUM 2 TABLET: 50; 8.6 TABLET ORAL at 20:32

## 2023-01-16 RX ADMIN — BISACODYL 5 MG: 5 TABLET, COATED ORAL at 13:04

## 2023-01-16 RX ADMIN — QUETIAPINE FUMARATE 12.5 MG: 25 TABLET ORAL at 20:32

## 2023-01-16 RX ADMIN — ACETAMINOPHEN 650 MG: 325 TABLET, FILM COATED ORAL at 22:12

## 2023-01-16 RX ADMIN — HEPARIN SODIUM 5000 UNITS: 5000 INJECTION INTRAVENOUS; SUBCUTANEOUS at 21:03

## 2023-01-16 RX ADMIN — HEPARIN SODIUM 5000 UNITS: 5000 INJECTION INTRAVENOUS; SUBCUTANEOUS at 12:14

## 2023-01-16 RX ADMIN — PANTOPRAZOLE SODIUM 40 MG: 40 TABLET, DELAYED RELEASE ORAL at 06:14

## 2023-01-16 RX ADMIN — Medication 10 ML: at 09:07

## 2023-01-16 RX ADMIN — CARVEDILOL 25 MG: 25 TABLET, FILM COATED ORAL at 09:07

## 2023-01-16 RX ADMIN — LOSARTAN POTASSIUM 50 MG: 50 TABLET, FILM COATED ORAL at 09:07

## 2023-01-16 RX ADMIN — ASPIRIN 81 MG: 81 TABLET, COATED ORAL at 09:06

## 2023-01-16 RX ADMIN — ACETAMINOPHEN 650 MG: 325 TABLET, FILM COATED ORAL at 13:46

## 2023-01-16 RX ADMIN — HEPARIN SODIUM 5000 UNITS: 5000 INJECTION INTRAVENOUS; SUBCUTANEOUS at 06:14

## 2023-01-16 RX ADMIN — Medication 10 ML: at 20:32

## 2023-01-16 RX ADMIN — HYDROXYCHLOROQUINE SULFATE 200 MG: 200 TABLET ORAL at 19:06

## 2023-01-16 NOTE — PROGRESS NOTES
[facility] Progress Note  [facility]. [place]        Patient Name: Carolyne Martins  : 1940  MRN: [id number]   LOS: 4 days    Patient Care Team:  Jerald Dawkins MD as PCP - General (Family Medicine)  Daryn Spears MD as Consulting Physician (General Surgery)    Chief Complaint:    Chief Complaint   Patient presents with   • Illness     Primary Care Physician:  Jerald Dawkins MD  Date of admission: 2023    Subjective     Interval History:   Follow-up chronic kidney disease stage V .  Events noted from last 24 hours.  Patient is less anxious, laying in the bed having breakfast.  Denies any chest pain or shortness of breath.  Review of Systems:   As noted above.    Objective     Vitals:   Temp:  [97.6 °F (36.4 °C)-98.7 °F (37.1 °C)] 98 °F (36.7 °C)  Heart Rate:  [] 74  Resp:  [16-18] 18  BP: (139-163)/(74-88) 163/75    Intake/Output Summary (Last 24 hours) at 2023 0819  Last data filed at 2023 0330  Gross per 24 hour   Intake 540.42 ml   Output 1300 ml   Net -759.58 ml       Physical Exam:    General Appearance: alert, oriented x 3, no acute distress   Skin: warm and dry  HEENT: oral mucosa normal, nonicteric sclera  Neck: supple, no JVD  Lungs: CTA  Heart: RRR, normal S1 and S2, 2/6 systolic ejection murmur.  Abdomen: soft, nontender, nondistended, positive bowel sounds  : no palpable bladder  Extremities: Trace edema, no cyanosis or clubbing  Neuro: normal speech and mental status     Scheduled Meds:     Current Facility-Administered Medications   Medication Dose Route Frequency Provider Last Rate Last Admin   • acetaminophen (TYLENOL) tablet 650 mg  650 mg Oral Q4H PRN Kerley, Brian Joseph, DO   650 mg at 23 0603    Or   • acetaminophen (TYLENOL) 160 MG/5ML solution 650 mg  650 mg Oral Q4H PRN Kerley, Brian Joseph, DO        Or   • acetaminophen (TYLENOL) suppository 650 mg  650 mg Rectal Q4H PRN Kerley, Brian Joseph, DO       • aspirin  EC tablet 81 mg  81 mg Oral Daily Kerley, Brian Joseph, DO   81 mg at 01/15/23 0800   • sennosides-docusate (PERICOLACE) 8.6-50 MG per tablet 2 tablet  2 tablet Oral BID Kerley, Brian Joseph, DO   2 tablet at 01/15/23 0800    And   • polyethylene glycol (MIRALAX) packet 17 g  17 g Oral Daily PRN Kerley, Brian Joseph, DO        And   • bisacodyl (DULCOLAX) EC tablet 5 mg  5 mg Oral Daily PRN Kerley, Brian Joseph, DO        And   • bisacodyl (DULCOLAX) suppository 10 mg  10 mg Rectal Daily PRN Kerley, Brian Joseph, DO       • carvedilol (COREG) tablet 25 mg  25 mg Oral BID With Meals Kerley, Brian Joseph, DO   25 mg at 01/15/23 1705   • heparin (porcine) 5000 UNIT/ML injection 5,000 Units  5,000 Units Subcutaneous Q8H Kerley, Brian Joseph, DO   5,000 Units at 01/16/23 0614   • losartan (COZAAR) tablet 50 mg  50 mg Oral Q24H Kerley, Brian Joseph, DO   50 mg at 01/15/23 0800   • methylPREDNISolone sodium succinate (SOLU-Medrol) injection 80 mg  80 mg Intravenous Daily Clay Schilling MD, FASN   80 mg at 01/15/23 1705   • ondansetron (ZOFRAN) injection 4 mg  4 mg Intravenous Q6H PRN Kerley, Brian Joseph, DO       • pantoprazole (PROTONIX) EC tablet 40 mg  40 mg Oral Q AM Kerley, Brian Joseph, DO   40 mg at 01/16/23 0614   • QUEtiapine (SEROquel) tablet 12.5 mg  12.5 mg Oral Nightly Clay Schilling MD, FASN   12.5 mg at 01/15/23 2041   • sodium chloride 0.9 % flush 10 mL  10 mL Intravenous Q12H Kerley, Brian Joseph,    10 mL at 01/15/23 2041   • sodium chloride 0.9 % flush 10 mL  10 mL Intravenous PRN Kerley, Brian Joseph, DO       • sodium chloride 0.9 % infusion 40 mL  40 mL Intravenous PRN Kerley, Brian Joseph, DO           aspirin, 81 mg, Oral, Daily  carvedilol, 25 mg, Oral, BID With Meals  heparin (porcine), 5,000 Units, Subcutaneous, Q8H  losartan, 50 mg, Oral, Q24H  methylPREDNISolone sodium succinate, 80 mg, Intravenous, Daily  pantoprazole, 40 mg, Oral, Q AM  QUEtiapine, 12.5 mg, Oral, Nightly  senna-docusate  sodium, 2 tablet, Oral, BID  sodium chloride, 10 mL, Intravenous, Q12H        IV Meds:        Results Reviewed:   I have personally reviewed the results from the time of this admission to 1/16/2023 08:19 EST     Results from last 7 days   Lab Units 01/16/23  0609 01/15/23  0723 01/14/23  0733 01/13/23  0555 01/12/23  0937   SODIUM mmol/L 134* 137 135*   < > 133*   POTASSIUM mmol/L 5.0 4.2 4.3   < > 5.1   CHLORIDE mmol/L 104 108* 107   < > 102   CO2 mmol/L 20.3* 19.3* 20.6*   < > 19.2*   BUN mg/dL 43* 42* 46*   < > 46*   CREATININE mg/dL 2.44* 2.72* 3.07*   < > 3.82*   CALCIUM mg/dL 9.9 9.7 9.0   < > 8.9   BILIRUBIN mg/dL  --   --   --   --  0.3   ALK PHOS U/L  --   --   --   --  134*   ALT (SGPT) U/L  --   --   --   --  13   AST (SGOT) U/L  --   --   --   --  30   GLUCOSE mg/dL 134* 92 98   < > 106*    < > = values in this interval not displayed.       Estimated Creatinine Clearance: 14.1 mL/min (A) (by C-G formula based on SCr of 2.44 mg/dL (H)).    Results from last 7 days   Lab Units 01/16/23  0609 01/15/23  0723 01/14/23  0733   PHOSPHORUS mg/dL 4.7* 3.6 3.7  3.7             Results from last 7 days   Lab Units 01/16/23  0609 01/15/23  0723 01/14/23  0733 01/13/23  0555 01/12/23  0937   WBC 10*3/mm3 3.61 5.37 4.22 4.45 8.14   HEMOGLOBIN g/dL 9.1* 8.5* 8.3* 8.0* 9.4*   PLATELETS 10*3/mm3 222 226 194 209 203             Brief Urine Lab Results  (Last result in the past 365 days)      Color   Clarity   Blood   Leuk Est   Nitrite   Protein   CREAT   Urine HCG        01/12/23 0937 Yellow   Turbid   Small (1+)   Large (3+)   Negative   30 mg/dL (1+)                 No results found for: UTPCR    Imaging Results (Last 24 Hours)     ** No results found for the last 24 hours. **              Assessment / Plan     ASSESSMENT:  1. Chronic kidney disease stage IV: Baseline chronic kidney disease stage IV secondary to longstanding atherosclerotic and glomerulosclerosis.  Patient has refused to talk about dialysis, son has  always agreed to what ever she says.  No indications for acute dialysis at this point.  But she may have some worsening of her baseline chronic kidney disease may end up requiring dialysis in the near future.  2. Anemia of chronic kidney disease: Longstanding history of need for IV iron as well as LIU, missed multiple doses in the recent past.  3. Hyperparathyroidism: Check and follow PTH.  Check phosphorus.  4. Acute cystitis without hematuria: Treated as per hospitalist service  5. Hypertension with chronic kidney disease stage IV: Blood pressure appears to be under optimal control with current medications.  6. Coronary artery disease: Significant atherosclerotic cardiovascular disease follows up with Dr. Acuña, she has missed her multiple recent appointments, last time she saw him was more than a year ago.  7. Valvular heart disease: Severe aortic sclerosis.  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  Then she was started on prednisone, she was on 2.5 mg a day for some time with some relief and for acute episodes she will go up to 10 mg a day for 3 days.  She finally decided against it and stopped taking it.  9. Rheumatoid arthritis: Same is true as noted above  10. Debility: Patient recognizes the debility and wants to go to a rehab.  11. Left leg injury      PLAN:  · Renal function is stable.  · Her arthritis seems to be slightly better today, after 2 doses of Solu-Medrol 80 mg she will be started on 10 mg of prednisone daily starting tomorrow.  I will also start her on Plaquenil 200 mg once a day.  · I also encouraged her not to refuse physical therapy.    · Her iron stores are adequate she got received a dose of Procrit, will continue the dose on weekly basis.  · Details were discussed with the patient as well as her son Magno.   · Details were also discussed with the hospitalist service.  Patient has been getting weaker, unable to take care of her at home  she will need the rehab placement.  She may end up long-term nursing home patient.  · Continue with rest of the current treatment plan, and monitor with surveillance labs.  · Further recommendations will depend on clinical course of the patient during the current hospitalization.     Thank you for involving us in the care of Carolyne Martins.  Please feel free to call with any questions.    Clay Schilling MD, FASN  01/16/23  08:19 [facility]    [facility]  [phone]      Part of this note may be an electronic transcription/translation of spoken language to printed text using the Dragon Dictation System.

## 2023-01-16 NOTE — CASE MANAGEMENT/SOCIAL WORK
0835 CM spoke with Aileen/Favian to inquire if [facility] has contract with [facility]. She states she will investigate and call me back.    3231 CM received call back from Aileen stating that facility does NOT have [facility] contract. Cm left vm with Indira/zEe PADRON to discuss referral. Received vm. Awaiting call back.    1113 Cm spoke with Indira about referral. She asks if pt is doing dialysis. She also states review notes question Lidocaine patch, asking if it can be switched to cream, and reflect concerns about Epogen costs. Cm advised pt is not doing dialysis at this time and asked if Lidocaine and Epogen are medications her son could  from pt Pharmacy and deliver to facility to administer. She agrees to further inquire and call with update.     3754 Cm received vm from Jessica/[facility] stating they can start precert on pt. CM returned call and left vm requesting they start precert ASAP due to pt being med ready for dc.     1556 PT note efaxed to [facility]

## 2023-01-16 NOTE — DISCHARGE PLACEMENT REQUEST
"Carolyne Martins (82 y.o. Female)     Date of Birth   1940    Social Security Number       Address   [address] [place], [place] [address] [place] [address]    Home Phone   [phone]    MRN   [id number]       Mandaen   Orthodox    Marital Status                               Admission Date   1/12/23    Admission Type   Emergency    Admitting Provider   Kerley, Brian Joseph, DO    Attending Provider   Kerley, Brian Joseph, DO    Department, Room/Bed   [facility] TELEMETRY 3, 320/1       Discharge Date       Discharge Disposition       Discharge Destination                               Attending Provider: Kerley, Brian Joseph, DO    Allergies: Ferrlecit [Na Ferric Gluc Cplx In Sucrose], Ranitidine Hcl, Levofloxacin, Lisinopril    Isolation: None   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)   Code Status: No CPR    Ht: 149.9 cm (59\")   Wt: 48.5 kg (106 lb 14.8 oz)    Admission Cmt: None   Principal Problem: Acute cystitis without hematuria [N30.00]                 Active Insurance as of 1/12/2023     Primary Coverage     Payor Plan Insurance Group Employer/Plan Group    ANTH MEDICARE REPLACEMENT ANTH MEDICARE ADVANTAGE [id number]     Payor Plan Address Payor Plan Phone Number Payor Plan Fax Number Effective Dates    [address] [phone]  7/1/2022 - None Entered    [facility] [address]       Subscriber Name Subscriber Birth Date Member ID       CAROLYNE MARTINS 1940 [id number]           Secondary Coverage     Payor Plan Insurance Group Employer/Plan Group    [facility] [id number]     Payor Plan Address Payor Plan Phone Number Payor Plan Fax Number Effective Dates    [address]   12/1/2016 - None Entered    [facility] [id number]       Subscriber Name Subscriber Birth Date Member ID       CAROLYNE MARTINS 1940 [id number]                 Emergency Contacts      (Rel.) Home Phone Work Phone Mobile Phone    Magno Martins (Son) [phone] -- " [phone]    Linda Banda (Friend) [phone] -- --               Physical Therapy Notes (most recent note)      Gabriel Olivares, PTA at 01/16/23 8286  Version 1 of 1       Goal Outcome Evaluation:  Plan of Care Reviewed With: patient, friend        Progress: improving  Outcome Evaluation: Pt supine in bed and willing to work with therpay. Pt was able to transfer to EOB with min a as compaired to mod a during previous treatment. Pt was mod a to perform STS from EOB with several vc's. Pt also required mod a to pivot from bed to chair with rw. Once in chair pt performed exercises sitting in bedside chair.  Pt was unable to perform L hip flexion from seated position without pain.  Pt was able to performabd/add without increasing pain. Informed Nursing.  See flowsheet for details    Electronically signed by Gabriel Olivares, MALI at 01/16/23 2437

## 2023-01-16 NOTE — PLAN OF CARE
Goal Outcome Evaluation:  Plan of Care Reviewed With: patient, friend        Progress: improving  Outcome Evaluation: Pt supine in bed and willing to work with therpay. Pt was able to transfer to EOB with min a as compaired to mod a during previous treatment. Pt was mod a to perform STS from EOB with several vc's. Pt also required mod a to pivot from bed to chair with rw. Once in chair pt performed exercises sitting in bedside chair.  Pt was unable to perform L hip flexion from seated position without pain.  Pt was able to performabd/add without increasing pain. Informed Nursing.  See flowsheet for details. Pt TTWB with L LE d/t reported pain by patient.

## 2023-01-16 NOTE — PLAN OF CARE
Goal Outcome Evaluation:  Plan of Care Reviewed With: patient        Progress: improving  Outcome Evaluation: Pt received sitting reclined in her chair, declined getting out of the chair today, but agreeable to grooming tasks.  Pt set up for washing her face, applying lotion,  combing her hair and brushing her teeth.  Pt noted with decreased L shoulder ROM which limits her ability to reach overhead to comb her hair.  Pt uses R arm for most grooming tasks.  Pt was able to reach forward with her L arm to  her water cup.  Cont OT per POC.

## 2023-01-16 NOTE — PROGRESS NOTES
Adult Nutrition  Assessment/PES    Patient Name:  Carolyne Martins  YOB: 1940  MRN: [id number]  Admit Date:  1/12/2023    Assessment Date:  1/16/2023    Comments:     1. Continue current diet order as medically appropriate.   2. Encourage PO intake to meet 50% of estimated needs.   3. Encourage intake of supplements ordered.     RD to follow-up and available PRN.    Reason for Assessment     Row Name 01/16/23 1443          Reason for Assessment    Reason For Assessment nurse/nurse practitioner consult;identified at risk by screening criteria;per organizational policy;follow-up protocol     Diagnosis renal disease;cardiac disease     Identified At Risk by Screening Criteria large or nonhealing wound, burn or pressure injury;MST SCORE 2+                  Labs/Tests/Procedures/Meds     Row Name 01/16/23 1443          Labs/Procedures/Meds    Lab Results Reviewed reviewed, pertinent     Lab Results Comments High: BUN, Cr, phosphorus  Low: Na        Medications    Pertinent Medications Reviewed reviewed, pertinent     Pertinent Medications Comments protonix, losartan, docusate                Physical Findings     Row Name 01/16/23 1444          Physical Findings    Overall Physical Appearance underwt for age, L perineum and L anterior plantar wound                  Nutrition Prescription Ordered     Row Name 01/16/23 1445          Nutrition Prescription PO    Current PO Diet Regular     Fluid Consistency Thin     Supplement Boost Plus (Ensure Enlive, Ensure Plus)     Supplement Frequency 2 times a day     Common Modifiers Renal;Low Sodium;Low Potassium                Evaluation of Received Nutrient/Fluid Intake     Row Name 01/16/23 1501          Intake Assessment    Energy/Calorie Requirement Assessment not meeting needs     Protein Requirement Assessment not meeting needs        PO Evaluation    Number of Days PO Intake Evaluated 3 days     Number of Meals 9     % PO Intake 47                    Problem/Interventions:   Problem 1     Row Name 01/16/23 1502          Nutrition Diagnoses Problem 1    Problem 1 Increased Nutrient Needs     Macronutrient Kcal     Etiology (related to) Medical Diagnosis     Renal CKD     Signs/Symptoms (evidenced by) Other (comment)  need for oral nutrition supplement to meet increased estimated needs                      Intervention Goal     Row Name 01/16/23 1502          Intervention Goal    General Maintain nutrition;Improved nutrition related lab(s);Reduce/improve symptoms;Meet nutritional needs for age/condition;Disease management/therapy     PO Establish PO;Tolerate PO;Increase intake;Maintain intake;Continue positive trend;Meet estimated needs     Weight Maintain weight                Nutrition Intervention     Row Name 01/16/23 1502          Nutrition Intervention    RD/Tech Action Follow Tx progress;Care plan reviewd;Encourage intake;Recommend/ordered;Supplement provided                Nutrition Prescription     Row Name 01/16/23 1502          Nutrition Prescription PO    New PO Prescription Ordered? No, recommended        Other Orders    Obtain Weight Daily     Obtain Weight Ordered? No, recommended     Supplement Vitamin mineral supplement     Supplement Ordered? No, recommended     Labs Mg++;Na+;K+     Labs Ordered? No, recommended                Education/Evaluation     Row Name 01/16/23 1502          Education    Education Education not appropriate at this time        Monitor/Evaluation    Monitor PO intake;Per protocol;Supplement intake;Pertinent labs;Weight;Skin status;Symptoms                 Electronically signed by:  Emily Mcfadden RD  01/16/23 15:03 EST

## 2023-01-16 NOTE — THERAPY TREATMENT NOTE
"Patient Name: Carolyne Martins  : 1940    MRN: [id number]                              Today's Date: 2023       Admit Date: 2023    Visit Dx:     ICD-10-CM ICD-9-CM   1. Acute cystitis without hematuria  N30.00 595.0   2. Chronic kidney disease, unspecified CKD stage  N18.9 585.9   3. Debility  R53.81 799.3     Patient Active Problem List   Diagnosis   • Arthralgia of shoulder region   • Tendinopathy of rotator cuff   • Osteoarthritis of right acromioclavicular joint   • Anemia due to stage 4 chronic kidney disease treated with erythropoietin ([facility])   • Anemia   • Gastric polyp   • Gastroesophageal reflux disease without esophagitis   • Melena   • Change in bowel habits   • Colon cancer screening   • Senile osteoporosis   • Personal history of colonic polyps   • Closed fracture of left hip ([facility])   • Impaired mobility and ADLs   • CKD (chronic kidney disease) stage 4, GFR 15-29 ml/min ([facility])   • Simple chronic bronchitis ([facility])   • Essential hypertension   • Lower extremity edema   • Acute cystitis without hematuria   • Debility   • Left leg injury   • JASMEET (acute kidney injury) ([facility])     Past Medical History:   Diagnosis Date   • Anemia    • Anemia    • Arthritis    • Back pain    • Bleeding from anus    • Bronchitis     STATES HAS BRONCHITIS CURRENTLY (17).     • Bronchitis 2017   • CAD (coronary artery disease)    • Cataract, bilateral    • Chronic kidney disease     STAGE 4.  SEES TERA   • Chronic kidney failure     REPORTS STAGE IV   • Difficulty swallowing solids    • Disease of thyroid gland    • Fracture, radius 2016    right distal radius and ulna, healed.   • GERD (gastroesophageal reflux disease)    • Gout    • High cholesterol    • History of blood transfusion    • History of nuclear stress test 2014    DR. CORTEZ.  \"IT WAS OK\"   • Hyperparathyroidism ([facility])    • Hyperparathyroidism ([facility])    • Hypertension    • Impaired functional mobility, balance, gait, and endurance    • " Impaired mobility    • Iron deficiency    • Osteoarthritis    • Osteoarthritis of knees, bilateral     Both knees Supartz injection series August, 2015   • Osteoporosis    • Palpitations    • Personal history of cardiac murmur    • Pessary maintenance    • PONV (postoperative nausea and vomiting)    • Presence of pessary    • Problems with swallowing     WITH FOOD OCCASSIONALLY   • Rheumatic heart disease     Personal history   • Rotator cuff tendonitis     right    • Rupture of right proximal biceps tendon     chronic   • Seasonal allergies    • Sinus problem    • Spinal headache     REPORTS AFTER DELIVERY OF SON   • Subacromial bursitis     right    • Valvular heart disease    • Vision problems    • Vitamin B12 deficiency    • Vitamin D deficiency    • Wears glasses      Past Surgical History:   Procedure Laterality Date   • CATARACT EXTRACTION Bilateral    • CERVICAL POLYPECTOMY     • COLONOSCOPY  2011   • COLONOSCOPY N/A 1/9/2018    Procedure: COLONOSCOPY with endoscopic mucosal resection (hot snare), normal saline submucosal injection, argon thermal ablation, resolution clip placement x4, and cold biopsy polypectomy;  Surgeon: Pieter Concepcion MD;  Location: [facility] ENDOSCOPY;  Service:    • ENDOSCOPY N/A 12/6/2017    Procedure: ESOPHAGOGASTRODUODENOSCOPY with biopsies and hot snare polypectomies;  Surgeon: Pieter Concepcion MD;  Location: [facility] ENDOSCOPY;  Service:    • ENDOSCOPY N/A 1/15/2019    Procedure: ESOPHAGOGASTRODUODENOSCOPY WITH BIOPSIES AND HOT SNARE POLYPECTOMIES X10;  Surgeon: Pieter Concepcion MD;  Location: [facility] ENDOSCOPY;  Service: Gastroenterology   • HIP TROCHANTERIC NAILING WITH INTRAMEDULLARY HIP SCREW Left 2/14/2021    Procedure: HIP TROCHANTER NAIL SHORT WITH INTRAMEDULLARY HIP SCREW, LEFT;  Surgeon: Gabriele Loyd MD;  Location: [facility] OR;  Service: Orthopedics;  Laterality: Left;   • UPPER GASTROINTESTINAL ENDOSCOPY  08/18/2014      General Information     Row Name 01/16/23 1545           Physical Therapy Time and Intention    Document Type therapy note (daily note)  -RM     Mode of Treatment physical therapy  -RM     Row Name 01/16/23 1545          General Information    Patient Profile Reviewed yes  -RM     Existing Precautions/Restrictions fall  -RM     Row Name 01/16/23 1545          Cognition    Orientation Status (Cognition) oriented x 4  -RM     Row Name 01/16/23 1545          Safety Issues, Functional Mobility    Safety Issues Affecting Function (Mobility) sequencing abilities;safety precaution awareness  -RM     Impairments Affecting Function (Mobility) balance;endurance/activity tolerance;strength;pain;range of motion (ROM)  -RM           User Key  (r) = Recorded By, (t) = Taken By, (c) = Cosigned By    Initials Name Provider Type    Gabriel Doyle PTA Physical Therapist Assistant               Mobility     Row Name 01/16/23 1545          Bed Mobility    Supine-Sit [place] (Bed Mobility) minimum assist (75% patient effort);verbal cues  -RM     Assistive Device (Bed Mobility) bed rails;draw sheet;head of bed elevated  -RM     Row Name 01/16/23 1545          Bed-Chair Transfer    Bed-Chair [place] (Transfers) moderate assist (50% patient effort);verbal cues;nonverbal cues (demo/gesture)  -RM     Assistive Device (Bed-Chair Transfers) walker, front-wheeled  -RM     Row Name 01/16/23 1545          Sit-Stand Transfer    Sit-Stand [place] (Transfers) moderate assist (50% patient effort);verbal cues  -RM     Assistive Device (Sit-Stand Transfers) walker, front-wheeled  -RM           User Key  (r) = Recorded By, (t) = Taken By, (c) = Cosigned By    Initials Name Provider Type    Gabriel Doyle PTA Physical Therapist Assistant               Obj/Interventions     Row Name 01/16/23 1546          Motor Skills    Therapeutic Exercise hip;knee;ankle  -RM     Row Name 01/16/23 1546          Hip (Therapeutic Exercise)    Hip (Therapeutic Exercise) strengthening  exercise;AAROM (active assistive range of motion)  -RM     Hip AAROM (Therapeutic Exercise) left;flexion;extension;aBduction;aDduction;10 repetitions  -RM     Hip Strengthening (Therapeutic Exercise) right;marching while seated;10 repetitions;aBduction;aDduction  -RM     Row Name 01/16/23 1546          Knee (Therapeutic Exercise)    Knee (Therapeutic Exercise) strengthening exercise  -RM     Knee Strengthening (Therapeutic Exercise) bilateral;LAQ (long arc quad);10 repetitions  -RM     Row Name 01/16/23 1546          Ankle (Therapeutic Exercise)    Ankle (Therapeutic Exercise) AROM (active range of motion)  -RM     Ankle AROM (Therapeutic Exercise) bilateral;dorsiflexion;plantarflexion;10 repetitions  -RM           User Key  (r) = Recorded By, (t) = Taken By, (c) = Cosigned By    Initials Name Provider Type    Gabriel Doyle, PTA Physical Therapist Assistant               Goals/Plan    No documentation.                Clinical Impression     Row Name 01/16/23 1547          Pain    Pretreatment Pain Rating 7/10  -RM     Posttreatment Pain Rating 6/10  -RM     Pain Location - Side/Orientation Bilateral  -RM     Pain Location lower  -RM     Pain Location - extremity  -RM     Row Name 01/16/23 1547          Plan of Care Review    Plan of Care Reviewed With patient;friend  -RM     Progress improving  -RM     Outcome Evaluation Pt supine in bed and willing to work with therpay. Pt was able to transfer to EOB with min a as compaired to mod a during previous treatment. Pt was mod a to perform STS from EOB with several vc's. Pt also required mod a to pivot from bed to chair with rw. Once in chair pt performed exercises sitting in bedside chair.  Pt was unable to perform L hip flexion from seated position without pain.  Pt was able to performabd/add without increasing pain. Informed Nursing.  See flowsheet for details  -RM     Row Name 01/16/23 1547          Positioning and Restraints    Pre-Treatment Position in bed   -RM     Post Treatment Position chair  -RM     In Chair reclined;call light within reach;encouraged to call for assist;exit alarm on;notified nsg  -RM           User Key  (r) = Recorded By, (t) = Taken By, (c) = Cosigned By    Initials Name Provider Type    Gabriel Doyle PTA Physical Therapist Assistant               Outcome Measures     Row Name 01/16/23 1553 01/16/23 0800       How much help from another person do you currently need...    Turning from your back to your side while in flat bed without using bedrails? 3  -RM 3  -BM    Moving from lying on back to sitting on the side of a flat bed without bedrails? 3  -RM 3  -BM    Moving to and from a bed to a chair (including a wheelchair)? 2  -RM 2  -BM    Standing up from a chair using your arms (e.g., wheelchair, bedside chair)? 2  -RM 2  -BM    Climbing 3-5 steps with a railing? 1  -RM 1  -BM    To walk in hospital room? 1  -RM 1  -BM    AM-PAC 6 Clicks Score (PT) 12  -RM 12  -BM    Highest level of mobility 4 --> Transferred to chair/commode  -RM 4 --> Transferred to chair/commode  -BM    Row Name 01/16/23 1553          Functional Assessment    Outcome Measure Options AM-PAC 6 Clicks Basic Mobility (PT)  -RM           User Key  (r) = Recorded By, (t) = Taken By, (c) = Cosigned By    Initials Name Provider Type    Gabriel Doyle PTA Physical Therapist Assistant    Jessica Domingo, RN Registered Nurse                             Physical Therapy Education     Title: PT OT SLP Therapies (Done)     Topic: Physical Therapy (Done)     Point: Mobility training (Done)     Learning Progress Summary           Patient Acceptance, E,TB,D, VU,NR by  at 1/16/2023 1554    Comment: exercise techniques    Acceptance, E, VU by  at 1/16/2023 1051    Acceptance, E, VU by MS at 1/13/2023 1455    Comment: importance of mobility                   Point: Home exercise program (Done)     Learning Progress Summary           Patient Acceptance, E,TB,D, VU,NR by   at 1/16/2023 1554    Comment: exercise techniques    Acceptance, E, VU by BM at 1/16/2023 1051    Acceptance, E, VU by MS at 1/13/2023 1455    Comment: importance of mobility                   Point: Body mechanics (Done)     Learning Progress Summary           Patient Acceptance, E, VU by  at 1/16/2023 1051                   Point: Precautions (Done)     Learning Progress Summary           Patient Acceptance, E, VU by  at 1/16/2023 1051                               User Key     Initials Effective Dates Name Provider Type Discipline     06/16/21 -  Gabriel Olivares, PTA Physical Therapist Assistant PT    MS 07/01/22 -  Prasanth Alvarado, PT Physical Therapist PT     12/12/22 -  Jessica Kamara, RN Registered Nurse Nurse              PT Recommendation and Plan     Plan of Care Reviewed With: patient, friend  Progress: improving  Outcome Evaluation: Pt supine in bed and willing to work with therpay. Pt was able to transfer to EOB with min a as compaired to mod a during previous treatment. Pt was mod a to perform STS from EOB with several vc's. Pt also required mod a to pivot from bed to chair with rw. Once in chair pt performed exercises sitting in bedside chair.  Pt was unable to perform L hip flexion from seated position without pain.  Pt was able to performabd/add without increasing pain. Informed Nursing.  See flowsheet for details     Time Calculation:    PT Charges     Row Name 01/16/23 1555             Time Calculation    Start Time 1446  -RM      Stop Time 1511  -RM      Time Calculation (min) 25 min  -RM      PT Received On 01/16/23  -RM      PT Goal Re-Cert Due Date 01/23/23  -RM         Time Calculation- PT    Total Timed Code Minutes- PT 25 minute(s)  -RM         Timed Charges    86955 - PT Therapeutic Exercise Minutes 10  -RM      40698 - PT Therapeutic Activity Minutes 15  -RM         Total Minutes    Timed Charges Total Minutes 25  -RM       Total Minutes 25  -RM            User Key  (r)  = Recorded By, (t) = Taken By, (c) = Cosigned By    Initials Name Provider Type     Gabriel Olivares, MALI Physical Therapist Assistant              Therapy Charges for Today     Code Description Service Date Service Provider Modifiers Qty    99783747965 HC PT THER PROC EA 15 MIN 1/16/2023 Gabriel Olivares, PTA GP 1    30422814017 HC PT THERAPEUTIC ACT EA 15 MIN 1/16/2023 Gabriel Olivares, PTA GP 1          PT G-Codes  Outcome Measure Options: AM-PAC 6 Clicks Basic Mobility (PT)  AM-PAC 6 Clicks Score (PT): 12  AM-PAC 6 Clicks Score (OT): 16       Gabriel Olivares PTA  1/16/2023

## 2023-01-16 NOTE — PROGRESS NOTES
[facility]    Name:  Carolyne Martins   Age:  82 y.o.  Sex:  female  :  1940  MRN:  [id number]   Visit Number:  [id number]  Admission Date:  2023  Date Of Service:  23  Primary Care Physician:  Jerald Dawkins MD     LOS: 4 days :    Chief Complaint:      Follow-up; generalized weakness, left leg pain    Subjective:    Feeling okay today.    Hospital Course:    Patient is an 82-year-old woman with past medical history of CKD follows with nephrology Dr. Fitzgerald, coronary artery disease follows with Dr. Acuña, arthritis, back pain, hyperparathyroidism, hypertension, anemia, osteoarthritis, rheumatic heart disease, valvular heart disease.  Presented to [facility] ED on 2023 feeling generally weak progressively worse over a few days.  Son at bedside said its been more difficult for her to transfer in and out of the wheelchair, she is had some falls recently.  She has some pain in her left leg.  She denied any shortness of air, chest pain, nausea, vomiting, abdominal pain, cough, dysuria.  Patient lives alone, son lives across the street and helps with some things but he works at least 40 hours a week is away from home a lot.     ED summary: Afebrile, vital signs stable on room air.  Blood glucose 106, sodium 133, creatinine 3.82 baseline may be around 3.3-3.5, chronic anemia hemoglobin 9.4, pattern of infection on urinalysis, urine culture collected, COVID/flu negative CXR unremarkable CT abdomen/pelvis new, mild pericardial effusion, small bilateral renal cystic lesions and hepatic cystic lesion compared to prior exam, suspected hematoma formation left inferior medial buttock.  CT lumbar spine no acute abnormality, multilevel lumbar degenerative disc disease present.  She was provided Rocephin and is normal saline 500 mL bolus.  ED personnel did attempt to discharge, but patient not able to take care of herself at home with current resources.  Social work  has recommended at least social admit for short-term rehab placement.    Inpatient general floor admission 1/12/2023 for UTI requiring IV antibiotics Rocephin, JASMEET on CKD, acute on chronic debility and impaired ADLs.    Urine culture resulted mixed growth.  Nephrology-Dr. Fitzgerald is well familiar with patient for over 20 years, concern for needing dialysis in the near future.  Patient does appear to need long-term care placement, unable to care for self at home with declining ability to perform ADLs.  Do suspect acute decline in part contributed to inflammatory arthritis, therefore provided IV Solu-Medrol with plan to possibly start low-dose prednisone afterwards.    -Follow-up; renal cystic lesions, hepatic cystic lesions.    Review of Systems:     All systems were reviewed and negative except as mentioned in subjective, assessment and plan.    Vital Signs:    Temp:  [97.6 °F (36.4 °C)-98.7 °F (37.1 °C)] 98 °F (36.7 °C)  Heart Rate:  [] 74  Resp:  [16-18] 18  BP: (139-163)/(74-88) 163/75    Intake and output:    I/O last 3 completed shifts:  In: 540.4 [P.O.:480; I.V.:60.4]  Out: 1925 [Urine:1925]  No intake/output data recorded.    Physical Examination:    General Appearance:  Alert and cooperative.    Head:  Atraumatic and normocephalic.   Eyes: Conjunctivae and sclerae normal, no icterus. No pallor.   Throat: No oral lesions, no thrush, oral mucosa moist.   Neck: Supple, trachea midline, no thyromegaly.   Lungs:   Breath sounds heard bilaterally equally.  No wheezing or crackles. No Pleural rub or bronchial breathing.   Heart:  Normal S1 and S2, no murmur, no gallop, no rub. No JVD.   Abdomen:   Normal bowel sounds, no masses, no organomegaly. Soft, nontender, nondistended, no rebound tenderness.   Extremities:  Some mild tenderness left knee and distal.   Skin:  Warm   Neurologic: Alert and oriented x 3. No facial asymmetry. Moves all four limbs. No tremors.      Laboratory results:    Results from last 7  days   Lab Units 01/16/23  0609 01/15/23  0723 01/14/23  0733 01/13/23  0555 01/12/23  0937   SODIUM mmol/L 134* 137 135*   < > 133*   POTASSIUM mmol/L 5.0 4.2 4.3   < > 5.1   CHLORIDE mmol/L 104 108* 107   < > 102   CO2 mmol/L 20.3* 19.3* 20.6*   < > 19.2*   BUN mg/dL 43* 42* 46*   < > 46*   CREATININE mg/dL 2.44* 2.72* 3.07*   < > 3.82*   CALCIUM mg/dL 9.9 9.7 9.0   < > 8.9   BILIRUBIN mg/dL  --   --   --   --  0.3   ALK PHOS U/L  --   --   --   --  134*   ALT (SGPT) U/L  --   --   --   --  13   AST (SGOT) U/L  --   --   --   --  30   GLUCOSE mg/dL 134* 92 98   < > 106*    < > = values in this interval not displayed.     Results from last 7 days   Lab Units 01/16/23  0609 01/15/23  0723 01/14/23  0733   WBC 10*3/mm3 3.61 5.37 4.22   HEMOGLOBIN g/dL 9.1* 8.5* 8.3*   HEMATOCRIT % 28.3* 26.7* 26.5*   PLATELETS 10*3/mm3 222 226 194             Results from last 7 days   Lab Units 01/12/23  1616 01/12/23  1613 01/12/23  0937   BLOODCX  No growth at 3 days No growth at 3 days  --    URINECX   --   --  25,000 CFU/mL Mixed Leena Isolated     No results for input(s): PHART, [id number], PO2ART, NWJ7FPW, BASEEXCESS in the last 8760 hours.   I have reviewed the patient's laboratory results.    Radiology results:    No radiology results from the last 24 hrs  I have reviewed the patient's radiology reports.    Medication Review:     I have reviewed the patient's active and prn medications.     Problem List:      Acute cystitis without hematuria    Debility    Left leg injury    JASMEET (acute kidney injury) ([facility])      Assessment/Plan:    Inpatient general floor admission 1/12/2023 for UTI requiring IV antibiotics Rocephin, JASMEET on CKD, acute on chronic debility and impaired ADLs.    Urine culture resulted mixed growth.  Nephrology-Dr. Fitzgerald is well familiar with patient for over 20 years, concern for needing dialysis in the near future but maybe not this hospitalization.  Patient does appear to need long-term care placement, unable to  care for self at home with declining ability to perform ADLs.  Do suspect acute decline in part contributed to inflammatory arthritis, therefore provided IV Solu-Medrol with plan to possibly start low-dose prednisone afterwards.    1/13  -Afebrile, vital signs stable on room air.  Creatinine slightly improved to 3.74.  Hemoglobin 8.0.  1/14  -Afebrile, vital signs stable on room air.  Creatinine improved to 3.07, GFR 14.  Hemoglobin 8.3.  1/15  -Afebrile, vital signs stable on room air.  Creatinine 2.72.  Patient declined PT today, discussed with her importance of being evaluated for placement.  She voiced understanding and agreement.  1/16  -Afebrile, vital signs stable on room air.  Sodium 134, creatinine improved to 2.44/GFR 19.  Hemoglobin 9.1.     UTI   -Rocephin started 1/12  -Urine culture collected 1/12, mixed campos.  -Blood culture collected 1/12, no growth 24 hours.     JASMEET on CKD  -Nephrology consultation, patient follows with Dr. Fitzgerald.     Mechanical fall  Left Leg pain  Hematoma, left inferior medial buttock  -XR left knee, tib/fib, ankle, left hip; no acute fracture.    Inflammatory arthritis, chronic  -IV Solu-Medrol, then low-dose prednisone.  Patient's acute debility may be exacerbated by underlying inflammatory conditions.  -She reportedly has stopped her Plaquenil, was transitioned to low-dose prednisone with higher doses for flares.     Acute on chronic debility  -PT/OT.  -She may need long-term care, she has been progressively getting weaker and likely not able to take care of her self at home.    Hyperparathyroidism  -PTH, phosphorus.    Renal cystic lesions  Hepatic cystic lesions  -Follow-up outpatient     Chronic:  CKD follows with nephrology Dr. Fitzgerald, coronary artery disease follows with Dr. Acuña, arthritis, back pain, hyperparathyroidism, hypertension, anemia of chronic disease, osteoarthritis, rheumatic heart disease, valvular heart disease.     Continue home medications.       Risk  Assessment: Moderate  DVT Prophylaxis: Heparin  Code Status:  DNR/DNI  Diet: Renal, nutrition recommendations; boost twice daily.  Discharge Plan: PT/OT, placement    -Follow-up; renal cystic lesions, hepatic cystic lesions.    Brian Joseph Kerley, DO  01/16/23  09:37 EST    Dictated utilizing Dragon dictation.

## 2023-01-16 NOTE — THERAPY TREATMENT NOTE
"Patient Name: Carolyne Martins  : 1940    MRN: [id number]                              Today's Date: 2023       Admit Date: 2023    Visit Dx:     ICD-10-CM ICD-9-CM   1. Acute cystitis without hematuria  N30.00 595.0   2. Chronic kidney disease, unspecified CKD stage  N18.9 585.9   3. Debility  R53.81 799.3     Patient Active Problem List   Diagnosis   • Arthralgia of shoulder region   • Tendinopathy of rotator cuff   • Osteoarthritis of right acromioclavicular joint   • Anemia due to stage 4 chronic kidney disease treated with erythropoietin ([facility])   • Anemia   • Gastric polyp   • Gastroesophageal reflux disease without esophagitis   • Melena   • Change in bowel habits   • Colon cancer screening   • Senile osteoporosis   • Personal history of colonic polyps   • Closed fracture of left hip ([facility])   • Impaired mobility and ADLs   • CKD (chronic kidney disease) stage 4, GFR 15-29 ml/min ([facility])   • Simple chronic bronchitis ([facility])   • Essential hypertension   • Lower extremity edema   • Acute cystitis without hematuria   • Debility   • Left leg injury   • JASMEET (acute kidney injury) ([facility])     Past Medical History:   Diagnosis Date   • Anemia    • Anemia    • Arthritis    • Back pain    • Bleeding from anus    • Bronchitis     STATES HAS BRONCHITIS CURRENTLY (17).     • Bronchitis 2017   • CAD (coronary artery disease)    • Cataract, bilateral    • Chronic kidney disease     STAGE 4.  SEES TERA   • Chronic kidney failure     REPORTS STAGE IV   • Difficulty swallowing solids    • Disease of thyroid gland    • Fracture, radius 2016    right distal radius and ulna, healed.   • GERD (gastroesophageal reflux disease)    • Gout    • High cholesterol    • History of blood transfusion    • History of nuclear stress test 2014    DR. CORTEZ.  \"IT WAS OK\"   • Hyperparathyroidism ([facility])    • Hyperparathyroidism ([facility])    • Hypertension    • Impaired functional mobility, balance, gait, and endurance    • " Impaired mobility    • Iron deficiency    • Osteoarthritis    • Osteoarthritis of knees, bilateral     Both knees Supartz injection series August, 2015   • Osteoporosis    • Palpitations    • Personal history of cardiac murmur    • Pessary maintenance    • PONV (postoperative nausea and vomiting)    • Presence of pessary    • Problems with swallowing     WITH FOOD OCCASSIONALLY   • Rheumatic heart disease     Personal history   • Rotator cuff tendonitis     right    • Rupture of right proximal biceps tendon     chronic   • Seasonal allergies    • Sinus problem    • Spinal headache     REPORTS AFTER DELIVERY OF SON   • Subacromial bursitis     right    • Valvular heart disease    • Vision problems    • Vitamin B12 deficiency    • Vitamin D deficiency    • Wears glasses      Past Surgical History:   Procedure Laterality Date   • CATARACT EXTRACTION Bilateral    • CERVICAL POLYPECTOMY     • COLONOSCOPY  2011   • COLONOSCOPY N/A 1/9/2018    Procedure: COLONOSCOPY with endoscopic mucosal resection (hot snare), normal saline submucosal injection, argon thermal ablation, resolution clip placement x4, and cold biopsy polypectomy;  Surgeon: Pieter Concepcion MD;  Location: [facility] ENDOSCOPY;  Service:    • ENDOSCOPY N/A 12/6/2017    Procedure: ESOPHAGOGASTRODUODENOSCOPY with biopsies and hot snare polypectomies;  Surgeon: Pieter Concepcion MD;  Location: [facility] ENDOSCOPY;  Service:    • ENDOSCOPY N/A 1/15/2019    Procedure: ESOPHAGOGASTRODUODENOSCOPY WITH BIOPSIES AND HOT SNARE POLYPECTOMIES X10;  Surgeon: Pieter Concepcion MD;  Location: [facility] ENDOSCOPY;  Service: Gastroenterology   • HIP TROCHANTERIC NAILING WITH INTRAMEDULLARY HIP SCREW Left 2/14/2021    Procedure: HIP TROCHANTER NAIL SHORT WITH INTRAMEDULLARY HIP SCREW, LEFT;  Surgeon: Gabriele Loyd MD;  Location: [facility] OR;  Service: Orthopedics;  Laterality: Left;   • UPPER GASTROINTESTINAL ENDOSCOPY  08/18/2014      General Information     Row Name 01/16/23 8680           OT Time and Intention    Document Type therapy note (daily note)  [facility]     Mode of Treatment occupational therapy  -[facility] Name 01/16/23 1630          General Information    Patient Profile Reviewed yes  -           User Key  (r) = Recorded By, (t) = Taken By, (c) = Cosigned By    Initials Name Provider Type     Kim Wick Occupational Therapist                 Mobility/ADL's     [facility] Name 01/16/23 1632          Bed Mobility    Comment, (Bed Mobility) pt sitting up in her chair upon arrival  -[facility] Name 01/16/23 1632          Transfers    Comment, (Transfers) pt declined standing with OT  -[facility] Name 01/16/23 1632          Grooming Assessment/Training    [place] Level (Grooming) hair care, combing/brushing;oral care regimen;wash face, hands;set up  [facility]           User Key  (r) = Recorded By, (t) = Taken By, (c) = Cosigned By    Initials Name Provider Type     Kim Wick Occupational Therapist               Obj/Interventions    No documentation.                Goals/Plan    No documentation.                Clinical Impression     [facility] Name 01/16/23 1636          Pain Assessment    Pretreatment Pain Rating 5/10  -     Posttreatment Pain Rating 5/10  -     Pain Location lower  -     Pain Location - back  -     Pre/Posttreatment Pain Comment placed pillows under each arm while sitting in her chair  -     Pain Intervention(s) Repositioned  -[facility] Name 01/16/23 1636          Plan of Care Review    Plan of Care Reviewed With patient  -     Progress improving  -     Outcome Evaluation Pt received sitting reclined in her chair, declined getting out of the chair today, but agreeable to grooming tasks.  Pt set up for washing her face, applying lotion,  combing her hair and brushing her teeth.  Pt noted with decreased L shoulder ROM which limits her ability to reach overhead to comb her hair.  Pt uses R arm for most grooming tasks.  Pt was able to reach forward with her L arm  to  her water cup.  Cont OT per POC.  -     Row Name 01/16/23 1636          Therapy Plan Review/Discharge Plan (OT)    Anticipated Discharge Disposition (OT) inpatient rehabilitation facility  -     Row Name 01/16/23 1636          Positioning and Restraints    Pre-Treatment Position sitting in chair/recliner  -     Post Treatment Position chair  -     In Chair reclined;call light within reach;encouraged to call for assist;exit alarm on  -           User Key  (r) = Recorded By, (t) = Taken By, (c) = Cosigned By    Initials Name Provider Type    Kim Vigil Occupational Therapist               Outcome Measures     Row Name 01/16/23 1641          How much help from another is currently needed...    Putting on and taking off regular lower body clothing? 2  -AH     Bathing (including washing, rinsing, and drying) 2  -AH     Toileting (which includes using toilet bed pan or urinal) 2  -AH     Putting on and taking off regular upper body clothing 3  -AH     Taking care of personal grooming (such as brushing teeth) 3  -AH     Eating meals 4  -AH     AM-PAC 6 Clicks Score (OT) 16  -     Row Name 01/16/23 1553 01/16/23 0800       How much help from another person do you currently need...    Turning from your back to your side while in flat bed without using bedrails? 3  -RM 3  -BM    Moving from lying on back to sitting on the side of a flat bed without bedrails? 3  -RM 3  -BM    Moving to and from a bed to a chair (including a wheelchair)? 2  -RM 2  -BM    Standing up from a chair using your arms (e.g., wheelchair, bedside chair)? 2  -RM 2  -BM    Climbing 3-5 steps with a railing? 1  -RM 1  -BM    To walk in hospital room? 1  -RM 1  -BM    AM-PAC 6 Clicks Score (PT) 12  -RM 12  -BM    Highest level of mobility 4 --> Transferred to chair/commode  -RM 4 --> Transferred to chair/commode  -BM    Row Name 01/16/23 1641 01/16/23 1553       Functional Assessment    Outcome Measure Options AM-PAC 6 Clicks  Daily Activity (OT)  - AM-PAC 6 Clicks Basic Mobility (PT)  -          User Key  (r) = Recorded By, (t) = Taken By, (c) = Cosigned By    Initials Name Provider Type     Kim Wick Occupational Therapist     Gabriel Olivares, PTA Physical Therapist Assistant     Jessica Kamara, RN Registered Nurse                Occupational Therapy Education     Title: PT OT SLP Therapies (Done)     Topic: Occupational Therapy (Done)     Point: ADL training (Done)     Description:   Instruct learner(s) on proper safety adaptation and remediation techniques during self care or transfers.   Instruct in proper use of assistive devices.              Learning Progress Summary           Patient Acceptance, E,TB, VU by  at 1/16/2023 1641    Comment: benefit of working with therapy    Acceptance, E, VU by  at 1/16/2023 1051    Acceptance, E,TB, VU by SD at 1/13/2023 1426    Comment: OT POC                   Point: Home exercise program (Done)     Description:   Instruct learner(s) on appropriate technique for monitoring, assisting and/or progressing therapeutic exercises/activities.              Learning Progress Summary           Patient Acceptance, E, VU by  at 1/16/2023 1051                   Point: Precautions (Done)     Description:   Instruct learner(s) on prescribed precautions during self-care and functional transfers.              Learning Progress Summary           Patient Acceptance, E, VU by  at 1/16/2023 1051                   Point: Body mechanics (Done)     Description:   Instruct learner(s) on proper positioning and spine alignment during self-care, functional mobility activities and/or exercises.              Learning Progress Summary           Patient Acceptance, E, VU by  at 1/16/2023 1051                               User Key     Initials Effective Dates Name Provider Type [facility] 06/16/21 -  Kim Wick Occupational Therapist OT    SD 06/16/21 -  Ирина Delaney OT  Occupational Therapist OT     12/12/22 -  Jessica Kamara, RN Registered Nurse Nurse              OT Recommendation and Plan     Plan of Care Review  Plan of Care Reviewed With: patient  Progress: improving  Outcome Evaluation: Pt received sitting reclined in her chair, declined getting out of the chair today, but agreeable to grooming tasks.  Pt set up for washing her face, applying lotion,  combing her hair and brushing her teeth.  Pt noted with decreased L shoulder ROM which limits her ability to reach overhead to comb her hair.  Pt uses R arm for most grooming tasks.  Pt was able to reach forward with her L arm to  her water cup.  Cont OT per POC.     Time Calculation:    Time Calculation- OT     Row Name 01/16/23 1642             Time Calculation- OT    OT Start Time 1614  -      OT Stop Time 1630  -      OT Time Calculation (min) 16 min  -      OT Received On 01/16/23  -      OT Goal Re-Cert Due Date 01/25/23  -         Timed Charges    98852 - OT Self Care/Mgmt Minutes 16  -         Total Minutes    Timed Charges Total Minutes 16  -       Total Minutes 16  -            User Key  (r) = Recorded By, (t) = Taken By, (c) = Cosigned By    Initials Name Provider Type     Kim Wick Occupational Therapist              Therapy Charges for Today     Code Description Service Date Service Provider Modifiers Qty    [id number] HC OT SELF CARE/MGMT/TRAIN EA 15 MIN 1/16/2023 Kim Wick GO 1               Kim Wick  1/16/2023

## 2023-01-17 LAB
ALBUMIN SERPL-MCNC: 2.9 G/DL (ref 3.5–5.2)
ANION GAP SERPL CALCULATED.3IONS-SCNC: 8.5 MMOL/L (ref 5–15)
BACTERIA SPEC AEROBE CULT: NORMAL
BACTERIA SPEC AEROBE CULT: NORMAL
BASOPHILS # BLD AUTO: 0.01 10*3/MM3 (ref 0–0.2)
BASOPHILS NFR BLD AUTO: 0.1 % (ref 0–1.5)
BUN SERPL-MCNC: 59 MG/DL (ref 8–23)
BUN/CREAT SERPL: 24.4 (ref 7–25)
CALCIUM SPEC-SCNC: 9.4 MG/DL (ref 8.6–10.5)
CHLORIDE SERPL-SCNC: 99 MMOL/L (ref 98–107)
CO2 SERPL-SCNC: 21.5 MMOL/L (ref 22–29)
CREAT SERPL-MCNC: 2.42 MG/DL (ref 0.57–1)
DEPRECATED RDW RBC AUTO: 55.9 FL (ref 37–54)
EGFRCR SERPLBLD CKD-EPI 2021: 19.8 ML/MIN/1.73
EOSINOPHIL # BLD AUTO: 0 10*3/MM3 (ref 0–0.4)
EOSINOPHIL NFR BLD AUTO: 0 % (ref 0.3–6.2)
ERYTHROCYTE [DISTWIDTH] IN BLOOD BY AUTOMATED COUNT: 15 % (ref 12.3–15.4)
GLUCOSE SERPL-MCNC: 132 MG/DL (ref 65–99)
HCT VFR BLD AUTO: 26.5 % (ref 34–46.6)
HGB BLD-MCNC: 8.7 G/DL (ref 12–15.9)
IMM GRANULOCYTES # BLD AUTO: 0.1 10*3/MM3 (ref 0–0.05)
IMM GRANULOCYTES NFR BLD AUTO: 1.2 % (ref 0–0.5)
LYMPHOCYTES # BLD AUTO: 0.96 10*3/MM3 (ref 0.7–3.1)
LYMPHOCYTES NFR BLD AUTO: 11.7 % (ref 19.6–45.3)
MCH RBC QN AUTO: 34 PG (ref 26.6–33)
MCHC RBC AUTO-ENTMCNC: 32.8 G/DL (ref 31.5–35.7)
MCV RBC AUTO: 103.5 FL (ref 79–97)
MONOCYTES # BLD AUTO: 0.57 10*3/MM3 (ref 0.1–0.9)
MONOCYTES NFR BLD AUTO: 6.9 % (ref 5–12)
NEUTROPHILS NFR BLD AUTO: 6.57 10*3/MM3 (ref 1.7–7)
NEUTROPHILS NFR BLD AUTO: 80.1 % (ref 42.7–76)
NRBC BLD AUTO-RTO: 1.6 /100 WBC (ref 0–0.2)
PHOSPHATE SERPL-MCNC: 3 MG/DL (ref 2.5–4.5)
PLATELET # BLD AUTO: 221 10*3/MM3 (ref 140–450)
PMV BLD AUTO: 10 FL (ref 6–12)
POTASSIUM SERPL-SCNC: 5 MMOL/L (ref 3.5–5.2)
RBC # BLD AUTO: 2.56 10*6/MM3 (ref 3.77–5.28)
SODIUM SERPL-SCNC: 129 MMOL/L (ref 136–145)
WBC NRBC COR # BLD: 8.21 10*3/MM3 (ref 3.4–10.8)

## 2023-01-17 PROCEDURE — 63710000001 PREDNISONE PER 5 MG: Performed by: INTERNAL MEDICINE

## 2023-01-17 PROCEDURE — 25010000002 EPOETIN ALFA-EPBX 20000 UNIT/ML SOLUTION: Performed by: INTERNAL MEDICINE

## 2023-01-17 PROCEDURE — 97110 THERAPEUTIC EXERCISES: CPT

## 2023-01-17 PROCEDURE — 85025 COMPLETE CBC W/AUTO DIFF WBC: CPT | Performed by: STUDENT IN AN ORGANIZED HEALTH CARE EDUCATION/TRAINING PROGRAM

## 2023-01-17 PROCEDURE — 80069 RENAL FUNCTION PANEL: CPT | Performed by: INTERNAL MEDICINE

## 2023-01-17 PROCEDURE — 25010000002 HEPARIN (PORCINE) PER 1000 UNITS: Performed by: STUDENT IN AN ORGANIZED HEALTH CARE EDUCATION/TRAINING PROGRAM

## 2023-01-17 PROCEDURE — 99232 SBSQ HOSP IP/OBS MODERATE 35: CPT | Performed by: INTERNAL MEDICINE

## 2023-01-17 RX ORDER — TORSEMIDE 10 MG/1
10 TABLET ORAL DAILY
Status: DISCONTINUED | OUTPATIENT
Start: 2023-01-17 | End: 2023-01-18

## 2023-01-17 RX ADMIN — PANTOPRAZOLE SODIUM 40 MG: 40 TABLET, DELAYED RELEASE ORAL at 05:15

## 2023-01-17 RX ADMIN — ASPIRIN 81 MG: 81 TABLET, COATED ORAL at 08:55

## 2023-01-17 RX ADMIN — LOSARTAN POTASSIUM 50 MG: 50 TABLET, FILM COATED ORAL at 08:55

## 2023-01-17 RX ADMIN — HEPARIN SODIUM 5000 UNITS: 5000 INJECTION INTRAVENOUS; SUBCUTANEOUS at 13:59

## 2023-01-17 RX ADMIN — Medication 10 ML: at 08:56

## 2023-01-17 RX ADMIN — CARVEDILOL 25 MG: 25 TABLET, FILM COATED ORAL at 18:07

## 2023-01-17 RX ADMIN — PREDNISONE 10 MG: 10 TABLET ORAL at 08:55

## 2023-01-17 RX ADMIN — SENNOSIDES AND DOCUSATE SODIUM 2 TABLET: 50; 8.6 TABLET ORAL at 08:55

## 2023-01-17 RX ADMIN — TORSEMIDE 10 MG: 10 TABLET ORAL at 08:55

## 2023-01-17 RX ADMIN — Medication 10 ML: at 21:54

## 2023-01-17 RX ADMIN — ACETAMINOPHEN 325 MG: 325 TABLET, FILM COATED ORAL at 21:53

## 2023-01-17 RX ADMIN — HEPARIN SODIUM 5000 UNITS: 5000 INJECTION INTRAVENOUS; SUBCUTANEOUS at 21:49

## 2023-01-17 RX ADMIN — EPOETIN ALFA-EPBX 20000 UNITS: 20000 INJECTION, SOLUTION INTRAVENOUS; SUBCUTANEOUS at 09:35

## 2023-01-17 RX ADMIN — HYDROXYCHLOROQUINE SULFATE 200 MG: 200 TABLET ORAL at 08:55

## 2023-01-17 RX ADMIN — QUETIAPINE FUMARATE 12.5 MG: 25 TABLET ORAL at 21:52

## 2023-01-17 RX ADMIN — CARVEDILOL 25 MG: 25 TABLET, FILM COATED ORAL at 08:55

## 2023-01-17 RX ADMIN — HEPARIN SODIUM 5000 UNITS: 5000 INJECTION INTRAVENOUS; SUBCUTANEOUS at 05:15

## 2023-01-17 NOTE — PAYOR COMM NOTE
"To:  Susie  From: Cally Cool RN  Phone: [phone]  Fax: [phone]  NPI: [id number]  TIN: [id number]    Carolyne Lopes (82 y.o. Female)     Date of Birth   1940    Social Security Number       Address   [address] [place] [address] [place] [address]    Home Phone   [phone]    MRN   [id number]       P[employer]ec[employer]alism   Voodoo    Marital Status                               Admission Date   1/12/23    Admission Type   Emergency    Admitting Provider   Kerley, Brian Joseph, DO    Attending Provider   Stepan Bass DO    Department, Room/Bed   [facility] TELEMETRY 3, 320/1       Discharge Date       Discharge Disposition       Discharge Destination                               Attending Provider: Stepan Bass DO    Allergies: Ferrlecit [Na Ferric Gluc Cplx In Sucrose], Ranitidine Hcl, Levofloxacin, Lisinopril    Isolation: None   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)   Code Status: No CPR    Ht: 149.9 cm (59\")   Wt: 49 kg (108 lb 0.4 oz)    Admission Cmt: None   Principal Problem: Acute cystitis without hematuria [N30.00]                 Active Insurance as of 1/12/2023     Primary Coverage     Payor Plan Insurance Group Employer/Plan Group    ANTHEM MEDICARE REPLACEMENT ANTHEM MEDICARE ADVANTAGE [id number]     Payor Plan Address Payor Plan Phone Number Payor Plan Fax Number Effective Dates    [address] [phone]  7/1/2022 - None Entered    [facility] [id number]       Subscriber Name Subscriber Birth Date Member ID       CAROLYNE LOPES ALTAGRACIA 1940 [id number]           Secondary Coverage     Payor Plan Insurance Group Employer/Plan Group    ANTHEM BLUE CROSS ANTHEM [facility] SUPP [id number]     Payor Plan Address Payor Plan Phone Number Payor Plan Fax Number Effective Dates    [address]   12/1/2016 - None Entered    [facility] [id number]       Subscriber Name Subscriber Birth Date Member ID       CAROLYNE LOPES ALTAGRACIA 1940 [id number]                 Emergency Contacts  "     (Rel.) Home Phone Work Phone Mobile Phone    Magno Martins (Son) [phone] -- [phone]    Linda Banda (Friend) [phone] -- --            Vital Signs (last day)     Date/Time Temp Temp src Pulse Resp BP Patient Position SpO2    01/17/23 1100 97.7 (36.5) Oral 77 18 140/70 Lying 97    01/17/23 0700 98.4 (36.9) Oral 73 18 158/77 Lying 98    01/17/23 0401 98.1 (36.7) Oral 77 16 161/80 Lying 98    01/16/23 2300 98.2 (36.8) Oral 80 18 121/94 Lying 97    01/16/23 1918 97.8 (36.6) Oral 100 18 109/62 Lying 98    01/16/23 1515 98 (36.7) Oral 80 18 138/88 Lying 97    01/16/23 1145 98.1 (36.7) Oral 78 18 114/59 Lying 97    01/16/23 0655 98 (36.7) Oral 74 18 163/75 Lying 98    01/16/23 0330 97.6 (36.4) Axillary 75 17 158/84 Lying 97          Current Facility-Administered Medications   Medication Dose Route Frequency Provider Last Rate Last Admin   • acetaminophen (TYLENOL) tablet 650 mg  650 mg Oral Q4H PRN Kerley, Brian Joseph, DO   650 mg at 01/16/23 2212    Or   • acetaminophen (TYLENOL) 160 MG/5ML solution 650 mg  650 mg Oral Q4H PRN Kerley, Brian Joseph, DO        Or   • acetaminophen (TYLENOL) suppository 650 mg  650 mg Rectal Q4H PRN Kerley, Brian Joseph, DO       • aspirin EC tablet 81 mg  81 mg Oral Daily Kerley, Brian Joseph, DO   81 mg at 01/17/23 0855   • sennosides-docusate (PERICOLACE) 8.6-50 MG per tablet 2 tablet  2 tablet Oral BID Kerley, Brian Joseph, DO   2 tablet at 01/17/23 0855    And   • polyethylene glycol (MIRALAX) packet 17 g  17 g Oral Daily PRN Kerley, Brian Joseph, DO        And   • bisacodyl (DULCOLAX) EC tablet 5 mg  5 mg Oral Daily PRN Kerley, Brian Joseph, DO   5 mg at 01/16/23 1304    And   • bisacodyl (DULCOLAX) suppository 10 mg  10 mg Rectal Daily PRN Kerley, Brian Joseph, DO       • carvedilol (COREG) tablet 25 mg  25 mg Oral BID With Meals Kerley, Brian Joseph,    25 mg at 01/17/23 0855   • heparin (porcine) 5000 UNIT/ML injection 5,000 Units  5,000 Units  Subcutaneous Q8H Kerley, Brian Joseph, DO   5,000 Units at 01/17/23 0515   • hydroxychloroquine (PLAQUENIL) tablet 200 mg  200 mg Oral Q24H Clay Schilling MD, FASN   200 mg at 01/17/23 0855   • losartan (COZAAR) tablet 50 mg  50 mg Oral Q24H Kerley, Brian Joseph, DO   50 mg at 01/17/23 0855   • ondansetron (ZOFRAN) injection 4 mg  4 mg Intravenous Q6H PRN Kerley, Brian Joseph, DO       • pantoprazole (PROTONIX) EC tablet 40 mg  40 mg Oral Q AM Kerley, Brian Joseph, DO   40 mg at 01/17/23 0515   • predniSONE (DELTASONE) tablet 10 mg  10 mg Oral Daily With Breakfast Clay Schilling MD, POLA   10 mg at 01/17/23 0855   • QUEtiapine (SEROquel) tablet 12.5 mg  12.5 mg Oral Nightly Clay Scihlling MD, FASN   12.5 mg at 01/16/23 2032   • sodium chloride 0.9 % flush 10 mL  10 mL Intravenous Q12H Kerley, Brian Joseph, DO   10 mL at 01/17/23 0856   • sodium chloride 0.9 % flush 10 mL  10 mL Intravenous PRN Kerley, Brian Joseph, DO       • sodium chloride 0.9 % infusion 40 mL  40 mL Intravenous PRN Kerley, Brian Joseph, DO       • torsemide (DEMADEX) tablet 10 mg  10 mg Oral Daily Clay Schilling MD FASN   10 mg at 01/17/23 0855     Lab Results (last 24 hours)     Procedure Component Value Units Date/Time    Renal Function Panel [[id number]]  (Abnormal) Collected: 01/17/23 0545    Specimen: Blood Updated: 01/17/23 0835     Glucose 132 mg/dL      BUN 59 mg/dL      Creatinine 2.42 mg/dL      Sodium 129 mmol/L      Potassium 5.0 mmol/L      Chloride 99 mmol/L      CO2 21.5 mmol/L      Calcium 9.4 mg/dL      Albumin 2.9 g/dL      Phosphorus 3.0 mg/dL      Anion Gap 8.5 mmol/L      BUN/Creatinine Ratio 24.4     eGFR 19.8 mL/min/1.73      Comment: National Kidney Foundation and American Society of Nephrology (ASN) Task Force recommended calculation based on the Chronic Kidney Disease Epidemiology Collaboration (CKD-EPI) equation refit without adjustment for race.       Narrative:      GFR Normal >60  Chronic Kidney Disease  <60  Kidney Failure <15    The GFR formula is only valid for adults with stable renal function between ages 18 and 70.    CBC Auto Differential [[id number]]  (Abnormal) Collected: 01/17/23 0545    Specimen: Blood Updated: 01/17/23 0602     WBC 8.21 10*3/mm3      RBC 2.56 10*6/mm3      Hemoglobin 8.7 g/dL      Hematocrit 26.5 %      .5 fL      MCH 34.0 pg      MCHC 32.8 g/dL      RDW 15.0 %      RDW-SD 55.9 fl      MPV 10.0 fL      Platelets 221 10*3/mm3      Neutrophil % 80.1 %      Lymphocyte % 11.7 %      Monocyte % 6.9 %      Eosinophil % 0.0 %      Basophil % 0.1 %      Immature Grans % 1.2 %      Neutrophils, Absolute 6.57 10*3/mm3      Lymphocytes, Absolute 0.96 10*3/mm3      Monocytes, Absolute 0.57 10*3/mm3      Eosinophils, Absolute 0.00 10*3/mm3      Basophils, Absolute 0.01 10*3/mm3      Immature Grans, Absolute 0.10 10*3/mm3      nRBC 1.6 /100 WBC     Blood Culture - Blood, Arm, Right [[id number]]  (Normal) Collected: 01/12/23 1616    Specimen: Blood from Arm, Right Updated: 01/16/23 1631     Blood Culture No growth at 4 days    Blood Culture - Blood, Arm, Left [[id number]]  (Normal) Collected: 01/12/23 1613    Specimen: Blood from Arm, Left Updated: 01/16/23 1631     Blood Culture No growth at 4 days        Imaging Results (Last 24 Hours)     ** No results found for the last 24 hours. **        Orders (last 24 hrs)      Start     Ordered    01/18/23 0600  Renal Function Panel  Daily       01/17/23 0810    01/18/23 0600  CBC (No Diff)  Morning Draw         01/17/23 1209    01/18/23 0600  Basic Metabolic Panel  Morning Draw         01/17/23 1209    01/17/23 1000  Epoetin Jefe-epbx (RETACRIT) injection 20,000 Units  Once         01/16/23 1823    01/17/23 0900  torsemide (DEMADEX) tablet 10 mg  Daily         01/17/23 0810    01/17/23 0812  Renal Function Panel  Once         01/17/23 0812    01/17/23 0800  predniSONE (DELTASONE) tablet 10 mg  Daily With Breakfast         01/16/23 1819    01/16/23 1918   hydroxychloroquine (PLAQUENIL) tablet 200 mg  Every 24 Hours Scheduled         01/16/23 1820    01/15/23 2100  QUEtiapine (SEROquel) tablet 12.5 mg  Nightly         01/15/23 1557    01/14/23 2100  sennosides-docusate (PERICOLACE) 8.6-50 MG per tablet 2 tablet  2 Times Daily        See Hyperspace for full Linked Orders Report.    01/14/23 1820    01/14/23 1820  bisacodyl (DULCOLAX) suppository 10 mg  Daily PRN        See Hyperspace for full Linked Orders Report.    01/14/23 1820    01/14/23 1820  polyethylene glycol (MIRALAX) packet 17 g  Daily PRN        See Hyperspace for full Linked Orders Report.    01/14/23 1820    01/14/23 1820  bisacodyl (DULCOLAX) EC tablet 5 mg  Daily PRN        See Hyperspace for full Linked Orders Report.    01/14/23 1820    01/13/23 0800  Dietary Nutrition Supplements Boost Plus (Ensure Enlive, Ensure Plus)  2 Times Daily       01/13/23 0750    01/13/23 0600  pantoprazole (PROTONIX) EC tablet 40 mg  Every Early Morning         01/12/23 1601    01/13/23 0600  CBC Auto Differential  Daily       01/12/23 1601    01/12/23 2200  heparin (porcine) 5000 UNIT/ML injection 5,000 Units  Every 8 Hours Scheduled         01/12/23 1602    01/12/23 2100  sodium chloride 0.9 % flush 10 mL  Every 12 Hours Scheduled         01/12/23 1601    01/12/23 1800  carvedilol (COREG) tablet 25 mg  2 Times Daily With Meals         01/12/23 1601    01/12/23 1800  Oral Care  2 Times Daily       01/12/23 1601    01/12/23 1700  aspirin EC tablet 81 mg  Daily         01/12/23 1601    01/12/23 1700  losartan (COZAAR) tablet 50 mg  Every 24 Hours Scheduled         01/12/23 1601    01/12/23 1600  Vital Signs  Every 4 Hours       01/12/23 1601    01/12/23 1600  Intake & Output  Every Shift       01/12/23 1601    01/12/23 1559  ondansetron (ZOFRAN) injection 4 mg  Every 6 Hours PRN         01/12/23 1602    01/12/23 1559  sodium chloride 0.9 % flush 10 mL  As Needed         01/12/23 1601    01/12/23 1559  sodium chloride 0.9 %  infusion 40 mL  As Needed         23 1601    23 1559  acetaminophen (TYLENOL) tablet 650 mg  Every 4 Hours PRN        See Hyperspace for full Linked Orders Report.    23 1601    23 1559  acetaminophen (TYLENOL) 160 MG/5ML solution 650 mg  Every 4 Hours PRN        See Hyperspace for full Linked Orders Report.    23 1601    23 1559  acetaminophen (TYLENOL) suppository 650 mg  Every 4 Hours PRN        See Hyperspace for full Linked Orders Report.    23 1601    23 0000  cephalexin (KEFLEX) 500 MG capsule  2 Times Daily         23 1147    Unscheduled  Up With Assistance  As Needed       23 1601    --  B Complex Vitamins (vitamin b complex) capsule capsule  Daily         23 1512                   Physician Progress Notes (last 48 hours)      Stepan Bass, DO at 23 1202              [facility]    [facility]ESS [facility]    Name:  Carolyne Martins   Age:  82 y.o.  Sex:  female  :  1940  MRN:  [id number]   Visit Number:  [id number]  Admission Date:  2023  Date Of Service:  23  Primary Care Physician:  Jerald Dawkins MD     LOS: 5 days :    Chief Complaint:      Weakness    Subjective:    Patient doing well.  Resting comfortably in bed eating breakfast.  No acute events overnight per nursing staff.    Hospital Course:       Patient is an 82-year-old woman with past medical history of CKD follows with nephrology Dr. Fitzgerald, coronary artery disease follows with Dr. Acuña, arthritis, back pain, hyperparathyroidism, hypertension, anemia, osteoarthritis, rheumatic heart disease, valvular heart disease.  Presented to [facility] ED on 2023 feeling generally weak progressively worse over a few days.  Son at bedside said its been more difficult for her to transfer in and out of the wheelchair, she is had some falls recently.  She has some pain in her left leg.  She denied any shortness of air, chest pain, nausea, vomiting,  abdominal pain, cough, dysuria.  Patient lives alone, son lives across the street and helps with some things but he works at least 40 hours a week is away from home a lot.     ED summary: Afebrile, vital signs stable on room air.  Blood glucose 106, sodium 133, creatinine 3.82 baseline may be around 3.3-3.5, chronic anemia hemoglobin 9.4, pattern of infection on urinalysis, urine culture collected, COVID/flu negative CXR unremarkable CT abdomen/pelvis new, mild pericardial effusion, small bilateral renal cystic lesions and hepatic cystic lesion compared to prior exam, suspected hematoma formation left inferior medial buttock.  CT lumbar spine no acute abnormality, multilevel lumbar degenerative disc disease present.  She was provided Rocephin and is normal saline 500 mL bolus.  ED personnel did attempt to discharge, but patient not able to take care of herself at home with current resources.  Social work has recommended at least social admit for short-term rehab placement.     Inpatient general floor admission 1/12/2023 for UTI requiring IV antibiotics Rocephin, JASMEET on CKD, acute on chronic debility and impaired ADLs.     Urine culture resulted mixed growth.  Patient completed antibiotic course with Rocephin.   Nephrology-Dr. Fitzgerald is well familiar with patient for over 20 years, concern for needing dialysis in the near future.  Patient does appear to need long-term care placement, unable to care for self at home with declining ability to perform ADLs.  Do suspect acute decline in part contributed to inflammatory arthritis, therefore provided IV Solu-Medrol with transition to low dose oral prednisone.       Review of Systems:     All systems were reviewed and negative except as mentioned in subjective, assessment and plan.    Vital Signs:    Temp:  [97.7 °F (36.5 °C)-98.4 °F (36.9 °C)] 97.7 °F (36.5 °C)  Heart Rate:  [] 77  Resp:  [16-18] 18  BP: (109-161)/(62-94) 140/70    Intake and output:    I/O last 3  completed shifts:  In: 720 [P.O.:720]  Out: 1700 [Urine:1700]  No intake/output data recorded.    Physical Examination:    General Appearance:  Alert and cooperative.    Head:  Atraumatic and normocephalic.   Eyes: Conjunctivae and sclerae normal, no icterus. No pallor.   Throat: No oral lesions, no thrush, oral mucosa moist.   Neck: Supple, trachea midline, no thyromegaly.   Lungs:   Breath sounds heard bilaterally equally.  No wheezing or crackles.    Heart:  Normal S1 and S2, no murmur, No JVD.   Abdomen:   Normal bowel sounds, no masses, no organomegaly. Soft, nontender, nondistended, no rebound tenderness.   Extremities: Supple, no edema, no cyanosis, no clubbing.   Skin: No bleeding or rash.   Neurologic: Alert and oriented x 3. No facial asymmetry. Moves all four limbs. No tremors.      Laboratory results:    Results from last 7 days   Lab Units 01/17/23  0545 01/16/23  0609 01/15/23  0723 01/13/23  0555 01/12/23  0937   SODIUM mmol/L 129* 134* 137   < > 133*   POTASSIUM mmol/L 5.0 5.0 4.2   < > 5.1   CHLORIDE mmol/L 99 104 108*   < > 102   CO2 mmol/L 21.5* 20.3* 19.3*   < > 19.2*   BUN mg/dL 59* 43* 42*   < > 46*   CREATININE mg/dL 2.42* 2.44* 2.72*   < > 3.82*   CALCIUM mg/dL 9.4 9.9 9.7   < > 8.9   BILIRUBIN mg/dL  --   --   --   --  0.3   ALK PHOS U/L  --   --   --   --  134*   ALT (SGPT) U/L  --   --   --   --  13   AST (SGOT) U/L  --   --   --   --  30   GLUCOSE mg/dL 132* 134* 92   < > 106*    < > = values in this interval not displayed.     Results from last 7 days   Lab Units 01/17/23  0545 01/16/23  0609 01/15/23  0723   WBC 10*3/mm3 8.21 3.61 5.37   HEMOGLOBIN g/dL 8.7* 9.1* 8.5*   HEMATOCRIT % 26.5* 28.3* 26.7*   PLATELETS 10*3/mm3 221 222 226             Results from last 7 days   Lab Units 01/12/23  1616 01/12/23  1613 01/12/23  0937   BLOODCX  No growth at 4 days No growth at 4 days  --    URINECX   --   --  25,000 CFU/mL Mixed Leena Isolated     No results for input(s): PHART, QMW4IYP, PO2ART,  ARK5WSO, BASEEXCESS in the last 8760 hours.   I have reviewed the patient's laboratory results.    Radiology results:    No radiology results from the last 24 hrs  I have reviewed the patient's radiology reports.    Medication Review:     I have reviewed the patient's active and prn medications.     Problem List:      Acute cystitis without hematuria    Debility    Left leg injury    JASMEET (acute kidney injury) ([facility])      Assessment:    1. UTI  2. JASMEET CKD  3. Left leg pain status post mechanical fall with hematoma  4. Flare of chronic inflammatory arthritis  5. Acute on chronic debility    Plan:    Inpatient general floor admission 1/12/2023 for UTI requiring IV antibiotics Rocephin, JASMEET on CKD, acute on chronic debility and impaired ADLs.    UTI   -Rocephin started 1/12  -Urine culture collected 1/12, mixed campos.  -Blood culture collected 1/12, no growth      JASMEET on CKD  -Nephrology consultation, patient follows with Dr. Fitzgerald.     Mechanical fall  Left Leg pain  Hematoma, left inferior medial buttock  -XR left knee, tib/fib, ankle, left hip; no acute fracture.     Inflammatory arthritis, chronic  -Transition from IV Solu-Medrol to low-dose prednisone.  Patient's acute debility may be exacerbated by underlying inflammatory conditions.  -Restarted on Plaquenil     Acute on chronic debility  -PT/OT.  -She may need long-term care, she has been progressively getting weaker and likely not able to take care of her self at home.     Hyperparathyroidism  -PTH, phosphorus.     Renal cystic lesions  Hepatic cystic lesions  -Follow-up outpatient    DVT Prophylaxis: Heparin subcu  Code Status: DNR  Diet: Renal  Discharge Plan: Discharge to short-term rehab tomorrow    Stepan Bass DO  01/17/23  12:02 EST    Dictated utilizing Dragon dictation.        Electronically signed by Stepan Bass DO at 01/17/23 7145     Kerley, Brian Joseph, DO at 01/16/23 1102              [facility]    PROGRESS  NOTE    Name:  Carolyne Martins   Age:  82 y.o.  Sex:  female  :  1940  MRN:  [id number]   Visit Number:  [id number]  Admission Date:  2023  Date Of Service:  23  Primary Care Physician:  Jerald Dawkins MD     LOS: 4 days :    Chief Complaint:      Follow-up; generalized weakness, left leg pain    Subjective:    Feeling okay today.    Hospital Course:    Patient is an 82-year-old woman with past medical history of CKD follows with nephrology Dr. Fitzgerald, coronary artery disease follows with Dr. Acuña, arthritis, back pain, hyperparathyroidism, hypertension, anemia, osteoarthritis, rheumatic heart disease, valvular heart disease.  Presented to [facility] ED on 2023 feeling generally weak progressively worse over a few days.  Son at bedside said its been more difficult for her to transfer in and out of the wheelchair, she is had some falls recently.  She has some pain in her left leg.  She denied any shortness of air, chest pain, nausea, vomiting, abdominal pain, cough, dysuria.  Patient lives alone, son lives across the street and helps with some things but he works at least 40 hours a week is away from home a lot.     ED summary: Afebrile, vital signs stable on room air.  Blood glucose 106, sodium 133, creatinine 3.82 baseline may be around 3.3-3.5, chronic anemia hemoglobin 9.4, pattern of infection on urinalysis, urine culture collected, COVID/flu negative CXR unremarkable CT abdomen/pelvis new, mild pericardial effusion, small bilateral renal cystic lesions and hepatic cystic lesion compared to prior exam, suspected hematoma formation left inferior medial buttock.  CT lumbar spine no acute abnormality, multilevel lumbar degenerative disc disease present.  She was provided Rocephin and is normal saline 500 mL bolus.  ED personnel did attempt to discharge, but patient not able to take care of herself at home with current resources.  Social work has recommended at least social admit for short-term  rehab placement.    Inpatient general floor admission 1/12/2023 for UTI requiring IV antibiotics Rocephin, JASMEET on CKD, acute on chronic debility and impaired ADLs.    Urine culture resulted mixed growth.  Nephrology-Dr. Fitzgerald is well familiar with patient for over 20 years, concern for needing dialysis in the near future.  Patient does appear to need long-term care placement, unable to care for self at home with declining ability to perform ADLs.  Do suspect acute decline in part contributed to inflammatory arthritis, therefore provided IV Solu-Medrol with plan to possibly start low-dose prednisone afterwards.    -Follow-up; renal cystic lesions, hepatic cystic lesions.    Review of Systems:     All systems were reviewed and negative except as mentioned in subjective, assessment and plan.    Vital Signs:    Temp:  [97.6 °F (36.4 °C)-98.7 °F (37.1 °C)] 98 °F (36.7 °C)  Heart Rate:  [] 74  Resp:  [16-18] 18  BP: (139-163)/(74-88) 163/75    Intake and output:    I/O last 3 completed shifts:  In: 540.4 [P.O.:480; I.V.:60.4]  Out: 1925 [Urine:1925]  No intake/output data recorded.    Physical Examination:    General Appearance:  Alert and cooperative.    Head:  Atraumatic and normocephalic.   Eyes: Conjunctivae and sclerae normal, no icterus. No pallor.   Throat: No oral lesions, no thrush, oral mucosa moist.   Neck: Supple, trachea midline, no thyromegaly.   Lungs:   Breath sounds heard bilaterally equally.  No wheezing or crackles. No Pleural rub or bronchial breathing.   Heart:  Normal S1 and S2, no murmur, no gallop, no rub. No JVD.   Abdomen:   Normal bowel sounds, no masses, no organomegaly. Soft, nontender, nondistended, no rebound tenderness.   Extremities:  Some mild tenderness left knee and distal.   Skin:  Warm   Neurologic: Alert and oriented x 3. No facial asymmetry. Moves all four limbs. No tremors.      Laboratory results:    Results from last 7 days   Lab Units 01/16/23  0609 01/15/23  0345  01/14/23  0733 01/13/23  0555 01/12/23  0937   SODIUM mmol/L 134* 137 135*   < > 133*   POTASSIUM mmol/L 5.0 4.2 4.3   < > 5.1   CHLORIDE mmol/L 104 108* 107   < > 102   CO2 mmol/L 20.3* 19.3* 20.6*   < > 19.2*   BUN mg/dL 43* 42* 46*   < > 46*   CREATININE mg/dL 2.44* 2.72* 3.07*   < > 3.82*   CALCIUM mg/dL 9.9 9.7 9.0   < > 8.9   BILIRUBIN mg/dL  --   --   --   --  0.3   ALK PHOS U/L  --   --   --   --  134*   ALT (SGPT) U/L  --   --   --   --  13   AST (SGOT) U/L  --   --   --   --  30   GLUCOSE mg/dL 134* 92 98   < > 106*    < > = values in this interval not displayed.     Results from last 7 days   Lab Units 01/16/23  0609 01/15/23  0723 01/14/23  0733   WBC 10*3/mm3 3.61 5.37 4.22   HEMOGLOBIN g/dL 9.1* 8.5* 8.3*   HEMATOCRIT % 28.3* 26.7* 26.5*   PLATELETS 10*3/mm3 222 226 194             Results from last 7 days   Lab Units 01/12/23  1616 01/12/23  1613 01/12/23  0937   BLOODCX  No growth at 3 days No growth at 3 days  --    URINECX   --   --  25,000 CFU/mL Mixed Leena Isolated     No results for input(s): PHART, RQS6WRQ, PO2ART, QYC1QKJ, BASEEXCESS in the last 8760 hours.   I have reviewed the patient's laboratory results.    Radiology results:    No radiology results from the last 24 hrs  I have reviewed the patient's radiology reports.    Medication Review:     I have reviewed the patient's active and prn medications.     Problem List:      Acute cystitis without hematuria    Debility    Left leg injury    JASMEET (acute kidney injury) ([facility])      Assessment/Plan:    Inpatient general floor admission 1/12/2023 for UTI requiring IV antibiotics Rocephin, JASMEET on CKD, acute on chronic debility and impaired ADLs.    Urine culture resulted mixed growth.  Nephrology-Dr. Fitzgerald is well familiar with patient for over 20 years, concern for needing dialysis in the near future but maybe not this hospitalization.  Patient does appear to need long-term care placement, unable to care for self at home with declining ability to  perform ADLs.  Do suspect acute decline in part contributed to inflammatory arthritis, therefore provided IV Solu-Medrol with plan to possibly start low-dose prednisone afterwards.    1/13  -Afebrile, vital signs stable on room air.  Creatinine slightly improved to 3.74.  Hemoglobin 8.0.  1/14  -Afebrile, vital signs stable on room air.  Creatinine improved to 3.07, GFR 14.  Hemoglobin 8.3.  1/15  -Afebrile, vital signs stable on room air.  Creatinine 2.72.  Patient declined PT today, discussed with her importance of being evaluated for placement.  She voiced understanding and agreement.  1/16  -Afebrile, vital signs stable on room air.  Sodium 134, creatinine improved to 2.44/GFR 19.  Hemoglobin 9.1.     UTI   -Rocephin started 1/12  -Urine culture collected 1/12, mixed campos.  -Blood culture collected 1/12, no growth 24 hours.     JASMEET on CKD  -Nephrology consultation, patient follows with Dr. Fitzgerald.     Mechanical fall  Left Leg pain  Hematoma, left inferior medial buttock  -XR left knee, tib/fib, ankle, left hip; no acute fracture.    Inflammatory arthritis, chronic  -IV Solu-Medrol, then low-dose prednisone.  Patient's acute debility may be exacerbated by underlying inflammatory conditions.  -She reportedly has stopped her Plaquenil, was transitioned to low-dose prednisone with higher doses for flares.     Acute on chronic debility  -PT/OT.  -She may need long-term care, she has been progressively getting weaker and likely not able to take care of her self at home.    Hyperparathyroidism  -PTH, phosphorus.    Renal cystic lesions  Hepatic cystic lesions  -Follow-up outpatient     Chronic:  CKD follows with nephrology Dr. Fitzgerald, coronary artery disease follows with Dr. Acuña, arthritis, back pain, hyperparathyroidism, hypertension, anemia of chronic disease, osteoarthritis, rheumatic heart disease, valvular heart disease.     Continue home medications.       Risk Assessment: Moderate  DVT Prophylaxis:  Heparin  Code Status:  DNR/DNI  Diet: Renal, nutrition recommendations; boost twice daily.  Discharge Plan: PT/OT, placement    -Follow-up; renal cystic lesions, hepatic cystic lesions.    Brian Joseph Kerley, DO  23  09:37 EST    Dictated utilizing Dragon dictation.      Electronically signed by Kerley, Brian Joseph, DO at 23 1128     Clay Schilling MD, FASN at 23 0819                [facility] Progress Note  [facility]. [place]        Patient Name: Carolyne Martins  : 1940  MRN: [id number]   LOS: 4 days    Patient Care Team:  Jerald Dawkins MD as PCP - General (Family Medicine)  Daryn Spears MD as Consulting Physician (General Surgery)    Chief Complaint:    Chief Complaint   Patient presents with   • Illness     Primary Care Physician:  Jerald Dawkins MD  Date of admission: 2023    Subjective     Interval History:   Follow-up chronic kidney disease stage V .  Events noted from last 24 hours.  Patient is less anxious, laying in the bed having breakfast.  Denies any chest pain or shortness of breath.  Review of Systems:   As noted above.    Objective     Vitals:   Temp:  [97.6 °F (36.4 °C)-98.7 °F (37.1 °C)] 98 °F (36.7 °C)  Heart Rate:  [] 74  Resp:  [16-18] 18  BP: (139-163)/(74-88) 163/75    Intake/Output Summary (Last 24 hours) at 2023 0819  Last data filed at 2023 0330  Gross per 24 hour   Intake 540.42 ml   Output 1300 ml   Net -759.58 ml       Physical Exam:    General Appearance: alert, oriented x 3, no acute distress   Skin: warm and dry  HEENT: oral mucosa normal, nonicteric sclera  Neck: supple, no JVD  Lungs: CTA  Heart: RRR, normal S1 and S2, 2/6 systolic ejection murmur.  Abdomen: soft, nontender, nondistended, positive bowel sounds  : no palpable bladder  Extremities: Trace edema, no cyanosis or clubbing  Neuro: normal speech and mental status     Scheduled Meds:     Current Facility-Administered Medications    Medication Dose Route Frequency Provider Last Rate Last Admin   • acetaminophen (TYLENOL) tablet 650 mg  650 mg Oral Q4H PRN Kerley, Brian Joseph, DO   650 mg at 01/13/23 0603    Or   • acetaminophen (TYLENOL) 160 MG/5ML solution 650 mg  650 mg Oral Q4H PRN Kerley, Brian Joseph, DO        Or   • acetaminophen (TYLENOL) suppository 650 mg  650 mg Rectal Q4H PRN Kerley, Brian Joseph, DO       • aspirin EC tablet 81 mg  81 mg Oral Daily Kerley, Brian Joseph, DO   81 mg at 01/15/23 0800   • sennosides-docusate (PERICOLACE) 8.6-50 MG per tablet 2 tablet  2 tablet Oral BID Kerley, Brian Joseph, DO   2 tablet at 01/15/23 0800    And   • polyethylene glycol (MIRALAX) packet 17 g  17 g Oral Daily PRN Kerley, Brian Joseph, DO        And   • bisacodyl (DULCOLAX) EC tablet 5 mg  5 mg Oral Daily PRN Kerley, Brian Joseph, DO        And   • bisacodyl (DULCOLAX) suppository 10 mg  10 mg Rectal Daily PRN Kerley, Brian Joseph, DO       • carvedilol (COREG) tablet 25 mg  25 mg Oral BID With Meals Kerley, Brian Joseph, DO   25 mg at 01/15/23 1705   • heparin (porcine) 5000 UNIT/ML injection 5,000 Units  5,000 Units Subcutaneous Q8H Kerley, Brian Joseph, DO   5,000 Units at 01/16/23 0614   • losartan (COZAAR) tablet 50 mg  50 mg Oral Q24H Kerley, Brian Joseph, DO   50 mg at 01/15/23 0800   • methylPREDNISolone sodium succinate (SOLU-Medrol) injection 80 mg  80 mg Intravenous Daily Clay Schilling MD, FASN   80 mg at 01/15/23 1705   • ondansetron (ZOFRAN) injection 4 mg  4 mg Intravenous Q6H PRN Kerley, Brian Joseph, DO       • pantoprazole (PROTONIX) EC tablet 40 mg  40 mg Oral Q AM Kerley, Brian Joseph, DO   40 mg at 01/16/23 0614   • QUEtiapine (SEROquel) tablet 12.5 mg  12.5 mg Oral Nightly Clay Schilling MD, FASN   12.5 mg at 01/15/23 2041   • sodium chloride 0.9 % flush 10 mL  10 mL Intravenous Q12H Kerley, Brian Joseph,    10 mL at 01/15/23 2041   • sodium chloride 0.9 % flush 10 mL  10 mL Intravenous PRN Kerley, Brian  DO Kiran       • sodium chloride 0.9 % infusion 40 mL  40 mL Intravenous PRN Kerley, Brian Joseph, DO           aspirin, 81 mg, Oral, Daily  carvedilol, 25 mg, Oral, BID With Meals  heparin (porcine), 5,000 Units, Subcutaneous, Q8H  losartan, 50 mg, Oral, Q24H  methylPREDNISolone sodium succinate, 80 mg, Intravenous, Daily  pantoprazole, 40 mg, Oral, Q AM  QUEtiapine, 12.5 mg, Oral, Nightly  senna-docusate sodium, 2 tablet, Oral, BID  sodium chloride, 10 mL, Intravenous, Q12H        IV Meds:        Results Reviewed:   I have personally reviewed the results from the time of this admission to 1/16/2023 08:19 EST     Results from last 7 days   Lab Units 01/16/23  0609 01/15/23  0723 01/14/23  0733 01/13/23  0555 01/12/23  0937   SODIUM mmol/L 134* 137 135*   < > 133*   POTASSIUM mmol/L 5.0 4.2 4.3   < > 5.1   CHLORIDE mmol/L 104 108* 107   < > 102   CO2 mmol/L 20.3* 19.3* 20.6*   < > 19.2*   BUN mg/dL 43* 42* 46*   < > 46*   CREATININE mg/dL 2.44* 2.72* 3.07*   < > 3.82*   CALCIUM mg/dL 9.9 9.7 9.0   < > 8.9   BILIRUBIN mg/dL  --   --   --   --  0.3   ALK PHOS U/L  --   --   --   --  134*   ALT (SGPT) U/L  --   --   --   --  13   AST (SGOT) U/L  --   --   --   --  30   GLUCOSE mg/dL 134* 92 98   < > 106*    < > = values in this interval not displayed.       Estimated Creatinine Clearance: 14.1 mL/min (A) (by C-G formula based on SCr of 2.44 mg/dL (H)).    Results from last 7 days   Lab Units 01/16/23  0609 01/15/23  0723 01/14/23  0733   PHOSPHORUS mg/dL 4.7* 3.6 3.7  3.7             Results from last 7 days   Lab Units 01/16/23  0609 01/15/23  0723 01/14/23  0733 01/13/23  0555 01/12/23  0937   WBC 10*3/mm3 3.61 5.37 4.22 4.45 8.14   HEMOGLOBIN g/dL 9.1* 8.5* 8.3* 8.0* 9.4*   PLATELETS 10*3/mm3 222 226 194 209 203             Brief Urine Lab Results  (Last result in the past 365 days)      Color   Clarity   Blood   Leuk Est   Nitrite   Protein   CREAT   Urine HCG        01/12/23 0937 Yellow   Turbid   Small (1+)    Large (3+)   Negative   30 mg/dL (1+)                 No results found for: UTPCR    Imaging Results (Last 24 Hours)     ** No results found for the last 24 hours. **              Assessment / Plan     ASSESSMENT:  1. Chronic kidney disease stage IV: Baseline chronic kidney disease stage IV secondary to longstanding atherosclerotic and glomerulosclerosis.  Patient has refused to talk about dialysis, son has always agreed to what ever she says.  No indications for acute dialysis at this point.  But she may have some worsening of her baseline chronic kidney disease may end up requiring dialysis in the near future.  2. Anemia of chronic kidney disease: Longstanding history of need for IV iron as well as LIU, missed multiple doses in the recent past.  3. Hyperparathyroidism: Check and follow PTH.  Check phosphorus.  4. Acute cystitis without hematuria: Treated as per hospitalist service  5. Hypertension with chronic kidney disease stage IV: Blood pressure appears to be under optimal control with current medications.  6. Coronary artery disease: Significant atherosclerotic cardiovascular disease follows up with Dr. Acuña, she has missed her multiple recent appointments, last time she saw him was more than a year ago.  7. Valvular heart disease: Severe aortic sclerosis.  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  Then she was started on prednisone, she was on 2.5 mg a day for some time with some relief and for acute episodes she will go up to 10 mg a day for 3 days.  She finally decided against it and stopped taking it.  9. Rheumatoid arthritis: Same is true as noted above  10. Debility: Patient recognizes the debility and wants to go to a rehab.  11. Left leg injury      PLAN:  · Renal function is stable.  · Her arthritis seems to be slightly better today, after 2 doses of Solu-Medrol 80 mg she will be started on 10 mg of prednisone daily starting tomorrow.  I will  also start her on Plaquenil 200 mg once a day.  · I also encouraged her not to refuse physical therapy.    · Her iron stores are adequate she got received a dose of Procrit, will continue the dose on weekly basis.  · Details were discussed with the patient as well as her son Magno.   · Details were also discussed with the hospitalist service.  Patient has been getting weaker, unable to take care of her at home she will need the rehab placement.  She may end up long-term nursing home patient.  · Continue with rest of the current treatment plan, and monitor with surveillance labs.  · Further recommendations will depend on clinical course of the patient during the current hospitalization.     Thank you for involving us in the care of Carolyne Martins.  Please feel free to call with any questions.    Clay Schilling MD, LUISAN  01/16/23  08:19 [facility]    [facility]  [phone]      Part of this note may be an electronic transcription/translation of spoken language to printed text using the Dragon Dictation System.                   Electronically signed by Clay Schilling MD, LUISAN at 01/16/23 5175       Consult Notes (last 24 hours)  Notes from 01/16/23 1239 through 01/17/23 1239   No notes of this type exist for this encounter.

## 2023-01-17 NOTE — THERAPY TREATMENT NOTE
"Patient Name: Carolyne Martins  : 1940    MRN: [id number]                              Today's Date: 2023       Admit Date: 2023    Visit Dx:     ICD-10-CM ICD-9-CM   1. Acute cystitis without hematuria  N30.00 595.0   2. Chronic kidney disease, unspecified CKD stage  N18.9 585.9   3. Debility  R53.81 799.3     Patient Active Problem List   Diagnosis   • Arthralgia of shoulder region   • Tendinopathy of rotator cuff   • Osteoarthritis of right acromioclavicular joint   • Anemia due to stage 4 chronic kidney disease treated with erythropoietin ([facility])   • Anemia   • Gastric polyp   • Gastroesophageal reflux disease without esophagitis   • Melena   • Change in bowel habits   • Colon cancer screening   • Senile osteoporosis   • Personal history of colonic polyps   • Closed fracture of left hip ([facility])   • Impaired mobility and ADLs   • CKD (chronic kidney disease) stage 4, GFR 15-29 ml/min ([facility])   • Simple chronic bronchitis ([facility])   • Essential hypertension   • Lower extremity edema   • Acute cystitis without hematuria   • Debility   • Left leg injury   • JASMEET (acute kidney injury) ([facility])     Past Medical History:   Diagnosis Date   • Anemia    • Anemia    • Arthritis    • Back pain    • Bleeding from anus    • Bronchitis     STATES HAS BRONCHITIS CURRENTLY (17).     • Bronchitis 2017   • CAD (coronary artery disease)    • Cataract, bilateral    • Chronic kidney disease     STAGE 4.  SEES TERA   • Chronic kidney failure     REPORTS STAGE IV   • Difficulty swallowing solids    • Disease of thyroid gland    • Fracture, radius 2016    right distal radius and ulna, healed.   • GERD (gastroesophageal reflux disease)    • Gout    • High cholesterol    • History of blood transfusion    • History of nuclear stress test 2014    DR. CORTEZ.  \"IT WAS OK\"   • Hyperparathyroidism ([facility])    • Hyperparathyroidism ([facility])    • Hypertension    • Impaired functional mobility, balance, gait, and endurance    • " Impaired mobility    • Iron deficiency    • Osteoarthritis    • Osteoarthritis of knees, bilateral     Both knees Supartz injection series August, 2015   • Osteoporosis    • Palpitations    • Personal history of cardiac murmur    • Pessary maintenance    • PONV (postoperative nausea and vomiting)    • Presence of pessary    • Problems with swallowing     WITH FOOD OCCASSIONALLY   • Rheumatic heart disease     Personal history   • Rotator cuff tendonitis     right    • Rupture of right proximal biceps tendon     chronic   • Seasonal allergies    • Sinus problem    • Spinal headache     REPORTS AFTER DELIVERY OF SON   • Subacromial bursitis     right    • Valvular heart disease    • Vision problems    • Vitamin B12 deficiency    • Vitamin D deficiency    • Wears glasses      Past Surgical History:   Procedure Laterality Date   • CATARACT EXTRACTION Bilateral    • CERVICAL POLYPECTOMY     • COLONOSCOPY  2011   • COLONOSCOPY N/A 1/9/2018    Procedure: COLONOSCOPY with endoscopic mucosal resection (hot snare), normal saline submucosal injection, argon thermal ablation, resolution clip placement x4, and cold biopsy polypectomy;  Surgeon: Pieter Concepcion MD;  Location: [facility] ENDOSCOPY;  Service:    • ENDOSCOPY N/A 12/6/2017    Procedure: ESOPHAGOGASTRODUODENOSCOPY with biopsies and hot snare polypectomies;  Surgeon: Pieter Concepcion MD;  Location: [facility] ENDOSCOPY;  Service:    • ENDOSCOPY N/A 1/15/2019    Procedure: ESOPHAGOGASTRODUODENOSCOPY WITH BIOPSIES AND HOT SNARE POLYPECTOMIES X10;  Surgeon: Pieter Concepcion MD;  Location: [facility] ENDOSCOPY;  Service: Gastroenterology   • HIP TROCHANTERIC NAILING WITH INTRAMEDULLARY HIP SCREW Left 2/14/2021    Procedure: HIP TROCHANTER NAIL SHORT WITH INTRAMEDULLARY HIP SCREW, LEFT;  Surgeon: Gabriele Loyd MD;  Location: [facility] OR;  Service: Orthopedics;  Laterality: Left;   • UPPER GASTROINTESTINAL ENDOSCOPY  08/18/2014      General Information     Row Name 01/17/23 1323           OT Time and Intention    Document Type therapy note (daily note)  -     Mode of Treatment occupational therapy  -           User Key  (r) = Recorded By, (t) = Taken By, (c) = Cosigned By    Initials Name Provider Type    Kim Vigil Occupational Therapist                 Mobility/ADL's     Row Name 01/17/23 1322          Bed Mobility    Supine-Sit Edcouch (Bed Mobility) verbal cues;standby assist  -     Assistive Device (Bed Mobility) head of bed elevated;bed rails  -     Row Name 01/17/23 1322          Transfers    Transfers bed-chair transfer  -     Row Name 01/17/23 1322          Bed-Chair Transfer    Bed-Chair Edcouch (Transfers) contact guard;verbal cues  -     Assistive Device (Bed-Chair Transfers) other (see comments)  -     Comment, (Bed-Chair Transfer) lateral scoot bed to chair  -           User Key  (r) = Recorded By, (t) = Taken By, (c) = Cosigned By    Initials Name Provider Type     Kim Wick Occupational Therapist               Obj/Interventions     Row Name 01/17/23 1325          Shoulder (Therapeutic Exercise)    Shoulder (Therapeutic Exercise) AROM (active range of motion)  -     Shoulder AROM (Therapeutic Exercise) right;flexion;extension;bilateral;scapular elevation;10 repetitions  -     Row Name 01/17/23 1325          Elbow/Forearm (Therapeutic Exercise)    Elbow/Forearm (Therapeutic Exercise) AROM (active range of motion)  -     Elbow/Forearm AROM (Therapeutic Exercise) bilateral;flexion;extension  -     Row Name 01/17/23 1325          Motor Skills    Therapeutic Exercise shoulder;elbow/forearm  -           User Key  (r) = Recorded By, (t) = Taken By, (c) = Cosigned By    Initials Name Provider Type    Kim Vigil Occupational Therapist               Goals/Plan    No documentation.                Clinical Impression     Row Name 01/17/23 1326          Pain Assessment    Pretreatment Pain Rating 5/10  -     Posttreatment Pain Rating  5/10  [facility]     Pain Location - Side/Orientation Bilateral  -     Pain Location lower  -     Pain Location - extremity  -     Pain Intervention(s) Repositioned  -     Row Name 01/17/23 1326          Plan of Care Review    Plan of Care Reviewed With patient  -     Progress improving  -     Outcome Evaluation Pt received supine in bed, willing to do UB ex as per flow sheet.  Pt sba to sit eob and was able to perform lateral scoot transfer from bed to chair with cga.  Pt was left in bed with PT working with her.  Pt progressing with therapy.  Cont OT per POC.  -     Row Name 01/17/23 1326          Positioning and Restraints    Pre-Treatment Position in bed  -     Post Treatment Position chair  -     In Chair sitting;call light within reach;encouraged to call for assist;with PT  -           User Key  (r) = Recorded By, (t) = Taken By, (c) = Cosigned By    Initials Name Provider Type     Kim Wick Occupational Therapist               Outcome Measures     Row Name 01/17/23 1329          How much help from another is currently needed...    Putting on and taking off regular lower body clothing? 2  -AH     Bathing (including washing, rinsing, and drying) 2  -AH     Toileting (which includes using toilet bed pan or urinal) 2  -AH     Putting on and taking off regular upper body clothing 3  -AH     Taking care of personal grooming (such as brushing teeth) 3  -AH     Eating meals 4  -AH     AM-PAC 6 Clicks Score (OT) 16  -     Row Name 01/17/23 1200 01/17/23 0800       How much help from another person do you currently need...    Turning from your back to your side while in flat bed without using bedrails? 3  -RM 3  -BM    Moving from lying on back to sitting on the side of a flat bed without bedrails? 3  -RM 3  -BM    Moving to and from a bed to a chair (including a wheelchair)? 3  -RM 2  -BM    Standing up from a chair using your arms (e.g., wheelchair, bedside chair)? 2  -RM 2  -BM    Climbing 3-5  steps with a railing? 1  -RM 1  -BM    To walk in hospital room? 1  -RM 1  -BM    AM-PAC 6 Clicks Score (PT) 13  -RM 12  -BM    Highest level of mobility 4 --> Transferred to chair/commode  -RM 4 --> Transferred to chair/commode  -BM    Row Name 01/17/23 1329 01/17/23 1200       Functional Assessment    Outcome Measure Options AM-PAC 6 Clicks Daily Activity (OT)  - AM-PAC 6 Clicks Basic Mobility (PT)  -RM          User Key  (r) = Recorded By, (t) = Taken By, (c) = Cosigned By    Initials Name Provider Type     Kim Wick Occupational Therapist     Gabriel Olivares, PTA Physical Therapist Assistant     Jessica Kamara, RN Registered Nurse                Occupational Therapy Education     Title: PT OT SLP Therapies (Done)     Topic: Occupational Therapy (Done)     Point: ADL training (Done)     Description:   Instruct learner(s) on proper safety adaptation and remediation techniques during self care or transfers.   Instruct in proper use of assistive devices.              Learning Progress Summary           Patient Acceptance, E,TB, VU by  at 1/17/2023 1329    Comment: benefit of working with therapy to improve her functional strength and independence with ADL tasks.    Acceptance, E, VU by  at 1/17/2023 0752    Acceptance, E,TB, VU by  at 1/16/2023 1641    Comment: benefit of working with therapy    Acceptance, E, VU by  at 1/16/2023 1051    Acceptance, E,TB, VU by SD at 1/13/2023 1426    Comment: OT POC                   Point: Home exercise program (Done)     Description:   Instruct learner(s) on appropriate technique for monitoring, assisting and/or progressing therapeutic exercises/activities.              Learning Progress Summary           Patient Acceptance, E,TB, VU by  at 1/17/2023 1329    Comment: benefit of working with therapy to improve her functional strength and independence with ADL tasks.    Acceptance, E, VU by  at 1/17/2023 0752    Acceptance, E, VU by  at 1/16/2023  1051                   Point: Precautions (Done)     Description:   Instruct learner(s) on prescribed precautions during self-care and functional transfers.              Learning Progress Summary           Patient Acceptance, E, VU by  at 1/17/2023 0752    Acceptance, E, VU by  at 1/16/2023 1051                   Point: Body mechanics (Done)     Description:   Instruct learner(s) on proper positioning and spine alignment during self-care, functional mobility activities and/or exercises.              Learning Progress Summary           Patient Acceptance, E, VU by  at 1/17/2023 0752    Acceptance, E, VU by  at 1/16/2023 1051                               User Key     Initials Effective Dates Name Provider Type Discipline     06/16/21 -  Kim Wick Occupational Therapist OT    SD 06/16/21 -  Ирина Delaney OT Occupational Therapist OT     12/12/22 -  Jessica Kamara RN Registered Nurse Nurse              OT Recommendation and Plan     Plan of Care Review  Plan of Care Reviewed With: patient  Progress: improving  Outcome Evaluation: Pt received supine in bed, willing to do UB ex as per flow sheet.  Pt sba to sit eob and was able to perform lateral scoot transfer from bed to chair with cga.  Pt was left in bed with PT working with her.  Pt progressing with therapy.  Cont OT per POC.     Time Calculation:    Time Calculation- OT     Row Name 01/17/23 1330             Time Calculation- OT    OT Start Time 1117  -      OT Stop Time 1135  -      OT Time Calculation (min) 18 min  -      OT Received On 01/17/23  -      OT Goal Re-Cert Due Date 01/25/23  -         Timed Charges    18833 - OT Therapeutic Exercise Minutes 10  -      98064 - OT Therapeutic Activity Minutes 8  -         Total Minutes    Timed Charges Total Minutes 18  -       Total Minutes 18  -            User Key  (r) = Recorded By, (t) = Taken By, (c) = Cosigned By    Initials Name Provider Type     Kim Wick  Occupational Therapist              Therapy Charges for Today     Code Description Service Date Service Provider Modifiers Qty    90298541728 HC OT SELF CARE/MGMT/TRAIN EA 15 MIN 1/16/2023 Kim Wick    [id number]  OT THER PROC EA 15 MIN 1/17/2023 Kim Wick  1/17/2023

## 2023-01-17 NOTE — THERAPY TREATMENT NOTE
"Patient Name: Carolyne Martins  : 1940    MRN: [id number]                              Today's Date: 2023       Admit Date: 2023    Visit Dx:     ICD-10-CM ICD-9-CM   1. Acute cystitis without hematuria  N30.00 595.0   2. Chronic kidney disease, unspecified CKD stage  N18.9 585.9   3. Debility  R53.81 799.3     Patient Active Problem List   Diagnosis   • Arthralgia of shoulder region   • Tendinopathy of rotator cuff   • Osteoarthritis of right acromioclavicular joint   • Anemia due to stage 4 chronic kidney disease treated with erythropoietin (Formerly [facility])   • Anemia   • Gastric polyp   • Gastroesophageal reflux disease without esophagitis   • Melena   • Change in bowel habits   • Colon cancer screening   • Senile osteoporosis   • Personal history of colonic polyps   • Closed fracture of left hip (Formerly [facility])   • Impaired mobility and ADLs   • CKD (chronic kidney disease) stage 4, GFR 15-29 ml/min (Formerly [facility])   • Simple chronic bronchitis (Formerly [facility])   • Essential hypertension   • Lower extremity edema   • Acute cystitis without hematuria   • Debility   • Left leg injury   • JASMEET (acute kidney injury) (Formerly [facility])     Past Medical History:   Diagnosis Date   • Anemia    • Anemia    • Arthritis    • Back pain    • Bleeding from anus    • Bronchitis     STATES HAS BRONCHITIS CURRENTLY (17).     • Bronchitis 2017   • CAD (coronary artery disease)    • Cataract, bilateral    • Chronic kidney disease     STAGE 4.  SEES TERA   • Chronic kidney failure     REPORTS STAGE IV   • Difficulty swallowing solids    • Disease of thyroid gland    • Fracture, radius 2016    right distal radius and ulna, healed.   • GERD (gastroesophageal reflux disease)    • Gout    • High cholesterol    • History of blood transfusion    • History of nuclear stress test 2014    DR. CORTEZ.  \"IT WAS OK\"   • Hyperparathyroidism (Formerly [facility])    • Hyperparathyroidism (Formerly [facility])    • Hypertension    • Impaired functional mobility, balance, gait, and endurance    • " Impaired mobility    • Iron deficiency    • Osteoarthritis    • Osteoarthritis of knees, bilateral     Both knees Supartz injection series August, 2015   • Osteoporosis    • Palpitations    • Personal history of cardiac murmur    • Pessary maintenance    • PONV (postoperative nausea and vomiting)    • Presence of pessary    • Problems with swallowing     WITH FOOD OCCASSIONALLY   • Rheumatic heart disease     Personal history   • Rotator cuff tendonitis     right    • Rupture of right proximal biceps tendon     chronic   • Seasonal allergies    • Sinus problem    • Spinal headache     REPORTS AFTER DELIVERY OF SON   • Subacromial bursitis     right    • Valvular heart disease    • Vision problems    • Vitamin B12 deficiency    • Vitamin D deficiency    • Wears glasses      Past Surgical History:   Procedure Laterality Date   • CATARACT EXTRACTION Bilateral    • CERVICAL POLYPECTOMY     • COLONOSCOPY  2011   • COLONOSCOPY N/A 1/9/2018    Procedure: COLONOSCOPY with endoscopic mucosal resection (hot snare), normal saline submucosal injection, argon thermal ablation, resolution clip placement x4, and cold biopsy polypectomy;  Surgeon: Pieter Concepcion MD;  Location: [facility] ENDOSCOPY;  Service:    • ENDOSCOPY N/A 12/6/2017    Procedure: ESOPHAGOGASTRODUODENOSCOPY with biopsies and hot snare polypectomies;  Surgeon: Pieter Concepcion MD;  Location: [facility] ENDOSCOPY;  Service:    • ENDOSCOPY N/A 1/15/2019    Procedure: ESOPHAGOGASTRODUODENOSCOPY WITH BIOPSIES AND HOT SNARE POLYPECTOMIES X10;  Surgeon: Pieter Concepcion MD;  Location: [facility] ENDOSCOPY;  Service: Gastroenterology   • HIP TROCHANTERIC NAILING WITH INTRAMEDULLARY HIP SCREW Left 2/14/2021    Procedure: HIP TROCHANTER NAIL SHORT WITH INTRAMEDULLARY HIP SCREW, LEFT;  Surgeon: Gabirele Loyd MD;  Location: [facility] OR;  Service: Orthopedics;  Laterality: Left;   • UPPER GASTROINTESTINAL ENDOSCOPY  08/18/2014      General Information     Row Name 01/17/23 1152           Physical Therapy Time and Intention    Document Type therapy note (daily note)  -RM     Mode of Treatment physical therapy  -RM     Row Name 01/17/23 1155          General Information    Patient Profile Reviewed yes  -RM     Existing Precautions/Restrictions fall  -RM     Row Name 01/17/23 1155          Cognition    Orientation Status (Cognition) oriented x 4  -RM     Row Name 01/17/23 1155          Safety Issues, Functional Mobility    Safety Issues Affecting Function (Mobility) safety precautions follow-through/compliance;sequencing abilities;positioning of assistive device;safety precaution awareness  -RM     Impairments Affecting Function (Mobility) balance;endurance/activity tolerance;strength;pain;range of motion (ROM)  -RM           User Key  (r) = Recorded By, (t) = Taken By, (c) = Cosigned By    Initials Name Provider Type    Gabriel Doyle PTA Physical Therapist Assistant               Mobility     Row Name 01/17/23 1156          Bed Mobility    Supine-Sit [place] (Bed Mobility) verbal cues;standby assist  -RM     Assistive Device (Bed Mobility) head of bed elevated;bed rails  -RM     Row Name 01/17/23 1156          Bed-Chair Transfer    Bed-Chair [place] (Transfers) contact guard;verbal cues  -RM     Assistive Device (Bed-Chair Transfers) other (see comments)  -RM     Comment, (Bed-Chair Transfer) Lateral scoot with arm of chair removed.  -RM           User Key  (r) = Recorded By, (t) = Taken By, (c) = Cosigned By    Initials Name Provider Type    Gabriel Doyle PTA Physical Therapist Assistant               Obj/Interventions     Row Name 01/17/23 1157          Hip (Therapeutic Exercise)    Hip AAROM (Therapeutic Exercise) left;flexion;extension;aBduction;aDduction;2 sets;10 repetitions  -RM     Hip Strengthening (Therapeutic Exercise) right;flexion;extension;aBduction;aDduction;10 repetitions;2 sets  -RM     Row Name 01/17/23 1157          Knee (Therapeutic Exercise)    Knee  Strengthening (Therapeutic Exercise) bilateral;LAQ (long arc quad);10 repetitions;2 sets  -RM     Row Name 01/17/23 1157          Ankle (Therapeutic Exercise)    Ankle (Therapeutic Exercise) AROM (active range of motion)  -RM     Ankle AROM (Therapeutic Exercise) bilateral;dorsiflexion;plantarflexion;20 repititions  -RM           User Key  (r) = Recorded By, (t) = Taken By, (c) = Cosigned By    Initials Name Provider Type    Gabriel Doyle, MALI Physical Therapist Assistant               Goals/Plan    No documentation.                Clinical Impression     Row Name 01/17/23 1157          Pain    Pretreatment Pain Rating 5/10  -RM     Posttreatment Pain Rating 6/10  -RM     Pain Location - Side/Orientation Left  -RM     Pain Location generalized  -RM     Pain Location - hip  -RM     Row Name 01/17/23 1157          Plan of Care Review    Plan of Care Reviewed With patient  -RM     Progress improving  -RM     Outcome Evaluation Pt  supine in bed working with OT and willing to perform transfer to bedside recliner as well as seated B LE exercises.  Pt was sba to sit EOB  and cga/min a to perform lateral scoot from bed to chair with arm of chair removed.  Pt performed B LE seated exercises.  See flowsheet for details  -RM     Row Name 01/17/23 1157          Positioning and Restraints    Pre-Treatment Position in bed  -RM     Post Treatment Position chair  -RM     In Chair reclined;call light within reach;encouraged to call for assist;exit alarm on;notified nsg  -RM           User Key  (r) = Recorded By, (t) = Taken By, (c) = Cosigned By    Initials Name Provider Type    Gabriel Doyle, MALI Physical Therapist Assistant               Outcome Measures     Row Name 01/17/23 1200 01/17/23 0800       How much help from another person do you currently need...    Turning from your back to your side while in flat bed without using bedrails? 3  -RM 3  -BM    Moving from lying on back to sitting on the side of a flat bed  without bedrails? 3  -RM 3  -BM    Moving to and from a bed to a chair (including a wheelchair)? 3  -RM 2  -BM    Standing up from a chair using your arms (e.g., wheelchair, bedside chair)? 2  -RM 2  -BM    Climbing 3-5 steps with a railing? 1  -RM 1  -BM    To walk in hospital room? 1  -RM 1  -BM    AM-PAC 6 Clicks Score (PT) 13  -RM 12  -BM    Highest level of mobility 4 --> Transferred to chair/commode  -RM 4 --> Transferred to chair/commode  -BM    Row Name 01/17/23 1200          Functional Assessment    Outcome Measure Options AM-PAC 6 Clicks Basic Mobility (PT)  -RM           User Key  (r) = Recorded By, (t) = Taken By, (c) = Cosigned By    Initials Name Provider Type     Gabriel Olivares, PTA Physical Therapist Assistant     Jessica Kamara, RN Registered Nurse                             Physical Therapy Education     Title: PT OT SLP Therapies (Done)     Topic: Physical Therapy (Done)     Point: Mobility training (Done)     Learning Progress Summary           Patient Acceptance, E,TB, VU,Bed IU by  at 1/17/2023 1201    Comment: Importance of daily activity    Acceptance, E, VU by  at 1/17/2023 0752    Acceptance, E,TB,D, VU,NR by  at 1/16/2023 1554    Comment: exercise techniques    Acceptance, E, VU by  at 1/16/2023 1051    Acceptance, E, VU by MS at 1/13/2023 1455    Comment: importance of mobility                   Point: Home exercise program (Done)     Learning Progress Summary           Patient Acceptance, E, VU by  at 1/17/2023 0752    Acceptance, E,TB,D, VU,NR by  at 1/16/2023 1554    Comment: exercise techniques    Acceptance, E, VU by  at 1/16/2023 1051    Acceptance, E, VU by MS at 1/13/2023 1455    Comment: importance of mobility                   Point: Body mechanics (Done)     Learning Progress Summary           Patient Acceptance, E, VU by  at 1/17/2023 0752    Acceptance, E, VU by  at 1/16/2023 1051                   Point: Precautions (Done)     Learning Progress  Summary           Patient Acceptance, E, VU by  at 1/17/2023 0752    Acceptance, E, VU by  at 1/16/2023 1051                               User Key     Initials Effective Dates Name Provider Type Discipline     06/16/21 -  Gabriel Olivares, PTA Physical Therapist Assistant PT    MS 07/01/22 -  Prasanth Alvarado, PT Physical Therapist PT     12/12/22 -  Jessica Kamara, RN Registered Nurse Nurse              PT Recommendation and Plan     Plan of Care Reviewed With: patient  Progress: improving  Outcome Evaluation: Pt  supine in bed working with OT and willing to perform transfer to bedside recliner as well as seated B LE exercises.  Pt was sba to sit EOB  and cga/min a to perform lateral scoot from bed to chair with arm of chair removed.  Pt performed B LE seated exercises.  See flowsheet for details     Time Calculation:    PT Charges     Row Name 01/17/23 1201             Time Calculation    Start Time 1126  -RM      Stop Time 1140  -RM      Time Calculation (min) 14 min  -RM      PT Received On 01/17/23  -RM      PT Goal Re-Cert Due Date 01/23/23  -RM         Time Calculation- PT    Total Timed Code Minutes- PT 14 minute(s)  -RM         Timed Charges    76231 - PT Therapeutic Exercise Minutes 14  -RM         Total Minutes    Timed Charges Total Minutes 14  -RM       Total Minutes 14  -RM            User Key  (r) = Recorded By, (t) = Taken By, (c) = Cosigned By    Initials Name Provider Type     Gabriel Olivares, MALI Physical Therapist Assistant              Therapy Charges for Today     Code Description Service Date Service Provider Modifiers Qty    92387514202 HC PT THER PROC EA 15 MIN 1/16/2023 Gabriel Olivares, PTA GP 1    95763491644 HC PT THERAPEUTIC ACT EA 15 MIN 1/16/2023 Gabriel Olivares, PTA GP 1    78439808862 HC PT THER PROC EA 15 MIN 1/17/2023 Gabriel Olivares, PTA GP 1          PT G-Codes  Outcome Measure Options: AM-PAC 6 Clicks Basic Mobility (PT)  AM-PAC 6 Clicks Score (PT):  13  AM-PAC 6 Clicks Score (OT): 16       Gabriel Olivares, PTA  1/17/2023

## 2023-01-17 NOTE — PROGRESS NOTES
[facility]    Name:  Carolyne Martins   Age:  82 y.o.  Sex:  female  :  1940  MRN:  [id number]   Visit Number:  [id number]  Admission Date:  2023  Date Of Service:  23  Primary Care Physician:  Jerald Dawkins MD     LOS: 5 days :    Chief Complaint:      Weakness    Subjective:    Patient doing well.  Resting comfortably in bed eating breakfast.  No acute events overnight per nursing staff.    Hospital Course:       Patient is an 82-year-old woman with past medical history of CKD follows with nephrology Dr. Fitzgerald, coronary artery disease follows with Dr. Acuña, arthritis, back pain, hyperparathyroidism, hypertension, anemia, osteoarthritis, rheumatic heart disease, valvular heart disease.  Presented to [facility] on 2023 feeling generally weak progressively worse over a few days.  Son at bedside said its been more difficult for her to transfer in and out of the wheelchair, she is had some falls recently.  She has some pain in her left leg.  She denied any shortness of air, chest pain, nausea, vomiting, abdominal pain, cough, dysuria.  Patient lives alone, son lives across the street and helps with some things but he works at least 40 hours a week is away from home a lot.     ED summary: Afebrile, vital signs stable on room air.  Blood glucose 106, sodium 133, creatinine 3.82 baseline may be around 3.3-3.5, chronic anemia hemoglobin 9.4, pattern of infection on urinalysis, urine culture collected, COVID/flu negative CXR unremarkable CT abdomen/pelvis new, mild pericardial effusion, small bilateral renal cystic lesions and hepatic cystic lesion compared to prior exam, suspected hematoma formation left inferior medial buttock.  CT lumbar spine no acute abnormality, multilevel lumbar degenerative disc disease present.  She was provided Rocephin and is normal saline 500 mL bolus.  ED personnel did attempt to discharge, but patient not able to take care  of herself at home with current resources.  Social work has recommended at least social admit for short-term rehab placement.     Inpatient general floor admission 1/12/2023 for UTI requiring IV antibiotics Rocephin, JASMEET on CKD, acute on chronic debility and impaired ADLs.     Urine culture resulted mixed growth.  Patient completed antibiotic course with Rocephin.   Nephrology-Dr. Fitzgerald is well familiar with patient for over 20 years, concern for needing dialysis in the near future.  Patient does appear to need long-term care placement, unable to care for self at home with declining ability to perform ADLs.  Do suspect acute decline in part contributed to inflammatory arthritis, therefore provided IV Solu-Medrol with transition to low dose oral prednisone.       Review of Systems:     All systems were reviewed and negative except as mentioned in subjective, assessment and plan.    Vital Signs:    Temp:  [97.7 °F (36.5 °C)-98.4 °F (36.9 °C)] 97.7 °F (36.5 °C)  Heart Rate:  [] 77  Resp:  [16-18] 18  BP: (109-161)/(62-94) 140/70    Intake and output:    I/O last 3 completed shifts:  In: 720 [P.O.:720]  Out: 1700 [Urine:1700]  No intake/output data recorded.    Physical Examination:    General Appearance:  Alert and cooperative.    Head:  Atraumatic and normocephalic.   Eyes: Conjunctivae and sclerae normal, no icterus. No pallor.   Throat: No oral lesions, no thrush, oral mucosa moist.   Neck: Supple, trachea midline, no thyromegaly.   Lungs:   Breath sounds heard bilaterally equally.  No wheezing or crackles.    Heart:  Normal S1 and S2, no murmur, No JVD.   Abdomen:   Normal bowel sounds, no masses, no organomegaly. Soft, nontender, nondistended, no rebound tenderness.   Extremities: Supple, no edema, no cyanosis, no clubbing.   Skin: No bleeding or rash.   Neurologic: Alert and oriented x 3. No facial asymmetry. Moves all four limbs. No tremors.      Laboratory results:    Results from last 7 days   Lab Units  01/17/23  0545 01/16/23  0609 01/15/23  0723 01/13/23  0555 01/12/23  0937   SODIUM mmol/L 129* 134* 137   < > 133*   POTASSIUM mmol/L 5.0 5.0 4.2   < > 5.1   CHLORIDE mmol/L 99 104 108*   < > 102   CO2 mmol/L 21.5* 20.3* 19.3*   < > 19.2*   BUN mg/dL 59* 43* 42*   < > 46*   CREATININE mg/dL 2.42* 2.44* 2.72*   < > 3.82*   CALCIUM mg/dL 9.4 9.9 9.7   < > 8.9   BILIRUBIN mg/dL  --   --   --   --  0.3   ALK PHOS U/L  --   --   --   --  134*   ALT (SGPT) U/L  --   --   --   --  13   AST (SGOT) U/L  --   --   --   --  30   GLUCOSE mg/dL 132* 134* 92   < > 106*    < > = values in this interval not displayed.     Results from last 7 days   Lab Units 01/17/23  0545 01/16/23  0609 01/15/23  0723   WBC 10*3/mm3 8.21 3.61 5.37   HEMOGLOBIN g/dL 8.7* 9.1* 8.5*   HEMATOCRIT % 26.5* 28.3* 26.7*   PLATELETS 10*3/mm3 221 222 226             Results from last 7 days   Lab Units 01/12/23  1616 01/12/23  1613 01/12/23  0937   BLOODCX  No growth at 4 days No growth at 4 days  --    URINECX   --   --  25,000 CFU/mL Mixed Campos Isolated     No results for input(s): PHART, TZL0WAC, PO2ART, TVZ2XRP, BASEEXCESS in the last 8760 hours.   I have reviewed the patient's laboratory results.    Radiology results:    No radiology results from the last 24 hrs  I have reviewed the patient's radiology reports.    Medication Review:     I have reviewed the patient's active and prn medications.     Problem List:      Acute cystitis without hematuria    Debility    Left leg injury    JASMEET (acute kidney injury) (HCC)      Assessment:    1. UTI  2. JASMEET CKD  3. Left leg pain status post mechanical fall with hematoma  4. Flare of chronic inflammatory arthritis  5. Acute on chronic debility    Plan:    Inpatient general floor admission 1/12/2023 for UTI requiring IV antibiotics Rocephin, JASMEET on CKD, acute on chronic debility and impaired ADLs.    UTI   -Rocephin started 1/12  -Urine culture collected 1/12, mixed campos.  -Blood culture collected 1/12, no  growth      JASMEET on CKD  -Nephrology consultation, patient follows with Dr. Fitzgerald.     Mechanical fall  Left Leg pain  Hematoma, left inferior medial buttock  -XR left knee, tib/fib, ankle, left hip; no acute fracture.     Inflammatory arthritis, chronic  -Transition from IV Solu-Medrol to low-dose prednisone.  Patient's acute debility may be exacerbated by underlying inflammatory conditions.  -Restarted on Plaquenil     Acute on chronic debility  -PT/OT.  -She may need long-term care, she has been progressively getting weaker and likely not able to take care of her self at home.     Hyperparathyroidism  -PTH, phosphorus.     Renal cystic lesions  Hepatic cystic lesions  -Follow-up outpatient    DVT Prophylaxis: Heparin subcu  Code Status: DNR  Diet: Renal  Discharge Plan: Discharge to short-term rehab tomorrow    Stepan Bass DO  01/17/23  12:02 EST    Dictated utilizing Dragon dictation.

## 2023-01-17 NOTE — PLAN OF CARE
Goal Outcome Evaluation:            Patient is awaiting placement for reheab. Patient denies needs at this time.

## 2023-01-17 NOTE — PROGRESS NOTES
[facility] Progress Note  [facility]. [place]        Patient Name: Carolyne Martins  : 1940  MRN: [id number]   LOS: 5 days    Patient Care Team:  Jerald Dawkins MD as PCP - General (Family Medicine)  Daryn Spears MD as Consulting Physician (General Surgery)    Chief Complaint:    Chief Complaint   Patient presents with   • Illness     Primary Care Physician:  Jerald Dawkins MD  Date of admission: 2023    Subjective     Interval History:   Follow-up chronic kidney disease stage V .  Events noted from last 24 hours.  Patient is less anxious, laying in the bed having breakfast.  Denies any chest pain or shortness of breath.   Review of Systems:   As noted above.    Objective     Vitals:   Temp:  [97.8 °F (36.6 °C)-98.4 °F (36.9 °C)] 98.4 °F (36.9 °C)  Heart Rate:  [] 73  Resp:  [16-18] 18  BP: (109-161)/(59-94) 158/77    Intake/Output Summary (Last 24 hours) at 2023 0809  Last data filed at 2023 0404  Gross per 24 hour   Intake 720 ml   Output 600 ml   Net 120 ml       Physical Exam:    General Appearance: alert, oriented x 3, no acute distress   Skin: warm and dry  HEENT: oral mucosa normal, nonicteric sclera  Neck: supple, no JVD  Lungs: CTA  Heart: RRR, normal S1 and S2, 2/6 systolic ejection murmur.  Abdomen: soft, nontender, nondistended, positive bowel sounds  : no palpable bladder  Extremities: Trace edema, no cyanosis or clubbing  Neuro: normal speech and mental status     Scheduled Meds:     Current Facility-Administered Medications   Medication Dose Route Frequency Provider Last Rate Last Admin   • acetaminophen (TYLENOL) tablet 650 mg  650 mg Oral Q4H PRN Kerley, Brian Joseph, DO   650 mg at 232    Or   • acetaminophen (TYLENOL) 160 MG/5ML solution 650 mg  650 mg Oral Q4H PRN Kerley, Brian Joseph, DO        Or   • acetaminophen (TYLENOL) suppository 650 mg  650 mg Rectal Q4H PRN Kerley, Brian Joseph, DO       • aspirin EC  tablet 81 mg  81 mg Oral Daily Kerley, Brian Joseph, DO   81 mg at 01/16/23 0906   • sennosides-docusate (PERICOLACE) 8.6-50 MG per tablet 2 tablet  2 tablet Oral BID Kerley, Brian Joseph, DO   2 tablet at 01/16/23 2032    And   • polyethylene glycol (MIRALAX) packet 17 g  17 g Oral Daily PRN Kerley, Brian Joseph, DO        And   • bisacodyl (DULCOLAX) EC tablet 5 mg  5 mg Oral Daily PRN Kerley, Brian Joseph, DO   5 mg at 01/16/23 1304    And   • bisacodyl (DULCOLAX) suppository 10 mg  10 mg Rectal Daily PRN Kerley, Brian Joseph, DO       • carvedilol (COREG) tablet 25 mg  25 mg Oral BID With Meals Kerley, Brian Joseph, DO   25 mg at 01/16/23 1749   • Epoetin Jefe-epbx (RETACRIT) injection 20,000 Units  20,000 Units Subcutaneous Once Clay Schilling MD, FASN       • heparin (porcine) 5000 UNIT/ML injection 5,000 Units  5,000 Units Subcutaneous Q8H Kerley, Brian Joseph, DO   5,000 Units at 01/17/23 0515   • hydroxychloroquine (PLAQUENIL) tablet 200 mg  200 mg Oral Q24H Clay Schilling MD, FASN   200 mg at 01/16/23 1906   • losartan (COZAAR) tablet 50 mg  50 mg Oral Q24H Kerley, Brian Joseph, DO   50 mg at 01/16/23 0907   • ondansetron (ZOFRAN) injection 4 mg  4 mg Intravenous Q6H PRN Kerley, Brian Joseph, DO       • pantoprazole (PROTONIX) EC tablet 40 mg  40 mg Oral Q AM Kerley, Brian Joseph, DO   40 mg at 01/17/23 0515   • predniSONE (DELTASONE) tablet 10 mg  10 mg Oral Daily With Breakfast Clay Schilling MD, FASN       • QUEtiapine (SEROquel) tablet 12.5 mg  12.5 mg Oral Nightly Clay Schilling MD, FASN   12.5 mg at 01/16/23 2032   • sodium chloride 0.9 % flush 10 mL  10 mL Intravenous Q12H Kerley, Brian Joseph,    10 mL at 01/16/23 2032   • sodium chloride 0.9 % flush 10 mL  10 mL Intravenous PRN Kerley, Brian Joseph,        • sodium chloride 0.9 % infusion 40 mL  40 mL Intravenous PRN Kerley, Brian Joseph, DO           aspirin, 81 mg, Oral, Daily  carvedilol, 25 mg, Oral, BID With Meals  epoetin  ovidio/ovidio-epbx, 20,000 Units, Subcutaneous, Once  heparin (porcine), 5,000 Units, Subcutaneous, Q8H  hydroxychloroquine, 200 mg, Oral, Q24H  losartan, 50 mg, Oral, Q24H  pantoprazole, 40 mg, Oral, Q AM  predniSONE, 10 mg, Oral, Daily With Breakfast  QUEtiapine, 12.5 mg, Oral, Nightly  senna-docusate sodium, 2 tablet, Oral, BID  sodium chloride, 10 mL, Intravenous, Q12H        IV Meds:        Results Reviewed:   I have personally reviewed the results from the time of this admission to 1/17/2023 08:09 EST     Results from last 7 days   Lab Units 01/16/23  0609 01/15/23  0723 01/14/23  0733 01/13/23  0555 01/12/23  0937   SODIUM mmol/L 134* 137 135*   < > 133*   POTASSIUM mmol/L 5.0 4.2 4.3   < > 5.1   CHLORIDE mmol/L 104 108* 107   < > 102   CO2 mmol/L 20.3* 19.3* 20.6*   < > 19.2*   BUN mg/dL 43* 42* 46*   < > 46*   CREATININE mg/dL 2.44* 2.72* 3.07*   < > 3.82*   CALCIUM mg/dL 9.9 9.7 9.0   < > 8.9   BILIRUBIN mg/dL  --   --   --   --  0.3   ALK PHOS U/L  --   --   --   --  134*   ALT (SGPT) U/L  --   --   --   --  13   AST (SGOT) U/L  --   --   --   --  30   GLUCOSE mg/dL 134* 92 98   < > 106*    < > = values in this interval not displayed.       Estimated Creatinine Clearance: 14.2 mL/min (A) (by C-G formula based on SCr of 2.44 mg/dL (H)).    Results from last 7 days   Lab Units 01/16/23  0609 01/15/23  0723 01/14/23  0733   PHOSPHORUS mg/dL 4.7* 3.6 3.7  3.7             Results from last 7 days   Lab Units 01/17/23  0545 01/16/23  0609 01/15/23  0723 01/14/23  0733 01/13/23  0555   WBC 10*3/mm3 8.21 3.61 5.37 4.22 4.45   HEMOGLOBIN g/dL 8.7* 9.1* 8.5* 8.3* 8.0*   PLATELETS 10*3/mm3 221 222 226 194 209             Brief Urine Lab Results  (Last result in the past 365 days)      Color   Clarity   Blood   Leuk Est   Nitrite   Protein   CREAT   Urine HCG        01/12/23 0937 Yellow   Turbid   Small (1+)   Large (3+)   Negative   30 mg/dL (1+)                 No results found for: UTPCR    Imaging Results (Last 24  Hours)     ** No results found for the last 24 hours. **              Assessment / Plan     ASSESSMENT:  1. Chronic kidney disease stage IV: Baseline chronic kidney disease stage IV secondary to longstanding atherosclerotic and glomerulosclerosis.  Patient has refused to talk about dialysis, son has always agreed to what ever she says.  No indications for acute dialysis at this point.  But she may have some worsening of her baseline chronic kidney disease may end up requiring dialysis in the near future.  2. Anemia of chronic kidney disease: Longstanding history of need for IV iron as well as LIU, missed multiple doses in the recent past.  3. Hyperparathyroidism: Check and follow PTH.  Check phosphorus.  4. Acute cystitis without hematuria: Treated as per hospitalist service  5. Hypertension with chronic kidney disease stage IV: Blood pressure appears to be under optimal control with current medications.  6. Coronary artery disease: Significant atherosclerotic cardiovascular disease follows up with Dr. Acuña, she has missed her multiple recent appointments, last time she saw him was more than a year ago.  7. Valvular heart disease: Severe aortic sclerosis.  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  Then she was started on prednisone, she was on 2.5 mg a day for some time with some relief and for acute episodes she will go up to 10 mg a day for 3 days.  She finally decided against it and stopped taking it.  9. Rheumatoid arthritis: Same is true as noted above  10. Debility: Patient recognizes the debility and wants to go to a rehab.  11. Left leg injury      PLAN:  · Renal function is stable.  · Her arthritis seems to be slightly better today, after 2 doses of Solu-Medrol 80 mg she will be started on 10 mg of prednisone daily starting tomorrow.  I will also start her on Plaquenil 200 mg once a day.  · I also encouraged her not to refuse physical therapy.  She  seems to be little more interactive with physical therapy and Occupational Therapy.  Rehab placement is in progress.  · Her iron stores are adequate she got received a dose of Procrit, will continue the dose on weekly basis.  · Details were discussed with the patient no family around today.   · Details were also discussed with the hospitalist service.  Patient has been getting weaker, unable to take care of her at home she will need the rehab placement.  She may end up long-term nursing home patient.  · Continue with rest of the current treatment plan, and monitor with surveillance labs.  · Further recommendations will depend on clinical course of the patient during the current hospitalization.     Thank you for involving us in the care of Carolyne Martins.  Please feel free to call with any questions.    Clay Schilling MD, FASN  01/17/23  08:09 [facility]    [facility]  [phone]      Part of this note may be an electronic transcription/translation of spoken language to printed text using the Dragon Dictation System.

## 2023-01-17 NOTE — CASE MANAGEMENT/SOCIAL WORK
1120 Emeka spoke with pt at bedside to discuss DCP. Cm advised her that [facility] does not take [place], however, [facility] accepted  referral and started precert 1/16. Pt is agreeable to transferring there for STR at [place]. She verbalizes wishes to return home after she finishes rehab, but states she has noone to assist her in the home. IMM discussed and verbally acknowledged CM contact card provided to pt. Pt verbally agrees to plan and expresses appreciation.     1148 CM spoke with Indira/angela PADRON who states she is still awaiting precert with expectation it will be back by tomorrow. PT WILL NOT need COVID swab. She will need EMS transport. Report to be called to [phone] 2nd floor. Indira will pull DC summary. Secure chat sent to Care team/Jessica Yancey, Rn, Kim Martinez,Bernadette to update them on above information. Pt son, Magno, notified of likely transfer to [facility] to facility tomorrow. He verbalized appreciation and understanding of DCP. He requests RN to call him before pt DC. Emeka advised his request would be sent to RN. Emeka provided him with 3rd floor nurses station and encouraged him to call for updates at his leisure.

## 2023-01-17 NOTE — PLAN OF CARE
Goal Outcome Evaluation:  Plan of Care Reviewed With: patient        Progress: improving  Outcome Evaluation: Pt received supine in bed, willing to do UB ex as per flow sheet.  Pt sba to sit eob and was able to perform lateral scoot transfer from bed to chair with cga.  Pt was left in bed with PT working with her.  Pt progressing with therapy.  Cont OT per POC.

## 2023-01-17 NOTE — PLAN OF CARE
Problem: Adult Inpatient Plan of Care  Goal: Plan of Care Review  Recent Flowsheet Documentation  Taken 1/17/2023 1157 by Gabriel Olivares, MALI  Progress: improving  Plan of Care Reviewed With: patient  Outcome Evaluation: Pt  supine in bed working with OT and willing to perform transfer to bedside recliner as well as seated B LE exercises.  Pt was sba to sit EOB  and cga/min a to perform lateral scoot from bed to chair with arm of chair removed.  Pt performed B LE seated exercises.  See flowsheet for details   Goal Outcome Evaluation:  Plan of Care Reviewed With: patient        Progress: improving  Outcome Evaluation: Pt  supine in bed working with OT and willing to perform transfer to bedside recliner as well as seated B LE exercises.  Pt was sba to sit EOB  and cga/min a to perform lateral scoot from bed to chair with arm of chair removed.  Pt performed B LE seated exercises.  See flowsheet for details

## 2023-01-17 NOTE — DISCHARGE PLACEMENT REQUEST
"OT note  Carolyne Martins (82 y.o. Female)     Date of Birth   1940    Social Security Number       Address   [address] [place] [id number]    Home Phone   [phone]    MRN   [id number]       Anabaptist   Jainism    Marital Status                               Admission Date   1/12/23    Admission Type   Emergency    Admitting Provider   Kerley, Brian Joseph, DO    Attending Provider   Stepan Bass DO    Department, Room/Bed   [facility] TELEMETRY 3, 320/1       Discharge Date       Discharge Disposition       Discharge Destination                               Attending Provider: Stepan Bass DO    Allergies: Ferrlecit [Na Ferric Gluc Cplx In Sucrose], Ranitidine Hcl, Levofloxacin, Lisinopril    Isolation: None   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)   Code Status: No CPR    Ht: 149.9 cm (59\")   Wt: 49 kg (108 lb 0.4 oz)    Admission Cmt: None   Principal Problem: Acute cystitis without hematuria [N30.00]                 Active Insurance as of 1/12/2023     Primary Coverage     Payor Plan Insurance Group Employer/Plan Group    ANTH MEDICARE REPLACEMENT ANTH MEDICARE ADVANTAGE [id number]     Payor Plan Address Payor Plan Phone Number Payor Plan Fax Number Effective Dates    [address] [phone]  7/1/2022 - None Entered    [facility] [phone]       Subscriber Name Subscriber Birth Date Member ID       CAROLYNE MARTINS 1940 [id number]           Secondary Coverage     Payor Plan Insurance Group Employer/Plan Group    [facility] [id number]     Payor Plan Address Payor Plan Phone Number Payor Plan Fax Number Effective Dates    [address]   12/1/2016 - None Entered    [facility] [id number]       Subscriber Name Subscriber Birth Date Member ID       CAROLYNE MARTINS 1940 [id number]                 Emergency Contacts      (Rel.) Home Phone Work Phone Mobile Phone    Magno Martins (Son) [phone] -- " [phone]    Linda Banda (Friend) [phone] -- --               Occupational Therapy Notes (most recent note)      Kim Wick at 23 1644          Patient Name: Carolyne Martins  : 1940    MRN: [id number]                              Today's Date: 2023       Admit Date: 2023    Visit Dx:     ICD-10-CM ICD-9-CM   1. Acute cystitis without hematuria  N30.00 595.0   2. Chronic kidney disease, unspecified CKD stage  N18.9 585.9   3. Debility  R53.81 799.3     Patient Active Problem List   Diagnosis   • Arthralgia of shoulder region   • Tendinopathy of rotator cuff   • Osteoarthritis of right acromioclavicular joint   • Anemia due to stage 4 chronic kidney disease treated with erythropoietin ([facility])   • Anemia   • Gastric polyp   • Gastroesophageal reflux disease without esophagitis   • Melena   • Change in bowel habits   • Colon cancer screening   • Senile osteoporosis   • Personal history of colonic polyps   • Closed fracture of left hip ([facility])   • Impaired mobility and ADLs   • CKD (chronic kidney disease) stage 4, GFR 15-29 ml/min ([facility])   • Simple chronic bronchitis ([facility])   • Essential hypertension   • Lower extremity edema   • Acute cystitis without hematuria   • Debility   • Left leg injury   • JASMEET (acute kidney injury) ([facility])     Past Medical History:   Diagnosis Date   • Anemia    • Anemia    • Arthritis    • Back pain    • Bleeding from anus    • Bronchitis     STATES HAS BRONCHITIS CURRENTLY (17).     • Bronchitis 2017   • CAD (coronary artery disease)    • Cataract, bilateral    • Chronic kidney disease     STAGE 4.  SEEREBA HALEY   • Chronic kidney failure     REPORTS STAGE IV   • Difficulty swallowing solids    • Disease of thyroid gland    • Fracture, radius 2016    right distal radius and ulna, healed.   • GERD (gastroesophageal reflux disease)    • Gout    • High cholesterol    • History of blood transfusion    • History of nuclear stress test       "DIEGO.  \"IT WAS OK\"   • Hyperparathyroidism (HCC)    • Hyperparathyroidism (HCC)    • Hypertension    • Impaired functional mobility, balance, gait, and endurance    • Impaired mobility    • Iron deficiency    • Osteoarthritis    • Osteoarthritis of knees, bilateral     Both knees Supartz injection series August, 2015   • Osteoporosis    • Palpitations    • Personal history of cardiac murmur    • Pessary maintenance    • PONV (postoperative nausea and vomiting)    • Presence of pessary    • Problems with swallowing     WITH FOOD OCCASSIONALLY   • Rheumatic heart disease     Personal history   • Rotator cuff tendonitis     right    • Rupture of right proximal biceps tendon     chronic   • Seasonal allergies    • Sinus problem    • Spinal headache     REPORTS AFTER DELIVERY OF SON   • Subacromial bursitis     right    • Valvular heart disease    • Vision problems    • Vitamin B12 deficiency    • Vitamin D deficiency    • Wears glasses      Past Surgical History:   Procedure Laterality Date   • CATARACT EXTRACTION Bilateral    • CERVICAL POLYPECTOMY     • COLONOSCOPY  2011   • COLONOSCOPY N/A 1/9/2018    Procedure: COLONOSCOPY with endoscopic mucosal resection (hot snare), normal saline submucosal injection, argon thermal ablation, resolution clip placement x4, and cold biopsy polypectomy;  Surgeon: Pieter Concepcion MD;  Location: [facility] ENDOSCOPY;  Service:    • ENDOSCOPY N/A 12/6/2017    Procedure: ESOPHAGOGASTRODUODENOSCOPY with biopsies and hot snare polypectomies;  Surgeon: Pieter Concepcion MD;  Location: [facility] ENDOSCOPY;  Service:    • ENDOSCOPY N/A 1/15/2019    Procedure: ESOPHAGOGASTRODUODENOSCOPY WITH BIOPSIES AND HOT SNARE POLYPECTOMIES X10;  Surgeon: Pieter Concepcion MD;  Location: [facility] ENDOSCOPY;  Service: Gastroenterology   • HIP TROCHANTERIC NAILING WITH INTRAMEDULLARY HIP SCREW Left 2/14/2021    Procedure: HIP TROCHANTER NAIL SHORT WITH INTRAMEDULLARY HIP SCREW, LEFT;  Surgeon: Gabriele Loyd MD;  " Location: [facility];  Service: Orthopedics;  Laterality: Left;   • UPPER GASTROINTESTINAL ENDOSCOPY  08/18/2014      General Information     [facility] Name 01/16/23 1630          OT Time and Intention    Document Type therapy note (daily note)  -     Mode of Treatment occupational therapy  -     Row Name 01/16/23 1630          General Information    Patient Profile Reviewed yes  -           User Key  (r) = Recorded By, (t) = Taken By, (c) = Cosigned By    Initials Name Provider Type     Kim Wick Occupational Therapist                 Mobility/ADL's     Row Name 01/16/23 1632          Bed Mobility    Comment, (Bed Mobility) pt sitting up in her chair upon arrival  -[facility] Name 01/16/23 1632          Transfers    Comment, (Transfers) pt declined standing with OT  -[facility] Name 01/16/23 1632          Grooming Assessment/Training    [place] (Grooming) hair care, combing/brushing;oral care regimen;wash face, hands;set up  [facility]           User Key  (r) = Recorded By, (t) = Taken By, (c) = Cosigned By    Initials Name Provider Type     Kim Wick Occupational Therapist               Obj/Interventions    No documentation.                Goals/Plan    No documentation.                Clinical Impression     [facility] Name 01/16/23 1636          Pain Assessment    Pretreatment Pain Rating 5/10  -     Posttreatment Pain Rating 5/10  -     Pain Location lower  -     Pain Location - back  -     Pre/Posttreatment Pain Comment placed pillows under each arm while sitting in her chair  -     Pain Intervention(s) Repositioned  -[facility] Name 01/16/23 1636          Plan of Care Review    Plan of Care Reviewed With patient  -     Progress improving  -     Outcome Evaluation Pt received sitting reclined in her chair, declined getting out of the chair today, but agreeable to grooming tasks.  Pt set up for washing her face, applying lotion,  combing her hair and brushing her teeth.  Pt noted with  decreased L shoulder ROM which limits her ability to reach overhead to comb her hair.  Pt uses R arm for most grooming tasks.  Pt was able to reach forward with her L arm to  her water cup.  Cont OT per POC.  -     Row Name 01/16/23 1636          Therapy Plan Review/Discharge Plan (OT)    Anticipated Discharge Disposition (OT) inpatient rehabilitation facility  -     Row Name 01/16/23 1636          Positioning and Restraints    Pre-Treatment Position sitting in chair/recliner  -     Post Treatment Position chair  -     In Chair reclined;call light within reach;encouraged to call for assist;exit alarm on  -           User Key  (r) = Recorded By, (t) = Taken By, (c) = Cosigned By    Initials Name Provider Type    Kim Vigil Occupational Therapist               Outcome Measures     Row Name 01/16/23 1641          How much help from another is currently needed...    Putting on and taking off regular lower body clothing? 2  -AH     Bathing (including washing, rinsing, and drying) 2  -AH     Toileting (which includes using toilet bed pan or urinal) 2  -AH     Putting on and taking off regular upper body clothing 3  -AH     Taking care of personal grooming (such as brushing teeth) 3  -AH     Eating meals 4  -AH     AM-PAC 6 Clicks Score (OT) 16  -     Row Name 01/16/23 1553 01/16/23 0800       How much help from another person do you currently need...    Turning from your back to your side while in flat bed without using bedrails? 3  -RM 3  -BM    Moving from lying on back to sitting on the side of a flat bed without bedrails? 3  -RM 3  -BM    Moving to and from a bed to a chair (including a wheelchair)? 2  -RM 2  -BM    Standing up from a chair using your arms (e.g., wheelchair, bedside chair)? 2  -RM 2  -BM    Climbing 3-5 steps with a railing? 1  -RM 1  -BM    To walk in hospital room? 1  -RM 1  -BM    AM-PAC 6 Clicks Score (PT) 12  -RM 12  -BM    Highest level of mobility 4 --> Transferred to  chair/commode  - 4 --> Transferred to chair/commode  -    Row Name 01/16/23 1641 01/16/23 1553       Functional Assessment    Outcome Measure Options AM-PAC 6 Clicks Daily Activity (OT)  - AM-PAC 6 Clicks Basic Mobility (PT)  -RM          User Key  (r) = Recorded By, (t) = Taken By, (c) = Cosigned By    Initials Name Provider Type     Kim Wick Occupational Therapist     Gabriel Olivares, PTA Physical Therapist Assistant     Jessica Kamara, RN Registered Nurse                Occupational Therapy Education     Title: PT OT SLP Therapies (Done)     Topic: Occupational Therapy (Done)     Point: ADL training (Done)     Description:   Instruct learner(s) on proper safety adaptation and remediation techniques during self care or transfers.   Instruct in proper use of assistive devices.              Learning Progress Summary           Patient Acceptance, E,TB, VU by  at 1/16/2023 1641    Comment: benefit of working with therapy    Acceptance, E, VU by  at 1/16/2023 1051    Acceptance, E,TB, VU by SD at 1/13/2023 1426    Comment: OT POC                   Point: Home exercise program (Done)     Description:   Instruct learner(s) on appropriate technique for monitoring, assisting and/or progressing therapeutic exercises/activities.              Learning Progress Summary           Patient Acceptance, E, VU by  at 1/16/2023 1051                   Point: Precautions (Done)     Description:   Instruct learner(s) on prescribed precautions during self-care and functional transfers.              Learning Progress Summary           Patient Acceptance, E, VU by  at 1/16/2023 1051                   Point: Body mechanics (Done)     Description:   Instruct learner(s) on proper positioning and spine alignment during self-care, functional mobility activities and/or exercises.              Learning Progress Summary           Patient Acceptance, E, VU by  at 1/16/2023 1051                               User  Key     Initials Effective Dates Name Provider Type Discipline     06/16/21 -  Kim Wick Occupational Therapist OT    SD 06/16/21 -  Ирина Delaney OT Occupational Therapist OT     12/12/22 -  Jessica Kamara, RN Registered Nurse Nurse              OT Recommendation and Plan     Plan of Care Review  Plan of Care Reviewed With: patient  Progress: improving  Outcome Evaluation: Pt received sitting reclined in her chair, declined getting out of the chair today, but agreeable to grooming tasks.  Pt set up for washing her face, applying lotion,  combing her hair and brushing her teeth.  Pt noted with decreased L shoulder ROM which limits her ability to reach overhead to comb her hair.  Pt uses R arm for most grooming tasks.  Pt was able to reach forward with her L arm to  her water cup.  Cont OT per POC.     Time Calculation:    Time Calculation- OT     Row Name 01/16/23 1642             Time Calculation- OT    OT Start Time 1614  -      OT Stop Time 1630  -      OT Time Calculation (min) 16 min  -      OT Received On 01/16/23  -      OT Goal Re-Cert Due Date 01/25/23  -         Timed Charges    77502 - OT Self Care/Mgmt Minutes 16  -         Total Minutes    Timed Charges Total Minutes 16  -       Total Minutes 16  -            User Key  (r) = Recorded By, (t) = Taken By, (c) = Cosigned By    Initials Name Provider Type     Kim Wick Occupational Therapist              Therapy Charges for Today     Code Description Service Date Service Provider Modifiers Qty    79356991203 HC OT SELF CARE/MGMT/TRAIN EA 15 MIN 1/16/2023 Kim Wick GO 1               Kim Wick  1/16/2023    Electronically signed by Kim Wick at 01/16/23 8792

## 2023-01-17 NOTE — PLAN OF CARE
Goal Outcome Evaluation:      Pt could answer all orientation questions correctly. She stated several times in the night that she is did not feel good. When RN asked what was wrong pt said she did not know. Pt said her left leg hurt, tylenol PRN given. RN assisted pt in calling her son. Pt did appear to rest in the night after PRN medication. Purewick in place.

## 2023-01-17 NOTE — PLAN OF CARE
Goal Outcome Evaluation:      Spoke to pt at her bedside.  Pt stated she had not requested an Advance Care Conversation.  I listened as she talked about going to [place] for rehabilitation before going home tomorrow.

## 2023-01-18 VITALS
BODY MASS INDEX: 22 KG/M2 | RESPIRATION RATE: 16 BRPM | DIASTOLIC BLOOD PRESSURE: 76 MMHG | HEIGHT: 59 IN | OXYGEN SATURATION: 98 % | SYSTOLIC BLOOD PRESSURE: 144 MMHG | TEMPERATURE: 98.3 F | HEART RATE: 73 BPM | WEIGHT: 109.13 LBS

## 2023-01-18 LAB
ALBUMIN SERPL-MCNC: 2.8 G/DL (ref 3.5–5.2)
ANION GAP SERPL CALCULATED.3IONS-SCNC: 8.1 MMOL/L (ref 5–15)
BASOPHILS # BLD AUTO: 0.02 10*3/MM3 (ref 0–0.2)
BASOPHILS NFR BLD AUTO: 0.2 % (ref 0–1.5)
BUN SERPL-MCNC: 74 MG/DL (ref 8–23)
BUN/CREAT SERPL: 27.2 (ref 7–25)
CALCIUM SPEC-SCNC: 9.2 MG/DL (ref 8.6–10.5)
CHLORIDE SERPL-SCNC: 105 MMOL/L (ref 98–107)
CO2 SERPL-SCNC: 19.9 MMOL/L (ref 22–29)
CREAT SERPL-MCNC: 2.72 MG/DL (ref 0.57–1)
DEPRECATED RDW RBC AUTO: 58.3 FL (ref 37–54)
EGFRCR SERPLBLD CKD-EPI 2021: 17.2 ML/MIN/1.73
EOSINOPHIL # BLD AUTO: 0.02 10*3/MM3 (ref 0–0.4)
EOSINOPHIL NFR BLD AUTO: 0.2 % (ref 0.3–6.2)
ERYTHROCYTE [DISTWIDTH] IN BLOOD BY AUTOMATED COUNT: 15.7 % (ref 12.3–15.4)
GLUCOSE SERPL-MCNC: 104 MG/DL (ref 65–99)
HCT VFR BLD AUTO: 26.9 % (ref 34–46.6)
HGB BLD-MCNC: 8.7 G/DL (ref 12–15.9)
IMM GRANULOCYTES # BLD AUTO: 0.39 10*3/MM3 (ref 0–0.05)
IMM GRANULOCYTES NFR BLD AUTO: 4.5 % (ref 0–0.5)
LYMPHOCYTES # BLD AUTO: 1.19 10*3/MM3 (ref 0.7–3.1)
LYMPHOCYTES NFR BLD AUTO: 13.6 % (ref 19.6–45.3)
MCH RBC QN AUTO: 33.9 PG (ref 26.6–33)
MCHC RBC AUTO-ENTMCNC: 32.3 G/DL (ref 31.5–35.7)
MCV RBC AUTO: 104.7 FL (ref 79–97)
MONOCYTES # BLD AUTO: 0.92 10*3/MM3 (ref 0.1–0.9)
MONOCYTES NFR BLD AUTO: 10.6 % (ref 5–12)
NEUTROPHILS NFR BLD AUTO: 6.18 10*3/MM3 (ref 1.7–7)
NEUTROPHILS NFR BLD AUTO: 70.9 % (ref 42.7–76)
NRBC BLD AUTO-RTO: 2.5 /100 WBC (ref 0–0.2)
PHOSPHATE SERPL-MCNC: 2.7 MG/DL (ref 2.5–4.5)
PLATELET # BLD AUTO: 233 10*3/MM3 (ref 140–450)
PMV BLD AUTO: 9.9 FL (ref 6–12)
POTASSIUM SERPL-SCNC: 4.8 MMOL/L (ref 3.5–5.2)
RBC # BLD AUTO: 2.57 10*6/MM3 (ref 3.77–5.28)
SODIUM SERPL-SCNC: 133 MMOL/L (ref 136–145)
WBC NRBC COR # BLD: 8.72 10*3/MM3 (ref 3.4–10.8)

## 2023-01-18 PROCEDURE — 80069 RENAL FUNCTION PANEL: CPT | Performed by: INTERNAL MEDICINE

## 2023-01-18 PROCEDURE — 25010000002 HEPARIN (PORCINE) PER 1000 UNITS: Performed by: STUDENT IN AN ORGANIZED HEALTH CARE EDUCATION/TRAINING PROGRAM

## 2023-01-18 PROCEDURE — 63710000001 PREDNISONE PER 5 MG: Performed by: INTERNAL MEDICINE

## 2023-01-18 PROCEDURE — 85025 COMPLETE CBC W/AUTO DIFF WBC: CPT | Performed by: STUDENT IN AN ORGANIZED HEALTH CARE EDUCATION/TRAINING PROGRAM

## 2023-01-18 PROCEDURE — 99239 HOSP IP/OBS DSCHRG MGMT >30: CPT | Performed by: INTERNAL MEDICINE

## 2023-01-18 RX ORDER — HYDROXYCHLOROQUINE SULFATE 200 MG/1
200 TABLET, FILM COATED ORAL
Qty: 30 TABLET | Refills: 0 | Status: SHIPPED | OUTPATIENT
Start: 2023-01-18 | End: 2023-02-22 | Stop reason: HOSPADM

## 2023-01-18 RX ORDER — PREDNISONE 2.5 MG
2.5 TABLET ORAL DAILY
Qty: 30 TABLET | Refills: 0 | Status: SHIPPED | OUTPATIENT
Start: 2023-01-18 | End: 2023-02-16

## 2023-01-18 RX ORDER — QUETIAPINE FUMARATE 25 MG/1
12.5 TABLET, FILM COATED ORAL NIGHTLY
Qty: 15 TABLET | Refills: 0 | Status: SHIPPED | OUTPATIENT
Start: 2023-01-18 | End: 2023-02-17

## 2023-01-18 RX ORDER — PREDNISONE 10 MG/1
10 TABLET ORAL
Qty: 30 TABLET | Refills: 0 | Status: SHIPPED | OUTPATIENT
Start: 2023-01-18 | End: 2023-01-18 | Stop reason: HOSPADM

## 2023-01-18 RX ORDER — PREDNISONE 1 MG/1
2.5 TABLET ORAL
Status: DISCONTINUED | OUTPATIENT
Start: 2023-01-19 | End: 2023-01-18 | Stop reason: HOSPADM

## 2023-01-18 RX ADMIN — HYDROXYCHLOROQUINE SULFATE 200 MG: 200 TABLET ORAL at 08:49

## 2023-01-18 RX ADMIN — TORSEMIDE 10 MG: 10 TABLET ORAL at 08:50

## 2023-01-18 RX ADMIN — ASPIRIN 81 MG: 81 TABLET, COATED ORAL at 08:49

## 2023-01-18 RX ADMIN — LOSARTAN POTASSIUM 50 MG: 50 TABLET, FILM COATED ORAL at 08:49

## 2023-01-18 RX ADMIN — Medication 10 ML: at 08:50

## 2023-01-18 RX ADMIN — HEPARIN SODIUM 5000 UNITS: 5000 INJECTION INTRAVENOUS; SUBCUTANEOUS at 06:45

## 2023-01-18 RX ADMIN — CARVEDILOL 25 MG: 25 TABLET, FILM COATED ORAL at 08:49

## 2023-01-18 RX ADMIN — SENNOSIDES AND DOCUSATE SODIUM 2 TABLET: 50; 8.6 TABLET ORAL at 08:49

## 2023-01-18 RX ADMIN — PREDNISONE 10 MG: 10 TABLET ORAL at 08:49

## 2023-01-18 RX ADMIN — PANTOPRAZOLE SODIUM 40 MG: 40 TABLET, DELAYED RELEASE ORAL at 06:47

## 2023-01-18 NOTE — DISCHARGE SUMMARY
[facility]   DISCHARGE SUMMARY      Name:  Carolyne Martins   Age:  82 y.o.  Sex:  female  :  1940  MRN:  [id number]   Visit Number:  [id number]    Admission Date:  2023  Date of Discharge:  2023  Primary Care Physician:  Jerald Dawkins MD      Discharge Diagnoses:     1. UTI  2. JASMEET CKD  3. Left leg pain status post mechanical fall with hematoma  4. Flare of chronic inflammatory arthritis  5. Acute on chronic debility    Problem List:     Active Hospital Problems    Diagnosis  POA   • **Acute cystitis without hematuria [N30.00]  Yes   • Debility [R53.81]  Yes   • Left leg injury [S89.92XA]  Yes   • JASMEET (acute kidney injury) (HCC) [N17.9]  Yes      Resolved Hospital Problems   No resolved problems to display.     Presenting Problem:    Chief Complaint   Patient presents with   • Illness      Consults:     Consulting Physician(s)     Provider Relationship Specialty    Clay Schilling MD, POLA Consulting Physician Nephrology        Procedures Performed:        History of presenting illness/Hospital Course:    Patient is an 82-year-old woman with past medical history of CKD follows with nephrology Dr. Fitzgerald, coronary artery disease follows with Dr. Acuña, arthritis, back pain, hyperparathyroidism, hypertension, anemia, osteoarthritis, rheumatic heart disease, valvular heart disease.  Presented to [facility] ED on 2023 feeling generally weak progressively worse over a few days.  Son at bedside said its been more difficult for her to transfer in and out of the wheelchair, she is had some falls recently.  She has some pain in her left leg.  She denied any shortness of air, chest pain, nausea, vomiting, abdominal pain, cough, dysuria.  Patient lives alone, son lives across the street and helps with some things but he works at least 40 hours a week is away from home a lot.     ED summary: Afebrile, vital signs stable on room air.  Blood glucose 106, sodium 133, creatinine 3.82  baseline may be around 3.3-3.5, chronic anemia hemoglobin 9.4, pattern of infection on urinalysis, urine culture collected, COVID/flu negative CXR unremarkable CT abdomen/pelvis new, mild pericardial effusion, small bilateral renal cystic lesions and hepatic cystic lesion compared to prior exam, suspected hematoma formation left inferior medial buttock.  CT lumbar spine no acute abnormality, multilevel lumbar degenerative disc disease present.  She was provided Rocephin and is normal saline 500 mL bolus.  ED personnel did attempt to discharge, but patient not able to take care of herself at home with current resources.  Social work has recommended at least social admit for short-term rehab placement.     Inpatient general floor admission 1/12/2023 for UTI requiring IV antibiotics Rocephin, JASMEET on CKD, acute on chronic debility and impaired ADLs.     Urine culture resulted mixed growth.  Patient completed antibiotic course with Rocephin.   Nephrology-Dr. Fitzgerald is well familiar with patient for over 20 years, concern for needing dialysis in the near future.  Patient does appear to need long-term care placement, unable to care for self at home with declining ability to perform ADLs.  Do suspect acute decline in part contributed to inflammatory arthritis, therefore provided IV Solu-Medrol with transition to low dose oral prednisone. Predisone prescribed at discharged, in addition to Plaquenil.  Follow up with pcp and with Dr Schilling    Vital Signs:    Temp:  [97.7 °F (36.5 °C)-98.8 °F (37.1 °C)] 98.3 °F (36.8 °C)  Heart Rate:  [73-87] 73  Resp:  [16-18] 16  BP: (119-144)/(60-96) 144/76    Physical Exam:    General Appearance:  Alert and cooperative.    Head:  Atraumatic and normocephalic.   Eyes: Conjunctivae and sclerae normal, no icterus. No pallor.   Ears:  Ears with no abnormalities noted.   Throat: No oral lesions, no thrush, oral mucosa moist.   Neck: Supple, trachea midline, no thyromegaly.       Lungs:   Breath  sounds heard bilaterally equally.  No crackles or wheezing.    Heart:  Normal S1 and S2, no murmur, No JVD.   Abdomen:   Normal bowel sounds, Soft, nontender, nondistended, no rebound tenderness.   Extremities: Supple, no edema, no cyanosis, no clubbing.   Pulses: Pulses palpable bilaterally.   Skin: No bleeding or rash.   Neurologic: Alert and oriented x 3. No facial asymmetry. Moves all four limbs. No tremors.     Pertinent Lab Results:     Results from last 7 days   Lab Units 01/18/23  0623 01/17/23  0545 01/16/23  0609 01/13/23  0555 01/12/23  0937   SODIUM mmol/L 133* 129* 134*   < > 133*   POTASSIUM mmol/L 4.8 5.0 5.0   < > 5.1   CHLORIDE mmol/L 105 99 104   < > 102   CO2 mmol/L 19.9* 21.5* 20.3*   < > 19.2*   BUN mg/dL 74* 59* 43*   < > 46*   CREATININE mg/dL 2.72* 2.42* 2.44*   < > 3.82*   CALCIUM mg/dL 9.2 9.4 9.9   < > 8.9   BILIRUBIN mg/dL  --   --   --   --  0.3   ALK PHOS U/L  --   --   --   --  134*   ALT (SGPT) U/L  --   --   --   --  13   AST (SGOT) U/L  --   --   --   --  30   GLUCOSE mg/dL 104* 132* 134*   < > 106*    < > = values in this interval not displayed.     Results from last 7 days   Lab Units 01/18/23  0623 01/17/23  0545 01/16/23  0609   WBC 10*3/mm3 8.72 8.21 3.61   HEMOGLOBIN g/dL 8.7* 8.7* 9.1*   HEMATOCRIT % 26.9* 26.5* 28.3*   PLATELETS 10*3/mm3 233 221 222             Results from last 7 days   Lab Units 01/15/23  0723   PROBNP pg/mL 4,834.0*                 Results from last 7 days   Lab Units 01/12/23  1616 01/12/23  1613 01/12/23  0937   BLOODCX  No growth at 5 days No growth at 5 days  --    URINECX   --   --  25,000 CFU/mL Mixed Leena Isolated       Pertinent Radiology Results:    Imaging Results (All)     Procedure Component Value Units Date/Time    XR Hip With or Without Pelvis 2 - 3 View Left [[id number]] Collected: 01/13/23 1449     Updated: 01/13/23 1274    Narrative:      PROCEDURE: XR HIP W OR WO PELVIS 2-3 VIEW LEFT-     HISTORY: pain hip; N30.00-Acute cystitis without  hematuria;  N18.9-Chronic kidney disease, unspecified; R53.81-Other malaise     COMPARISON: July 2022.     FINDINGS:  A three view exam demonstrates no acute fracture or  dislocation.  Patient is status post ORIF of a prior left hip fracture.  Again noted is a compression screw extending into the acetabulum with  bony remodeling. There is lucency around the fixation screw consistent  with loosening. There is osteopenia. No acute soft tissue abnormality is  seen.       Impression:      No acute fracture.     Stable chronic changes as described.              Images were reviewed, interpreted, and dictated by Dr. Angela Pablo MD  Transcribed by Gayatri Forrest PA-C.     This report was signed and finalized on 1/13/2023 2:52 PM by Angela Pablo MD.    XR Tibia Fibula 2 View Left [[id number]] Collected: 01/13/23 1127     Updated: 01/13/23 1134    Narrative:      PROCEDURE: XR KNEE 3 VW LEFT     History: Chronic left knee pain; N30.00-Acute cystitis without  hematuria; N18.9-Chronic kidney disease, unspecified; R53.81-Other  malaise     COMPARISON: None.     FINDINGS:  A 3 view exam demonstrates no acute fracture or dislocation.  There is diffuse osteopenia. There is narrowing of all three joint  compartments. There is no joint effusion. No soft tissue abnormality is  seen.       Impression:      No acute fracture. Degenerative changes.              PROCEDURE:  XR TIBIA FIBULA 2 VW LEFT-     HISTORY: Left tibia pain     COMPARISON: None.     FINDINGS:  A 2 view exam demonstrates no acute fracture or dislocation.  There is diffuse osteopenia. The joint spaces are preserved.  No soft  tissue abnormality is seen.     IMPRESSION: No acute fracture.            PROCEDURE:  XR ANKLE 3+ VW LEFT     History: Left ankle pain     COMPARISON: None.     FINDINGS:  A 3 view exam demonstrates no acute fracture or dislocation.  There is a remote medial malleolar fracture with nonunion. There is  osteopenia. The joint spaces are  preserved. No soft tissue abnormality  is seen. There is no joint effusion.     IMPRESSION: No acute fracture.                     Images were reviewed, interpreted, and dictated by Dr. Angela Pablo MD  Transcribed by Gayatri Forrest PA-C.     This report was signed and finalized on 1/13/2023 11:32 AM by Angela Pablo MD.    XR Ankle 3+ View Left [[id number]] Collected: 01/13/23 1127     Updated: 01/13/23 1134    Narrative:      PROCEDURE: XR KNEE 3 VW LEFT     History: Chronic left knee pain; N30.00-Acute cystitis without  hematuria; N18.9-Chronic kidney disease, unspecified; R53.81-Other  malaise     COMPARISON: None.     FINDINGS:  A 3 view exam demonstrates no acute fracture or dislocation.  There is diffuse osteopenia. There is narrowing of all three joint  compartments. There is no joint effusion. No soft tissue abnormality is  seen.       Impression:      No acute fracture. Degenerative changes.              PROCEDURE:  XR TIBIA FIBULA 2 VW LEFT-     HISTORY: Left tibia pain     COMPARISON: None.     FINDINGS:  A 2 view exam demonstrates no acute fracture or dislocation.  There is diffuse osteopenia. The joint spaces are preserved.  No soft  tissue abnormality is seen.     IMPRESSION: No acute fracture.            PROCEDURE:  XR ANKLE 3+ VW LEFT     History: Left ankle pain     COMPARISON: None.     FINDINGS:  A 3 view exam demonstrates no acute fracture or dislocation.  There is a remote medial malleolar fracture with nonunion. There is  osteopenia. The joint spaces are preserved. No soft tissue abnormality  is seen. There is no joint effusion.     IMPRESSION: No acute fracture.                     Images were reviewed, interpreted, and dictated by Dr. Angela Pablo MD  Transcribed by Gayatri Forrest PA-C.     This report was signed and finalized on 1/13/2023 11:32 AM by Angela Pablo MD.    XR Knee 3 View Left [[id number]] Collected: 01/13/23 1127     Updated: 01/13/23 1134    Narrative:      PROCEDURE: XR  KNEE 3 VW LEFT     History: Chronic left knee pain; N30.00-Acute cystitis without  hematuria; N18.9-Chronic kidney disease, unspecified; R53.81-Other  malaise     COMPARISON: None.     FINDINGS:  A 3 view exam demonstrates no acute fracture or dislocation.  There is diffuse osteopenia. There is narrowing of all three joint  compartments. There is no joint effusion. No soft tissue abnormality is  seen.       Impression:      No acute fracture. Degenerative changes.              PROCEDURE:  XR TIBIA FIBULA 2 VW LEFT-     HISTORY: Left tibia pain     COMPARISON: None.     FINDINGS:  A 2 view exam demonstrates no acute fracture or dislocation.  There is diffuse osteopenia. The joint spaces are preserved.  No soft  tissue abnormality is seen.     IMPRESSION: No acute fracture.            PROCEDURE:  XR ANKLE 3+ VW LEFT     History: Left ankle pain     COMPARISON: None.     FINDINGS:  A 3 view exam demonstrates no acute fracture or dislocation.  There is a remote medial malleolar fracture with nonunion. There is  osteopenia. The joint spaces are preserved. No soft tissue abnormality  is seen. There is no joint effusion.     IMPRESSION: No acute fracture.                     Images were reviewed, interpreted, and dictated by Dr. Angela Pablo MD  Transcribed by Gayatri Forrest PA-C.     This report was signed and finalized on 1/13/2023 11:32 AM by Angela Pablo MD.    CT Abdomen Pelvis Without Contrast [[id number]] Collected: 01/12/23 1229     Updated: 01/12/23 1245    Narrative:      PROCEDURE: CT ABDOMEN PELVIS WO CONTRAST-     HISTORY: UTI; N30.00-Acute cystitis without hematuria; N18.9-Chronic  kidney disease, unspecified     COMPARISON: 11/20/2019 and left hip image of 07/12/2022...     PROCEDURE: Axial images were obtained from the lung bases to the pubic  symphysis by computed tomography. This study was performed with  techniques to keep radiation doses as low as reasonably achievable,  (ALARA). Individualized dose  reduction techniques using automated  exposure control or adjustment of mA and/or kV according to the patient  size were employed.     FINDINGS:      ABDOMEN: The lung bases are clear. The heart is mildly enlarged with  mild pericardial effusion, new from prior exam. The limited non-contrast  images of the liver demonstrate a stable, 11 mm, hypodense lesion  measuring less than 20 Hounsfield units consistent with a cyst. Liver  otherwise appears normal. Gallbladder present with no CT visible stones.  The spleen is unremarkable. No adrenal masses are seen. The aorta is  normal in caliber. There is no significant free fluid or adenopathy.   There is no nephrolithiasis. There is no hydronephrosis. There are  bilateral, circumscribed, isodense renal lesions likely cysts and not  significant change from the prior exam. The largest measures 86 mm in  the craniocaudad dimension and has a rim calcification, stable from  prior. Streak artifact from patient's arm position noted. Vascular  calcifications noted.     PELVIS: The GI tract demonstrate no obstruction. The appendix is  identified and appears normal. The urinary bladder is partially filled  and appears unremarkable. Uterus is midline. There is no fluid or  adenopathy.  There is streak artifact from fixation hardware in the left  proximal femur. As seen on recent radiographs there is extension of the  compression screw into the left hip joint with adjacent remodeling of  the superior acetabulum; appearance is similar to the prior exam. There  is also nonunion of the previous set intertrochanteric fracture. Diffuse  osteopenia noted. Best seen on series 3 image 103 and series 4 image 69  are areas of increased attenuation in the fat of the medial inferior  left buttock likely representing hematoma from previous fall.Pessary is  noted.       Impression:      New, mild pericardial effusion.     Stable, bilateral, simple appearing renal cystic lesions and hepatic  cystic  lesion compared to prior exam.     Suspected hematoma formation in the left inferior medial buttock as  described.     Stable chronic changes involving fixation hardware of the proximal left  femur compared to 07/12/2022.     This report was signed and finalized on 1/12/2023 12:43 PM by Angela Pablo MD.    CT Lumbar Spine Without Contrast [[id number]] Collected: 01/12/23 1226     Updated: 01/12/23 1230    Narrative:         PROCEDURE: CT LUMBAR SPINE WO CONTRAST-     HISTORY: fall; N30.00-Acute cystitis without hematuria; N18.9-Chronic  kidney disease, unspecified, compare 07/12/2022.     PROCEDURE: Axial images were obtained through the lumbar spine by  computed tomography. Reconstruction images were also performed. This  study was performed with techniques to keep radiation doses as low as  reasonably achievable, (ALARA). Individualized dose reduction techniques  using automated exposure control or adjustment of mA and/or kV according  to the patient size were employed.     FINDINGS: Again noted is minimal anterolisthesis of L4 and L5 and grade  1 anterolisthesis of L5 on S1; this is stable from the prior exam. The  vertebral bodies are of proper height. The facets overlap at all levels.  Moderate, diffuse osteopenia noted. Vascular calcifications noted. Small  osteophytes noted at all levels.     L1-L2: No significant disc disease is present. There is no stenosis.     L2-L3: No significant disc disease is present. There is no stenosis.     L3-L4: No significant disc disease is present. There is no stenosis.     L4-L5: There is moderate annular bulge causing mild spinal stenosis.  There is moderate, bilateral neural foraminal narrowing.     L5-S1: There is moderate annular bulge causing mild spinal stenosis.  There is moderate, bilateral neural foraminal narrowing.       Impression:      No acute abnormality.     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           This report was signed and  finalized on 1/12/2023 12:28 PM by Angela Pablo MD.    XR Chest 1 View [[id number]] Collected: 01/12/23 1002     Updated: 01/12/23 1005    Narrative:      PROCEDURE: XR CHEST 1 VW-     HISTORY: illness, malaise     COMPARISON: 04/08/2022..     FINDINGS: The heart is mildly enlarged, stable. There is mild tortuosity  of the thoracic aorta with aortic mural calcifications.. The mediastinum  is unremarkable. Mild interstitial disease bilaterally is stable.. There  is no pneumothorax.  There are no acute osseous abnormalities.       Impression:      Stable chest..           This report was signed and finalized on 1/12/2023 10:03 AM by Angela Pablo MD.          Echo:    Results for orders placed during the hospital encounter of 03/29/21    Adult Transthoracic Echo Complete W/ Cont if Necessary Per Protocol    Interpretation Summary  · Left ventricular wall thickness is consistent with mild concentric hypertrophy.  · Estimated left ventricular EF = 68% Estimated left ventricular EF was in agreement with the calculated left ventricular EF. Left ventricular ejection fraction appears to be 66 - 70%. Left ventricular systolic function is normal.  · Left ventricular diastolic function is consistent with (grade I) impaired relaxation.  · Left atrial volume is moderately increased.  · Estimated right ventricular systolic pressure from tricuspid regurgitation is normal (<35 mmHg).    Condition on Discharge:      Stable.    Code status during the hospital stay:    Code Status and Medical Interventions:   Ordered at: 01/13/23 1108     Medical Intervention Limits:    NO intubation (DNI)     Code Status (Patient has no pulse and is not breathing):    No CPR (Do Not Attempt to Resuscitate)     Medical Interventions (Patient has pulse or is breathing):    Limited Support     Release to patient:    Routine Release     Discharge Disposition:    Rehab Facility or Unit (DC - External)    Discharge Medications:       Discharge Medications       New Medications      Instructions Start Date   cephalexin 500 MG capsule  Commonly known as: KEFLEX   500 mg, Oral, 2 Times Daily      hydroxychloroquine 200 MG tablet  Commonly known as: PLAQUENIL   200 mg, Oral, Every 24 Hours Scheduled      predniSONE 10 MG tablet  Commonly known as: DELTASONE   10 mg, Oral, Daily With Breakfast      QUEtiapine 25 MG tablet  Commonly known as: SEROquel   12.5 mg, Oral, Nightly         Changes to Medications      Instructions Start Date   torsemide 10 MG tablet  Commonly known as: DEMADEX  What changed:   · how much to take  · how to take this  · when to take this  · additional instructions   Take 1 tablet by mouth on Thursday and Monday, take as needed otherwise         Continue These Medications      Instructions Start Date   allopurinol 100 MG tablet  Commonly known as: ZYLOPRIM   1 tablet, Oral, Daily      aspirin 81 MG EC tablet   81 mg, Oral, Daily      budesonide-formoterol 80-4.5 MCG/ACT inhaler  Commonly known as: SYMBICORT   2 puffs, Daily      carboxymethylcellulose 0.5 % solution  Commonly known as: REFRESH PLUS   1 drop, Both Eyes, 3 Times Daily PRN      carvedilol 25 MG tablet  Commonly known as: COREG   25 mg, Oral, 2 Times Daily With Meals      cholecalciferol 25 MCG (1000 UT) tablet  Commonly known as: VITAMIN D3   1,000 Units, Daily      docusate sodium 100 MG capsule  Commonly known as: COLACE   100 mg, Oral, 2 Times Daily PRN      epoetin ovidio 66564 UNIT/ML injection  Commonly known as: EPOGEN,PROCRIT   Inject 40,000 Units under the skin into the appropriate area as directed. Every 2 weeks.  Due Wednesday, Feb 17th      estradiol 0.1 MG/GM vaginal cream  Commonly known as: ESTRACE   Massage 1 gram in vaginal opening twice weekly      fluticasone 50 MCG/ACT nasal spray  Commonly known as: FLONASE   2 sprays, Daily PRN      irbesartan 150 MG tablet  Commonly known as: AVAPRO   150 mg, Oral, Daily      lidocaine 5 %  Commonly known as: LIDODERM   1 patch,  Transdermal, Every 24 Hours, Remove & Discard patch within 12 hours or as directed by MD      omeprazole 40 MG capsule  Commonly known as: priLOSEC   40 mg, Oral, Daily      PROBIOTIC DAILY PO   1 tablet, Daily PRN      vitamin b complex capsule capsule   Oral, Daily           Discharge Diet:     Diet Instructions     Diet: Renal      Discharge Diet: Renal        Activity at Discharge:       Follow-up Appointments:    Additional Instructions for the Follow-ups that You Need to Schedule     Discharge Follow-up with PCP   As directed       Currently Documented PCP:    Jerald Dawkins MD    PCP Phone Number:    [phone]     Follow Up Details: 2-3 weeks         Discharge Follow-up with Specified Provider: Tonie; 1 Week   As directed      To: Tonie    Follow Up: 1 Week            Contact information for follow-up providers     Jerald Dawkins MD.    Specialty: Family Medicine  Contact information:  [address] [place] [address] [phone]             Jerald Dawkins MD .    Specialty: Family Medicine  Why: 2-3 weeks  Contact information:  [address] [place] [address] [phone]                   Contact information for after-discharge care     Destination     [facility] .    Service: Skilled Nursing  Contact information:  [address] [facility] [address] [phone]                           No future appointments.  Test Results Pending at Discharge:           Stepan Bass DO  01/18/23  07:41 EST    Time: I spent >30 minutes on this discharge activity which included: face-to-face encounter with the patient, reviewing the data in the system, coordination of the care with the nursing staff as well as consultants, documentation, and entering orders.     Dictated utilizing Dragon dictation.

## 2023-01-18 NOTE — PROGRESS NOTES
[facility] Progress Note  [facility]. [place]        Patient Name: Carolyne Martins  : 1940  MRN: [id number]   LOS: 6 days    Patient Care Team:  Jerald Dawkins MD as PCP - General (Family Medicine)  Daryn Spears MD as Consulting Physician (General Surgery)    Chief Complaint:    Chief Complaint   Patient presents with   • Illness     Primary Care Physician:  Jerald Dawkins MD  Date of admission: 2023    Subjective     Interval History:   Follow-up chronic kidney disease stage V .  Events noted from last 24 hours.  Patient is less anxious, laying in the bed having breakfast.  Denies any chest pain or shortness of breath.   Review of Systems:   As noted above.    Objective     Vitals:   Temp:  [97.7 °F (36.5 °C)-98.8 °F (37.1 °C)] 98.3 °F (36.8 °C)  Heart Rate:  [73-87] 73  Resp:  [16-18] 16  BP: (119-144)/(60-96) 144/76    Intake/Output Summary (Last 24 hours) at 2023 0803  Last data filed at 2023 0326  Gross per 24 hour   Intake --   Output 850 ml   Net -850 ml       Physical Exam:    General Appearance: alert, oriented x 3, no acute distress   Skin: warm and dry  HEENT: oral mucosa normal, nonicteric sclera  Neck: supple, no JVD  Lungs: CTA  Heart: RRR, normal S1 and S2, 2/6 systolic ejection murmur.  Abdomen: soft, nontender, nondistended, positive bowel sounds  : no palpable bladder  Extremities: Trace edema, no cyanosis or clubbing  Neuro: normal speech and mental status     Scheduled Meds:     Current Facility-Administered Medications   Medication Dose Route Frequency Provider Last Rate Last Admin   • acetaminophen (TYLENOL) tablet 650 mg  650 mg Oral Q4H PRN Kerley, Brian Joseph, DO   325 mg at 233    Or   • acetaminophen (TYLENOL) 160 MG/5ML solution 650 mg  650 mg Oral Q4H PRN Kerley, Brian Joseph, DO        Or   • acetaminophen (TYLENOL) suppository 650 mg  650 mg Rectal Q4H PRN Kerley, Brian Joseph, DO       • aspirin EC tablet  81 mg  81 mg Oral Daily Kerley, Brian Joseph, DO   81 mg at 01/17/23 0855   • sennosides-docusate (PERICOLACE) 8.6-50 MG per tablet 2 tablet  2 tablet Oral BID Kerley, Brian Joseph, DO   2 tablet at 01/17/23 0855    And   • polyethylene glycol (MIRALAX) packet 17 g  17 g Oral Daily PRN Kerley, Brian Joseph, DO        And   • bisacodyl (DULCOLAX) EC tablet 5 mg  5 mg Oral Daily PRN Kerley, Brian Joseph, DO   5 mg at 01/16/23 1304    And   • bisacodyl (DULCOLAX) suppository 10 mg  10 mg Rectal Daily PRN Kerley, Brian Joseph, DO       • carvedilol (COREG) tablet 25 mg  25 mg Oral BID With Meals Kerley, Brian Joseph, DO   25 mg at 01/17/23 1807   • heparin (porcine) 5000 UNIT/ML injection 5,000 Units  5,000 Units Subcutaneous Q8H Kerley, Brian Joseph, DO   5,000 Units at 01/18/23 0645   • hydroxychloroquine (PLAQUENIL) tablet 200 mg  200 mg Oral Q24H Clay Schilling MD, FASN   200 mg at 01/17/23 0855   • losartan (COZAAR) tablet 50 mg  50 mg Oral Q24H Kerley, Brian Joseph, DO   50 mg at 01/17/23 0855   • ondansetron (ZOFRAN) injection 4 mg  4 mg Intravenous Q6H PRN Kerley, Brian Joseph, DO       • pantoprazole (PROTONIX) EC tablet 40 mg  40 mg Oral Q AM Kerley, Brian Joseph, DO   40 mg at 01/18/23 0647   • predniSONE (DELTASONE) tablet 10 mg  10 mg Oral Daily With Breakfast Clay Schilling MD, FASN   10 mg at 01/17/23 0855   • QUEtiapine (SEROquel) tablet 12.5 mg  12.5 mg Oral Nightly Clay Schilling MD, FASN   12.5 mg at 01/17/23 2152   • sodium chloride 0.9 % flush 10 mL  10 mL Intravenous Q12H Kerley, Brian Joseph, DO   10 mL at 01/17/23 2154   • sodium chloride 0.9 % flush 10 mL  10 mL Intravenous PRN Kerley, Brian Joseph,        • sodium chloride 0.9 % infusion 40 mL  40 mL Intravenous PRN Kerley, Brian Joseph,        • torsemide (DEMADEX) tablet 10 mg  10 mg Oral Daily Clay Schilling MD, FASN   10 mg at 01/17/23 0855       aspirin, 81 mg, Oral, Daily  carvedilol, 25 mg, Oral, BID With Meals  heparin  (porcine), 5,000 Units, Subcutaneous, Q8H  hydroxychloroquine, 200 mg, Oral, Q24H  losartan, 50 mg, Oral, Q24H  pantoprazole, 40 mg, Oral, Q AM  predniSONE, 10 mg, Oral, Daily With Breakfast  QUEtiapine, 12.5 mg, Oral, Nightly  senna-docusate sodium, 2 tablet, Oral, BID  sodium chloride, 10 mL, Intravenous, Q12H  torsemide, 10 mg, Oral, Daily        IV Meds:        Results Reviewed:   I have personally reviewed the results from the time of this admission to 1/18/2023 08:03 EST     Results from last 7 days   Lab Units 01/18/23 0623 01/17/23  0545 01/16/23  0609 01/13/23  0555 01/12/23  0937   SODIUM mmol/L 133* 129* 134*   < > 133*   POTASSIUM mmol/L 4.8 5.0 5.0   < > 5.1   CHLORIDE mmol/L 105 99 104   < > 102   CO2 mmol/L 19.9* 21.5* 20.3*   < > 19.2*   BUN mg/dL 74* 59* 43*   < > 46*   CREATININE mg/dL 2.72* 2.42* 2.44*   < > 3.82*   CALCIUM mg/dL 9.2 9.4 9.9   < > 8.9   BILIRUBIN mg/dL  --   --   --   --  0.3   ALK PHOS U/L  --   --   --   --  134*   ALT (SGPT) U/L  --   --   --   --  13   AST (SGOT) U/L  --   --   --   --  30   GLUCOSE mg/dL 104* 132* 134*   < > 106*    < > = values in this interval not displayed.       Estimated Creatinine Clearance: 12.9 mL/min (A) (by C-G formula based on SCr of 2.72 mg/dL (H)).    Results from last 7 days   Lab Units 01/18/23 0623 01/17/23  0545 01/16/23  0609   PHOSPHORUS mg/dL 2.7 3.0 4.7*             Results from last 7 days   Lab Units 01/18/23 0623 01/17/23  0545 01/16/23  0609 01/15/23  0723 01/14/23  0733   WBC 10*3/mm3 8.72 8.21 3.61 5.37 4.22   HEMOGLOBIN g/dL 8.7* 8.7* 9.1* 8.5* 8.3*   PLATELETS 10*3/mm3 233 221 222 226 194             Brief Urine Lab Results  (Last result in the past 365 days)      Color   Clarity   Blood   Leuk Est   Nitrite   Protein   CREAT   Urine HCG        01/12/23 0937 Yellow   Turbid   Small (1+)   Large (3+)   Negative   30 mg/dL (1+)                 No results found for: UTPCR    Imaging Results (Last 24 Hours)     ** No results found  for the last 24 hours. **              Assessment / Plan     ASSESSMENT:  1. Chronic kidney disease stage IV: Baseline chronic kidney disease stage IV secondary to longstanding atherosclerotic and glomerulosclerosis.  Patient has refused to talk about dialysis, son has always agreed to what ever she says.  No indications for acute dialysis at this point.  But she may have some worsening of her baseline chronic kidney disease may end up requiring dialysis in the near future.  2. Anemia of chronic kidney disease: Longstanding history of need for IV iron as well as LIU, missed multiple doses in the recent past.  3. Hyperparathyroidism: Check and follow PTH.  Check phosphorus.  4. Acute cystitis without hematuria: Treated as per hospitalist service  5. Hypertension with chronic kidney disease stage IV: Blood pressure appears to be under optimal control with current medications.  6. Coronary artery disease: Significant atherosclerotic cardiovascular disease follows up with Dr. Acuña, she has missed her multiple recent appointments, last time she saw him was more than a year ago.  7. Valvular heart disease: Severe aortic sclerosis.  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  Then she was started on prednisone, she was on 2.5 mg a day for some time with some relief and for acute episodes she will go up to 10 mg a day for 3 days.  She finally decided against it and stopped taking it.  9. Rheumatoid arthritis: Same is true as noted above  10. Debility: Patient recognizes the debility and wants to go to a rehab.  11. Left leg injury      PLAN:  · Renal function is stable.  I will go ahead and stop the torsemide altogether her volume status seems to be on the dry side.  It does appear that patient will be going to the rehab today.  I will go ahead and decrease the prednisone to 2.5 mg a day.  He can continue rest of the medications and we will DC the torsemide  altogether.  · Continue Procrit 40,000 every 2 weeks and hold for hemoglobin greater than 11.  · Details were discussed with the patient no family around today.   · Details were also discussed with the hospitalist service.  Patient has been getting weaker, unable to take care of her at home she will need the rehab placement.  She may end up long-term nursing home patient.  · Continue with rest of the current treatment plan, and monitor with surveillance labs.  · Further recommendations will depend on clinical course of the patient during the current hospitalization.     Thank you for involving us in the care of Carolyne Martins.  Please feel free to call with any questions.    Clay Schilling MD, FASN  01/18/23  08:03 [facility]    [facility]  [phone]      Part of this note may be an electronic transcription/translation of spoken language to printed text using the Dragon Dictation System.

## 2023-01-18 NOTE — PLAN OF CARE
Goal Outcome Evaluation:           Progress: improving  Outcome Evaluation: Pt resrting well this shift.  Tolerateing room air.  Anticipating being able to go to rehab today.  wound care per orders.

## 2023-01-18 NOTE — CASE MANAGEMENT/SOCIAL WORK
Case Management Discharge Note                Selected Continued Care - Discharged on 1/18/2023 Admission date: 1/12/2023 - Discharge disposition: Rehab Facility or Unit (DC - External)    Destination Coordination complete.    Service Provider Selected Services Address Phone Fax Patient Preferred    [facility] [address] [place] [address] [phone] --       Internal Comment last updated by Kacy Harry, APRN 1/16/2023 1429    1/16 Cm received vm from [facility] stating they can start precert on pt. CM returned call and left vm requesting they start precert ASAP due to pt being med ready for dc.                      Durable Medical Equipment    No services have been selected for the patient.              Dialysis/Infusion    No services have been selected for the patient.              Home Medical Care    No services have been selected for the patient.              Therapy    No services have been selected for the patient.              Community Resources    No services have been selected for the patient.              Community & DME    No services have been selected for the patient.                  Transportation Services  Ambulance: [facility]

## 2023-01-20 ENCOUNTER — NURSING HOME (OUTPATIENT)
Dept: FAMILY MEDICINE CLINIC | Facility: CLINIC | Age: 83
End: 2023-01-20
Payer: MEDICARE

## 2023-01-20 VITALS
HEART RATE: 72 BPM | DIASTOLIC BLOOD PRESSURE: 78 MMHG | TEMPERATURE: 98.9 F | BODY MASS INDEX: 22.1 KG/M2 | OXYGEN SATURATION: 98 % | RESPIRATION RATE: 18 BRPM | SYSTOLIC BLOOD PRESSURE: 134 MMHG | WEIGHT: 109.4 LBS

## 2023-01-20 DIAGNOSIS — N30.00 ACUTE CYSTITIS WITHOUT HEMATURIA: ICD-10-CM

## 2023-01-20 DIAGNOSIS — Z74.09 IMPAIRED MOBILITY AND ADLS: ICD-10-CM

## 2023-01-20 DIAGNOSIS — Z78.9 IMPAIRED MOBILITY AND ADLS: ICD-10-CM

## 2023-01-20 DIAGNOSIS — M25.50 PAIN IN JOINT INVOLVING MULTIPLE SITES: ICD-10-CM

## 2023-01-20 DIAGNOSIS — M19.90 CHRONIC INFLAMMATORY ARTHRITIS: Primary | ICD-10-CM

## 2023-01-20 PROCEDURE — 99309 SBSQ NF CARE MODERATE MDM 30: CPT | Performed by: NURSE PRACTITIONER

## 2023-01-20 RX ORDER — HYDROCODONE BITARTRATE AND ACETAMINOPHEN 5; 325 MG/1; MG/1
1 TABLET ORAL 2 TIMES DAILY PRN
Qty: 60 TABLET | Refills: 0 | Status: ON HOLD | OUTPATIENT
Start: 2023-01-20 | End: 2023-02-22

## 2023-01-20 NOTE — LETTER
"Nursing Home Follow Up Note      Mikey Duran DO []   FATUMA Monet [x]  [address] [place] [address]  Phone: [phone]  Fax: [phone] Baltazar Borden MD []    Jose Antonio Pride DO []   [address] [place] [address]  Phone: [phone]  Fax: [phone]     PATIENT NAME: Carolyne Martins                                                                          YOB: 1940           DATE OF SERVICE: 01/20/2023  FACILITY:  [][facility]   [] [place]   [x] [facility] [place]   [] [facility]   [] Other ______________________________________________________________________      CHIEF COMPLAINT:    Complaints of generalized body aches and concerns that she has pneumonia.    Medication and chart review.      HISTORY OF PRESENT ILLNESS:     Patient is an 82-year-old woman with past medical history of CKD follows with nephrology Dr. Fitzgerald, coronary artery disease follows with Dr. Acuña, arthritis, back pain, hyperparathyroidism, hypertension, anemia, osteoarthritis, rheumatic heart disease, valvular heart disease.  Presented to [facility] ED on 1/12/2023 feeling generally weak progressively worse over a few days.  She was treated for UTI with Rocephin and was discharged to the facility on Keflex that will complete tomorrow.  She was also treated with Plaquenil and steroids for a flare of her inflammatory arthritis.  She was continued on Plaquenil and is on prednisone 10 mg daily.  She reports that she is having generalized joint pain, \"hurting all over and feels it is related to pneumonia\".  She has had no increased work of breathing or O2 demand.  She had labs today and WBC count 8.4.  BUN 74 and creatinine 2.4 but this is baseline from hospital.  She is resting in bed today with no signs and symptoms of distress.    PAST MEDICAL & SURGICAL HISTORY:   Past Medical History:   Diagnosis Date   • Anemia 1998   • Anemia    • Arthritis    • Back pain    • Bleeding from anus    • Bronchitis     " "STATES HAS BRONCHITIS CURRENTLY (12/5/17).     • Bronchitis 12/2017   • CAD (coronary artery disease)    • Cataract, bilateral    • Chronic kidney disease     STAGE 4.  SEEREBA HALEY   • Chronic kidney failure     REPORTS STAGE IV   • Difficulty swallowing solids    • Disease of thyroid gland    • Fracture, radius 2016    right distal radius and ulna, healed.   • GERD (gastroesophageal reflux disease)    • Gout    • High cholesterol    • History of blood transfusion 2019   • History of nuclear stress test 2014    DR. CORTEZ.  \"IT WAS OK\"   • Hyperparathyroidism (HCC)    • Hyperparathyroidism (HCC)    • Hypertension    • Impaired functional mobility, balance, gait, and endurance    • Impaired mobility    • Iron deficiency    • Osteoarthritis    • Osteoarthritis of knees, bilateral     Both knees Supartz injection series August, 2015   • Osteoporosis    • Palpitations    • Personal history of cardiac murmur    • Pessary maintenance    • PONV (postoperative nausea and vomiting)    • Presence of pessary    • Problems with swallowing     WITH FOOD OCCASSIONALLY   • Rheumatic heart disease     Personal history   • Rotator cuff tendonitis     right    • Rupture of right proximal biceps tendon     chronic   • Seasonal allergies    • Sinus problem    • Spinal headache     REPORTS AFTER DELIVERY OF SON   • Subacromial bursitis     right    • Valvular heart disease    • Vision problems    • Vitamin B12 deficiency    • Vitamin D deficiency    • Wears glasses       Past Surgical History:   Procedure Laterality Date   • CATARACT EXTRACTION Bilateral    • CERVICAL POLYPECTOMY     • COLONOSCOPY  2011   • COLONOSCOPY N/A 1/9/2018    Procedure: COLONOSCOPY with endoscopic mucosal resection (hot snare), normal saline submucosal injection, argon thermal ablation, resolution clip placement x4, and cold biopsy polypectomy;  Surgeon: Pieter Concepcion MD;  Location: [facility] ENDOSCOPY;  Service:    • ENDOSCOPY N/A 12/6/2017    Procedure: " ESOPHAGOGASTRODUODENOSCOPY with biopsies and hot snare polypectomies;  Surgeon: Pieter Concepcion MD;  Location: [facility] ENDOSCOPY;  Service:    • ENDOSCOPY N/A 1/15/2019    Procedure: ESOPHAGOGASTRODUODENOSCOPY WITH BIOPSIES AND HOT SNARE POLYPECTOMIES X10;  Surgeon: Pieter Concepcion MD;  Location: [facility] ENDOSCOPY;  Service: Gastroenterology   • HIP TROCHANTERIC NAILING WITH INTRAMEDULLARY HIP SCREW Left 2/14/2021    Procedure: HIP TROCHANTER NAIL SHORT WITH INTRAMEDULLARY HIP SCREW, LEFT;  Surgeon: Gabriele Loyd MD;  Location: [facility] OR;  Service: Orthopedics;  Laterality: Left;   • UPPER GASTROINTESTINAL ENDOSCOPY  08/18/2014         MEDICATIONS:  I have reviewed and reconciled the patients medication list in the patients chart at the [facility] nursing [facility] today.      ALLERGIES:    Allergies   Allergen Reactions   • Ferrlecit [Na Ferric Gluc Cplx In Sucrose] Swelling   • Ranitidine Hcl Shortness Of Breath   • Levofloxacin Other (See Comments)     Leg cramps     • Lisinopril Cough         SOCIAL HISTORY:    Social History     Socioeconomic History   • Marital status:    Tobacco Use   • Smoking status: Never   • Smokeless tobacco: Never   Vaping Use   • Vaping Use: Never used   Substance and Sexual Activity   • Alcohol use: Never   • Drug use: Never   • Sexual activity: Not Currently     Birth control/protection: Post-menopausal       FAMILY HISTORY:    Family History   Problem Relation Age of Onset   • Liver cancer Maternal Grandmother    • Gout Other    • Osteoporosis Other    • Stroke Other    • Ulcers Other    • Asthma Other    • Hypertension Other    • Heart disease Other    • Osteoarthritis Other    • Colon cancer Neg Hx        REVIEW OF SYSTEMS:    Review of Systems   Constitutional: Negative for activity change, appetite change, chills, diaphoresis, fatigue, fever, unexpected weight gain and unexpected weight loss.   HENT: Negative for congestion, mouth sores, nosebleeds, rhinorrhea and trouble  swallowing.    Eyes: Negative for discharge, redness and itching.   Respiratory: Negative for apnea, cough, choking, chest tightness, shortness of breath, wheezing and stridor.    Cardiovascular: Negative for chest pain, palpitations and leg swelling.   Gastrointestinal: Negative for abdominal pain, blood in stool, constipation, diarrhea, nausea, vomiting and indigestion.   Endocrine: Negative for polydipsia and polyuria.   Genitourinary: Negative for decreased urine volume, difficulty urinating, dysuria, frequency, hematuria, urgency and urinary incontinence.   Musculoskeletal: Positive for arthralgias (generalized) and gait problem. Negative for joint swelling and myalgias.   Skin: Negative for color change, dry skin, rash and skin lesions.   Neurological: Positive for weakness and memory problem. Negative for dizziness, headache and confusion.   Psychiatric/Behavioral: Negative for behavioral problems, dysphoric mood, hallucinations, sleep disturbance and depressed mood. The patient is nervous/anxious.          PHYSICAL EXAMINATION:   VITAL SIGNS:   Vitals:    01/20/23 1239   BP: 134/78   Pulse: 72   Resp: 18   Temp: 98.9 °F (37.2 °C)   SpO2: 98%   Weight: 49.6 kg (109 lb 6.4 oz)       Physical Exam  Vitals and nursing note reviewed.   Constitutional:       Appearance: Normal appearance. She is well-developed.   HENT:      Head: Normocephalic.      Right Ear: External ear normal.      Left Ear: External ear normal.      Nose: Nose normal.   Eyes:      Conjunctiva/sclera: Conjunctivae normal.   Cardiovascular:      Rate and Rhythm: Normal rate and regular rhythm.      Heart sounds: Normal heart sounds.   Pulmonary:      Effort: Pulmonary effort is normal. No respiratory distress.      Breath sounds: Normal breath sounds. No wheezing or rales.   Abdominal:      General: Bowel sounds are normal. There is no distension.      Palpations: Abdomen is soft.      Tenderness: There is no abdominal tenderness.    Musculoskeletal:      Comments: NROM all major joints   Skin:     General: Skin is warm and dry.      Findings: No rash.   Neurological:      Mental Status: She is alert and oriented to person, place, and time.   Psychiatric:         Mood and Affect: Mood is anxious.         Speech: Speech normal.         Cognition and Memory: Cognition is not impaired. Memory is impaired.         RECORDS REVIEW:   I have reviewed and interpreted the records in [facility] and [facility]    ASSESSMENT     Diagnoses and all orders for this visit:    1. Chronic inflammatory arthritis (Primary)  -     HYDROcodone-acetaminophen (NORCO) 5-325 MG per tablet; Take 1 tablet by mouth 2 (Two) Times a Day As Needed for Moderate Pain for up to 30 days.  Dispense: 60 tablet; Refill: 0    2. Pain in joint involving multiple sites    3. Acute cystitis without hematuria    4. Impaired mobility and ADLs        PLAN    Chronic inflammatory arthritis/Joint pain multiple sites  -Stat chest x-ray was performed due to patient's concern for pneumonia and chest x-ray was negative for any pneumonia or other acute findings.  We will give Depo-Medrol 40 mg IM now.  We will give Norco 5/325 p.o. twice daily as needed.  CBC and CMP insignificant today.  She does have history of chronic bronchitis several nursing to give DuoNebs every 6 as needed if patient does develop any respiratory symptoms.  She is also on scheduled inhalers.  Will follow-up and continue to monitor.    Nursing to continue supportive care for all ADLs.    Nursing encouraged to keep me informed of any acute changes, lack of improvement, or any new concerning symptoms.    [x]  Discussed Patient in detail with nursing/staff, addressed all needs today.     [x]  Plan of Care Reviewed   [x]  PT/OT Reviewed   [x]  Order Changes  [x]  Discharge Plans Reviewed  [x]  Advance Directive on file with Nursing Home.   [x]  POA on file with Nursing Home.   [x]  Code Status listed: []  Full Code   [x]  DNR          Roma Diaz, APRN.

## 2023-01-20 NOTE — PROGRESS NOTES
"Nursing Home Follow Up Note      Mikey Duran DO []   FATUMA Monet [x]  [address] [place] [address]  Phone: [phone]  Fax: [phone] Baltazar Borden MD []    Jose Antonio Pride DO []   [address] [place] [address]  Phone: [phone]  Fax: [phone]     PATIENT NAME: Carolyne Martins                                                                          YOB: 1940           DATE OF SERVICE: 01/20/2023  FACILITY:  [][place]   [] [place]   [x] [facility]   [] [facility]   [] Other ______________________________________________________________________      CHIEF COMPLAINT:    Complaints of generalized body aches and concerns that she has pneumonia.    Medication and chart review.      HISTORY OF PRESENT ILLNESS:     Patient is an 82-year-old woman with past medical history of CKD follows with nephrology Dr. Fitzgerald, coronary artery disease follows with Dr. Acuña, arthritis, back pain, hyperparathyroidism, hypertension, anemia, osteoarthritis, rheumatic heart disease, valvular heart disease.  Presented to [facility] ED on 1/12/2023 feeling generally weak progressively worse over a few days.  She was treated for UTI with Rocephin and was discharged to the facility on Keflex that will complete tomorrow.  She was also treated with Plaquenil and steroids for a flare of her inflammatory arthritis.  She was continued on Plaquenil and is on prednisone 10 mg daily.  She reports that she is having generalized joint pain, \"hurting all over and feels it is related to pneumonia\".  She has had no increased work of breathing or O2 demand.  She had labs today and WBC count 8.4.  BUN 74 and creatinine 2.4 but this is baseline from hospital.  She is resting in bed today with no signs and symptoms of distress.    PAST MEDICAL & SURGICAL HISTORY:   Past Medical History:   Diagnosis Date   • Anemia 1998   • Anemia    • Arthritis    • Back pain    • Bleeding from anus    • Bronchitis     " "STATES HAS BRONCHITIS CURRENTLY (12/5/17).     • Bronchitis 12/2017   • CAD (coronary artery disease)    • Cataract, bilateral    • Chronic kidney disease     STAGE 4.  SEEREBA HALEY   • Chronic kidney failure     REPORTS STAGE IV   • Difficulty swallowing solids    • Disease of thyroid gland    • Fracture, radius 2016    right distal radius and ulna, healed.   • GERD (gastroesophageal reflux disease)    • Gout    • High cholesterol    • History of blood transfusion 2019   • History of nuclear stress test 2014    DR. CORTEZ.  \"IT WAS OK\"   • Hyperparathyroidism (HCC)    • Hyperparathyroidism (HCC)    • Hypertension    • Impaired functional mobility, balance, gait, and endurance    • Impaired mobility    • Iron deficiency    • Osteoarthritis    • Osteoarthritis of knees, bilateral     Both knees Supartz injection series August, 2015   • Osteoporosis    • Palpitations    • Personal history of cardiac murmur    • Pessary maintenance    • PONV (postoperative nausea and vomiting)    • Presence of pessary    • Problems with swallowing     WITH FOOD OCCASSIONALLY   • Rheumatic heart disease     Personal history   • Rotator cuff tendonitis     right    • Rupture of right proximal biceps tendon     chronic   • Seasonal allergies    • Sinus problem    • Spinal headache     REPORTS AFTER DELIVERY OF SON   • Subacromial bursitis     right    • Valvular heart disease    • Vision problems    • Vitamin B12 deficiency    • Vitamin D deficiency    • Wears glasses       Past Surgical History:   Procedure Laterality Date   • CATARACT EXTRACTION Bilateral    • CERVICAL POLYPECTOMY     • COLONOSCOPY  2011   • COLONOSCOPY N/A 1/9/2018    Procedure: COLONOSCOPY with endoscopic mucosal resection (hot snare), normal saline submucosal injection, argon thermal ablation, resolution clip placement x4, and cold biopsy polypectomy;  Surgeon: Pieter Concepcion MD;  Location: [facility] ENDOSCOPY;  Service:    • ENDOSCOPY N/A 12/6/2017    Procedure: " ESOPHAGOGASTRODUODENOSCOPY with biopsies and hot snare polypectomies;  Surgeon: Pieter Concepcion MD;  Location: [facility] ENDOSCOPY;  Service:    • ENDOSCOPY N/A 1/15/2019    Procedure: ESOPHAGOGASTRODUODENOSCOPY WITH BIOPSIES AND HOT SNARE POLYPECTOMIES X10;  Surgeon: Pieter Concepcion MD;  Location: [facility] ENDOSCOPY;  Service: Gastroenterology   • HIP TROCHANTERIC NAILING WITH INTRAMEDULLARY HIP SCREW Left 2/14/2021    Procedure: HIP TROCHANTER NAIL SHORT WITH INTRAMEDULLARY HIP SCREW, LEFT;  Surgeon: Gabriele Loyd MD;  Location: [facility] OR;  Service: Orthopedics;  Laterality: Left;   • UPPER GASTROINTESTINAL ENDOSCOPY  08/18/2014         MEDICATIONS:  I have reviewed and reconciled the patients medication list in the patients chart at the [facility] nursing [facility] today.      ALLERGIES:    Allergies   Allergen Reactions   • Ferrlecit [Na Ferric Gluc Cplx In Sucrose] Swelling   • Ranitidine Hcl Shortness Of Breath   • Levofloxacin Other (See Comments)     Leg cramps     • Lisinopril Cough         SOCIAL HISTORY:    Social History     Socioeconomic History   • Marital status:    Tobacco Use   • Smoking status: Never   • Smokeless tobacco: Never   Vaping Use   • Vaping Use: Never used   Substance and Sexual Activity   • Alcohol use: Never   • Drug use: Never   • Sexual activity: Not Currently     Birth control/protection: Post-menopausal       FAMILY HISTORY:    Family History   Problem Relation Age of Onset   • Liver cancer Maternal Grandmother    • Gout Other    • Osteoporosis Other    • Stroke Other    • Ulcers Other    • Asthma Other    • Hypertension Other    • Heart disease Other    • Osteoarthritis Other    • Colon cancer Neg Hx        REVIEW OF SYSTEMS:    Review of Systems   Constitutional: Negative for activity change, appetite change, chills, diaphoresis, fatigue, fever, unexpected weight gain and unexpected weight loss.   HENT: Negative for congestion, mouth sores, nosebleeds, rhinorrhea and trouble  swallowing.    Eyes: Negative for discharge, redness and itching.   Respiratory: Negative for apnea, cough, choking, chest tightness, shortness of breath, wheezing and stridor.    Cardiovascular: Negative for chest pain, palpitations and leg swelling.   Gastrointestinal: Negative for abdominal pain, blood in stool, constipation, diarrhea, nausea, vomiting and indigestion.   Endocrine: Negative for polydipsia and polyuria.   Genitourinary: Negative for decreased urine volume, difficulty urinating, dysuria, frequency, hematuria, urgency and urinary incontinence.   Musculoskeletal: Positive for arthralgias (generalized) and gait problem. Negative for joint swelling and myalgias.   Skin: Negative for color change, dry skin, rash and skin lesions.   Neurological: Positive for weakness and memory problem. Negative for dizziness, headache and confusion.   Psychiatric/Behavioral: Negative for behavioral problems, dysphoric mood, hallucinations, sleep disturbance and depressed mood. The patient is nervous/anxious.          PHYSICAL EXAMINATION:   VITAL SIGNS:   Vitals:    01/20/23 1239   BP: 134/78   Pulse: 72   Resp: 18   Temp: 98.9 °F (37.2 °C)   SpO2: 98%   Weight: 49.6 kg (109 lb 6.4 oz)       Physical Exam  Vitals and nursing note reviewed.   Constitutional:       Appearance: Normal appearance. She is well-developed.   HENT:      Head: Normocephalic.      Right Ear: External ear normal.      Left Ear: External ear normal.      Nose: Nose normal.   Eyes:      Conjunctiva/sclera: Conjunctivae normal.   Cardiovascular:      Rate and Rhythm: Normal rate and regular rhythm.      Heart sounds: Normal heart sounds.   Pulmonary:      Effort: Pulmonary effort is normal. No respiratory distress.      Breath sounds: Normal breath sounds. No wheezing or rales.   Abdominal:      General: Bowel sounds are normal. There is no distension.      Palpations: Abdomen is soft.      Tenderness: There is no abdominal tenderness.    Musculoskeletal:      Comments: NROM all major joints   Skin:     General: Skin is warm and dry.      Findings: No rash.   Neurological:      Mental Status: She is alert and oriented to person, place, and time.   Psychiatric:         Mood and Affect: Mood is anxious.         Speech: Speech normal.         Cognition and Memory: Cognition is not impaired. Memory is impaired.         RECORDS REVIEW:   I have reviewed and interpreted the records in [facility] and [facility]    ASSESSMENT     Diagnoses and all orders for this visit:    1. Chronic inflammatory arthritis (Primary)  -     HYDROcodone-acetaminophen (NORCO) 5-325 MG per tablet; Take 1 tablet by mouth 2 (Two) Times a Day As Needed for Moderate Pain for up to 30 days.  Dispense: 60 tablet; Refill: 0    2. Pain in joint involving multiple sites    3. Acute cystitis without hematuria    4. Impaired mobility and ADLs        PLAN    Chronic inflammatory arthritis/Joint pain multiple sites  -Stat chest x-ray was performed due to patient's concern for pneumonia and chest x-ray was negative for any pneumonia or other acute findings.  We will give Depo-Medrol 40 mg IM now.  We will give Norco 5/325 p.o. twice daily as needed.  CBC and CMP insignificant today.  She does have history of chronic bronchitis several nursing to give DuoNebs every 6 as needed if patient does develop any respiratory symptoms.  She is also on scheduled inhalers.  Will follow-up and continue to monitor.    Nursing to continue supportive care for all ADLs.    Nursing encouraged to keep me informed of any acute changes, lack of improvement, or any new concerning symptoms.    [x]  Discussed Patient in detail with nursing/staff, addressed all needs today.     [x]  Plan of Care Reviewed   [x]  PT/OT Reviewed   [x]  Order Changes  [x]  Discharge Plans Reviewed  [x]  Advance Directive on file with Nursing Home.   [x]  POA on file with Nursing Home.   [x]  Code Status listed: []  Full Code   [x]  DNR          Roma Diaz, APRN.

## 2023-01-26 ENCOUNTER — NURSING HOME (OUTPATIENT)
Dept: FAMILY MEDICINE CLINIC | Facility: CLINIC | Age: 83
End: 2023-01-26
Payer: COMMERCIAL

## 2023-01-26 VITALS
SYSTOLIC BLOOD PRESSURE: 136 MMHG | RESPIRATION RATE: 18 BRPM | HEART RATE: 86 BPM | BODY MASS INDEX: 22.22 KG/M2 | WEIGHT: 110 LBS | TEMPERATURE: 98 F | OXYGEN SATURATION: 96 % | DIASTOLIC BLOOD PRESSURE: 70 MMHG

## 2023-01-26 DIAGNOSIS — N18.4 CKD (CHRONIC KIDNEY DISEASE) STAGE 4, GFR 15-29 ML/MIN: ICD-10-CM

## 2023-01-26 DIAGNOSIS — Z74.09 IMPAIRED MOBILITY AND ADLS: ICD-10-CM

## 2023-01-26 DIAGNOSIS — U07.1 COVID-19 VIRUS INFECTION: Primary | ICD-10-CM

## 2023-01-26 DIAGNOSIS — Z78.9 IMPAIRED MOBILITY AND ADLS: ICD-10-CM

## 2023-01-26 DIAGNOSIS — J44.9 CHRONIC OBSTRUCTIVE PULMONARY DISEASE, UNSPECIFIED COPD TYPE: ICD-10-CM

## 2023-01-26 PROCEDURE — 99309 SBSQ NF CARE MODERATE MDM 30: CPT | Performed by: NURSE PRACTITIONER

## 2023-01-26 NOTE — LETTER
Nursing Home Follow Up Note      Mikey Duran DO []   FATUMA Monet [x]  [address] [place] [address]  Phone: [phone]  Fax: [phone] Baltazar Borden MD []    Jose Antonio Pride DO []   [address] [place] [address]  Phone: [phone]  Fax: [phone]     PATIENT NAME: Carolyne Martins                                                                          YOB: 1940           DATE OF SERVICE: 01/26/2023  FACILITY:  [][place]   [] [place]   [x] [facility]   [] [facility]   [] Other ______________________________________________________________________      CHIEF COMPLAINT:    Positive Covid 19 infection since yesterday.       HISTORY OF PRESENT ILLNESS:     Patient is an 82-year-old woman with past medical history of CKD follows with nephrology Dr. Fitzgerald, coronary artery disease follows with Dr. Acuña, arthritis, back pain, hyperparathyroidism, hypertension, anemia, osteoarthritis, rheumatic heart disease, valvular heart disease. Routine Covid testing performed yesterday in facility due to other positive cases, and she was positive.  Nursing reports that she has had complaints of increased coughing and not feeling well present during visit today.  She reports that she is feeling well and does not see any need to go to the hospital.  She has had no increased work of breathing or O2 demand.  No fever.  She is resting in bed today with no signs and symptoms of distress. She can not take any of the antiviral Covid medications due to her CKD.    PAST MEDICAL & SURGICAL HISTORY:   Past Medical History:   Diagnosis Date   • Anemia 1998   • Anemia    • Arthritis    • Back pain    • Bleeding from anus    • Bronchitis     STATES HAS BRONCHITIS CURRENTLY (12/5/17).     • Bronchitis 12/2017   • CAD (coronary artery disease)    • Cataract, bilateral    • Chronic kidney disease     STAGE 4.  SEES TERA   • Chronic kidney failure     REPORTS STAGE IV   • Difficulty  "swallowing solids    • Disease of thyroid gland    • Fracture, radius 2016    right distal radius and ulna, healed.   • GERD (gastroesophageal reflux disease)    • Gout    • High cholesterol    • History of blood transfusion 2019   • History of nuclear stress test 2014    DR. CORTEZ.  \"IT WAS OK\"   • Hyperparathyroidism (HCC)    • Hyperparathyroidism (HCC)    • Hypertension    • Impaired functional mobility, balance, gait, and endurance    • Impaired mobility    • Iron deficiency    • Osteoarthritis    • Osteoarthritis of knees, bilateral     Both knees Supartz injection series August, 2015   • Osteoporosis    • Palpitations    • Personal history of cardiac murmur    • Pessary maintenance    • PONV (postoperative nausea and vomiting)    • Presence of pessary    • Problems with swallowing     WITH FOOD OCCASSIONALLY   • Rheumatic heart disease     Personal history   • Rotator cuff tendonitis     right    • Rupture of right proximal biceps tendon     chronic   • Seasonal allergies    • Sinus problem    • Spinal headache     REPORTS AFTER DELIVERY OF SON   • Subacromial bursitis     right    • Valvular heart disease    • Vision problems    • Vitamin B12 deficiency    • Vitamin D deficiency    • Wears glasses       Past Surgical History:   Procedure Laterality Date   • CATARACT EXTRACTION Bilateral    • CERVICAL POLYPECTOMY     • COLONOSCOPY  2011   • COLONOSCOPY N/A 1/9/2018    Procedure: COLONOSCOPY with endoscopic mucosal resection (hot snare), normal saline submucosal injection, argon thermal ablation, resolution clip placement x4, and cold biopsy polypectomy;  Surgeon: Pieter Concepcion MD;  Location: [facility] ENDOSCOPY;  Service:    • ENDOSCOPY N/A 12/6/2017    Procedure: ESOPHAGOGASTRODUODENOSCOPY with biopsies and hot snare polypectomies;  Surgeon: Pieter Concepcion MD;  Location: [facility] ENDOSCOPY;  Service:    • ENDOSCOPY N/A 1/15/2019    Procedure: ESOPHAGOGASTRODUODENOSCOPY WITH BIOPSIES AND HOT SNARE POLYPECTOMIES " X10;  Surgeon: Pieter Concepcion MD;  Location: [facility] ENDOSCOPY;  Service: Gastroenterology   • HIP TROCHANTERIC NAILING WITH INTRAMEDULLARY HIP SCREW Left 2/14/2021    Procedure: HIP TROCHANTER NAIL SHORT WITH INTRAMEDULLARY HIP SCREW, LEFT;  Surgeon: Gabriele Loyd MD;  Location: [facility] OR;  Service: Orthopedics;  Laterality: Left;   • UPPER GASTROINTESTINAL ENDOSCOPY  08/18/2014         MEDICATIONS:  I have reviewed and reconciled the patients medication list in the patients chart at the skilled nursing facility today.      ALLERGIES:    Allergies   Allergen Reactions   • Ferrlecit [Na Ferric Gluc Cplx In Sucrose] Swelling   • Ranitidine Hcl Shortness Of Breath   • Levofloxacin Other (See Comments)     Leg cramps     • Lisinopril Cough         SOCIAL HISTORY:    Social History     Socioeconomic History   • Marital status:    Tobacco Use   • Smoking status: Never   • Smokeless tobacco: Never   Vaping Use   • Vaping Use: Never used   Substance and Sexual Activity   • Alcohol use: Never   • Drug use: Never   • Sexual activity: Not Currently     Birth control/protection: Post-menopausal       FAMILY HISTORY:    Family History   Problem Relation Age of Onset   • Liver cancer Maternal Grandmother    • Gout Other    • Osteoporosis Other    • Stroke Other    • Ulcers Other    • Asthma Other    • Hypertension Other    • Heart disease Other    • Osteoarthritis Other    • Colon cancer Neg Hx        REVIEW OF SYSTEMS:    Review of Systems   Constitutional: Positive for fatigue. Negative for activity change, appetite change, chills, diaphoresis, fever, unexpected weight gain and unexpected weight loss.   HENT: Negative for congestion, mouth sores, nosebleeds, rhinorrhea and trouble swallowing.    Eyes: Negative for discharge, redness and itching.   Respiratory: Positive for apnea and cough. Negative for choking, chest tightness, shortness of breath, wheezing and stridor.         Chronic related to COPD, but no  increase   Cardiovascular: Negative for chest pain, palpitations and leg swelling.   Gastrointestinal: Negative for abdominal pain, blood in stool, constipation, diarrhea, nausea, vomiting and indigestion.   Genitourinary: Negative for decreased urine volume, difficulty urinating, dysuria, frequency, hematuria, urgency and urinary incontinence.   Musculoskeletal: Positive for arthralgias (generalized) and gait problem. Negative for joint swelling and myalgias.   Skin: Negative for color change, dry skin, rash and skin lesions.   Neurological: Positive for weakness and memory problem. Negative for dizziness, headache and confusion.   Psychiatric/Behavioral: Negative for behavioral problems, dysphoric mood, hallucinations, sleep disturbance and depressed mood. The patient is not nervous/anxious.          PHYSICAL EXAMINATION:   VITAL SIGNS:   Vitals:    01/26/23 1239   BP: 136/70   Pulse: 86   Resp: 18   Temp: 98 °F (36.7 °C)   SpO2: 96%   Weight: 49.9 kg (110 lb)       Physical Exam  Vitals and nursing note reviewed.   Constitutional:       Appearance: Normal appearance. She is well-developed.   HENT:      Head: Normocephalic.      Right Ear: External ear normal.      Left Ear: External ear normal.      Nose: Nose normal.   Eyes:      Conjunctiva/sclera: Conjunctivae normal.   Cardiovascular:      Rate and Rhythm: Normal rate and regular rhythm.      Heart sounds: Normal heart sounds.   Pulmonary:      Effort: Pulmonary effort is normal. No respiratory distress.      Breath sounds: Decreased air movement present. Decreased breath sounds present.   Abdominal:      General: Bowel sounds are normal. There is no distension.      Palpations: Abdomen is soft.      Tenderness: There is no abdominal tenderness.   Musculoskeletal:      Comments: NROM all major joints   Skin:     General: Skin is warm and dry.      Findings: No rash.   Neurological:      Mental Status: She is alert and oriented to person, place, and time.    Psychiatric:         Mood and Affect: Mood is anxious.         Speech: Speech normal.         Cognition and Memory: Cognition is not impaired. Memory is impaired.         RECORDS REVIEW:   I have reviewed and interpreted the records in [facility] and [facility]    ASSESSMENT     Diagnoses and all orders for this visit:    1. COVID-19 virus infection (Primary)    2. CKD (chronic kidney disease) stage 4, GFR 15-29 ml/min ([facility])    3. Chronic obstructive pulmonary disease, unspecified COPD type ([facility])    4. Impaired mobility and ADLs        PLAN    Covid 19 infection/CKD/COPD  -CXR performed and negative for any acute findings. Due to her COPD will start Decadron 3 mg po bid x 10 days and Z-Yimi. Nursing to hold daily Prednisone for 10 days. Due to her CKD stage 4, she is unable to take antiviral Covid medications. Give mucinex 600 mg po bid x 7 days. Give prn Duo Nebs. Nursing send to hospital for further evaluation of any increased shortness of breath or hypoxia.  Will follow-up and continue to monitor.    Nursing to continue supportive care for all ADLs.    Nursing encouraged to keep me informed of any acute changes, lack of improvement, or any new concerning symptoms.    [x]  Discussed Patient in detail with nursing/staff, addressed all needs today.     [x]  Plan of Care Reviewed   [x]  PT/OT Reviewed   [x]  Order Changes  [x]  Discharge Plans Reviewed  [x]  Advance Directive on file with Nursing Home.   [x]  POA on file with Nursing Home.   [x]  Code Status listed: []  Full Code   [x]  DNR     “I confirm accuracy of unchanged data/findings which have been carried forward from previous visit, as well as I have updated appropriately those that have changed.”        Roma Diaz, APRN.

## 2023-01-30 PROBLEM — J44.9 CHRONIC OBSTRUCTIVE PULMONARY DISEASE (HCC): Status: ACTIVE | Noted: 2023-01-30

## 2023-01-31 ENCOUNTER — NURSING HOME (OUTPATIENT)
Dept: INTERNAL MEDICINE | Facility: CLINIC | Age: 83
End: 2023-01-31
Payer: MEDICARE

## 2023-01-31 VITALS
BODY MASS INDEX: 22.22 KG/M2 | RESPIRATION RATE: 18 BRPM | DIASTOLIC BLOOD PRESSURE: 74 MMHG | TEMPERATURE: 97.5 F | OXYGEN SATURATION: 95 % | HEART RATE: 80 BPM | WEIGHT: 110 LBS | SYSTOLIC BLOOD PRESSURE: 124 MMHG

## 2023-01-31 DIAGNOSIS — D64.9 ANEMIA, UNSPECIFIED TYPE: ICD-10-CM

## 2023-01-31 DIAGNOSIS — Z78.9 IMPAIRED MOBILITY AND ADLS: ICD-10-CM

## 2023-01-31 DIAGNOSIS — E44.0 MODERATE PROTEIN-CALORIE MALNUTRITION: ICD-10-CM

## 2023-01-31 DIAGNOSIS — I10 ESSENTIAL HYPERTENSION: ICD-10-CM

## 2023-01-31 DIAGNOSIS — Z74.09 IMPAIRED MOBILITY AND ADLS: ICD-10-CM

## 2023-01-31 DIAGNOSIS — N18.4 CHRONIC KIDNEY DISEASE (CKD), STAGE IV (SEVERE): Primary | ICD-10-CM

## 2023-01-31 DIAGNOSIS — J41.0 SIMPLE CHRONIC BRONCHITIS: ICD-10-CM

## 2023-01-31 DIAGNOSIS — G89.4 CHRONIC PAIN SYNDROME: ICD-10-CM

## 2023-01-31 PROCEDURE — 99306 1ST NF CARE HIGH MDM 50: CPT | Performed by: INTERNAL MEDICINE

## 2023-01-31 NOTE — PROGRESS NOTES
Nursing Home Follow Up Note      Mikey Duran DO []   FATUMA Monet [x]  [address] [place] [address]  Phone: [phone]  Fax: [phone] Baltazar Borden MD []    Jose Antonio Pride DO []   [address] [place] [address]  Phone: [phone]  Fax: [phone]     PATIENT NAME: Carolyne Martins                                                                          YOB: 1940           DATE OF SERVICE: 01/26/2023  FACILITY:  [][place]   [] [place]   [x] [facility]   [] [facility]   [] Other ______________________________________________________________________      CHIEF COMPLAINT:    Positive Covid 19 infection since yesterday.       HISTORY OF PRESENT ILLNESS:     Patient is an 82-year-old woman with past medical history of CKD follows with nephrology Dr. Fitzgerald, coronary artery disease follows with Dr. Acuña, arthritis, back pain, hyperparathyroidism, hypertension, anemia, osteoarthritis, rheumatic heart disease, valvular heart disease. Routine Covid testing performed yesterday in facility due to other positive cases, and she was positive.  Nursing reports that she has had complaints of increased coughing and not feeling well present during visit today.  She reports that she is feeling well and does not see any need to go to the hospital.  She has had no increased work of breathing or O2 demand.  No fever.  She is resting in bed today with no signs and symptoms of distress. She can not take any of the antiviral Covid medications due to her CKD.    PAST MEDICAL & SURGICAL HISTORY:   Past Medical History:   Diagnosis Date   • Anemia 1998   • Anemia    • Arthritis    • Back pain    • Bleeding from anus    • Bronchitis     STATES HAS BRONCHITIS CURRENTLY (12/5/17).     • Bronchitis 12/2017   • CAD (coronary artery disease)    • Cataract, bilateral    • Chronic kidney disease     STAGE 4.  SEES TERA   • Chronic kidney failure     REPORTS STAGE IV   • Difficulty  "swallowing solids    • Disease of thyroid gland    • Fracture, radius 2016    right distal radius and ulna, healed.   • GERD (gastroesophageal reflux disease)    • Gout    • High cholesterol    • History of blood transfusion 2019   • History of nuclear stress test 2014    DR. CORTEZ.  \"IT WAS OK\"   • Hyperparathyroidism (HCC)    • Hyperparathyroidism (HCC)    • Hypertension    • Impaired functional mobility, balance, gait, and endurance    • Impaired mobility    • Iron deficiency    • Osteoarthritis    • Osteoarthritis of knees, bilateral     Both knees Supartz injection series August, 2015   • Osteoporosis    • Palpitations    • Personal history of cardiac murmur    • Pessary maintenance    • PONV (postoperative nausea and vomiting)    • Presence of pessary    • Problems with swallowing     WITH FOOD OCCASSIONALLY   • Rheumatic heart disease     Personal history   • Rotator cuff tendonitis     right    • Rupture of right proximal biceps tendon     chronic   • Seasonal allergies    • Sinus problem    • Spinal headache     REPORTS AFTER DELIVERY OF SON   • Subacromial bursitis     right    • Valvular heart disease    • Vision problems    • Vitamin B12 deficiency    • Vitamin D deficiency    • Wears glasses       Past Surgical History:   Procedure Laterality Date   • CATARACT EXTRACTION Bilateral    • CERVICAL POLYPECTOMY     • COLONOSCOPY  2011   • COLONOSCOPY N/A 1/9/2018    Procedure: COLONOSCOPY with endoscopic mucosal resection (hot snare), normal saline submucosal injection, argon thermal ablation, resolution clip placement x4, and cold biopsy polypectomy;  Surgeon: Pieter Concepcion MD;  Location: [facility] ENDOSCOPY;  Service:    • ENDOSCOPY N/A 12/6/2017    Procedure: ESOPHAGOGASTRODUODENOSCOPY with biopsies and hot snare polypectomies;  Surgeon: Pieter Concepcion MD;  Location: [facility] ENDOSCOPY;  Service:    • ENDOSCOPY N/A 1/15/2019    Procedure: ESOPHAGOGASTRODUODENOSCOPY WITH BIOPSIES AND HOT SNARE POLYPECTOMIES " X10;  Surgeon: Pieter Concepcion MD;  Location: [facility] ENDOSCOPY;  Service: Gastroenterology   • HIP TROCHANTERIC NAILING WITH INTRAMEDULLARY HIP SCREW Left 2/14/2021    Procedure: HIP TROCHANTER NAIL SHORT WITH INTRAMEDULLARY HIP SCREW, LEFT;  Surgeon: Gabriele Loyd MD;  Location: [facility] OR;  Service: Orthopedics;  Laterality: Left;   • UPPER GASTROINTESTINAL ENDOSCOPY  08/18/2014         MEDICATIONS:  I have reviewed and reconciled the patients medication list in the patients chart at the skilled nursing facility today.      ALLERGIES:    Allergies   Allergen Reactions   • Ferrlecit [Na Ferric Gluc Cplx In Sucrose] Swelling   • Ranitidine Hcl Shortness Of Breath   • Levofloxacin Other (See Comments)     Leg cramps     • Lisinopril Cough         SOCIAL HISTORY:    Social History     Socioeconomic History   • Marital status:    Tobacco Use   • Smoking status: Never   • Smokeless tobacco: Never   Vaping Use   • Vaping Use: Never used   Substance and Sexual Activity   • Alcohol use: Never   • Drug use: Never   • Sexual activity: Not Currently     Birth control/protection: Post-menopausal       FAMILY HISTORY:    Family History   Problem Relation Age of Onset   • Liver cancer Maternal Grandmother    • Gout Other    • Osteoporosis Other    • Stroke Other    • Ulcers Other    • Asthma Other    • Hypertension Other    • Heart disease Other    • Osteoarthritis Other    • Colon cancer Neg Hx        REVIEW OF SYSTEMS:    Review of Systems   Constitutional: Positive for fatigue. Negative for activity change, appetite change, chills, diaphoresis, fever, unexpected weight gain and unexpected weight loss.   HENT: Negative for congestion, mouth sores, nosebleeds, rhinorrhea and trouble swallowing.    Eyes: Negative for discharge, redness and itching.   Respiratory: Positive for apnea and cough. Negative for choking, chest tightness, shortness of breath, wheezing and stridor.         Chronic related to COPD, but no  increase   Cardiovascular: Negative for chest pain, palpitations and leg swelling.   Gastrointestinal: Negative for abdominal pain, blood in stool, constipation, diarrhea, nausea, vomiting and indigestion.   Genitourinary: Negative for decreased urine volume, difficulty urinating, dysuria, frequency, hematuria, urgency and urinary incontinence.   Musculoskeletal: Positive for arthralgias (generalized) and gait problem. Negative for joint swelling and myalgias.   Skin: Negative for color change, dry skin, rash and skin lesions.   Neurological: Positive for weakness and memory problem. Negative for dizziness, headache and confusion.   Psychiatric/Behavioral: Negative for behavioral problems, dysphoric mood, hallucinations, sleep disturbance and depressed mood. The patient is not nervous/anxious.          PHYSICAL EXAMINATION:   VITAL SIGNS:   Vitals:    01/26/23 1239   BP: 136/70   Pulse: 86   Resp: 18   Temp: 98 °F (36.7 °C)   SpO2: 96%   Weight: 49.9 kg (110 lb)       Physical Exam  Vitals and nursing note reviewed.   Constitutional:       Appearance: Normal appearance. She is well-developed.   HENT:      Head: Normocephalic.      Right Ear: External ear normal.      Left Ear: External ear normal.      Nose: Nose normal.   Eyes:      Conjunctiva/sclera: Conjunctivae normal.   Cardiovascular:      Rate and Rhythm: Normal rate and regular rhythm.      Heart sounds: Normal heart sounds.   Pulmonary:      Effort: Pulmonary effort is normal. No respiratory distress.      Breath sounds: Decreased air movement present. Decreased breath sounds present.   Abdominal:      General: Bowel sounds are normal. There is no distension.      Palpations: Abdomen is soft.      Tenderness: There is no abdominal tenderness.   Musculoskeletal:      Comments: NROM all major joints   Skin:     General: Skin is warm and dry.      Findings: No rash.   Neurological:      Mental Status: She is alert and oriented to person, place, and time.    Psychiatric:         Mood and Affect: Mood is anxious.         Speech: Speech normal.         Cognition and Memory: Cognition is not impaired. Memory is impaired.         RECORDS REVIEW:   I have reviewed and interpreted the records in [facility] and [facility]    ASSESSMENT     Diagnoses and all orders for this visit:    1. COVID-19 virus infection (Primary)    2. CKD (chronic kidney disease) stage 4, GFR 15-29 ml/min ([facility])    3. Chronic obstructive pulmonary disease, unspecified COPD type ([facility])    4. Impaired mobility and ADLs        PLAN    Covid 19 infection/CKD/COPD  -CXR performed and negative for any acute findings. Due to her COPD will start Decadron 3 mg po bid x 10 days and Z-Yimi. Nursing to hold daily Prednisone for 10 days. Due to her CKD stage 4, she is unable to take antiviral Covid medications. Give mucinex 600 mg po bid x 7 days. Give prn Duo Nebs. Nursing send to hospital for further evaluation of any increased shortness of breath or hypoxia.  Will follow-up and continue to monitor.    Nursing to continue supportive care for all ADLs.    Nursing encouraged to keep me informed of any acute changes, lack of improvement, or any new concerning symptoms.    [x]  Discussed Patient in detail with nursing/staff, addressed all needs today.     [x]  Plan of Care Reviewed   [x]  PT/OT Reviewed   [x]  Order Changes  [x]  Discharge Plans Reviewed  [x]  Advance Directive on file with Nursing Home.   [x]  POA on file with Nursing Home.   [x]  Code Status listed: []  Full Code   [x]  DNR     “I confirm accuracy of unchanged data/findings which have been carried forward from previous visit, as well as I have updated appropriately those that have changed.”        Roma Diaz, APRN.

## 2023-02-08 ENCOUNTER — NURSING HOME (OUTPATIENT)
Dept: FAMILY MEDICINE CLINIC | Facility: CLINIC | Age: 83
End: 2023-02-08
Payer: MEDICARE

## 2023-02-08 VITALS
SYSTOLIC BLOOD PRESSURE: 112 MMHG | HEART RATE: 88 BPM | WEIGHT: 111 LBS | OXYGEN SATURATION: 96 % | TEMPERATURE: 98.1 F | RESPIRATION RATE: 18 BRPM | DIASTOLIC BLOOD PRESSURE: 68 MMHG | BODY MASS INDEX: 22.42 KG/M2

## 2023-02-08 DIAGNOSIS — Z86.16 HISTORY OF COVID-19: Primary | ICD-10-CM

## 2023-02-08 DIAGNOSIS — F32.9 REACTIVE DEPRESSION: ICD-10-CM

## 2023-02-08 DIAGNOSIS — Z74.09 IMPAIRED MOBILITY AND ADLS: ICD-10-CM

## 2023-02-08 DIAGNOSIS — Z78.9 IMPAIRED MOBILITY AND ADLS: ICD-10-CM

## 2023-02-08 PROCEDURE — 99309 SBSQ NF CARE MODERATE MDM 30: CPT | Performed by: NURSE PRACTITIONER

## 2023-02-09 PROBLEM — R60.0 LOWER EXTREMITY EDEMA: Status: RESOLVED | Noted: 2021-03-29 | Resolved: 2023-02-09

## 2023-02-10 ENCOUNTER — NURSING HOME (OUTPATIENT)
Dept: FAMILY MEDICINE CLINIC | Facility: CLINIC | Age: 83
End: 2023-02-10
Payer: MEDICARE

## 2023-02-10 VITALS
DIASTOLIC BLOOD PRESSURE: 68 MMHG | SYSTOLIC BLOOD PRESSURE: 112 MMHG | HEART RATE: 88 BPM | WEIGHT: 111 LBS | BODY MASS INDEX: 22.42 KG/M2 | OXYGEN SATURATION: 96 % | RESPIRATION RATE: 18 BRPM | TEMPERATURE: 98.1 F

## 2023-02-10 DIAGNOSIS — N18.4 CKD (CHRONIC KIDNEY DISEASE) STAGE 4, GFR 15-29 ML/MIN: ICD-10-CM

## 2023-02-10 DIAGNOSIS — R41.82 ALTERED MENTAL STATUS, UNSPECIFIED ALTERED MENTAL STATUS TYPE: ICD-10-CM

## 2023-02-10 DIAGNOSIS — Z78.9 IMPAIRED MOBILITY AND ADLS: ICD-10-CM

## 2023-02-10 DIAGNOSIS — Z74.09 IMPAIRED MOBILITY AND ADLS: ICD-10-CM

## 2023-02-10 DIAGNOSIS — E87.5 HYPERKALEMIA: Primary | ICD-10-CM

## 2023-02-10 PROCEDURE — 99309 SBSQ NF CARE MODERATE MDM 30: CPT | Performed by: NURSE PRACTITIONER

## 2023-02-10 NOTE — PROGRESS NOTES
Nursing Home Follow Up Note      Mikey Duran DO []   FATUMA Monet [x]  [address] [place] [address]  Phone: [phone]  Fax: [phone] Baltazar Borden MD []    Jose Antonio Pride DO []   [address] [place] [address]  Phone: [phone]  Fax: [phone]     PATIENT NAME: Carolyne Martins                                                                          YOB: 1940           DATE OF SERVICE: 02/8/2023  FACILITY:  [][place]   [] [place]   [x] [facility]   [] [facility]   [] Other ______________________________________________________________________      CHIEF COMPLAINT:    Follow up recent Covid infection.       HISTORY OF PRESENT ILLNESS:     Patient recently diagnosed with Covid on 1/25. She was treated with Zithromax and Decadron and has recovered very well. She did not have any fever, shortness of breath or hypoxia. Has not had any weight loss. She reports she does not have any concerns about Covid but she is very sad and down because she wants to go home. She reports she would feel much better if she could go home and she has people to take care of her. She says to contact her son to get information about who will take care of her, so arrangements can be made for her to go home. She was tearful and upset during visit, wanting to go home.     PAST MEDICAL & SURGICAL HISTORY:    Past Medical History:   Diagnosis Date   • Anemia 1998   • Anemia    • Arthritis    • Back pain    • Bleeding from anus    • Bronchitis     STATES HAS BRONCHITIS CURRENTLY (12/5/17).     • Bronchitis 12/2017   • CAD (coronary artery disease)    • Cataract, bilateral    • Chronic kidney disease     STAGE 4.  SEES TERA   • Chronic kidney failure     REPORTS STAGE IV   • Difficulty swallowing solids    • Disease of thyroid gland    • Fracture, radius 2016    right distal radius and ulna, healed.   • GERD (gastroesophageal reflux disease)    • Gout    • High cholesterol    • History  "of blood transfusion 2019   • History of nuclear stress test 2014    DR. CORTEZ.  \"IT WAS OK\"   • Hyperparathyroidism (HCC)    • Hyperparathyroidism (HCC)    • Hypertension    • Impaired functional mobility, balance, gait, and endurance    • Impaired mobility    • Iron deficiency    • Osteoarthritis    • Osteoarthritis of knees, bilateral     Both knees Supartz injection series August, 2015   • Osteoporosis    • Palpitations    • Personal history of cardiac murmur    • Pessary maintenance    • PONV (postoperative nausea and vomiting)    • Presence of pessary    • Problems with swallowing     WITH FOOD OCCASSIONALLY   • Rheumatic heart disease     Personal history   • Rotator cuff tendonitis     right    • Rupture of right proximal biceps tendon     chronic   • Seasonal allergies    • Sinus problem    • Spinal headache     REPORTS AFTER DELIVERY OF SON   • Subacromial bursitis     right    • Valvular heart disease    • Vision problems    • Vitamin B12 deficiency    • Vitamin D deficiency    • Wears glasses       Past Surgical History:   Procedure Laterality Date   • CATARACT EXTRACTION Bilateral    • CERVICAL POLYPECTOMY     • COLONOSCOPY  2011   • COLONOSCOPY N/A 1/9/2018    Procedure: COLONOSCOPY with endoscopic mucosal resection (hot snare), normal saline submucosal injection, argon thermal ablation, resolution clip placement x4, and cold biopsy polypectomy;  Surgeon: Pieter Concepcion MD;  Location: [facility] ENDOSCOPY;  Service:    • ENDOSCOPY N/A 12/6/2017    Procedure: ESOPHAGOGASTRODUODENOSCOPY with biopsies and hot snare polypectomies;  Surgeon: Pieter Concepcion MD;  Location: [facility] ENDOSCOPY;  Service:    • ENDOSCOPY N/A 1/15/2019    Procedure: ESOPHAGOGASTRODUODENOSCOPY WITH BIOPSIES AND HOT SNARE POLYPECTOMIES X10;  Surgeon: Pieter Concepcion MD;  Location: [facility] ENDOSCOPY;  Service: Gastroenterology   • HIP TROCHANTERIC NAILING WITH INTRAMEDULLARY HIP SCREW Left 2/14/2021    Procedure: HIP TROCHANTER NAIL SHORT " WITH INTRAMEDULLARY HIP SCREW, LEFT;  Surgeon: Gabriele Loyd MD;  Location: [facility];  Service: Orthopedics;  Laterality: Left;   • UPPER GASTROINTESTINAL ENDOSCOPY  08/18/2014         MEDICATIONS:  I have reviewed and reconciled the patients medication list in the patients chart at the skilled nursing facility today.      ALLERGIES:    Allergies   Allergen Reactions   • Ferrlecit [Na Ferric Gluc Cplx In Sucrose] Swelling   • Ranitidine Hcl Shortness Of Breath   • Levofloxacin Other (See Comments)     Leg cramps     • Lisinopril Cough         SOCIAL HISTORY:    Social History     Socioeconomic History   • Marital status:    Tobacco Use   • Smoking status: Never   • Smokeless tobacco: Never   Vaping Use   • Vaping Use: Never used   Substance and Sexual Activity   • Alcohol use: Never   • Drug use: Never   • Sexual activity: Not Currently     Birth control/protection: Post-menopausal       FAMILY HISTORY:    Family History   Problem Relation Age of Onset   • Liver cancer Maternal Grandmother    • Gout Other    • Osteoporosis Other    • Stroke Other    • Ulcers Other    • Asthma Other    • Hypertension Other    • Heart disease Other    • Osteoarthritis Other    • Colon cancer Neg Hx        REVIEW OF SYSTEMS:    Review of Systems   Constitutional: Negative for activity change, appetite change, chills, diaphoresis, fatigue, fever, unexpected weight gain and unexpected weight loss.   HENT: Negative for congestion, mouth sores, nosebleeds and trouble swallowing.    Eyes: Negative for discharge, redness and itching.   Respiratory: Positive for apnea and cough. Negative for choking, chest tightness, shortness of breath, wheezing and stridor.         Chronic related to COPD, but no increase   Cardiovascular: Negative for chest pain, palpitations and leg swelling.   Gastrointestinal: Negative for abdominal pain, blood in stool, constipation, diarrhea, nausea, vomiting and indigestion.   Genitourinary: Negative  for decreased urine volume, difficulty urinating, dysuria, frequency, hematuria, urgency and urinary incontinence.   Musculoskeletal: Positive for arthralgias (generalized) and gait problem. Negative for joint swelling and myalgias.   Skin: Negative for color change, dry skin and rash.   Neurological: Positive for weakness, memory problem and confusion. Negative for dizziness and headache.   Psychiatric/Behavioral: Positive for depressed mood. Negative for behavioral problems, dysphoric mood, hallucinations and sleep disturbance. The patient is not nervous/anxious.          PHYSICAL EXAMINATION:   VITAL SIGNS:   Vitals:    02/08/23 1531   BP: 112/68   Pulse: 88   Resp: 18   Temp: 98.1 °F (36.7 °C)   SpO2: 96%   Weight: 50.3 kg (111 lb)       Physical Exam  Vitals and nursing note reviewed.   Constitutional:       Appearance: Normal appearance. She is well-developed.   HENT:      Head: Normocephalic.      Right Ear: External ear normal.      Left Ear: External ear normal.      Nose: Nose normal.   Eyes:      Conjunctiva/sclera: Conjunctivae normal.   Cardiovascular:      Rate and Rhythm: Normal rate and regular rhythm.      Heart sounds: Normal heart sounds.   Pulmonary:      Effort: Pulmonary effort is normal. No respiratory distress.      Breath sounds: Decreased air movement present. Decreased breath sounds present.   Abdominal:      General: Bowel sounds are normal. There is no distension.      Palpations: Abdomen is soft.      Tenderness: There is no abdominal tenderness.   Musculoskeletal:      Comments: NROM all major joints   Skin:     General: Skin is warm and dry.      Findings: No rash.   Neurological:      Mental Status: She is alert. She is confused.   Psychiatric:         Mood and Affect: Mood is depressed. Affect is tearful.         Speech: Speech normal.         Behavior: Behavior is agitated.         Cognition and Memory: Cognition is not impaired. Memory is impaired.         RECORDS REVIEW:   I have  reviewed and interpreted the records in [facility] and [facility]    ASSESSMENT     Diagnoses and all orders for this visit:    1. History of COVID-19 (Primary)    2. Reactive depression    3. Impaired mobility and ADLs        PLAN    History Covid  -Recovered well with no last effects. Does have some occasional confusion that is new. Will follow up and continue to monitor.    Reactive Depression  -Will start Zoloft 25 mg daily. Did speak with  about patient discharging home and patient does not have anyone at home to care for her, which is why she is here. She lives alone, son lives close but due to work can not provide 24/7 care that is needed for her and she does not have any one else to help.  and son to discuss with patient.     Will follow up and continue to monitor.     Nursing to continue supportive care for all ADLs.    Nursing encouraged to keep me informed of any acute changes, lack of improvement, or any new concerning symptoms.    [x]  Discussed Patient in detail with nursing/staff, addressed all needs today.     [x]  Plan of Care Reviewed   [x]  PT/OT Reviewed   [x]  Order Changes  [x]  Discharge Plans Reviewed  [x]  Advance Directive on file with Nursing Home.   [x]  POA on file with Nursing Home.   [x]  Code Status listed: []  Full Code   [x]  DNR     “I confirm accuracy of unchanged data/findings which have been carried forward from previous visit, as well as I have updated appropriately those that have changed.”          Roma Diaz, APRN.

## 2023-02-13 NOTE — PROGRESS NOTES
"Nursing Home Follow Up Note      Mikey Duran DO []   FATUMA Monet [x]  [address] [place] [address]  Phone: [phone]  Fax: [phone] Baltazar Borden MD []    Jose Antonio Pride DO []   [address] [place] [address]  Phone: [phone]  Fax: [phone]     PATIENT NAME: Carolyne Martins                                                                          YOB: 1940           DATE OF SERVICE: 02/10/2023  FACILITY:  [][place]   [] [place]   [x] [facility]   [] [facility]   [] Other ______________________________________________________________________      CHIEF COMPLAINT:    Follow up abnormal lab results.     HISTORY OF PRESENT ILLNESS:     Patient with labs obtained yesterday and potassium 6.3. It was repeated STAT and was 6.7. She was given Kayexalate 15g q 6 hours x 4 doses. She had BMP today and potassium down to 5.3. BUN 58 and Cr 2.1. This is improved from 61 and 2.2. Family at bedside today and reports that patient continues to report that she is \"sick and is not going to get better until she goes home and if she can not go home, needs to go to the hospital\". Family reports that the only people to care for her are a son and ex-daughter in law who can not care for her 24 hours and can not afford to provide 24/7 care takers.     Family and nursing also report she has been more confused the past several days. BIMS score decreased from baseline. She has recently had Covid. Her labs and VS are stable with no signs and symptoms of any distress.     PAST MEDICAL & SURGICAL HISTORY:    Past Medical History:   Diagnosis Date   • Anemia 1998   • Anemia    • Arthritis    • Back pain    • Bleeding from anus    • Bronchitis     STATES HAS BRONCHITIS CURRENTLY (12/5/17).     • Bronchitis 12/2017   • CAD (coronary artery disease)    • Cataract, bilateral    • Chronic kidney disease     STAGE 4.  SEES TERA   • Chronic kidney failure     REPORTS STAGE IV   • " "Difficulty swallowing solids    • Disease of thyroid gland    • Fracture, radius 2016    right distal radius and ulna, healed.   • GERD (gastroesophageal reflux disease)    • Gout    • High cholesterol    • History of blood transfusion 2019   • History of nuclear stress test 2014    DR. CORTEZ.  \"IT WAS OK\"   • Hyperparathyroidism (HCC)    • Hyperparathyroidism (HCC)    • Hypertension    • Impaired functional mobility, balance, gait, and endurance    • Impaired mobility    • Iron deficiency    • Osteoarthritis    • Osteoarthritis of knees, bilateral     Both knees Supartz injection series August, 2015   • Osteoporosis    • Palpitations    • Personal history of cardiac murmur    • Pessary maintenance    • PONV (postoperative nausea and vomiting)    • Presence of pessary    • Problems with swallowing     WITH FOOD OCCASSIONALLY   • Rheumatic heart disease     Personal history   • Rotator cuff tendonitis     right    • Rupture of right proximal biceps tendon     chronic   • Seasonal allergies    • Sinus problem    • Spinal headache     REPORTS AFTER DELIVERY OF SON   • Subacromial bursitis     right    • Valvular heart disease    • Vision problems    • Vitamin B12 deficiency    • Vitamin D deficiency    • Wears glasses       Past Surgical History:   Procedure Laterality Date   • CATARACT EXTRACTION Bilateral    • CERVICAL POLYPECTOMY     • COLONOSCOPY  2011   • COLONOSCOPY N/A 1/9/2018    Procedure: COLONOSCOPY with endoscopic mucosal resection (hot snare), normal saline submucosal injection, argon thermal ablation, resolution clip placement x4, and cold biopsy polypectomy;  Surgeon: Pieter Concepcion MD;  Location: [facility] ENDOSCOPY;  Service:    • ENDOSCOPY N/A 12/6/2017    Procedure: ESOPHAGOGASTRODUODENOSCOPY with biopsies and hot snare polypectomies;  Surgeon: Pieter Concepcion MD;  Location: [facility] ENDOSCOPY;  Service:    • ENDOSCOPY N/A 1/15/2019    Procedure: ESOPHAGOGASTRODUODENOSCOPY WITH BIOPSIES AND HOT SNARE " POLYPECTOMIES X10;  Surgeon: Pieter Concepcion MD;  Location: [facility] ENDOSCOPY;  Service: Gastroenterology   • HIP TROCHANTERIC NAILING WITH INTRAMEDULLARY HIP SCREW Left 2/14/2021    Procedure: HIP TROCHANTER NAIL SHORT WITH INTRAMEDULLARY HIP SCREW, LEFT;  Surgeon: Gabriele Loyd MD;  Location: [facility] OR;  Service: Orthopedics;  Laterality: Left;   • UPPER GASTROINTESTINAL ENDOSCOPY  08/18/2014         MEDICATIONS:  I have reviewed and reconciled the patients medication list in the patients chart at the skilled nursing facility today.      ALLERGIES:    Allergies   Allergen Reactions   • Ferrlecit [Na Ferric Gluc Cplx In Sucrose] Swelling   • Ranitidine Hcl Shortness Of Breath   • Levofloxacin Other (See Comments)     Leg cramps     • Lisinopril Cough         SOCIAL HISTORY:    Social History     Socioeconomic History   • Marital status:    Tobacco Use   • Smoking status: Never   • Smokeless tobacco: Never   Vaping Use   • Vaping Use: Never used   Substance and Sexual Activity   • Alcohol use: Never   • Drug use: Never   • Sexual activity: Not Currently     Birth control/protection: Post-menopausal       FAMILY HISTORY:    Family History   Problem Relation Age of Onset   • Liver cancer Maternal Grandmother    • Gout Other    • Osteoporosis Other    • Stroke Other    • Ulcers Other    • Asthma Other    • Hypertension Other    • Heart disease Other    • Osteoarthritis Other    • Colon cancer Neg Hx        REVIEW OF SYSTEMS:    Review of Systems   Constitutional: Negative for activity change, appetite change, chills, diaphoresis, fatigue, fever, unexpected weight gain and unexpected weight loss.   HENT: Negative for congestion, mouth sores, nosebleeds and trouble swallowing.    Respiratory: Positive for apnea and cough. Negative for choking, chest tightness, shortness of breath, wheezing and stridor.         Chronic related to COPD   Cardiovascular: Negative for chest pain, palpitations and leg swelling.    Gastrointestinal: Negative for abdominal pain, blood in stool, constipation, diarrhea, nausea, vomiting and indigestion.   Genitourinary: Negative for decreased urine volume, difficulty urinating, dysuria, frequency, hematuria, urgency and urinary incontinence.   Musculoskeletal: Positive for arthralgias (generalized) and gait problem. Negative for joint swelling and myalgias.   Skin: Negative for color change, dry skin and rash.   Neurological: Positive for weakness, memory problem and confusion. Negative for dizziness and headache.   Psychiatric/Behavioral: Positive for depressed mood. Negative for behavioral problems, dysphoric mood, hallucinations and sleep disturbance. The patient is not nervous/anxious.          PHYSICAL EXAMINATION:   VITAL SIGNS:   Vitals:    02/10/23 1245   BP: 112/68   Pulse: 88   Resp: 18   Temp: 98.1 °F (36.7 °C)   SpO2: 96%   Weight: 50.3 kg (111 lb)       Physical Exam  Vitals and nursing note reviewed.   Constitutional:       Appearance: Normal appearance. She is well-developed.   HENT:      Head: Normocephalic.      Right Ear: External ear normal.      Left Ear: External ear normal.      Nose: Nose normal.   Eyes:      Conjunctiva/sclera: Conjunctivae normal.   Cardiovascular:      Rate and Rhythm: Normal rate and regular rhythm.      Heart sounds: Normal heart sounds.   Pulmonary:      Effort: Pulmonary effort is normal. No respiratory distress.      Breath sounds: Decreased air movement present. Decreased breath sounds present.   Abdominal:      General: Bowel sounds are normal. There is no distension.      Palpations: Abdomen is soft.      Tenderness: There is no abdominal tenderness.   Musculoskeletal:      Comments: NROM all major joints   Skin:     General: Skin is warm and dry.      Findings: No rash.   Neurological:      Mental Status: She is alert. She is confused.   Psychiatric:         Mood and Affect: Mood is depressed. Affect is tearful.         Speech: Speech normal.          Behavior: Behavior is agitated.         Cognition and Memory: Cognition is not impaired. Memory is impaired.         RECORDS REVIEW:   I have reviewed and interpreted the records in [facility] and [facility]    ASSESSMENT     Diagnoses and all orders for this visit:    1. Hyperkalemia (Primary)    2. CKD (chronic kidney disease) stage 4, GFR 15-29 ml/min ([facility])    3. Altered mental status, unspecified altered mental status type    4. Impaired mobility and ADLs        PLAN    Hyperkalemia/CKD  -Potassium stable at 5.3 now. CKD stable, GFR 21, Cr 2.1. Will get CMP Monday.     Mental status change  -Will check UA C&S. She has recently had Covid however. Will follow up and continue to monitor.     Will follow up and continue to monitor.     Nursing to continue supportive care for all ADLs.    Nursing encouraged to keep me informed of any acute changes, lack of improvement, or any new concerning symptoms.    [x]  Discussed Patient in detail with nursing/staff, addressed all needs today.     [x]  Plan of Care Reviewed   [x]  PT/OT Reviewed   [x]  Order Changes  [x]  Discharge Plans Reviewed  [x]  Advance Directive on file with Nursing Home.   [x]  POA on file with Nursing Home.   [x]  Code Status listed: []  Full Code   [x]  DNR     “I confirm accuracy of unchanged data/findings which have been carried forward from previous visit, as well as I have updated appropriately those that have changed.”          Roma Diaz, APRN.

## 2023-02-16 ENCOUNTER — HOSPITAL ENCOUNTER (INPATIENT)
Facility: HOSPITAL | Age: 83
LOS: 6 days | Discharge: NURSING, REHAB OR LONG-TERM CARE | DRG: 470 | End: 2023-02-22
Attending: STUDENT IN AN ORGANIZED HEALTH CARE EDUCATION/TRAINING PROGRAM | Admitting: INTERNAL MEDICINE
Payer: MEDICARE

## 2023-02-16 ENCOUNTER — APPOINTMENT (OUTPATIENT)
Dept: GENERAL RADIOLOGY | Facility: HOSPITAL | Age: 83
DRG: 470 | End: 2023-02-16
Payer: MEDICARE

## 2023-02-16 ENCOUNTER — APPOINTMENT (OUTPATIENT)
Dept: CT IMAGING | Facility: HOSPITAL | Age: 83
DRG: 470 | End: 2023-02-16
Payer: MEDICARE

## 2023-02-16 DIAGNOSIS — M19.90 CHRONIC INFLAMMATORY ARTHRITIS: ICD-10-CM

## 2023-02-16 DIAGNOSIS — S72.142K CLOSED DISP INTERTROCHANTERIC FRACTURE OF LEFT FEMUR WITH NONUNION: ICD-10-CM

## 2023-02-16 DIAGNOSIS — U07.1 COVID-19: ICD-10-CM

## 2023-02-16 DIAGNOSIS — M16.52 POST-TRAUMATIC OSTEOARTHRITIS OF LEFT HIP: ICD-10-CM

## 2023-02-16 DIAGNOSIS — M25.559 HIP PAIN: Primary | ICD-10-CM

## 2023-02-16 DIAGNOSIS — Z87.81 H/O FRACTURE OF LEFT HIP: ICD-10-CM

## 2023-02-16 LAB
ALBUMIN SERPL-MCNC: 2.9 G/DL (ref 3.5–5.2)
ALBUMIN/GLOB SERPL: 1.3 G/DL
ALP SERPL-CCNC: 147 U/L (ref 39–117)
ALT SERPL W P-5'-P-CCNC: 13 U/L (ref 1–33)
ANION GAP SERPL CALCULATED.3IONS-SCNC: 6.9 MMOL/L (ref 5–15)
AST SERPL-CCNC: 15 U/L (ref 1–32)
BASOPHILS # BLD AUTO: 0 10*3/MM3 (ref 0–0.2)
BASOPHILS NFR BLD AUTO: 0 % (ref 0–1.5)
BILIRUB SERPL-MCNC: <0.2 MG/DL (ref 0–1.2)
BUN SERPL-MCNC: 58 MG/DL (ref 8–23)
BUN/CREAT SERPL: 25.9 (ref 7–25)
CALCIUM SPEC-SCNC: 8.3 MG/DL (ref 8.6–10.5)
CHLORIDE SERPL-SCNC: 106 MMOL/L (ref 98–107)
CO2 SERPL-SCNC: 23.1 MMOL/L (ref 22–29)
CREAT SERPL-MCNC: 2.24 MG/DL (ref 0.57–1)
DEPRECATED RDW RBC AUTO: 61.5 FL (ref 37–54)
EGFRCR SERPLBLD CKD-EPI 2021: 21.7 ML/MIN/1.73
EOSINOPHIL # BLD AUTO: 0.01 10*3/MM3 (ref 0–0.4)
EOSINOPHIL NFR BLD AUTO: 0.2 % (ref 0.3–6.2)
ERYTHROCYTE [DISTWIDTH] IN BLOOD BY AUTOMATED COUNT: 16 % (ref 12.3–15.4)
FLUAV RNA RESP QL NAA+PROBE: NOT DETECTED
FLUBV RNA RESP QL NAA+PROBE: NOT DETECTED
GLOBULIN UR ELPH-MCNC: 2.2 GM/DL
GLUCOSE SERPL-MCNC: 150 MG/DL (ref 65–99)
HCT VFR BLD AUTO: 26.4 % (ref 34–46.6)
HGB BLD-MCNC: 8.4 G/DL (ref 12–15.9)
HOLD SPECIMEN: NORMAL
HOLD SPECIMEN: NORMAL
IMM GRANULOCYTES # BLD AUTO: 0.02 10*3/MM3 (ref 0–0.05)
IMM GRANULOCYTES NFR BLD AUTO: 0.3 % (ref 0–0.5)
LYMPHOCYTES # BLD AUTO: 0.42 10*3/MM3 (ref 0.7–3.1)
LYMPHOCYTES NFR BLD AUTO: 6.6 % (ref 19.6–45.3)
MACROCYTES BLD QL SMEAR: NORMAL
MCH RBC QN AUTO: 33.6 PG (ref 26.6–33)
MCHC RBC AUTO-ENTMCNC: 31.8 G/DL (ref 31.5–35.7)
MCV RBC AUTO: 105.6 FL (ref 79–97)
MONOCYTES # BLD AUTO: 0.17 10*3/MM3 (ref 0.1–0.9)
MONOCYTES NFR BLD AUTO: 2.7 % (ref 5–12)
NEUTROPHILS NFR BLD AUTO: 5.7 10*3/MM3 (ref 1.7–7)
NEUTROPHILS NFR BLD AUTO: 90.2 % (ref 42.7–76)
NRBC BLD AUTO-RTO: 0 /100 WBC (ref 0–0.2)
PLATELET # BLD AUTO: 108 10*3/MM3 (ref 140–450)
PMV BLD AUTO: 9.8 FL (ref 6–12)
POTASSIUM SERPL-SCNC: 5.3 MMOL/L (ref 3.5–5.2)
PROT SERPL-MCNC: 5.1 G/DL (ref 6–8.5)
RBC # BLD AUTO: 2.5 10*6/MM3 (ref 3.77–5.28)
SARS-COV-2 RNA RESP QL NAA+PROBE: DETECTED
SMALL PLATELETS BLD QL SMEAR: NORMAL
SODIUM SERPL-SCNC: 136 MMOL/L (ref 136–145)
WBC MORPH BLD: NORMAL
WBC NRBC COR # BLD: 6.32 10*3/MM3 (ref 3.4–10.8)
WHOLE BLOOD HOLD COAG: NORMAL
WHOLE BLOOD HOLD SPECIMEN: NORMAL

## 2023-02-16 PROCEDURE — 87636 SARSCOV2 & INF A&B AMP PRB: CPT | Performed by: STUDENT IN AN ORGANIZED HEALTH CARE EDUCATION/TRAINING PROGRAM

## 2023-02-16 PROCEDURE — 71045 X-RAY EXAM CHEST 1 VIEW: CPT

## 2023-02-16 PROCEDURE — 85007 BL SMEAR W/DIFF WBC COUNT: CPT | Performed by: PHYSICIAN ASSISTANT

## 2023-02-16 PROCEDURE — 99222 1ST HOSP IP/OBS MODERATE 55: CPT | Performed by: INTERNAL MEDICINE

## 2023-02-16 PROCEDURE — 80053 COMPREHEN METABOLIC PANEL: CPT | Performed by: PHYSICIAN ASSISTANT

## 2023-02-16 PROCEDURE — 72192 CT PELVIS W/O DYE: CPT

## 2023-02-16 PROCEDURE — 25010000002 HEPARIN (PORCINE) PER 1000 UNITS: Performed by: INTERNAL MEDICINE

## 2023-02-16 PROCEDURE — 85025 COMPLETE CBC W/AUTO DIFF WBC: CPT | Performed by: PHYSICIAN ASSISTANT

## 2023-02-16 PROCEDURE — 73502 X-RAY EXAM HIP UNI 2-3 VIEWS: CPT

## 2023-02-16 PROCEDURE — 99285 EMERGENCY DEPT VISIT HI MDM: CPT

## 2023-02-16 RX ORDER — SODIUM CHLORIDE 0.9 % (FLUSH) 0.9 %
10 SYRINGE (ML) INJECTION AS NEEDED
Status: DISCONTINUED | OUTPATIENT
Start: 2023-02-16 | End: 2023-02-22 | Stop reason: HOSPADM

## 2023-02-16 RX ORDER — IPRATROPIUM BROMIDE AND ALBUTEROL SULFATE 2.5; .5 MG/3ML; MG/3ML
3 SOLUTION RESPIRATORY (INHALATION) EVERY 4 HOURS PRN
COMMUNITY

## 2023-02-16 RX ORDER — HEPARIN SODIUM 5000 [USP'U]/ML
5000 INJECTION, SOLUTION INTRAVENOUS; SUBCUTANEOUS EVERY 12 HOURS SCHEDULED
Status: DISCONTINUED | OUTPATIENT
Start: 2023-02-16 | End: 2023-02-17

## 2023-02-16 RX ORDER — LOSARTAN POTASSIUM 50 MG/1
50 TABLET ORAL
Status: DISCONTINUED | OUTPATIENT
Start: 2023-02-17 | End: 2023-02-18

## 2023-02-16 RX ORDER — CHOLECALCIFEROL (VITAMIN D3) 125 MCG
5 CAPSULE ORAL NIGHTLY PRN
Status: DISCONTINUED | OUTPATIENT
Start: 2023-02-16 | End: 2023-02-22 | Stop reason: HOSPADM

## 2023-02-16 RX ORDER — ACETAMINOPHEN 650 MG/1
650 SUPPOSITORY RECTAL EVERY 4 HOURS PRN
Status: DISCONTINUED | OUTPATIENT
Start: 2023-02-16 | End: 2023-02-17

## 2023-02-16 RX ORDER — BISACODYL 10 MG
10 SUPPOSITORY, RECTAL RECTAL DAILY PRN
Status: DISCONTINUED | OUTPATIENT
Start: 2023-02-16 | End: 2023-02-22 | Stop reason: HOSPADM

## 2023-02-16 RX ORDER — SODIUM CHLORIDE 0.9 % (FLUSH) 0.9 %
10 SYRINGE (ML) INJECTION EVERY 12 HOURS SCHEDULED
Status: DISCONTINUED | OUTPATIENT
Start: 2023-02-16 | End: 2023-02-22 | Stop reason: HOSPADM

## 2023-02-16 RX ORDER — BISACODYL 10 MG
10 SUPPOSITORY, RECTAL RECTAL AS NEEDED
COMMUNITY

## 2023-02-16 RX ORDER — HYDROCODONE BITARTRATE AND ACETAMINOPHEN 5; 325 MG/1; MG/1
1 TABLET ORAL 2 TIMES DAILY PRN
Status: DISCONTINUED | OUTPATIENT
Start: 2023-02-16 | End: 2023-02-17

## 2023-02-16 RX ORDER — BISACODYL 5 MG/1
5 TABLET, DELAYED RELEASE ORAL DAILY PRN
Status: DISCONTINUED | OUTPATIENT
Start: 2023-02-16 | End: 2023-02-22 | Stop reason: HOSPADM

## 2023-02-16 RX ORDER — SERTRALINE HYDROCHLORIDE 25 MG/1
25 TABLET, FILM COATED ORAL DAILY
COMMUNITY

## 2023-02-16 RX ORDER — POLYETHYLENE GLYCOL 3350 17 G/17G
17 POWDER, FOR SOLUTION ORAL DAILY PRN
Status: DISCONTINUED | OUTPATIENT
Start: 2023-02-16 | End: 2023-02-22 | Stop reason: HOSPADM

## 2023-02-16 RX ORDER — PANTOPRAZOLE SODIUM 40 MG/1
40 TABLET, DELAYED RELEASE ORAL
Status: DISCONTINUED | OUTPATIENT
Start: 2023-02-17 | End: 2023-02-22 | Stop reason: HOSPADM

## 2023-02-16 RX ORDER — FLUTICASONE PROPIONATE AND SALMETEROL 250; 50 UG/1; UG/1
1 POWDER RESPIRATORY (INHALATION)
COMMUNITY

## 2023-02-16 RX ORDER — SODIUM CHLORIDE 9 MG/ML
40 INJECTION, SOLUTION INTRAVENOUS AS NEEDED
Status: DISCONTINUED | OUTPATIENT
Start: 2023-02-16 | End: 2023-02-17

## 2023-02-16 RX ORDER — TORSEMIDE 10 MG/1
10 TABLET ORAL 2 TIMES WEEKLY
COMMUNITY

## 2023-02-16 RX ORDER — QUETIAPINE FUMARATE 25 MG/1
12.5 TABLET, FILM COATED ORAL NIGHTLY
Status: DISCONTINUED | OUTPATIENT
Start: 2023-02-16 | End: 2023-02-22 | Stop reason: HOSPADM

## 2023-02-16 RX ORDER — HYDROXYCHLOROQUINE SULFATE 200 MG/1
200 TABLET, FILM COATED ORAL
Status: COMPLETED | OUTPATIENT
Start: 2023-02-17 | End: 2023-02-17

## 2023-02-16 RX ORDER — ACETAMINOPHEN 325 MG/1
650 TABLET ORAL EVERY 4 HOURS PRN
Status: DISCONTINUED | OUTPATIENT
Start: 2023-02-16 | End: 2023-02-17

## 2023-02-16 RX ORDER — ACETAMINOPHEN 160 MG/5ML
650 SOLUTION ORAL EVERY 4 HOURS PRN
Status: DISCONTINUED | OUTPATIENT
Start: 2023-02-16 | End: 2023-02-17

## 2023-02-16 RX ORDER — ONDANSETRON 2 MG/ML
4 INJECTION INTRAMUSCULAR; INTRAVENOUS EVERY 6 HOURS PRN
Status: DISCONTINUED | OUTPATIENT
Start: 2023-02-16 | End: 2023-02-22 | Stop reason: HOSPADM

## 2023-02-16 RX ORDER — SENNA PLUS 8.6 MG/1
1 TABLET ORAL AS NEEDED
COMMUNITY

## 2023-02-16 RX ORDER — PREDNISONE 10 MG/1
10 TABLET ORAL DAILY
COMMUNITY

## 2023-02-16 RX ORDER — MORPHINE SULFATE 2 MG/ML
1 INJECTION, SOLUTION INTRAMUSCULAR; INTRAVENOUS EVERY 4 HOURS PRN
Status: DISCONTINUED | OUTPATIENT
Start: 2023-02-16 | End: 2023-02-17

## 2023-02-16 RX ORDER — NALOXONE HCL 0.4 MG/ML
0.4 VIAL (ML) INJECTION
Status: DISCONTINUED | OUTPATIENT
Start: 2023-02-16 | End: 2023-02-17

## 2023-02-16 RX ORDER — AMOXICILLIN 250 MG
2 CAPSULE ORAL 2 TIMES DAILY
Status: DISCONTINUED | OUTPATIENT
Start: 2023-02-16 | End: 2023-02-22 | Stop reason: HOSPADM

## 2023-02-16 RX ORDER — CARVEDILOL 25 MG/1
25 TABLET ORAL 2 TIMES DAILY WITH MEALS
Status: DISCONTINUED | OUTPATIENT
Start: 2023-02-16 | End: 2023-02-19

## 2023-02-16 RX ORDER — DOCUSATE SODIUM 100 MG/1
100 CAPSULE, LIQUID FILLED ORAL 2 TIMES DAILY PRN
Status: DISCONTINUED | OUTPATIENT
Start: 2023-02-16 | End: 2023-02-22 | Stop reason: HOSPADM

## 2023-02-16 RX ADMIN — Medication 10 ML: at 23:12

## 2023-02-16 RX ADMIN — CARVEDILOL 25 MG: 25 TABLET, FILM COATED ORAL at 23:09

## 2023-02-16 RX ADMIN — QUETIAPINE FUMARATE 12.5 MG: 25 TABLET ORAL at 23:09

## 2023-02-16 RX ADMIN — HEPARIN SODIUM 5000 UNITS: 5000 INJECTION INTRAVENOUS; SUBCUTANEOUS at 23:09

## 2023-02-17 ENCOUNTER — ANESTHESIA (OUTPATIENT)
Dept: PERIOP | Facility: HOSPITAL | Age: 83
DRG: 470 | End: 2023-02-17
Payer: MEDICARE

## 2023-02-17 ENCOUNTER — ANESTHESIA EVENT CONVERTED (OUTPATIENT)
Dept: ANESTHESIOLOGY | Facility: HOSPITAL | Age: 83
DRG: 470 | End: 2023-02-17
Payer: MEDICARE

## 2023-02-17 ENCOUNTER — ANESTHESIA EVENT (OUTPATIENT)
Dept: PERIOP | Facility: HOSPITAL | Age: 83
DRG: 470 | End: 2023-02-17
Payer: MEDICARE

## 2023-02-17 ENCOUNTER — APPOINTMENT (OUTPATIENT)
Dept: GENERAL RADIOLOGY | Facility: HOSPITAL | Age: 83
DRG: 470 | End: 2023-02-17
Payer: MEDICARE

## 2023-02-17 PROBLEM — Z96.642 STATUS POST LEFT HIP REPLACEMENT: Status: ACTIVE | Noted: 2023-02-17

## 2023-02-17 PROBLEM — M16.52 POST-TRAUMATIC OSTEOARTHRITIS OF LEFT HIP: Status: ACTIVE | Noted: 2023-02-16

## 2023-02-17 LAB
ABO GROUP BLD: NORMAL
ANION GAP SERPL CALCULATED.3IONS-SCNC: 6.5 MMOL/L (ref 5–15)
BACTERIA UR QL AUTO: ABNORMAL /HPF
BASOPHILS # BLD AUTO: 0.01 10*3/MM3 (ref 0–0.2)
BASOPHILS NFR BLD AUTO: 0.2 % (ref 0–1.5)
BILIRUB UR QL STRIP: NEGATIVE
BLD GP AB SCN SERPL QL: NEGATIVE
BUN SERPL-MCNC: 56 MG/DL (ref 8–23)
BUN/CREAT SERPL: 25.8 (ref 7–25)
CALCIUM SPEC-SCNC: 8.5 MG/DL (ref 8.6–10.5)
CHLORIDE SERPL-SCNC: 108 MMOL/L (ref 98–107)
CLARITY UR: ABNORMAL
CO2 SERPL-SCNC: 22.5 MMOL/L (ref 22–29)
COLOR UR: YELLOW
CREAT SERPL-MCNC: 2.17 MG/DL (ref 0.57–1)
DEPRECATED RDW RBC AUTO: 64.5 FL (ref 37–54)
EGFRCR SERPLBLD CKD-EPI 2021: 22.5 ML/MIN/1.73
EOSINOPHIL # BLD AUTO: 0.11 10*3/MM3 (ref 0–0.4)
EOSINOPHIL NFR BLD AUTO: 2.3 % (ref 0.3–6.2)
ERYTHROCYTE [DISTWIDTH] IN BLOOD BY AUTOMATED COUNT: 16.3 % (ref 12.3–15.4)
GLUCOSE BLDC GLUCOMTR-MCNC: 93 MG/DL (ref 70–130)
GLUCOSE SERPL-MCNC: 80 MG/DL (ref 65–99)
GLUCOSE UR STRIP-MCNC: NEGATIVE MG/DL
HCT VFR BLD AUTO: 27.5 % (ref 34–46.6)
HCT VFR BLD AUTO: 30.3 % (ref 34–46.6)
HGB BLD-MCNC: 8.3 G/DL (ref 12–15.9)
HGB BLD-MCNC: 9.9 G/DL (ref 12–15.9)
HGB UR QL STRIP.AUTO: ABNORMAL
HYALINE CASTS UR QL AUTO: ABNORMAL /LPF
HYPOCHROMIA BLD QL: NORMAL
IMM GRANULOCYTES # BLD AUTO: 0.07 10*3/MM3 (ref 0–0.05)
IMM GRANULOCYTES NFR BLD AUTO: 1.5 % (ref 0–0.5)
KETONES UR QL STRIP: NEGATIVE
LEUKOCYTE ESTERASE UR QL STRIP.AUTO: ABNORMAL
LYMPHOCYTES # BLD AUTO: 0.77 10*3/MM3 (ref 0.7–3.1)
LYMPHOCYTES NFR BLD AUTO: 16 % (ref 19.6–45.3)
MACROCYTES BLD QL SMEAR: NORMAL
MCH RBC QN AUTO: 32.9 PG (ref 26.6–33)
MCHC RBC AUTO-ENTMCNC: 30.2 G/DL (ref 31.5–35.7)
MCV RBC AUTO: 109.1 FL (ref 79–97)
MONOCYTES # BLD AUTO: 0.29 10*3/MM3 (ref 0.1–0.9)
MONOCYTES NFR BLD AUTO: 6 % (ref 5–12)
MRSA DNA SPEC QL NAA+PROBE: ABNORMAL
NEUTROPHILS NFR BLD AUTO: 3.56 10*3/MM3 (ref 1.7–7)
NEUTROPHILS NFR BLD AUTO: 74 % (ref 42.7–76)
NITRITE UR QL STRIP: POSITIVE
NRBC BLD AUTO-RTO: 0 /100 WBC (ref 0–0.2)
PH UR STRIP.AUTO: 5.5 [PH] (ref 5–8)
PLAT MORPH BLD: NORMAL
PLATELET # BLD AUTO: 104 10*3/MM3 (ref 140–450)
PMV BLD AUTO: 10.4 FL (ref 6–12)
POTASSIUM SERPL-SCNC: 5.2 MMOL/L (ref 3.5–5.2)
PROT UR QL STRIP: ABNORMAL
QT INTERVAL: 366 MS
QTC INTERVAL: 397 MS
RBC # BLD AUTO: 2.52 10*6/MM3 (ref 3.77–5.28)
RBC # UR STRIP: ABNORMAL /HPF
REF LAB TEST METHOD: ABNORMAL
RH BLD: POSITIVE
SARS-COV-2 AG RESP QL IA.RAPID: NORMAL
SODIUM SERPL-SCNC: 137 MMOL/L (ref 136–145)
SP GR UR STRIP: 1.01 (ref 1–1.03)
SQUAMOUS #/AREA URNS HPF: ABNORMAL /HPF
T&S EXPIRATION DATE: NORMAL
UROBILINOGEN UR QL STRIP: ABNORMAL
WBC # UR STRIP: ABNORMAL /HPF
WBC MORPH BLD: NORMAL
WBC NRBC COR # BLD: 4.81 10*3/MM3 (ref 3.4–10.8)

## 2023-02-17 PROCEDURE — 76000 FLUOROSCOPY <1 HR PHYS/QHP: CPT

## 2023-02-17 PROCEDURE — 85007 BL SMEAR W/DIFF WBC COUNT: CPT | Performed by: INTERNAL MEDICINE

## 2023-02-17 PROCEDURE — 99232 SBSQ HOSP IP/OBS MODERATE 35: CPT | Performed by: NURSE PRACTITIONER

## 2023-02-17 PROCEDURE — C1776 JOINT DEVICE (IMPLANTABLE): HCPCS | Performed by: ORTHOPAEDIC SURGERY

## 2023-02-17 PROCEDURE — 86920 COMPATIBILITY TEST SPIN: CPT

## 2023-02-17 PROCEDURE — 0SRS0JA REPLACEMENT OF LEFT HIP JOINT, FEMORAL SURFACE WITH SYNTHETIC SUBSTITUTE, UNCEMENTED, OPEN APPROACH: ICD-10-PCS | Performed by: ORTHOPAEDIC SURGERY

## 2023-02-17 PROCEDURE — 82962 GLUCOSE BLOOD TEST: CPT

## 2023-02-17 PROCEDURE — P9016 RBC LEUKOCYTES REDUCED: HCPCS

## 2023-02-17 PROCEDURE — 93005 ELECTROCARDIOGRAM TRACING: CPT | Performed by: INTERNAL MEDICINE

## 2023-02-17 PROCEDURE — 25010000002 CEFAZOLIN PER 500 MG: Performed by: ORTHOPAEDIC SURGERY

## 2023-02-17 PROCEDURE — 87088 URINE BACTERIA CULTURE: CPT | Performed by: ORTHOPAEDIC SURGERY

## 2023-02-17 PROCEDURE — 86900 BLOOD TYPING SEROLOGIC ABO: CPT

## 2023-02-17 PROCEDURE — 99222 1ST HOSP IP/OBS MODERATE 55: CPT | Performed by: ORTHOPAEDIC SURGERY

## 2023-02-17 PROCEDURE — 88305 TISSUE EXAM BY PATHOLOGIST: CPT

## 2023-02-17 PROCEDURE — 25010000002 HYDROMORPHONE 1 MG/ML SOLUTION: Performed by: NURSE ANESTHETIST, CERTIFIED REGISTERED

## 2023-02-17 PROCEDURE — 86901 BLOOD TYPING SEROLOGIC RH(D): CPT | Performed by: ORTHOPAEDIC SURGERY

## 2023-02-17 PROCEDURE — 25010000002 CEFTRIAXONE SODIUM-DEXTROSE 1-3.74 GM-%(50ML) RECONSTITUTED SOLUTION: Performed by: NURSE PRACTITIONER

## 2023-02-17 PROCEDURE — 88311 DECALCIFY TISSUE: CPT

## 2023-02-17 PROCEDURE — 25010000002 ROPIVACAINE PER 1 MG: Performed by: NURSE ANESTHETIST, CERTIFIED REGISTERED

## 2023-02-17 PROCEDURE — 25010000002 PROCHLORPERAZINE 10 MG/2ML SOLUTION: Performed by: INTERNAL MEDICINE

## 2023-02-17 PROCEDURE — 85025 COMPLETE CBC W/AUTO DIFF WBC: CPT | Performed by: INTERNAL MEDICINE

## 2023-02-17 PROCEDURE — 81001 URINALYSIS AUTO W/SCOPE: CPT | Performed by: ORTHOPAEDIC SURGERY

## 2023-02-17 PROCEDURE — 0SPB04Z REMOVAL OF INTERNAL FIXATION DEVICE FROM LEFT HIP JOINT, OPEN APPROACH: ICD-10-PCS | Performed by: ORTHOPAEDIC SURGERY

## 2023-02-17 PROCEDURE — 86850 RBC ANTIBODY SCREEN: CPT | Performed by: ORTHOPAEDIC SURGERY

## 2023-02-17 PROCEDURE — 0 CEFAZOLIN SODIUM-DEXTROSE 2-3 GM-%(50ML) RECONSTITUTED SOLUTION: Performed by: ORTHOPAEDIC SURGERY

## 2023-02-17 PROCEDURE — 36430 TRANSFUSION BLD/BLD COMPNT: CPT

## 2023-02-17 PROCEDURE — 87081 CULTURE SCREEN ONLY: CPT | Performed by: INTERNAL MEDICINE

## 2023-02-17 PROCEDURE — 87086 URINE CULTURE/COLONY COUNT: CPT | Performed by: ORTHOPAEDIC SURGERY

## 2023-02-17 PROCEDURE — 27132 TOTAL HIP ARTHROPLASTY: CPT | Performed by: ORTHOPAEDIC SURGERY

## 2023-02-17 PROCEDURE — 80048 BASIC METABOLIC PNL TOTAL CA: CPT | Performed by: INTERNAL MEDICINE

## 2023-02-17 PROCEDURE — 72170 X-RAY EXAM OF PELVIS: CPT

## 2023-02-17 PROCEDURE — 25010000002 ONDANSETRON PER 1 MG: Performed by: NURSE ANESTHETIST, CERTIFIED REGISTERED

## 2023-02-17 PROCEDURE — 87186 SC STD MICRODIL/AGAR DIL: CPT | Performed by: ORTHOPAEDIC SURGERY

## 2023-02-17 PROCEDURE — 85018 HEMOGLOBIN: CPT | Performed by: ORTHOPAEDIC SURGERY

## 2023-02-17 PROCEDURE — C1769 GUIDE WIRE: HCPCS | Performed by: ORTHOPAEDIC SURGERY

## 2023-02-17 PROCEDURE — 25010000002 FENTANYL CITRATE (PF) 100 MCG/2ML SOLUTION: Performed by: NURSE ANESTHETIST, CERTIFIED REGISTERED

## 2023-02-17 PROCEDURE — 85014 HEMATOCRIT: CPT | Performed by: ORTHOPAEDIC SURGERY

## 2023-02-17 PROCEDURE — 87426 SARSCOV CORONAVIRUS AG IA: CPT | Performed by: ORTHOPAEDIC SURGERY

## 2023-02-17 PROCEDURE — 86900 BLOOD TYPING SEROLOGIC ABO: CPT | Performed by: ORTHOPAEDIC SURGERY

## 2023-02-17 PROCEDURE — 87641 MR-STAPH DNA AMP PROBE: CPT | Performed by: INTERNAL MEDICINE

## 2023-02-17 PROCEDURE — 25010000002 DEXAMETHASONE PER 1 MG: Performed by: NURSE ANESTHETIST, CERTIFIED REGISTERED

## 2023-02-17 PROCEDURE — 25010000002 PROPOFOL 200 MG/20ML EMULSION: Performed by: NURSE ANESTHETIST, CERTIFIED REGISTERED

## 2023-02-17 DEVICE — CONE FEM BDY PROX STDOFFST ARCOS A 60: Type: IMPLANTABLE DEVICE | Site: HIP | Status: FUNCTIONAL

## 2023-02-17 DEVICE — HD FEM MOD COCR 28MM STD/NK: Type: IMPLANTABLE DEVICE | Site: HIP | Status: FUNCTIONAL

## 2023-02-17 DEVICE — CUP ACET RINGLOC BIPOL 28MM 42MM: Type: IMPLANTABLE DEVICE | Site: HIP | Status: FUNCTIONAL

## 2023-02-17 DEVICE — IMPLANTABLE DEVICE: Type: IMPLANTABLE DEVICE | Site: HIP | Status: FUNCTIONAL

## 2023-02-17 RX ORDER — DEXAMETHASONE SODIUM PHOSPHATE 4 MG/ML
INJECTION, SOLUTION INTRA-ARTICULAR; INTRALESIONAL; INTRAMUSCULAR; INTRAVENOUS; SOFT TISSUE AS NEEDED
Status: DISCONTINUED | OUTPATIENT
Start: 2023-02-17 | End: 2023-02-17 | Stop reason: SURG

## 2023-02-17 RX ORDER — SENNA PLUS 8.6 MG/1
1 TABLET ORAL AS NEEDED
Status: DISCONTINUED | OUTPATIENT
Start: 2023-02-17 | End: 2023-02-22 | Stop reason: HOSPADM

## 2023-02-17 RX ORDER — TRANEXAMIC ACID 10 MG/ML
1000 INJECTION, SOLUTION INTRAVENOUS ONCE
Status: COMPLETED | OUTPATIENT
Start: 2023-02-17 | End: 2023-02-17

## 2023-02-17 RX ORDER — ONDANSETRON 2 MG/ML
4 INJECTION INTRAMUSCULAR; INTRAVENOUS EVERY 6 HOURS PRN
Status: DISCONTINUED | OUTPATIENT
Start: 2023-02-17 | End: 2023-02-22 | Stop reason: HOSPADM

## 2023-02-17 RX ORDER — BUDESONIDE AND FORMOTEROL FUMARATE DIHYDRATE 160; 4.5 UG/1; UG/1
2 AEROSOL RESPIRATORY (INHALATION)
Status: DISCONTINUED | OUTPATIENT
Start: 2023-02-17 | End: 2023-02-22 | Stop reason: HOSPADM

## 2023-02-17 RX ORDER — PROCHLORPERAZINE EDISYLATE 5 MG/ML
5 INJECTION INTRAMUSCULAR; INTRAVENOUS EVERY 6 HOURS PRN
Status: DISCONTINUED | OUTPATIENT
Start: 2023-02-17 | End: 2023-02-22 | Stop reason: HOSPADM

## 2023-02-17 RX ORDER — BUPIVACAINE HCL/0.9 % NACL/PF 0.125 %
PLASTIC BAG, INJECTION (ML) EPIDURAL AS NEEDED
Status: DISCONTINUED | OUTPATIENT
Start: 2023-02-17 | End: 2023-02-17 | Stop reason: SURG

## 2023-02-17 RX ORDER — ONDANSETRON 4 MG/1
4 TABLET, FILM COATED ORAL EVERY 6 HOURS PRN
Status: DISCONTINUED | OUTPATIENT
Start: 2023-02-17 | End: 2023-02-22 | Stop reason: HOSPADM

## 2023-02-17 RX ORDER — BISACODYL 10 MG
10 SUPPOSITORY, RECTAL RECTAL DAILY PRN
Status: DISCONTINUED | OUTPATIENT
Start: 2023-02-17 | End: 2023-02-22 | Stop reason: HOSPADM

## 2023-02-17 RX ORDER — FENTANYL CITRATE 50 UG/ML
INJECTION, SOLUTION INTRAMUSCULAR; INTRAVENOUS AS NEEDED
Status: DISCONTINUED | OUTPATIENT
Start: 2023-02-17 | End: 2023-02-17 | Stop reason: SURG

## 2023-02-17 RX ORDER — ONDANSETRON 2 MG/ML
INJECTION INTRAMUSCULAR; INTRAVENOUS AS NEEDED
Status: DISCONTINUED | OUTPATIENT
Start: 2023-02-17 | End: 2023-02-17 | Stop reason: SURG

## 2023-02-17 RX ORDER — CEFAZOLIN SODIUM 2 G/50ML
2 SOLUTION INTRAVENOUS EVERY 8 HOURS
Status: COMPLETED | OUTPATIENT
Start: 2023-02-17 | End: 2023-02-18

## 2023-02-17 RX ORDER — ETOMIDATE 2 MG/ML
INJECTION INTRAVENOUS AS NEEDED
Status: DISCONTINUED | OUTPATIENT
Start: 2023-02-17 | End: 2023-02-17 | Stop reason: SURG

## 2023-02-17 RX ORDER — SODIUM CHLORIDE 9 MG/ML
INJECTION, SOLUTION INTRAVENOUS CONTINUOUS PRN
Status: DISCONTINUED | OUTPATIENT
Start: 2023-02-17 | End: 2023-02-17 | Stop reason: SURG

## 2023-02-17 RX ORDER — IPRATROPIUM BROMIDE AND ALBUTEROL SULFATE 2.5; .5 MG/3ML; MG/3ML
3 SOLUTION RESPIRATORY (INHALATION) EVERY 4 HOURS PRN
Status: DISCONTINUED | OUTPATIENT
Start: 2023-02-17 | End: 2023-02-22 | Stop reason: HOSPADM

## 2023-02-17 RX ORDER — CEFTRIAXONE 1 G/50ML
1 INJECTION, SOLUTION INTRAVENOUS EVERY 24 HOURS
Status: DISCONTINUED | OUTPATIENT
Start: 2023-02-17 | End: 2023-02-19 | Stop reason: ALTCHOICE

## 2023-02-17 RX ORDER — HYDROCODONE BITARTRATE AND ACETAMINOPHEN 7.5; 325 MG/1; MG/1
1 TABLET ORAL EVERY 4 HOURS PRN
Status: DISCONTINUED | OUTPATIENT
Start: 2023-02-17 | End: 2023-02-22 | Stop reason: HOSPADM

## 2023-02-17 RX ORDER — ROPIVACAINE HYDROCHLORIDE 5 MG/ML
INJECTION, SOLUTION EPIDURAL; INFILTRATION; PERINEURAL
Status: DISCONTINUED | OUTPATIENT
Start: 2023-02-17 | End: 2023-02-17 | Stop reason: SURG

## 2023-02-17 RX ORDER — KETAMINE HCL IN NACL, ISO-OSM 100MG/10ML
SYRINGE (ML) INJECTION AS NEEDED
Status: DISCONTINUED | OUTPATIENT
Start: 2023-02-17 | End: 2023-02-17 | Stop reason: SURG

## 2023-02-17 RX ORDER — EPHEDRINE SULFATE 5 MG/ML
INJECTION INTRAVENOUS AS NEEDED
Status: DISCONTINUED | OUTPATIENT
Start: 2023-02-17 | End: 2023-02-17 | Stop reason: SURG

## 2023-02-17 RX ORDER — PROPOFOL 10 MG/ML
INJECTION, EMULSION INTRAVENOUS AS NEEDED
Status: DISCONTINUED | OUTPATIENT
Start: 2023-02-17 | End: 2023-02-17 | Stop reason: SURG

## 2023-02-17 RX ORDER — TORSEMIDE 10 MG/1
10 TABLET ORAL 2 TIMES WEEKLY
Status: DISCONTINUED | OUTPATIENT
Start: 2023-02-20 | End: 2023-02-21

## 2023-02-17 RX ORDER — LIDOCAINE 4 G/G
1 PATCH TOPICAL DAILY
COMMUNITY

## 2023-02-17 RX ORDER — DOCUSATE SODIUM 100 MG/1
100 CAPSULE, LIQUID FILLED ORAL 2 TIMES DAILY
Status: DISCONTINUED | OUTPATIENT
Start: 2023-02-17 | End: 2023-02-22 | Stop reason: HOSPADM

## 2023-02-17 RX ORDER — SODIUM CHLORIDE, SODIUM LACTATE, POTASSIUM CHLORIDE, CALCIUM CHLORIDE 600; 310; 30; 20 MG/100ML; MG/100ML; MG/100ML; MG/100ML
100 INJECTION, SOLUTION INTRAVENOUS CONTINUOUS
Status: DISCONTINUED | OUTPATIENT
Start: 2023-02-17 | End: 2023-02-18

## 2023-02-17 RX ORDER — ENOXAPARIN SODIUM 100 MG/ML
30 INJECTION SUBCUTANEOUS DAILY
Status: DISCONTINUED | OUTPATIENT
Start: 2023-02-18 | End: 2023-02-22 | Stop reason: HOSPADM

## 2023-02-17 RX ORDER — SODIUM CHLORIDE 9 MG/ML
40 INJECTION, SOLUTION INTRAVENOUS AS NEEDED
Status: DISCONTINUED | OUTPATIENT
Start: 2023-02-17 | End: 2023-02-22 | Stop reason: HOSPADM

## 2023-02-17 RX ORDER — ONDANSETRON 2 MG/ML
4 INJECTION INTRAMUSCULAR; INTRAVENOUS ONCE AS NEEDED
Status: COMPLETED | OUTPATIENT
Start: 2023-02-17 | End: 2023-02-17

## 2023-02-17 RX ORDER — CEFAZOLIN SODIUM 2 G/50ML
2 SOLUTION INTRAVENOUS ONCE
Status: COMPLETED | OUTPATIENT
Start: 2023-02-17 | End: 2023-02-17

## 2023-02-17 RX ORDER — LIDOCAINE HYDROCHLORIDE 20 MG/ML
INJECTION, SOLUTION INTRAVENOUS AS NEEDED
Status: DISCONTINUED | OUTPATIENT
Start: 2023-02-17 | End: 2023-02-17 | Stop reason: SURG

## 2023-02-17 RX ORDER — PREDNISONE 10 MG/1
10 TABLET ORAL DAILY
Status: DISCONTINUED | OUTPATIENT
Start: 2023-02-17 | End: 2023-02-22 | Stop reason: HOSPADM

## 2023-02-17 RX ORDER — LIDOCAINE HYDROCHLORIDE 20 MG/ML
INJECTION, SOLUTION EPIDURAL; INFILTRATION; INTRACAUDAL; PERINEURAL
Status: DISCONTINUED | OUTPATIENT
Start: 2023-02-17 | End: 2023-02-17 | Stop reason: SURG

## 2023-02-17 RX ORDER — SODIUM CHLORIDE 0.9 % (FLUSH) 0.9 %
1-10 SYRINGE (ML) INJECTION AS NEEDED
Status: DISCONTINUED | OUTPATIENT
Start: 2023-02-17 | End: 2023-02-22 | Stop reason: HOSPADM

## 2023-02-17 RX ORDER — SODIUM CHLORIDE 0.9 % (FLUSH) 0.9 %
10 SYRINGE (ML) INJECTION EVERY 12 HOURS SCHEDULED
Status: DISCONTINUED | OUTPATIENT
Start: 2023-02-17 | End: 2023-02-22 | Stop reason: HOSPADM

## 2023-02-17 RX ORDER — NALOXONE HCL 0.4 MG/ML
0.1 VIAL (ML) INJECTION
Status: DISCONTINUED | OUTPATIENT
Start: 2023-02-17 | End: 2023-02-22 | Stop reason: HOSPADM

## 2023-02-17 RX ORDER — SODIUM CHLORIDE, SODIUM LACTATE, POTASSIUM CHLORIDE, CALCIUM CHLORIDE 600; 310; 30; 20 MG/100ML; MG/100ML; MG/100ML; MG/100ML
INJECTION, SOLUTION INTRAVENOUS CONTINUOUS PRN
Status: DISCONTINUED | OUTPATIENT
Start: 2023-02-17 | End: 2023-02-17 | Stop reason: SURG

## 2023-02-17 RX ORDER — ROCURONIUM BROMIDE 10 MG/ML
INJECTION, SOLUTION INTRAVENOUS AS NEEDED
Status: DISCONTINUED | OUTPATIENT
Start: 2023-02-17 | End: 2023-02-17 | Stop reason: SURG

## 2023-02-17 RX ADMIN — EPHEDRINE SULFATE 10 MG: 5 INJECTION INTRAVENOUS at 15:22

## 2023-02-17 RX ADMIN — ETOMIDATE 6 MG: 2 INJECTION, SOLUTION INTRAVENOUS at 14:55

## 2023-02-17 RX ADMIN — EPHEDRINE SULFATE 10 MG: 5 INJECTION INTRAVENOUS at 17:21

## 2023-02-17 RX ADMIN — CEFAZOLIN SODIUM 2 G: 2 SOLUTION INTRAVENOUS at 22:51

## 2023-02-17 RX ADMIN — CEFAZOLIN SODIUM 2 G: 2 SOLUTION INTRAVENOUS at 15:09

## 2023-02-17 RX ADMIN — EPHEDRINE SULFATE 10 MG: 5 INJECTION INTRAVENOUS at 15:27

## 2023-02-17 RX ADMIN — SUGAMMADEX 200 MG: 100 INJECTION, SOLUTION INTRAVENOUS at 18:21

## 2023-02-17 RX ADMIN — ROCURONIUM BROMIDE 40 MG: 10 INJECTION INTRAVENOUS at 14:55

## 2023-02-17 RX ADMIN — ONDANSETRON 4 MG: 2 INJECTION INTRAMUSCULAR; INTRAVENOUS at 19:21

## 2023-02-17 RX ADMIN — HYDROMORPHONE HYDROCHLORIDE 0.5 MG: 1 INJECTION, SOLUTION INTRAMUSCULAR; INTRAVENOUS; SUBCUTANEOUS at 19:41

## 2023-02-17 RX ADMIN — SODIUM CHLORIDE, POTASSIUM CHLORIDE, SODIUM LACTATE AND CALCIUM CHLORIDE: 600; 310; 30; 20 INJECTION, SOLUTION INTRAVENOUS at 14:52

## 2023-02-17 RX ADMIN — ONDANSETRON 4 MG: 2 INJECTION INTRAMUSCULAR; INTRAVENOUS at 15:45

## 2023-02-17 RX ADMIN — SODIUM CHLORIDE, POTASSIUM CHLORIDE, SODIUM LACTATE AND CALCIUM CHLORIDE 100 ML/HR: 600; 310; 30; 20 INJECTION, SOLUTION INTRAVENOUS at 22:46

## 2023-02-17 RX ADMIN — Medication 10 MG: at 17:12

## 2023-02-17 RX ADMIN — ROCURONIUM BROMIDE 10 MG: 10 INJECTION INTRAVENOUS at 16:51

## 2023-02-17 RX ADMIN — HYDROXYCHLOROQUINE SULFATE 200 MG: 200 TABLET ORAL at 08:59

## 2023-02-17 RX ADMIN — CEFTRIAXONE 1 G: 1 INJECTION, SOLUTION INTRAVENOUS at 09:59

## 2023-02-17 RX ADMIN — LOSARTAN POTASSIUM 50 MG: 50 TABLET, FILM COATED ORAL at 08:59

## 2023-02-17 RX ADMIN — Medication 10 ML: at 08:59

## 2023-02-17 RX ADMIN — TRANEXAMIC ACID 1000 MG: 10 INJECTION, SOLUTION INTRAVENOUS at 17:52

## 2023-02-17 RX ADMIN — LIDOCAINE HYDROCHLORIDE 40 MG: 20 INJECTION, SOLUTION INTRAVENOUS at 14:55

## 2023-02-17 RX ADMIN — LIDOCAINE HYDROCHLORIDE 5 ML: 20 INJECTION, SOLUTION EPIDURAL; INFILTRATION; INTRACAUDAL; PERINEURAL at 19:03

## 2023-02-17 RX ADMIN — ROCURONIUM BROMIDE 10 MG: 10 INJECTION INTRAVENOUS at 17:29

## 2023-02-17 RX ADMIN — DEXAMETHASONE SODIUM PHOSPHATE 4 MG: 4 INJECTION, SOLUTION INTRAMUSCULAR; INTRAVENOUS at 15:45

## 2023-02-17 RX ADMIN — Medication 10 MG: at 14:55

## 2023-02-17 RX ADMIN — ROPIVACAINE HYDROCHLORIDE 15 ML: 5 INJECTION, SOLUTION EPIDURAL; INFILTRATION; PERINEURAL at 19:03

## 2023-02-17 RX ADMIN — HYDROMORPHONE HYDROCHLORIDE 0.5 MG: 1 INJECTION, SOLUTION INTRAMUSCULAR; INTRAVENOUS; SUBCUTANEOUS at 19:22

## 2023-02-17 RX ADMIN — Medication 100 MCG: at 15:26

## 2023-02-17 RX ADMIN — SODIUM CHLORIDE, POTASSIUM CHLORIDE, SODIUM LACTATE AND CALCIUM CHLORIDE: 600; 310; 30; 20 INJECTION, SOLUTION INTRAVENOUS at 18:22

## 2023-02-17 RX ADMIN — FENTANYL CITRATE 25 MCG: 50 INJECTION INTRAMUSCULAR; INTRAVENOUS at 16:11

## 2023-02-17 RX ADMIN — FENTANYL CITRATE 25 MCG: 50 INJECTION INTRAMUSCULAR; INTRAVENOUS at 15:46

## 2023-02-17 RX ADMIN — FENTANYL CITRATE 25 MCG: 50 INJECTION INTRAMUSCULAR; INTRAVENOUS at 15:51

## 2023-02-17 RX ADMIN — PROPOFOL 40 MG: 10 INJECTION, EMULSION INTRAVENOUS at 14:55

## 2023-02-17 RX ADMIN — FENTANYL CITRATE 25 MCG: 50 INJECTION INTRAMUSCULAR; INTRAVENOUS at 15:30

## 2023-02-17 RX ADMIN — TRANEXAMIC ACID 1000 MG: 10 INJECTION, SOLUTION INTRAVENOUS at 15:30

## 2023-02-17 RX ADMIN — SODIUM CHLORIDE: 9 INJECTION, SOLUTION INTRAVENOUS at 14:52

## 2023-02-17 RX ADMIN — PROCHLORPERAZINE EDISYLATE 5 MG: 5 INJECTION INTRAMUSCULAR; INTRAVENOUS at 21:29

## 2023-02-17 RX ADMIN — CARVEDILOL 25 MG: 25 TABLET, FILM COATED ORAL at 08:58

## 2023-02-17 NOTE — ANESTHESIA PROCEDURE NOTES
Peripheral Block    Pre-sedation assessment completed: 2/17/2023 7:02 PM    Patient reassessed immediately prior to procedure    Patient location during procedure: post-op  Start time: 2/17/2023 7:03 PM  Stop time: 2/17/2023 7:09 PM  Reason for block: at surgeon's request and post-op pain management  Performed by  CRNA/CAA: Bryan Lopes, CRNA  Preanesthetic Checklist  Completed: patient identified, IV checked, site marked, risks and benefits discussed, surgical consent, monitors and equipment checked, pre-op evaluation and timeout performed  Prep:  Pt Position: supine  Sterile barriers:cap, gloves and mask  Prep: ChloraPrep  Patient monitoring: EKG, continuous pulse oximetry and blood pressure monitoring  Procedure    Sedation: no    Guidance:ultrasound guided    ULTRASOUND INTERPRETATION.  Using ultrasound guidance a 20 G gauge needle was placed in close proximity to the nerve, at which point, under ultrasound guidance anesthetic was injected in the area of the nerve and spread of the anesthesia was seen on ultrasound in close proximity thereto.  There were no abnormalities seen on ultrasound; a digital image was taken; and the patient tolerated the procedure with no complications. Images:still images not obtained    Laterality:left  Block Type:PENG  Injection Technique:single-shot  Needle Type:echogenic  Needle Gauge:20 G  Resistance on Injection: none    Medications Used: lidocaine PF 2% (XYLOCAINE) injection - Injection   5 mL - 2/17/2023 7:03:00 PM  ropivacaine (NAROPIN) injection 0.5 % - Peripheral Nerve   15 mL - 2/17/2023 7:03:00 PM      Medications  Preservative Free Saline:10ml  Comment:Adjuncts per total volume of LA:          If required, intravenous sedation was given -- see meds on anesthesia record.    Post Assessment  Injection Assessment: negative aspiration for heme, no paresthesia on injection and incremental injection  Patient Tolerance:comfortable throughout  block  Complications:no  Additional Notes  PENG Block    Incremental injection with negative blood aspirate. No pain on injection. Normal injection resistance.  Vascular puncture avoided. Nerves and surrounding tissues identified with local spread around nerves visualized.

## 2023-02-17 NOTE — ANESTHESIA PROCEDURE NOTES
Airway  Urgency: elective    Date/Time: 2/17/2023 2:56 PM  End Time:2/17/2023 2:57 PM  Airway not difficult    General Information and Staff    Patient location during procedure: OR  CRNA/CAA: Bryan Lopes CRNA    Indications and Patient Condition  Indications for airway management: airway protection    Preoxygenated: yes  MILS not maintained throughout  Mask difficulty assessment: 1 - vent by mask    Final Airway Details  Final airway type: endotracheal airway      Successful airway: ETT  Cuffed: yes   Successful intubation technique: direct laryngoscopy  Facilitating devices/methods: anterior pressure/BURP  Endotracheal tube insertion site: oral  Blade: Kevin  Blade size: 3  ETT size (mm): 7.0  Cormack-Lehane Classification: grade I - full view of glottis  Placement verified by: chest auscultation and capnometry   Cuff volume (mL): 6  Measured from: lips  ETT/EBT  to lips (cm): 20  Number of attempts at approach: 1  Assessment: lips, teeth, and gum same as pre-op and atraumatic intubation    Additional Comments  Negative epigastric sounds, Breath sound equal bilaterally with symmetric chest rise and fall

## 2023-02-17 NOTE — ANESTHESIA PREPROCEDURE EVALUATION
Anesthesia Evaluation     Patient summary reviewed and Nursing notes reviewed   history of anesthetic complications: PONV  NPO Solid Status: > 8 hours  NPO Liquid Status: > 8 hours           Airway   Mallampati: I  TM distance: >3 FB  Neck ROM: full  Possible difficult intubation  Dental - normal exam     Pulmonary - normal exam   (+) COPD,     ROS comment: Covid +  Cardiovascular - normal exam    ECG reviewed  Patient on routine beta blocker  Rhythm: regular  Rate: normal    (+) hypertension, CAD, hyperlipidemia,     ROS comment: Echo 2021  Interpretation Summary    ? Left ventricular wall thickness is consistent with mild concentric hypertrophy.  ? Estimated left ventricular EF = 68% Estimated left ventricular EF was in agreement with the calculated left ventricular EF. Left ventricular ejection fraction appears to be 66 - 70%. Left ventricular systolic function is normal.  ? Left ventricular diastolic function is consistent with (grade I) impaired relaxation.  ? Left atrial volume is moderately increased.  ? Estimated right ventricular systolic pressure from tricuspid regurgitation is normal (<35 mmHg).         Neuro/Psych  GI/Hepatic/Renal/Endo    (+)  GERD,  renal disease CRI, thyroid problem     Musculoskeletal     (+) back pain, chronic pain,   Abdominal  - normal exam    Abdomen: soft.  Bowel sounds: normal.   Substance History      OB/GYN          Other   arthritis, blood dyscrasia anemia,     ROS/Med Hx Other: Hgb 8.3  Plt 104  Crt 2.17  K 5.2    1.Deviation of femoral neck screws/femoral head fragmentation status post left hip fracture repair, 2021  2.COVID-19 positive, POA  3.CAD  4.CKD stage IV  5.Hypertension  6.Hyperlipidemia  7.Hypothyroidism  Osteoarthritis/inflammatorarthritis  9.mpaired mobility and ADLs                  Anesthesia Plan    ASA 3     general with block     (Risks and benefits discussed including risk of aspiration, recall and dental damage. All patient questions answered. Will  continue with POC.    Single shot FI vs PENG block POPC  Risk vs Benefits discussed with patient to include infection, bleeding at the site, paresthesia, and incomplete analgesia.  )  intravenous induction     Anesthetic plan, risks, benefits, and alternatives have been provided, discussed and informed consent has been obtained with: patient.  Pre-procedure education provided      CODE STATUS:    Code Status (Patient has no pulse and is not breathing): CPR (Attempt to Resuscitate)  Medical Interventions (Patient has pulse or is breathing): Full Support

## 2023-02-18 ENCOUNTER — APPOINTMENT (OUTPATIENT)
Dept: CT IMAGING | Facility: HOSPITAL | Age: 83
DRG: 470 | End: 2023-02-18
Payer: MEDICARE

## 2023-02-18 LAB
ANION GAP SERPL CALCULATED.3IONS-SCNC: 6.9 MMOL/L (ref 5–15)
BASOPHILS # BLD AUTO: 0.01 10*3/MM3 (ref 0–0.2)
BASOPHILS NFR BLD AUTO: 0.1 % (ref 0–1.5)
BH BB BLOOD EXPIRATION DATE: NORMAL
BH BB BLOOD EXPIRATION DATE: NORMAL
BH BB BLOOD TYPE BARCODE: 5100
BH BB BLOOD TYPE BARCODE: 5100
BH BB DISPENSE STATUS: NORMAL
BH BB DISPENSE STATUS: NORMAL
BH BB PRODUCT CODE: NORMAL
BH BB PRODUCT CODE: NORMAL
BH BB UNIT NUMBER: NORMAL
BH BB UNIT NUMBER: NORMAL
BUN SERPL-MCNC: 59 MG/DL (ref 8–23)
BUN/CREAT SERPL: 26.6 (ref 7–25)
CALCIUM SPEC-SCNC: 8.4 MG/DL (ref 8.6–10.5)
CHLORIDE SERPL-SCNC: 107 MMOL/L (ref 98–107)
CO2 SERPL-SCNC: 21.1 MMOL/L (ref 22–29)
CREAT SERPL-MCNC: 2.22 MG/DL (ref 0.57–1)
CROSSMATCH INTERPRETATION: NORMAL
CROSSMATCH INTERPRETATION: NORMAL
DEPRECATED RDW RBC AUTO: 64.6 FL (ref 37–54)
EGFRCR SERPLBLD CKD-EPI 2021: 21.9 ML/MIN/1.73
EOSINOPHIL # BLD AUTO: 0 10*3/MM3 (ref 0–0.4)
EOSINOPHIL NFR BLD AUTO: 0 % (ref 0.3–6.2)
ERYTHROCYTE [DISTWIDTH] IN BLOOD BY AUTOMATED COUNT: 17.6 % (ref 12.3–15.4)
GLUCOSE BLDC GLUCOMTR-MCNC: 116 MG/DL (ref 70–130)
GLUCOSE BLDC GLUCOMTR-MCNC: 125 MG/DL (ref 70–130)
GLUCOSE SERPL-MCNC: 117 MG/DL (ref 65–99)
HCT VFR BLD AUTO: 23.6 % (ref 34–46.6)
HCT VFR BLD AUTO: 29.1 % (ref 34–46.6)
HGB BLD-MCNC: 7.4 G/DL (ref 12–15.9)
HGB BLD-MCNC: 9.4 G/DL (ref 12–15.9)
IMM GRANULOCYTES # BLD AUTO: 0.05 10*3/MM3 (ref 0–0.05)
IMM GRANULOCYTES NFR BLD AUTO: 0.6 % (ref 0–0.5)
LYMPHOCYTES # BLD AUTO: 0.49 10*3/MM3 (ref 0.7–3.1)
LYMPHOCYTES NFR BLD AUTO: 5.6 % (ref 19.6–45.3)
MCH RBC QN AUTO: 32.4 PG (ref 26.6–33)
MCHC RBC AUTO-ENTMCNC: 32.3 G/DL (ref 31.5–35.7)
MCV RBC AUTO: 100.3 FL (ref 79–97)
MONOCYTES # BLD AUTO: 0.51 10*3/MM3 (ref 0.1–0.9)
MONOCYTES NFR BLD AUTO: 5.8 % (ref 5–12)
NEUTROPHILS NFR BLD AUTO: 7.72 10*3/MM3 (ref 1.7–7)
NEUTROPHILS NFR BLD AUTO: 87.9 % (ref 42.7–76)
NRBC BLD AUTO-RTO: 0 /100 WBC (ref 0–0.2)
PLATELET # BLD AUTO: 102 10*3/MM3 (ref 140–450)
PMV BLD AUTO: 10.1 FL (ref 6–12)
POTASSIUM SERPL-SCNC: 5.9 MMOL/L (ref 3.5–5.2)
POTASSIUM SERPL-SCNC: 6.5 MMOL/L (ref 3.5–5.2)
POTASSIUM SERPL-SCNC: 6.6 MMOL/L (ref 3.5–5.2)
RBC # BLD AUTO: 2.9 10*6/MM3 (ref 3.77–5.28)
SODIUM SERPL-SCNC: 135 MMOL/L (ref 136–145)
UNIT  ABO: NORMAL
UNIT  ABO: NORMAL
UNIT  RH: NORMAL
UNIT  RH: NORMAL
VRE SPEC QL CULT: ABNORMAL
WBC NRBC COR # BLD: 8.78 10*3/MM3 (ref 3.4–10.8)

## 2023-02-18 PROCEDURE — 36430 TRANSFUSION BLD/BLD COMPNT: CPT

## 2023-02-18 PROCEDURE — 93010 ELECTROCARDIOGRAM REPORT: CPT | Performed by: INTERNAL MEDICINE

## 2023-02-18 PROCEDURE — 97161 PT EVAL LOW COMPLEX 20 MIN: CPT

## 2023-02-18 PROCEDURE — 0 CEFAZOLIN SODIUM-DEXTROSE 2-3 GM-%(50ML) RECONSTITUTED SOLUTION: Performed by: ORTHOPAEDIC SURGERY

## 2023-02-18 PROCEDURE — 25010000002 CEFTRIAXONE SODIUM-DEXTROSE 1-3.74 GM-%(50ML) RECONSTITUTED SOLUTION: Performed by: ORTHOPAEDIC SURGERY

## 2023-02-18 PROCEDURE — 25010000002 HYDROMORPHONE 1 MG/ML SOLUTION: Performed by: ORTHOPAEDIC SURGERY

## 2023-02-18 PROCEDURE — 84132 ASSAY OF SERUM POTASSIUM: CPT | Performed by: NURSE PRACTITIONER

## 2023-02-18 PROCEDURE — 80048 BASIC METABOLIC PNL TOTAL CA: CPT | Performed by: ORTHOPAEDIC SURGERY

## 2023-02-18 PROCEDURE — 63710000001 PREDNISONE PER 5 MG: Performed by: ORTHOPAEDIC SURGERY

## 2023-02-18 PROCEDURE — P9016 RBC LEUKOCYTES REDUCED: HCPCS

## 2023-02-18 PROCEDURE — 25010000002 CALCIUM GLUCONATE-NACL 1-0.675 GM/50ML-% SOLUTION: Performed by: NURSE PRACTITIONER

## 2023-02-18 PROCEDURE — 99024 POSTOP FOLLOW-UP VISIT: CPT | Performed by: ORTHOPAEDIC SURGERY

## 2023-02-18 PROCEDURE — 25010000002 ONDANSETRON PER 1 MG: Performed by: ORTHOPAEDIC SURGERY

## 2023-02-18 PROCEDURE — 25010000002 PROCHLORPERAZINE 10 MG/2ML SOLUTION: Performed by: INTERNAL MEDICINE

## 2023-02-18 PROCEDURE — 63710000001 INSULIN REGULAR HUMAN PER 5 UNITS: Performed by: NURSE PRACTITIONER

## 2023-02-18 PROCEDURE — 94640 AIRWAY INHALATION TREATMENT: CPT

## 2023-02-18 PROCEDURE — 25010000002 ENOXAPARIN PER 10 MG: Performed by: ORTHOPAEDIC SURGERY

## 2023-02-18 PROCEDURE — 63710000001 DIPHENHYDRAMINE PER 50 MG: Performed by: INTERNAL MEDICINE

## 2023-02-18 PROCEDURE — 86900 BLOOD TYPING SEROLOGIC ABO: CPT

## 2023-02-18 PROCEDURE — 72192 CT PELVIS W/O DYE: CPT

## 2023-02-18 PROCEDURE — 85025 COMPLETE CBC W/AUTO DIFF WBC: CPT | Performed by: ORTHOPAEDIC SURGERY

## 2023-02-18 PROCEDURE — 93005 ELECTROCARDIOGRAM TRACING: CPT | Performed by: INTERNAL MEDICINE

## 2023-02-18 PROCEDURE — 85018 HEMOGLOBIN: CPT | Performed by: INTERNAL MEDICINE

## 2023-02-18 PROCEDURE — 82962 GLUCOSE BLOOD TEST: CPT

## 2023-02-18 PROCEDURE — 85014 HEMATOCRIT: CPT | Performed by: INTERNAL MEDICINE

## 2023-02-18 PROCEDURE — 99232 SBSQ HOSP IP/OBS MODERATE 35: CPT | Performed by: NURSE PRACTITIONER

## 2023-02-18 RX ORDER — DEXTROSE MONOHYDRATE 25 G/50ML
50 INJECTION, SOLUTION INTRAVENOUS ONCE
Status: COMPLETED | OUTPATIENT
Start: 2023-02-18 | End: 2023-02-18

## 2023-02-18 RX ORDER — CALCIUM GLUCONATE 20 MG/ML
1 INJECTION, SOLUTION INTRAVENOUS ONCE
Status: COMPLETED | OUTPATIENT
Start: 2023-02-18 | End: 2023-02-18

## 2023-02-18 RX ORDER — DIPHENHYDRAMINE HYDROCHLORIDE 50 MG/ML
25 INJECTION INTRAMUSCULAR; INTRAVENOUS ONCE
Status: COMPLETED | OUTPATIENT
Start: 2023-02-18 | End: 2023-02-18

## 2023-02-18 RX ORDER — DIPHENHYDRAMINE HCL 25 MG
25 CAPSULE ORAL ONCE
Status: COMPLETED | OUTPATIENT
Start: 2023-02-18 | End: 2023-02-18

## 2023-02-18 RX ADMIN — SENNOSIDES AND DOCUSATE SODIUM 2 TABLET: 50; 8.6 TABLET ORAL at 08:15

## 2023-02-18 RX ADMIN — ONDANSETRON 4 MG: 2 INJECTION INTRAMUSCULAR; INTRAVENOUS at 20:49

## 2023-02-18 RX ADMIN — DOCUSATE SODIUM 100 MG: 100 CAPSULE, LIQUID FILLED ORAL at 20:10

## 2023-02-18 RX ADMIN — SERTRALINE 25 MG: 50 TABLET, FILM COATED ORAL at 08:14

## 2023-02-18 RX ADMIN — HUMAN INSULIN 10 UNITS: 100 INJECTION, SOLUTION SUBCUTANEOUS at 16:45

## 2023-02-18 RX ADMIN — ONDANSETRON 4 MG: 2 INJECTION INTRAMUSCULAR; INTRAVENOUS at 01:30

## 2023-02-18 RX ADMIN — CEFTRIAXONE 1 G: 1 INJECTION, SOLUTION INTRAVENOUS at 09:23

## 2023-02-18 RX ADMIN — MUPIROCIN: 20 OINTMENT TOPICAL at 09:24

## 2023-02-18 RX ADMIN — SENNOSIDES AND DOCUSATE SODIUM 2 TABLET: 50; 8.6 TABLET ORAL at 20:10

## 2023-02-18 RX ADMIN — Medication 10 ML: at 08:17

## 2023-02-18 RX ADMIN — Medication 10 ML: at 20:11

## 2023-02-18 RX ADMIN — CARVEDILOL 25 MG: 25 TABLET, FILM COATED ORAL at 17:47

## 2023-02-18 RX ADMIN — PROCHLORPERAZINE EDISYLATE 5 MG: 5 INJECTION INTRAMUSCULAR; INTRAVENOUS at 07:25

## 2023-02-18 RX ADMIN — ONDANSETRON 4 MG: 2 INJECTION INTRAMUSCULAR; INTRAVENOUS at 09:24

## 2023-02-18 RX ADMIN — MUPIROCIN: 20 OINTMENT TOPICAL at 21:18

## 2023-02-18 RX ADMIN — SODIUM CHLORIDE 500 ML: 9 INJECTION, SOLUTION INTRAVENOUS at 21:17

## 2023-02-18 RX ADMIN — DIPHENHYDRAMINE HYDROCHLORIDE 25 MG: 25 CAPSULE ORAL at 23:12

## 2023-02-18 RX ADMIN — SODIUM CHLORIDE, POTASSIUM CHLORIDE, SODIUM LACTATE AND CALCIUM CHLORIDE 100 ML/HR: 600; 310; 30; 20 INJECTION, SOLUTION INTRAVENOUS at 11:06

## 2023-02-18 RX ADMIN — CARVEDILOL 25 MG: 25 TABLET, FILM COATED ORAL at 08:15

## 2023-02-18 RX ADMIN — PANTOPRAZOLE SODIUM 40 MG: 40 TABLET, DELAYED RELEASE ORAL at 06:17

## 2023-02-18 RX ADMIN — PREDNISONE 10 MG: 10 TABLET ORAL at 08:17

## 2023-02-18 RX ADMIN — Medication 10 ML: at 08:16

## 2023-02-18 RX ADMIN — HYDROMORPHONE HYDROCHLORIDE 0.5 MG: 1 INJECTION, SOLUTION INTRAMUSCULAR; INTRAVENOUS; SUBCUTANEOUS at 07:25

## 2023-02-18 RX ADMIN — DEXTROSE MONOHYDRATE 50 ML: 25 INJECTION, SOLUTION INTRAVENOUS at 16:46

## 2023-02-18 RX ADMIN — Medication 10 ML: at 20:15

## 2023-02-18 RX ADMIN — ENOXAPARIN SODIUM 30 MG: 30 INJECTION SUBCUTANEOUS at 08:16

## 2023-02-18 RX ADMIN — HYDROMORPHONE HYDROCHLORIDE 0.5 MG: 1 INJECTION, SOLUTION INTRAMUSCULAR; INTRAVENOUS; SUBCUTANEOUS at 01:31

## 2023-02-18 RX ADMIN — DOCUSATE SODIUM 100 MG: 100 CAPSULE, LIQUID FILLED ORAL at 08:14

## 2023-02-18 RX ADMIN — CEFAZOLIN SODIUM 2 G: 2 SOLUTION INTRAVENOUS at 06:18

## 2023-02-18 RX ADMIN — CALCIUM GLUCONATE 1 G: 20 INJECTION, SOLUTION INTRAVENOUS at 16:45

## 2023-02-18 RX ADMIN — SODIUM ZIRCONIUM CYCLOSILICATE 10 G: 10 POWDER, FOR SUSPENSION ORAL at 07:25

## 2023-02-18 RX ADMIN — BUDESONIDE AND FORMOTEROL FUMARATE DIHYDRATE 2 PUFF: 160; 4.5 AEROSOL RESPIRATORY (INHALATION) at 09:24

## 2023-02-18 NOTE — ANESTHESIA POSTPROCEDURE EVALUATION
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        Post Anesthesia Care and Evaluation    Patient location during evaluation: PACU  Patient participation: complete - patient participated  Level of consciousness: awake and alert  Pain score: 3  Pain management: satisfactory to patient    Airway patency: patent  Anesthetic complications: No anesthetic complications  PONV Status: none  Cardiovascular status: acceptable and stable  Respiratory status: acceptable, nonlabored ventilation, spontaneous ventilation and unassisted  Hydration status: acceptable    Comments: Vitals signs as noted in nursing documentation as per protocol.

## 2023-02-19 PROBLEM — S72.002A FRACTURE, PROXIMAL FEMUR, LEFT, CLOSED, INITIAL ENCOUNTER: Status: ACTIVE | Noted: 2023-02-19

## 2023-02-19 PROBLEM — D62 POSTOPERATIVE ANEMIA DUE TO ACUTE BLOOD LOSS: Status: ACTIVE | Noted: 2023-02-19

## 2023-02-19 LAB
ACINETOBACTER SCREEN CX: NORMAL
ANION GAP SERPL CALCULATED.3IONS-SCNC: 10.9 MMOL/L (ref 5–15)
BACTERIA SPEC AEROBE CULT: ABNORMAL
BASOPHILS # BLD AUTO: 0.02 10*3/MM3 (ref 0–0.2)
BASOPHILS NFR BLD AUTO: 0.3 % (ref 0–1.5)
BUN SERPL-MCNC: 67 MG/DL (ref 8–23)
BUN/CREAT SERPL: 23.5 (ref 7–25)
CALCIUM SPEC-SCNC: 7.8 MG/DL (ref 8.6–10.5)
CHLORIDE SERPL-SCNC: 102 MMOL/L (ref 98–107)
CO2 SERPL-SCNC: 16.1 MMOL/L (ref 22–29)
CREAT SERPL-MCNC: 2.85 MG/DL (ref 0.57–1)
DEPRECATED RDW RBC AUTO: 67.9 FL (ref 37–54)
EGFRCR SERPLBLD CKD-EPI 2021: 16.2 ML/MIN/1.73
EOSINOPHIL # BLD AUTO: 0.01 10*3/MM3 (ref 0–0.4)
EOSINOPHIL NFR BLD AUTO: 0.1 % (ref 0.3–6.2)
ERYTHROCYTE [DISTWIDTH] IN BLOOD BY AUTOMATED COUNT: 18.6 % (ref 12.3–15.4)
GLUCOSE SERPL-MCNC: 92 MG/DL (ref 65–99)
HCT VFR BLD AUTO: 25.2 % (ref 34–46.6)
HCT VFR BLD AUTO: 27.9 % (ref 34–46.6)
HGB BLD-MCNC: 8.3 G/DL (ref 12–15.9)
HGB BLD-MCNC: 8.5 G/DL (ref 12–15.9)
IMM GRANULOCYTES # BLD AUTO: 0.07 10*3/MM3 (ref 0–0.05)
IMM GRANULOCYTES NFR BLD AUTO: 1 % (ref 0–0.5)
LYMPHOCYTES # BLD AUTO: 0.89 10*3/MM3 (ref 0.7–3.1)
LYMPHOCYTES NFR BLD AUTO: 12.2 % (ref 19.6–45.3)
MCH RBC QN AUTO: 31 PG (ref 26.6–33)
MCHC RBC AUTO-ENTMCNC: 30.5 G/DL (ref 31.5–35.7)
MCV RBC AUTO: 101.8 FL (ref 79–97)
MONOCYTES # BLD AUTO: 0.68 10*3/MM3 (ref 0.1–0.9)
MONOCYTES NFR BLD AUTO: 9.3 % (ref 5–12)
NEUTROPHILS NFR BLD AUTO: 5.61 10*3/MM3 (ref 1.7–7)
NEUTROPHILS NFR BLD AUTO: 77.1 % (ref 42.7–76)
NRBC BLD AUTO-RTO: 0 /100 WBC (ref 0–0.2)
PLATELET # BLD AUTO: 81 10*3/MM3 (ref 140–450)
PMV BLD AUTO: 10 FL (ref 6–12)
POTASSIUM SERPL-SCNC: 5.3 MMOL/L (ref 3.5–5.2)
POTASSIUM SERPL-SCNC: 5.6 MMOL/L (ref 3.5–5.2)
RBC # BLD AUTO: 2.74 10*6/MM3 (ref 3.77–5.28)
SODIUM SERPL-SCNC: 129 MMOL/L (ref 136–145)
WBC NRBC COR # BLD: 7.28 10*3/MM3 (ref 3.4–10.8)

## 2023-02-19 PROCEDURE — 97530 THERAPEUTIC ACTIVITIES: CPT

## 2023-02-19 PROCEDURE — 85014 HEMATOCRIT: CPT | Performed by: NURSE PRACTITIONER

## 2023-02-19 PROCEDURE — 25010000002 ERTAPENEM PER 500 MG: Performed by: NURSE PRACTITIONER

## 2023-02-19 PROCEDURE — 63710000001 PREDNISONE PER 5 MG: Performed by: ORTHOPAEDIC SURGERY

## 2023-02-19 PROCEDURE — 84132 ASSAY OF SERUM POTASSIUM: CPT | Performed by: NURSE PRACTITIONER

## 2023-02-19 PROCEDURE — 97110 THERAPEUTIC EXERCISES: CPT

## 2023-02-19 PROCEDURE — 99232 SBSQ HOSP IP/OBS MODERATE 35: CPT | Performed by: NURSE PRACTITIONER

## 2023-02-19 PROCEDURE — 80048 BASIC METABOLIC PNL TOTAL CA: CPT | Performed by: NURSE PRACTITIONER

## 2023-02-19 PROCEDURE — 85025 COMPLETE CBC W/AUTO DIFF WBC: CPT | Performed by: NURSE PRACTITIONER

## 2023-02-19 PROCEDURE — 99024 POSTOP FOLLOW-UP VISIT: CPT | Performed by: ORTHOPAEDIC SURGERY

## 2023-02-19 PROCEDURE — 85018 HEMOGLOBIN: CPT | Performed by: NURSE PRACTITIONER

## 2023-02-19 PROCEDURE — 25010000002 CEFTRIAXONE SODIUM-DEXTROSE 1-3.74 GM-%(50ML) RECONSTITUTED SOLUTION: Performed by: ORTHOPAEDIC SURGERY

## 2023-02-19 PROCEDURE — 97112 NEUROMUSCULAR REEDUCATION: CPT

## 2023-02-19 RX ORDER — CARVEDILOL 6.25 MG/1
6.25 TABLET ORAL 2 TIMES DAILY WITH MEALS
Status: DISCONTINUED | OUTPATIENT
Start: 2023-02-19 | End: 2023-02-22

## 2023-02-19 RX ADMIN — SERTRALINE 25 MG: 50 TABLET, FILM COATED ORAL at 08:24

## 2023-02-19 RX ADMIN — HYDROCODONE BITARTRATE AND ACETAMINOPHEN 1 TABLET: 7.5; 325 TABLET ORAL at 13:48

## 2023-02-19 RX ADMIN — BUDESONIDE AND FORMOTEROL FUMARATE DIHYDRATE 2 PUFF: 160; 4.5 AEROSOL RESPIRATORY (INHALATION) at 08:25

## 2023-02-19 RX ADMIN — QUETIAPINE FUMARATE 12.5 MG: 25 TABLET ORAL at 20:40

## 2023-02-19 RX ADMIN — Medication 10 ML: at 20:40

## 2023-02-19 RX ADMIN — MUPIROCIN: 20 OINTMENT TOPICAL at 20:40

## 2023-02-19 RX ADMIN — SENNOSIDES AND DOCUSATE SODIUM 2 TABLET: 50; 8.6 TABLET ORAL at 08:23

## 2023-02-19 RX ADMIN — PREDNISONE 10 MG: 10 TABLET ORAL at 08:23

## 2023-02-19 RX ADMIN — DOCUSATE SODIUM 100 MG: 100 CAPSULE, LIQUID FILLED ORAL at 08:24

## 2023-02-19 RX ADMIN — ERTAPENEM SODIUM 500 MG: 1 INJECTION, POWDER, LYOPHILIZED, FOR SOLUTION INTRAMUSCULAR; INTRAVENOUS at 13:42

## 2023-02-19 RX ADMIN — CEFTRIAXONE 1 G: 1 INJECTION, SOLUTION INTRAVENOUS at 09:34

## 2023-02-19 RX ADMIN — Medication 10 ML: at 08:24

## 2023-02-19 RX ADMIN — PANTOPRAZOLE SODIUM 40 MG: 40 TABLET, DELAYED RELEASE ORAL at 05:15

## 2023-02-19 RX ADMIN — DOCUSATE SODIUM 100 MG: 100 CAPSULE, LIQUID FILLED ORAL at 20:40

## 2023-02-19 RX ADMIN — SENNOSIDES AND DOCUSATE SODIUM 2 TABLET: 50; 8.6 TABLET ORAL at 20:40

## 2023-02-19 RX ADMIN — MUPIROCIN: 20 OINTMENT TOPICAL at 08:25

## 2023-02-19 RX ADMIN — CARVEDILOL 6.25 MG: 6.25 TABLET, FILM COATED ORAL at 17:26

## 2023-02-19 RX ADMIN — Medication 10 ML: at 09:35

## 2023-02-19 RX ADMIN — CARVEDILOL 6.25 MG: 6.25 TABLET, FILM COATED ORAL at 08:24

## 2023-02-20 LAB
ALBUMIN SERPL-MCNC: 2.2 G/DL (ref 3.5–5.2)
ANION GAP SERPL CALCULATED.3IONS-SCNC: 11.3 MMOL/L (ref 5–15)
BASOPHILS # BLD AUTO: 0 10*3/MM3 (ref 0–0.2)
BASOPHILS NFR BLD AUTO: 0 % (ref 0–1.5)
BH BB BLOOD EXPIRATION DATE: NORMAL
BH BB BLOOD TYPE BARCODE: 5100
BH BB DISPENSE STATUS: NORMAL
BH BB PRODUCT CODE: NORMAL
BH BB UNIT NUMBER: NORMAL
BUN SERPL-MCNC: 63 MG/DL (ref 8–23)
BUN/CREAT SERPL: 21.4 (ref 7–25)
CALCIUM SPEC-SCNC: 7.6 MG/DL (ref 8.6–10.5)
CHLORIDE SERPL-SCNC: 104 MMOL/L (ref 98–107)
CO2 SERPL-SCNC: 16.7 MMOL/L (ref 22–29)
CREAT SERPL-MCNC: 2.94 MG/DL (ref 0.57–1)
CROSSMATCH INTERPRETATION: NORMAL
DEPRECATED RDW RBC AUTO: 62.4 FL (ref 37–54)
EGFRCR SERPLBLD CKD-EPI 2021: 15.6 ML/MIN/1.73
EOSINOPHIL # BLD AUTO: 0.04 10*3/MM3 (ref 0–0.4)
EOSINOPHIL NFR BLD AUTO: 0.6 % (ref 0.3–6.2)
ERYTHROCYTE [DISTWIDTH] IN BLOOD BY AUTOMATED COUNT: 18.4 % (ref 12.3–15.4)
GLUCOSE SERPL-MCNC: 77 MG/DL (ref 65–99)
HCT VFR BLD AUTO: 23.4 % (ref 34–46.6)
HCT VFR BLD AUTO: 24.2 % (ref 34–46.6)
HGB BLD-MCNC: 7.6 G/DL (ref 12–15.9)
HGB BLD-MCNC: 8 G/DL (ref 12–15.9)
IMM GRANULOCYTES # BLD AUTO: 0.05 10*3/MM3 (ref 0–0.05)
IMM GRANULOCYTES NFR BLD AUTO: 0.7 % (ref 0–0.5)
INR PPP: 1.22 (ref 0.9–1.1)
LYMPHOCYTES # BLD AUTO: 0.56 10*3/MM3 (ref 0.7–3.1)
LYMPHOCYTES NFR BLD AUTO: 7.8 % (ref 19.6–45.3)
MCH RBC QN AUTO: 31.5 PG (ref 26.6–33)
MCHC RBC AUTO-ENTMCNC: 33.1 G/DL (ref 31.5–35.7)
MCV RBC AUTO: 95.3 FL (ref 79–97)
MONOCYTES # BLD AUTO: 0.55 10*3/MM3 (ref 0.1–0.9)
MONOCYTES NFR BLD AUTO: 7.6 % (ref 5–12)
NEUTROPHILS NFR BLD AUTO: 6.01 10*3/MM3 (ref 1.7–7)
NEUTROPHILS NFR BLD AUTO: 83.3 % (ref 42.7–76)
NRBC BLD AUTO-RTO: 0 /100 WBC (ref 0–0.2)
PHOSPHATE SERPL-MCNC: 4.9 MG/DL (ref 2.5–4.5)
PLATELET # BLD AUTO: 78 10*3/MM3 (ref 140–450)
PMV BLD AUTO: 10.2 FL (ref 6–12)
POTASSIUM SERPL-SCNC: 5.4 MMOL/L (ref 3.5–5.2)
PROTHROMBIN TIME: 15.8 SECONDS (ref 12.5–14.5)
RBC # BLD AUTO: 2.54 10*6/MM3 (ref 3.77–5.28)
SODIUM SERPL-SCNC: 132 MMOL/L (ref 136–145)
UNIT  ABO: NORMAL
UNIT  RH: NORMAL
WBC NRBC COR # BLD: 7.21 10*3/MM3 (ref 3.4–10.8)

## 2023-02-20 PROCEDURE — P9016 RBC LEUKOCYTES REDUCED: HCPCS

## 2023-02-20 PROCEDURE — 86900 BLOOD TYPING SEROLOGIC ABO: CPT

## 2023-02-20 PROCEDURE — 99024 POSTOP FOLLOW-UP VISIT: CPT | Performed by: PHYSICIAN ASSISTANT

## 2023-02-20 PROCEDURE — 25010000002 ERTAPENEM PER 500 MG: Performed by: NURSE PRACTITIONER

## 2023-02-20 PROCEDURE — 99232 SBSQ HOSP IP/OBS MODERATE 35: CPT | Performed by: NURSE PRACTITIONER

## 2023-02-20 PROCEDURE — 63710000001 PREDNISONE PER 5 MG: Performed by: ORTHOPAEDIC SURGERY

## 2023-02-20 PROCEDURE — 97530 THERAPEUTIC ACTIVITIES: CPT

## 2023-02-20 PROCEDURE — 97166 OT EVAL MOD COMPLEX 45 MIN: CPT

## 2023-02-20 PROCEDURE — 80069 RENAL FUNCTION PANEL: CPT | Performed by: NURSE PRACTITIONER

## 2023-02-20 PROCEDURE — 85018 HEMOGLOBIN: CPT | Performed by: NURSE PRACTITIONER

## 2023-02-20 PROCEDURE — 85610 PROTHROMBIN TIME: CPT | Performed by: NURSE PRACTITIONER

## 2023-02-20 PROCEDURE — 97110 THERAPEUTIC EXERCISES: CPT

## 2023-02-20 PROCEDURE — 25010000002 ENOXAPARIN PER 10 MG: Performed by: ORTHOPAEDIC SURGERY

## 2023-02-20 PROCEDURE — 94799 UNLISTED PULMONARY SVC/PX: CPT

## 2023-02-20 PROCEDURE — 36430 TRANSFUSION BLD/BLD COMPNT: CPT

## 2023-02-20 PROCEDURE — 85014 HEMATOCRIT: CPT | Performed by: NURSE PRACTITIONER

## 2023-02-20 PROCEDURE — 85025 COMPLETE CBC W/AUTO DIFF WBC: CPT | Performed by: NURSE PRACTITIONER

## 2023-02-20 RX ADMIN — Medication 10 ML: at 08:36

## 2023-02-20 RX ADMIN — ERTAPENEM SODIUM 500 MG: 1 INJECTION, POWDER, LYOPHILIZED, FOR SOLUTION INTRAMUSCULAR; INTRAVENOUS at 13:07

## 2023-02-20 RX ADMIN — PANTOPRAZOLE SODIUM 40 MG: 40 TABLET, DELAYED RELEASE ORAL at 05:41

## 2023-02-20 RX ADMIN — HYDROCODONE BITARTRATE AND ACETAMINOPHEN 1 TABLET: 7.5; 325 TABLET ORAL at 08:59

## 2023-02-20 RX ADMIN — PREDNISONE 10 MG: 10 TABLET ORAL at 08:32

## 2023-02-20 RX ADMIN — BUDESONIDE AND FORMOTEROL FUMARATE DIHYDRATE 2 PUFF: 160; 4.5 AEROSOL RESPIRATORY (INHALATION) at 20:21

## 2023-02-20 RX ADMIN — QUETIAPINE FUMARATE 12.5 MG: 25 TABLET ORAL at 20:12

## 2023-02-20 RX ADMIN — MUPIROCIN: 20 OINTMENT TOPICAL at 11:57

## 2023-02-20 RX ADMIN — DOCUSATE SODIUM 100 MG: 100 CAPSULE, LIQUID FILLED ORAL at 20:12

## 2023-02-20 RX ADMIN — CARVEDILOL 6.25 MG: 6.25 TABLET, FILM COATED ORAL at 17:32

## 2023-02-20 RX ADMIN — TORSEMIDE 10 MG: 10 TABLET ORAL at 08:31

## 2023-02-20 RX ADMIN — CARVEDILOL 6.25 MG: 6.25 TABLET, FILM COATED ORAL at 08:29

## 2023-02-20 RX ADMIN — BUDESONIDE AND FORMOTEROL FUMARATE DIHYDRATE 2 PUFF: 160; 4.5 AEROSOL RESPIRATORY (INHALATION) at 01:37

## 2023-02-20 RX ADMIN — Medication 10 ML: at 20:13

## 2023-02-20 RX ADMIN — ENOXAPARIN SODIUM 30 MG: 30 INJECTION SUBCUTANEOUS at 08:39

## 2023-02-20 RX ADMIN — SERTRALINE 25 MG: 50 TABLET, FILM COATED ORAL at 08:33

## 2023-02-20 RX ADMIN — MUPIROCIN: 20 OINTMENT TOPICAL at 20:13

## 2023-02-20 RX ADMIN — Medication 10 ML: at 09:00

## 2023-02-20 RX ADMIN — BUDESONIDE AND FORMOTEROL FUMARATE DIHYDRATE 2 PUFF: 160; 4.5 AEROSOL RESPIRATORY (INHALATION) at 08:59

## 2023-02-20 RX ADMIN — DOCUSATE SODIUM 100 MG: 100 CAPSULE, LIQUID FILLED ORAL at 08:34

## 2023-02-21 LAB
ALBUMIN SERPL-MCNC: 2 G/DL (ref 3.5–5.2)
ANION GAP SERPL CALCULATED.3IONS-SCNC: 11.1 MMOL/L (ref 5–15)
BASOPHILS # BLD AUTO: 0.01 10*3/MM3 (ref 0–0.2)
BASOPHILS NFR BLD AUTO: 0.2 % (ref 0–1.5)
BUN SERPL-MCNC: 67 MG/DL (ref 8–23)
BUN/CREAT SERPL: 22.8 (ref 7–25)
CALCIUM SPEC-SCNC: 7.4 MG/DL (ref 8.6–10.5)
CHLORIDE SERPL-SCNC: 105 MMOL/L (ref 98–107)
CO2 SERPL-SCNC: 15.9 MMOL/L (ref 22–29)
CREAT SERPL-MCNC: 2.94 MG/DL (ref 0.57–1)
DEPRECATED RDW RBC AUTO: 64.9 FL (ref 37–54)
EGFRCR SERPLBLD CKD-EPI 2021: 15.6 ML/MIN/1.73
EOSINOPHIL # BLD AUTO: 0.03 10*3/MM3 (ref 0–0.4)
EOSINOPHIL NFR BLD AUTO: 0.5 % (ref 0.3–6.2)
ERYTHROCYTE [DISTWIDTH] IN BLOOD BY AUTOMATED COUNT: 18.1 % (ref 12.3–15.4)
GLUCOSE SERPL-MCNC: 110 MG/DL (ref 65–99)
HCT VFR BLD AUTO: 30.9 % (ref 34–46.6)
HGB BLD-MCNC: 9.5 G/DL (ref 12–15.9)
IMM GRANULOCYTES # BLD AUTO: 0.05 10*3/MM3 (ref 0–0.05)
IMM GRANULOCYTES NFR BLD AUTO: 0.9 % (ref 0–0.5)
LYMPHOCYTES # BLD AUTO: 0.62 10*3/MM3 (ref 0.7–3.1)
LYMPHOCYTES NFR BLD AUTO: 10.6 % (ref 19.6–45.3)
MCH RBC QN AUTO: 30.4 PG (ref 26.6–33)
MCHC RBC AUTO-ENTMCNC: 30.7 G/DL (ref 31.5–35.7)
MCV RBC AUTO: 98.7 FL (ref 79–97)
MONOCYTES # BLD AUTO: 0.52 10*3/MM3 (ref 0.1–0.9)
MONOCYTES NFR BLD AUTO: 8.9 % (ref 5–12)
NEUTROPHILS NFR BLD AUTO: 4.61 10*3/MM3 (ref 1.7–7)
NEUTROPHILS NFR BLD AUTO: 78.9 % (ref 42.7–76)
NRBC BLD AUTO-RTO: 0 /100 WBC (ref 0–0.2)
PHOSPHATE SERPL-MCNC: 5.1 MG/DL (ref 2.5–4.5)
PLATELET # BLD AUTO: 87 10*3/MM3 (ref 140–450)
PMV BLD AUTO: 9.7 FL (ref 6–12)
POTASSIUM SERPL-SCNC: 5.5 MMOL/L (ref 3.5–5.2)
QT INTERVAL: 350 MS
QTC INTERVAL: 413 MS
RBC # BLD AUTO: 3.13 10*6/MM3 (ref 3.77–5.28)
REF LAB TEST METHOD: NORMAL
SODIUM SERPL-SCNC: 132 MMOL/L (ref 136–145)
WBC NRBC COR # BLD: 5.84 10*3/MM3 (ref 3.4–10.8)

## 2023-02-21 PROCEDURE — 25010000002 ERTAPENEM PER 500 MG: Performed by: NURSE PRACTITIONER

## 2023-02-21 PROCEDURE — 85025 COMPLETE CBC W/AUTO DIFF WBC: CPT | Performed by: NURSE PRACTITIONER

## 2023-02-21 PROCEDURE — 25010000002 ENOXAPARIN PER 10 MG: Performed by: ORTHOPAEDIC SURGERY

## 2023-02-21 PROCEDURE — 94799 UNLISTED PULMONARY SVC/PX: CPT

## 2023-02-21 PROCEDURE — 93010 ELECTROCARDIOGRAM REPORT: CPT | Performed by: INTERNAL MEDICINE

## 2023-02-21 PROCEDURE — 97110 THERAPEUTIC EXERCISES: CPT

## 2023-02-21 PROCEDURE — 99024 POSTOP FOLLOW-UP VISIT: CPT | Performed by: PHYSICIAN ASSISTANT

## 2023-02-21 PROCEDURE — 97530 THERAPEUTIC ACTIVITIES: CPT

## 2023-02-21 PROCEDURE — 99232 SBSQ HOSP IP/OBS MODERATE 35: CPT | Performed by: NURSE PRACTITIONER

## 2023-02-21 PROCEDURE — 80069 RENAL FUNCTION PANEL: CPT | Performed by: NURSE PRACTITIONER

## 2023-02-21 PROCEDURE — 93005 ELECTROCARDIOGRAM TRACING: CPT | Performed by: NURSE PRACTITIONER

## 2023-02-21 PROCEDURE — 63710000001 PREDNISONE PER 5 MG: Performed by: ORTHOPAEDIC SURGERY

## 2023-02-21 RX ORDER — ALUMINA, MAGNESIA, AND SIMETHICONE 2400; 2400; 240 MG/30ML; MG/30ML; MG/30ML
15 SUSPENSION ORAL ONCE
Status: COMPLETED | OUTPATIENT
Start: 2023-02-21 | End: 2023-02-21

## 2023-02-21 RX ORDER — SODIUM BICARBONATE 650 MG/1
1300 TABLET ORAL 2 TIMES DAILY
Status: DISCONTINUED | OUTPATIENT
Start: 2023-02-21 | End: 2023-02-22

## 2023-02-21 RX ADMIN — HYDROCODONE BITARTRATE AND ACETAMINOPHEN 1 TABLET: 7.5; 325 TABLET ORAL at 17:57

## 2023-02-21 RX ADMIN — Medication 10 ML: at 20:18

## 2023-02-21 RX ADMIN — SENNOSIDES AND DOCUSATE SODIUM 2 TABLET: 50; 8.6 TABLET ORAL at 09:04

## 2023-02-21 RX ADMIN — BUDESONIDE AND FORMOTEROL FUMARATE DIHYDRATE 2 PUFF: 160; 4.5 AEROSOL RESPIRATORY (INHALATION) at 20:00

## 2023-02-21 RX ADMIN — SERTRALINE 25 MG: 50 TABLET, FILM COATED ORAL at 09:05

## 2023-02-21 RX ADMIN — MUPIROCIN: 20 OINTMENT TOPICAL at 20:17

## 2023-02-21 RX ADMIN — PANTOPRAZOLE SODIUM 40 MG: 40 TABLET, DELAYED RELEASE ORAL at 06:02

## 2023-02-21 RX ADMIN — Medication 10 ML: at 09:06

## 2023-02-21 RX ADMIN — Medication 10 ML: at 09:52

## 2023-02-21 RX ADMIN — HYDROCODONE BITARTRATE AND ACETAMINOPHEN 1 TABLET: 7.5; 325 TABLET ORAL at 14:04

## 2023-02-21 RX ADMIN — CARVEDILOL 6.25 MG: 6.25 TABLET, FILM COATED ORAL at 09:04

## 2023-02-21 RX ADMIN — ALUMINUM HYDROXIDE, MAGNESIUM HYDROXIDE, AND DIMETHICONE 15 ML: 400; 400; 40 SUSPENSION ORAL at 18:10

## 2023-02-21 RX ADMIN — SODIUM BICARBONATE 650 MG TABLET 1300 MG: at 20:16

## 2023-02-21 RX ADMIN — ENOXAPARIN SODIUM 30 MG: 30 INJECTION SUBCUTANEOUS at 09:05

## 2023-02-21 RX ADMIN — SODIUM ZIRCONIUM CYCLOSILICATE 10 G: 10 POWDER, FOR SUSPENSION ORAL at 10:24

## 2023-02-21 RX ADMIN — QUETIAPINE FUMARATE 12.5 MG: 25 TABLET ORAL at 20:17

## 2023-02-21 RX ADMIN — MUPIROCIN: 20 OINTMENT TOPICAL at 09:06

## 2023-02-21 RX ADMIN — PREDNISONE 10 MG: 10 TABLET ORAL at 09:05

## 2023-02-21 RX ADMIN — DOCUSATE SODIUM 100 MG: 100 CAPSULE, LIQUID FILLED ORAL at 20:17

## 2023-02-21 RX ADMIN — ERTAPENEM SODIUM 500 MG: 1 INJECTION, POWDER, LYOPHILIZED, FOR SOLUTION INTRAMUSCULAR; INTRAVENOUS at 13:08

## 2023-02-21 RX ADMIN — BUDESONIDE AND FORMOTEROL FUMARATE DIHYDRATE 2 PUFF: 160; 4.5 AEROSOL RESPIRATORY (INHALATION) at 09:07

## 2023-02-21 RX ADMIN — CARVEDILOL 6.25 MG: 6.25 TABLET, FILM COATED ORAL at 17:11

## 2023-02-21 RX ADMIN — DOCUSATE SODIUM 100 MG: 100 CAPSULE, LIQUID FILLED ORAL at 09:05

## 2023-02-21 RX ADMIN — SODIUM BICARBONATE 650 MG TABLET 1300 MG: at 10:23

## 2023-02-22 VITALS
OXYGEN SATURATION: 91 % | TEMPERATURE: 98 F | BODY MASS INDEX: 24.49 KG/M2 | RESPIRATION RATE: 16 BRPM | DIASTOLIC BLOOD PRESSURE: 86 MMHG | WEIGHT: 121.47 LBS | HEART RATE: 104 BPM | HEIGHT: 59 IN | SYSTOLIC BLOOD PRESSURE: 140 MMHG

## 2023-02-22 LAB
ANION GAP SERPL CALCULATED.3IONS-SCNC: 7.2 MMOL/L (ref 5–15)
BH BB BLOOD EXPIRATION DATE: NORMAL
BH BB BLOOD TYPE BARCODE: 5100
BH BB DISPENSE STATUS: NORMAL
BH BB PRODUCT CODE: NORMAL
BH BB UNIT NUMBER: NORMAL
BUN SERPL-MCNC: 61 MG/DL (ref 8–23)
BUN/CREAT SERPL: 23.6 (ref 7–25)
CALCIUM SPEC-SCNC: 7.5 MG/DL (ref 8.6–10.5)
CHLORIDE SERPL-SCNC: 104 MMOL/L (ref 98–107)
CO2 SERPL-SCNC: 19.8 MMOL/L (ref 22–29)
CREAT SERPL-MCNC: 2.59 MG/DL (ref 0.57–1)
CROSSMATCH INTERPRETATION: NORMAL
DEPRECATED RDW RBC AUTO: 65.1 FL (ref 37–54)
EGFRCR SERPLBLD CKD-EPI 2021: 18.2 ML/MIN/1.73
ERYTHROCYTE [DISTWIDTH] IN BLOOD BY AUTOMATED COUNT: 18.1 % (ref 12.3–15.4)
GLUCOSE SERPL-MCNC: 125 MG/DL (ref 65–99)
HCT VFR BLD AUTO: 30.2 % (ref 34–46.6)
HGB BLD-MCNC: 9.3 G/DL (ref 12–15.9)
MCH RBC QN AUTO: 30.8 PG (ref 26.6–33)
MCHC RBC AUTO-ENTMCNC: 30.8 G/DL (ref 31.5–35.7)
MCV RBC AUTO: 100 FL (ref 79–97)
PLATELET # BLD AUTO: 97 10*3/MM3 (ref 140–450)
PMV BLD AUTO: 9.9 FL (ref 6–12)
POTASSIUM SERPL-SCNC: 4.9 MMOL/L (ref 3.5–5.2)
RBC # BLD AUTO: 3.02 10*6/MM3 (ref 3.77–5.28)
SODIUM SERPL-SCNC: 131 MMOL/L (ref 136–145)
UNIT  ABO: NORMAL
UNIT  RH: NORMAL
WBC NRBC COR # BLD: 4.93 10*3/MM3 (ref 3.4–10.8)

## 2023-02-22 PROCEDURE — 25010000002 ENOXAPARIN PER 10 MG: Performed by: ORTHOPAEDIC SURGERY

## 2023-02-22 PROCEDURE — 94761 N-INVAS EAR/PLS OXIMETRY MLT: CPT

## 2023-02-22 PROCEDURE — 63710000001 PREDNISONE PER 5 MG: Performed by: ORTHOPAEDIC SURGERY

## 2023-02-22 PROCEDURE — 25010000002 ERTAPENEM PER 500 MG: Performed by: NURSE PRACTITIONER

## 2023-02-22 PROCEDURE — 99239 HOSP IP/OBS DSCHRG MGMT >30: CPT | Performed by: NURSE PRACTITIONER

## 2023-02-22 PROCEDURE — 94799 UNLISTED PULMONARY SVC/PX: CPT

## 2023-02-22 PROCEDURE — 99024 POSTOP FOLLOW-UP VISIT: CPT | Performed by: PHYSICIAN ASSISTANT

## 2023-02-22 PROCEDURE — 85027 COMPLETE CBC AUTOMATED: CPT | Performed by: NURSE PRACTITIONER

## 2023-02-22 PROCEDURE — 25010000002 EPOETIN ALFA-EPBX 20000 UNIT/ML SOLUTION: Performed by: INTERNAL MEDICINE

## 2023-02-22 PROCEDURE — 80048 BASIC METABOLIC PNL TOTAL CA: CPT | Performed by: NURSE PRACTITIONER

## 2023-02-22 PROCEDURE — 94664 DEMO&/EVAL PT USE INHALER: CPT

## 2023-02-22 RX ORDER — SODIUM BICARBONATE 650 MG/1
650 TABLET ORAL 2 TIMES DAILY
Start: 2023-02-22

## 2023-02-22 RX ORDER — HYDROCODONE BITARTRATE AND ACETAMINOPHEN 5; 325 MG/1; MG/1
1 TABLET ORAL 2 TIMES DAILY PRN
Qty: 4 TABLET | Refills: 0 | Status: SHIPPED | OUTPATIENT
Start: 2023-02-22 | End: 2023-02-24

## 2023-02-22 RX ORDER — ENOXAPARIN SODIUM 100 MG/ML
30 INJECTION SUBCUTANEOUS DAILY
Qty: 3 ML | Refills: 0 | Status: SHIPPED | OUTPATIENT
Start: 2023-02-23 | End: 2023-03-05

## 2023-02-22 RX ORDER — CARVEDILOL 12.5 MG/1
12.5 TABLET ORAL 2 TIMES DAILY WITH MEALS
Start: 2023-02-22

## 2023-02-22 RX ORDER — SODIUM BICARBONATE 650 MG/1
650 TABLET ORAL 2 TIMES DAILY
Status: DISCONTINUED | OUTPATIENT
Start: 2023-02-22 | End: 2023-02-22 | Stop reason: HOSPADM

## 2023-02-22 RX ORDER — TORSEMIDE 10 MG/1
10 TABLET ORAL 2 TIMES WEEKLY
Status: DISCONTINUED | OUTPATIENT
Start: 2023-02-23 | End: 2023-02-22 | Stop reason: HOSPADM

## 2023-02-22 RX ORDER — CARVEDILOL 12.5 MG/1
12.5 TABLET ORAL 2 TIMES DAILY WITH MEALS
Status: DISCONTINUED | OUTPATIENT
Start: 2023-02-22 | End: 2023-02-22 | Stop reason: HOSPADM

## 2023-02-22 RX ADMIN — ERTAPENEM SODIUM 500 MG: 1 INJECTION, POWDER, LYOPHILIZED, FOR SOLUTION INTRAMUSCULAR; INTRAVENOUS at 14:35

## 2023-02-22 RX ADMIN — SENNOSIDES AND DOCUSATE SODIUM 2 TABLET: 50; 8.6 TABLET ORAL at 09:03

## 2023-02-22 RX ADMIN — MUPIROCIN: 20 OINTMENT TOPICAL at 09:13

## 2023-02-22 RX ADMIN — EPOETIN ALFA-EPBX 20000 UNITS: 20000 INJECTION, SOLUTION INTRAVENOUS; SUBCUTANEOUS at 10:33

## 2023-02-22 RX ADMIN — CARVEDILOL 6.25 MG: 6.25 TABLET, FILM COATED ORAL at 08:55

## 2023-02-22 RX ADMIN — SODIUM ZIRCONIUM CYCLOSILICATE 10 G: 10 POWDER, FOR SUSPENSION ORAL at 09:05

## 2023-02-22 RX ADMIN — DOCUSATE SODIUM 100 MG: 100 CAPSULE, LIQUID FILLED ORAL at 09:00

## 2023-02-22 RX ADMIN — PANTOPRAZOLE SODIUM 40 MG: 40 TABLET, DELAYED RELEASE ORAL at 05:04

## 2023-02-22 RX ADMIN — ENOXAPARIN SODIUM 30 MG: 30 INJECTION SUBCUTANEOUS at 09:08

## 2023-02-22 RX ADMIN — BUDESONIDE AND FORMOTEROL FUMARATE DIHYDRATE 2 PUFF: 160; 4.5 AEROSOL RESPIRATORY (INHALATION) at 07:07

## 2023-02-22 RX ADMIN — SERTRALINE 25 MG: 50 TABLET, FILM COATED ORAL at 08:56

## 2023-02-22 RX ADMIN — PREDNISONE 10 MG: 10 TABLET ORAL at 09:01

## 2023-02-22 RX ADMIN — SODIUM BICARBONATE 650 MG TABLET 1300 MG: at 09:02

## 2023-02-22 RX ADMIN — Medication 10 ML: at 09:11

## 2023-02-23 LAB
QT INTERVAL: 336 MS
QTC INTERVAL: 424 MS

## 2023-02-27 ENCOUNTER — NURSING HOME (OUTPATIENT)
Dept: FAMILY MEDICINE CLINIC | Facility: CLINIC | Age: 83
End: 2023-02-27
Payer: MEDICARE

## 2023-02-27 VITALS
RESPIRATION RATE: 18 BRPM | DIASTOLIC BLOOD PRESSURE: 75 MMHG | OXYGEN SATURATION: 99 % | TEMPERATURE: 97.4 F | WEIGHT: 110 LBS | SYSTOLIC BLOOD PRESSURE: 103 MMHG | BODY MASS INDEX: 22.22 KG/M2 | HEART RATE: 84 BPM

## 2023-02-27 DIAGNOSIS — Z09 HOSPITAL DISCHARGE FOLLOW-UP: Primary | ICD-10-CM

## 2023-02-27 DIAGNOSIS — N18.4 CKD (CHRONIC KIDNEY DISEASE) STAGE 4, GFR 15-29 ML/MIN: ICD-10-CM

## 2023-02-27 DIAGNOSIS — Z74.09 IMPAIRED MOBILITY AND ADLS: ICD-10-CM

## 2023-02-27 DIAGNOSIS — Z96.642 STATUS POST LEFT HIP REPLACEMENT: ICD-10-CM

## 2023-02-27 DIAGNOSIS — Z78.9 IMPAIRED MOBILITY AND ADLS: ICD-10-CM

## 2023-02-27 DIAGNOSIS — D62 POSTOPERATIVE ANEMIA DUE TO ACUTE BLOOD LOSS: ICD-10-CM

## 2023-02-27 DIAGNOSIS — S72.002A FRACTURE, PROXIMAL FEMUR, LEFT, CLOSED, INITIAL ENCOUNTER: ICD-10-CM

## 2023-02-27 DIAGNOSIS — R41.82 ALTERED MENTAL STATUS, UNSPECIFIED ALTERED MENTAL STATUS TYPE: ICD-10-CM

## 2023-02-27 DIAGNOSIS — T81.89XA PROBLEM INVOLVING SURGICAL INCISION: ICD-10-CM

## 2023-02-27 PROCEDURE — 99310 SBSQ NF CARE HIGH MDM 45: CPT | Performed by: NURSE PRACTITIONER

## 2023-02-28 ENCOUNTER — NURSING HOME (OUTPATIENT)
Dept: FAMILY MEDICINE CLINIC | Facility: CLINIC | Age: 83
End: 2023-02-28
Payer: MEDICARE

## 2023-02-28 VITALS
WEIGHT: 110 LBS | SYSTOLIC BLOOD PRESSURE: 103 MMHG | OXYGEN SATURATION: 99 % | BODY MASS INDEX: 22.22 KG/M2 | TEMPERATURE: 97.4 F | HEART RATE: 84 BPM | RESPIRATION RATE: 18 BRPM | DIASTOLIC BLOOD PRESSURE: 68 MMHG

## 2023-02-28 DIAGNOSIS — Z74.09 IMPAIRED MOBILITY AND ADLS: ICD-10-CM

## 2023-02-28 DIAGNOSIS — S72.002S FRACTURE, PROXIMAL FEMUR, LEFT, SEQUELA: ICD-10-CM

## 2023-02-28 DIAGNOSIS — Z78.9 IMPAIRED MOBILITY AND ADLS: ICD-10-CM

## 2023-02-28 DIAGNOSIS — R53.83 FATIGUE, UNSPECIFIED TYPE: Primary | ICD-10-CM

## 2023-02-28 DIAGNOSIS — L24.A9 WOUND DRAINAGE: ICD-10-CM

## 2023-02-28 DIAGNOSIS — R41.89 COGNITIVE IMPAIRMENT: ICD-10-CM

## 2023-02-28 DIAGNOSIS — Z86.2 HISTORY OF ANEMIA: ICD-10-CM

## 2023-02-28 PROCEDURE — 99308 SBSQ NF CARE LOW MDM 20: CPT | Performed by: NURSE PRACTITIONER

## 2023-03-02 ENCOUNTER — HOSPITAL ENCOUNTER (OUTPATIENT)
Dept: ULTRASOUND IMAGING | Facility: HOSPITAL | Age: 83
Discharge: HOME | End: 2023-03-02
Admitting: PHYSICIAN ASSISTANT
Payer: MEDICARE

## 2023-03-02 ENCOUNTER — OFFICE VISIT (OUTPATIENT)
Dept: ORTHOPEDIC SURGERY | Facility: CLINIC | Age: 83
End: 2023-03-02
Payer: MEDICARE

## 2023-03-02 DIAGNOSIS — S72.002A CLOSED FRACTURE OF LEFT HIP, INITIAL ENCOUNTER: Primary | ICD-10-CM

## 2023-03-02 DIAGNOSIS — Z96.642 S/P TOTAL LEFT HIP ARTHROPLASTY: ICD-10-CM

## 2023-03-02 DIAGNOSIS — M79.89 LEFT LEG SWELLING: ICD-10-CM

## 2023-03-02 PROCEDURE — 99024 POSTOP FOLLOW-UP VISIT: CPT | Performed by: PHYSICIAN ASSISTANT

## 2023-03-02 PROCEDURE — 93971 EXTREMITY STUDY: CPT

## 2023-03-02 RX ORDER — ROSUVASTATIN CALCIUM 10 MG/1
1 TABLET, COATED ORAL DAILY
COMMUNITY
Start: 2022-10-03

## 2023-03-02 NOTE — PROGRESS NOTES
"Subjective   Patient ID: Carolyne Martins is a 82 y.o. right hand dominant female is here today for a post-operative visit.  Post-op of the Left Hip (Removal Of Fracture Hardware And Conversion To Total Hip Arthroplasty on 2/17/23.)          CHIEF COMPLAINT:    Initial post op s/p left KELLIE    History of Present Illness      Pain controlled: [] no   [x] yes   Medication refill requested: [x] no   [] yes    Patient compliant with instructions: [] no   [x] yes    Patient is brought to our clinic via BMT transportation with her son present.  She states her left hip \"has not been bothering me\".  She does notice some swelling to the left lower leg but denies chest pain or shortness of breath.  Of note-patient did have significant postoperative serous drainage which seems to have lessened since being in the hospital.  She is currently at the rehab facility.  She was placed on NWB parameters while admitted and to be NON WB for a total of 6 - 8 weeks due to a cortical posterior nondisplaced  Fracture along the mid, distal stem of the femur stem.      Past Medical History:   Diagnosis Date   • Anemia 1998   • Anemia    • Arthritis    • Back pain    • Bleeding from anus    • Bronchitis     STATES HAS BRONCHITIS CURRENTLY (12/5/17).     • Bronchitis 12/2017   • CAD (coronary artery disease)    • Cataract, bilateral    • Chronic kidney disease     STAGE 4.  SEEREBA HALEY   • Chronic kidney failure     REPORTS STAGE IV   • Difficulty swallowing solids    • Disease of thyroid gland    • Fracture, radius 2016    right distal radius and ulna, healed.   • GERD (gastroesophageal reflux disease)    • Gout    • High cholesterol    • History of blood transfusion 2019   • History of nuclear stress test 2014    DR. CORTEZ.  \"IT WAS OK\"   • Hyperparathyroidism (HCC)    • Hyperparathyroidism (HCC)    • Hypertension    • Impaired functional mobility, balance, gait, and endurance    • Impaired mobility    • Iron deficiency    • Osteoarthritis  "   • Osteoarthritis of knees, bilateral     Both knees Supartz injection series August, 2015   • Osteoporosis    • Palpitations    • Personal history of cardiac murmur    • Pessary maintenance    • PONV (postoperative nausea and vomiting)    • Presence of pessary    • Problems with swallowing     WITH FOOD OCCASSIONALLY   • Rheumatic heart disease     Personal history   • Rotator cuff tendonitis     right    • Rupture of right proximal biceps tendon     chronic   • Seasonal allergies    • Sinus problem    • Spinal headache     REPORTS AFTER DELIVERY OF SON   • Subacromial bursitis     right    • Valvular heart disease    • Vision problems    • Vitamin B12 deficiency    • Vitamin D deficiency    • Wears glasses         Past Surgical History:   Procedure Laterality Date   • CATARACT EXTRACTION Bilateral    • CERVICAL POLYPECTOMY     • COLONOSCOPY  2011   • COLONOSCOPY N/A 1/9/2018    Procedure: COLONOSCOPY with endoscopic mucosal resection (hot snare), normal saline submucosal injection, argon thermal ablation, resolution clip placement x4, and cold biopsy polypectomy;  Surgeon: Pieter Concepcion MD;  Location: [facility] ENDOSCOPY;  Service:    • ENDOSCOPY N/A 12/6/2017    Procedure: ESOPHAGOGASTRODUODENOSCOPY with biopsies and hot snare polypectomies;  Surgeon: Pieter Concepcion MD;  Location: [facility] ENDOSCOPY;  Service:    • ENDOSCOPY N/A 1/15/2019    Procedure: ESOPHAGOGASTRODUODENOSCOPY WITH BIOPSIES AND HOT SNARE POLYPECTOMIES X10;  Surgeon: Pieter Concepcion MD;  Location: [facility] ENDOSCOPY;  Service: Gastroenterology   • HARDWARE REMOVAL Left 2/17/2023    Procedure: FEMUR HARDWARE REMOVAL, LEFT;  Surgeon: Gabriele Loyd MD;  Location: [facility] OR;  Service: Orthopedics;  Laterality: Left;   • HIP TROCHANTERIC NAILING WITH INTRAMEDULLARY HIP SCREW Left 2/14/2021    Procedure: HIP TROCHANTER NAIL SHORT WITH INTRAMEDULLARY HIP SCREW, LEFT;  Surgeon: Gabriele Loyd MD;  Location: [facility] OR;  Service: Orthopedics;   Laterality: Left;   • TOTAL HIP ARTHROPLASTY Left 2/17/2023    Procedure: REMOVAL OF FRACTURE HARDWARE AND CONVERSION TO TOTAL HIP ARTHROPLASTY, LEFT;  Surgeon: Gabriele Loyd MD;  Location: [facility];  Service: Orthopedics;  Laterality: Left;   • UPPER GASTROINTESTINAL ENDOSCOPY  08/18/2014       Allergies   Allergen Reactions   • Ferrlecit [Na Ferric Gluc Cplx In Sucrose] Swelling   • Ranitidine Hcl Shortness Of Breath   • Levofloxacin Other (See Comments)     Leg cramps     • Lisinopril Cough       Review of Systems   Musculoskeletal: Positive for arthralgias (left hip).     I have reviewed the medical and surgical history, family history, social history, medications, and/or allergies, and the review of systems of this report.    Objective   LMP  (LMP Unknown)       Signs of infection: [x] no                    [] yes   Drainage: [x] no active drainage. The bandage did have dried light yellow serous drainage.   No palpable hematoma . No redness or warmth. There is soft ecchymosis to the incisional area   Incision: [x] healing well     []healed well   Motor exam intact: [] no                    [x] yes   Neurovascular exam intact: [] no                    [x] yes   Signs of compartment syndrome: [x] no                    [] yes   Signs of DVT: + LLE edema and calf TTP   Other:      Physical Exam  Ortho Exam    Extremity DVT signs are positive on physical exam with calf pain  Neurologic Exam    Assessment & Plan     Independent Review of Radiographic Studies:    X-ray of the left hip 3 view performed in the clinic independently reviewed for the evaluation of left total hip arthroplasty.  Comparison films are available reviewed.  There has been no interval change from recent imaging.  No evidence of hardware loosening.         Procedures     Diagnoses and all orders for this visit:    1. Closed fracture of left hip, initial encounter (HCC) (Primary)  -     XR Hip With or Without Pelvis 2 - 3 View Left;  Future    2. Left leg swelling  -     US Venous Doppler Lower Extremity Left (duplex); Future    3. S/P total left hip arthroplasty  -     US Venous Doppler Lower Extremity Left (duplex); Future         Recommendations/Plan:     Sutures Staples or Pins [x] Removed today  [] At prior visit  [] Plan removal later   Physical therapy: [x]rehab facility  []outpatient referral  [] therapy ongoing   Ultrasound: []not ordered         [x]order given to patient   Labs: [x]not ordered         []order given to patient   Weight Bearing status: []Full []WBAT []PWB [x]NWB until re-check by ortho and cleared for PWB.     Discussion of orthopaedic goals and activities and patient and/or guardian expressed appreciation.  Regular exercise as tolerated  Guided on proper techniques for mobility, strength, agility and/or conditioning exercises  Weight bearing parameters reviewed  Take prescribed medications as instructed only as tolerated     Follow up in 5 weeks Will need a CT of the hip at this next visit.  Non weight bearing   Change dressing daily to keep clean and dry  Patient is encouraged and agreeable to call or return sooner for any issues or concerns.

## 2023-03-04 NOTE — PROGRESS NOTES
"Nursing Home Follow Up Note      Mikey Duran DO []   FATUMA Monet [x]  [address] [place] [address]  Phone: [phone]  Fax: [phone] Baltazar Borden MD []    Jose Antonio Pride DO []   [address] [place] [address]  Phone: [phone]  Fax: [phone]     PATIENT NAME: Carolyne Martins                                                                          YOB: 1940           DATE OF SERVICE: 02/28/2023  FACILITY:  [][facility]   [] [facility]   [x] [facility]   [] [facility]   [] Other ______________________________________________________________________      CHIEF COMPLAINT:    Increased fatigue and weakness today.    HISTORY OF PRESENT ILLNESS:     Nursing reports that therapy advised them patient was more fatigued and weak during therapy session today. They are concerned it is due to all of drainage she is having from her hip, since it resulted in anemia during hospitalization. Nursing reports she has been sleeping more today as well. She is resting and sleeping in bed during visit. She would wake to converse but quickly went back to sleep. She denies any pain or discomfort, has no complaints or concerns. No signs and symptoms of any distress.        PAST MEDICAL & SURGICAL HISTORY:    Past Medical History:   Diagnosis Date   • Anemia 1998   • Anemia    • Arthritis    • Back pain    • Bleeding from anus    • Bronchitis     STATES HAS BRONCHITIS CURRENTLY (12/5/17).     • Bronchitis 12/2017   • CAD (coronary artery disease)    • Cataract, bilateral    • Chronic kidney disease     STAGE 4.  SEES TERA   • Chronic kidney failure     REPORTS STAGE IV   • Difficulty swallowing solids    • Disease of thyroid gland    • Fracture, radius 2016    right distal radius and ulna, healed.   • GERD (gastroesophageal reflux disease)    • Gout    • High cholesterol    • History of blood transfusion 2019   • History of nuclear stress test 2014    DR. CORTEZ.  \"IT WAS OK\"   • " Hyperparathyroidism (HCC)    • Hyperparathyroidism (HCC)    • Hypertension    • Impaired functional mobility, balance, gait, and endurance    • Impaired mobility    • Iron deficiency    • Osteoarthritis    • Osteoarthritis of knees, bilateral     Both knees Supartz injection series August, 2015   • Osteoporosis    • Palpitations    • Personal history of cardiac murmur    • Pessary maintenance    • PONV (postoperative nausea and vomiting)    • Presence of pessary    • Problems with swallowing     WITH FOOD OCCASSIONALLY   • Rheumatic heart disease     Personal history   • Rotator cuff tendonitis     right    • Rupture of right proximal biceps tendon     chronic   • Seasonal allergies    • Sinus problem    • Spinal headache     REPORTS AFTER DELIVERY OF SON   • Subacromial bursitis     right    • Valvular heart disease    • Vision problems    • Vitamin B12 deficiency    • Vitamin D deficiency    • Wears glasses       Past Surgical History:   Procedure Laterality Date   • CATARACT EXTRACTION Bilateral    • CERVICAL POLYPECTOMY     • COLONOSCOPY  2011   • COLONOSCOPY N/A 1/9/2018    Procedure: COLONOSCOPY with endoscopic mucosal resection (hot snare), normal saline submucosal injection, argon thermal ablation, resolution clip placement x4, and cold biopsy polypectomy;  Surgeon: Pieter Concepcion MD;  Location: [facility] ENDOSCOPY;  Service:    • ENDOSCOPY N/A 12/6/2017    Procedure: ESOPHAGOGASTRODUODENOSCOPY with biopsies and hot snare polypectomies;  Surgeon: Pieter Concepcion MD;  Location: [facility] ENDOSCOPY;  Service:    • ENDOSCOPY N/A 1/15/2019    Procedure: ESOPHAGOGASTRODUODENOSCOPY WITH BIOPSIES AND HOT SNARE POLYPECTOMIES X10;  Surgeon: Pieter Concepcion MD;  Location: [facility] ENDOSCOPY;  Service: Gastroenterology   • HARDWARE REMOVAL Left 2/17/2023    Procedure: FEMUR HARDWARE REMOVAL, LEFT;  Surgeon: Gabriele Loyd MD;  Location: [facility] OR;  Service: Orthopedics;  Laterality: Left;   • HIP TROCHANTERIC NAILING WITH  INTRAMEDULLARY HIP SCREW Left 2/14/2021    Procedure: HIP TROCHANTER NAIL SHORT WITH INTRAMEDULLARY HIP SCREW, LEFT;  Surgeon: Gabriele Loyd MD;  Location: [facility];  Service: Orthopedics;  Laterality: Left;   • TOTAL HIP ARTHROPLASTY Left 2/17/2023    Procedure: REMOVAL OF FRACTURE HARDWARE AND CONVERSION TO TOTAL HIP ARTHROPLASTY, LEFT;  Surgeon: Gabriele Loyd MD;  Location: [facility] OR;  Service: Orthopedics;  Laterality: Left;   • UPPER GASTROINTESTINAL ENDOSCOPY  08/18/2014         MEDICATIONS:  I have reviewed and reconciled the patients medication list in the patients chart at the [facility] nursing [facility] today.      ALLERGIES:    Allergies   Allergen Reactions   • Ferrlecit [Na Ferric Gluc Cplx In Sucrose] Swelling   • Ranitidine Hcl Shortness Of Breath   • Levofloxacin Other (See Comments)     Leg cramps     • Lisinopril Cough         SOCIAL HISTORY:    Social History     Socioeconomic History   • Marital status:    Tobacco Use   • Smoking status: Never   • Smokeless tobacco: Never   Vaping Use   • Vaping Use: Never used   Substance and Sexual Activity   • Alcohol use: Never   • Drug use: Never   • Sexual activity: Not Currently     Birth control/protection: Post-menopausal       FAMILY HISTORY:    Family History   Problem Relation Age of Onset   • Liver cancer Maternal Grandmother    • Gout Other    • Osteoporosis Other    • Stroke Other    • Ulcers Other    • Asthma Other    • Hypertension Other    • Heart disease Other    • Osteoarthritis Other    • Colon cancer Neg Hx        REVIEW OF SYSTEMS:    Review of Systems   Constitutional: Positive for fatigue. Negative for activity change, appetite change, chills, diaphoresis, fever and unexpected weight loss.   HENT: Negative for congestion, mouth sores and nosebleeds.    Respiratory: Positive for shortness of breath (chronic). Negative for apnea, cough, choking, chest tightness and wheezing.         Chronic related to COPD   Cardiovascular:  Negative for chest pain, palpitations and leg swelling.   Gastrointestinal: Negative for abdominal pain, constipation, diarrhea, nausea and vomiting.   Genitourinary: Negative for decreased urine volume, difficulty urinating, dysuria, frequency, hematuria, urgency and urinary incontinence.   Musculoskeletal: Positive for arthralgias (left hip) and gait problem.   Skin: Negative for color change, dry skin and rash.        Incision left hip with drainage   Neurological: Positive for weakness, memory problem and confusion. Negative for dizziness and headache.         PHYSICAL EXAMINATION:   VITAL SIGNS:   Vitals:    02/28/23 1505   BP: 103/68   Pulse: 84   Resp: 18   Temp: 97.4 °F (36.3 °C)   SpO2: 99%   Weight: 49.9 kg (110 lb)       Physical Exam  Vitals and nursing note reviewed.   Constitutional:       Appearance: Normal appearance. She is well-developed.   HENT:      Head: Normocephalic.      Right Ear: External ear normal.      Left Ear: External ear normal.      Nose: Nose normal.   Eyes:      Conjunctiva/sclera: Conjunctivae normal.   Cardiovascular:      Rate and Rhythm: Normal rate and regular rhythm.      Heart sounds: Normal heart sounds.   Pulmonary:      Effort: Pulmonary effort is normal. No respiratory distress.      Breath sounds: Decreased air movement present. Decreased breath sounds present.   Abdominal:      General: Bowel sounds are normal. There is no distension.      Palpations: Abdomen is soft.      Tenderness: There is no abdominal tenderness.   Musculoskeletal:      Left hip: Tenderness present. Decreased range of motion.   Skin:     General: Skin is warm and dry.      Findings: No rash.          Neurological:      Mental Status: She is alert. She is disoriented and confused.   Psychiatric:         Mood and Affect: Mood and affect normal.         Speech: Speech normal.         Behavior: Behavior is slowed.         Cognition and Memory: Cognition is not impaired. Memory is impaired.          RECORDS REVIEW:   I have reviewed and interpreted the records in [facility] and [facility]    ASSESSMENT     Diagnoses and all orders for this visit:    1. Fatigue, unspecified type (Primary)    2. History of anemia    3. Wound drainage    4. Fracture, proximal femur, left, sequela    5. Cognitive impairment    6. Impaired mobility and ADLs        PLAN    Fatigue/wound drainage/history of anemia  -Will check CBC and CMP. She has follow up appointment with Ortho on Thursday, 3/2.    Will follow up and continue to monitor.     Nursing to continue supportive care for all ADLs.    Nursing encouraged to keep me informed of any acute changes, lack of improvement, or any new concerning symptoms.    [x]  Discussed Patient in detail with nursing/staff, addressed all needs today.     [x]  Plan of Care Reviewed   [x]  PT/OT Reviewed   [x]  Order Changes  [x]  Discharge Plans Reviewed  [x]  Advance Directive on file with Nursing Home.   [x]  POA on file with Nursing Home.   [x]  Code Status listed: []  Full Code   [x]  DNR       “I confirm accuracy of unchanged data/findings which have been carried forward from previous visit, as well as I have updated appropriately those that have changed.”          Roma Diaz, APRN.

## 2023-03-07 ENCOUNTER — NURSING HOME (OUTPATIENT)
Dept: INTERNAL MEDICINE | Facility: CLINIC | Age: 83
End: 2023-03-07
Payer: MEDICARE

## 2023-03-07 VITALS
HEART RATE: 84 BPM | SYSTOLIC BLOOD PRESSURE: 103 MMHG | WEIGHT: 111 LBS | BODY MASS INDEX: 22.42 KG/M2 | RESPIRATION RATE: 18 BRPM | OXYGEN SATURATION: 98 % | TEMPERATURE: 97.4 F | DIASTOLIC BLOOD PRESSURE: 75 MMHG

## 2023-03-07 DIAGNOSIS — J41.0 SIMPLE CHRONIC BRONCHITIS: ICD-10-CM

## 2023-03-07 DIAGNOSIS — Z78.9 IMPAIRED MOBILITY AND ADLS: ICD-10-CM

## 2023-03-07 DIAGNOSIS — E44.0 MODERATE PROTEIN-CALORIE MALNUTRITION: ICD-10-CM

## 2023-03-07 DIAGNOSIS — G89.4 CHRONIC PAIN SYNDROME: ICD-10-CM

## 2023-03-07 DIAGNOSIS — S72.002D CLOSED FRACTURE OF LEFT HIP WITH ROUTINE HEALING, SUBSEQUENT ENCOUNTER: Primary | ICD-10-CM

## 2023-03-07 DIAGNOSIS — Z74.09 IMPAIRED MOBILITY AND ADLS: ICD-10-CM

## 2023-03-07 DIAGNOSIS — I10 ESSENTIAL HYPERTENSION: ICD-10-CM

## 2023-03-07 DIAGNOSIS — N18.4 CHRONIC KIDNEY DISEASE (CKD), STAGE IV (SEVERE): ICD-10-CM

## 2023-03-07 DIAGNOSIS — D64.9 ANEMIA, UNSPECIFIED TYPE: ICD-10-CM

## 2023-03-07 PROCEDURE — 99309 SBSQ NF CARE MODERATE MDM 30: CPT | Performed by: INTERNAL MEDICINE

## 2023-03-10 NOTE — PROGRESS NOTES
"  Nursing Home Progress Note        Jose Antoniojohanne PrideDO   [address] [place] [address] Phone: [phone]  Fax: [phone]     PATIENT NAME: Carolyne Martins                                                                          YOB: 1940           DATE OF SERVICE: 03/07/2023  FACILITY:  [facility] ______________________________________________________________________     CHIEF COMPLAINT:  Chronic Medical Management      HISTORY OF PRESENT ILLNESS:   Patient was seen for routine compliance visit however since her last visit, she did suffer a fall which resulted in postoperative left hip hardware fracture.  Patient underwent total hip arthroplasty on 2/17/2023.  She was also treated for UTI with Invanz for ESBL E. coli which she completed on 2/25/2023.  Diuretics were adjusted by Dr. Haley.    On exam today, patient was laying comfortably in her bed.  Family was at bedside and was supported of her care.  Pain seems to be in reasonable control.  Appetite was stable.  Patient remains nonweightbearing until she follows up with orthopedics.    PAST MEDICAL & SURGICAL HISTORY:   Past Medical History:   Diagnosis Date   • Anemia 1998   • Anemia    • Arthritis    • Back pain    • Bleeding from anus    • Bronchitis     STATES HAS BRONCHITIS CURRENTLY (12/5/17).     • Bronchitis 12/2017   • CAD (coronary artery disease)    • Cataract, bilateral    • Chronic kidney disease     STAGE 4.  TAMIE HALEY   • Chronic kidney failure     REPORTS STAGE IV   • Difficulty swallowing solids    • Disease of thyroid gland    • Fracture, radius 2016    right distal radius and ulna, healed.   • GERD (gastroesophageal reflux disease)    • Gout    • High cholesterol    • History of blood transfusion 2019   • History of nuclear stress test 2014    DR. CORTEZ.  \"IT WAS OK\"   • Hyperparathyroidism (HCC)    • Hyperparathyroidism (HCC)    • Hypertension    • Impaired functional mobility, balance, gait, and " endurance    • Impaired mobility    • Iron deficiency    • Osteoarthritis    • Osteoarthritis of knees, bilateral     Both knees Supartz injection series August, 2015   • Osteoporosis    • Palpitations    • Personal history of cardiac murmur    • Pessary maintenance    • PONV (postoperative nausea and vomiting)    • Presence of pessary    • Problems with swallowing     WITH FOOD OCCASSIONALLY   • Rheumatic heart disease     Personal history   • Rotator cuff tendonitis     right    • Rupture of right proximal biceps tendon     chronic   • Seasonal allergies    • Sinus problem    • Spinal headache     REPORTS AFTER DELIVERY OF SON   • Subacromial bursitis     right    • Valvular heart disease    • Vision problems    • Vitamin B12 deficiency    • Vitamin D deficiency    • Wears glasses       Past Surgical History:   Procedure Laterality Date   • CATARACT EXTRACTION Bilateral    • CERVICAL POLYPECTOMY     • COLONOSCOPY  2011   • COLONOSCOPY N/A 1/9/2018    Procedure: COLONOSCOPY with endoscopic mucosal resection (hot snare), normal saline submucosal injection, argon thermal ablation, resolution clip placement x4, and cold biopsy polypectomy;  Surgeon: Pieter Concepcion MD;  Location: [facility] ENDOSCOPY;  Service:    • ENDOSCOPY N/A 12/6/2017    Procedure: ESOPHAGOGASTRODUODENOSCOPY with biopsies and hot snare polypectomies;  Surgeon: Pieter Concepcion MD;  Location: [facility] ENDOSCOPY;  Service:    • ENDOSCOPY N/A 1/15/2019    Procedure: ESOPHAGOGASTRODUODENOSCOPY WITH BIOPSIES AND HOT SNARE POLYPECTOMIES X10;  Surgeon: Pieter Concepcion MD;  Location: [facility] ENDOSCOPY;  Service: Gastroenterology   • HARDWARE REMOVAL Left 2/17/2023    Procedure: FEMUR HARDWARE REMOVAL, LEFT;  Surgeon: Gabriele Loyd MD;  Location: [facility] OR;  Service: Orthopedics;  Laterality: Left;   • HIP TROCHANTERIC NAILING WITH INTRAMEDULLARY HIP SCREW Left 2/14/2021    Procedure: HIP TROCHANTER NAIL SHORT WITH INTRAMEDULLARY HIP SCREW, LEFT;  Surgeon:  Gabriele Loyd MD;  Location: [facility];  Service: Orthopedics;  Laterality: Left;   • TOTAL HIP ARTHROPLASTY Left 2/17/2023    Procedure: REMOVAL OF FRACTURE HARDWARE AND CONVERSION TO TOTAL HIP ARTHROPLASTY, LEFT;  Surgeon: Gabriele Loyd MD;  Location: [facility] OR;  Service: Orthopedics;  Laterality: Left;   • UPPER GASTROINTESTINAL ENDOSCOPY  08/18/2014         MEDICATIONS:  I have reviewed and reconciled the patients medication list in the patients chart at the skilled nursing facility today, 03/07/2023.      ALLERGIES:  Allergies   Allergen Reactions   • Ferrlecit [Na Ferric Gluc Cplx In Sucrose] Swelling   • Ranitidine Hcl Shortness Of Breath   • Levofloxacin Other (See Comments)     Leg cramps     • Lisinopril Cough         SOCIAL HISTORY:  Social History     Socioeconomic History   • Marital status:    Tobacco Use   • Smoking status: Never   • Smokeless tobacco: Never   Vaping Use   • Vaping Use: Never used   Substance and Sexual Activity   • Alcohol use: Never   • Drug use: Never   • Sexual activity: Not Currently     Birth control/protection: Post-menopausal       FAMILY HISTORY:  Family History   Problem Relation Age of Onset   • Liver cancer Maternal Grandmother    • Gout Other    • Osteoporosis Other    • Stroke Other    • Ulcers Other    • Asthma Other    • Hypertension Other    • Heart disease Other    • Osteoarthritis Other    • Colon cancer Neg Hx        REVIEW OF SYSTEMS:  Review of Systems   Constitutional: Negative for chills, fatigue and fever.   HENT: Negative for congestion, ear pain, rhinorrhea, sinus pressure and sore throat.    Eyes: Negative for visual disturbance.   Respiratory: Negative for cough, chest tightness, shortness of breath and wheezing.    Cardiovascular: Negative for chest pain, palpitations and leg swelling.   Gastrointestinal: Negative for abdominal pain, blood in stool, constipation, diarrhea, nausea and vomiting.   Endocrine: Negative for polydipsia and  polyuria.   Genitourinary: Negative for dysuria and hematuria.   Musculoskeletal: Negative for arthralgias and back pain.   Skin: Negative for rash.   Neurological: Negative for dizziness, light-headedness, numbness and headaches.   Psychiatric/Behavioral: Negative for dysphoric mood and sleep disturbance. The patient is not nervous/anxious.           PHYSICAL EXAMINATION:     VITAL SIGNS:  /75   Pulse 84   Temp 97.4 °F (36.3 °C)   Resp 18   Wt 50.3 kg (111 lb)   LMP  (LMP Unknown)   SpO2 98%   BMI 22.42 kg/m²     Physical Exam  Vitals and nursing note reviewed.   Constitutional:       Appearance: Normal appearance. She is well-developed.      Comments: Frail elderly female in no acute distress   HENT:      Head: Normocephalic and atraumatic.      Nose: Nose normal.      Mouth/Throat:      Mouth: Mucous membranes are moist.      Pharynx: No oropharyngeal exudate.   Eyes:      General: No scleral icterus.     Conjunctiva/sclera: Conjunctivae normal.      Pupils: Pupils are equal, round, and reactive to light.   Neck:      Thyroid: No thyromegaly.   Cardiovascular:      Rate and Rhythm: Normal rate and regular rhythm.      Heart sounds: Normal heart sounds. No murmur heard.    No friction rub. No gallop.   Pulmonary:      Effort: Pulmonary effort is normal. No respiratory distress.      Breath sounds: Normal breath sounds. No wheezing.   Abdominal:      General: Bowel sounds are normal. There is no distension.      Palpations: Abdomen is soft.      Tenderness: There is no abdominal tenderness.   Musculoskeletal:         General: No deformity or signs of injury.      Cervical back: Normal range of motion and neck supple.      Comments: Multiple small joint deformities in hands   Lymphadenopathy:      Cervical: No cervical adenopathy.   Skin:     General: Skin is warm and dry.      Findings: No rash.   Neurological:      Mental Status: She is alert and oriented to person, place, and time.   Psychiatric:          Mood and Affect: Mood normal.         Behavior: Behavior normal.         RECORDS REVIEW:        ASSESSMENT     Diagnoses and all orders for this visit:    1. Closed fracture of left hip with routine healing, subsequent encounter (Primary)    2. Chronic kidney disease (CKD), stage IV (severe) ([facility])    3. Moderate protein-calorie malnutrition (HCC)    4. Simple chronic bronchitis (HCC)    5. Chronic pain syndrome    6. Anemia, unspecified type    7. Impaired mobility and ADLs    8. Essential hypertension        PLAN  Left hip fracture  - Patient had hardware fracture which required conversion to total hip arthroplasty  - Patient remains nonweightbearing.  - Pain is in control.  - Continue physical therapy and Occupational Therapy in the nursing facility.  - Follow-up with Dr. Loyd as scheduled.    Impaired mobility and ADLs  - Continue physical therapy and nursing facility for strengthening.  - Patient continues to benefit from nursing care for ADLs.    CKD stage IV  - Currently following with Dr. Schilling.  Diuretics were adjusted during hospitalization.  Follow-up as scheduled.    Rheumatoid arthritis  - Currently on low-dose steroids along with Plaquenil.  - Patient will need to follow-up with rheumatologist to discuss further therapy plans.    Mood disorder  - Currently on Seroquel.    COPD  - Continue Symbicort and nebs as needed in facility.    Gout  - Continue allopurinol.    GERD  - Continue Prilosec 40 mg daily.    Hypertension  - Home doses of carvedilol and irbesartan were continued.    Anemia related to chronic kidney disease  -Continue Procrit injections.        28-minute spent with direct patient care and review of medical chart.    [x]  Discussed Patient in detail with nursing/staff, addressed all needs today.     [x]  Plan of Care Reviewed   []  PT/OT Reviewed   []  Order Changes  []  Discharge Plans Reviewed  [x]  Advance Directive on file with Nursing Home.   [x]  POA on file with Nursing Home.     [x]  Code Status listed and reviewed.     I confirm accuracy of unchanged data/findings including physical exam and plan which have been carried forward from previous visit, as well as I have updated appropriately those that have changed          Jose Antonio Pride DO.  3/10/2023

## 2023-04-03 NOTE — THERAPY TREATMENT NOTE
"Patient Name: Carolyne Martins  : 1940    MRN: [id number]                              Today's Date: 2021       Admit Date: 2021    Visit Dx:     ICD-10-CM ICD-9-CM   1. Closed fracture of left hip, initial encounter (CMS/[facility])  S72.002A 820.8     Patient Active Problem List   Diagnosis   • Arthralgia of shoulder region   • Tendinopathy of rotator cuff   • Osteoarthritis of right acromioclavicular joint   • Anemia due to stage 4 chronic kidney disease treated with erythropoietin (CMS/[facility])   • Anemia   • Gastric polyp   • Heartburn   • Melena   • Constipation   • Change in bowel habits   • Colon cancer screening   • Epigastric pain   • Weight loss   • Senile osteoporosis   • Left lower quadrant abdominal pain   • Personal history of colonic polyps   • Closed fracture of left hip (CMS/[facility])     Past Medical History:   Diagnosis Date   • Anemia    • Anemia    • Arthritis    • Back pain    • Bleeding from anus    • Bronchitis     STATES HAS BRONCHITIS CURRENTLY (17).     • Bronchitis 2017   • CAD (coronary artery disease)    • Cataract, bilateral    • Chronic kidney disease     STAGE 4.  SEES TERA   • Chronic kidney failure     REPORTS STAGE IV   • Difficulty swallowing solids    • Disease of thyroid gland    • Fracture, radius 2016    right distal radius and ulna, healed.   • GERD (gastroesophageal reflux disease)    • Gout    • High cholesterol    • History of blood transfusion    • History of nuclear stress test     DR. CORTEZ.  \"IT WAS OK\"   • Hyperparathyroidism (CMS/[facility])    • Hyperparathyroidism (CMS/[facility])    • Hypertension    • Impaired functional mobility, balance, gait, and endurance    • Iron deficiency    • Osteoarthritis    • Osteoarthritis of knees, bilateral     Both knees Supartz injection series    • Osteoporosis    • Palpitations    • Personal history of cardiac murmur    • PONV (postoperative nausea and vomiting)    • Presence of pessary    • Problems " I called patient to let him know that Rx refill was sent to his pharmacy.  He had no further questions.    Rayna CLARK) ROT     with swallowing     WITH FOOD OCCASSIONALLY   • Rheumatic heart disease     Personal history   • Rotator cuff tendonitis     right    • Rupture of right proximal biceps tendon     chronic   • Seasonal allergies    • Sinus problem    • Spinal headache     REPORTS AFTER DELIVERY OF SON   • Subacromial bursitis     right    • Valvular heart disease    • Vision problems    • Vitamin B12 deficiency    • Vitamin D deficiency    • Wears glasses      Past Surgical History:   Procedure Laterality Date   • CATARACT EXTRACTION Bilateral    • CERVICAL POLYPECTOMY     • COLONOSCOPY  2011   • COLONOSCOPY N/A 1/9/2018    Procedure: COLONOSCOPY with endoscopic mucosal resection (hot snare), normal saline submucosal injection, argon thermal ablation, resolution clip placement x4, and cold biopsy polypectomy;  Surgeon: Pieter Concepcion MD;  Location: [facility] ENDOSCOPY;  Service:    • ENDOSCOPY N/A 12/6/2017    Procedure: ESOPHAGOGASTRODUODENOSCOPY with biopsies and hot snare polypectomies;  Surgeon: Pieter Concepcion MD;  Location: [facility] ENDOSCOPY;  Service:    • ENDOSCOPY N/A 1/15/2019    Procedure: ESOPHAGOGASTRODUODENOSCOPY WITH BIOPSIES AND HOT SNARE POLYPECTOMIES X10;  Surgeon: Pieter Concepcion MD;  Location: [facility] ENDOSCOPY;  Service: Gastroenterology   • HIP TROCHANTERIC NAILING WITH INTRAMEDULLARY HIP SCREW Left 2/14/2021    Procedure: HIP TROCHANTER NAIL SHORT WITH INTRAMEDULLARY HIP SCREW, LEFT;  Surgeon: Gabriele Loyd MD;  Location: [facility] OR;  Service: Orthopedics;  Laterality: Left;   • UPPER GASTROINTESTINAL ENDOSCOPY  08/18/2014     General Information     Row Name 02/17/21 3742          Physical Therapy Time and Intention    Document Type  therapy note (daily note)  -LM     Mode of Treatment  physical therapy  -LM     Row Name 02/17/21 1352          General Information    Existing Precautions/Restrictions  fall  -LM     Row Name 02/17/21 1354          Safety Issues, Functional Mobility    Safety Issues Affecting  Function (Mobility)  ability to follow commands;at risk behavior observed;impulsivity;insight into deficits/self-awareness;judgment;positioning of assistive device;problem-solving;safety precaution awareness;safety precautions follow-through/compliance;sequencing abilities  -LM     Impairments Affecting Function (Mobility)  balance;endurance/activity tolerance;strength;motor planning;pain;range of motion (ROM)  -LM       User Key  (r) = Recorded By, (t) = Taken By, (c) = Cosigned By    Initials Name Provider Type    LM Janki Wharton, PT Physical Therapist        Mobility     Row Name 02/17/21 1350          Bed Mobility    Bed Mobility  supine-sit  -LM     Supine-Sit [place] (Bed Mobility)  verbal cues;moderate assist (50% patient effort)  -LM     Assistive Device (Bed Mobility)  bed rails;draw sheet;head of bed elevated  -LM     Row Name 02/17/21 1350          Transfers    Comment (Transfers)  Transfer from EOB to bedside commode with RW and mod A.  -LM     Row Name 02/17/21 1350          Sit-Stand Transfer    Sit-Stand [place] (Transfers)  verbal cues;moderate assist (50% patient effort)  -LM     Assistive Device (Sit-Stand Transfers)  walker, front-wheeled  -LM     Row Name 02/17/21 1350          Gait/Stairs (Locomotion)    [place] Level (Gait)  verbal cues;moderate assist (50% patient effort)  -LM     Assistive Device (Gait)  walker, front-wheeled  -LM     Distance in Feet (Gait)  4'; Pt very impulsive with mobility reaching for bedside chair and with difficulty with sequencing and weight shift despite verbal and tactile cues.  -LM     Deviations/Abnormal Patterns (Gait)  antalgic;keith decreased;festinating/shuffling;gait speed decreased;stride length decreased;weight shifting decreased  -LM     Bilateral Gait Deviations  forward flexed posture;heel strike decreased;weight shift ability decreased  -LM     Row Name 02/17/21 1350          Mobility    Extremity Weight-bearing Status  left lower  extremity  -LM     Left Lower Extremity (Weight-bearing Status)  weight-bearing as tolerated (WBAT)  -LM       User Key  (r) = Recorded By, (t) = Taken By, (c) = Cosigned By    Initials Name Provider Type    Janki Yates, OSCAR Physical Therapist        Obj/Interventions     [facility] Name 02/17/21 1350          Motor Skills    Therapeutic Exercise  other (see comments) AP, heel slides, SLR, hip ABD/ADD  -     Row Name 02/17/21 [facility]0          Balance    Static Sitting Balance  WFL;unsupported;sitting, edge of bed  -LM     Dynamic Sitting Balance  WFL;unsupported;sitting, edge of bed  -LM     Static Standing Balance  moderate impairment;supported  -LM     Dynamic Standing Balance  moderate impairment;supported  -LM       User Key  (r) = Recorded By, (t) = Taken By, (c) = Cosigned By    Initials Name Provider Type    Janki Yates, OSCAR Physical Therapist        Goals/Plan    No documentation.       Clinical Impression     Row Name 02/17/21 [facility]0          Pain    Additional Documentation  Pain Scale: Numbers Pre/Post-Treatment (Group)  -LM     Row Name 02/17/21 [facility]          Pain Scale: Numbers Pre/Post-Treatment    Pretreatment Pain Rating  10/10  -LM     Posttreatment Pain Rating  8/10  -LM     Pain Location - Side  Left  -LM     Pain Location  hip  -LM     Pain Intervention(s)  Repositioned;Ambulation/increased activity;Medication (See MAR)  -[facility] Name 02/17/21 1350          Plan of Care Review    Plan of Care Reviewed With  patient  -LM     Progress  improving  -LM     Outcome Summary  PT tx completed. Patient requires mod A for sup to sit, sit to stand, transfer to bedside commode and to ambulate 4' with RW. Very impulsive with mobility d/t perceived pain despite verbal and tacile cues. Patient performs LE ther ex as indicated on care map. Cont to goals.  -     Row Name 02/17/21 1350          Therapy Assessment/Plan (PT)    Rehab Potential (PT)  good, to achieve stated therapy goals  -LM     Row Name 02/17/21  1350          Vital Signs    O2 Delivery Pre Treatment  room air  -LM     O2 Delivery Intra Treatment  room air  -LM     O2 Delivery Post Treatment  room air  -LM     Row Name 02/17/21 1350          Positioning and Restraints    Pre-Treatment Position  in bed  -LM     Post Treatment Position  chair  -LM     In Chair  notified nsg;reclined;call light within reach;encouraged to call for assist;exit alarm on  -LM       User Key  (r) = Recorded By, (t) = Taken By, (c) = Cosigned By    Initials Name Provider Type    Janki Yates, PT Physical Therapist        Outcome Measures     Row Name 02/17/21 1350          How much help from another person do you currently need...    Turning from your back to your side while in flat bed without using bedrails?  2  -LM     Moving from lying on back to sitting on the side of a flat bed without bedrails?  2  -LM     Moving to and from a bed to a chair (including a wheelchair)?  2  -LM     Standing up from a chair using your arms (e.g., wheelchair, bedside chair)?  2  -LM     Climbing 3-5 steps with a railing?  1  -LM     To walk in hospital room?  2  -LM     AM-PAC 6 Clicks Score (PT)  11  -LM     Row Name 02/17/21 1350          Functional Assessment    Outcome Measure Options  AM-PAC 6 Clicks Basic Mobility (PT)  -LM       User Key  (r) = Recorded By, (t) = Taken By, (c) = Cosigned By    Initials Name Provider Type    Janki Yates, PT Physical Therapist        Physical Therapy Education                 Title: PT OT SLP Therapies (Done)     Topic: Physical Therapy (Done)     Point: Mobility training (Done)     Learning Progress Summary           Patient Acceptance, E,TB, VU by LM at 2/17/2021 1534    Comment: Ther ex for HEP; proper use of RW for safe transfers/ambulation.    Acceptance, E,TB, VU by JL at 2/16/2021 2339    Acceptance, E,TB,D, VU,NR by RM at 2/16/2021 1318    Comment: safety and sequencing for transfers.    Acceptance, E,TB, VU by JR at 2/15/2021 1425    Comment:  Importance of mobility to recovery.  Role of PT and POC                   Point: Home exercise program (Done)     Learning Progress Summary           Patient Acceptance, E,TB, VU by  at 2/17/2021 1534    Comment: Ther ex for HEP; proper use of RW for safe transfers/ambulation.    Acceptance, E,TB, VU by  at 2/16/2021 2339                   Point: Body mechanics (Done)     Learning Progress Summary           Patient Acceptance, E,TB, VU by  at 2/16/2021 2339                   Point: Precautions (Done)     Learning Progress Summary           Patient Acceptance, E,TB, VU by  at 2/17/2021 1534    Comment: Ther ex for HEP; proper use of RW for safe transfers/ambulation.    Acceptance, E,TB, VU by  at 2/16/2021 2339                               User Key     Initials Effective Dates Name Provider Type Discipline     04/03/18 -  Elizabeth Matamoros, PT Physical Therapist PT     04/03/18 -  Janki Wharton, OSCAR Physical Therapist PT     03/07/18 -  Gabriel Olivares PTA Physical Therapy Assistant PT     11/23/20 -  Thien Crawford RN Registered Nurse Nurse              PT Recommendation and Plan     Plan of Care Reviewed With: patient  Progress: improving  Outcome Summary: PT tx completed. Patient requires mod A for sup to sit, sit to stand, transfer to bedside commode and to ambulate 4' with RW. Very impulsive with mobility d/t perceived pain despite verbal and tacile cues. Patient performs LE ther ex as indicated on care map. Cont to goals.     Time Calculation:   PT Charges     Row Name 02/17/21 1535             Time Calculation    Start Time  1350  -      PT Received On  02/17/21  -         Time Calculation- PT    Total Timed Code Minutes- PT  35 minute(s)  -        User Key  (r) = Recorded By, (t) = Taken By, (c) = Cosigned By    Initials Name Provider Type     Janki Wharton, PT Physical Therapist        Therapy Charges for Today     Code Description Service Date Service Provider Modifiers Qty     [id number]  GAIT TRAINING EA 15 MIN 2/17/2021 Janki Wharton, PT GP 1    [id number] HC PT THER PROC EA 15 MIN 2/17/2021 Janki Wharton, PT GP 1          PT G-Codes  Outcome Measure Options: AM-PAC 6 Clicks Basic Mobility (PT)  AM-PAC 6 Clicks Score (PT): 11  AM-PAC 6 Clicks Score (OT): 16    Janki Wharton PT  2/17/2021

## 2023-04-06 ENCOUNTER — OFFICE VISIT (OUTPATIENT)
Dept: ORTHOPEDIC SURGERY | Facility: CLINIC | Age: 83
End: 2023-04-06
Payer: MEDICARE

## 2023-04-06 ENCOUNTER — TELEPHONE (OUTPATIENT)
Dept: ORTHOPEDIC SURGERY | Facility: CLINIC | Age: 83
End: 2023-04-06

## 2023-04-06 VITALS
BODY MASS INDEX: 22.38 KG/M2 | HEIGHT: 59 IN | DIASTOLIC BLOOD PRESSURE: 82 MMHG | WEIGHT: 111 LBS | SYSTOLIC BLOOD PRESSURE: 167 MMHG

## 2023-04-06 DIAGNOSIS — S72.002A CLOSED FRACTURE OF LEFT HIP, INITIAL ENCOUNTER: Primary | ICD-10-CM

## 2023-04-06 DIAGNOSIS — Z96.642 S/P TOTAL LEFT HIP ARTHROPLASTY: ICD-10-CM

## 2023-04-06 PROCEDURE — 99024 POSTOP FOLLOW-UP VISIT: CPT | Performed by: PHYSICIAN ASSISTANT

## 2023-04-06 PROCEDURE — 3079F DIAST BP 80-89 MM HG: CPT | Performed by: PHYSICIAN ASSISTANT

## 2023-04-06 PROCEDURE — 1160F RVW MEDS BY RX/DR IN RCRD: CPT | Performed by: PHYSICIAN ASSISTANT

## 2023-04-06 PROCEDURE — 1159F MED LIST DOCD IN RCRD: CPT | Performed by: PHYSICIAN ASSISTANT

## 2023-04-06 PROCEDURE — 3077F SYST BP >= 140 MM HG: CPT | Performed by: PHYSICIAN ASSISTANT

## 2023-04-06 RX ORDER — SACCHAROMYCES BOULARDII 250 MG
250 CAPSULE ORAL
COMMUNITY

## 2023-04-06 NOTE — PROGRESS NOTES
"Subjective   Patient ID: Carolyne Martins is a 82 y.o. right hand dominant female is here today for a post-operative visit.  Post-op of the Left Hip (Status post Removal Of Fracture Hardware And Conversion To Total Hip Arthroplasty on 2/17/23.)          CHIEF COMPLAINT:    Progress and recovery after surgery.    History of Present Illness      Pain controlled: [] no   [x] yes   Medication refill requested: [x] no   [] yes    Patient compliant with instructions: [] no   [x] yes   Other: Reports good progress since surgery.  Denies CP or SOA  She has been non weightbearing to the LLE.       Past Medical History:   Diagnosis Date   • Anemia 1998   • Anemia    • Arthritis    • Back pain    • Bleeding from anus    • Bronchitis     STATES HAS BRONCHITIS CURRENTLY (12/5/17).     • Bronchitis 12/2017   • CAD (coronary artery disease)    • Cataract, bilateral    • Chronic kidney disease     STAGE 4.  SEES TERA   • Chronic kidney failure     REPORTS STAGE IV   • Difficulty swallowing solids    • Disease of thyroid gland    • Fracture, radius 2016    right distal radius and ulna, healed.   • GERD (gastroesophageal reflux disease)    • Gout    • High cholesterol    • History of blood transfusion 2019   • History of nuclear stress test 2014    DR. CORTEZ.  \"IT WAS OK\"   • Hyperparathyroidism    • Hyperparathyroidism    • Hypertension    • Impaired functional mobility, balance, gait, and endurance    • Impaired mobility    • Iron deficiency    • Osteoarthritis    • Osteoarthritis of knees, bilateral     Both knees Supartz injection series August, 2015   • Osteoporosis    • Palpitations    • Personal history of cardiac murmur    • Pessary maintenance    • PONV (postoperative nausea and vomiting)    • Presence of pessary    • Problems with swallowing     WITH FOOD OCCASSIONALLY   • Rheumatic heart disease     Personal history   • Rotator cuff tendonitis     right    • Rupture of right proximal biceps tendon     chronic   • " Seasonal allergies    • Sinus problem    • Spinal headache     REPORTS AFTER DELIVERY OF SON   • Subacromial bursitis     right    • Valvular heart disease    • Vision problems    • Vitamin B12 deficiency    • Vitamin D deficiency    • Wears glasses         Past Surgical History:   Procedure Laterality Date   • CATARACT EXTRACTION Bilateral    • CERVICAL POLYPECTOMY     • COLONOSCOPY  2011   • COLONOSCOPY N/A 1/9/2018    Procedure: COLONOSCOPY with endoscopic mucosal resection (hot snare), normal saline submucosal injection, argon thermal ablation, resolution clip placement x4, and cold biopsy polypectomy;  Surgeon: Pieter Concepcion MD;  Location: [facility] ENDOSCOPY;  Service:    • ENDOSCOPY N/A 12/6/2017    Procedure: ESOPHAGOGASTRODUODENOSCOPY with biopsies and hot snare polypectomies;  Surgeon: Pieter Concepcion MD;  Location: [facility] ENDOSCOPY;  Service:    • ENDOSCOPY N/A 1/15/2019    Procedure: ESOPHAGOGASTRODUODENOSCOPY WITH BIOPSIES AND HOT SNARE POLYPECTOMIES X10;  Surgeon: Pieter Concepcion MD;  Location: [facility] ENDOSCOPY;  Service: Gastroenterology   • HARDWARE REMOVAL Left 2/17/2023    Procedure: FEMUR HARDWARE REMOVAL, LEFT;  Surgeon: Gabriele Loyd MD;  Location: [facility] OR;  Service: Orthopedics;  Laterality: Left;   • HIP TROCHANTERIC NAILING WITH INTRAMEDULLARY HIP SCREW Left 2/14/2021    Procedure: HIP TROCHANTER NAIL SHORT WITH INTRAMEDULLARY HIP SCREW, LEFT;  Surgeon: Gabriele Loyd MD;  Location: [facility] OR;  Service: Orthopedics;  Laterality: Left;   • TOTAL HIP ARTHROPLASTY Left 2/17/2023    Procedure: REMOVAL OF FRACTURE HARDWARE AND CONVERSION TO TOTAL HIP ARTHROPLASTY, LEFT;  Surgeon: Gabriele Loyd MD;  Location: [facility] OR;  Service: Orthopedics;  Laterality: Left;   • UPPER GASTROINTESTINAL ENDOSCOPY  08/18/2014       Allergies   Allergen Reactions   • Ferrlecit [Na Ferric Gluc Cplx In Sucrose] Swelling   • Ranitidine Hcl Shortness Of Breath   • Levofloxacin Other (See Comments)      "Leg cramps     • Lisinopril Cough       Review of Systems   Constitutional: Negative for fever.   HENT: Negative for dental problem and voice change.    Eyes: Negative for visual disturbance.   Respiratory: Negative for shortness of breath.    Cardiovascular: Negative for chest pain.   Gastrointestinal: Negative for abdominal pain.   Genitourinary: Negative for dysuria.   Musculoskeletal: Positive for arthralgias (left hip), gait problem (presents in wheelchair) and joint swelling (left foot).   Skin: Negative for rash.   Neurological: Negative for speech difficulty.   Hematological: Does not bruise/bleed easily.   Psychiatric/Behavioral: Negative for confusion.     I have reviewed the medical and surgical history, family history, social history, medications, and/or allergies, and the review of systems of this report.    Objective   /82   Ht 149.9 cm (59.02\")   Wt 50.3 kg (111 lb)   LMP  (LMP Unknown)   BMI 22.41 kg/m²       Signs of infection: [x] no                    [] yes   Drainage: [x] no                    [] yes   Incision: [x] healing well     []healed well   Motor exam intact: [] no                    [x] yes   Neurovascular exam intact: [] no                    [x] yes   Signs of compartment syndrome: [x] no                    [] yes   Signs of DVT: [x] no                    [] yes   Other:      Physical Exam  Ortho Exam    Extremity DVT signs are negative on physical exam with negative Daniele sign, no calf pain, no palpable cords and no skin tone change  Neurologic Exam    Assessment & Plan     Independent Review of Radiographic Studies:    No new imaging done today.  Reviewed imaging with patient at a prior visit.    Laboratory and Other Studies:  No new results reviewed today.          Procedures     Diagnoses and all orders for this visit:    1. Closed fracture of left hip, initial encounter (Primary)  -     CT hip left wo contrast; Future    2. S/P total left hip arthroplasty  -     CT hip " left wo contrast; Future         Recommendations/Plan:     Sutures Staples or Pins [] Removed today  [x] At prior visit  [] Plan removal later   Physical therapy: []rehab facility  []outpatient referral  [] therapy ongoing   Ultrasound: [x]not ordered         []order given to patient   Labs: [x]not ordered         []order given to patient   Weight Bearing status: []Full [x]WBAT []PWB []NWB []Other     Discussion of orthopaedic goals and activities and patient and/or guardian expressed appreciation.  Regular exercise as tolerated  Guided on proper techniques for mobility, strength, agility and/or conditioning exercises  Weight bearing parameters reviewed  Take prescribed medications as instructed only as tolerated       Will order CT of left hip, and depending on callus formation/healing, we will discuss her new WB parameters    Patient is encouraged and agreeable to call or return sooner for any issues or concerns.    4-14-23  I reviewed the CT results from [facility]. There is no evidence of acute fracture  Flakita CEDENO updated a PT order and also updated her son on the new WB status which is WBAT. May resume PT with WBAT parameters

## 2023-04-06 NOTE — TELEPHONE ENCOUNTER
[facility] scheduling is going to call back to schedule CT left hip. They did not have access to order that I faxed to them that they requested.

## 2023-04-10 ENCOUNTER — TELEPHONE (OUTPATIENT)
Dept: ORTHOPEDIC SURGERY | Facility: CLINIC | Age: 83
End: 2023-04-10
Payer: MEDICARE

## 2023-04-10 NOTE — TELEPHONE ENCOUNTER
[facility] called the office in regards to a CT scan scheduled today at 4:00 pm. She stated that the order is needing corrected and she is needing to clarify the auth. She would like a call back as soon as possible in regards to her scan. Her name is Fabiana [phone].

## 2023-04-11 ENCOUNTER — TELEPHONE (OUTPATIENT)
Dept: ORTHOPEDIC SURGERY | Facility: CLINIC | Age: 83
End: 2023-04-11
Payer: MEDICARE

## 2023-04-11 DIAGNOSIS — S72.002A CLOSED FRACTURE OF LEFT HIP, INITIAL ENCOUNTER: Primary | ICD-10-CM

## 2023-04-11 DIAGNOSIS — Z96.642 S/P TOTAL LEFT HIP ARTHROPLASTY: ICD-10-CM

## 2023-04-11 NOTE — TELEPHONE ENCOUNTER
Per Harley, patient can be weight bearing as tolerated. CT showed no acute fracture. I notified patient's son and advised him I will call [facility] and let them know as well so they can start therapy and get her walking.

## 2023-04-11 NOTE — TELEPHONE ENCOUNTER
I spoke with [facility] and they are requesting order to be sent to them for PT. Order faxed attn: Margoth to [phone]

## 2023-04-11 NOTE — TELEPHONE ENCOUNTER
I called patient's son, Magno, and let him know I had spoken with [facility] and faxed PT order to them. Harley wants to see patient back in 6-8 weeks. Magno was notified of this as well. He advised he will call the office at a later time to schedule this.

## 2023-04-12 ENCOUNTER — NURSING HOME (OUTPATIENT)
Dept: FAMILY MEDICINE CLINIC | Facility: CLINIC | Age: 83
End: 2023-04-12
Payer: MEDICARE

## 2023-04-12 VITALS
TEMPERATURE: 98.2 F | BODY MASS INDEX: 22.33 KG/M2 | SYSTOLIC BLOOD PRESSURE: 150 MMHG | OXYGEN SATURATION: 97 % | RESPIRATION RATE: 18 BRPM | WEIGHT: 110.6 LBS | HEART RATE: 70 BPM | DIASTOLIC BLOOD PRESSURE: 60 MMHG

## 2023-04-12 DIAGNOSIS — Z74.09 IMPAIRED MOBILITY AND ADLS: ICD-10-CM

## 2023-04-12 DIAGNOSIS — D63.1 ANEMIA DUE TO STAGE 4 CHRONIC KIDNEY DISEASE TREATED WITH ERYTHROPOIETIN: ICD-10-CM

## 2023-04-12 DIAGNOSIS — N18.4 ANEMIA DUE TO STAGE 4 CHRONIC KIDNEY DISEASE TREATED WITH ERYTHROPOIETIN: ICD-10-CM

## 2023-04-12 DIAGNOSIS — Z96.642 STATUS POST LEFT HIP REPLACEMENT: Primary | ICD-10-CM

## 2023-04-12 DIAGNOSIS — Z78.9 IMPAIRED MOBILITY AND ADLS: ICD-10-CM

## 2023-04-12 NOTE — LETTER
Nursing Home Follow Up Note      Mikey Duran DO []   FATUMA Monet [x]  [address] [place] [address]  Phone: [phone]  Fax: [phone] Baltazar Borden MD []    Jose Antonio Pride DO []   [address] [place] [address]  Phone: [phone]  Fax: [phone]     PATIENT NAME: Carolyne Martins                                                                          YOB: 1940           DATE OF SERVICE: 04/12/2023  FACILITY:  [][place]   [] [place]   [x] [facility]   [] [facility]   [] Other ______________________________________________________________________      CHIEF COMPLAINT:    Follow-up Ortho visit 4/6.    Follow-up on lab results.    HISTORY OF PRESENT ILLNESS:     Patient had a follow-up visit with Ortho on 4/6 for status post Removal Of Fracture Hardware And Conversion To Total Hip Arthroplasty on 2/17/23.  She was cleared for activity and weightbearing as tolerated.  She has been doing very well working with therapy and getting up to participate in activities most every day.  Pain is controlled with current medication.  No signs and symptoms of any distress.    She has weekly labs obtained due to epoetin injections she receives for her CKD with anemia.  Hemoglobin 9.5 and transferrin 146 which is stable for her.  No symptoms of anemia.  She receives the injections biweekly.    PAST MEDICAL & SURGICAL HISTORY:    Past Medical History:   Diagnosis Date   • Anemia 1998   • Anemia    • Arthritis    • Back pain    • Bleeding from anus    • Bronchitis     STATES HAS BRONCHITIS CURRENTLY (12/5/17).     • Bronchitis 12/2017   • CAD (coronary artery disease)    • Cataract, bilateral    • Chronic kidney disease     STAGE 4.  SEEREBA HALEY   • Chronic kidney failure     REPORTS STAGE IV   • Difficulty swallowing solids    • Disease of thyroid gland    • Fracture, radius 2016    right distal radius and ulna, healed.   • GERD (gastroesophageal reflux disease)    • Gout   "  • High cholesterol    • History of blood transfusion 2019   • History of nuclear stress test 2014    DR. CORTEZ.  \"IT WAS OK\"   • Hyperparathyroidism    • Hyperparathyroidism    • Hypertension    • Impaired functional mobility, balance, gait, and endurance    • Impaired mobility    • Iron deficiency    • Osteoarthritis    • Osteoarthritis of knees, bilateral     Both knees Supartz injection series August, 2015   • Osteoporosis    • Palpitations    • Personal history of cardiac murmur    • Pessary maintenance    • PONV (postoperative nausea and vomiting)    • Presence of pessary    • Problems with swallowing     WITH FOOD OCCASSIONALLY   • Rheumatic heart disease     Personal history   • Rotator cuff tendonitis     right    • Rupture of right proximal biceps tendon     chronic   • Seasonal allergies    • Sinus problem    • Spinal headache     REPORTS AFTER DELIVERY OF SON   • Subacromial bursitis     right    • Valvular heart disease    • Vision problems    • Vitamin B12 deficiency    • Vitamin D deficiency    • Wears glasses       Past Surgical History:   Procedure Laterality Date   • CATARACT EXTRACTION Bilateral    • CERVICAL POLYPECTOMY     • COLONOSCOPY  2011   • COLONOSCOPY N/A 1/9/2018    Procedure: COLONOSCOPY with endoscopic mucosal resection (hot snare), normal saline submucosal injection, argon thermal ablation, resolution clip placement x4, and cold biopsy polypectomy;  Surgeon: Pieter Concepcion MD;  Location: [facility] ENDOSCOPY;  Service:    • ENDOSCOPY N/A 12/6/2017    Procedure: ESOPHAGOGASTRODUODENOSCOPY with biopsies and hot snare polypectomies;  Surgeon: Pieter Concepcion MD;  Location: [facility] ENDOSCOPY;  Service:    • ENDOSCOPY N/A 1/15/2019    Procedure: ESOPHAGOGASTRODUODENOSCOPY WITH BIOPSIES AND HOT SNARE POLYPECTOMIES X10;  Surgeon: Pieter Concepcion MD;  Location: [facility] ENDOSCOPY;  Service: Gastroenterology   • HARDWARE REMOVAL Left 2/17/2023    Procedure: FEMUR HARDWARE REMOVAL, LEFT;  Surgeon: " Gabriele Loyd MD;  Location: [facility];  Service: Orthopedics;  Laterality: Left;   • HIP TROCHANTERIC NAILING WITH INTRAMEDULLARY HIP SCREW Left 2/14/2021    Procedure: HIP TROCHANTER NAIL SHORT WITH INTRAMEDULLARY HIP SCREW, LEFT;  Surgeon: Gabriele Loyd MD;  Location: [facility] OR;  Service: Orthopedics;  Laterality: Left;   • TOTAL HIP ARTHROPLASTY Left 2/17/2023    Procedure: REMOVAL OF FRACTURE HARDWARE AND CONVERSION TO TOTAL HIP ARTHROPLASTY, LEFT;  Surgeon: Gabriele Loyd MD;  Location: [facility];  Service: Orthopedics;  Laterality: Left;   • UPPER GASTROINTESTINAL ENDOSCOPY  08/18/2014         MEDICATIONS:  I have reviewed and reconciled the patients medication list in the patients chart at the skilled nursing [facility] today.      ALLERGIES:    Allergies   Allergen Reactions   • Ferrlecit [Na Ferric Gluc Cplx In Sucrose] Swelling   • Ranitidine Hcl Shortness Of Breath   • Levofloxacin Other (See Comments)     Leg cramps     • Lisinopril Cough         SOCIAL HISTORY:    Social History     Socioeconomic History   • Marital status:    Tobacco Use   • Smoking status: Never   • Smokeless tobacco: Never   Vaping Use   • Vaping Use: Never used   Substance and Sexual Activity   • Alcohol use: Never   • Drug use: Never   • Sexual activity: Not Currently     Birth control/protection: Post-menopausal       FAMILY HISTORY:    Family History   Problem Relation Age of Onset   • Liver cancer Maternal Grandmother    • Gout Other    • Osteoporosis Other    • Stroke Other    • Ulcers Other    • Asthma Other    • Hypertension Other    • Heart disease Other    • Osteoarthritis Other    • Colon cancer Neg Hx        REVIEW OF SYSTEMS:    Review of Systems   Constitutional: Negative for activity change, appetite change, chills, diaphoresis, fatigue, fever and unexpected weight loss.   HENT: Negative for congestion, mouth sores and nosebleeds.    Respiratory: Positive for shortness of breath (chronic).  Negative for apnea, cough, choking, chest tightness and wheezing.         Chronic related to COPD   Cardiovascular: Negative for chest pain, palpitations and leg swelling.   Gastrointestinal: Negative for abdominal pain, constipation, diarrhea, nausea and vomiting.   Genitourinary: Negative for decreased urine volume, difficulty urinating, dysuria, frequency, hematuria, urgency and urinary incontinence.   Musculoskeletal: Positive for arthralgias (left hip but improved) and gait problem.   Skin: Negative for color change, dry skin and rash.   Neurological: Positive for weakness and memory problem. Negative for dizziness, headache and confusion.         PHYSICAL EXAMINATION:   VITAL SIGNS:   Vitals:    04/12/23 1332   BP: 150/60   Pulse: 70   Resp: 18   Temp: 98.2 °F (36.8 °C)   SpO2: 97%   Weight: 50.2 kg (110 lb 9.6 oz)       Physical Exam  Vitals and nursing note reviewed.   Constitutional:       Appearance: Normal appearance. She is well-developed.   HENT:      Head: Normocephalic.      Right Ear: External ear normal.      Left Ear: External ear normal.      Nose: Nose normal.   Eyes:      Conjunctiva/sclera: Conjunctivae normal.   Cardiovascular:      Rate and Rhythm: Normal rate and regular rhythm.      Heart sounds: Normal heart sounds.   Pulmonary:      Effort: Pulmonary effort is normal. No respiratory distress.      Breath sounds: Decreased air movement present. Decreased breath sounds present.   Abdominal:      General: Bowel sounds are normal. There is no distension.      Palpations: Abdomen is soft.      Tenderness: There is no abdominal tenderness.   Musculoskeletal:      Left hip: Decreased range of motion.   Skin:     General: Skin is warm and dry.      Findings: No rash.   Neurological:      Mental Status: She is alert and oriented to person, place, and time.   Psychiatric:         Mood and Affect: Mood and affect normal.         Speech: Speech normal.         Behavior: Behavior normal.          Cognition and Memory: Cognition is not impaired. Memory is impaired.         RECORDS REVIEW:   I have reviewed and interpreted the records in [facility] and [facility]    ASSESSMENT     Diagnoses and all orders for this visit:    1. Status post left hip replacement (Primary)    2. Anemia due to stage 4 chronic kidney disease treated with erythropoietin    3. Impaired mobility and ADLs        PLAN     Status post left hip replacement  -Ortho visit on 4/6 and she was cleared for weightbearing and activity as tolerated.  She will continue to work with therapy.    Anemia due to CKD  -Hemoglobin stable at 9.5 and transferrin stable at 146.  She will continue erythropoietin injections biweekly and continue to follow with nephrology.    Will follow up and continue to monitor.     Nursing to continue supportive care for all ADLs.    Nursing encouraged to keep me informed of any acute changes, lack of improvement, or any new concerning symptoms.    [x]  Discussed Patient in detail with nursing/staff, addressed all needs today.     [x]  Plan of Care Reviewed   [x]  PT/OT Reviewed   [x]  Order Changes  [x]  Discharge Plans Reviewed  [x]  Advance Directive on file with Nursing Home.   [x]  POA on file with Nursing Home.   [x]  Code Status listed: []  Full Code   [x]  DNR     “I confirm accuracy of unchanged data/findings which have been carried forward from previous visit, as well as I have updated appropriately those that have changed.”            FATUMA Frank.

## 2023-04-12 NOTE — PROGRESS NOTES
Nursing Home Follow Up Note      Mikey Duran DO []   FATUMA Monet [x]  [address] [place] [address]  Phone: [phone]  Fax: [phone] Baltazar Borden MD []    Jose Antonio Pride DO []   [address] [place] [address]  Phone: [phone]  Fax: [phone]     PATIENT NAME: Carolyne Martins                                                                          YOB: 1940           DATE OF SERVICE: 04/12/2023  FACILITY:  [][place]   [] [place]   [x] [facility]   [] [facility]   [] Other ______________________________________________________________________      CHIEF COMPLAINT:    Follow-up Ortho visit 4/6.    Follow-up on lab results.    HISTORY OF PRESENT ILLNESS:     Patient had a follow-up visit with Ortho on 4/6 for status post Removal Of Fracture Hardware And Conversion To Total Hip Arthroplasty on 2/17/23.  She was cleared for activity and weightbearing as tolerated.  She has been doing very well working with therapy and getting up to participate in activities most every day.  Pain is controlled with current medication.  No signs and symptoms of any distress.    She has weekly labs obtained due to epoetin injections she receives for her CKD with anemia.  Hemoglobin 9.5 and transferrin 146 which is stable for her.  No symptoms of anemia.  She receives the injections biweekly.    PAST MEDICAL & SURGICAL HISTORY:    Past Medical History:   Diagnosis Date   • Anemia 1998   • Anemia    • Arthritis    • Back pain    • Bleeding from anus    • Bronchitis     STATES HAS BRONCHITIS CURRENTLY (12/5/17).     • Bronchitis 12/2017   • CAD (coronary artery disease)    • Cataract, bilateral    • Chronic kidney disease     STAGE 4.  SEEREBA HALEY   • Chronic kidney failure     REPORTS STAGE IV   • Difficulty swallowing solids    • Disease of thyroid gland    • Fracture, radius 2016    right distal radius and ulna, healed.   • GERD (gastroesophageal reflux disease)    • Gout   "  • High cholesterol    • History of blood transfusion 2019   • History of nuclear stress test 2014    DR. CORTEZ.  \"IT WAS OK\"   • Hyperparathyroidism    • Hyperparathyroidism    • Hypertension    • Impaired functional mobility, balance, gait, and endurance    • Impaired mobility    • Iron deficiency    • Osteoarthritis    • Osteoarthritis of knees, bilateral     Both knees Supartz injection series August, 2015   • Osteoporosis    • Palpitations    • Personal history of cardiac murmur    • Pessary maintenance    • PONV (postoperative nausea and vomiting)    • Presence of pessary    • Problems with swallowing     WITH FOOD OCCASSIONALLY   • Rheumatic heart disease     Personal history   • Rotator cuff tendonitis     right    • Rupture of right proximal biceps tendon     chronic   • Seasonal allergies    • Sinus problem    • Spinal headache     REPORTS AFTER DELIVERY OF SON   • Subacromial bursitis     right    • Valvular heart disease    • Vision problems    • Vitamin B12 deficiency    • Vitamin D deficiency    • Wears glasses       Past Surgical History:   Procedure Laterality Date   • CATARACT EXTRACTION Bilateral    • CERVICAL POLYPECTOMY     • COLONOSCOPY  2011   • COLONOSCOPY N/A 1/9/2018    Procedure: COLONOSCOPY with endoscopic mucosal resection (hot snare), normal saline submucosal injection, argon thermal ablation, resolution clip placement x4, and cold biopsy polypectomy;  Surgeon: Pieter Concepcion MD;  Location: [facility] ENDOSCOPY;  Service:    • ENDOSCOPY N/A 12/6/2017    Procedure: ESOPHAGOGASTRODUODENOSCOPY with biopsies and hot snare polypectomies;  Surgeon: Pieter Concepcino MD;  Location: [facility] ENDOSCOPY;  Service:    • ENDOSCOPY N/A 1/15/2019    Procedure: ESOPHAGOGASTRODUODENOSCOPY WITH BIOPSIES AND HOT SNARE POLYPECTOMIES X10;  Surgeon: Pieter Concepcion MD;  Location: [facility] ENDOSCOPY;  Service: Gastroenterology   • HARDWARE REMOVAL Left 2/17/2023    Procedure: FEMUR HARDWARE REMOVAL, LEFT;  Surgeon: " Gabriele Loyd MD;  Location: [facility];  Service: Orthopedics;  Laterality: Left;   • HIP TROCHANTERIC NAILING WITH INTRAMEDULLARY HIP SCREW Left 2/14/2021    Procedure: HIP TROCHANTER NAIL SHORT WITH INTRAMEDULLARY HIP SCREW, LEFT;  Surgeon: Gabriele Loyd MD;  Location: [facility] OR;  Service: Orthopedics;  Laterality: Left;   • TOTAL HIP ARTHROPLASTY Left 2/17/2023    Procedure: REMOVAL OF FRACTURE HARDWARE AND CONVERSION TO TOTAL HIP ARTHROPLASTY, LEFT;  Surgeon: Gabriele Loyd MD;  Location: [facility];  Service: Orthopedics;  Laterality: Left;   • UPPER GASTROINTESTINAL ENDOSCOPY  08/18/2014         MEDICATIONS:  I have reviewed and reconciled the patients medication list in the patients chart at the skilled nursing [facility] today.      ALLERGIES:    Allergies   Allergen Reactions   • Ferrlecit [Na Ferric Gluc Cplx In Sucrose] Swelling   • Ranitidine Hcl Shortness Of Breath   • Levofloxacin Other (See Comments)     Leg cramps     • Lisinopril Cough         SOCIAL HISTORY:    Social History     Socioeconomic History   • Marital status:    Tobacco Use   • Smoking status: Never   • Smokeless tobacco: Never   Vaping Use   • Vaping Use: Never used   Substance and Sexual Activity   • Alcohol use: Never   • Drug use: Never   • Sexual activity: Not Currently     Birth control/protection: Post-menopausal       FAMILY HISTORY:    Family History   Problem Relation Age of Onset   • Liver cancer Maternal Grandmother    • Gout Other    • Osteoporosis Other    • Stroke Other    • Ulcers Other    • Asthma Other    • Hypertension Other    • Heart disease Other    • Osteoarthritis Other    • Colon cancer Neg Hx        REVIEW OF SYSTEMS:    Review of Systems   Constitutional: Negative for activity change, appetite change, chills, diaphoresis, fatigue, fever and unexpected weight loss.   HENT: Negative for congestion, mouth sores and nosebleeds.    Respiratory: Positive for shortness of breath (chronic).  Negative for apnea, cough, choking, chest tightness and wheezing.         Chronic related to COPD   Cardiovascular: Negative for chest pain, palpitations and leg swelling.   Gastrointestinal: Negative for abdominal pain, constipation, diarrhea, nausea and vomiting.   Genitourinary: Negative for decreased urine volume, difficulty urinating, dysuria, frequency, hematuria, urgency and urinary incontinence.   Musculoskeletal: Positive for arthralgias (left hip but improved) and gait problem.   Skin: Negative for color change, dry skin and rash.   Neurological: Positive for weakness and memory problem. Negative for dizziness, headache and confusion.         PHYSICAL EXAMINATION:   VITAL SIGNS:   Vitals:    04/12/23 1332   BP: 150/60   Pulse: 70   Resp: 18   Temp: 98.2 °F (36.8 °C)   SpO2: 97%   Weight: 50.2 kg (110 lb 9.6 oz)       Physical Exam  Vitals and nursing note reviewed.   Constitutional:       Appearance: Normal appearance. She is well-developed.   HENT:      Head: Normocephalic.      Right Ear: External ear normal.      Left Ear: External ear normal.      Nose: Nose normal.   Eyes:      Conjunctiva/sclera: Conjunctivae normal.   Cardiovascular:      Rate and Rhythm: Normal rate and regular rhythm.      Heart sounds: Normal heart sounds.   Pulmonary:      Effort: Pulmonary effort is normal. No respiratory distress.      Breath sounds: Decreased air movement present. Decreased breath sounds present.   Abdominal:      General: Bowel sounds are normal. There is no distension.      Palpations: Abdomen is soft.      Tenderness: There is no abdominal tenderness.   Musculoskeletal:      Left hip: Decreased range of motion.   Skin:     General: Skin is warm and dry.      Findings: No rash.   Neurological:      Mental Status: She is alert and oriented to person, place, and time.   Psychiatric:         Mood and Affect: Mood and affect normal.         Speech: Speech normal.         Behavior: Behavior normal.          Cognition and Memory: Cognition is not impaired. Memory is impaired.         RECORDS REVIEW:   I have reviewed and interpreted the records in [facility]    ASSESSMENT     Diagnoses and all orders for this visit:    1. Status post left hip replacement (Primary)    2. Anemia due to stage 4 chronic kidney disease treated with erythropoietin    3. Impaired mobility and ADLs        PLAN     Status post left hip replacement  -Ortho visit on 4/6 and she was cleared for weightbearing and activity as tolerated.  She will continue to work with therapy.    Anemia due to CKD  -Hemoglobin stable at 9.5 and transferrin stable at 146.  She will continue erythropoietin injections biweekly and continue to follow with nephrology.    Will follow up and continue to monitor.     Nursing to continue supportive care for all ADLs.    Nursing encouraged to keep me informed of any acute changes, lack of improvement, or any new concerning symptoms.    [x]  Discussed Patient in detail with nursing/staff, addressed all needs today.     [x]  Plan of Care Reviewed   [x]  PT/OT Reviewed   [x]  Order Changes  [x]  Discharge Plans Reviewed  [x]  Advance Directive on file with Nursing Home.   [x]  POA on file with Nursing Home.   [x]  Code Status listed: []  Full Code   [x]  DNR     “I confirm accuracy of unchanged data/findings which have been carried forward from previous visit, as well as I have updated appropriately those that have changed.”            FATUMA Frank.

## 2023-05-02 ENCOUNTER — NURSING HOME (OUTPATIENT)
Dept: INTERNAL MEDICINE | Facility: CLINIC | Age: 83
End: 2023-05-02
Payer: MEDICARE

## 2023-05-02 VITALS
HEART RATE: 71 BPM | BODY MASS INDEX: 22.41 KG/M2 | OXYGEN SATURATION: 92 % | SYSTOLIC BLOOD PRESSURE: 137 MMHG | TEMPERATURE: 97.5 F | WEIGHT: 111 LBS | DIASTOLIC BLOOD PRESSURE: 70 MMHG | RESPIRATION RATE: 18 BRPM

## 2023-05-02 DIAGNOSIS — M10.9 GOUT, UNSPECIFIED CAUSE, UNSPECIFIED CHRONICITY, UNSPECIFIED SITE: ICD-10-CM

## 2023-05-02 DIAGNOSIS — N18.4 CHRONIC KIDNEY DISEASE (CKD), STAGE IV (SEVERE): ICD-10-CM

## 2023-05-02 DIAGNOSIS — F39 MOOD DISORDER: ICD-10-CM

## 2023-05-02 DIAGNOSIS — Z74.09 IMPAIRED MOBILITY AND ADLS: ICD-10-CM

## 2023-05-02 DIAGNOSIS — E44.0 MODERATE PROTEIN-CALORIE MALNUTRITION: ICD-10-CM

## 2023-05-02 DIAGNOSIS — D64.9 ANEMIA, UNSPECIFIED TYPE: ICD-10-CM

## 2023-05-02 DIAGNOSIS — J41.0 SIMPLE CHRONIC BRONCHITIS: ICD-10-CM

## 2023-05-02 DIAGNOSIS — S72.002D CLOSED FRACTURE OF LEFT HIP WITH ROUTINE HEALING, SUBSEQUENT ENCOUNTER: Primary | ICD-10-CM

## 2023-05-02 DIAGNOSIS — M06.9 RHEUMATOID ARTHRITIS INVOLVING MULTIPLE SITES, UNSPECIFIED WHETHER RHEUMATOID FACTOR PRESENT: ICD-10-CM

## 2023-05-02 DIAGNOSIS — Z78.9 IMPAIRED MOBILITY AND ADLS: ICD-10-CM

## 2023-05-02 DIAGNOSIS — J44.9 CHRONIC OBSTRUCTIVE PULMONARY DISEASE, UNSPECIFIED COPD TYPE: ICD-10-CM

## 2023-05-02 DIAGNOSIS — I10 ESSENTIAL HYPERTENSION: ICD-10-CM

## 2023-05-02 NOTE — LETTER
"  Nursing Home Progress Note        Jose Antonio Pride DO   [address] [place] [address] Phone: [phone]  Fax: [phone]     PATIENT NAME: Carolyne Martins                                                                          YOB: 1940           DATE OF SERVICE: 05/02/2023  FACILITY:  [facility] ______________________________________________________________________     CHIEF COMPLAINT:  Chronic Medical Management      HISTORY OF PRESENT ILLNESS:   Patient was sleepy on exam today and was unable to provide any detailed history.  She has been doing fairly well per nursing staff.  She has been compliant with taking her medications.  She is less agitated and seems to have accepted staying in the facility.  She only mumbled on exam today.      PAST MEDICAL & SURGICAL HISTORY:   Past Medical History:   Diagnosis Date   • Anemia 1998   • Anemia    • Arthritis    • Back pain    • Bleeding from anus    • Bronchitis     STATES HAS BRONCHITIS CURRENTLY (12/5/17).     • Bronchitis 12/2017   • CAD (coronary artery disease)    • Cataract, bilateral    • Chronic kidney disease     STAGE 4.  SEES TERA   • Chronic kidney failure     REPORTS STAGE IV   • Difficulty swallowing solids    • Disease of thyroid gland    • Fracture, radius 2016    right distal radius and ulna, healed.   • GERD (gastroesophageal reflux disease)    • Gout    • High cholesterol    • History of blood transfusion 2019   • History of nuclear stress test 2014    DR. CORTEZ.  \"IT WAS OK\"   • Hyperparathyroidism    • Hyperparathyroidism    • Hypertension    • Impaired functional mobility, balance, gait, and endurance    • Impaired mobility    • Iron deficiency    • Osteoarthritis    • Osteoarthritis of knees, bilateral     Both knees Supartz injection series August, 2015   • Osteoporosis    • Palpitations    • Personal history of cardiac murmur    • Pessary maintenance    • PONV (postoperative nausea and vomiting)  "   • Presence of pessary    • Problems with swallowing     WITH FOOD OCCASSIONALLY   • Rheumatic heart disease     Personal history   • Rotator cuff tendonitis     right    • Rupture of right proximal biceps tendon     chronic   • Seasonal allergies    • Sinus problem    • Spinal headache     REPORTS AFTER DELIVERY OF SON   • Subacromial bursitis     right    • Valvular heart disease    • Vision problems    • Vitamin B12 deficiency    • Vitamin D deficiency    • Wears glasses       Past Surgical History:   Procedure Laterality Date   • CATARACT EXTRACTION Bilateral    • CERVICAL POLYPECTOMY     • COLONOSCOPY  2011   • COLONOSCOPY N/A 1/9/2018    Procedure: COLONOSCOPY with endoscopic mucosal resection (hot snare), normal saline submucosal injection, argon thermal ablation, resolution clip placement x4, and cold biopsy polypectomy;  Surgeon: Pieter Concepcion MD;  Location: [facility] ENDOSCOPY;  Service:    • ENDOSCOPY N/A 12/6/2017    Procedure: ESOPHAGOGASTRODUODENOSCOPY with biopsies and hot snare polypectomies;  Surgeon: Pieter Concepcion MD;  Location: [facility] ENDOSCOPY;  Service:    • ENDOSCOPY N/A 1/15/2019    Procedure: ESOPHAGOGASTRODUODENOSCOPY WITH BIOPSIES AND HOT SNARE POLYPECTOMIES X10;  Surgeon: Pieter Concepcion MD;  Location: [facility] ENDOSCOPY;  Service: Gastroenterology   • HARDWARE REMOVAL Left 2/17/2023    Procedure: FEMUR HARDWARE REMOVAL, LEFT;  Surgeon: Gabriele Loyd MD;  Location: [facility] OR;  Service: Orthopedics;  Laterality: Left;   • HIP TROCHANTERIC NAILING WITH INTRAMEDULLARY HIP SCREW Left 2/14/2021    Procedure: HIP TROCHANTER NAIL SHORT WITH INTRAMEDULLARY HIP SCREW, LEFT;  Surgeon: Gabriele Loyd MD;  Location: [facility] OR;  Service: Orthopedics;  Laterality: Left;   • TOTAL HIP ARTHROPLASTY Left 2/17/2023    Procedure: REMOVAL OF FRACTURE HARDWARE AND CONVERSION TO TOTAL HIP ARTHROPLASTY, LEFT;  Surgeon: Gabriele Loyd MD;  Location: [facility] OR;  Service: Orthopedics;  Laterality:  Left;   • UPPER GASTROINTESTINAL ENDOSCOPY  08/18/2014         MEDICATIONS:  I have reviewed and reconciled the patients medication list in the patients chart at the skilled nursing facility today, 05/02/2023.      ALLERGIES:  Allergies   Allergen Reactions   • Ferrlecit [Na Ferric Gluc Cplx In Sucrose] Swelling   • Ranitidine Hcl Shortness Of Breath   • Levofloxacin Other (See Comments)     Leg cramps     • Lisinopril Cough         SOCIAL HISTORY:  Social History     Socioeconomic History   • Marital status:    Tobacco Use   • Smoking status: Never   • Smokeless tobacco: Never   Vaping Use   • Vaping Use: Never used   Substance and Sexual Activity   • Alcohol use: Never   • Drug use: Never   • Sexual activity: Not Currently     Birth control/protection: Post-menopausal       FAMILY HISTORY:  Family History   Problem Relation Age of Onset   • Liver cancer Maternal Grandmother    • Gout Other    • Osteoporosis Other    • Stroke Other    • Ulcers Other    • Asthma Other    • Hypertension Other    • Heart disease Other    • Osteoarthritis Other    • Colon cancer Neg Hx        REVIEW OF SYSTEMS:  Review of Systems   Unable to perform ROS: Dementia          PHYSICAL EXAMINATION:     VITAL SIGNS:  /70   Pulse 71   Temp 97.5 °F (36.4 °C)   Resp 18   Wt 50.3 kg (111 lb)   LMP  (LMP Unknown)   SpO2 92%   BMI 22.41 kg/m²     Physical Exam  Vitals and nursing note reviewed.   Constitutional:       Appearance: Normal appearance. She is well-developed.      Comments: Frail elderly female in no acute distress   HENT:      Head: Normocephalic and atraumatic.      Nose: Nose normal.      Mouth/Throat:      Mouth: Mucous membranes are moist.      Pharynx: No oropharyngeal exudate.   Eyes:      General: No scleral icterus.     Conjunctiva/sclera: Conjunctivae normal.      Pupils: Pupils are equal, round, and reactive to light.   Neck:      Thyroid: No thyromegaly.   Cardiovascular:      Rate and Rhythm: Normal  rate and regular rhythm.      Heart sounds: Normal heart sounds. No murmur heard.    No friction rub. No gallop.   Pulmonary:      Effort: Pulmonary effort is normal. No respiratory distress.      Breath sounds: Normal breath sounds. No wheezing.   Abdominal:      General: Bowel sounds are normal. There is no distension.      Palpations: Abdomen is soft.      Tenderness: There is no abdominal tenderness.   Musculoskeletal:         General: No deformity or signs of injury.      Cervical back: Normal range of motion and neck supple.      Comments: Multiple small joint deformities in hands   Lymphadenopathy:      Cervical: No cervical adenopathy.   Skin:     General: Skin is warm and dry.      Findings: No rash.   Neurological:      Mental Status: She is alert and oriented to person, place, and time.   Psychiatric:         Mood and Affect: Mood normal.         Behavior: Behavior normal.         RECORDS REVIEW:       ASSESSMENT     Diagnoses and all orders for this visit:    1. Closed fracture of left hip with routine healing, subsequent encounter (Primary)    2. Impaired mobility and ADLs    3. Chronic kidney disease (CKD), stage IV (severe)    4. Moderate protein-calorie malnutrition    5. Simple chronic bronchitis    6. Anemia, unspecified type    7. Essential hypertension    8. Rheumatoid arthritis involving multiple sites, unspecified whether rheumatoid factor present    9. Mood disorder    10. Chronic obstructive pulmonary disease, unspecified COPD type    11. Gout, unspecified cause, unspecified chronicity, unspecified site        PLAN  Left hip fracture  - Patient had hardware fracture which required conversion to total hip arthroplasty  -Patient has followed up with orthopedics and is now weightbearing as tolerated.  - Continue physical therapy and Occupational Therapy in the nursing facility.    Impaired mobility and ADLs  - Continue physical therapy and nursing facility for strengthening.  - Patient continues  to benefit from nursing care for ADLs.    CKD stage IV  - Currently following with Dr. Schilling.  Diuretics were adjusted during hospitalization.  Follow-up as scheduled.    Rheumatoid arthritis  - Currently on low-dose steroids along with Plaquenil.  Patient is currently on 10 mg prednisone.  I am open to trying to gradually taper down on this regimen if tolerated.  As the patient's family was not available, this discussion can be considered at a later time.  -Follow-up with rheumatology as scheduled.    Mood disorder  - Currently stable Seroquel.    COPD  - Continue Symbicort and nebs as needed in facility.    Gout  - Continue allopurinol.    GERD  - Continue Prilosec 40 mg daily.    Hypertension  - Home doses of carvedilol and irbesartan were continued.    Anemia related to chronic kidney disease  -Continue Procrit injections.            [x]  Discussed Patient in detail with nursing/staff, addressed all needs today.     [x]  Plan of Care Reviewed   []  PT/OT Reviewed   []  Order Changes  []  Discharge Plans Reviewed  [x]  Advance Directive on file with Nursing Home.   [x]  POA on file with Nursing Home.    [x]  Code Status listed and reviewed.     I confirm accuracy of unchanged data/findings including physical exam and plan which have been carried forward from previous visit, as well as I have updated appropriately those that have changed          Jose Antonio Pride DO.  5/2/2023

## 2023-05-02 NOTE — PROGRESS NOTES
"  Nursing Home Progress Note        Jose Antonio Pride DO   [address] [place] [address] Phone: [phone]  Fax: [phone]     PATIENT NAME: Carolyne Martins                                                                          YOB: 1940           DATE OF SERVICE: 05/02/2023  FACILITY:  [facility] ______________________________________________________________________     CHIEF COMPLAINT:  Chronic Medical Management      HISTORY OF PRESENT ILLNESS:   Patient was sleepy on exam today and was unable to provide any detailed history.  She has been doing fairly well per nursing staff.  She has been compliant with taking her medications.  She is less agitated and seems to have accepted staying in the facility.  She only mumbled on exam today.      PAST MEDICAL & SURGICAL HISTORY:   Past Medical History:   Diagnosis Date   • Anemia 1998   • Anemia    • Arthritis    • Back pain    • Bleeding from anus    • Bronchitis     STATES HAS BRONCHITIS CURRENTLY (12/5/17).     • Bronchitis 12/2017   • CAD (coronary artery disease)    • Cataract, bilateral    • Chronic kidney disease     STAGE 4.  SEES TERA   • Chronic kidney failure     REPORTS STAGE IV   • Difficulty swallowing solids    • Disease of thyroid gland    • Fracture, radius 2016    right distal radius and ulna, healed.   • GERD (gastroesophageal reflux disease)    • Gout    • High cholesterol    • History of blood transfusion 2019   • History of nuclear stress test 2014    DR. CORTEZ.  \"IT WAS OK\"   • Hyperparathyroidism    • Hyperparathyroidism    • Hypertension    • Impaired functional mobility, balance, gait, and endurance    • Impaired mobility    • Iron deficiency    • Osteoarthritis    • Osteoarthritis of knees, bilateral     Both knees Supartz injection series August, 2015   • Osteoporosis    • Palpitations    • Personal history of cardiac murmur    • Pessary maintenance    • PONV (postoperative nausea and vomiting)  "   • Presence of pessary    • Problems with swallowing     WITH FOOD OCCASSIONALLY   • Rheumatic heart disease     Personal history   • Rotator cuff tendonitis     right    • Rupture of right proximal biceps tendon     chronic   • Seasonal allergies    • Sinus problem    • Spinal headache     REPORTS AFTER DELIVERY OF SON   • Subacromial bursitis     right    • Valvular heart disease    • Vision problems    • Vitamin B12 deficiency    • Vitamin D deficiency    • Wears glasses       Past Surgical History:   Procedure Laterality Date   • CATARACT EXTRACTION Bilateral    • CERVICAL POLYPECTOMY     • COLONOSCOPY  2011   • COLONOSCOPY N/A 1/9/2018    Procedure: COLONOSCOPY with endoscopic mucosal resection (hot snare), normal saline submucosal injection, argon thermal ablation, resolution clip placement x4, and cold biopsy polypectomy;  Surgeon: Pieter Concepcion MD;  Location: [facility] ENDOSCOPY;  Service:    • ENDOSCOPY N/A 12/6/2017    Procedure: ESOPHAGOGASTRODUODENOSCOPY with biopsies and hot snare polypectomies;  Surgeon: Pieter Concepcion MD;  Location: [facility] ENDOSCOPY;  Service:    • ENDOSCOPY N/A 1/15/2019    Procedure: ESOPHAGOGASTRODUODENOSCOPY WITH BIOPSIES AND HOT SNARE POLYPECTOMIES X10;  Surgeon: Pieter Concepcion MD;  Location: [facility] ENDOSCOPY;  Service: Gastroenterology   • HARDWARE REMOVAL Left 2/17/2023    Procedure: FEMUR HARDWARE REMOVAL, LEFT;  Surgeon: Gabriele Loyd MD;  Location: [facility] OR;  Service: Orthopedics;  Laterality: Left;   • HIP TROCHANTERIC NAILING WITH INTRAMEDULLARY HIP SCREW Left 2/14/2021    Procedure: HIP TROCHANTER NAIL SHORT WITH INTRAMEDULLARY HIP SCREW, LEFT;  Surgeon: Gabriele Loyd MD;  Location: [facility] OR;  Service: Orthopedics;  Laterality: Left;   • TOTAL HIP ARTHROPLASTY Left 2/17/2023    Procedure: REMOVAL OF FRACTURE HARDWARE AND CONVERSION TO TOTAL HIP ARTHROPLASTY, LEFT;  Surgeon: Gabriele Loyd MD;  Location: [facility] OR;  Service: Orthopedics;  Laterality:  Left;   • UPPER GASTROINTESTINAL ENDOSCOPY  08/18/2014         MEDICATIONS:  I have reviewed and reconciled the patients medication list in the patients chart at the skilled nursing facility today, 05/02/2023.      ALLERGIES:  Allergies   Allergen Reactions   • Ferrlecit [Na Ferric Gluc Cplx In Sucrose] Swelling   • Ranitidine Hcl Shortness Of Breath   • Levofloxacin Other (See Comments)     Leg cramps     • Lisinopril Cough         SOCIAL HISTORY:  Social History     Socioeconomic History   • Marital status:    Tobacco Use   • Smoking status: Never   • Smokeless tobacco: Never   Vaping Use   • Vaping Use: Never used   Substance and Sexual Activity   • Alcohol use: Never   • Drug use: Never   • Sexual activity: Not Currently     Birth control/protection: Post-menopausal       FAMILY HISTORY:  Family History   Problem Relation Age of Onset   • Liver cancer Maternal Grandmother    • Gout Other    • Osteoporosis Other    • Stroke Other    • Ulcers Other    • Asthma Other    • Hypertension Other    • Heart disease Other    • Osteoarthritis Other    • Colon cancer Neg Hx        REVIEW OF SYSTEMS:  Review of Systems   Unable to perform ROS: Dementia          PHYSICAL EXAMINATION:     VITAL SIGNS:  /70   Pulse 71   Temp 97.5 °F (36.4 °C)   Resp 18   Wt 50.3 kg (111 lb)   LMP  (LMP Unknown)   SpO2 92%   BMI 22.41 kg/m²     Physical Exam  Vitals and nursing note reviewed.   Constitutional:       Appearance: Normal appearance. She is well-developed.      Comments: Frail elderly female in no acute distress   HENT:      Head: Normocephalic and atraumatic.      Nose: Nose normal.      Mouth/Throat:      Mouth: Mucous membranes are moist.      Pharynx: No oropharyngeal exudate.   Eyes:      General: No scleral icterus.     Conjunctiva/sclera: Conjunctivae normal.      Pupils: Pupils are equal, round, and reactive to light.   Neck:      Thyroid: No thyromegaly.   Cardiovascular:      Rate and Rhythm: Normal  rate and regular rhythm.      Heart sounds: Normal heart sounds. No murmur heard.    No friction rub. No gallop.   Pulmonary:      Effort: Pulmonary effort is normal. No respiratory distress.      Breath sounds: Normal breath sounds. No wheezing.   Abdominal:      General: Bowel sounds are normal. There is no distension.      Palpations: Abdomen is soft.      Tenderness: There is no abdominal tenderness.   Musculoskeletal:         General: No deformity or signs of injury.      Cervical back: Normal range of motion and neck supple.      Comments: Multiple small joint deformities in hands   Lymphadenopathy:      Cervical: No cervical adenopathy.   Skin:     General: Skin is warm and dry.      Findings: No rash.   Neurological:      Mental Status: She is alert and oriented to person, place, and time.   Psychiatric:         Mood and Affect: Mood normal.         Behavior: Behavior normal.         RECORDS REVIEW:       ASSESSMENT     Diagnoses and all orders for this visit:    1. Closed fracture of left hip with routine healing, subsequent encounter (Primary)    2. Impaired mobility and ADLs    3. Chronic kidney disease (CKD), stage IV (severe)    4. Moderate protein-calorie malnutrition    5. Simple chronic bronchitis    6. Anemia, unspecified type    7. Essential hypertension    8. Rheumatoid arthritis involving multiple sites, unspecified whether rheumatoid factor present    9. Mood disorder    10. Chronic obstructive pulmonary disease, unspecified COPD type    11. Gout, unspecified cause, unspecified chronicity, unspecified site        PLAN  Left hip fracture  - Patient had hardware fracture which required conversion to total hip arthroplasty  -Patient has followed up with orthopedics and is now weightbearing as tolerated.  - Continue physical therapy and Occupational Therapy in the nursing facility.    Impaired mobility and ADLs  - Continue physical therapy and nursing facility for strengthening.  - Patient continues  to benefit from nursing care for ADLs.    CKD stage IV  - Currently following with Dr. Schilling.  Diuretics were adjusted during hospitalization.  Follow-up as scheduled.    Rheumatoid arthritis  - Currently on low-dose steroids along with Plaquenil.  Patient is currently on 10 mg prednisone.  I am open to trying to gradually taper down on this regimen if tolerated.  As the patient's family was not available, this discussion can be considered at a later time.  -Follow-up with rheumatology as scheduled.    Mood disorder  - Currently stable Seroquel.    COPD  - Continue Symbicort and nebs as needed in facility.    Gout  - Continue allopurinol.    GERD  - Continue Prilosec 40 mg daily.    Hypertension  - Home doses of carvedilol and irbesartan were continued.    Anemia related to chronic kidney disease  -Continue Procrit injections.            [x]  Discussed Patient in detail with nursing/staff, addressed all needs today.     [x]  Plan of Care Reviewed   []  PT/OT Reviewed   []  Order Changes  []  Discharge Plans Reviewed  [x]  Advance Directive on file with Nursing Home.   [x]  POA on file with Nursing Home.    [x]  Code Status listed and reviewed.     I confirm accuracy of unchanged data/findings including physical exam and plan which have been carried forward from previous visit, as well as I have updated appropriately those that have changed          Jose Antonio Pride DO.  5/2/2023

## 2023-05-03 RX ORDER — HYDROCODONE BITARTRATE AND ACETAMINOPHEN 5; 325 MG/1; MG/1
1 TABLET ORAL EVERY 6 HOURS PRN
Qty: 120 TABLET | Refills: 0 | Status: SHIPPED | OUTPATIENT
Start: 2023-05-03

## 2023-05-03 NOTE — TELEPHONE ENCOUNTER
(NEW SCRIPT)    [facility] REQUESTING MED REFILL FOR NORCO 5-325 MG.    DIRECTIONS: NORCO 5-325 MG 1 TAB PO Q 6 HRS PRN.

## 2023-05-09 ENCOUNTER — NURSING HOME (OUTPATIENT)
Dept: FAMILY MEDICINE CLINIC | Facility: CLINIC | Age: 83
End: 2023-05-09
Payer: MEDICARE

## 2023-05-09 VITALS
RESPIRATION RATE: 18 BRPM | TEMPERATURE: 97.8 F | WEIGHT: 111.2 LBS | BODY MASS INDEX: 22.45 KG/M2 | HEART RATE: 70 BPM | OXYGEN SATURATION: 97 % | DIASTOLIC BLOOD PRESSURE: 68 MMHG | SYSTOLIC BLOOD PRESSURE: 123 MMHG

## 2023-05-09 DIAGNOSIS — N18.4 ANEMIA DUE TO STAGE 4 CHRONIC KIDNEY DISEASE TREATED WITH ERYTHROPOIETIN: ICD-10-CM

## 2023-05-09 DIAGNOSIS — Z78.9 IMPAIRED MOBILITY AND ADLS: ICD-10-CM

## 2023-05-09 DIAGNOSIS — D63.1 ANEMIA DUE TO STAGE 4 CHRONIC KIDNEY DISEASE TREATED WITH ERYTHROPOIETIN: ICD-10-CM

## 2023-05-09 DIAGNOSIS — Z74.09 IMPAIRED MOBILITY AND ADLS: ICD-10-CM

## 2023-05-09 DIAGNOSIS — Z87.440 HISTORY OF UTI: ICD-10-CM

## 2023-05-09 DIAGNOSIS — R41.0 INTERMITTENT CONFUSION: Primary | ICD-10-CM

## 2023-05-09 NOTE — PROGRESS NOTES
Nursing Home Follow Up Note      Mikey Duran DO []   FATUMA Monet [x]  [address] [place] [address]  Phone: [phone]  Fax: [phone] Baltazar Borden MD []    Jose Antonio Pride DO []   [address] [place] [address]  Phone: [phone]  Fax: [phone]     PATIENT NAME: Carolyne Martins                                                                          YOB: 1940           DATE OF SERVICE: 05/9/2023  FACILITY:  [][place]   [] [place]   [x] [facility]   [] [facility]   [] Other ______________________________________________________________________      CHIEF COMPLAINT:    Increased confusion yesterday.     Follow-up on lab results.    HISTORY OF PRESENT ILLNESS:     Behavioral health provider follow-up with patient yesterday and reported to nursing that she had increased confusion.  Nursing felt that she did have some slight confusion yesterday but has appeared to be at baseline today.  She appears at baseline during visit today.  She has had increased confusion in the past with UTI.    She has weekly labs obtained due to epoetin injections she receives for her CKD with anemia.  Hemoglobin 11, iron 31 and transferrin 154. Iron still slightly low but much improved, as well as Hgb.  No symptoms of anemia.  She receives the injections biweekly, and oral ferrous sulfate.    PAST MEDICAL & SURGICAL HISTORY:    Past Medical History:   Diagnosis Date   • Anemia 1998   • Anemia    • Arthritis    • Back pain    • Bleeding from anus    • Bronchitis     STATES HAS BRONCHITIS CURRENTLY (12/5/17).     • Bronchitis 12/2017   • CAD (coronary artery disease)    • Cataract, bilateral    • Chronic kidney disease     STAGE 4.  SEEREBA HALEY   • Chronic kidney failure     REPORTS STAGE IV   • Difficulty swallowing solids    • Disease of thyroid gland    • Fracture, radius 2016    right distal radius and ulna, healed.   • GERD (gastroesophageal reflux disease)    • Gout    • High  "cholesterol    • History of blood transfusion 2019   • History of nuclear stress test 2014    DR. CORTEZ.  \"IT WAS OK\"   • Hyperparathyroidism    • Hyperparathyroidism    • Hypertension    • Impaired functional mobility, balance, gait, and endurance    • Impaired mobility    • Iron deficiency    • Osteoarthritis    • Osteoarthritis of knees, bilateral     Both knees Supartz injection series August, 2015   • Osteoporosis    • Palpitations    • Personal history of cardiac murmur    • Pessary maintenance    • PONV (postoperative nausea and vomiting)    • Presence of pessary    • Problems with swallowing     WITH FOOD OCCASSIONALLY   • Rheumatic heart disease     Personal history   • Rotator cuff tendonitis     right    • Rupture of right proximal biceps tendon     chronic   • Seasonal allergies    • Sinus problem    • Spinal headache     REPORTS AFTER DELIVERY OF SON   • Subacromial bursitis     right    • Valvular heart disease    • Vision problems    • Vitamin B12 deficiency    • Vitamin D deficiency    • Wears glasses       Past Surgical History:   Procedure Laterality Date   • CATARACT EXTRACTION Bilateral    • CERVICAL POLYPECTOMY     • COLONOSCOPY  2011   • COLONOSCOPY N/A 1/9/2018    Procedure: COLONOSCOPY with endoscopic mucosal resection (hot snare), normal saline submucosal injection, argon thermal ablation, resolution clip placement x4, and cold biopsy polypectomy;  Surgeon: Pieter Concepcion MD;  Location: [facility] ENDOSCOPY;  Service:    • ENDOSCOPY N/A 12/6/2017    Procedure: ESOPHAGOGASTRODUODENOSCOPY with biopsies and hot snare polypectomies;  Surgeon: Pieter Concepcion MD;  Location: [facility] ENDOSCOPY;  Service:    • ENDOSCOPY N/A 1/15/2019    Procedure: ESOPHAGOGASTRODUODENOSCOPY WITH BIOPSIES AND HOT SNARE POLYPECTOMIES X10;  Surgeon: Pieter Concepcion MD;  Location: [facility] ENDOSCOPY;  Service: Gastroenterology   • HARDWARE REMOVAL Left 2/17/2023    Procedure: FEMUR HARDWARE REMOVAL, LEFT;  Surgeon: Evert" Gabriele RIZO MD;  Location: [facility];  Service: Orthopedics;  Laterality: Left;   • HIP TROCHANTERIC NAILING WITH INTRAMEDULLARY HIP SCREW Left 2/14/2021    Procedure: HIP TROCHANTER NAIL SHORT WITH INTRAMEDULLARY HIP SCREW, LEFT;  Surgeon: Gabriele Loyd MD;  Location: [facility] OR;  Service: Orthopedics;  Laterality: Left;   • TOTAL HIP ARTHROPLASTY Left 2/17/2023    Procedure: REMOVAL OF FRACTURE HARDWARE AND CONVERSION TO TOTAL HIP ARTHROPLASTY, LEFT;  Surgeon: Gabriele Loyd MD;  Location: [facility] OR;  Service: Orthopedics;  Laterality: Left;   • UPPER GASTROINTESTINAL ENDOSCOPY  08/18/2014         MEDICATIONS:  I have reviewed and reconciled the patients medication list in the patients chart at the [facility] nursing facility today.      ALLERGIES:    Allergies   Allergen Reactions   • Ferrlecit [Na Ferric Gluc Cplx In Sucrose] Swelling   • Ranitidine Hcl Shortness Of Breath   • Levofloxacin Other (See Comments)     Leg cramps     • Lisinopril Cough         SOCIAL HISTORY:    Social History     Socioeconomic History   • Marital status:    Tobacco Use   • Smoking status: Never   • Smokeless tobacco: Never   Vaping Use   • Vaping Use: Never used   Substance and Sexual Activity   • Alcohol use: Never   • Drug use: Never   • Sexual activity: Not Currently     Birth control/protection: Post-menopausal       FAMILY HISTORY:    Family History   Problem Relation Age of Onset   • Liver cancer Maternal Grandmother    • Gout Other    • Osteoporosis Other    • Stroke Other    • Ulcers Other    • Asthma Other    • Hypertension Other    • Heart disease Other    • Osteoarthritis Other    • Colon cancer Neg Hx        REVIEW OF SYSTEMS:    Review of Systems   Reason unable to perform ROS: per patient and nursing.   Constitutional: Negative for activity change, appetite change, chills, diaphoresis, fatigue, fever and unexpected weight loss.   HENT: Negative for congestion, mouth sores and nosebleeds.    Respiratory:  Positive for shortness of breath (chronic). Negative for apnea, cough, choking, chest tightness and wheezing.         Chronic related to COPD   Cardiovascular: Negative for chest pain, palpitations and leg swelling.   Gastrointestinal: Negative for abdominal pain, constipation, diarrhea, nausea and vomiting.   Genitourinary: Negative for decreased urine volume, difficulty urinating, dysuria, frequency, hematuria, urgency and urinary incontinence.   Musculoskeletal: Positive for arthralgias (chronic) and gait problem.   Skin: Negative for color change, dry skin and rash.   Neurological: Positive for weakness, memory problem and confusion (per nursing). Negative for dizziness and headache.         PHYSICAL EXAMINATION:   VITAL SIGNS:   Vitals:    05/09/23 1121   BP: 123/68   Pulse: 70   Resp: 18   Temp: 97.8 °F (36.6 °C)   SpO2: 97%   Weight: 50.4 kg (111 lb 3.2 oz)       Physical Exam  Vitals and nursing note reviewed.   Constitutional:       Appearance: Normal appearance. She is well-developed.   HENT:      Head: Normocephalic.      Right Ear: External ear normal.      Left Ear: External ear normal.      Nose: Nose normal.   Eyes:      Conjunctiva/sclera: Conjunctivae normal.   Cardiovascular:      Rate and Rhythm: Normal rate and regular rhythm.      Heart sounds: Normal heart sounds.   Pulmonary:      Effort: Pulmonary effort is normal. No respiratory distress.      Breath sounds: Decreased air movement present. Decreased breath sounds present.   Abdominal:      General: Bowel sounds are normal. There is no distension.      Palpations: Abdomen is soft.      Tenderness: There is no abdominal tenderness.   Musculoskeletal:      Left hip: Decreased range of motion.   Skin:     General: Skin is warm and dry.      Findings: No rash.   Neurological:      Mental Status: She is alert and oriented to person, place, and time.      Comments: No confusion at this time   Psychiatric:         Mood and Affect: Mood and affect  normal.         Speech: Speech normal.         Behavior: Behavior normal.         Cognition and Memory: Cognition is not impaired. Memory is impaired.         RECORDS REVIEW:   I have reviewed and interpreted the records in [facility] and [facility]    ASSESSMENT     Diagnoses and all orders for this visit:    1. Intermittent confusion (Primary)    2. History of UTI    3. Anemia due to stage 4 chronic kidney disease treated with erythropoietin    4. Impaired mobility and ADLs        PLAN     Intermittent confusion/history UTI  -Will get UA C&S for further evaluation. Will follow up with results.     Anemia due to CKD  -Hemoglobin stable at 11. Iron still slightly low at 31. Will increase Ferrous Sulfate to tid. She will continue erythropoietin injections biweekly and continue to follow with nephrology. Will check iron in 1 month.    Will follow up and continue to monitor.     Nursing to continue supportive care for all ADLs.    Nursing encouraged to keep me informed of any acute changes, lack of improvement, or any new concerning symptoms.    [x]  Discussed Patient in detail with nursing/staff, addressed all needs today.     [x]  Plan of Care Reviewed   [x]  PT/OT Reviewed   [x]  Order Changes  [x]  Discharge Plans Reviewed  [x]  Advance Directive on file with Nursing Home.   [x]  POA on file with Nursing Home.   [x]  Code Status listed: []  Full Code   [x]  DNR     “I confirm accuracy of unchanged data/findings which have been carried forward from previous visit, as well as I have updated appropriately those that have changed.”            Roma Diaz, FATUMA.

## 2023-05-09 NOTE — LETTER
Nursing Home Follow Up Note      Mikey Duran DO []   FATUMA Monet [x]  [address] [place] [address]  Phone: [phone]  Fax: [phone] Baltazar Borden MD []    Jose Antonio Pride DO []   [address] [place] [address]  Phone: [phone]  Fax: [phone]     PATIENT NAME: Carolyne Martins                                                                          YOB: 1940           DATE OF SERVICE: 05/9/2023  FACILITY:  [][place]   [] [place]   [x] [facility]   [] [facility]   [] Other ______________________________________________________________________      CHIEF COMPLAINT:    Increased confusion yesterday.     Follow-up on lab results.    HISTORY OF PRESENT ILLNESS:     Behavioral health provider follow-up with patient yesterday and reported to nursing that she had increased confusion.  Nursing felt that she did have some slight confusion yesterday but has appeared to be at baseline today.  She appears at baseline during visit today.  She has had increased confusion in the past with UTI.    She has weekly labs obtained due to epoetin injections she receives for her CKD with anemia.  Hemoglobin 11, iron 31 and transferrin 154. Iron still slightly low but much improved, as well as Hgb.  No symptoms of anemia.  She receives the injections biweekly, and oral ferrous sulfate.    PAST MEDICAL & SURGICAL HISTORY:    Past Medical History:   Diagnosis Date   • Anemia 1998   • Anemia    • Arthritis    • Back pain    • Bleeding from anus    • Bronchitis     STATES HAS BRONCHITIS CURRENTLY (12/5/17).     • Bronchitis 12/2017   • CAD (coronary artery disease)    • Cataract, bilateral    • Chronic kidney disease     STAGE 4.  SEEREBA HALEY   • Chronic kidney failure     REPORTS STAGE IV   • Difficulty swallowing solids    • Disease of thyroid gland    • Fracture, radius 2016    right distal radius and ulna, healed.   • GERD (gastroesophageal reflux disease)    • Gout    • High  "cholesterol    • History of blood transfusion 2019   • History of nuclear stress test 2014    DR. CORTEZ.  \"IT WAS OK\"   • Hyperparathyroidism    • Hyperparathyroidism    • Hypertension    • Impaired functional mobility, balance, gait, and endurance    • Impaired mobility    • Iron deficiency    • Osteoarthritis    • Osteoarthritis of knees, bilateral     Both knees Supartz injection series August, 2015   • Osteoporosis    • Palpitations    • Personal history of cardiac murmur    • Pessary maintenance    • PONV (postoperative nausea and vomiting)    • Presence of pessary    • Problems with swallowing     WITH FOOD OCCASSIONALLY   • Rheumatic heart disease     Personal history   • Rotator cuff tendonitis     right    • Rupture of right proximal biceps tendon     chronic   • Seasonal allergies    • Sinus problem    • Spinal headache     REPORTS AFTER DELIVERY OF SON   • Subacromial bursitis     right    • Valvular heart disease    • Vision problems    • Vitamin B12 deficiency    • Vitamin D deficiency    • Wears glasses       Past Surgical History:   Procedure Laterality Date   • CATARACT EXTRACTION Bilateral    • CERVICAL POLYPECTOMY     • COLONOSCOPY  2011   • COLONOSCOPY N/A 1/9/2018    Procedure: COLONOSCOPY with endoscopic mucosal resection (hot snare), normal saline submucosal injection, argon thermal ablation, resolution clip placement x4, and cold biopsy polypectomy;  Surgeon: Pieter Concepcion MD;  Location: [facility] ENDOSCOPY;  Service:    • ENDOSCOPY N/A 12/6/2017    Procedure: ESOPHAGOGASTRODUODENOSCOPY with biopsies and hot snare polypectomies;  Surgeon: Pieter Concepcion MD;  Location: [facility] ENDOSCOPY;  Service:    • ENDOSCOPY N/A 1/15/2019    Procedure: ESOPHAGOGASTRODUODENOSCOPY WITH BIOPSIES AND HOT SNARE POLYPECTOMIES X10;  Surgeon: Pieter Concepcion MD;  Location: [facility] ENDOSCOPY;  Service: Gastroenterology   • HARDWARE REMOVAL Left 2/17/2023    Procedure: FEMUR HARDWARE REMOVAL, LEFT;  Surgeon: Evert" Gabriele RIZO MD;  Location: [facility];  Service: Orthopedics;  Laterality: Left;   • HIP TROCHANTERIC NAILING WITH INTRAMEDULLARY HIP SCREW Left 2/14/2021    Procedure: HIP TROCHANTER NAIL SHORT WITH INTRAMEDULLARY HIP SCREW, LEFT;  Surgeon: Gabriele Loyd MD;  Location: [facility] OR;  Service: Orthopedics;  Laterality: Left;   • TOTAL HIP ARTHROPLASTY Left 2/17/2023    Procedure: REMOVAL OF FRACTURE HARDWARE AND CONVERSION TO TOTAL HIP ARTHROPLASTY, LEFT;  Surgeon: Gabriele Loyd MD;  Location: [facility] OR;  Service: Orthopedics;  Laterality: Left;   • UPPER GASTROINTESTINAL ENDOSCOPY  08/18/2014         MEDICATIONS:  I have reviewed and reconciled the patients medication list in the patients chart at the [facility] nursing facility today.      ALLERGIES:    Allergies   Allergen Reactions   • Ferrlecit [Na Ferric Gluc Cplx In Sucrose] Swelling   • Ranitidine Hcl Shortness Of Breath   • Levofloxacin Other (See Comments)     Leg cramps     • Lisinopril Cough         SOCIAL HISTORY:    Social History     Socioeconomic History   • Marital status:    Tobacco Use   • Smoking status: Never   • Smokeless tobacco: Never   Vaping Use   • Vaping Use: Never used   Substance and Sexual Activity   • Alcohol use: Never   • Drug use: Never   • Sexual activity: Not Currently     Birth control/protection: Post-menopausal       FAMILY HISTORY:    Family History   Problem Relation Age of Onset   • Liver cancer Maternal Grandmother    • Gout Other    • Osteoporosis Other    • Stroke Other    • Ulcers Other    • Asthma Other    • Hypertension Other    • Heart disease Other    • Osteoarthritis Other    • Colon cancer Neg Hx        REVIEW OF SYSTEMS:    Review of Systems   Reason unable to perform ROS: per patient and nursing.   Constitutional: Negative for activity change, appetite change, chills, diaphoresis, fatigue, fever and unexpected weight loss.   HENT: Negative for congestion, mouth sores and nosebleeds.    Respiratory:  Positive for shortness of breath (chronic). Negative for apnea, cough, choking, chest tightness and wheezing.         Chronic related to COPD   Cardiovascular: Negative for chest pain, palpitations and leg swelling.   Gastrointestinal: Negative for abdominal pain, constipation, diarrhea, nausea and vomiting.   Genitourinary: Negative for decreased urine volume, difficulty urinating, dysuria, frequency, hematuria, urgency and urinary incontinence.   Musculoskeletal: Positive for arthralgias (chronic) and gait problem.   Skin: Negative for color change, dry skin and rash.   Neurological: Positive for weakness, memory problem and confusion (per nursing). Negative for dizziness and headache.         PHYSICAL EXAMINATION:   VITAL SIGNS:   Vitals:    05/09/23 1121   BP: 123/68   Pulse: 70   Resp: 18   Temp: 97.8 °F (36.6 °C)   SpO2: 97%   Weight: 50.4 kg (111 lb 3.2 oz)       Physical Exam  Vitals and nursing note reviewed.   Constitutional:       Appearance: Normal appearance. She is well-developed.   HENT:      Head: Normocephalic.      Right Ear: External ear normal.      Left Ear: External ear normal.      Nose: Nose normal.   Eyes:      Conjunctiva/sclera: Conjunctivae normal.   Cardiovascular:      Rate and Rhythm: Normal rate and regular rhythm.      Heart sounds: Normal heart sounds.   Pulmonary:      Effort: Pulmonary effort is normal. No respiratory distress.      Breath sounds: Decreased air movement present. Decreased breath sounds present.   Abdominal:      General: Bowel sounds are normal. There is no distension.      Palpations: Abdomen is soft.      Tenderness: There is no abdominal tenderness.   Musculoskeletal:      Left hip: Decreased range of motion.   Skin:     General: Skin is warm and dry.      Findings: No rash.   Neurological:      Mental Status: She is alert and oriented to person, place, and time.      Comments: No confusion at this time   Psychiatric:         Mood and Affect: Mood and affect  normal.         Speech: Speech normal.         Behavior: Behavior normal.         Cognition and Memory: Cognition is not impaired. Memory is impaired.         RECORDS REVIEW:   I have reviewed and interpreted the records in [facility] and [facility]    ASSESSMENT     Diagnoses and all orders for this visit:    1. Intermittent confusion (Primary)    2. History of UTI    3. Anemia due to stage 4 chronic kidney disease treated with erythropoietin    4. Impaired mobility and ADLs        PLAN     Intermittent confusion/history UTI  -Will get UA C&S for further evaluation. Will follow up with results.     Anemia due to CKD  -Hemoglobin stable at 11. Iron still slightly low at 31. Will increase Ferrous Sulfate to tid. She will continue erythropoietin injections biweekly and continue to follow with nephrology. Will check iron in 1 month.    Will follow up and continue to monitor.     Nursing to continue supportive care for all ADLs.    Nursing encouraged to keep me informed of any acute changes, lack of improvement, or any new concerning symptoms.    [x]  Discussed Patient in detail with nursing/staff, addressed all needs today.     [x]  Plan of Care Reviewed   [x]  PT/OT Reviewed   [x]  Order Changes  [x]  Discharge Plans Reviewed  [x]  Advance Directive on file with Nursing Home.   [x]  POA on file with Nursing Home.   [x]  Code Status listed: []  Full Code   [x]  DNR     “I confirm accuracy of unchanged data/findings which have been carried forward from previous visit, as well as I have updated appropriately those that have changed.”            Roma Diaz, FATUMA.

## 2023-05-17 ENCOUNTER — NURSING HOME (OUTPATIENT)
Dept: FAMILY MEDICINE CLINIC | Facility: CLINIC | Age: 83
End: 2023-05-17
Payer: MEDICARE

## 2023-05-17 VITALS
WEIGHT: 111.2 LBS | RESPIRATION RATE: 18 BRPM | HEART RATE: 67 BPM | TEMPERATURE: 98.8 F | SYSTOLIC BLOOD PRESSURE: 136 MMHG | BODY MASS INDEX: 22.45 KG/M2 | DIASTOLIC BLOOD PRESSURE: 82 MMHG | OXYGEN SATURATION: 95 %

## 2023-05-17 DIAGNOSIS — M25.50 PAIN IN JOINT INVOLVING MULTIPLE SITES: ICD-10-CM

## 2023-05-17 DIAGNOSIS — N18.4 CKD (CHRONIC KIDNEY DISEASE) STAGE 4, GFR 15-29 ML/MIN: ICD-10-CM

## 2023-05-17 DIAGNOSIS — N18.4 ANEMIA DUE TO STAGE 4 CHRONIC KIDNEY DISEASE TREATED WITH ERYTHROPOIETIN: ICD-10-CM

## 2023-05-17 DIAGNOSIS — D63.1 ANEMIA DUE TO STAGE 4 CHRONIC KIDNEY DISEASE TREATED WITH ERYTHROPOIETIN: ICD-10-CM

## 2023-05-17 DIAGNOSIS — K21.9 GASTROESOPHAGEAL REFLUX DISEASE WITHOUT ESOPHAGITIS: ICD-10-CM

## 2023-05-17 DIAGNOSIS — Z96.642 STATUS POST LEFT HIP REPLACEMENT: ICD-10-CM

## 2023-05-17 DIAGNOSIS — I10 ESSENTIAL HYPERTENSION: ICD-10-CM

## 2023-05-17 DIAGNOSIS — J44.9 CHRONIC OBSTRUCTIVE PULMONARY DISEASE, UNSPECIFIED COPD TYPE: Primary | ICD-10-CM

## 2023-05-17 DIAGNOSIS — M19.90 INFLAMMATORY ARTHRITIS: ICD-10-CM

## 2023-05-17 RX ORDER — HYDROCODONE BITARTRATE AND ACETAMINOPHEN 5; 325 MG/1; MG/1
1 TABLET ORAL EVERY 6 HOURS PRN
Qty: 60 TABLET | Refills: 0 | Status: ON HOLD | OUTPATIENT
Start: 2023-05-17

## 2023-05-17 NOTE — LETTER
Nursing Home Follow Up Note      Mikey Duran DO []   FATUMA Monet [x]  [address] [place] [address]  Phone: [phone]  Fax: [phone] Baltazar Borden MD []    Jose Antonio Pride DO []   [address] [place] [address]  Phone: [phone]  Fax: [phone]     PATIENT NAME: Carolyne Martins                                                                          YOB: 1940           DATE OF SERVICE: 2023  FACILITY:  [][place]   [] [place]   [x] [facility]   [] [facility]   [] Other ______________________________________________________________________      CHIEF COMPLAINT:    Discharging home on  with family.      HISTORY OF PRESENT ILLNESS:     Patient is an 82-year-old woman with past medical history of CKD follows with nephrology, Dr. Fitzgerald, coronary artery disease follows with Dr. Acuña, arthritis, back pain, hyperparathyroidism, hypertension, anemia, osteoarthritis, rheumatic heart disease, valvular heart disease.  Presented to [facility] ED on 2023 feeling weak progressively worse over a few days.  She was treated for UTI with Rocephin and was discharged to the facility on Keflex. She was also treated with Plaquenil and steroids for a flare of her inflammatory arthritis.  She was continued on Plaquenil and is on prednisone 10 mg daily. She was admitted here to facility for rehabilitation and strengthening. She was unable to participate much or progress for the first couple months but she has progressed very well for the past couple months. Insurance coverage will  tomorrow so she will be discharging home tomorrow. She will discharge to her home and her son lives beside of her to assist her. She has no complaints or signs and symptoms of distress today. She is happy to be discharging home.     PAST MEDICAL & SURGICAL HISTORY:   Past Medical History:   Diagnosis Date   • Anemia    • Anemia    • Arthritis    • Back pain    • Bleeding from anus   "  • Bronchitis     STATES HAS BRONCHITIS CURRENTLY (12/5/17).     • Bronchitis 12/2017   • CAD (coronary artery disease)    • Cataract, bilateral    • Chronic kidney disease     STAGE 4.  SEEREBA HALEY   • Chronic kidney failure     REPORTS STAGE IV   • Difficulty swallowing solids    • Disease of thyroid gland    • Fracture, radius 2016    right distal radius and ulna, healed.   • GERD (gastroesophageal reflux disease)    • Gout    • High cholesterol    • History of blood transfusion 2019   • History of nuclear stress test 2014    DR. CORTEZ.  \"IT WAS OK\"   • Hyperparathyroidism    • Hyperparathyroidism    • Hypertension    • Impaired functional mobility, balance, gait, and endurance    • Impaired mobility    • Iron deficiency    • Osteoarthritis    • Osteoarthritis of knees, bilateral     Both knees Supartz injection series August, 2015   • Osteoporosis    • Palpitations    • Personal history of cardiac murmur    • Pessary maintenance    • PONV (postoperative nausea and vomiting)    • Presence of pessary    • Problems with swallowing     WITH FOOD OCCASSIONALLY   • Rheumatic heart disease     Personal history   • Rotator cuff tendonitis     right    • Rupture of right proximal biceps tendon     chronic   • Seasonal allergies    • Sinus problem    • Spinal headache     REPORTS AFTER DELIVERY OF SON   • Subacromial bursitis     right    • Valvular heart disease    • Vision problems    • Vitamin B12 deficiency    • Vitamin D deficiency    • Wears glasses       Past Surgical History:   Procedure Laterality Date   • CATARACT EXTRACTION Bilateral    • CERVICAL POLYPECTOMY     • COLONOSCOPY  2011   • COLONOSCOPY N/A 1/9/2018    Procedure: COLONOSCOPY with endoscopic mucosal resection (hot snare), normal saline submucosal injection, argon thermal ablation, resolution clip placement x4, and cold biopsy polypectomy;  Surgeon: Pieter Concepcion MD;  Location: [facility] ENDOSCOPY;  Service:    • ENDOSCOPY N/A 12/6/2017    Procedure: " ESOPHAGOGASTRODUODENOSCOPY with biopsies and hot snare polypectomies;  Surgeon: Pieter Concepcion MD;  Location: [facility] ENDOSCOPY;  Service:    • ENDOSCOPY N/A 1/15/2019    Procedure: ESOPHAGOGASTRODUODENOSCOPY WITH BIOPSIES AND HOT SNARE POLYPECTOMIES X10;  Surgeon: Pieter Concepcion MD;  Location: [facility] ENDOSCOPY;  Service: Gastroenterology   • HARDWARE REMOVAL Left 2/17/2023    Procedure: FEMUR HARDWARE REMOVAL, LEFT;  Surgeon: Gabriele Loyd MD;  Location: [facility] OR;  Service: Orthopedics;  Laterality: Left;   • HIP TROCHANTERIC NAILING WITH INTRAMEDULLARY HIP SCREW Left 2/14/2021    Procedure: HIP TROCHANTER NAIL SHORT WITH INTRAMEDULLARY HIP SCREW, LEFT;  Surgeon: Gabriele Loyd MD;  Location: [facility] OR;  Service: Orthopedics;  Laterality: Left;   • TOTAL HIP ARTHROPLASTY Left 2/17/2023    Procedure: REMOVAL OF FRACTURE HARDWARE AND CONVERSION TO TOTAL HIP ARTHROPLASTY, LEFT;  Surgeon: Gabriele Loyd MD;  Location: [facility] OR;  Service: Orthopedics;  Laterality: Left;   • UPPER GASTROINTESTINAL ENDOSCOPY  08/18/2014         MEDICATIONS:  I have reviewed and reconciled the patients medication list in the patients chart at the [facility] nursing [facility] today.      ALLERGIES:    Allergies   Allergen Reactions   • Ferrlecit [Na Ferric Gluc Cplx In Sucrose] Swelling   • Ranitidine Hcl Shortness Of Breath   • Levofloxacin Other (See Comments)     Leg cramps     • Lisinopril Cough         SOCIAL HISTORY:    Social History     Socioeconomic History   • Marital status:    Tobacco Use   • Smoking status: Never   • Smokeless tobacco: Never   Vaping Use   • Vaping Use: Never used   Substance and Sexual Activity   • Alcohol use: Never   • Drug use: Never   • Sexual activity: Not Currently     Birth control/protection: Post-menopausal       FAMILY HISTORY:    Family History   Problem Relation Age of Onset   • Liver cancer Maternal Grandmother    • Gout Other    • Osteoporosis Other    • Stroke Other    •  Ulcers Other    • Asthma Other    • Hypertension Other    • Heart disease Other    • Osteoarthritis Other    • Colon cancer Neg Hx        REVIEW OF SYSTEMS:    Review of Systems   Constitutional: Negative for activity change, appetite change, chills, diaphoresis, fatigue, fever, unexpected weight gain and unexpected weight loss.   HENT: Negative for congestion, mouth sores, nosebleeds, rhinorrhea and trouble swallowing.    Eyes: Negative for discharge, redness and itching.   Respiratory: Negative for apnea, cough, choking, chest tightness, shortness of breath, wheezing and stridor.    Cardiovascular: Negative for chest pain, palpitations and leg swelling.   Gastrointestinal: Negative for abdominal pain, blood in stool, constipation, diarrhea, nausea, vomiting and indigestion.   Endocrine: Negative for polydipsia and polyuria.   Genitourinary: Negative for decreased urine volume, difficulty urinating, dysuria, frequency, hematuria, urgency and urinary incontinence.   Musculoskeletal: Positive for arthralgias (generalized) and gait problem. Negative for joint swelling and myalgias.   Skin: Negative for color change, dry skin, rash and skin lesions.   Neurological: Positive for weakness and memory problem. Negative for dizziness, headache and confusion.   Psychiatric/Behavioral: Negative for behavioral problems, dysphoric mood, hallucinations, sleep disturbance and depressed mood. The patient is not nervous/anxious.          PHYSICAL EXAMINATION:   VITAL SIGNS:   Vitals:    05/17/23 1552   BP: 136/82   Pulse: 67   Resp: 18   Temp: 98.8 °F (37.1 °C)   SpO2: 95%   Weight: 50.4 kg (111 lb 3.2 oz)       Physical Exam  Vitals and nursing note reviewed.   Constitutional:       Appearance: Normal appearance. She is well-developed.   HENT:      Head: Normocephalic.      Right Ear: External ear normal.      Left Ear: External ear normal.      Nose: Nose normal.   Eyes:      Conjunctiva/sclera: Conjunctivae normal.    Cardiovascular:      Rate and Rhythm: Normal rate and regular rhythm.      Heart sounds: Normal heart sounds.   Pulmonary:      Effort: Pulmonary effort is normal. No respiratory distress.      Breath sounds: Normal breath sounds. No wheezing or rales.   Abdominal:      General: Bowel sounds are normal. There is no distension.      Palpations: Abdomen is soft.      Tenderness: There is no abdominal tenderness.   Musculoskeletal:      Comments: NROM all major joints   Skin:     General: Skin is warm and dry.      Findings: No rash.   Neurological:      Mental Status: She is alert and oriented to person, place, and time.   Psychiatric:         Mood and Affect: Mood and affect normal.         Speech: Speech normal.         Cognition and Memory: Cognition is not impaired. Memory is impaired.         RECORDS REVIEW:   I have reviewed and interpreted the records in [facility] and [facility]    ASSESSMENT     Diagnoses and all orders for this visit:    1. Chronic obstructive pulmonary disease, unspecified COPD type (Primary)    2. Status post left hip replacement    3. Anemia due to stage 4 chronic kidney disease treated with erythropoietin    4. CKD (chronic kidney disease) stage 4, GFR 15-29 ml/min ([facility])    5. Gastroesophageal reflux disease without esophagitis    6. Essential hypertension    7. Pain in joint involving multiple sites    8. Inflammatory arthritis        PLAN    COPD  -Stable on current medications with no acute exacerbation.     HTN  -At goal during admission. Continue meds a directed.     CKD with anemia  -She is managed by Nephrology. She is on Epogen injections bi-weekly, and takes iron supplements. She has CBC check every 2 weeks.     GERD  -Controlled during admission. She is aware of triggers.     Chronic inflammatory arthritis/Joint pain multiple sites  -Stable on Prednisone and current medications. Will send short course.     She will follow up with PCP for management of chronic conditions.     Nursing to  continue supportive care for all ADLs.    Nursing encouraged to keep me informed of any acute changes, poonam any new concerning symptoms.    [x]  Discussed Patient in detail with nursing/staff, addressed all needs today.     [x]  Plan of Care Reviewed   [x]  PT/OT Reviewed   [x]  Order Changes  [x]  Discharge Plans Reviewed  [x]  Advance Directive on file with Nursing Home.   [x]  POA on file with Nursing Home.   [x]  Code Status listed: []  Full Code   [x]  DNR     “I confirm accuracy of unchanged data/findings which have been carried forward from previous visit, as well as I have updated appropriately those that have changed.”    I spent 40 minutes on discharge for Carolyne on this date of service. This time includes time spent by me in the following activities:preparing for the visit, reviewing tests, obtaining and/or reviewing a separately obtained history, performing a medically appropriate examination and/or evaluation , counseling and educating the patient/family/caregiver, ordering medications, tests, or procedures, referring and communicating with other health care professionals , documenting information in the medical record, independently interpreting results and communicating that information with the patient/family/caregiver and care coordination    Roma Diaz, APRN.

## 2023-05-18 NOTE — PROGRESS NOTES
Nursing Home Follow Up Note      Mikey Duran DO []   FATUMA Monet [x]  [address] [place] [address]  Phone: [phone]  Fax: [phone] Baltazar Borden MD []    Jose Antonio Pride DO []   [address] [place] [address]  Phone: [phone]  Fax: [phone]     PATIENT NAME: Carolyne Martins                                                                          YOB: 1940           DATE OF SERVICE: 2023  FACILITY:  [][place]   [] [place]   [x] [facility]   [] [facility]   [] Other ______________________________________________________________________      CHIEF COMPLAINT:    Discharging home on  with family.      HISTORY OF PRESENT ILLNESS:     Patient is an 82-year-old woman with past medical history of CKD follows with nephrology, Dr. Fitzgerald, coronary artery disease follows with Dr. Acuña, arthritis, back pain, hyperparathyroidism, hypertension, anemia, osteoarthritis, rheumatic heart disease, valvular heart disease.  Presented to [facility] ED on 2023 feeling weak progressively worse over a few days.  She was treated for UTI with Rocephin and was discharged to the facility on Keflex. She was also treated with Plaquenil and steroids for a flare of her inflammatory arthritis.  She was continued on Plaquenil and is on prednisone 10 mg daily. She was admitted here to facility for rehabilitation and strengthening. She was unable to participate much or progress for the first couple months but she has progressed very well for the past couple months. Insurance coverage will  tomorrow so she will be discharging home tomorrow. She will discharge to her home and her son lives beside of her to assist her. She has no complaints or signs and symptoms of distress today. She is happy to be discharging home.     PAST MEDICAL & SURGICAL HISTORY:   Past Medical History:   Diagnosis Date   • Anemia    • Anemia    • Arthritis    • Back pain    • Bleeding from anus   "  • Bronchitis     STATES HAS BRONCHITIS CURRENTLY (12/5/17).     • Bronchitis 12/2017   • CAD (coronary artery disease)    • Cataract, bilateral    • Chronic kidney disease     STAGE 4.  SEEREBA HALEY   • Chronic kidney failure     REPORTS STAGE IV   • Difficulty swallowing solids    • Disease of thyroid gland    • Fracture, radius 2016    right distal radius and ulna, healed.   • GERD (gastroesophageal reflux disease)    • Gout    • High cholesterol    • History of blood transfusion 2019   • History of nuclear stress test 2014    DR. CORTEZ.  \"IT WAS OK\"   • Hyperparathyroidism    • Hyperparathyroidism    • Hypertension    • Impaired functional mobility, balance, gait, and endurance    • Impaired mobility    • Iron deficiency    • Osteoarthritis    • Osteoarthritis of knees, bilateral     Both knees Supartz injection series August, 2015   • Osteoporosis    • Palpitations    • Personal history of cardiac murmur    • Pessary maintenance    • PONV (postoperative nausea and vomiting)    • Presence of pessary    • Problems with swallowing     WITH FOOD OCCASSIONALLY   • Rheumatic heart disease     Personal history   • Rotator cuff tendonitis     right    • Rupture of right proximal biceps tendon     chronic   • Seasonal allergies    • Sinus problem    • Spinal headache     REPORTS AFTER DELIVERY OF SON   • Subacromial bursitis     right    • Valvular heart disease    • Vision problems    • Vitamin B12 deficiency    • Vitamin D deficiency    • Wears glasses       Past Surgical History:   Procedure Laterality Date   • CATARACT EXTRACTION Bilateral    • CERVICAL POLYPECTOMY     • COLONOSCOPY  2011   • COLONOSCOPY N/A 1/9/2018    Procedure: COLONOSCOPY with endoscopic mucosal resection (hot snare), normal saline submucosal injection, argon thermal ablation, resolution clip placement x4, and cold biopsy polypectomy;  Surgeon: Pieter Concepcion MD;  Location: [facility] ENDOSCOPY;  Service:    • ENDOSCOPY N/A 12/6/2017    Procedure: " ESOPHAGOGASTRODUODENOSCOPY with biopsies and hot snare polypectomies;  Surgeon: Pieter Concepcion MD;  Location: [facility] ENDOSCOPY;  Service:    • ENDOSCOPY N/A 1/15/2019    Procedure: ESOPHAGOGASTRODUODENOSCOPY WITH BIOPSIES AND HOT SNARE POLYPECTOMIES X10;  Surgeon: Pieter Concepcion MD;  Location: [facility] ENDOSCOPY;  Service: Gastroenterology   • HARDWARE REMOVAL Left 2/17/2023    Procedure: FEMUR HARDWARE REMOVAL, LEFT;  Surgeon: Gabriele Loyd MD;  Location: [facility] OR;  Service: Orthopedics;  Laterality: Left;   • HIP TROCHANTERIC NAILING WITH INTRAMEDULLARY HIP SCREW Left 2/14/2021    Procedure: HIP TROCHANTER NAIL SHORT WITH INTRAMEDULLARY HIP SCREW, LEFT;  Surgeon: Gabriele Loyd MD;  Location: [facility] OR;  Service: Orthopedics;  Laterality: Left;   • TOTAL HIP ARTHROPLASTY Left 2/17/2023    Procedure: REMOVAL OF FRACTURE HARDWARE AND CONVERSION TO TOTAL HIP ARTHROPLASTY, LEFT;  Surgeon: Gabriele Loyd MD;  Location: [facility] OR;  Service: Orthopedics;  Laterality: Left;   • UPPER GASTROINTESTINAL ENDOSCOPY  08/18/2014         MEDICATIONS:  I have reviewed and reconciled the patients medication list in the patients chart at the [facility] nursing [facility] today.      ALLERGIES:    Allergies   Allergen Reactions   • Ferrlecit [Na Ferric Gluc Cplx In Sucrose] Swelling   • Ranitidine Hcl Shortness Of Breath   • Levofloxacin Other (See Comments)     Leg cramps     • Lisinopril Cough         SOCIAL HISTORY:    Social History     Socioeconomic History   • Marital status:    Tobacco Use   • Smoking status: Never   • Smokeless tobacco: Never   Vaping Use   • Vaping Use: Never used   Substance and Sexual Activity   • Alcohol use: Never   • Drug use: Never   • Sexual activity: Not Currently     Birth control/protection: Post-menopausal       FAMILY HISTORY:    Family History   Problem Relation Age of Onset   • Liver cancer Maternal Grandmother    • Gout Other    • Osteoporosis Other    • Stroke Other    •  Ulcers Other    • Asthma Other    • Hypertension Other    • Heart disease Other    • Osteoarthritis Other    • Colon cancer Neg Hx        REVIEW OF SYSTEMS:    Review of Systems   Constitutional: Negative for activity change, appetite change, chills, diaphoresis, fatigue, fever, unexpected weight gain and unexpected weight loss.   HENT: Negative for congestion, mouth sores, nosebleeds, rhinorrhea and trouble swallowing.    Eyes: Negative for discharge, redness and itching.   Respiratory: Negative for apnea, cough, choking, chest tightness, shortness of breath, wheezing and stridor.    Cardiovascular: Negative for chest pain, palpitations and leg swelling.   Gastrointestinal: Negative for abdominal pain, blood in stool, constipation, diarrhea, nausea, vomiting and indigestion.   Endocrine: Negative for polydipsia and polyuria.   Genitourinary: Negative for decreased urine volume, difficulty urinating, dysuria, frequency, hematuria, urgency and urinary incontinence.   Musculoskeletal: Positive for arthralgias (generalized) and gait problem. Negative for joint swelling and myalgias.   Skin: Negative for color change, dry skin, rash and skin lesions.   Neurological: Positive for weakness and memory problem. Negative for dizziness, headache and confusion.   Psychiatric/Behavioral: Negative for behavioral problems, dysphoric mood, hallucinations, sleep disturbance and depressed mood. The patient is not nervous/anxious.          PHYSICAL EXAMINATION:   VITAL SIGNS:   Vitals:    05/17/23 1552   BP: 136/82   Pulse: 67   Resp: 18   Temp: 98.8 °F (37.1 °C)   SpO2: 95%   Weight: 50.4 kg (111 lb 3.2 oz)       Physical Exam  Vitals and nursing note reviewed.   Constitutional:       Appearance: Normal appearance. She is well-developed.   HENT:      Head: Normocephalic.      Right Ear: External ear normal.      Left Ear: External ear normal.      Nose: Nose normal.   Eyes:      Conjunctiva/sclera: Conjunctivae normal.    Cardiovascular:      Rate and Rhythm: Normal rate and regular rhythm.      Heart sounds: Normal heart sounds.   Pulmonary:      Effort: Pulmonary effort is normal. No respiratory distress.      Breath sounds: Normal breath sounds. No wheezing or rales.   Abdominal:      General: Bowel sounds are normal. There is no distension.      Palpations: Abdomen is soft.      Tenderness: There is no abdominal tenderness.   Musculoskeletal:      Comments: NROM all major joints   Skin:     General: Skin is warm and dry.      Findings: No rash.   Neurological:      Mental Status: She is alert and oriented to person, place, and time.   Psychiatric:         Mood and Affect: Mood and affect normal.         Speech: Speech normal.         Cognition and Memory: Cognition is not impaired. Memory is impaired.         RECORDS REVIEW:   I have reviewed and interpreted the records in [facility] and [facility]    ASSESSMENT     Diagnoses and all orders for this visit:    1. Chronic obstructive pulmonary disease, unspecified COPD type (Primary)    2. Status post left hip replacement    3. Anemia due to stage 4 chronic kidney disease treated with erythropoietin    4. CKD (chronic kidney disease) stage 4, GFR 15-29 ml/min ([facility])    5. Gastroesophageal reflux disease without esophagitis    6. Essential hypertension    7. Pain in joint involving multiple sites    8. Inflammatory arthritis        PLAN    COPD  -Stable on current medications with no acute exacerbation.     HTN  -At goal during admission. Continue meds a directed.     CKD with anemia  -She is managed by Nephrology. She is on Epogen injections bi-weekly, and takes iron supplements. She has CBC check every 2 weeks.     GERD  -Controlled during admission. She is aware of triggers.     Chronic inflammatory arthritis/Joint pain multiple sites  -Stable on Prednisone and current medications. Will send short course.     She will follow up with PCP for management of chronic conditions.     Nursing to  continue supportive care for all ADLs.    Nursing encouraged to keep me informed of any acute changes, poonam any new concerning symptoms.    [x]  Discussed Patient in detail with nursing/staff, addressed all needs today.     [x]  Plan of Care Reviewed   [x]  PT/OT Reviewed   [x]  Order Changes  [x]  Discharge Plans Reviewed  [x]  Advance Directive on file with Nursing Home.   [x]  POA on file with Nursing Home.   [x]  Code Status listed: []  Full Code   [x]  DNR     “I confirm accuracy of unchanged data/findings which have been carried forward from previous visit, as well as I have updated appropriately those that have changed.”    I spent 40 minutes on discharge for Carolyne on this date of service. This time includes time spent by me in the following activities:preparing for the visit, reviewing tests, obtaining and/or reviewing a separately obtained history, performing a medically appropriate examination and/or evaluation , counseling and educating the patient/family/caregiver, ordering medications, tests, or procedures, referring and communicating with other health care professionals , documenting information in the medical record, independently interpreting results and communicating that information with the patient/family/caregiver and care coordination    Roma Diaz, APRN.

## 2023-05-19 ENCOUNTER — APPOINTMENT (OUTPATIENT)
Dept: ULTRASOUND IMAGING | Facility: HOSPITAL | Age: 83
End: 2023-05-19
Payer: MEDICARE

## 2023-05-19 ENCOUNTER — APPOINTMENT (OUTPATIENT)
Dept: GENERAL RADIOLOGY | Facility: HOSPITAL | Age: 83
End: 2023-05-19
Payer: MEDICARE

## 2023-05-19 ENCOUNTER — HOSPITAL ENCOUNTER (OUTPATIENT)
Facility: HOSPITAL | Age: 83
Setting detail: OBSERVATION
Discharge: NURSING, REHAB OR LONG-TERM CARE | End: 2023-05-24
Attending: EMERGENCY MEDICINE | Admitting: INTERNAL MEDICINE
Payer: MEDICARE

## 2023-05-19 DIAGNOSIS — M79.605 LOWER EXTREMITY PAIN, LEFT: ICD-10-CM

## 2023-05-19 DIAGNOSIS — R82.71 BACTERIURIA: ICD-10-CM

## 2023-05-19 DIAGNOSIS — N18.9 ACUTE ON CHRONIC RENAL INSUFFICIENCY: ICD-10-CM

## 2023-05-19 DIAGNOSIS — M16.52 POST-TRAUMATIC OSTEOARTHRITIS OF LEFT HIP: ICD-10-CM

## 2023-05-19 DIAGNOSIS — N28.9 ACUTE ON CHRONIC RENAL INSUFFICIENCY: ICD-10-CM

## 2023-05-19 DIAGNOSIS — Z96.642 STATUS POST LEFT HIP REPLACEMENT: ICD-10-CM

## 2023-05-19 DIAGNOSIS — R53.1 GENERALIZED WEAKNESS: ICD-10-CM

## 2023-05-19 DIAGNOSIS — I80.9 PHLEBITIS: Primary | ICD-10-CM

## 2023-05-19 DIAGNOSIS — R53.81 DEBILITY: ICD-10-CM

## 2023-05-19 PROBLEM — M17.12 OSTEOARTHRITIS OF LEFT KNEE: Status: ACTIVE | Noted: 2023-05-19

## 2023-05-19 LAB
ALBUMIN SERPL-MCNC: 3.2 G/DL (ref 3.5–5.2)
ALBUMIN/GLOB SERPL: 1.2 G/DL
ALP SERPL-CCNC: 133 U/L (ref 39–117)
ALT SERPL W P-5'-P-CCNC: 8 U/L (ref 1–33)
AMORPH URATE CRY URNS QL MICRO: ABNORMAL /HPF
ANION GAP SERPL CALCULATED.3IONS-SCNC: 9.7 MMOL/L (ref 5–15)
AST SERPL-CCNC: 14 U/L (ref 1–32)
BACTERIA UR QL AUTO: ABNORMAL /HPF
BASOPHILS # BLD AUTO: 0.04 10*3/MM3 (ref 0–0.2)
BASOPHILS NFR BLD AUTO: 0.5 % (ref 0–1.5)
BILIRUB SERPL-MCNC: 0.5 MG/DL (ref 0–1.2)
BILIRUB UR QL STRIP: NEGATIVE
BUN SERPL-MCNC: 38 MG/DL (ref 8–23)
BUN/CREAT SERPL: 16 (ref 7–25)
CALCIUM SPEC-SCNC: 9.6 MG/DL (ref 8.6–10.5)
CHLORIDE SERPL-SCNC: 102 MMOL/L (ref 98–107)
CLARITY UR: CLEAR
CO2 SERPL-SCNC: 28.3 MMOL/L (ref 22–29)
COLOR UR: YELLOW
CREAT SERPL-MCNC: 2.38 MG/DL (ref 0.57–1)
DEPRECATED RDW RBC AUTO: 62.6 FL (ref 37–54)
EGFRCR SERPLBLD CKD-EPI 2021: 20 ML/MIN/1.73
EOSINOPHIL # BLD AUTO: 0.24 10*3/MM3 (ref 0–0.4)
EOSINOPHIL NFR BLD AUTO: 3.1 % (ref 0.3–6.2)
ERYTHROCYTE [DISTWIDTH] IN BLOOD BY AUTOMATED COUNT: 17.2 % (ref 12.3–15.4)
GLOBULIN UR ELPH-MCNC: 2.6 GM/DL
GLUCOSE SERPL-MCNC: 111 MG/DL (ref 65–99)
GLUCOSE UR STRIP-MCNC: NEGATIVE MG/DL
HCT VFR BLD AUTO: 34.1 % (ref 34–46.6)
HGB BLD-MCNC: 10.7 G/DL (ref 12–15.9)
HGB UR QL STRIP.AUTO: NEGATIVE
HYALINE CASTS UR QL AUTO: ABNORMAL /LPF
IMM GRANULOCYTES # BLD AUTO: 0.04 10*3/MM3 (ref 0–0.05)
IMM GRANULOCYTES NFR BLD AUTO: 0.5 % (ref 0–0.5)
KETONES UR QL STRIP: NEGATIVE
LEUKOCYTE ESTERASE UR QL STRIP.AUTO: NEGATIVE
LYMPHOCYTES # BLD AUTO: 1.14 10*3/MM3 (ref 0.7–3.1)
LYMPHOCYTES NFR BLD AUTO: 14.8 % (ref 19.6–45.3)
MCH RBC QN AUTO: 31.3 PG (ref 26.6–33)
MCHC RBC AUTO-ENTMCNC: 31.4 G/DL (ref 31.5–35.7)
MCV RBC AUTO: 99.7 FL (ref 79–97)
MONOCYTES # BLD AUTO: 0.83 10*3/MM3 (ref 0.1–0.9)
MONOCYTES NFR BLD AUTO: 10.8 % (ref 5–12)
NEUTROPHILS NFR BLD AUTO: 5.43 10*3/MM3 (ref 1.7–7)
NEUTROPHILS NFR BLD AUTO: 70.3 % (ref 42.7–76)
NITRITE UR QL STRIP: NEGATIVE
NRBC BLD AUTO-RTO: 0 /100 WBC (ref 0–0.2)
PH UR STRIP.AUTO: 7.5 [PH] (ref 5–8)
PLATELET # BLD AUTO: 192 10*3/MM3 (ref 140–450)
PMV BLD AUTO: 10.2 FL (ref 6–12)
POTASSIUM SERPL-SCNC: 4.8 MMOL/L (ref 3.5–5.2)
PROT SERPL-MCNC: 5.8 G/DL (ref 6–8.5)
PROT UR QL STRIP: ABNORMAL
RBC # BLD AUTO: 3.42 10*6/MM3 (ref 3.77–5.28)
RBC # UR STRIP: ABNORMAL /HPF
REF LAB TEST METHOD: ABNORMAL
SODIUM SERPL-SCNC: 140 MMOL/L (ref 136–145)
SP GR UR STRIP: 1.01 (ref 1–1.03)
SQUAMOUS #/AREA URNS HPF: ABNORMAL /HPF
UROBILINOGEN UR QL STRIP: ABNORMAL
WBC # UR STRIP: ABNORMAL /HPF
WBC NRBC COR # BLD: 7.72 10*3/MM3 (ref 3.4–10.8)

## 2023-05-19 PROCEDURE — 93971 EXTREMITY STUDY: CPT

## 2023-05-19 PROCEDURE — 73562 X-RAY EXAM OF KNEE 3: CPT

## 2023-05-19 PROCEDURE — 96372 THER/PROPH/DIAG INJ SC/IM: CPT

## 2023-05-19 PROCEDURE — G0378 HOSPITAL OBSERVATION PER HR: HCPCS

## 2023-05-19 PROCEDURE — 73590 X-RAY EXAM OF LOWER LEG: CPT

## 2023-05-19 PROCEDURE — 36415 COLL VENOUS BLD VENIPUNCTURE: CPT

## 2023-05-19 PROCEDURE — 96361 HYDRATE IV INFUSION ADD-ON: CPT

## 2023-05-19 PROCEDURE — 81001 URINALYSIS AUTO W/SCOPE: CPT

## 2023-05-19 PROCEDURE — 25010000002 HEPARIN (PORCINE) PER 1000 UNITS: Performed by: INTERNAL MEDICINE

## 2023-05-19 PROCEDURE — 73552 X-RAY EXAM OF FEMUR 2/>: CPT

## 2023-05-19 PROCEDURE — 99285 EMERGENCY DEPT VISIT HI MDM: CPT

## 2023-05-19 PROCEDURE — 80053 COMPREHEN METABOLIC PANEL: CPT

## 2023-05-19 PROCEDURE — 85025 COMPLETE CBC W/AUTO DIFF WBC: CPT

## 2023-05-19 PROCEDURE — 93010 ELECTROCARDIOGRAM REPORT: CPT | Performed by: INTERNAL MEDICINE

## 2023-05-19 PROCEDURE — 99222 1ST HOSP IP/OBS MODERATE 55: CPT | Performed by: INTERNAL MEDICINE

## 2023-05-19 RX ORDER — ACETAMINOPHEN 160 MG/5ML
650 SOLUTION ORAL EVERY 4 HOURS PRN
Status: DISCONTINUED | OUTPATIENT
Start: 2023-05-19 | End: 2023-05-24 | Stop reason: HOSPADM

## 2023-05-19 RX ORDER — HYDROCODONE BITARTRATE AND ACETAMINOPHEN 5; 325 MG/1; MG/1
1 TABLET ORAL EVERY 6 HOURS PRN
Status: DISCONTINUED | OUTPATIENT
Start: 2023-05-19 | End: 2023-05-24 | Stop reason: HOSPADM

## 2023-05-19 RX ORDER — IPRATROPIUM BROMIDE AND ALBUTEROL SULFATE 2.5; .5 MG/3ML; MG/3ML
3 SOLUTION RESPIRATORY (INHALATION) EVERY 4 HOURS PRN
Status: DISCONTINUED | OUTPATIENT
Start: 2023-05-19 | End: 2023-05-24 | Stop reason: HOSPADM

## 2023-05-19 RX ORDER — LIDOCAINE 50 MG/G
1 PATCH TOPICAL DAILY
Status: DISCONTINUED | OUTPATIENT
Start: 2023-05-20 | End: 2023-05-24 | Stop reason: HOSPADM

## 2023-05-19 RX ORDER — ACETAMINOPHEN 325 MG/1
650 TABLET ORAL EVERY 4 HOURS PRN
Status: DISCONTINUED | OUTPATIENT
Start: 2023-05-19 | End: 2023-05-24 | Stop reason: HOSPADM

## 2023-05-19 RX ORDER — SODIUM CHLORIDE 9 MG/ML
100 INJECTION, SOLUTION INTRAVENOUS CONTINUOUS
Status: ACTIVE | OUTPATIENT
Start: 2023-05-19 | End: 2023-05-20

## 2023-05-19 RX ORDER — ROSUVASTATIN CALCIUM 5 MG/1
10 TABLET, COATED ORAL NIGHTLY
Status: DISCONTINUED | OUTPATIENT
Start: 2023-05-19 | End: 2023-05-24 | Stop reason: HOSPADM

## 2023-05-19 RX ORDER — POLYETHYLENE GLYCOL 3350 17 G/17G
17 POWDER, FOR SOLUTION ORAL DAILY PRN
Status: DISCONTINUED | OUTPATIENT
Start: 2023-05-19 | End: 2023-05-24 | Stop reason: HOSPADM

## 2023-05-19 RX ORDER — ALLOPURINOL 100 MG/1
100 TABLET ORAL DAILY
Status: DISCONTINUED | OUTPATIENT
Start: 2023-05-20 | End: 2023-05-24 | Stop reason: HOSPADM

## 2023-05-19 RX ORDER — ONDANSETRON 2 MG/ML
4 INJECTION INTRAMUSCULAR; INTRAVENOUS EVERY 6 HOURS PRN
Status: DISCONTINUED | OUTPATIENT
Start: 2023-05-19 | End: 2023-05-24 | Stop reason: HOSPADM

## 2023-05-19 RX ORDER — HYDROCODONE BITARTRATE AND ACETAMINOPHEN 5; 325 MG/1; MG/1
1 TABLET ORAL ONCE
Status: COMPLETED | OUTPATIENT
Start: 2023-05-19 | End: 2023-05-19

## 2023-05-19 RX ORDER — BISACODYL 5 MG/1
5 TABLET, DELAYED RELEASE ORAL DAILY PRN
Status: DISCONTINUED | OUTPATIENT
Start: 2023-05-19 | End: 2023-05-24 | Stop reason: HOSPADM

## 2023-05-19 RX ORDER — BISACODYL 10 MG
10 SUPPOSITORY, RECTAL RECTAL DAILY PRN
Status: DISCONTINUED | OUTPATIENT
Start: 2023-05-19 | End: 2023-05-24 | Stop reason: HOSPADM

## 2023-05-19 RX ORDER — PANTOPRAZOLE SODIUM 40 MG/1
40 TABLET, DELAYED RELEASE ORAL
Status: DISCONTINUED | OUTPATIENT
Start: 2023-05-20 | End: 2023-05-24 | Stop reason: HOSPADM

## 2023-05-19 RX ORDER — CARVEDILOL 12.5 MG/1
12.5 TABLET ORAL 2 TIMES DAILY WITH MEALS
Status: DISCONTINUED | OUTPATIENT
Start: 2023-05-19 | End: 2023-05-21

## 2023-05-19 RX ORDER — SODIUM CHLORIDE 9 MG/ML
40 INJECTION, SOLUTION INTRAVENOUS AS NEEDED
Status: DISCONTINUED | OUTPATIENT
Start: 2023-05-19 | End: 2023-05-24 | Stop reason: HOSPADM

## 2023-05-19 RX ORDER — SODIUM CHLORIDE 0.9 % (FLUSH) 0.9 %
10 SYRINGE (ML) INJECTION AS NEEDED
Status: DISCONTINUED | OUTPATIENT
Start: 2023-05-19 | End: 2023-05-24 | Stop reason: HOSPADM

## 2023-05-19 RX ORDER — CARBOXYMETHYLCELLULOSE SODIUM 5 MG/ML
1 SOLUTION/ DROPS OPHTHALMIC 3 TIMES DAILY PRN
Status: DISCONTINUED | OUTPATIENT
Start: 2023-05-19 | End: 2023-05-24 | Stop reason: HOSPADM

## 2023-05-19 RX ORDER — SODIUM CHLORIDE 0.9 % (FLUSH) 0.9 %
10 SYRINGE (ML) INJECTION EVERY 12 HOURS SCHEDULED
Status: DISCONTINUED | OUTPATIENT
Start: 2023-05-19 | End: 2023-05-24 | Stop reason: HOSPADM

## 2023-05-19 RX ORDER — HEPARIN SODIUM 5000 [USP'U]/ML
5000 INJECTION, SOLUTION INTRAVENOUS; SUBCUTANEOUS EVERY 12 HOURS SCHEDULED
Status: DISCONTINUED | OUTPATIENT
Start: 2023-05-19 | End: 2023-05-24 | Stop reason: HOSPADM

## 2023-05-19 RX ORDER — ACETAMINOPHEN 650 MG/1
650 SUPPOSITORY RECTAL EVERY 4 HOURS PRN
Status: DISCONTINUED | OUTPATIENT
Start: 2023-05-19 | End: 2023-05-24 | Stop reason: HOSPADM

## 2023-05-19 RX ORDER — FOLIC ACID/VIT B COMPLEX AND C 0.8 MG
1 TABLET ORAL DAILY
Status: DISCONTINUED | OUTPATIENT
Start: 2023-05-20 | End: 2023-05-24 | Stop reason: HOSPADM

## 2023-05-19 RX ORDER — AMOXICILLIN 250 MG
2 CAPSULE ORAL 2 TIMES DAILY
Status: DISCONTINUED | OUTPATIENT
Start: 2023-05-19 | End: 2023-05-24 | Stop reason: HOSPADM

## 2023-05-19 RX ORDER — SODIUM CHLORIDE 9 MG/ML
125 INJECTION, SOLUTION INTRAVENOUS CONTINUOUS
Status: DISCONTINUED | OUTPATIENT
Start: 2023-05-19 | End: 2023-05-19

## 2023-05-19 RX ADMIN — SODIUM CHLORIDE 125 ML/HR: 9 INJECTION, SOLUTION INTRAVENOUS at 18:57

## 2023-05-19 RX ADMIN — CARVEDILOL 12.5 MG: 12.5 TABLET, FILM COATED ORAL at 21:25

## 2023-05-19 RX ADMIN — SENNOSIDES AND DOCUSATE SODIUM 2 TABLET: 50; 8.6 TABLET ORAL at 21:23

## 2023-05-19 RX ADMIN — HYDROCODONE BITARTRATE AND ACETAMINOPHEN 1 TABLET: 5; 325 TABLET ORAL at 17:00

## 2023-05-19 RX ADMIN — HEPARIN SODIUM 5000 UNITS: 5000 INJECTION INTRAVENOUS; SUBCUTANEOUS at 21:23

## 2023-05-19 RX ADMIN — Medication 10 ML: at 21:24

## 2023-05-19 RX ADMIN — ROSUVASTATIN CALCIUM 10 MG: 5 TABLET, FILM COATED ORAL at 21:23

## 2023-05-19 RX ADMIN — SODIUM CHLORIDE 100 ML/HR: 9 INJECTION, SOLUTION INTRAVENOUS at 21:23

## 2023-05-19 NOTE — CASE MANAGEMENT/SOCIAL WORK
Case Management/Social Work    Patient Name:  Carolyne Martins  YOB: 1940  MRN: [id number]  Admit Date:  5/19/2023        On-call SW received call from ED stating pt is here and family reporting they are unable to care for pt at home. Pt had recently been d/c'd from [facility] and son has been unable to care for pt at home since d/c. Pt is not walking and son is having to carry pt. Pt has had a significant decline since returning home. SW is unable to contact facilities at this time regarding STR to LTC placement due to it being a Friday evening and they are already gone and will not be back until Monday morning. Son reports signing up for programs to help assit but they have not worked out. Son is unable to afford caregivers as he was privately paying for pt at facility when insurance did not pay (unsure of reason). JR advised SABRINA Rosales to see if pt would medically meet for admission and if not we would need to complete a social admit until able to speak to facilities on Monday for more information regarding pt and situation. RN will update provider.       Electronically signed by:  STEVIE Turner  05/19/23 16:47 EDT

## 2023-05-19 NOTE — ED PROVIDER NOTES
Subjective  History of Present Illness:    This is a 83-year-old female presents emergency room today for evaluation of left knee pain and bruising.  She presents via [facility] emergency medical services.  Had a left hip replacement back in February done here at this facility.  She reports bruising over the left posterior knee and decreased range of motion of the left knee.  Had been ambulating and had just been discharged from [facility] and rehab for previous hip fracture 2 years ago with replacement surgery on the left side several months ago.  No chest pain or shortness of air.  He also has a skin tear of the right lower extremity/wound from a previous dog scratch yesterday per EMS.  Denies any cough congestion fevers or runny nose.  Denies any chest pain or shortness of air.  Denies hip pain.  Denies neck or back pain.  No known trauma to the left knee.    Had a chance to speak with the son at bedside and obtain better history, he reports that her mentation is normal for her at this time.  Reports that she since being taken home from [facility] over the last couple days has refused to bear weight on the left knee and is complaining of left knee pain, he tells me that he has had to carry her everywhere she wants to go and that she has been eating and drinking less and that he is having difficulty providing the care for her that she needs.  He reports that insurance quit paying and he had to start taking out of his personal finances to afford to keep her there and is unable to continue to do so at this time.      Nurses Notes reviewed and agree, including vitals, allergies, social history and prior medical history.     REVIEW OF SYSTEMS: All systems reviewed and not pertinent unless noted.  Review of Systems   Constitutional: Negative for fever.   HENT: Negative for congestion and rhinorrhea.    Respiratory: Negative for cough and shortness of breath.    Cardiovascular: Negative for chest pain.  "  Musculoskeletal:        Left knee pain   All other systems reviewed and are negative.      Past Medical History:   Diagnosis Date   • Anemia 1998   • Anemia    • Arthritis    • Back pain    • Bleeding from anus    • Bronchitis     STATES HAS BRONCHITIS CURRENTLY (12/5/17).     • Bronchitis 12/2017   • CAD (coronary artery disease)    • Cataract, bilateral    • Chronic kidney disease     STAGE 4.  SEES TERA   • Chronic kidney failure     REPORTS STAGE IV   • Difficulty swallowing solids    • Disease of thyroid gland    • Fracture, radius 2016    right distal radius and ulna, healed.   • GERD (gastroesophageal reflux disease)    • Gout    • High cholesterol    • History of blood transfusion 2019   • History of nuclear stress test 2014    DR. CORTEZ.  \"IT WAS OK\"   • Hyperparathyroidism    • Hyperparathyroidism    • Hypertension    • Impaired functional mobility, balance, gait, and endurance    • Impaired mobility    • Iron deficiency    • Osteoarthritis    • Osteoarthritis of knees, bilateral     Both knees Supartz injection series August, 2015   • Osteoporosis    • Palpitations    • Personal history of cardiac murmur    • Pessary maintenance    • PONV (postoperative nausea and vomiting)    • Presence of pessary    • Problems with swallowing     WITH FOOD OCCASSIONALLY   • Rheumatic heart disease     Personal history   • Rotator cuff tendonitis     right    • Rupture of right proximal biceps tendon     chronic   • Seasonal allergies    • Sinus problem    • Spinal headache     REPORTS AFTER DELIVERY OF SON   • Subacromial bursitis     right    • Valvular heart disease    • Vision problems    • Vitamin B12 deficiency    • Vitamin D deficiency    • Wears glasses        Allergies:    Ferrlecit [na ferric gluc cplx in sucrose], Ranitidine hcl, Levofloxacin, and Lisinopril      Past Surgical History:   Procedure Laterality Date   • CATARACT EXTRACTION Bilateral    • CERVICAL POLYPECTOMY     • COLONOSCOPY  2011   • " COLONOSCOPY N/A 1/9/2018    Procedure: COLONOSCOPY with endoscopic mucosal resection (hot snare), normal saline submucosal injection, argon thermal ablation, resolution clip placement x4, and cold biopsy polypectomy;  Surgeon: Pieter Concepcion MD;  Location: [facility] ENDOSCOPY;  Service:    • ENDOSCOPY N/A 12/6/2017    Procedure: ESOPHAGOGASTRODUODENOSCOPY with biopsies and hot snare polypectomies;  Surgeon: Pieter Concepcion MD;  Location: [facility] ENDOSCOPY;  Service:    • ENDOSCOPY N/A 1/15/2019    Procedure: ESOPHAGOGASTRODUODENOSCOPY WITH BIOPSIES AND HOT SNARE POLYPECTOMIES X10;  Surgeon: Pieter Concepcion MD;  Location: [facility] ENDOSCOPY;  Service: Gastroenterology   • HARDWARE REMOVAL Left 2/17/2023    Procedure: FEMUR HARDWARE REMOVAL, LEFT;  Surgeon: Gabriele Loyd MD;  Location: [facility] OR;  Service: Orthopedics;  Laterality: Left;   • HIP TROCHANTERIC NAILING WITH INTRAMEDULLARY HIP SCREW Left 2/14/2021    Procedure: HIP TROCHANTER NAIL SHORT WITH INTRAMEDULLARY HIP SCREW, LEFT;  Surgeon: Gabriele Loyd MD;  Location: [facility] OR;  Service: Orthopedics;  Laterality: Left;   • TOTAL HIP ARTHROPLASTY Left 2/17/2023    Procedure: REMOVAL OF FRACTURE HARDWARE AND CONVERSION TO TOTAL HIP ARTHROPLASTY, LEFT;  Surgeon: Gabriele Loyd MD;  Location: [facility] OR;  Service: Orthopedics;  Laterality: Left;   • UPPER GASTROINTESTINAL ENDOSCOPY  08/18/2014         Social History     Socioeconomic History   • Marital status:    Tobacco Use   • Smoking status: Never   • Smokeless tobacco: Never   Vaping Use   • Vaping Use: Never used   Substance and Sexual Activity   • Alcohol use: Never   • Drug use: Never   • Sexual activity: Not Currently     Birth control/protection: Post-menopausal         Family History   Problem Relation Age of Onset   • Liver cancer Maternal Grandmother    • Gout Other    • Osteoporosis Other    • Stroke Other    • Ulcers Other    • Asthma Other    • Hypertension Other    • Heart disease  "Other    • Osteoarthritis Other    • Colon cancer Neg Hx        Objective  Physical Exam:  /62 (BP Location: Left arm, Patient Position: Lying)   Pulse 66   Temp 98.2 °F (36.8 °C) (Oral)   Resp 18   Ht 149.9 cm (59\")   Wt 50.3 kg (111 lb)   LMP  (LMP Unknown)   SpO2 95%   BMI 22.42 kg/m²      Physical Exam  Vitals and nursing note reviewed.   Constitutional:       Comments: Chronically ill-appearing and frail.   HENT:      Head: Normocephalic and atraumatic.      Nose: Nose normal.      Mouth/Throat:      Pharynx: Oropharynx is clear.   Eyes:      Extraocular Movements: Extraocular movements intact.      Pupils: Pupils are equal, round, and reactive to light.   Cardiovascular:      Rate and Rhythm: Normal rate and regular rhythm.      Pulses: Normal pulses.      Heart sounds: Normal heart sounds.   Pulmonary:      Effort: Pulmonary effort is normal. No respiratory distress.      Breath sounds: Normal breath sounds. No stridor. No wheezing, rhonchi or rales.   Abdominal:      General: There is no distension.      Palpations: Abdomen is soft.      Tenderness: There is no abdominal tenderness. There is no guarding.   Musculoskeletal:         General: Swelling present.      Cervical back: Normal range of motion and neck supple. No rigidity or tenderness.      Comments: There is swelling of the left lower extremity below the knee compared to right.  Decreased range of motion of the left knee secondary to pain.  No hip instability or tenderness palpated.  No obvious deformity of the left knee, however there is significant ecchymosis over the posterior aspect of the left knee in the popliteal region with associated tenderness on the left anterior knee.  No obvious deformities.  Neurovascular intact with 2+ pedal pulses bilaterally.  No femur or foot or ankle tenderness palpated.   Skin:     General: Skin is warm and dry.      Capillary Refill: Capillary refill takes less than 2 seconds.      Comments: There " is a small wound/skin abrasion on the right lower extremity on the anterior distal tib-fib nonbleeding.   Neurological:      General: No focal deficit present.      Mental Status: She is alert.               Procedures    ED Course:    ED Course as of 05/20/23 0050   Fri May 19, 2023   1702 EKG interpreted by me reveals sinus rhythm rate 78 nonspecific T wave change.  No ectopy no ischemic changes. [PF]      ED Course User Index  [PF] Dusty Marmolejo W,        Lab Results (last 24 hours)     Procedure Component Value Units Date/Time    CBC Auto Differential [[id number]]  (Abnormal) Collected: 05/19/23 1710    Specimen: Blood Updated: 05/19/23 1715     WBC 7.72 10*3/mm3      RBC 3.42 10*6/mm3      Hemoglobin 10.7 g/dL      Hematocrit 34.1 %      MCV 99.7 fL      MCH 31.3 pg      MCHC 31.4 g/dL      RDW 17.2 %      RDW-SD 62.6 fl      MPV 10.2 fL      Platelets 192 10*3/mm3      Neutrophil % 70.3 %      Lymphocyte % 14.8 %      Monocyte % 10.8 %      Eosinophil % 3.1 %      Basophil % 0.5 %      Immature Grans % 0.5 %      Neutrophils, Absolute 5.43 10*3/mm3      Lymphocytes, Absolute 1.14 10*3/mm3      Monocytes, Absolute 0.83 10*3/mm3      Eosinophils, Absolute 0.24 10*3/mm3      Basophils, Absolute 0.04 10*3/mm3      Immature Grans, Absolute 0.04 10*3/mm3      nRBC 0.0 /100 WBC     Comprehensive Metabolic Panel [[id number]]  (Abnormal) Collected: 05/19/23 1710    Specimen: Blood Updated: 05/19/23 1732     Glucose 111 mg/dL      BUN 38 mg/dL      Creatinine 2.38 mg/dL      Sodium 140 mmol/L      Potassium 4.8 mmol/L      Chloride 102 mmol/L      CO2 28.3 mmol/L      Calcium 9.6 mg/dL      Total Protein 5.8 g/dL      Albumin 3.2 g/dL      ALT (SGPT) 8 U/L      AST (SGOT) 14 U/L      Alkaline Phosphatase 133 U/L      Total Bilirubin 0.5 mg/dL      Globulin 2.6 gm/dL      A/G Ratio 1.2 g/dL      BUN/Creatinine Ratio 16.0     Anion Gap 9.7 mmol/L      eGFR 20.0 mL/min/1.73     Narrative:      GFR Normal >60  Chronic Kidney  Disease <60  Kidney Failure <15    The GFR formula is only valid for adults with stable renal function between ages 18 and 70.    Urinalysis With Culture If Indicated - Urine, Clean Catch [[id number]]  (Abnormal) Collected: 05/19/23 1821    Specimen: Urine, Clean Catch Updated: 05/19/23 1836     Color, UA Yellow     Appearance, UA Clear     pH, UA 7.5     Specific Gravity, UA 1.011     Glucose, UA Negative     Ketones, UA Negative     Bilirubin, UA Negative     Blood, UA Negative     Protein,  mg/dL (2+)     Leuk Esterase, UA Negative     Nitrite, UA Negative     Urobilinogen, UA 0.2 E.U./dL    Narrative:      In absence of clinical symptoms, the presence of pyuria, bacteria, and/or nitrites on the urinalysis result does not correlate with infection.    Urinalysis, Microscopic Only - Urine, Clean Catch [[id number]]  (Abnormal) Collected: 05/19/23 1821    Specimen: Urine, Clean Catch Updated: 05/19/23 1840     RBC, UA None Seen /HPF      WBC, UA 0-2 /HPF      Bacteria, UA 4+ /HPF      Squamous Epithelial Cells, UA 0-2 /HPF      Hyaline Casts, UA None Seen /LPF      Amorphous Crystals, UA Moderate/2+ /HPF      Methodology Manual Light Microscopy           US Venous Doppler Lower Extremity Left (duplex)    Result Date: 5/19/2023  FINAL REPORT TECHNIQUE: Multiple transverse and longitudinal images were performed of the femoral-popliteal deep venous system with augmentation and compression maneuvers. CLINICAL HISTORY: r/o dvt; pain FINDINGS: Left lower extremity duplex ultrasound demonstrates normal flow in the deep venous system.  There is nonocclusive thrombosis in the greater saphenous vein.     Impression: Nonocclusive greater saphenous vein thrombosis.  No DVT. Authenticated and Electronically Signed by David Gutierrez M.D. on 05/19/2023 06:21:21 PM         MDM    Initial impression of presenting illness: 83-year-old female presents emergency room today for evaluation of the left knee pain.    DDX: includes but is not  limited to: Osseous abnormality including fracture or dislocation, osteoarthritis, DVT, musculoskeletal strain or sprain, other    Patient arrives dynamically stable, afebrile, mildly tachycardic at a rate of 103, nonhypoxic and nontachypneic and nontoxic-appearing with vitals interpreted by myself.     Pertinent features from physical exam:There is swelling of the left lower extremity below the knee compared to right.  Decreased range of motion of the left knee secondary to pain.  No hip instability or tenderness palpated.  No obvious deformity of the left knee, however there is significant ecchymosis over the posterior aspect of the left knee in the popliteal region with associated tenderness on the left anterior knee.  No obvious deformities.  Neurovascular intact with 2+ pedal pulses bilaterally.  No femur or foot or ankle tenderness palpated.  There is also a minor wound/skin tear of the right lower extremity nonbleeding on arrival of the distal right tib-fib from a dog scratch yesterday at home..  Multiple areas of ecchymotic bruising on the bilateral upper extremities.    Initial diagnostic plan: CBC, CMP, left femur left knee and left tib-fib and right tib-fib plain film, Doppler venous ultrasound of the left lower extremity to rule out DVT    Results from initial plan were reviewed and interpreted by me revealing ultrasound as interpreted by radiology with nonocclusive greater saphenous vein thrombosis but no DVT.  CBC with hemoglobin of 10.7, appears stable from prior.  CMP with a glucose of 111, BUN of 38, creatinine of 2.38.  EGFR of 20.  Mildly elevated creatinine compared to prior and mildly decreased GFR compared to prior labs.  Appears to be suggestive of acute on chronic renal insufficiency.    Diagnostic information from other sources: Chart review    Interventions / Re-evaluation: Given oral Norco 5 mg for pain.  Patient reported improvement in symptoms, however still has decreased movement of the  left lower extremity.  Started patient on maintenance fluids of normal saline.  Stable for admission.    Results/clinical rationale were discussed with patient at bedside.  Patient is agreeable to admission.    Consultations/Discussion of results with other physicians: Discussed with Dr. Palomares, hospitalist, who agreed to admit patient to the hospital service for further care.  Believe patient would benefit from placement consideration, case management and PT OT consult.    Disposition plan: Admit to hospitalist  -----    Final diagnoses:   Phlebitis   Lower extremity pain, left   Bacteriuria   Generalized weakness   Acute on chronic renal insufficiency        Tex Melo PA-C  05/20/23 0052

## 2023-05-19 NOTE — ED NOTES
Spoke with case management regarding sons concerns with being unable to care for pt at home. She states we may need to socially admit the patient until she is able to contact admissions to try to place pt in another facility. VALENCIA Nice informed.

## 2023-05-19 NOTE — H&P
[facility]   HISTORY AND PHYSICAL      Name:  Carolyne Martins   Age:  83 y.o.  Sex:  female  :  1940  MRN:  [id number]   Visit Number:  [id number]  Admission Date:  2023  Date Of Service:  23  Primary Care Physician:  Jerald Dawkins MD    Chief Complaint:     Generalized weakness and knee pain.    History Of Presenting Illness:      Ms. Martins is an 83-year-old female with history of coronary artery disease, chronic kidney disease stage IV, hypertension, dyslipidemia, recent left hip fracture and repair in 2023 was brought to the emergency room by EMS with left knee joint pain and bruising.  Patient went to short-term rehabilitation at [facility] and rehab in February after discharge from this facility and apparently was discharged home yesterday.  At that point, the son realized how immobile the patient was.  According to the son, patient was not able to get up from the bed and stand and needed total care to the fact that he had to carry her to the bathroom.  The son subsequently called EMS as he could not care for her at home.  Patient currently complains of left knee joint pain and denies any other symptoms.    In the emergency room, she was afebrile and hemodynamically stable saturating at 93% on room air.  Blood work done in the emergency room showed a BUN of 38, creatinine of 2.38 (baseline) glucose 111 and albumin of 3.2.  CBC was unremarkable except for hemoglobin of 10.7.  Urine analysis was negative for any evidence of urinary tract infection.  Left femur x-ray showed stable postoperative changes of hip replacement.  X-ray of the left knee was negative for any fracture or dislocation.  X-ray of bilateral tibia and fibula were negative for any fracture or dislocation.  Ultrasound of the left lower extremity showed nonocclusive greater saphenous vein thrombosis without any DVT.  Since the family is unable to take care of the patient, case management  was consulted but unfortunately due to it being the weekend, patient is currently being admitted to the medical floor for safe discharge planning next week.    Review Of Systems:    All systems were reviewed and negative except as mentioned in history of presenting illness, assessment and plan.    Past Medical History: Patient  has a past medical history of Anemia (1998), Anemia, Arthritis, Back pain, Bleeding from anus, Bronchitis, Bronchitis (12/2017), CAD (coronary artery disease), Cataract, bilateral, Chronic kidney disease, Chronic kidney failure, Difficulty swallowing solids, Disease of thyroid gland, Fracture, radius (2016), GERD (gastroesophageal reflux disease), Gout, High cholesterol, History of blood transfusion (2019), History of nuclear stress test (2014), Hyperparathyroidism, Hyperparathyroidism, Hypertension, Impaired functional mobility, balance, gait, and endurance, Impaired mobility, Iron deficiency, Osteoarthritis, Osteoarthritis of knees, bilateral, Osteoporosis, Palpitations, Personal history of cardiac murmur, Pessary maintenance, PONV (postoperative nausea and vomiting), Presence of pessary, Problems with swallowing, Rheumatic heart disease, Rotator cuff tendonitis, Rupture of right proximal biceps tendon, Seasonal allergies, Sinus problem, Spinal headache, Subacromial bursitis, Valvular heart disease, Vision problems, Vitamin B12 deficiency, Vitamin D deficiency, and Wears glasses.    Past Surgical History: Patient  has a past surgical history that includes Cataract Extraction (Bilateral); Upper gastrointestinal endoscopy (08/18/2014); Colonoscopy (2011); Esophagogastroduodenoscopy (N/A, 12/6/2017); Colonoscopy (N/A, 1/9/2018); Esophagogastroduodenoscopy (N/A, 1/15/2019); Cervical polypectomy; Hip Trochanteric Nailing (Left, 2/14/2021); Total hip arthroplasty (Left, 2/17/2023); and Hardware Removal (Left, 2/17/2023).    Social History: Patient  reports that she has never smoked. She has never  used smokeless tobacco. She reports that she does not drink alcohol and does not use drugs.    Family History:  Patient family history includes Asthma in an other family member; Gout in an other family member; Heart disease in an other family member; Hypertension in an other family member; Liver cancer in her maternal grandmother; Osteoarthritis in an other family member; Osteoporosis in an other family member; Stroke in an other family member; Ulcers in an other family member.    Allergies:      Ferrlecit [na ferric gluc cplx in sucrose], Ranitidine hcl, Levofloxacin, and Lisinopril    Home Medications:    Prior to Admission Medications     Prescriptions Last Dose Informant Patient Reported? Taking?    allopurinol (ZYLOPRIM) 100 MG tablet 5/19/2023 Self Yes Yes    Take 1 tablet by mouth Daily.    aspirin 81 MG EC tablet 5/19/2023  Yes Yes    Take 1 tablet by mouth Daily.    B Complex Vitamins (vitamin b complex) capsule capsule 5/19/2023  Yes Yes    Take 1 capsule by mouth Daily.    bisacodyl (DULCOLAX) 10 MG suppository Unknown  Yes No    Insert 1 suppository into the rectum As Needed for Constipation.    carboxymethylcellulose (REFRESH PLUS) 0.5 % solution Past Week  Yes Yes    Administer 1 drop to both eyes 3 (Three) Times a Day As Needed for Dry Eyes.    carvedilol (COREG) 12.5 MG tablet 5/19/2023  No Yes    Take 1 tablet by mouth 2 (Two) Times a Day With Meals.    cholecalciferol (VITAMIN D3) 1000 UNITS tablet 5/19/2023 Self Yes Yes    Take 1 tablet by mouth Daily.    cyanocobalamin (VITAMIN B-12) 500 MCG tablet 5/19/2023  Yes Yes    Take 1 tablet by mouth Daily.    docusate sodium (COLACE) 100 MG capsule 5/19/2023  No Yes    Take 1 capsule by mouth 2 (Two) Times a Day As Needed for Constipation.    Epoetin Jefe-epbx (RETACRIT) 12302 UNIT/ML injection   No No    Inject 1 mL under the skin into the appropriate area as directed Every 14 (Fourteen) Days. Indications: Anemia associated with Chronic Kidney Failure     estradiol (ESTRACE) 0.1 MG/GM vaginal cream   No No    Massage 1 gram in vaginal opening twice weekly    Patient taking differently:  Insert 1 g into the vagina 2 (Two) Times a Week. Every Monday and Thursday    fluticasone (FLONASE) 50 MCG/ACT nasal spray Unknown Self Yes No    2 sprays into the nostril(s) as directed by provider Daily As Needed.    Fluticasone-Salmeterol (ADVAIR/WIXELA) 250-50 MCG/ACT DISKUS Unknown  Yes No    Inhale 1 puff 2 (Two) Times a Day.    HYDROcodone-acetaminophen (NORCO) 5-325 MG per tablet Unknown  No No    Take 1 tablet by mouth Every 6 (Six) Hours As Needed for Moderate Pain.    ipratropium-albuterol (DUO-NEB) 0.5-2.5 mg/3 ml nebulizer   Yes No    Take 3 mL by nebulization Every 4 (Four) Hours As Needed for Shortness of Air.    Lidocaine 4 % patch 5/19/2023  Yes Yes    Apply 1 patch topically Daily.    omeprazole (PriLOSEC) 40 MG capsule 5/19/2023 Self Yes Yes    Take 1 capsule by mouth Daily.    predniSONE (DELTASONE) 10 MG tablet Unknown  Yes No    Take 1 tablet by mouth Daily.    Probiotic Product (PROBIOTIC DAILY PO) 5/18/2023  Yes Yes    Take 1 tablet by mouth Daily As Needed.    QUEtiapine (SEROquel) 25 MG tablet   No No    Take 0.5 tablets by mouth Every Night for 30 days.    rosuvastatin (CRESTOR) 10 MG tablet 5/19/2023  Yes Yes    Take 1 tablet by mouth Daily.    saccharomyces boulardii (FLORASTOR) 250 MG capsule   Yes No    Take 1 capsule by mouth.    senna (SENOKOT) 8.6 MG tablet 5/18/2023  Yes Yes    Take 1 tablet by mouth As Needed for Constipation.    sertraline (ZOLOFT) 25 MG tablet Past Week  Yes Yes    Take 1 tablet by mouth Daily.    sertraline (ZOLOFT) 50 MG tablet   Yes No    Take 1 tablet by mouth.    sodium bicarbonate 650 MG tablet 5/18/2023  No Yes    Take 1 tablet by mouth 2 (Two) Times a Day.    SODIUM PHOSPHATES RE Unknown  Yes No    Insert 1 application into the rectum As Needed (constipation).    torsemide (DEMADEX) 10 MG tablet 5/19/2023  Yes Yes     "Take 1 tablet by mouth 2 (Two) Times a Week. 1/2 tablet by mouth one time a day on MONDAYS and THURSDAYS        ED Medications:    Medications   sodium chloride 0.9 % infusion (125 mL/hr Intravenous New Bag 5/19/23 1857)   HYDROcodone-acetaminophen (NORCO) 5-325 MG per tablet 1 tablet (1 tablet Oral Given 5/19/23 1700)     Vital Signs:  Temp:  [98 °F (36.7 °C)] 98 °F (36.7 °C)  Heart Rate:  [] 106  Resp:  [19] 19  BP: (162-172)/(80-94) 162/83        05/19/23  1555   Weight: 50.3 kg (111 lb)     Body mass index is 22.42 kg/m².    Physical Exam:     Most recent vital Signs: /83   Pulse 106   Temp 98 °F (36.7 °C) (Oral)   Resp 19   Ht 149.9 cm (59\")   Wt 50.3 kg (111 lb)   LMP  (LMP Unknown)   SpO2 92%   BMI 22.42 kg/m²     Physical Exam  Constitutional:       General: She is not in acute distress.     Appearance: Normal appearance. She is not ill-appearing.   HENT:      Head: Normocephalic and atraumatic.      Right Ear: External ear normal.      Left Ear: External ear normal.      Nose: Nose normal.      Mouth/Throat:      Mouth: Mucous membranes are moist.   Eyes:      Extraocular Movements: Extraocular movements intact.      Conjunctiva/sclera: Conjunctivae normal.   Cardiovascular:      Rate and Rhythm: Normal rate and regular rhythm.      Pulses: Normal pulses.      Heart sounds: Normal heart sounds. No murmur heard.  Pulmonary:      Effort: Pulmonary effort is normal.      Breath sounds: Normal breath sounds. No wheezing or rales.   Abdominal:      General: Bowel sounds are normal.      Palpations: Abdomen is soft.      Tenderness: There is no abdominal tenderness. There is no guarding or rebound.   Musculoskeletal:      Cervical back: Neck supple.      Comments: 1+ pitting edema noted in the left ankle.  Left lower extremity swollen compared to right.  Left knee joint likely more swollen compared to the right with tenderness.  No hip tenderness noted.  Previous hip surgery scar has healed " well.   Skin:     General: Skin is warm.      Findings: Bruising present. No erythema or rash.      Comments: Multiple bruises noted in both legs.  Skin tear noted on the right lower leg.   Neurological:      General: No focal deficit present.      Mental Status: She is alert and oriented to person, place, and time. Mental status is at baseline.   Psychiatric:         Mood and Affect: Mood normal.         Behavior: Behavior normal.       Laboratory data:    I have reviewed the labs done in the emergency room.    Results from last 7 days   Lab Units 05/19/23  1710   SODIUM mmol/L 140   POTASSIUM mmol/L 4.8   CHLORIDE mmol/L 102   CO2 mmol/L 28.3   BUN mg/dL 38*   CREATININE mg/dL 2.38*   CALCIUM mg/dL 9.6   BILIRUBIN mg/dL 0.5   ALK PHOS U/L 133*   ALT (SGPT) U/L 8   AST (SGOT) U/L 14   GLUCOSE mg/dL 111*     Results from last 7 days   Lab Units 05/19/23  1710   WBC 10*3/mm3 7.72   HEMOGLOBIN g/dL 10.7*   HEMATOCRIT % 34.1   PLATELETS 10*3/mm3 192       Results from last 7 days   Lab Units 05/19/23  1821   COLOR UA  Yellow   GLUCOSE UA  Negative   KETONES UA  Negative   LEUKOCYTES UA  Negative   PH, URINE  7.5   BILIRUBIN UA  Negative   UROBILINOGEN UA  0.2 E.U./dL   RBC UA /HPF None Seen   WBC UA /HPF 0-2*     Radiology:    US Venous Doppler Lower Extremity Left (duplex)    Result Date: 5/19/2023  FINAL REPORT TECHNIQUE: Multiple transverse and longitudinal images were performed of the femoral-popliteal deep venous system with augmentation and compression maneuvers. CLINICAL HISTORY: r/o dvt; pain FINDINGS: Left lower extremity duplex ultrasound demonstrates normal flow in the deep venous system.  There is nonocclusive thrombosis in the greater saphenous vein.     Nonocclusive greater saphenous vein thrombosis.  No DVT. Authenticated and Electronically Signed by David Gutierrez M.D. on 05/19/2023 06:21:21 PM    Assessment:    1. Generalized weakness and failure to thrive, POA.  2. Left knee joint pain likely secondary to  osteoarthritis, POA.  3. Left greater saphenous vein superficial thrombosis, POA.  4. Coronary artery disease.  5. Chronic diastolic heart failure, no evidence of exacerbation.  6. Chronic kidney disease stage IV.  7. Essential hypertension.  8. Acquired hypothyroidism.  9. Anemia of chronic kidney disease.    Plan:    Generalized weakness/impaired mobility and ADLs.  - Patient does have generalized weakness and inability to ambulate.  - This is multifactorial including recent history of hip fracture, left knee joint osteoarthritis as well as deconditioning.  - We will consult physical and occupational therapy.  - Consult case management for short-term rehab placement versus long-term care.    Left knee joint osteoarthritis.  - We will place her on Lortab for pain control.  - Avoid NSAIDs due to chronic kidney disease.    Left greater saphenous vein superficial thrombosis.  - Likely secondary to recent history of hip fracture and lack of activity.  - DVT prophylaxis with heparin.    I have discussed the patient's condition and treatment plan with her son Magno over the phone.  I confirmed that the patient has a living will scanned into the chart and as per son she is DNR/DNI.  Discussed with nursing staff at the bedside.    Risk Assessment: Low  DVT Prophylaxis: Heparin  Code Status: DNR/DNI  Diet: Renal    Advance Care Planning      ACP discussion was held with the patient during this visit. Patient has an advance directive in EMR which is still valid.          Baldomero Palomares MD  05/19/23  19:53 EDT    Dictated utilizing Dragon dictation.

## 2023-05-20 LAB
ALBUMIN SERPL-MCNC: 2.9 G/DL (ref 3.5–5.2)
ANION GAP SERPL CALCULATED.3IONS-SCNC: 11.2 MMOL/L (ref 5–15)
BUN SERPL-MCNC: 39 MG/DL (ref 8–23)
BUN/CREAT SERPL: 17.6 (ref 7–25)
CALCIUM SPEC-SCNC: 9.1 MG/DL (ref 8.6–10.5)
CHLORIDE SERPL-SCNC: 104 MMOL/L (ref 98–107)
CO2 SERPL-SCNC: 24.8 MMOL/L (ref 22–29)
CREAT SERPL-MCNC: 2.21 MG/DL (ref 0.57–1)
DEPRECATED RDW RBC AUTO: 62.4 FL (ref 37–54)
EGFRCR SERPLBLD CKD-EPI 2021: 21.9 ML/MIN/1.73
ERYTHROCYTE [DISTWIDTH] IN BLOOD BY AUTOMATED COUNT: 17 % (ref 12.3–15.4)
GLUCOSE SERPL-MCNC: 76 MG/DL (ref 65–99)
HCT VFR BLD AUTO: 32 % (ref 34–46.6)
HGB BLD-MCNC: 10 G/DL (ref 12–15.9)
MCH RBC QN AUTO: 31.3 PG (ref 26.6–33)
MCHC RBC AUTO-ENTMCNC: 31.3 G/DL (ref 31.5–35.7)
MCV RBC AUTO: 100.3 FL (ref 79–97)
PHOSPHATE SERPL-MCNC: 3.1 MG/DL (ref 2.5–4.5)
PLATELET # BLD AUTO: 161 10*3/MM3 (ref 140–450)
PMV BLD AUTO: 9.4 FL (ref 6–12)
POTASSIUM SERPL-SCNC: 4.1 MMOL/L (ref 3.5–5.2)
RBC # BLD AUTO: 3.19 10*6/MM3 (ref 3.77–5.28)
SODIUM SERPL-SCNC: 140 MMOL/L (ref 136–145)
WBC NRBC COR # BLD: 5.57 10*3/MM3 (ref 3.4–10.8)

## 2023-05-20 PROCEDURE — 25010000002 HEPARIN (PORCINE) PER 1000 UNITS: Performed by: INTERNAL MEDICINE

## 2023-05-20 PROCEDURE — 25010000002 ONDANSETRON PER 1 MG: Performed by: INTERNAL MEDICINE

## 2023-05-20 PROCEDURE — G0378 HOSPITAL OBSERVATION PER HR: HCPCS

## 2023-05-20 PROCEDURE — 96374 THER/PROPH/DIAG INJ IV PUSH: CPT

## 2023-05-20 PROCEDURE — 96372 THER/PROPH/DIAG INJ SC/IM: CPT

## 2023-05-20 PROCEDURE — 85027 COMPLETE CBC AUTOMATED: CPT | Performed by: INTERNAL MEDICINE

## 2023-05-20 PROCEDURE — 97161 PT EVAL LOW COMPLEX 20 MIN: CPT

## 2023-05-20 PROCEDURE — 80069 RENAL FUNCTION PANEL: CPT | Performed by: INTERNAL MEDICINE

## 2023-05-20 PROCEDURE — 96361 HYDRATE IV INFUSION ADD-ON: CPT

## 2023-05-20 PROCEDURE — 99232 SBSQ HOSP IP/OBS MODERATE 35: CPT | Performed by: NURSE PRACTITIONER

## 2023-05-20 RX ORDER — TORSEMIDE 20 MG/1
10 TABLET ORAL 2 TIMES WEEKLY
Status: DISCONTINUED | OUTPATIENT
Start: 2023-05-22 | End: 2023-05-24 | Stop reason: HOSPADM

## 2023-05-20 RX ORDER — SODIUM BICARBONATE 650 MG/1
650 TABLET ORAL 2 TIMES DAILY
Status: DISCONTINUED | OUTPATIENT
Start: 2023-05-20 | End: 2023-05-24 | Stop reason: HOSPADM

## 2023-05-20 RX ADMIN — ASPIRIN 81 MG: 81 TABLET, COATED ORAL at 09:07

## 2023-05-20 RX ADMIN — CARVEDILOL 12.5 MG: 12.5 TABLET, FILM COATED ORAL at 08:59

## 2023-05-20 RX ADMIN — ROSUVASTATIN CALCIUM 10 MG: 5 TABLET, FILM COATED ORAL at 22:03

## 2023-05-20 RX ADMIN — LIDOCAINE 1 PATCH: 50 PATCH CUTANEOUS at 09:06

## 2023-05-20 RX ADMIN — SODIUM BICARBONATE 650 MG TABLET 650 MG: at 22:03

## 2023-05-20 RX ADMIN — SERTRALINE 50 MG: 50 TABLET, FILM COATED ORAL at 09:06

## 2023-05-20 RX ADMIN — CARVEDILOL 12.5 MG: 12.5 TABLET, FILM COATED ORAL at 18:47

## 2023-05-20 RX ADMIN — HYDROCODONE BITARTRATE AND ACETAMINOPHEN 1 TABLET: 5; 325 TABLET ORAL at 05:44

## 2023-05-20 RX ADMIN — HEPARIN SODIUM 5000 UNITS: 5000 INJECTION INTRAVENOUS; SUBCUTANEOUS at 09:08

## 2023-05-20 RX ADMIN — HYDROCODONE BITARTRATE AND ACETAMINOPHEN 1 TABLET: 5; 325 TABLET ORAL at 15:47

## 2023-05-20 RX ADMIN — Medication 10 ML: at 22:03

## 2023-05-20 RX ADMIN — Medication 10 ML: at 09:07

## 2023-05-20 RX ADMIN — HEPARIN SODIUM 5000 UNITS: 5000 INJECTION INTRAVENOUS; SUBCUTANEOUS at 22:04

## 2023-05-20 RX ADMIN — SENNOSIDES AND DOCUSATE SODIUM 2 TABLET: 50; 8.6 TABLET ORAL at 22:04

## 2023-05-20 RX ADMIN — ALLOPURINOL 100 MG: 100 TABLET ORAL at 09:06

## 2023-05-20 RX ADMIN — HYDROCODONE BITARTRATE AND ACETAMINOPHEN 1 TABLET: 5; 325 TABLET ORAL at 22:03

## 2023-05-20 RX ADMIN — PANTOPRAZOLE SODIUM 40 MG: 40 TABLET, DELAYED RELEASE ORAL at 05:44

## 2023-05-20 RX ADMIN — SODIUM BICARBONATE 650 MG TABLET 650 MG: at 10:54

## 2023-05-20 RX ADMIN — SENNOSIDES AND DOCUSATE SODIUM 2 TABLET: 50; 8.6 TABLET ORAL at 09:07

## 2023-05-20 RX ADMIN — Medication 1 TABLET: at 08:59

## 2023-05-20 RX ADMIN — ONDANSETRON 4 MG: 2 SOLUTION INTRAMUSCULAR; INTRAVENOUS at 15:48

## 2023-05-20 NOTE — PLAN OF CARE
Goal Outcome Evaluation:  Plan of Care Reviewed With: patient, son           Outcome Evaluation: PT evaluation completed this am with pt presenting supine in bed on room air and rating pain at 2/10 in L knee. Pt was oriented x 3 with prompts. Pt needed to be cleaned up and her bed mobility was assessed at this time. Pt would help with BUE to pull on rail and with LLE supported by pillow completed rolling with min assist. Supine to sit required mod assist and again pillow was used to progress LLE to EOB with 8/10 pain noted in L knee. Pt was able to sit on EOB x 5 min with SBA. Pt not able to stand successfully this date due to L knee pain and weakness. Pt did practice scooting to the R x 3 with max assist to place wt on RLE and use UEs to clear buttocks to move higher in bed. Max assist needed to return to supine. Along with her R knee pain pt presents with deficits in strength, balance, and endurance. She is expected to improve her functional mobility with continued PT services prior to d/c.

## 2023-05-20 NOTE — CASE MANAGEMENT/SOCIAL WORK
Discharge Planning Assessment  [facility]     Patient Name: Carolyne Martins  MRN: [id number]  Today's Date: 5/20/2023    Admit Date: 5/19/2023    Plan: DCP   Discharge Needs Assessment     Row Name 05/20/23 1006       Living Environment    People in Home alone    Current Living Arrangements home    Potentially Unsafe Housing Conditions unable to assess    Primary Care Provided by child(jp)    Provides Primary Care For no one, unable/limited ability to care for self    Family Caregiver if Needed child(jp), adult    Quality of Family Relationships involved;supportive    Able to Return to Prior Arrangements yes       Resource/Environmental Concerns    Resource/Environmental Concerns none    Transportation Concerns none       Transition Planning    Patient/Family Anticipates Transition to inpatient rehabilitation facility;long-term care facility    Patient/Family Anticipated Services at Transition skilled nursing    Transportation Anticipated family or friend will provide;agency       Discharge Needs Assessment    Readmission Within the Last 30 Days no previous admission in last 30 days    Equipment Currently Used at Home walker, rolling;wheelchair    Concerns to be Addressed discharge planning    Anticipated Changes Related to Illness inability to care for self    Equipment Needed After Discharge other (see comments)  TBD    Discharge Facility/Level of Care Needs other (see comments)  TBD    Current Discharge Risk lives alone               Discharge Plan     Row Name 05/20/23 1007       Plan    Plan DCP    Patient/Family in Agreement with Plan yes    Plan Comments No family present at bedside. SW spoke with pts son, Magno, over telephone regarding discharge planning. Pt lives alone in [facility]. Son lives across the street. Son owns both homes. Son works full time and checks in on pt as needed. Pt was just d/c'd from [facility] for STR on 5/17/23. Pt had been at facility since last d/c on 2/22/23. Pt was d/c'd back  home. Son took off work the last week to care for pt. Pt is unable to care for herself and be at home alone. Son is having to carry pt, pt unable to walk. Pt uses walker and w/c at baseline. Son states insurance stopped paying for STR and he had to pay $8000 out of pocket to pay for rest of stay, and could no longer afford private pay. JR spoke with Re/Eze PADRON who states she will have to look into case and follow up with this SW on Monday morning regarding if insurace would  pt again if assessed by therapy and transition into LTC, medicaid pending. SW updated son on findings. Son will be at hospital on monday to discuss. Son is open to LTC as he has to go back to work and can no longer care for pt. JR/WM will continue to follow for dc needs.    Final Discharge Disposition Code 30 - still a patient              Continued Care and Services - Admitted Since 5/19/2023    Coordination has not been started for this encounter.     Selected Continued Care - Prior Encounters Includes continued care and service providers with selected services from prior encounters from 2/18/2023 to 5/20/2023    Discharged on 2/22/2023 Admission date: 2/16/2023 - Discharge disposition: Skilled Nursing Facility (DC - External)    Destination     Service Provider Selected Services Address Phone Fax Patient Preferred    [facility] [address] [facility] [id number] [phone] --                       Demographic Summary     Row Name 05/20/23 1005       General Information    Admission Type observation    Arrived From home    Required Notices Provided Observation Status Notice    Referral Source admission list    Reason for Consult discharge planning    Preferred Language English               Functional Status     Row Name 05/20/23 1006       Functional Status, IADL    Medications independent    Meal Preparation completely dependent    Housekeeping completely dependent    Laundry  completely dependent    Shopping completely dependent       Mental Status Summary    Recent Changes in Mental Status/Cognitive Functioning unable to assess       Employment/    Employment Status retired               Psychosocial     Row Name 05/20/23 1006       Developmental Stage ([place]'s)    Developmental Stage Stage 8 (65 years-death/Late Adulthood) Integrity vs. Despair               Abuse/Neglect    No documentation.                Legal    No documentation.                Substance Abuse    No documentation.                Patient Forms    No documentation.                   STEVIE Turner

## 2023-05-20 NOTE — PLAN OF CARE
Goal Outcome Evaluation:  Plan of Care Reviewed With: patient        Progress: no change  Outcome Evaluation: Patient admitted from ER. VSS throughout shift. Patient slightly hypertensive; given PO at home medications for BP. Complaints of pain treated with PRN pain medications. No acute events during shift.          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 She underwent craniotomy for metastasis resection in 2021 followed by gamma knife radiation.      Most recently seen in our clinic by me on 2022. 1 year follow up MRI was recommended at that time. Today, patient is here to follow up after a brain MRI which was done on 10/14/2022. She denies any new symptoms. Her balance is good. She has an upcoming follow up with Dr. Canela in January.       Review of Systems:   Pertinent items are noted in HPI or as in patient entered ROS below, remainder of complete ROS is negative.   No flowsheet data found.     Physical Exam:   /81   Pulse 74   SpO2 97%    General: No acute distress.    Neuro: The patient is fully oriented. Speech is normal. Extraocular movements are intact without nystagmus. Facial sensation is intact in V1, V2, V3 distributions. Facial nerve function is normal, rated as a House Brackmann 1.  Shoulders are full strength. There is no pronator drift. Full strength in all extremities. Sensation intact throughout. No dysmetria with finger-nose-finger testing. Gait is normal.  Psych: Normal mood and affect. Behavior is normal.        Imaging:  10/14/2022 MRI Brain:                                                                  IMPRESSION: In this patient with history of metastatic cervical cancer  to the cerebellum, status post resection and chemoradiation, there is  no evidence of local recurrence  or new intracranial metastatic  disease.      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 No new symptoms. We will plan for a follow up MRI in 10/2023. Patient to contact us sooner if she has any new symptoms or concerns.          I spent 20 minutes on patient care activities related to this encounter on the date of service, including time spent reviewing the chart, obtaining history and examination and in counseling the patient, and in documentation in the electronic medical record.      Danielle MEYERS CNP  Department of Neurosurgery

## 2023-05-20 NOTE — PROGRESS NOTES
"    [facility]    Name:  Carolyne Martins   Age:  83 y.o.  Sex:  female  :  1940  MRN:  [id number]   Visit Number:  [id number]  Admission Date:  2023  Date Of Service:  23  Primary Care Physician:  Jerald Dawkins MD     LOS: 0 days :    Chief Complaint:      Weakness    Subjective:    Patient seen and examined this morning.  Patient is found resting in bed, there is no family present on exam.  Patient awakes and asks where she is and questions if she is in the hospital.  States she doesn't remember coming home to son's house yesterday and is at the hospital \"because of rehab\".  Denies any pain and states she rested well.    Hospital Course:    Ms. Martins is an 83-year-old female with history of coronary artery disease, chronic kidney disease stage IV, hypertension, dyslipidemia, recent left hip fracture and repair in 2023 was brought to the emergency room by EMS with left knee joint pain and bruising.  Patient went to short-term rehabilitation at [facility] and rehab in February after discharge from this facility and apparently was discharged home yesterday.  At that point, the son realized how immobile the patient was.  According to the son, patient was not able to get up from the bed and stand and needed total care to the fact that he had to carry her to the bathroom.  The son subsequently called EMS as he could not care for her at home.  Patient currently complains of left knee joint pain and denies any other symptoms.     In the emergency room, she was afebrile and hemodynamically stable saturating at 93% on room air.  Blood work done in the emergency room showed a BUN of 38, creatinine of 2.38 (baseline) glucose 111 and albumin of 3.2.  CBC was unremarkable except for hemoglobin of 10.7.  Urine analysis was negative for any evidence of urinary tract infection.  Left femur x-ray showed stable postoperative changes of hip replacement.  " X-ray of the left knee was negative for any fracture or dislocation.  X-ray of bilateral tibia and fibula were negative for any fracture or dislocation.  Ultrasound of the left lower extremity showed nonocclusive greater saphenous vein thrombosis without any DVT.  Since the family is unable to take care of the patient, case management was consulted but unfortunately due to it being the weekend, patient is currently being admitted to the medical floor for safe discharge planning next week.    Review of Systems:     All systems were reviewed and negative except as mentioned in subjective, assessment and plan.    Vital Signs:    Temp:  [97.9 °F (36.6 °C)-98.2 °F (36.8 °C)] 98.1 °F (36.7 °C)  Heart Rate:  [] 67  Resp:  [16-19] 16  BP: (153-172)/(62-94) 153/76    Intake and output:    I/O last 3 completed shifts:  In: 120 [P.O.:120]  Out: 300 [Urine:300]  I/O this shift:  In: 360 [P.O.:360]  Out: -     Physical Examination:    General Appearance:  Alert and cooperative, pleasant elderly female, no acute distress on exam, appears frail and chronically ill   Head:  Atraumatic and normocephalic.   Eyes: Conjunctivae and sclerae normal, no icterus. No pallor.   Throat: No oral lesions, no thrush, oral mucosa moist.   Neck: Supple, trachea midline   Lungs:   Breath sounds heard bilaterally equally.  No wheezing or crackles. Unlabored on room air   Heart:  Normal S1 and S2, no murmur, no gallop, no rub. No JVD.   Abdomen:   Normal bowel sounds, no masses, no organomegaly. Soft, nontender, nondistended, no rebound tenderness.   Extremities: Supple, Left lower extremity swelling, left ankle with edema, surgery scar to left hip healed   Skin: No bleeding or rash, bruising to lower extremities with dressing in place to right lower leg due to skin tear, significant bruising/ discoloration to posterior left knee   Neurologic: Alert and oriented x 2. No facial asymmetry. Moves all four limbs. No tremors.      Laboratory  results:    Results from last 7 days   Lab Units 05/20/23  0558 05/19/23  1710   SODIUM mmol/L 140 140   POTASSIUM mmol/L 4.1 4.8   CHLORIDE mmol/L 104 102   CO2 mmol/L 24.8 28.3   BUN mg/dL 39* 38*   CREATININE mg/dL 2.21* 2.38*   CALCIUM mg/dL 9.1 9.6   BILIRUBIN mg/dL  --  0.5   ALK PHOS U/L  --  133*   ALT (SGPT) U/L  --  8   AST (SGOT) U/L  --  14   GLUCOSE mg/dL 76 111*     Results from last 7 days   Lab Units 05/20/23  0558 05/19/23  1710   WBC 10*3/mm3 5.57 7.72   HEMOGLOBIN g/dL 10.0* 10.7*   HEMATOCRIT % 32.0* 34.1   PLATELETS 10*3/mm3 161 192                 No results for input(s): PHART, SFI6HZS, PO2ART, QXE3HJB, BASEEXCESS in the last 8760 hours.   I have reviewed the patient's laboratory results.    Radiology results:    XR Femur 2 View Left    Result Date: 5/20/2023  LEFT FEMUR     HISTORY: Pain.  REFERENCE: Pelvis radiograph February 2023.  FINDINGS:  A four view exam demonstrates left hip arthroplasty, replacing previous intramedullary nail with proximal femoral neck cannulated screw in February 2023. There is an old intertrochanteric fracture of the proximal left femur, grossly unchanged. Transverse screw tract through the proximal femoral diaphysis related to previously placed screw. No acute fracture or malalignment.      Impression: No acute fracture.     This report was signed and finalized on 5/20/2023 6:41 AM by Dr Chencho Hughes DO.    XR Knee 3 View Left    Result Date: 5/20/2023  LEFT KNEE  HISTORY: Left knee pain, recent hip surgery..  FINDINGS:  A three view exam demonstrates no acute fracture or dislocation. The joint spaces appear normal. Small superior patellar spur. No joint effusion. Mild arterial calcifications of the calf.      Impression: No acute fracture.     This report was signed and finalized on 5/20/2023 6:38 AM by Dr Chencho Hughes DO.    US Venous Doppler Lower Extremity Left (duplex)    Result Date: 5/19/2023  FINAL REPORT TECHNIQUE: Multiple transverse and longitudinal images  were performed of the femoral-popliteal deep venous system with augmentation and compression maneuvers. CLINICAL HISTORY: r/o dvt; pain FINDINGS: Left lower extremity duplex ultrasound demonstrates normal flow in the deep venous system.  There is nonocclusive thrombosis in the greater saphenous vein.     Impression: Nonocclusive greater saphenous vein thrombosis.  No DVT. Authenticated and Electronically Signed by David Gutierrez M.D. on 05/19/2023 06:21:21 PM    XR Tibia & Fibula 1 View Bilateral    Result Date: 5/20/2023  BILATERAL TIBIA/FIBULA TWO VIEWS EACH     HISTORY: Left knee pain.  FINDINGS:  A four view exam shows no acute fracture or dislocation of either tibia/fibula. Arterial calcifications.      Impression: No acute fracture of either tibia/fibula.     This report was signed and finalized on 5/20/2023 7:45 AM by Dr Chencho Hughes DO.    I have reviewed the patient's radiology reports.    Medication Review:     I have reviewed the patient's active and prn medications.     Problem List:      Phlebitis    Anemia    Gastroesophageal reflux disease without esophagitis    Impaired mobility and ADLs    CKD (chronic kidney disease) stage 4, GFR 15-29 ml/min ([facility])    Essential hypertension    Osteoarthritis of left knee      Assessment:    1. Generalized weakness and failure to thrive, POA.  2. Left knee joint pain likely secondary to osteoarthritis, POA.  3. Left greater saphenous vein superficial thrombosis, POA.  4. Coronary artery disease.  5. Chronic diastolic heart failure, no evidence of exacerbation.  6. Chronic kidney disease stage IV.  7. Rheumatoid Arthritis  8. Essential hypertension.  9. Acquired hypothyroidism.  10. Anemia of chronic kidney disease.    Plan:       Generalized weakness/impaired mobility and ADLs.  - Patient does have generalized weakness and inability to ambulate  - This is multifactorial including recent history of hip fracture, left knee joint osteoarthritis as well as deconditioning.  -  Consult physical and occupational therapy for recommendations  - Consult case management for short-term rehab placement versus long-term care as family is not currently able to take care of her     Left knee joint osteoarthritis.  -Lortab for pain control.  - Avoid NSAIDs due to chronic kidney disease.     Left greater saphenous vein superficial thrombosis.  - Likely secondary to recent history of hip fracture and lack of activity.  - DVT prophylaxis with heparin    CKD  -Creatinine at baseline around 2.3    Hypertension  -Blood pressure elevated  -Does not appear to be on any anti-hypertensives aside from Coreg.  Monitor for 24 hours and start Amlodipine 5mg if remains elevated.    DVT Prophylaxis: Heparin  Code Status: DNR/DNI  Diet: Renal  Discharge Plan: Pending placement    Roma Burk, APRN  05/20/23  08:45 EDT    Dictated utilizing Dragon dictation.

## 2023-05-20 NOTE — THERAPY EVALUATION
Patient Name: Carolyne Martins  : 1940    MRN: [id number]                              Today's Date: 2023       Admit Date: 2023    Visit Dx:     ICD-10-CM ICD-9-CM   1. Phlebitis  I80.9 451.9   2. Lower extremity pain, left  M79.605 729.5   3. Bacteriuria  R82.71 791.9   4. Generalized weakness  R53.1 780.79   5. Acute on chronic renal insufficiency  N28.9 593.9    N18.9 585.9     Patient Active Problem List   Diagnosis   • Arthralgia of shoulder region   • Tendinopathy of rotator cuff   • Osteoarthritis of right acromioclavicular joint   • Anemia due to stage 4 chronic kidney disease treated with erythropoietin   • Anemia   • Gastric polyp   • Gastroesophageal reflux disease without esophagitis   • Melena   • Change in bowel habits   • Colon cancer screening   • Senile osteoporosis   • Personal history of colonic polyps   • Closed fracture of left hip ([facility])   • Impaired mobility and ADLs   • CKD (chronic kidney disease) stage 4, GFR 15-29 ml/min ([facility])   • Simple chronic bronchitis   • Essential hypertension   • Acute cystitis without hematuria   • Debility   • Left leg injury   • JASMEET (acute kidney injury)   • Chronic obstructive pulmonary disease   • Hip pain   • Post-traumatic osteoarthritis of left hip   • Status post left hip replacement   • Fracture, proximal femur, left, closed, initial encounter   • Postoperative anemia due to acute blood loss   • Phlebitis   • Osteoarthritis of left knee     Past Medical History:   Diagnosis Date   • Anemia    • Anemia    • Arthritis    • Back pain    • Bleeding from anus    • Bronchitis     STATES HAS BRONCHITIS CURRENTLY (17).     • Bronchitis 2017   • CAD (coronary artery disease)    • Cataract, bilateral    • Chronic kidney disease     STAGE 4.  SEES TERA   • Chronic kidney failure     REPORTS STAGE IV   • Difficulty swallowing solids    • Disease of thyroid gland    • Fracture, radius 2016    right distal radius and ulna, healed.   •  "GERD (gastroesophageal reflux disease)    • Gout    • High cholesterol    • History of blood transfusion 2019   • History of nuclear stress test 2014    DR. CORTEZ.  \"IT WAS OK\"   • Hyperparathyroidism    • Hyperparathyroidism    • Hypertension    • Impaired functional mobility, balance, gait, and endurance    • Impaired mobility    • Iron deficiency    • Osteoarthritis    • Osteoarthritis of knees, bilateral     Both knees Supartz injection series August, 2015   • Osteoporosis    • Palpitations    • Personal history of cardiac murmur    • Pessary maintenance    • PONV (postoperative nausea and vomiting)    • Presence of pessary    • Problems with swallowing     WITH FOOD OCCASSIONALLY   • Rheumatic heart disease     Personal history   • Rotator cuff tendonitis     right    • Rupture of right proximal biceps tendon     chronic   • Seasonal allergies    • Sinus problem    • Spinal headache     REPORTS AFTER DELIVERY OF SON   • Subacromial bursitis     right    • Valvular heart disease    • Vision problems    • Vitamin B12 deficiency    • Vitamin D deficiency    • Wears glasses      Past Surgical History:   Procedure Laterality Date   • CATARACT EXTRACTION Bilateral    • CERVICAL POLYPECTOMY     • COLONOSCOPY  2011   • COLONOSCOPY N/A 1/9/2018    Procedure: COLONOSCOPY with endoscopic mucosal resection (hot snare), normal saline submucosal injection, argon thermal ablation, resolution clip placement x4, and cold biopsy polypectomy;  Surgeon: Pieter Concepcion MD;  Location: [facility] ENDOSCOPY;  Service:    • ENDOSCOPY N/A 12/6/2017    Procedure: ESOPHAGOGASTRODUODENOSCOPY with biopsies and hot snare polypectomies;  Surgeon: Pieter Concepcion MD;  Location: [facility] ENDOSCOPY;  Service:    • ENDOSCOPY N/A 1/15/2019    Procedure: ESOPHAGOGASTRODUODENOSCOPY WITH BIOPSIES AND HOT SNARE POLYPECTOMIES X10;  Surgeon: Pieter Concepcion MD;  Location: [facility] ENDOSCOPY;  Service: Gastroenterology   • HARDWARE REMOVAL Left 2/17/2023    " Procedure: FEMUR HARDWARE REMOVAL, LEFT;  Surgeon: Gabriele Loyd MD;  Location: [facility] OR;  Service: Orthopedics;  Laterality: Left;   • HIP TROCHANTERIC NAILING WITH INTRAMEDULLARY HIP SCREW Left 2/14/2021    Procedure: HIP TROCHANTER NAIL SHORT WITH INTRAMEDULLARY HIP SCREW, LEFT;  Surgeon: Gabriele Loyd MD;  Location: [facility] OR;  Service: Orthopedics;  Laterality: Left;   • TOTAL HIP ARTHROPLASTY Left 2/17/2023    Procedure: REMOVAL OF FRACTURE HARDWARE AND CONVERSION TO TOTAL HIP ARTHROPLASTY, LEFT;  Surgeon: Gabriele Loyd MD;  Location: [facility] OR;  Service: Orthopedics;  Laterality: Left;   • UPPER GASTROINTESTINAL ENDOSCOPY  08/18/2014      General Information     Row Name 05/20/23 1425          Physical Therapy Time and Intention    Document Type evaluation  -TW     Mode of Treatment physical therapy  -TW     Row Name 05/20/23 1425          General Information    Patient Profile Reviewed yes  -TW     Prior Level of Function mod assist:;max assist:;all household mobility  per pt's son pt only home one day from rehab and was max assist for all transfers and unable to walk due to L knee pain. Pt has BSC,FWW, and w/c at home.  -TW     Existing Precautions/Restrictions fall  -TW     Barriers to Rehab medically complex;previous functional deficit;cognitive status;ineffective coping  -TW     Row Name 05/20/23 1425          Living Environment    People in Home alone;other (see comments)  pt returned home from rehab for only one day and pt's son stayed with her. He lives across the street.  -TW     Row Name 05/20/23 1425          Home Main Entrance    Number of Stairs, Main Entrance none  -TW     Row Name 05/20/23 1425          Stairs Within Home, Primary    Number of Stairs, Within Home, Primary none  -TW     Row Name 05/20/23 1425          Cognition    Orientation Status (Cognition) verbal cues/prompts needed for orientation;oriented x 3  -TW     Row Name 05/20/23 1425          Safety Issues,  Functional Mobility    Safety Issues Affecting Function (Mobility) friction/shear risk;insight into deficits/self-awareness;safety precaution awareness;safety precautions follow-through/compliance;sequencing abilities;ability to follow commands  -TW     Impairments Affecting Function (Mobility) balance;pain;strength;endurance/activity tolerance;coordination  -TW     Comment, Safety Issues/Impairments (Mobility) pt's L knee pain increases with any mobility of LLE. To assist with mobility of LLE a pillow kept under knee and the pillow was used to move pt's LLE  -TW           User Key  (r) = Recorded By, (t) = Taken By, (c) = Cosigned By    Initials Name Provider Type    TW Mildred Acosta, OSCAR Physical Therapist               Mobility     Row Name 05/20/23 0886          Bed Mobility    Bed Mobility rolling left;rolling right;scooting/bridging;supine-sit;sit-supine  -TW     Rolling Left [place] (Bed Mobility) minimum assist (75% patient effort);verbal cues  -TW     Rolling Right [place] (Bed Mobility) minimum assist (75% patient effort);verbal cues  -TW     Scooting/Bridging [place] (Bed Mobility) minimum assist (75% patient effort);verbal cues;nonverbal cues (demo/gesture)  -TW     Supine-Sit [place] (Bed Mobility) moderate assist (50% patient effort);verbal cues;nonverbal cues (demo/gesture)  -TW     Sit-Supine [place] (Bed Mobility) verbal cues;nonverbal cues (demo/gesture);maximum assist (25% patient effort)  -TW     Assistive Device (Bed Mobility) draw sheet;head of bed elevated;bed rails  -TW     Comment, (Bed Mobility) Each step in bedmobility explained to pt to decrease her anxiety about moving and possibly increasing L knee pain. A pillow was used to assist in moving LLE in bed with greater success than trying to move LLE directly. Cues to use RLE as needed for boosting in bed. Worked with pt sitting on R side of bed to use RLE and BUE to scoot buttocks to the R. Max assist needed for 3  scoots to the R.  -     Row Name 05/20/23 1425          Transfers    Comment, (Transfers) pain in LLE kept pt from attempting any transfers this pm.  -[facility] Name 05/20/23 1425          Bed-Chair Transfer    Bed-Chair O[place] (Transfers) unable to assess  -[facility] Name 05/20/23 1425          Sit-Stand Transfer    Sit-Stand [place] (Transfers) unable to assess  -[facility] Name 05/20/23 1425          Gait/Stairs (Locomotion)    Ogle Level (Gait) not tested  -           User Key  (r) = Recorded By, (t) = Taken By, (c) = Cosigned By    Initials Name Provider Type     Jean, Mildred, PT Physical Therapist               Obj/Interventions     Row Name 05/20/23 1425          Range of Motion Comprehensive    Comment, General Range of Motion RLE grossly WFLs. R knee flexion limited due to pain with any R knee flexion. Therapist able to flex R knee to 50 degrees. Pt also noted to have tight heelcords bilaterally.  -[facility] Name 05/20/23 1425          Strength Comprehensive (MMT)    Comment, General Manual Muscle Testing (MMT) Assessment RLE grossly 3/5 to 4-/5 and LLE grossly 1+/5 to 2/5 with L knee pain at 8-10/10 with any active movement.  -     Row Name 05/20/23 1425          Balance    Balance Assessment sitting static balance;sitting dynamic balance  -TW     Static Sitting Balance standby assist  -TW     Dynamic Sitting Balance standby assist  -TW     Position, Sitting Balance unsupported;sitting edge of bed  -TW     Comment, Balance pt sat on EOB for 5 min with encouragement to stay up and not return to bed.  -           User Key  (r) = Recorded By, (t) = Taken By, (c) = Cosigned By    Initials Name Provider Type    TW Karla, Mildred, PT Physical Therapist               Goals/Plan     Row Name 05/20/23 1425          Bed Mobility Goal 1 (PT)    Activity/Assistive Device (Bed Mobility Goal 1, PT) bed mobility activities, all  -TW     Ogle Level/Cues Needed (Bed Mobility Goal 1,  PT) contact guard required  -TW     Time Frame (Bed Mobility Goal 1, PT) 2 weeks  -TW     Progress/Outcomes (Bed Mobility Goal 1, PT) goal ongoing  -TW     Row Name 05/20/23 1425          Transfer Goal 1 (PT)    Activity/Assistive Device (Transfer Goal 1, PT) sit-to-stand/stand-to-sit;bed-to-chair/chair-to-bed;toilet;walker, rolling  -TW     Traverse Level/Cues Needed (Transfer Goal 1, PT) contact guard required  -TW     Time Frame (Transfer Goal 1, PT) 2 weeks  -TW     Strategies/Barriers (Transfers Goal 1, PT) with pt able to tolerate wt throught LLE to assist with transfers  -TW     Progress/Outcome (Transfer Goal 1, PT) goal ongoing  -TW     Row Name 05/20/23 1425          Gait Training Goal 1 (PT)    Activity/Assistive Device (Gait Training Goal 1, PT) gait (walking locomotion);assistive device use;decrease fall risk;diminish gait deviation  -TW     Traverse Level (Gait Training Goal 1, PT) minimum assist (75% or more patient effort)  -TW     Distance (Gait Training Goal 1, PT) 40 ft  -TW     Time Frame (Gait Training Goal 1, PT) 2 weeks  -TW     Progress/Outcome (Gait Training Goal 1, PT) goal ongoing  -TW     Row Name 05/20/23 1425          ROM Goal 1 (PT)    ROM Goal 1 (PT) pt to tolerate 90 degrees L knee flexion  -TW     Time Frame (ROM Goal 1, PT) short-term goal (STG);1 week  -TW     Progress/Outcome (ROM Goal 1, PT) goal ongoing  -TW     Row Name 05/20/23 1425          Patient Education Goal (PT)    Activity (Patient Education Goal, PT) BLE ther ex 1 x 10  -TW     Traverse/Cues/Accuracy (Memory Goal 2, PT) demonstrates adequately  -TW     Time Frame (Patient Education Goal, PT) 2 weeks  -TW     Progress/Outcome (Patient Education Goal, PT) goal ongoing  -TW     Row Name 05/20/23 1425          Therapy Assessment/Plan (PT)    Planned Therapy Interventions (PT) balance training;bed mobility training;gait training;patient/family education;ROM (range of motion);strengthening;transfer training   -TW           User Key  (r) = Recorded By, (t) = Taken By, (c) = Cosigned By    Initials Name Provider Type    TW Mildred Acosta, PT Physical Therapist               Clinical Impression     Row Name 05/20/23 1427          Pain    Pretreatment Pain Rating 2/10  -TW     Posttreatment Pain Rating 4/10  -TW     Pain Location - Side/Orientation Left  -TW     Pain Location - knee  -TW     Pre/Posttreatment Pain Comment with any passive or active LLE movement pt's L knee pain increased to 8-10/10. Therapist used a pillow to assist in positioning LLE with greater success in moving LLE than to directly touch LLE.  -TW     Pain Intervention(s) Repositioned;Other (Comment)  -TW     Row Name 05/20/23 1429          Plan of Care Review    Plan of Care Reviewed With patient;son  -TW     Outcome Evaluation PT evaluation completed this am with pt presenting supine in bed on room air and rating pain at 2/10 in L knee. Pt was oriented x 3 with prompts. Pt needed to be cleaned up and her bed mobility was assessed at this time. Pt would help with BUE to pull on rail and with LLE supported by pillow completed rolling with min assist. Supine to sit required mod assist and again pillow was used to progress LLE to EOB with 8/10 pain noted in L knee. Pt was able to sit on EOB x 5 min with SBA. Pt not able to stand successfully this date due to L knee pain and weakness. Pt did practice scooting to the R x 3 with max assist to place wt on RLE and use UEs to clear buttocks to move higher in bed. Max assist needed to return to supine. Along with her R knee pain pt presents with deficits in strength, balance, and endurance. She is expected to improve her functional mobility with continued PT services prior to d/c.  -TW     Row Name 05/20/23 1424          Therapy Assessment/Plan (PT)    Patient/Family Therapy Goals Statement (PT) Pt's son states if possible pt needs more therapy to improve functional mobility.  -TW     Rehab Potential (PT) good, to  achieve stated therapy goals  -TW     Criteria for Skilled Interventions Met (PT) yes;meets criteria  -TW     Therapy Frequency (PT) 5 times/wk  -TW     Predicted Duration of Therapy Intervention (PT) 2 wks  -TW     Row Name 05/20/23 1425          Vital Signs    Pretreatment Heart Rate (beats/min) 76  -TW     Posttreatment Heart Rate (beats/min) 88  -TW     Pre SpO2 (%) 96  -TW     O2 Delivery Pre Treatment room air  -TW     Post SpO2 (%) 98  -TW     O2 Delivery Post Treatment room air  -TW     Pre Patient Position Supine  -TW     Intra Patient Position Sitting  -TW     Post Patient Position Supine  -TW     Row Name 05/20/23 1425          Positioning and Restraints    Pre-Treatment Position in bed  -TW     Post Treatment Position bed  -TW     In Bed notified nsg;supine;side rails up x3;call light within reach;encouraged to call for assist;exit alarm on;with family/caregiver  pillow under B knees  -TW           User Key  (r) = Recorded By, (t) = Taken By, (c) = Cosigned By    Initials Name Provider Type    Mildred Andre, PT Physical Therapist               Outcome Measures     Row Name 05/20/23 1425          How much help from another person do you currently need...    Turning from your back to your side while in flat bed without using bedrails? 2  -TW     Moving from lying on back to sitting on the side of a flat bed without bedrails? 2  -TW     Moving to and from a bed to a chair (including a wheelchair)? 1  -TW     Standing up from a chair using your arms (e.g., wheelchair, bedside chair)? 1  -TW     Climbing 3-5 steps with a railing? 1  -TW     To walk in hospital room? 1  -TW     AM-PAC 6 Clicks Score (PT) 8  -TW     Highest level of mobility 3 --> Sat at edge of bed  -TW     Row Name 05/20/23 1425          Functional Assessment    Outcome Measure Options AM-PAC 6 Clicks Basic Mobility (PT)  -TW           User Key  (r) = Recorded By, (t) = Taken By, (c) = Cosigned By    Initials Name Provider Type    MOOKIE Acosta  OSCAR Levy Physical Therapist                             Physical Therapy Education     Title: PT OT SLP Therapies (In Progress)     Topic: Physical Therapy (In Progress)     Point: Mobility training (Done)     Learning Progress Summary           Patient Acceptance, E, VU by TW at 5/20/2023 1425    Comment: Pt and her son educated on purpose of PT evaluation and pt's inpt POC/goals   Family Acceptance, E, VU by TW at 5/20/2023 1425    Comment: Pt and her son educated on purpose of PT evaluation and pt's inpt POC/goals                   Point: Home exercise program (Not Started)     Learner Progress:  Not documented in this visit.          Point: Body mechanics (Not Started)     Learner Progress:  Not documented in this visit.          Point: Precautions (Not Started)     Learner Progress:  Not documented in this visit.                      User Key     Initials Effective Dates Name Provider Type Discipline     06/16/21 -  Mildred Acosta PT Physical Therapist PT              PT Recommendation and Plan  Planned Therapy Interventions (PT): balance training, bed mobility training, gait training, patient/family education, ROM (range of motion), strengthening, transfer training  Plan of Care Reviewed With: patient, son  Outcome Evaluation: PT evaluation completed this am with pt presenting supine in bed on room air and rating pain at 2/10 in L knee. Pt was oriented x 3 with prompts. Pt needed to be cleaned up and her bed mobility was assessed at this time. Pt would help with BUE to pull on rail and with LLE supported by pillow completed rolling with min assist. Supine to sit required mod assist and again pillow was used to progress LLE to EOB with 8/10 pain noted in L knee. Pt was able to sit on EOB x 5 min with SBA. Pt not able to stand successfully this date due to L knee pain and weakness. Pt did practice scooting to the R x 3 with max assist to place wt on RLE and use UEs to clear buttocks to move higher in bed.  Max assist needed to return to supine. Along with her R knee pain pt presents with deficits in strength, balance, and endurance. She is expected to improve her functional mobility with continued PT services prior to d/c.     Time Calculation:    PT Charges     Row Name 05/20/23 1425             Time Calculation    Stop Time 1425  -TW      PT Received On 05/13/23 -TW      PT Goal Re-Cert Due Date 05/30/23 -TW            User Key  (r) = Recorded By, (t) = Taken By, (c) = Cosigned By    Initials Name Provider Type    TW Mildred Acosta PT Physical Therapist              Therapy Charges for Today     Code Description Service Date Service Provider Modifiers Qty    62463165569 HC PT EVAL LOW COMPLEXITY 3 5/20/2023 Mildred Acosta PT GP 1          PT G-Codes  Outcome Measure Options: AM-PAC 6 Clicks Basic Mobility (PT)  AM-PAC 6 Clicks Score (PT): 8  PT Discharge Summary  Anticipated Discharge Disposition (PT): skilled nursing facility, inpatient rehabilitation facility    Mildred Acosta PT  5/20/2023

## 2023-05-20 NOTE — PROGRESS NOTES
"Adult Nutrition  Assessment/PES    Patient Name:  Carolyne Martins  YOB: 1940  MRN: [id number]  Admit Date:  5/19/2023    Assessment Date:  5/20/2023    Comments:      1. Continue current diet order as medically appropriate.   2. Encourage PO intake to meet 50% of estimated needs.   3. Will order Boost VHC daily.   4. Encourage intake of supplement ordered.     RD to follow-up and available PRN.    Reason for Assessment     Row Name 05/20/23 0944          Reason for Assessment    Reason For Assessment identified at risk by screening criteria     Diagnosis renal disease     Identified At Risk by Screening Criteria MST SCORE 2+;reduced oral intake over the last month                  Labs/Tests/Procedures/Meds     Row Name 05/20/23 0945          Labs/Procedures/Meds    Lab Results Reviewed reviewed, pertinent     Lab Results Comments High: BUN and Cr        Medications    Pertinent Medications Reviewed reviewed, pertinent     Pertinent Medications Comments protonix, B complex, docusate                Physical Findings     Row Name 05/20/23 0954          Physical Findings    Overall Physical Appearance underwt for age                Estimated/Assessed Needs - Anthropometrics     Row Name 05/20/23 0954 05/20/23 0340       Anthropometrics    Weight -- 43.8 kg (96 lb 9 oz)    Height for Calculation 1.499 m (4' 11\") --    Weight for Calculation 43.5 kg (96 lb) --       Estimated/Assessed Needs    Additional Documentation Estimated Calorie Needs (Group);KCAL/KG (Group);Protein Requirements (Group);Fluid Requirements (Group) --       Estimated Calorie Needs    Estimated Calorie Requirement (kcal/day) 1306 --       KCAL/KG    KCAL/KG 25 Kcal/Kg (kcal);30 Kcal/Kg (kcal) --    25 Kcal/Kg (kcal) 1088.625 --    30 Kcal/Kg (kcal) 1306.35 --       Protein Requirements    Weight Used For Protein Calculations 43.5 kg (96 lb) --    Est Protein Requirement Amount (gms/kg) 1.0 gm protein --    Estimated Protein " Requirements (gms/day) 43.55 --       Fluid Requirements    Fluid Requirements (mL/day) 1306  1mL/kcal --    RDA Method (mL) 1306 --               Nutrition Prescription Ordered     Row Name 05/20/23 0955          Nutrition Prescription PO    Current PO Diet Regular     Fluid Consistency Thin     Common Modifiers Low Potassium;Fluid Restriction;Low Sodium     Fluid Restriction mL per Day 1500 mL                Evaluation of Received Nutrient/Fluid Intake     Row Name 05/20/23 0956          Intake Assessment    Energy/Calorie Requirement Assessment not meeting needs     Protein Requirement Assessment not meeting needs        PO Evaluation    Number of Days PO Intake Evaluated 1 day     Number of Meals 1     % PO Intake 25                   Problem/Interventions:   Problem 1     Row Name 05/20/23 0956          Nutrition Diagnoses Problem 1    Problem 1 Increased Nutrient Needs     Macronutrient Kcal     Etiology (related to) Medical Diagnosis     Renal CKD     Signs/Symptoms (evidenced by) Other (comment)  need for oral nutrition supplement                      Intervention Goal     Row Name 05/20/23 0957          Intervention Goal    General Maintain nutrition;Improved nutrition related lab(s);Reduce/improve symptoms;Meet nutritional needs for age/condition;Disease management/therapy     PO Establish PO;Increase intake;Tolerate PO;Maintain intake;Continue positive trend;Meet estimated needs     Weight Maintain weight                Nutrition Intervention     Row Name 05/20/23 0957          Nutrition Intervention    RD/Tech Action Follow Tx progress;Care plan reviewd;Await begin PO;Encourage intake;Recommend/ordered     Recommended/Ordered Supplement                Nutrition Prescription     Row Name 05/20/23 0957          Nutrition Prescription PO    PO Prescription Begin/change supplement     Supplement --  Boost VHC     New PO Prescription Ordered? Yes        Other Orders    Obtain Weight Daily     Obtain Weight  Ordered? No, recommended     Supplement Vitamin mineral supplement     Supplement Ordered? No, recommended     Labs Mg++;Na+;Phos;K+     Labs Ordered? No, recommended                Education/Evaluation     Row Name 05/20/23 0987          Education    Education Will Instruct as appropriate        Monitor/Evaluation    Monitor Per protocol;PO intake;Supplement intake;Pertinent labs;Weight;Skin status;Symptoms                 Electronically signed by:  Emily Mcfadden RD  05/20/23 09:58 EDT

## 2023-05-20 NOTE — PLAN OF CARE
Goal Outcome Evaluation:      PT working with PT this shift. Continues with external Urinary cath. PT reports L knee pain, treated effectadan per MAR

## 2023-05-21 LAB
ANION GAP SERPL CALCULATED.3IONS-SCNC: 11.7 MMOL/L (ref 5–15)
BUN SERPL-MCNC: 40 MG/DL (ref 8–23)
BUN/CREAT SERPL: 15.6 (ref 7–25)
CALCIUM SPEC-SCNC: 9.2 MG/DL (ref 8.6–10.5)
CHLORIDE SERPL-SCNC: 105 MMOL/L (ref 98–107)
CO2 SERPL-SCNC: 25.3 MMOL/L (ref 22–29)
CREAT SERPL-MCNC: 2.57 MG/DL (ref 0.57–1)
EGFRCR SERPLBLD CKD-EPI 2021: 18.3 ML/MIN/1.73
GLUCOSE SERPL-MCNC: 82 MG/DL (ref 65–99)
POTASSIUM SERPL-SCNC: 4.6 MMOL/L (ref 3.5–5.2)
SODIUM SERPL-SCNC: 142 MMOL/L (ref 136–145)

## 2023-05-21 PROCEDURE — 80048 BASIC METABOLIC PNL TOTAL CA: CPT | Performed by: NURSE PRACTITIONER

## 2023-05-21 PROCEDURE — G0378 HOSPITAL OBSERVATION PER HR: HCPCS

## 2023-05-21 PROCEDURE — 97110 THERAPEUTIC EXERCISES: CPT

## 2023-05-21 PROCEDURE — 93005 ELECTROCARDIOGRAM TRACING: CPT | Performed by: INTERNAL MEDICINE

## 2023-05-21 PROCEDURE — 99232 SBSQ HOSP IP/OBS MODERATE 35: CPT | Performed by: NURSE PRACTITIONER

## 2023-05-21 PROCEDURE — 96372 THER/PROPH/DIAG INJ SC/IM: CPT

## 2023-05-21 PROCEDURE — 25010000002 HEPARIN (PORCINE) PER 1000 UNITS: Performed by: INTERNAL MEDICINE

## 2023-05-21 PROCEDURE — 25010000002 ONDANSETRON PER 1 MG: Performed by: INTERNAL MEDICINE

## 2023-05-21 PROCEDURE — 96376 TX/PRO/DX INJ SAME DRUG ADON: CPT

## 2023-05-21 RX ORDER — CARVEDILOL 25 MG/1
25 TABLET ORAL 2 TIMES DAILY WITH MEALS
Status: DISCONTINUED | OUTPATIENT
Start: 2023-05-21 | End: 2023-05-24 | Stop reason: HOSPADM

## 2023-05-21 RX ORDER — CALCIUM CARBONATE 500 MG/1
1 TABLET, CHEWABLE ORAL 3 TIMES DAILY PRN
Status: DISCONTINUED | OUTPATIENT
Start: 2023-05-21 | End: 2023-05-24 | Stop reason: HOSPADM

## 2023-05-21 RX ADMIN — SODIUM BICARBONATE 650 MG TABLET 650 MG: at 21:28

## 2023-05-21 RX ADMIN — SERTRALINE 50 MG: 50 TABLET, FILM COATED ORAL at 08:36

## 2023-05-21 RX ADMIN — ONDANSETRON 4 MG: 2 SOLUTION INTRAMUSCULAR; INTRAVENOUS at 23:54

## 2023-05-21 RX ADMIN — ACETAMINOPHEN 650 MG: 325 TABLET, FILM COATED ORAL at 09:31

## 2023-05-21 RX ADMIN — HYDROCODONE BITARTRATE AND ACETAMINOPHEN 1 TABLET: 5; 325 TABLET ORAL at 13:04

## 2023-05-21 RX ADMIN — ALLOPURINOL 100 MG: 100 TABLET ORAL at 08:35

## 2023-05-21 RX ADMIN — CARVEDILOL 25 MG: 25 TABLET, FILM COATED ORAL at 18:20

## 2023-05-21 RX ADMIN — Medication 1 TABLET: at 08:36

## 2023-05-21 RX ADMIN — CALCIUM CARBONATE (ANTACID) CHEW TAB 500 MG 1 TABLET: 500 CHEW TAB at 23:54

## 2023-05-21 RX ADMIN — HEPARIN SODIUM 5000 UNITS: 5000 INJECTION INTRAVENOUS; SUBCUTANEOUS at 21:28

## 2023-05-21 RX ADMIN — LIDOCAINE 1 PATCH: 50 PATCH CUTANEOUS at 08:35

## 2023-05-21 RX ADMIN — HEPARIN SODIUM 5000 UNITS: 5000 INJECTION INTRAVENOUS; SUBCUTANEOUS at 08:36

## 2023-05-21 RX ADMIN — CARVEDILOL 12.5 MG: 12.5 TABLET, FILM COATED ORAL at 08:35

## 2023-05-21 RX ADMIN — ROSUVASTATIN CALCIUM 10 MG: 5 TABLET, FILM COATED ORAL at 21:28

## 2023-05-21 RX ADMIN — ASPIRIN 81 MG: 81 TABLET, COATED ORAL at 08:36

## 2023-05-21 RX ADMIN — HYDROCODONE BITARTRATE AND ACETAMINOPHEN 1 TABLET: 5; 325 TABLET ORAL at 23:54

## 2023-05-21 RX ADMIN — Medication 10 ML: at 21:29

## 2023-05-21 RX ADMIN — ONDANSETRON 4 MG: 2 SOLUTION INTRAMUSCULAR; INTRAVENOUS at 14:41

## 2023-05-21 RX ADMIN — SODIUM BICARBONATE 650 MG TABLET 650 MG: at 08:36

## 2023-05-21 RX ADMIN — Medication 10 ML: at 08:36

## 2023-05-21 RX ADMIN — SENNOSIDES AND DOCUSATE SODIUM 2 TABLET: 50; 8.6 TABLET ORAL at 21:28

## 2023-05-21 RX ADMIN — HYDROCODONE BITARTRATE AND ACETAMINOPHEN 1 TABLET: 5; 325 TABLET ORAL at 05:44

## 2023-05-21 RX ADMIN — PANTOPRAZOLE SODIUM 40 MG: 40 TABLET, DELAYED RELEASE ORAL at 05:44

## 2023-05-21 NOTE — PAYOR COMM NOTE
"TO: ANTHEM MEDICARE REPLACEMENT  FROM: ANGIE BENITEZ RN  PH: [phone], FAX: [phone]  NPI: [id number]  TAX ID: [id number]  MEMBER ID: [id number]  MRN: [id number]  UPDATED CLINICALS AT 72 HOURS OBS      Carolyne Martins (83 y.o. Female)     Date of Birth   1940    Social Security Number       Address   [address] [place] [address]    Home Phone   [phone]    MRN   [id number]       Islam   Taoist    Marital Status                               Admission Date   5/19/23    Admission Type   Emergency    Admitting Provider   Baldomero Palomares MD    Attending Provider   Baldomero Palomares MD    [facility]       Discharge Date       Discharge Disposition       Discharge Destination                               Attending Provider: Baldomero Palomares MD    Allergies: Ferrlecit [Na Ferric Gluc Cplx In Sucrose], Ranitidine Hcl, Levofloxacin, Lisinopril    Isolation: None   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), 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), 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), COVID (History) (02/20/23)   Code Status: No CPR    Ht: 149.9 cm (59\")   Wt: 44 kg (97 lb)    Admission Cmt: None   Principal Problem: Phlebitis [I80.9]                 Active Insurance as of 5/19/2023     Primary Coverage     Payor Plan Insurance Group Employer/Plan Group    ANTHEM MEDICARE REPLACEMENT ANTH MEDICARE ADVANTAGE [id number]     Payor Plan Address Payor Plan Phone Number Payor Plan Fax Number Effective Dates    [address] [phone]  7/1/2022 - None Entered    [facility] [id number]       Subscriber Name Subscriber Birth Date Member ID       CAROLYNE MARTINS 1940 [id number]           Secondary Coverage     Payor Plan Insurance Group Employer/Plan Group    ANTHEM BLUE CROSS ANTHEM [facility] SUPP [id number]     Payor Plan Address Payor Plan Phone Number Payor Plan Fax Number Effective Dates    [address]   12/1/2016 - None Entered    [place] [address]       Subscriber " Name Subscriber Birth Date Member ID       CAROLYNE LOPES 1940 [id number]                 Emergency Contacts      (Rel.) Home Phone Work Phone Mobile Phone    Magno Lopes (Son) [phone] -- [phone]    Linda Banda (Friend) [phone] -- --               History & Physical      Baldomero Palomares MD at 23            [facility]   HISTORY AND PHYSICAL      Name:  Carolyne Lopes   Age:  83 y.o.  Sex:  female  :  1940  MRN:  [id number]   Visit Number:  [id number]  Admission Date:  2023  Date Of Service:  23  Primary Care Physician:  Jerald Dawkins MD    Chief Complaint:     Generalized weakness and knee pain.    History Of Presenting Illness:      Ms. Lopes is an 83-year-old female with history of coronary artery disease, chronic kidney disease stage IV, hypertension, dyslipidemia, recent left hip fracture and repair in 2023 was brought to the emergency room by EMS with left knee joint pain and bruising.  Patient went to short-term rehabilitation at [facility] and rehab in February after discharge from this facility and apparently was discharged home yesterday.  At that point, the son realized how immobile the patient was.  According to the son, patient was not able to get up from the bed and stand and needed total care to the fact that he had to carry her to the bathroom.  The son subsequently called EMS as he could not care for her at home.  Patient currently complains of left knee joint pain and denies any other symptoms.    In the emergency room, she was afebrile and hemodynamically stable saturating at 93% on room air.  Blood work done in the emergency room showed a BUN of 38, creatinine of 2.38 (baseline) glucose 111 and albumin of 3.2.  CBC was unremarkable except for hemoglobin of 10.7.  Urine analysis was negative for any evidence of urinary tract infection.  Left femur x-ray showed stable postoperative  changes of hip replacement.  X-ray of the left knee was negative for any fracture or dislocation.  X-ray of bilateral tibia and fibula were negative for any fracture or dislocation.  Ultrasound of the left lower extremity showed nonocclusive greater saphenous vein thrombosis without any DVT.  Since the family is unable to take care of the patient, case management was consulted but unfortunately due to it being the weekend, patient is currently being admitted to the medical floor for safe discharge planning next week.    Review Of Systems:    All systems were reviewed and negative except as mentioned in history of presenting illness, assessment and plan.    Past Medical History: Patient  has a past medical history of Anemia (1998), Anemia, Arthritis, Back pain, Bleeding from anus, Bronchitis, Bronchitis (12/2017), CAD (coronary artery disease), Cataract, bilateral, Chronic kidney disease, Chronic kidney failure, Difficulty swallowing solids, Disease of thyroid gland, Fracture, radius (2016), GERD (gastroesophageal reflux disease), Gout, High cholesterol, History of blood transfusion (2019), History of nuclear stress test (2014), Hyperparathyroidism, Hyperparathyroidism, Hypertension, Impaired functional mobility, balance, gait, and endurance, Impaired mobility, Iron deficiency, Osteoarthritis, Osteoarthritis of knees, bilateral, Osteoporosis, Palpitations, Personal history of cardiac murmur, Pessary maintenance, PONV (postoperative nausea and vomiting), Presence of pessary, Problems with swallowing, Rheumatic heart disease, Rotator cuff tendonitis, Rupture of right proximal biceps tendon, Seasonal allergies, Sinus problem, Spinal headache, Subacromial bursitis, Valvular heart disease, Vision problems, Vitamin B12 deficiency, Vitamin D deficiency, and Wears glasses.    Past Surgical History: Patient  has a past surgical history that includes Cataract Extraction (Bilateral); Upper gastrointestinal endoscopy  (08/18/2014); Colonoscopy (2011); Esophagogastroduodenoscopy (N/A, 12/6/2017); Colonoscopy (N/A, 1/9/2018); Esophagogastroduodenoscopy (N/A, 1/15/2019); Cervical polypectomy; Hip Trochanteric Nailing (Left, 2/14/2021); Total hip arthroplasty (Left, 2/17/2023); and Hardware Removal (Left, 2/17/2023).    Social History: Patient  reports that she has never smoked. She has never used smokeless tobacco. She reports that she does not drink alcohol and does not use drugs.    Family History:  Patient family history includes Asthma in an other family member; Gout in an other family member; Heart disease in an other family member; Hypertension in an other family member; Liver cancer in her maternal grandmother; Osteoarthritis in an other family member; Osteoporosis in an other family member; Stroke in an other family member; Ulcers in an other family member.    Allergies:      Ferrlecit [na ferric gluc cplx in sucrose], Ranitidine hcl, Levofloxacin, and Lisinopril    Home Medications:    Prior to Admission Medications     Prescriptions Last Dose Informant Patient Reported? Taking?    allopurinol (ZYLOPRIM) 100 MG tablet 5/19/2023 Self Yes Yes    Take 1 tablet by mouth Daily.    aspirin 81 MG EC tablet 5/19/2023  Yes Yes    Take 1 tablet by mouth Daily.    B Complex Vitamins (vitamin b complex) capsule capsule 5/19/2023  Yes Yes    Take 1 capsule by mouth Daily.    bisacodyl (DULCOLAX) 10 MG suppository Unknown  Yes No    Insert 1 suppository into the rectum As Needed for Constipation.    carboxymethylcellulose (REFRESH PLUS) 0.5 % solution Past Week  Yes Yes    Administer 1 drop to both eyes 3 (Three) Times a Day As Needed for Dry Eyes.    carvedilol (COREG) 12.5 MG tablet 5/19/2023  No Yes    Take 1 tablet by mouth 2 (Two) Times a Day With Meals.    cholecalciferol (VITAMIN D3) 1000 UNITS tablet 5/19/2023 Self Yes Yes    Take 1 tablet by mouth Daily.    cyanocobalamin (VITAMIN B-12) 500 MCG tablet 5/19/2023  Yes Yes    Take  1 tablet by mouth Daily.    docusate sodium (COLACE) 100 MG capsule 5/19/2023  No Yes    Take 1 capsule by mouth 2 (Two) Times a Day As Needed for Constipation.    Epoetin Jefe-epbx (RETACRIT) 48250 UNIT/ML injection   No No    Inject 1 mL under the skin into the appropriate area as directed Every 14 (Fourteen) Days. Indications: Anemia associated with Chronic Kidney Failure    estradiol (ESTRACE) 0.1 MG/GM vaginal cream   No No    Massage 1 gram in vaginal opening twice weekly    Patient taking differently:  Insert 1 g into the vagina 2 (Two) Times a Week. Every Monday and Thursday    fluticasone (FLONASE) 50 MCG/ACT nasal spray Unknown Self Yes No    2 sprays into the nostril(s) as directed by provider Daily As Needed.    Fluticasone-Salmeterol (ADVAIR/WIXELA) 250-50 MCG/ACT DISKUS Unknown  Yes No    Inhale 1 puff 2 (Two) Times a Day.    HYDROcodone-acetaminophen (NORCO) 5-325 MG per tablet Unknown  No No    Take 1 tablet by mouth Every 6 (Six) Hours As Needed for Moderate Pain.    ipratropium-albuterol (DUO-NEB) 0.5-2.5 mg/3 ml nebulizer   Yes No    Take 3 mL by nebulization Every 4 (Four) Hours As Needed for Shortness of Air.    Lidocaine 4 % patch 5/19/2023  Yes Yes    Apply 1 patch topically Daily.    omeprazole (PriLOSEC) 40 MG capsule 5/19/2023 Self Yes Yes    Take 1 capsule by mouth Daily.    predniSONE (DELTASONE) 10 MG tablet Unknown  Yes No    Take 1 tablet by mouth Daily.    Probiotic Product (PROBIOTIC DAILY PO) 5/18/2023  Yes Yes    Take 1 tablet by mouth Daily As Needed.    QUEtiapine (SEROquel) 25 MG tablet   No No    Take 0.5 tablets by mouth Every Night for 30 days.    rosuvastatin (CRESTOR) 10 MG tablet 5/19/2023  Yes Yes    Take 1 tablet by mouth Daily.    saccharomyces boulardii (FLORASTOR) 250 MG capsule   Yes No    Take 1 capsule by mouth.    senna (SENOKOT) 8.6 MG tablet 5/18/2023  Yes Yes    Take 1 tablet by mouth As Needed for Constipation.    sertraline (ZOLOFT) 25 MG tablet Past Week   "Yes Yes    Take 1 tablet by mouth Daily.    sertraline (ZOLOFT) 50 MG tablet   Yes No    Take 1 tablet by mouth.    sodium bicarbonate 650 MG tablet 5/18/2023  No Yes    Take 1 tablet by mouth 2 (Two) Times a Day.    SODIUM PHOSPHATES RE Unknown  Yes No    Insert 1 application into the rectum As Needed (constipation).    torsemide (DEMADEX) 10 MG tablet 5/19/2023  Yes Yes    Take 1 tablet by mouth 2 (Two) Times a Week. 1/2 tablet by mouth one time a day on MONDAYS and THURSDAYS        ED Medications:    Medications   sodium chloride 0.9 % infusion (125 mL/hr Intravenous New Bag 5/19/23 1857)   HYDROcodone-acetaminophen (NORCO) 5-325 MG per tablet 1 tablet (1 tablet Oral Given 5/19/23 1700)     Vital Signs:  Temp:  [98 °F (36.7 °C)] 98 °F (36.7 °C)  Heart Rate:  [] 106  Resp:  [19] 19  BP: (162-172)/(80-94) 162/83        05/19/23  1555   Weight: 50.3 kg (111 lb)     Body mass index is 22.42 kg/m².    Physical Exam:     Most recent vital Signs: /83   Pulse 106   Temp 98 °F (36.7 °C) (Oral)   Resp 19   Ht 149.9 cm (59\")   Wt 50.3 kg (111 lb)   LMP  (LMP Unknown)   SpO2 92%   BMI 22.42 kg/m²     Physical Exam  Constitutional:       General: She is not in acute distress.     Appearance: Normal appearance. She is not ill-appearing.   HENT:      Head: Normocephalic and atraumatic.      Right Ear: External ear normal.      Left Ear: External ear normal.      Nose: Nose normal.      Mouth/Throat:      Mouth: Mucous membranes are moist.   Eyes:      Extraocular Movements: Extraocular movements intact.      Conjunctiva/sclera: Conjunctivae normal.   Cardiovascular:      Rate and Rhythm: Normal rate and regular rhythm.      Pulses: Normal pulses.      Heart sounds: Normal heart sounds. No murmur heard.  Pulmonary:      Effort: Pulmonary effort is normal.      Breath sounds: Normal breath sounds. No wheezing or rales.   Abdominal:      General: Bowel sounds are normal.      Palpations: Abdomen is soft.      " Tenderness: There is no abdominal tenderness. There is no guarding or rebound.   Musculoskeletal:      Cervical back: Neck supple.      Comments: 1+ pitting edema noted in the left ankle.  Left lower extremity swollen compared to right.  Left knee joint likely more swollen compared to the right with tenderness.  No hip tenderness noted.  Previous hip surgery scar has healed well.   Skin:     General: Skin is warm.      Findings: Bruising present. No erythema or rash.      Comments: Multiple bruises noted in both legs.  Skin tear noted on the right lower leg.   Neurological:      General: No focal deficit present.      Mental Status: She is alert and oriented to person, place, and time. Mental status is at baseline.   Psychiatric:         Mood and Affect: Mood normal.         Behavior: Behavior normal.       Laboratory data:    I have reviewed the labs done in the emergency room.    Results from last 7 days   Lab Units 05/19/23  1710   SODIUM mmol/L 140   POTASSIUM mmol/L 4.8   CHLORIDE mmol/L 102   CO2 mmol/L 28.3   BUN mg/dL 38*   CREATININE mg/dL 2.38*   CALCIUM mg/dL 9.6   BILIRUBIN mg/dL 0.5   ALK PHOS U/L 133*   ALT (SGPT) U/L 8   AST (SGOT) U/L 14   GLUCOSE mg/dL 111*     Results from last 7 days   Lab Units 05/19/23  1710   WBC 10*3/mm3 7.72   HEMOGLOBIN g/dL 10.7*   HEMATOCRIT % 34.1   PLATELETS 10*3/mm3 192       Results from last 7 days   Lab Units 05/19/23  1821   COLOR UA  Yellow   GLUCOSE UA  Negative   KETONES UA  Negative   LEUKOCYTES UA  Negative   PH, URINE  7.5   BILIRUBIN UA  Negative   UROBILINOGEN UA  0.2 E.U./dL   RBC UA /HPF None Seen   WBC UA /HPF 0-2*     Radiology:    US Venous Doppler Lower Extremity Left (duplex)    Result Date: 5/19/2023  FINAL REPORT TECHNIQUE: Multiple transverse and longitudinal images were performed of the femoral-popliteal deep venous system with augmentation and compression maneuvers. CLINICAL HISTORY: r/o dvt; pain FINDINGS: Left lower extremity duplex ultrasound  demonstrates normal flow in the deep venous system.  There is nonocclusive thrombosis in the greater saphenous vein.     Nonocclusive greater saphenous vein thrombosis.  No DVT. Authenticated and Electronically Signed by David Gutierrez M.D. on 05/19/2023 06:21:21 PM    Assessment:    1. Generalized weakness and failure to thrive, POA.  2. Left knee joint pain likely secondary to osteoarthritis, POA.  3. Left greater saphenous vein superficial thrombosis, POA.  4. Coronary artery disease.  5. Chronic diastolic heart failure, no evidence of exacerbation.  6. Chronic kidney disease stage IV.  7. Essential hypertension.  8. Acquired hypothyroidism.  9. Anemia of chronic kidney disease.    Plan:    Generalized weakness/impaired mobility and ADLs.  - Patient does have generalized weakness and inability to ambulate.  - This is multifactorial including recent history of hip fracture, left knee joint osteoarthritis as well as deconditioning.  - We will consult physical and occupational therapy.  - Consult case management for short-term rehab placement versus long-term care.    Left knee joint osteoarthritis.  - We will place her on Lortab for pain control.  - Avoid NSAIDs due to chronic kidney disease.    Left greater saphenous vein superficial thrombosis.  - Likely secondary to recent history of hip fracture and lack of activity.  - DVT prophylaxis with heparin.    I have discussed the patient's condition and treatment plan with her son Magno over the phone.  I confirmed that the patient has a living will scanned into the chart and as per son she is DNR/DNI.  Discussed with nursing staff at the bedside.    Risk Assessment: Low  DVT Prophylaxis: Heparin  Code Status: DNR/DNI  Diet: Renal    Advance Care Planning      ACP discussion was held with the patient during this visit. Patient has an advance directive in EMR which is still valid.         Baldomero Palomares MD  05/19/23  19:53 EDT    Dictated utilizing Dragon  dictation.    Electronically signed by Baldomero Palomares MD at 05/19/23 2049          Emergency Department Notes      Tex Melo PA-C at 05/19/23 1603          Subjective  History of Present Illness:    This is a 83-year-old female presents emergency room today for evaluation of left knee pain and bruising.  She presents via [facility] emergency medical services.  Had a left hip replacement back in February done here at this facility.  She reports bruising over the left posterior knee and decreased range of motion of the left knee.  Had been ambulating and had just been discharged from [facility] and rehab for previous hip fracture 2 years ago with replacement surgery on the left side several months ago.  No chest pain or shortness of air.  He also has a skin tear of the right lower extremity/wound from a previous dog scratch yesterday per EMS.  Denies any cough congestion fevers or runny nose.  Denies any chest pain or shortness of air.  Denies hip pain.  Denies neck or back pain.  No known trauma to the left knee.    Had a chance to speak with the son at bedside and obtain better history, he reports that her mentation is normal for her at this time.  Reports that she since being taken home from [facility] over the last couple days has refused to bear weight on the left knee and is complaining of left knee pain, he tells me that he has had to carry her everywhere she wants to go and that she has been eating and drinking less and that he is having difficulty providing the care for her that she needs.  He reports that insurance quit paying and he had to start taking out of his personal finances to afford to keep her there and is unable to continue to do so at this time.      Nurses Notes reviewed and agree, including vitals, allergies, social history and prior medical history.     REVIEW OF SYSTEMS: All systems reviewed and not pertinent unless noted.  Review of Systems   Constitutional: Negative for  "fever.   HENT: Negative for congestion and rhinorrhea.    Respiratory: Negative for cough and shortness of breath.    Cardiovascular: Negative for chest pain.   Musculoskeletal:        Left knee pain   All other systems reviewed and are negative.      Past Medical History:   Diagnosis Date   • Anemia 1998   • Anemia    • Arthritis    • Back pain    • Bleeding from anus    • Bronchitis     STATES HAS BRONCHITIS CURRENTLY (12/5/17).     • Bronchitis 12/2017   • CAD (coronary artery disease)    • Cataract, bilateral    • Chronic kidney disease     STAGE 4.  SEES TERA   • Chronic kidney failure     REPORTS STAGE IV   • Difficulty swallowing solids    • Disease of thyroid gland    • Fracture, radius 2016    right distal radius and ulna, healed.   • GERD (gastroesophageal reflux disease)    • Gout    • High cholesterol    • History of blood transfusion 2019   • History of nuclear stress test 2014    DR. CORTEZ.  \"IT WAS OK\"   • Hyperparathyroidism    • Hyperparathyroidism    • Hypertension    • Impaired functional mobility, balance, gait, and endurance    • Impaired mobility    • Iron deficiency    • Osteoarthritis    • Osteoarthritis of knees, bilateral     Both knees Supartz injection series August, 2015   • Osteoporosis    • Palpitations    • Personal history of cardiac murmur    • Pessary maintenance    • PONV (postoperative nausea and vomiting)    • Presence of pessary    • Problems with swallowing     WITH FOOD OCCASSIONALLY   • Rheumatic heart disease     Personal history   • Rotator cuff tendonitis     right    • Rupture of right proximal biceps tendon     chronic   • Seasonal allergies    • Sinus problem    • Spinal headache     REPORTS AFTER DELIVERY OF SON   • Subacromial bursitis     right    • Valvular heart disease    • Vision problems    • Vitamin B12 deficiency    • Vitamin D deficiency    • Wears glasses        Allergies:    Ferrlecit [na ferric gluc cplx in sucrose], Ranitidine hcl, Levofloxacin, and " Lisinopril      Past Surgical History:   Procedure Laterality Date   • CATARACT EXTRACTION Bilateral    • CERVICAL POLYPECTOMY     • COLONOSCOPY  2011   • COLONOSCOPY N/A 1/9/2018    Procedure: COLONOSCOPY with endoscopic mucosal resection (hot snare), normal saline submucosal injection, argon thermal ablation, resolution clip placement x4, and cold biopsy polypectomy;  Surgeon: Pieter Concepcion MD;  Location: [facility] ENDOSCOPY;  Service:    • ENDOSCOPY N/A 12/6/2017    Procedure: ESOPHAGOGASTRODUODENOSCOPY with biopsies and hot snare polypectomies;  Surgeon: Pieter Concepcion MD;  Location: [facility] ENDOSCOPY;  Service:    • ENDOSCOPY N/A 1/15/2019    Procedure: ESOPHAGOGASTRODUODENOSCOPY WITH BIOPSIES AND HOT SNARE POLYPECTOMIES X10;  Surgeon: Pieter Concepcion MD;  Location: [facility] ENDOSCOPY;  Service: Gastroenterology   • HARDWARE REMOVAL Left 2/17/2023    Procedure: FEMUR HARDWARE REMOVAL, LEFT;  Surgeon: Gabriele Loyd MD;  Location: [facility] OR;  Service: Orthopedics;  Laterality: Left;   • HIP TROCHANTERIC NAILING WITH INTRAMEDULLARY HIP SCREW Left 2/14/2021    Procedure: HIP TROCHANTER NAIL SHORT WITH INTRAMEDULLARY HIP SCREW, LEFT;  Surgeon: Gabriele Loyd MD;  Location: [facility] OR;  Service: Orthopedics;  Laterality: Left;   • TOTAL HIP ARTHROPLASTY Left 2/17/2023    Procedure: REMOVAL OF FRACTURE HARDWARE AND CONVERSION TO TOTAL HIP ARTHROPLASTY, LEFT;  Surgeon: Gabriele Loyd MD;  Location: [facility] OR;  Service: Orthopedics;  Laterality: Left;   • UPPER GASTROINTESTINAL ENDOSCOPY  08/18/2014         Social History     Socioeconomic History   • Marital status:    Tobacco Use   • Smoking status: Never   • Smokeless tobacco: Never   Vaping Use   • Vaping Use: Never used   Substance and Sexual Activity   • Alcohol use: Never   • Drug use: Never   • Sexual activity: Not Currently     Birth control/protection: Post-menopausal         Family History   Problem Relation Age of Onset   • Liver cancer  "Maternal Grandmother    • Gout Other    • Osteoporosis Other    • Stroke Other    • Ulcers Other    • Asthma Other    • Hypertension Other    • Heart disease Other    • Osteoarthritis Other    • Colon cancer Neg Hx        Objective  Physical Exam:  /62 (BP Location: Left arm, Patient Position: Lying)   Pulse 66   Temp 98.2 °F (36.8 °C) (Oral)   Resp 18   Ht 149.9 cm (59\")   Wt 50.3 kg (111 lb)   LMP  (LMP Unknown)   SpO2 95%   BMI 22.42 kg/m²      Physical Exam  Vitals and nursing note reviewed.   Constitutional:       Comments: Chronically ill-appearing and frail.   HENT:      Head: Normocephalic and atraumatic.      Nose: Nose normal.      Mouth/Throat:      Pharynx: Oropharynx is clear.   Eyes:      Extraocular Movements: Extraocular movements intact.      Pupils: Pupils are equal, round, and reactive to light.   Cardiovascular:      Rate and Rhythm: Normal rate and regular rhythm.      Pulses: Normal pulses.      Heart sounds: Normal heart sounds.   Pulmonary:      Effort: Pulmonary effort is normal. No respiratory distress.      Breath sounds: Normal breath sounds. No stridor. No wheezing, rhonchi or rales.   Abdominal:      General: There is no distension.      Palpations: Abdomen is soft.      Tenderness: There is no abdominal tenderness. There is no guarding.   Musculoskeletal:         General: Swelling present.      Cervical back: Normal range of motion and neck supple. No rigidity or tenderness.      Comments: There is swelling of the left lower extremity below the knee compared to right.  Decreased range of motion of the left knee secondary to pain.  No hip instability or tenderness palpated.  No obvious deformity of the left knee, however there is significant ecchymosis over the posterior aspect of the left knee in the popliteal region with associated tenderness on the left anterior knee.  No obvious deformities.  Neurovascular intact with 2+ pedal pulses bilaterally.  No femur or foot or " ankle tenderness palpated.   Skin:     General: Skin is warm and dry.      Capillary Refill: Capillary refill takes less than 2 seconds.      Comments: There is a small wound/skin abrasion on the right lower extremity on the anterior distal tib-fib nonbleeding.   Neurological:      General: No focal deficit present.      Mental Status: She is alert.               Procedures    ED Course:    ED Course as of 05/20/23 0050   Fri May 19, 2023   1702 EKG interpreted by me reveals sinus rhythm rate 78 nonspecific T wave change.  No ectopy no ischemic changes. [PF]      ED Course User Index  [PF] Dusty Marmolejo,        Lab Results (last 24 hours)     Procedure Component Value Units Date/Time    CBC Auto Differential [[id number]]  (Abnormal) Collected: 05/19/23 1710    Specimen: Blood Updated: 05/19/23 1715     WBC 7.72 10*3/mm3      RBC 3.42 10*6/mm3      Hemoglobin 10.7 g/dL      Hematocrit 34.1 %      MCV 99.7 fL      MCH 31.3 pg      MCHC 31.4 g/dL      RDW 17.2 %      RDW-SD 62.6 fl      MPV 10.2 fL      Platelets 192 10*3/mm3      Neutrophil % 70.3 %      Lymphocyte % 14.8 %      Monocyte % 10.8 %      Eosinophil % 3.1 %      Basophil % 0.5 %      Immature Grans % 0.5 %      Neutrophils, Absolute 5.43 10*3/mm3      Lymphocytes, Absolute 1.14 10*3/mm3      Monocytes, Absolute 0.83 10*3/mm3      Eosinophils, Absolute 0.24 10*3/mm3      Basophils, Absolute 0.04 10*3/mm3      Immature Grans, Absolute 0.04 10*3/mm3      nRBC 0.0 /100 WBC     Comprehensive Metabolic Panel [[id number]]  (Abnormal) Collected: 05/19/23 1710    Specimen: Blood Updated: 05/19/23 1732     Glucose 111 mg/dL      BUN 38 mg/dL      Creatinine 2.38 mg/dL      Sodium 140 mmol/L      Potassium 4.8 mmol/L      Chloride 102 mmol/L      CO2 28.3 mmol/L      Calcium 9.6 mg/dL      Total Protein 5.8 g/dL      Albumin 3.2 g/dL      ALT (SGPT) 8 U/L      AST (SGOT) 14 U/L      Alkaline Phosphatase 133 U/L      Total Bilirubin 0.5 mg/dL      Globulin 2.6 gm/dL       A/G Ratio 1.2 g/dL      BUN/Creatinine Ratio 16.0     Anion Gap 9.7 mmol/L      eGFR 20.0 mL/min/1.73     Narrative:      GFR Normal >60  Chronic Kidney Disease <60  Kidney Failure <15    The GFR formula is only valid for adults with stable renal function between ages 18 and 70.    Urinalysis With Culture If Indicated - Urine, Clean Catch [[id number]]  (Abnormal) Collected: 05/19/23 1821    Specimen: Urine, Clean Catch Updated: 05/19/23 1836     Color, UA Yellow     Appearance, UA Clear     pH, UA 7.5     Specific Gravity, UA 1.011     Glucose, UA Negative     Ketones, UA Negative     Bilirubin, UA Negative     Blood, UA Negative     Protein,  mg/dL (2+)     Leuk Esterase, UA Negative     Nitrite, UA Negative     Urobilinogen, UA 0.2 E.U./dL    Narrative:      In absence of clinical symptoms, the presence of pyuria, bacteria, and/or nitrites on the urinalysis result does not correlate with infection.    Urinalysis, Microscopic Only - Urine, Clean Catch [[id number]]  (Abnormal) Collected: 05/19/23 1821    Specimen: Urine, Clean Catch Updated: 05/19/23 1840     RBC, UA None Seen /HPF      WBC, UA 0-2 /HPF      Bacteria, UA 4+ /HPF      Squamous Epithelial Cells, UA 0-2 /HPF      Hyaline Casts, UA None Seen /LPF      Amorphous Crystals, UA Moderate/2+ /HPF      Methodology Manual Light Microscopy           US Venous Doppler Lower Extremity Left (duplex)    Result Date: 5/19/2023  FINAL REPORT TECHNIQUE: Multiple transverse and longitudinal images were performed of the femoral-popliteal deep venous system with augmentation and compression maneuvers. CLINICAL HISTORY: r/o dvt; pain FINDINGS: Left lower extremity duplex ultrasound demonstrates normal flow in the deep venous system.  There is nonocclusive thrombosis in the greater saphenous vein.     Impression: Nonocclusive greater saphenous vein thrombosis.  No DVT. Authenticated and Electronically Signed by David Gutierrez M.D. on 05/19/2023 06:21:21 PM          [facility]    Initial impression of presenting illness: 83-year-old female presents emergency room today for evaluation of the left knee pain.    DDX: includes but is not limited to: Osseous abnormality including fracture or dislocation, osteoarthritis, DVT, musculoskeletal strain or sprain, other    Patient arrives dynamically stable, afebrile, mildly tachycardic at a rate of 103, nonhypoxic and nontachypneic and nontoxic-appearing with vitals interpreted by myself.     Pertinent features from physical exam:There is swelling of the left lower extremity below the knee compared to right.  Decreased range of motion of the left knee secondary to pain.  No hip instability or tenderness palpated.  No obvious deformity of the left knee, however there is significant ecchymosis over the posterior aspect of the left knee in the popliteal region with associated tenderness on the left anterior knee.  No obvious deformities.  Neurovascular intact with 2+ pedal pulses bilaterally.  No femur or foot or ankle tenderness palpated.  There is also a minor wound/skin tear of the right lower extremity nonbleeding on arrival of the distal right tib-fib from a dog scratch yesterday at home..  Multiple areas of ecchymotic bruising on the bilateral upper extremities.    Initial diagnostic plan: CBC, CMP, left femur left knee and left tib-fib and right tib-fib plain film, Doppler venous ultrasound of the left lower extremity to rule out DVT    Results from initial plan were reviewed and interpreted by me revealing ultrasound as interpreted by radiology with nonocclusive greater saphenous vein thrombosis but no DVT.  CBC with hemoglobin of 10.7, appears stable from prior.  CMP with a glucose of 111, BUN of 38, creatinine of 2.38.  EGFR of 20.  Mildly elevated creatinine compared to prior and mildly decreased GFR compared to prior labs.  Appears to be suggestive of acute on chronic renal insufficiency.    Diagnostic information from other  sources: Chart review    Interventions / Re-evaluation: Given oral Norco 5 mg for pain.  Patient reported improvement in symptoms, however still has decreased movement of the left lower extremity.  Started patient on maintenance fluids of normal saline.  Stable for admission.    Results/clinical rationale were discussed with patient at bedside.  Patient is agreeable to admission.    Consultations/Discussion of results with other physicians: Discussed with Dr. Palomares, hospitalist, who agreed to admit patient to the hospital service for further care.  Believe patient would benefit from placement consideration, case management and PT OT consult.    Disposition plan: Admit to hospitalist  -----    Final diagnoses:   Phlebitis   Lower extremity pain, left   Bacteriuria   Generalized weakness   Acute on chronic renal insufficiency        Tex Melo PA-C  05/20/23 0052      Electronically signed by Tex Melo PA-C at 05/20/23 0052     Connie Nance, RN at 05/19/23 1652        Spoke with case management regarding sons concerns with being unable to care for pt at home. She states we may need to socially admit the patient until she is able to contact admissions to try to place pt in another facility. VALENCIA Nice informed.    Electronically signed by Connie Nance, RN at 05/19/23 1653     Stepna Pinon PCT at 05/19/23 1854     Summary:Bed Request             Contacted house at this time to request a [facility] bed for Pt. [facility] will call back.     Electronically signed by Stepan Pinon PCT at 05/19/23 1855     Stepan Pinon PCT at 05/19/23 1857     Summary:Bed Assigned             Pt assigned to [facility] bed 432 at this time per house.     Electronically signed by Stepan Pinon PCT at 05/19/23 1857       Vital Signs (last day)     Date/Time Temp Temp src Pulse Resp BP Patient Position SpO2    05/21/23 1517 98.3 (36.8) Oral 70 18 121/55 Lying 93    05/21/23 1116 98 (36.7) Oral 68 18  143/59 Lying 93    05/21/23 0717 98.1 (36.7) Oral 66 18 165/56 Lying 91    05/21/23 0339 98 (36.7) Axillary 64 18 155/63 Lying 92    05/20/23 2347 98.2 (36.8) Axillary 72 16 150/62 Lying 93    05/20/23 1937 97.8 (36.6) Oral 73 16 144/60 Lying 93    05/20/23 1531 98 (36.7) Oral 72 18 163/68 Lying 96    05/20/23 1112 98 (36.7) Oral 75 18 144/57 Lying 97    05/20/23 0749 98.1 (36.7) Oral 67 16 153/76 Lying 95    05/20/23 0340 98 (36.7) Axillary 75 16 165/81 Lying 93            Current Facility-Administered Medications   Medication Dose Route Frequency Provider Last Rate Last Admin   • acetaminophen (TYLENOL) tablet 650 mg  650 mg Oral Q4H PRN Baldomero Palomares MD   650 mg at 05/21/23 0931    Or   • acetaminophen (TYLENOL) 160 MG/5ML solution 650 mg  650 mg Oral Q4H PRN Baldomero Palomares MD        Or   • acetaminophen (TYLENOL) suppository 650 mg  650 mg Rectal Q4H PRN Baldomero Palomares MD       • allopurinol (ZYLOPRIM) tablet 100 mg  100 mg Oral Daily Baldomero Palomares MD   100 mg at 05/21/23 0835   • aspirin EC tablet 81 mg  81 mg Oral Daily Baldomero Palomares MD   81 mg at 05/21/23 0836   • b complex-vitamin c-folic acid (NEPHRO-ORQUIDEA) tablet 1 tablet  1 tablet Oral Daily Baldomero Palomares MD   1 tablet at 05/21/23 0836   • sennosides-docusate (PERICOLACE) 8.6-50 MG per tablet 2 tablet  2 tablet Oral BID Baldomero Palomares MD   2 tablet at 05/20/23 2204    And   • polyethylene glycol (MIRALAX) packet 17 g  17 g Oral Daily PRN Baldomero Palomares MD        And   • bisacodyl (DULCOLAX) EC tablet 5 mg  5 mg Oral Daily PRN Baldomero Palomares MD        And   • bisacodyl (DULCOLAX) suppository 10 mg  10 mg Rectal Daily PRN Baldomero Palomares MD       • carboxymethylcellulose (REFRESH PLUS) 0.5 % ophthalmic solution 1 drop  1 drop Both Eyes TID PRN Baldomero Palomares MD       • carvedilol (COREG) tablet 25 mg  25 mg Oral BID With Meals Ronni, FATUMA Beltran       • heparin (porcine) 5000 UNIT/ML injection 5,000 Units  5,000 Units Subcutaneous Q12H Baldomero Palomares MD   5,000  Units at 05/21/23 0836   • HYDROcodone-acetaminophen (NORCO) 5-325 MG per tablet 1 tablet  1 tablet Oral Q6H PRN Baldomero Palomares MD   1 tablet at 05/21/23 1304   • ipratropium-albuterol (DUO-NEB) nebulizer solution 3 mL  3 mL Nebulization Q4H PRN Baldomero Palomares MD       • lidocaine (LIDODERM) 5 % 1 patch  1 patch Transdermal Daily Baldomero Palomares MD   1 patch at 05/21/23 0835   • ondansetron (ZOFRAN) injection 4 mg  4 mg Intravenous Q6H PRN Baldomero Palomares MD   4 mg at 05/21/23 1441   • pantoprazole (PROTONIX) EC tablet 40 mg  40 mg Oral Q AM Baldomero Palomares MD   40 mg at 05/21/23 0544   • rosuvastatin (CRESTOR) tablet 10 mg  10 mg Oral Nightly Baldomero Palomares MD   10 mg at 05/20/23 2203   • sertraline (ZOLOFT) tablet 50 mg  50 mg Oral Daily Baldomero Palomares MD   50 mg at 05/21/23 0836   • sodium bicarbonate tablet 650 mg  650 mg Oral BID Roma Burk APRN   650 mg at 05/21/23 0836   • sodium chloride 0.9 % flush 10 mL  10 mL Intravenous Q12H Baldomero Palomares MD   10 mL at 05/21/23 0836   • sodium chloride 0.9 % flush 10 mL  10 mL Intravenous PRN Baldomero Palomares MD       • sodium chloride 0.9 % infusion 40 mL  40 mL Intravenous PRN Baldomero Palomares MD       • [START ON 5/22/2023] torsemide (DEMADEX) tablet 10 mg  10 mg Oral Once per day on Mon Thu Roma Burk APRN           Lab Results (last 24 hours)     Procedure Component Value Units Date/Time    Basic Metabolic Panel [[id number]]  (Abnormal) Collected: 05/21/23 0603    Specimen: Blood Updated: 05/21/23 0654     Glucose 82 mg/dL      BUN 40 mg/dL      Creatinine 2.57 mg/dL      Sodium 142 mmol/L      Potassium 4.6 mmol/L      Chloride 105 mmol/L      CO2 25.3 mmol/L      Calcium 9.2 mg/dL      BUN/Creatinine Ratio 15.6     Anion Gap 11.7 mmol/L      eGFR 18.3 mL/min/1.73     Narrative:      GFR Normal >60  Chronic Kidney Disease <60  Kidney Failure <15    The GFR formula is only valid for adults with stable renal function between ages 18 and 70.        Imaging Results  (Last 24 Hours)     ** No results found for the last 24 hours. **        ECG/EMG Results (last 24 hours)     ** No results found for the last 24 hours. **           Physician Progress Notes (last 48 hours)      Zuleyka Rudolph APRN at 23 1042              [facility]    Name:  Carolyne Martins   Age:  83 y.o.  Sex:  female  :  1940  MRN:  [id number]   Visit Number:  [id number]  Admission Date:  2023  Date Of Service:  23  Primary Care Physician:  Jerald Dawkins MD     LOS: 0 days :    Chief Complaint:      Weakness    Subjective:    Patient seen and examined with no family at bedside.  She is able to tell me her name and that she is currently in the hospital.  Unable to state year or reasoning as to why she is currently in the hospital.  Patient does not recall her son being unable to care for her.  Denies pain.  Updated that we will be trying to get her into short-term rehab versus long-term care.    Hospital Course:    Ms. Martins is an 83-year-old female with history of coronary artery disease, chronic kidney disease stage IV, hypertension, dyslipidemia, recent left hip fracture and repair in 2023 was brought to the emergency room by EMS with left knee joint pain and bruising.  Patient went to short-term rehabilitation at [facility] and rehab in February after discharge from this facility and apparently was discharged home 1 day prior to presenting to ED.  At that point, the son realized how immobile the patient was.  According to the son, patient was not able to get up from the bed and stand and needed total care to the fact that he had to carry her to the bathroom.  The son subsequently called EMS as he could not care for her at home.       In the emergency room, she was afebrile and hemodynamically stable saturating at 93% on room air.  Blood work done in the emergency room showed a BUN of 38, creatinine of 2.38 (baseline) glucose  111 and albumin of 3.2.  CBC was unremarkable except for hemoglobin of 10.7.  Urine analysis was negative for any evidence of urinary tract infection.  Left femur x-ray showed stable postoperative changes of hip replacement.  X-ray of the left knee was negative for any fracture or dislocation.  X-ray of bilateral tibia and fibula were negative for any fracture or dislocation.  Ultrasound of the left lower extremity showed nonocclusive greater saphenous vein thrombosis without any DVT.  Since the family is unable to take care of the patient, case management was consulted but unfortunately due to it being the weekend, patient is currently being admitted to the medical floor for safe discharge planning next week.    Review of Systems:     All systems were reviewed and negative except as mentioned in subjective, assessment and plan.    Vital Signs:    Temp:  [97.8 °F (36.6 °C)-98.2 °F (36.8 °C)] 98.1 °F (36.7 °C)  Heart Rate:  [64-75] 66  Resp:  [16-18] 18  BP: (144-165)/(56-68) 165/56    Intake and output:    I/O last 3 completed shifts:  In: 600 [P.O.:600]  Out: 745 [Urine:745]  I/O this shift:  In: 120 [P.O.:120]  Out: -     Physical Examination:    General Appearance:  Alert and cooperative.  Chronically ill/frail appearing elderly female.   Head:  Atraumatic and normocephalic.   Eyes: Conjunctivae and sclerae normal, no icterus. No pallor.   Throat: No oral lesions, no thrush, oral mucosa moist.   Neck: Supple, trachea midline, no thyromegaly.   Lungs:   Breath sounds heard bilaterally equally.  No wheezing or crackles. No Pleural rub or bronchial breathing.  Unlabored on room air.   Heart:  Normal S1 and S2, no murmur, no gallop, no rub. No JVD.   Abdomen:   Normal bowel sounds, no masses, no organomegaly. Soft, nontender, nondistended, no rebound tenderness.   Extremities: Supple, no edema, no cyanosis, no clubbing.   Skin: No bleeding or rash.  Scattered ecchymosis.  Right lower extremity skin tear noted.    Neurologic: Alert and oriented x 2 with confused conversation. No facial asymmetry. Moves all four limbs. No tremors.  Severe generalized weakness.     Laboratory results:    Results from last 7 days   Lab Units 05/21/23  0603 05/20/23  0558 05/19/23  1710   SODIUM mmol/L 142 140 140   POTASSIUM mmol/L 4.6 4.1 4.8   CHLORIDE mmol/L 105 104 102   CO2 mmol/L 25.3 24.8 28.3   BUN mg/dL 40* 39* 38*   CREATININE mg/dL 2.57* 2.21* 2.38*   CALCIUM mg/dL 9.2 9.1 9.6   BILIRUBIN mg/dL  --   --  0.5   ALK PHOS U/L  --   --  133*   ALT (SGPT) U/L  --   --  8   AST (SGOT) U/L  --   --  14   GLUCOSE mg/dL 82 76 111*     Results from last 7 days   Lab Units 05/20/23  0558 05/19/23  1710   WBC 10*3/mm3 5.57 7.72   HEMOGLOBIN g/dL 10.0* 10.7*   HEMATOCRIT % 32.0* 34.1   PLATELETS 10*3/mm3 161 192                 No results for input(s): PHART, LOP8DJC, PO2ART, WSP3SEL, BASEEXCESS in the last 8760 hours.   I have reviewed the patient's laboratory results.    Radiology results:    XR Femur 2 View Left    Result Date: 5/20/2023  LEFT FEMUR     HISTORY: Pain.  REFERENCE: Pelvis radiograph February 2023.  FINDINGS:  A four view exam demonstrates left hip arthroplasty, replacing previous intramedullary nail with proximal femoral neck cannulated screw in February 2023. There is an old intertrochanteric fracture of the proximal left femur, grossly unchanged. Transverse screw tract through the proximal femoral diaphysis related to previously placed screw. No acute fracture or malalignment.      Impression: No acute fracture.     This report was signed and finalized on 5/20/2023 6:41 AM by Dr Chencho Hughes DO.    XR Knee 3 View Left    Result Date: 5/20/2023  LEFT KNEE  HISTORY: Left knee pain, recent hip surgery..  FINDINGS:  A three view exam demonstrates no acute fracture or dislocation. The joint spaces appear normal. Small superior patellar spur. No joint effusion. Mild arterial calcifications of the calf.      Impression: No acute  fracture.     This report was signed and finalized on 5/20/2023 6:38 AM by Dr Chencho Hughes DO.    US Venous Doppler Lower Extremity Left (duplex)    Result Date: 5/19/2023  FINAL REPORT TECHNIQUE: Multiple transverse and longitudinal images were performed of the femoral-popliteal deep venous system with augmentation and compression maneuvers. CLINICAL HISTORY: r/o dvt; pain FINDINGS: Left lower extremity duplex ultrasound demonstrates normal flow in the deep venous system.  There is nonocclusive thrombosis in the greater saphenous vein.     Impression: Nonocclusive greater saphenous vein thrombosis.  No DVT. Authenticated and Electronically Signed by David Gutierrez M.D. on 05/19/2023 06:21:21 PM    XR Tibia & Fibula 1 View Bilateral    Result Date: 5/20/2023  BILATERAL TIBIA/FIBULA TWO VIEWS EACH     HISTORY: Left knee pain.  FINDINGS:  A four view exam shows no acute fracture or dislocation of either tibia/fibula. Arterial calcifications.      Impression: No acute fracture of either tibia/fibula.     This report was signed and finalized on 5/20/2023 7:45 AM by Dr Chencho Hughes DO.    I have reviewed the patient's radiology reports.    Medication Review:     I have reviewed the patient's active and prn medications.     Problem List:      Phlebitis    Anemia    Gastroesophageal reflux disease without esophagitis    Impaired mobility and ADLs    CKD (chronic kidney disease) stage 4, GFR 15-29 ml/min ([facility])    Essential hypertension    Osteoarthritis of left knee      Assessment:    1. Generalized weakness and failure to thrive, POA.  2. Left knee joint pain likely secondary to rheumatoid arthritis, POA.  3. Left greater saphenous vein superficial thrombosis, POA.  4. Coronary artery disease.  5. Chronic diastolic heart failure, no evidence of exacerbation.  6. Chronic kidney disease stage IV.  7. Rheumatoid Arthritis  8. Essential hypertension.  9. Acquired hypothyroidism.  10. Anemia of chronic kidney disease.  11. Recent left  "hip replacement with ORIF     Plan:        Generalized weakness/impaired mobility and ADLs.  - Patient does have generalized weakness and inability to ambulate  - This is multifactorial including recent history of hip fracture/RA  - Consult physical and occupational therapy for recommendations  - Consult case management for short-term rehab placement versus long-term care as family is not currently able to take care of her     Left greater saphenous vein superficial thrombosis.  - Likely secondary to recent history of hip fracture and lack of activity.  - DVT prophylaxis with heparin  - No current knee pain.  - Imaging otherwise negative.     CKD  -Creatinine at baseline around 2.3     Hypertension  -Blood pressure slightly elevated.  - Continue Coreg- uptitrated.       DVT Prophylaxis: Heparin  Code Status: DNR/DNI  Diet: Renal/cardiac  Discharge Plan: Pending placement    FATUMA Arora  23  10:42 EDT    Dictated utilizing Dragon dictation.      Electronically signed by Zuleyka Rudolph APRN at 23 1052     Roma Burk APRN at 23 0800              [facility]    Name:  Carolyne Martins   Age:  83 y.o.  Sex:  female  :  1940  MRN:  [id number]   Visit Number:  [id number]  Admission Date:  2023  Date Of Service:  23  Primary Care Physician:  Jerald Dawkins MD     LOS: 0 days :    Chief Complaint:      Weakness    Subjective:    Patient seen and examined this morning.  Patient is found resting in bed, there is no family present on exam.  Patient awakes and asks where she is and questions if she is in the hospital.  States she doesn't remember coming home to son's house yesterday and is at the hospital \"because of rehab\".  Denies any pain and states she rested well.    Hospital Course:    Ms. Martins is an 83-year-old female with history of coronary artery disease, chronic kidney disease stage IV, hypertension, dyslipidemia, " recent left hip fracture and repair in February 2023 was brought to the emergency room by EMS with left knee joint pain and bruising.  Patient went to short-term rehabilitation at [facility] and rehab in February after discharge from this facility and apparently was discharged home yesterday.  At that point, the son realized how immobile the patient was.  According to the son, patient was not able to get up from the bed and stand and needed total care to the fact that he had to carry her to the bathroom.  The son subsequently called EMS as he could not care for her at home.  Patient currently complains of left knee joint pain and denies any other symptoms.     In the emergency room, she was afebrile and hemodynamically stable saturating at 93% on room air.  Blood work done in the emergency room showed a BUN of 38, creatinine of 2.38 (baseline) glucose 111 and albumin of 3.2.  CBC was unremarkable except for hemoglobin of 10.7.  Urine analysis was negative for any evidence of urinary tract infection.  Left femur x-ray showed stable postoperative changes of hip replacement.  X-ray of the left knee was negative for any fracture or dislocation.  X-ray of bilateral tibia and fibula were negative for any fracture or dislocation.  Ultrasound of the left lower extremity showed nonocclusive greater saphenous vein thrombosis without any DVT.  Since the family is unable to take care of the patient, case management was consulted but unfortunately due to it being the weekend, patient is currently being admitted to the medical floor for safe discharge planning next week.    Review of Systems:     All systems were reviewed and negative except as mentioned in subjective, assessment and plan.    Vital Signs:    Temp:  [97.9 °F (36.6 °C)-98.2 °F (36.8 °C)] 98.1 °F (36.7 °C)  Heart Rate:  [] 67  Resp:  [16-19] 16  BP: (153-172)/(62-94) 153/76    Intake and output:    I/O last 3 completed shifts:  In: 120 [P.O.:120]  Out: 300  [Urine:300]  I/O this shift:  In: 360 [P.O.:360]  Out: -     Physical Examination:    General Appearance:  Alert and cooperative, pleasant elderly female, no acute distress on exam, appears frail and chronically ill   Head:  Atraumatic and normocephalic.   Eyes: Conjunctivae and sclerae normal, no icterus. No pallor.   Throat: No oral lesions, no thrush, oral mucosa moist.   Neck: Supple, trachea midline   Lungs:   Breath sounds heard bilaterally equally.  No wheezing or crackles. Unlabored on room air   Heart:  Normal S1 and S2, no murmur, no gallop, no rub. No JVD.   Abdomen:   Normal bowel sounds, no masses, no organomegaly. Soft, nontender, nondistended, no rebound tenderness.   Extremities: Supple, Left lower extremity swelling, left ankle with edema, surgery scar to left hip healed   Skin: No bleeding or rash, bruising to lower extremities with dressing in place to right lower leg due to skin tear, significant bruising/ discoloration to posterior left knee   Neurologic: Alert and oriented x 2. No facial asymmetry. Moves all four limbs. No tremors.      Laboratory results:    Results from last 7 days   Lab Units 05/20/23  0558 05/19/23  1710   SODIUM mmol/L 140 140   POTASSIUM mmol/L 4.1 4.8   CHLORIDE mmol/L 104 102   CO2 mmol/L 24.8 28.3   BUN mg/dL 39* 38*   CREATININE mg/dL 2.21* 2.38*   CALCIUM mg/dL 9.1 9.6   BILIRUBIN mg/dL  --  0.5   ALK PHOS U/L  --  133*   ALT (SGPT) U/L  --  8   AST (SGOT) U/L  --  14   GLUCOSE mg/dL 76 111*     Results from last 7 days   Lab Units 05/20/23  0558 05/19/23  1710   WBC 10*3/mm3 5.57 7.72   HEMOGLOBIN g/dL 10.0* 10.7*   HEMATOCRIT % 32.0* 34.1   PLATELETS 10*3/mm3 161 192                 No results for input(s): PHART, RJY0UVC, PO2ART, TDU7SVI, BASEEXCESS in the last 8760 hours.   I have reviewed the patient's laboratory results.    Radiology results:    XR Femur 2 View Left    Result Date: 5/20/2023  LEFT FEMUR     HISTORY: Pain.  REFERENCE: Pelvis radiograph February  2023.  FINDINGS:  A four view exam demonstrates left hip arthroplasty, replacing previous intramedullary nail with proximal femoral neck cannulated screw in February 2023. There is an old intertrochanteric fracture of the proximal left femur, grossly unchanged. Transverse screw tract through the proximal femoral diaphysis related to previously placed screw. No acute fracture or malalignment.      Impression: No acute fracture.     This report was signed and finalized on 5/20/2023 6:41 AM by Dr Chencho Hughes DO.    XR Knee 3 View Left    Result Date: 5/20/2023  LEFT KNEE  HISTORY: Left knee pain, recent hip surgery..  FINDINGS:  A three view exam demonstrates no acute fracture or dislocation. The joint spaces appear normal. Small superior patellar spur. No joint effusion. Mild arterial calcifications of the calf.      Impression: No acute fracture.     This report was signed and finalized on 5/20/2023 6:38 AM by Dr Chencho Hughes DO.    US Venous Doppler Lower Extremity Left (duplex)    Result Date: 5/19/2023  FINAL REPORT TECHNIQUE: Multiple transverse and longitudinal images were performed of the femoral-popliteal deep venous system with augmentation and compression maneuvers. CLINICAL HISTORY: r/o dvt; pain FINDINGS: Left lower extremity duplex ultrasound demonstrates normal flow in the deep venous system.  There is nonocclusive thrombosis in the greater saphenous vein.     Impression: Nonocclusive greater saphenous vein thrombosis.  No DVT. Authenticated and Electronically Signed by David Gutierrez M.D. on 05/19/2023 06:21:21 PM    XR Tibia & Fibula 1 View Bilateral    Result Date: 5/20/2023  BILATERAL TIBIA/FIBULA TWO VIEWS EACH     HISTORY: Left knee pain.  FINDINGS:  A four view exam shows no acute fracture or dislocation of either tibia/fibula. Arterial calcifications.      Impression: No acute fracture of either tibia/fibula.     This report was signed and finalized on 5/20/2023 7:45 AM by Dr Chencho Hughes DO.    I have  reviewed the patient's radiology reports.    Medication Review:     I have reviewed the patient's active and prn medications.     Problem List:      Phlebitis    Anemia    Gastroesophageal reflux disease without esophagitis    Impaired mobility and ADLs    CKD (chronic kidney disease) stage 4, GFR 15-29 ml/min ([facility])    Essential hypertension    Osteoarthritis of left knee      Assessment:    12. Generalized weakness and failure to thrive, POA.  13. Left knee joint pain likely secondary to osteoarthritis, POA.  14. Left greater saphenous vein superficial thrombosis, POA.  15. Coronary artery disease.  16. Chronic diastolic heart failure, no evidence of exacerbation.  17. Chronic kidney disease stage IV.  18. Rheumatoid Arthritis  19. Essential hypertension.  20. Acquired hypothyroidism.  21. Anemia of chronic kidney disease.    Plan:       Generalized weakness/impaired mobility and ADLs.  - Patient does have generalized weakness and inability to ambulate  - This is multifactorial including recent history of hip fracture, left knee joint osteoarthritis as well as deconditioning.  - Consult physical and occupational therapy for recommendations  - Consult case management for short-term rehab placement versus long-term care as family is not currently able to take care of her     Left knee joint osteoarthritis.  -Lortab for pain control.  - Avoid NSAIDs due to chronic kidney disease.     Left greater saphenous vein superficial thrombosis.  - Likely secondary to recent history of hip fracture and lack of activity.  - DVT prophylaxis with heparin    CKD  -Creatinine at baseline around 2.3    Hypertension  -Blood pressure elevated  -Does not appear to be on any anti-hypertensives aside from Coreg.  Monitor for 24 hours and start Amlodipine 5mg if remains elevated.    DVT Prophylaxis: Heparin  Code Status: DNR/DNI  Diet: Renal  Discharge Plan: Pending placement    Roma Burk, APRN  05/20/23  08:45 EDT    Dictated  utilizing Dragon dictation.      Electronically signed by Roma Burk APRN at 05/20/23 0905       Consult Notes (last 48 hours)  Notes from 05/19/23 1531 through 05/21/23 1531   No notes of this type exist for this encounter.

## 2023-05-21 NOTE — PLAN OF CARE
"Goal Outcome Evaluation:  Plan of Care Reviewed With: patient           Outcome Evaluation: Pt yelling come help me upon PTA arrival in room,when questioned pt stated \"I feel bad all over\" upon further questioning determined pt's L knee was hurting. PTA edu pt to relax her L LE and removed pillow from under B LE and as pt relaxed reported L knee was feeling some better and with AAROM and encouragement pt able to perform knee flex/extAAROM, hip abd and SAQ and R LE same ex however AROM 1x10 reps. Pt tender to palpation L medial knee and nursing aware, pt with some edema and warm to touch noted in that area at end of therapy session pt reported pain felt much better 5/10. Con't with PT POC and progress as tolerated         "

## 2023-05-21 NOTE — PLAN OF CARE
Goal Outcome Evaluation:      Patient frequently hollers out in pain, easily reoriented/redirected. PRN meds given for pain. Left knee pain relieved with ice pack application. Discharge pending

## 2023-05-21 NOTE — PROGRESS NOTES
[facility]    Name:  Carolyne Martins   Age:  83 y.o.  Sex:  female  :  1940  MRN:  [id number]   Visit Number:  [id number]  Admission Date:  2023  Date Of Service:  23  Primary Care Physician:  Jerald Dawkins MD     LOS: 0 days :    Chief Complaint:      Weakness    Subjective:    Patient seen and examined with no family at bedside.  She is able to tell me her name and that she is currently in the hospital.  Unable to state year or reasoning as to why she is currently in the hospital.  Patient does not recall her son being unable to care for her.  Denies pain.  Updated that we will be trying to get her into short-term rehab versus long-term care.    Hospital Course:    Ms. Martins is an 83-year-old female with history of coronary artery disease, chronic kidney disease stage IV, hypertension, dyslipidemia, recent left hip fracture and repair in 2023 was brought to the emergency room by EMS with left knee joint pain and bruising.  Patient went to short-term rehabilitation at [facility] and rehab in February after discharge from this facility and apparently was discharged home 1 day prior to presenting to ED.  At that point, the son realized how immobile the patient was.  According to the son, patient was not able to get up from the bed and stand and needed total care to the fact that he had to carry her to the bathroom.  The son subsequently called EMS as he could not care for her at home.       In the emergency room, she was afebrile and hemodynamically stable saturating at 93% on room air.  Blood work done in the emergency room showed a BUN of 38, creatinine of 2.38 (baseline) glucose 111 and albumin of 3.2.  CBC was unremarkable except for hemoglobin of 10.7.  Urine analysis was negative for any evidence of urinary tract infection.  Left femur x-ray showed stable postoperative changes of hip replacement.  X-ray of the left knee was  negative for any fracture or dislocation.  X-ray of bilateral tibia and fibula were negative for any fracture or dislocation.  Ultrasound of the left lower extremity showed nonocclusive greater saphenous vein thrombosis without any DVT.  Since the family is unable to take care of the patient, case management was consulted but unfortunately due to it being the weekend, patient is currently being admitted to the medical floor for safe discharge planning next week.    Review of Systems:     All systems were reviewed and negative except as mentioned in subjective, assessment and plan.    Vital Signs:    Temp:  [97.8 °F (36.6 °C)-98.2 °F (36.8 °C)] 98.1 °F (36.7 °C)  Heart Rate:  [64-75] 66  Resp:  [16-18] 18  BP: (144-165)/(56-68) 165/56    Intake and output:    I/O last 3 completed shifts:  In: 600 [P.O.:600]  Out: 745 [Urine:745]  I/O this shift:  In: 120 [P.O.:120]  Out: -     Physical Examination:    General Appearance:  Alert and cooperative.  Chronically ill/frail appearing elderly female.   Head:  Atraumatic and normocephalic.   Eyes: Conjunctivae and sclerae normal, no icterus. No pallor.   Throat: No oral lesions, no thrush, oral mucosa moist.   Neck: Supple, trachea midline, no thyromegaly.   Lungs:   Breath sounds heard bilaterally equally.  No wheezing or crackles. No Pleural rub or bronchial breathing.  Unlabored on room air.   Heart:  Normal S1 and S2, no murmur, no gallop, no rub. No JVD.   Abdomen:   Normal bowel sounds, no masses, no organomegaly. Soft, nontender, nondistended, no rebound tenderness.   Extremities: Supple, no edema, no cyanosis, no clubbing.   Skin: No bleeding or rash.  Scattered ecchymosis.  Right lower extremity skin tear noted.   Neurologic: Alert and oriented x 2 with confused conversation. No facial asymmetry. Moves all four limbs. No tremors.  Severe generalized weakness.     Laboratory results:    Results from last 7 days   Lab Units 05/21/23  0603 05/20/23  0558 05/19/23  3637    SODIUM mmol/L 142 140 140   POTASSIUM mmol/L 4.6 4.1 4.8   CHLORIDE mmol/L 105 104 102   CO2 mmol/L 25.3 24.8 28.3   BUN mg/dL 40* 39* 38*   CREATININE mg/dL 2.57* 2.21* 2.38*   CALCIUM mg/dL 9.2 9.1 9.6   BILIRUBIN mg/dL  --   --  0.5   ALK PHOS U/L  --   --  133*   ALT (SGPT) U/L  --   --  8   AST (SGOT) U/L  --   --  14   GLUCOSE mg/dL 82 76 111*     Results from last 7 days   Lab Units 05/20/23  0558 05/19/23  1710   WBC 10*3/mm3 5.57 7.72   HEMOGLOBIN g/dL 10.0* 10.7*   HEMATOCRIT % 32.0* 34.1   PLATELETS 10*3/mm3 161 192                 No results for input(s): PHART, DWD4NHJ, PO2ART, DUU9SEA, BASEEXCESS in the last 8760 hours.   I have reviewed the patient's laboratory results.    Radiology results:    XR Femur 2 View Left    Result Date: 5/20/2023  LEFT FEMUR     HISTORY: Pain.  REFERENCE: Pelvis radiograph February 2023.  FINDINGS:  A four view exam demonstrates left hip arthroplasty, replacing previous intramedullary nail with proximal femoral neck cannulated screw in February 2023. There is an old intertrochanteric fracture of the proximal left femur, grossly unchanged. Transverse screw tract through the proximal femoral diaphysis related to previously placed screw. No acute fracture or malalignment.      Impression: No acute fracture.     This report was signed and finalized on 5/20/2023 6:41 AM by Dr Chencho Hughes DO.    XR Knee 3 View Left    Result Date: 5/20/2023  LEFT KNEE  HISTORY: Left knee pain, recent hip surgery..  FINDINGS:  A three view exam demonstrates no acute fracture or dislocation. The joint spaces appear normal. Small superior patellar spur. No joint effusion. Mild arterial calcifications of the calf.      Impression: No acute fracture.     This report was signed and finalized on 5/20/2023 6:38 AM by Dr Chencho Hughes DO.    US Venous Doppler Lower Extremity Left (duplex)    Result Date: 5/19/2023  FINAL REPORT TECHNIQUE: Multiple transverse and longitudinal images were performed of the  femoral-popliteal deep venous system with augmentation and compression maneuvers. CLINICAL HISTORY: r/o dvt; pain FINDINGS: Left lower extremity duplex ultrasound demonstrates normal flow in the deep venous system.  There is nonocclusive thrombosis in the greater saphenous vein.     Impression: Nonocclusive greater saphenous vein thrombosis.  No DVT. Authenticated and Electronically Signed by David Gutierrez M.D. on 05/19/2023 06:21:21 PM    XR Tibia & Fibula 1 View Bilateral    Result Date: 5/20/2023  BILATERAL TIBIA/FIBULA TWO VIEWS EACH     HISTORY: Left knee pain.  FINDINGS:  A four view exam shows no acute fracture or dislocation of either tibia/fibula. Arterial calcifications.      Impression: No acute fracture of either tibia/fibula.     This report was signed and finalized on 5/20/2023 7:45 AM by Dr Chencoh Hughes DO.    I have reviewed the patient's radiology reports.    Medication Review:     I have reviewed the patient's active and prn medications.     Problem List:      Phlebitis    Anemia    Gastroesophageal reflux disease without esophagitis    Impaired mobility and ADLs    CKD (chronic kidney disease) stage 4, GFR 15-29 ml/min ([facility])    Essential hypertension    Osteoarthritis of left knee      Assessment:    1. Generalized weakness and failure to thrive, POA.  2. Left knee joint pain likely secondary to rheumatoid arthritis, POA.  3. Left greater saphenous vein superficial thrombosis, POA.  4. Coronary artery disease.  5. Chronic diastolic heart failure, no evidence of exacerbation.  6. Chronic kidney disease stage IV.  7. Rheumatoid Arthritis  8. Essential hypertension.  9. Acquired hypothyroidism.  10. Anemia of chronic kidney disease.  11. Recent left hip replacement with ORIF     Plan:        Generalized weakness/impaired mobility and ADLs.  - Patient does have generalized weakness and inability to ambulate  - This is multifactorial including recent history of hip fracture/RA  - Consult physical and  occupational therapy for recommendations  - Consult case management for short-term rehab placement versus long-term care as family is not currently able to take care of her     Left greater saphenous vein superficial thrombosis.  - Likely secondary to recent history of hip fracture and lack of activity.  - DVT prophylaxis with heparin  - No current knee pain.  - Imaging otherwise negative.     CKD  -Creatinine at baseline around 2.3     Hypertension  -Blood pressure slightly elevated.  - Continue Coreg- uptitrated.       DVT Prophylaxis: Heparin  Code Status: DNR/DNI  Diet: Renal/cardiac  Discharge Plan: Pending placement    Zuleyka Rudolph, APRN  05/21/23  10:42 EDT    Dictated utilizing Dragon dictation.

## 2023-05-21 NOTE — THERAPY TREATMENT NOTE
Patient Name: Carolyne Martins  : 1940    MRN: [id number]                              Today's Date: 2023       Admit Date: 2023    Visit Dx:     ICD-10-CM ICD-9-CM   1. Phlebitis  I80.9 451.9   2. Lower extremity pain, left  M79.605 729.5   3. Bacteriuria  R82.71 791.9   4. Generalized weakness  R53.1 780.79   5. Acute on chronic renal insufficiency  N28.9 593.9    N18.9 585.9     Patient Active Problem List   Diagnosis   • Arthralgia of shoulder region   • Tendinopathy of rotator cuff   • Osteoarthritis of right acromioclavicular joint   • Anemia due to stage 4 chronic kidney disease treated with erythropoietin   • Anemia   • Gastric polyp   • Gastroesophageal reflux disease without esophagitis   • Melena   • Change in bowel habits   • Colon cancer screening   • Senile osteoporosis   • Personal history of colonic polyps   • Closed fracture of left hip ([facility])   • Impaired mobility and ADLs   • CKD (chronic kidney disease) stage 4, GFR 15-29 ml/min ([facility])   • Simple chronic bronchitis   • Essential hypertension   • Acute cystitis without hematuria   • Debility   • Left leg injury   • JASMEET (acute kidney injury)   • Chronic obstructive pulmonary disease   • Hip pain   • Post-traumatic osteoarthritis of left hip   • Status post left hip replacement   • Fracture, proximal femur, left, closed, initial encounter   • Postoperative anemia due to acute blood loss   • Phlebitis   • Osteoarthritis of left knee     Past Medical History:   Diagnosis Date   • Anemia    • Anemia    • Arthritis    • Back pain    • Bleeding from anus    • Bronchitis     STATES HAS BRONCHITIS CURRENTLY (17).     • Bronchitis 2017   • CAD (coronary artery disease)    • Cataract, bilateral    • Chronic kidney disease     STAGE 4.  SEES TERA   • Chronic kidney failure     REPORTS STAGE IV   • Difficulty swallowing solids    • Disease of thyroid gland    • Fracture, radius 2016    right distal radius and ulna, healed.   •  "GERD (gastroesophageal reflux disease)    • Gout    • High cholesterol    • History of blood transfusion 2019   • History of nuclear stress test 2014    DR. CORTEZ.  \"IT WAS OK\"   • Hyperparathyroidism    • Hyperparathyroidism    • Hypertension    • Impaired functional mobility, balance, gait, and endurance    • Impaired mobility    • Iron deficiency    • Osteoarthritis    • Osteoarthritis of knees, bilateral     Both knees Supartz injection series August, 2015   • Osteoporosis    • Palpitations    • Personal history of cardiac murmur    • Pessary maintenance    • PONV (postoperative nausea and vomiting)    • Presence of pessary    • Problems with swallowing     WITH FOOD OCCASSIONALLY   • Rheumatic heart disease     Personal history   • Rotator cuff tendonitis     right    • Rupture of right proximal biceps tendon     chronic   • Seasonal allergies    • Sinus problem    • Spinal headache     REPORTS AFTER DELIVERY OF SON   • Subacromial bursitis     right    • Valvular heart disease    • Vision problems    • Vitamin B12 deficiency    • Vitamin D deficiency    • Wears glasses      Past Surgical History:   Procedure Laterality Date   • CATARACT EXTRACTION Bilateral    • CERVICAL POLYPECTOMY     • COLONOSCOPY  2011   • COLONOSCOPY N/A 1/9/2018    Procedure: COLONOSCOPY with endoscopic mucosal resection (hot snare), normal saline submucosal injection, argon thermal ablation, resolution clip placement x4, and cold biopsy polypectomy;  Surgeon: Pieter Concepcion MD;  Location: [facility] ENDOSCOPY;  Service:    • ENDOSCOPY N/A 12/6/2017    Procedure: ESOPHAGOGASTRODUODENOSCOPY with biopsies and hot snare polypectomies;  Surgeon: Pieter Concepcion MD;  Location: [facility] ENDOSCOPY;  Service:    • ENDOSCOPY N/A 1/15/2019    Procedure: ESOPHAGOGASTRODUODENOSCOPY WITH BIOPSIES AND HOT SNARE POLYPECTOMIES X10;  Surgeon: Pieter Concepcion MD;  Location: [facility] ENDOSCOPY;  Service: Gastroenterology   • HARDWARE REMOVAL Left 2/17/2023    " Procedure: FEMUR HARDWARE REMOVAL, LEFT;  Surgeon: Gabriele Loyd MD;  Location: [facility];  Service: Orthopedics;  Laterality: Left;   • HIP TROCHANTERIC NAILING WITH INTRAMEDULLARY HIP SCREW Left 2/14/2021    Procedure: HIP TROCHANTER NAIL SHORT WITH INTRAMEDULLARY HIP SCREW, LEFT;  Surgeon: Gabriele Loyd MD;  Location: [facility] OR;  Service: Orthopedics;  Laterality: Left;   • TOTAL HIP ARTHROPLASTY Left 2/17/2023    Procedure: REMOVAL OF FRACTURE HARDWARE AND CONVERSION TO TOTAL HIP ARTHROPLASTY, LEFT;  Surgeon: Gabriele Loyd MD;  Location: [facility] OR;  Service: Orthopedics;  Laterality: Left;   • UPPER GASTROINTESTINAL ENDOSCOPY  08/18/2014      General Information     Row Name 05/21/23 1717          Physical Therapy Time and Intention    Document Type therapy note (daily note)  -     Mode of Treatment physical therapy  -     Row Name 05/21/23 1717          General Information    Patient Profile Reviewed yes  -     Existing Precautions/Restrictions fall  -     Row Name 05/21/23 1717          Safety Issues, Functional Mobility    Safety Issues Affecting Function (Mobility) awareness of need for assistance;friction/shear risk;insight into deficits/self-awareness;problem-solving;safety precautions follow-through/compliance  -     Impairments Affecting Function (Mobility) balance;pain;strength;endurance/activity tolerance;coordination  -           User Key  (r) = Recorded By, (t) = Taken By, (c) = Cosigned By    Initials Name Provider Type    CC Annmarie Thomas, PTA Physical Therapist Assistant               Mobility    No documentation.                Obj/Interventions    No documentation.                Goals/Plan    No documentation.                Clinical Impression     Row Name 05/21/23 1718          Pain    Pretreatment Pain Rating 8/10  -     Posttreatment Pain Rating 5/10  -     Pain Location - Side/Orientation Left  -     Pain Location - knee  -     Pain Intervention(s)  "Repositioned  -CC     Additional Documentation Pain Scale: Numbers Pre/Post-Treatment (Group)  -CC     Row Name 05/21/23 1718          Plan of Care Review    Plan of Care Reviewed With patient  -CC     Outcome Evaluation Pt yelling come help me upon PTA arrival in room,when questioned pt stated \"I feel bad all over\" upon further questioning determined pt's L knee was hurting. PTA edu pt to relax her L LE and removed pillow from under B LE and as pt relaxed reported L knee was feeling some better and with AAROM and encouragement pt able to perform knee flex/extAAROM, hip abd and SAQ and R LE same ex however AROM 1x10 reps. Pt tender to palpation L medial knee and nursing aware, pt with some edema and warm to touch noted in that area at end of therapy session pt reported pain felt much better 5/10. Con't with PT POC and progress as tolerated  -CC     Row Name 05/21/23 1718          Positioning and Restraints    Pre-Treatment Position in bed  -CC     Post Treatment Position bed  -CC     In Bed supine;call light within reach;encouraged to call for assist;exit alarm on;notified nsg  -CC           User Key  (r) = Recorded By, (t) = Taken By, (c) = Cosigned By    Initials Name Provider Type    CC Annmarie Thomas, PTA Physical Therapist Assistant               Outcome Measures     Row Name 05/21/23 1725 05/21/23 0800       How much help from another person do you currently need...    Turning from your back to your side while in flat bed without using bedrails? 2  -CC 2  -CS    Moving from lying on back to sitting on the side of a flat bed without bedrails? 2  -CC 2  -CS    Moving to and from a bed to a chair (including a wheelchair)? 1  -CC 1  -CS    Standing up from a chair using your arms (e.g., wheelchair, bedside chair)? 1  -CC 1  -CS    Climbing 3-5 steps with a railing? 1  -CC 1  -CS    To walk in hospital room? 1  -CC 1  -CS    AM-PAC 6 Clicks Score (PT) 8  -CC 8  -CS    Highest level of mobility 3 --> Sat at edge " "of bed  - 3 --> Sat at edge of bed  -    Row Name 05/21/23 1725          Functional Assessment    Outcome Measure Options AM-PAC 6 Clicks Basic Mobility (PT)  -           User Key  (r) = Recorded By, (t) = Taken By, (c) = Cosigned By    Initials Name Provider Type    CC Annmarie Thomas PTA Physical Therapist Assistant     Shannan Matos LPN Licensed Nurse                             Physical Therapy Education     Title: PT OT SLP Therapies (In Progress)     Topic: Physical Therapy (In Progress)     Point: Mobility training (Done)     Learning Progress Summary           Patient Acceptance, E, VU by  at 5/20/2023 1425    Comment: Pt and her son educated on purpose of PT evaluation and pt's inpt POC/goals   Family Acceptance, E, VU by  at 5/20/2023 1425    Comment: Pt and her son educated on purpose of PT evaluation and pt's inpt POC/goals                   Point: Home exercise program (Not Started)     Learner Progress:  Not documented in this visit.          Point: Body mechanics (Not Started)     Learner Progress:  Not documented in this visit.          Point: Precautions (Done)     Learning Progress Summary           Patient Acceptance, E,TB, VU,NR by  at 5/21/2023 1726    Comment: edu pt to relax L LE to A with decreasing pain                               User Key     Initials Effective Dates Name Provider Type Discipline     06/16/21 -  Annmarie Thomas PTA Physical Therapist Assistant PT     06/16/21 -  Mildred Acosta, OSCAR Physical Therapist PT              PT Recommendation and Plan     Plan of Care Reviewed With: patient  Outcome Evaluation: Pt yelling come help me upon PTA arrival in room,when questioned pt stated \"I feel bad all over\" upon further questioning determined pt's L knee was hurting. PTA edu pt to relax her L LE and removed pillow from under B LE and as pt relaxed reported L knee was feeling some better and with AAROM and encouragement pt able to perform knee " flex/extAAROM, hip abd and SAQ and R LE same ex however AROM 1x10 reps. Pt tender to palpation L medial knee and nursing aware, pt with some edema and warm to touch noted in that area at end of therapy session pt reported pain felt much better 5/10. Con't with PT POC and progress as tolerated     Time Calculation:    PT Charges     Row Name 05/21/23 1727             Time Calculation    PT Received On 05/21/23  -CC      PT Goal Re-Cert Due Date 05/30/23  -CC         Time Calculation- PT    Total Timed Code Minutes- PT 17 minute(s)  -CC         Timed Charges    53143 - PT Therapeutic Exercise Minutes 17  -CC         Total Minutes    Timed Charges Total Minutes 17  -CC       Total Minutes 17  -CC            User Key  (r) = Recorded By, (t) = Taken By, (c) = Cosigned By    Initials Name Provider Type    CC Annmarie Thomas PTA Physical Therapist Assistant              Therapy Charges for Today     Code Description Service Date Service Provider Modifiers Qty    87388512012 HC PT THER PROC EA 15 MIN 5/21/2023 Annmarie Thomas PTA GP 1          PT G-Codes  Outcome Measure Options: AM-PAC 6 Clicks Basic Mobility (PT)  AM-PAC 6 Clicks Score (PT): 8       Annmarie Thomas PTA  5/21/2023

## 2023-05-21 NOTE — PLAN OF CARE
Goal Outcome Evaluation:  Plan of Care Reviewed With: patient        Progress: no change  Outcome Evaluation: VSS throughout shift. No increased oxygen need; maintains on RA. Pain medicated with PRN medications. Patient turned and repositioned Q2 hours. No acute events during shift.

## 2023-05-22 LAB
ANION GAP SERPL CALCULATED.3IONS-SCNC: 10.4 MMOL/L (ref 5–15)
BUN SERPL-MCNC: 45 MG/DL (ref 8–23)
BUN/CREAT SERPL: 17.6 (ref 7–25)
CALCIUM SPEC-SCNC: 9.1 MG/DL (ref 8.6–10.5)
CHLORIDE SERPL-SCNC: 103 MMOL/L (ref 98–107)
CO2 SERPL-SCNC: 24.6 MMOL/L (ref 22–29)
CREAT SERPL-MCNC: 2.56 MG/DL (ref 0.57–1)
EGFRCR SERPLBLD CKD-EPI 2021: 18.4 ML/MIN/1.73
GLUCOSE SERPL-MCNC: 109 MG/DL (ref 65–99)
POTASSIUM SERPL-SCNC: 4.7 MMOL/L (ref 3.5–5.2)
QT INTERVAL: 400 MS
QTC INTERVAL: 444 MS
SODIUM SERPL-SCNC: 138 MMOL/L (ref 136–145)

## 2023-05-22 PROCEDURE — 63710000001 PREDNISONE PER 5 MG: Performed by: NURSE PRACTITIONER

## 2023-05-22 PROCEDURE — G0378 HOSPITAL OBSERVATION PER HR: HCPCS

## 2023-05-22 PROCEDURE — 99232 SBSQ HOSP IP/OBS MODERATE 35: CPT | Performed by: NURSE PRACTITIONER

## 2023-05-22 PROCEDURE — 97530 THERAPEUTIC ACTIVITIES: CPT

## 2023-05-22 PROCEDURE — 25010000002 HEPARIN (PORCINE) PER 1000 UNITS: Performed by: INTERNAL MEDICINE

## 2023-05-22 PROCEDURE — 96372 THER/PROPH/DIAG INJ SC/IM: CPT

## 2023-05-22 PROCEDURE — 80048 BASIC METABOLIC PNL TOTAL CA: CPT | Performed by: NURSE PRACTITIONER

## 2023-05-22 PROCEDURE — 97166 OT EVAL MOD COMPLEX 45 MIN: CPT

## 2023-05-22 RX ORDER — PREDNISONE 10 MG/1
10 TABLET ORAL DAILY
Status: DISCONTINUED | OUTPATIENT
Start: 2023-05-22 | End: 2023-05-24 | Stop reason: HOSPADM

## 2023-05-22 RX ADMIN — TORSEMIDE 10 MG: 20 TABLET ORAL at 08:45

## 2023-05-22 RX ADMIN — SERTRALINE 50 MG: 50 TABLET, FILM COATED ORAL at 08:44

## 2023-05-22 RX ADMIN — PANTOPRAZOLE SODIUM 40 MG: 40 TABLET, DELAYED RELEASE ORAL at 05:30

## 2023-05-22 RX ADMIN — PREDNISONE 10 MG: 10 TABLET ORAL at 14:52

## 2023-05-22 RX ADMIN — Medication 1 TABLET: at 08:44

## 2023-05-22 RX ADMIN — ALLOPURINOL 100 MG: 100 TABLET ORAL at 08:45

## 2023-05-22 RX ADMIN — HEPARIN SODIUM 5000 UNITS: 5000 INJECTION INTRAVENOUS; SUBCUTANEOUS at 22:05

## 2023-05-22 RX ADMIN — LIDOCAINE 1 PATCH: 50 PATCH CUTANEOUS at 08:45

## 2023-05-22 RX ADMIN — SODIUM BICARBONATE 650 MG TABLET 650 MG: at 08:45

## 2023-05-22 RX ADMIN — Medication 10 ML: at 22:06

## 2023-05-22 RX ADMIN — SODIUM BICARBONATE 650 MG TABLET 650 MG: at 22:05

## 2023-05-22 RX ADMIN — SENNOSIDES AND DOCUSATE SODIUM 2 TABLET: 50; 8.6 TABLET ORAL at 22:05

## 2023-05-22 RX ADMIN — ASPIRIN 81 MG: 81 TABLET, COATED ORAL at 08:45

## 2023-05-22 RX ADMIN — CARVEDILOL 25 MG: 25 TABLET, FILM COATED ORAL at 17:06

## 2023-05-22 RX ADMIN — CARVEDILOL 25 MG: 25 TABLET, FILM COATED ORAL at 08:44

## 2023-05-22 RX ADMIN — HEPARIN SODIUM 5000 UNITS: 5000 INJECTION INTRAVENOUS; SUBCUTANEOUS at 08:44

## 2023-05-22 RX ADMIN — SENNOSIDES AND DOCUSATE SODIUM 2 TABLET: 50; 8.6 TABLET ORAL at 08:44

## 2023-05-22 RX ADMIN — Medication 10 ML: at 08:46

## 2023-05-22 RX ADMIN — HYDROCODONE BITARTRATE AND ACETAMINOPHEN 1 TABLET: 5; 325 TABLET ORAL at 17:06

## 2023-05-22 RX ADMIN — ROSUVASTATIN CALCIUM 10 MG: 5 TABLET, FILM COATED ORAL at 22:05

## 2023-05-22 NOTE — PLAN OF CARE
Goal Outcome Evaluation:  Plan of Care Reviewed With: patient        Progress: no change  Outcome Evaluation: VSS throughout shift. Complaint of abdominal pain/chest pain during shift. EKG completed as precautionary (no acute issues noted). Asked provider for TUMS to help with pain; provided patient with pain medication, nausea medicine, and TUMS to help alleviate symptoms. Per patient, medications were effective.

## 2023-05-22 NOTE — CASE MANAGEMENT/SOCIAL WORK
Case Management/Social Work    Patient Name:  Carolyne Martins  YOB: 1940  MRN: [id number]  Admit Date:  5/19/2023        JR [facility] and she stated that she is working on getting Pt. Precert approved for STR. She stated that someone from their facility will be contacting Pt. Son to discuss LTC. JR met with Pt. and her son at bedside to discuss this. JR informed Pt. Son that she would call him with an update once one is available. JR/WM will continue to follow for discharge planning.      Electronically signed by:  Sukhwinder Martins  05/22/23 11:00 EDT

## 2023-05-22 NOTE — PLAN OF CARE
Goal Outcome Evaluation:  Plan of Care Reviewed With: patient, son        Progress: improving  Outcome Evaluation: Pt agreeable to therapy. Performed supine to sit with min A especially L LE to EOB, and min/mod A to come to upright sitting position, scooting to EOB with draw sheet and min A with increased time for pt to initiate each movement, sit <->stand x2 reps with RW mod A x2 reps and attempted to perform SPT with AD without success, however able to perform SPT with mod A x2 without AD from EOB to recliner. Con't with PT POC and progress as tolerated

## 2023-05-22 NOTE — CASE MANAGEMENT/SOCIAL WORK
Case Management/Social Work    Patient Name:  Carolyne Martins  YOB: 1940  MRN: [id number]  Admit Date:  5/19/2023        [facility] have started the precert for Pt. JR called and notified Pt. Son of this information. JR/WM will continue to follow for discharge planning.       Electronically signed by:  Sukhwinder Martins  05/22/23 14:27 EDT

## 2023-05-22 NOTE — THERAPY EVALUATION
Patient Name: Carolyne Martins  : 1940    MRN: [id number]                              Today's Date: 2023       Admit Date: 2023    Visit Dx:     ICD-10-CM ICD-9-CM   1. Phlebitis  I80.9 451.9   2. Lower extremity pain, left  M79.605 729.5   3. Bacteriuria  R82.71 791.9   4. Generalized weakness  R53.1 780.79   5. Acute on chronic renal insufficiency  N28.9 593.9    N18.9 585.9     Patient Active Problem List   Diagnosis   • Arthralgia of shoulder region   • Tendinopathy of rotator cuff   • Osteoarthritis of right acromioclavicular joint   • Anemia due to stage 4 chronic kidney disease treated with erythropoietin   • Anemia   • Gastric polyp   • Gastroesophageal reflux disease without esophagitis   • Melena   • Change in bowel habits   • Colon cancer screening   • Senile osteoporosis   • Personal history of colonic polyps   • Closed fracture of left hip ([facility])   • Impaired mobility and ADLs   • CKD (chronic kidney disease) stage 4, GFR 15-29 ml/min ([facility])   • Simple chronic bronchitis   • Essential hypertension   • Acute cystitis without hematuria   • Debility   • Left leg injury   • JASMEET (acute kidney injury)   • Chronic obstructive pulmonary disease   • Hip pain   • Post-traumatic osteoarthritis of left hip   • Status post left hip replacement   • Fracture, proximal femur, left, closed, initial encounter   • Postoperative anemia due to acute blood loss   • Phlebitis   • Osteoarthritis of left knee     Past Medical History:   Diagnosis Date   • Anemia    • Anemia    • Arthritis    • Back pain    • Bleeding from anus    • Bronchitis     STATES HAS BRONCHITIS CURRENTLY (17).     • Bronchitis 2017   • CAD (coronary artery disease)    • Cataract, bilateral    • Chronic kidney disease     STAGE 4.  SEES TERA   • Chronic kidney failure     REPORTS STAGE IV   • Difficulty swallowing solids    • Disease of thyroid gland    • Fracture, radius 2016    right distal radius and ulna, healed.   •  "GERD (gastroesophageal reflux disease)    • Gout    • High cholesterol    • History of blood transfusion 2019   • History of nuclear stress test 2014    DR. CORTEZ.  \"IT WAS OK\"   • Hyperparathyroidism    • Hyperparathyroidism    • Hypertension    • Impaired functional mobility, balance, gait, and endurance    • Impaired mobility    • Iron deficiency    • Osteoarthritis    • Osteoarthritis of knees, bilateral     Both knees Supartz injection series August, 2015   • Osteoporosis    • Palpitations    • Personal history of cardiac murmur    • Pessary maintenance    • PONV (postoperative nausea and vomiting)    • Presence of pessary    • Problems with swallowing     WITH FOOD OCCASSIONALLY   • Rheumatic heart disease     Personal history   • Rotator cuff tendonitis     right    • Rupture of right proximal biceps tendon     chronic   • Seasonal allergies    • Sinus problem    • Spinal headache     REPORTS AFTER DELIVERY OF SON   • Subacromial bursitis     right    • Valvular heart disease    • Vision problems    • Vitamin B12 deficiency    • Vitamin D deficiency    • Wears glasses      Past Surgical History:   Procedure Laterality Date   • CATARACT EXTRACTION Bilateral    • CERVICAL POLYPECTOMY     • COLONOSCOPY  2011   • COLONOSCOPY N/A 1/9/2018    Procedure: COLONOSCOPY with endoscopic mucosal resection (hot snare), normal saline submucosal injection, argon thermal ablation, resolution clip placement x4, and cold biopsy polypectomy;  Surgeon: Pieter Concepcion MD;  Location: [facility] ENDOSCOPY;  Service:    • ENDOSCOPY N/A 12/6/2017    Procedure: ESOPHAGOGASTRODUODENOSCOPY with biopsies and hot snare polypectomies;  Surgeon: Pieter Concepcion MD;  Location: [facility] ENDOSCOPY;  Service:    • ENDOSCOPY N/A 1/15/2019    Procedure: ESOPHAGOGASTRODUODENOSCOPY WITH BIOPSIES AND HOT SNARE POLYPECTOMIES X10;  Surgeon: Pieter Concepcion MD;  Location: [facility] ENDOSCOPY;  Service: Gastroenterology   • HARDWARE REMOVAL Left 2/17/2023    " Procedure: FEMUR HARDWARE REMOVAL, LEFT;  Surgeon: Gabriele Loyd MD;  Location: [facility];  Service: Orthopedics;  Laterality: Left;   • HIP TROCHANTERIC NAILING WITH INTRAMEDULLARY HIP SCREW Left 2/14/2021    Procedure: HIP TROCHANTER NAIL SHORT WITH INTRAMEDULLARY HIP SCREW, LEFT;  Surgeon: Gabriele Loyd MD;  Location: [facility];  Service: Orthopedics;  Laterality: Left;   • TOTAL HIP ARTHROPLASTY Left 2/17/2023    Procedure: REMOVAL OF FRACTURE HARDWARE AND CONVERSION TO TOTAL HIP ARTHROPLASTY, LEFT;  Surgeon: Gabriele Loyd MD;  Location: [facility];  Service: Orthopedics;  Laterality: Left;   • UPPER GASTROINTESTINAL ENDOSCOPY  08/18/2014      General Information     [facility] Name 05/22/23 1137          OT Time and Intention    Document Type evaluation  -     Mode of Treatment occupational therapy  -[facility] Name 05/22/23 1137          General Information    Patient Profile Reviewed yes  -     Prior Level of Function mod assist:;ADL's  -     Existing Precautions/Restrictions fall  -     Barriers to Rehab previous functional deficit;cognitive status  -[facility] Name 05/22/23 1137          Occupational Profile    Reason for Services/Referral (Occupational Profile) ADL decline  -[facility] Name 05/22/23 1137          Living Environment    People in Home alone  son lives across the street  -[facility] Name 05/22/23 1137          Home Main Entrance    Number of Stairs, Main Entrance none  -[facility] Name 05/22/23 1137          Stairs Within Home, Primary    Number of Stairs, Within Home, Primary none  -[facility] Name 05/22/23 1137          Cognition    Orientation Status (Cognition) oriented to;person;time;verbal cues/prompts needed for orientation  -[facility] Name 05/22/23 1137          Safety Issues, Functional Mobility    Safety Issues Affecting Function (Mobility) awareness of need for assistance;insight into deficits/self-awareness;problem-solving;safety precaution awareness;safety  precautions follow-through/compliance  -     Impairments Affecting Function (Mobility) balance;pain;strength;endurance/activity tolerance;coordination  -           User Key  (r) = Recorded By, (t) = Taken By, (c) = Cosigned By    Initials Name Provider Type     Kim Wick Occupational Therapist                 Mobility/ADL's     [facility] 05/22/23 1142          Bed Mobility    Rolling Left [place] (Bed Mobility) minimum assist (75% patient effort)  -     Rolling Right [place] (Bed Mobility) minimum assist (75% patient effort)  -     Supine-Sit [place] (Bed Mobility) moderate assist (50% patient effort);verbal cues  -     Sit-Supine [place] (Bed Mobility) contact guard;minimum assist (75% patient effort)  -     Assistive Device (Bed Mobility) draw sheet;head of bed elevated;bed rails  -AH     Row Name 05/22/23 1142          Transfers    Transfers sit-stand transfer  -AH     Row Name 05/22/23 1142          Sit-Stand Transfer    Sit-Stand [place] (Transfers) moderate assist (50% patient effort);maximum assist (25% patient effort)  -     Comment, (Sit-Stand Transfer) unable to stand fully d/t pain L knee  -AH     Row Name 05/22/23 1142          Functional Mobility    Functional Mobility- Ind. Level unable to perform  -AH     Row Name 05/22/23 1142          Activities of Daily Living    BADL Assessment/Intervention bathing;upper body dressing;lower body dressing;grooming;feeding;toileting  -AH     Row Name 05/22/23 1142          Bathing Assessment/Intervention    [place] Level (Bathing) maximum assist (25% patient effort)  -AH     Row Name 05/22/23 1142          Upper Body Dressing Assessment/Training    [place] Level (Upper Body Dressing) moderate assist (50% patient effort)  -AH     Row Name 05/22/23 1142          Lower Body Dressing Assessment/Training    [place] Level (Lower Body Dressing) dependent (less than 25% patient effort)  -AH     Row Name 05/22/23  1142          Grooming Assessment/Training    [place] Level (Grooming) minimum assist (75% patient effort)  -[facility] Name 05/22/23 1142          Self-Feeding Assessment/Training    [place] Level (Feeding) set up  -[facility] Name 05/22/23 1142          Toileting Assessment/Training    [place] Level (Toileting) maximum assist (25% patient effort)  -           User Key  (r) = Recorded By, (t) = Taken By, (c) = Cosigned By    Initials Name Provider Type    Kim Vigil Occupational Therapist               Obj/Interventions     [facility] Name 05/22/23 1146          Sensory Assessment (Somatosensory)    Sensory Assessment (Somatosensory) sensation intact  -[facility] Name 05/22/23 1146          Vision Assessment/Intervention    Visual Impairment/Limitations WFL;corrective lenses full-time  -AH     Row Name 05/22/23 1146          Range of Motion Comprehensive    Comment, General Range of Motion Pt limited with bilateral shoulder flexion ~90 degrees passively, 70 degrees actively  -AH     Row Name 05/22/23 1146          Strength Comprehensive (MMT)    Comment, General Manual Muscle Testing (MMT) Assessment B shoulders not tested, elbows 4-/5  -AH     Row Name 05/22/23 1146          Balance    Static Sitting Balance standby assist  -     Dynamic Sitting Balance contact guard  -           User Key  (r) = Recorded By, (t) = Taken By, (c) = Cosigned By    Initials Name Provider Type    Kim Vigil Occupational Therapist               Goals/Plan     [facility] Name 05/22/23 1153          Bed Mobility Goal 1 (OT)    Activity/Assistive Device (Bed Mobility Goal 1, OT) supine to sit  -     [place] Level/Cues Needed (Bed Mobility Goal 1, OT) minimum assist (75% or more patient effort)  -     Time Frame (Bed Mobility Goal 1, OT) by discharge  -     Progress/Outcomes (Bed Mobility Goal 1, OT) goal ongoing  -[facility] Name 05/22/23 1153          Transfer Goal 1 (OT)    Activity/Assistive Device  (Transfer Goal 1, OT) sit-to-stand/stand-to-sit;walker, rolling  -     [place] Level/Cues Needed (Transfer Goal 1, OT) minimum assist (75% or more patient effort)  -     Time Frame (Transfer Goal 1, OT) long term goal (LTG);5 days  -     Progress/Outcome (Transfer Goal 1, OT) goal ongoing  -[facility] Name 05/22/23 1153          Bathing Goal 1 (OT)    Activity/Device (Bathing Goal 1, OT) upper body bathing  -     [place] Level/Cues Needed (Bathing Goal 1, OT) minimum assist (75% or more patient effort)  -     Time Frame (Bathing Goal 1, OT) by discharge  -     Progress/Outcomes (Bathing Goal 1, OT) goal ongoing  -[facility] Name 05/22/23 1153          Dressing Goal 1 (OT)    Activity/Device (Dressing Goal 1, OT) upper body dressing  -     [place]/Cues Needed (Dressing Goal 1, OT) contact guard required  -     Time Frame (Dressing Goal 1, OT) by discharge  -     Progress/Outcome (Dressing Goal 1, OT) goal ongoing  -[facility] Name 05/22/23 1673          Grooming Goal 1 (OT)    Activity/Device (Grooming Goal 1, OT) wash face, hands;oral care;hair care  -     [place] (Grooming Goal 1, OT) set-up required  -AH     Time Frame (Grooming Goal 1, OT) by discharge  -     Progress/Outcome (Grooming Goal 1, OT) goal ongoing  -[facility] Name 05/22/23 7260          ROM Goal 1 (OT)    ROM Goal 1 (OT) Pt will perform UB AROM ex to improve her shoulder ROM for UB dressing  -     Time Frame (ROM Goal 1, OT) by discharge  -     Progress/Outcome (ROM Goal 1, OT) goal ongoing  -[facility] Name 05/22/23 3024          Therapy Assessment/Plan (OT)    Planned Therapy Interventions (OT) activity tolerance training;BADL retraining;patient/caregiver education/training;transfer/mobility retraining;ROM/therapeutic exercise  -           User Key  (r) = Recorded By, (t) = Taken By, (c) = Cosigned By    Initials Name Provider Type    Kim Vigil Occupational Therapist               Clinical  Impression     [facility] Name 05/22/23 1148          Pain Assessment    Pain Intervention(s) Repositioned;Ambulation/increased activity  -     Additional Documentation Pain Scale: FACES Pre/Post-Treatment (Group)  -[facility] Name 05/22/23 1148          Pain Scale: FACES Pre/Post-Treatment    Pain: FACES Scale, Pretreatment 4-->hurts little more  -     Posttreatment Pain Rating 2-->hurts little bit  -     Pain Location - Side/Orientation Left  -     Pain Location - knee  -[facility] Name 05/22/23 1148          Plan of Care Review    Plan of Care Reviewed With patient  -     Progress no change  -     Outcome Evaluation Pt seen for OT evaluation today.  Pt alone initially, but her son arrived toward the end of the evaluation.  Pt confused, oriented to person and month.  Pt min assist to roll in bed, mod assist to sit eob and mod to max assist to stand.  Pt did not weight bear very much through her L leg.  Pt unable to stand up straight or take any steps.  Pt was able to lay back in bed with min assist for LLE.  Pt is expected to benefit from skilled OT to improve her strength and independence with ADL tasks.  -[facility] Name 05/22/23 1148          Therapy Assessment/Plan (OT)    Patient/Family Therapy Goal Statement (OT) d/c home  -     Rehab Potential (OT) good, to achieve stated therapy goals  -     Criteria for Skilled Therapeutic Interventions Met (OT) yes;skilled treatment is necessary  [facility]     Therapy Frequency (OT) 3 times/wk  -[facility] Name 05/22/23 1148          Therapy Plan Review/Discharge Plan (OT)    Anticipated Discharge Disposition (OT) inpatient rehabilitation facility;skilled nursing facility  -[facility] Name 05/22/23 1148          Positioning and Restraints    Pre-Treatment Position in bed  -     Post Treatment Position bed  -     In Bed fowlers;call light within reach;encouraged to call for assist;exit alarm on;with family/caregiver  -           User Key  (r) = Recorded By, (t) =  Taken By, (c) = Cosigned By    Initials Name Provider Type    Kim Vigil Occupational Therapist               Outcome Measures     Row Name 05/22/23 1155          How much help from another is currently needed...    Putting on and taking off regular lower body clothing? 2  -AH     Bathing (including washing, rinsing, and drying) 2  -AH     Toileting (which includes using toilet bed pan or urinal) 2  -AH     Putting on and taking off regular upper body clothing 2  -AH     Taking care of personal grooming (such as brushing teeth) 3  -AH     Eating meals 3  -     AM-PAC 6 Clicks Score (OT) 14  -     Row Name 05/22/23 0800          How much help from another person do you currently need...    Turning from your back to your side while in flat bed without using bedrails? 2  -MD     Moving from lying on back to sitting on the side of a flat bed without bedrails? 2  -MD     Moving to and from a bed to a chair (including a wheelchair)? 1  -MD     Standing up from a chair using your arms (e.g., wheelchair, bedside chair)? 1  -MD     Climbing 3-5 steps with a railing? 1  -MD     To walk in hospital room? 1  -MD     AM-PAC 6 Clicks Score (PT) 8  -MD     Highest level of mobility 3 --> Sat at edge of bed  -MD     Row Name 05/22/23 1155          Functional Assessment    Outcome Measure Options AM-PAC 6 Clicks Daily Activity (OT)  -           User Key  (r) = Recorded By, (t) = Taken By, (c) = Cosigned By    Initials Name Provider Type    Kim Vigil Occupational Therapist    Bhavin Troncoso RN Registered Nurse                Occupational Therapy Education     Title: PT OT SLP Therapies (In Progress)     Topic: Occupational Therapy (In Progress)     Point: ADL training (Done)     Description:   Instruct learner(s) on proper safety adaptation and remediation techniques during self care or transfers.   Instruct in proper use of assistive devices.              Learning Progress Summary           Patient  Acceptance, E,TB, VU by  at 5/22/2023 7014    Comment: Role of OT/POC                   Point: Home exercise program (Not Started)     Description:   Instruct learner(s) on appropriate technique for monitoring, assisting and/or progressing therapeutic exercises/activities.              Learner Progress:  Not documented in this visit.          Point: Precautions (Not Started)     Description:   Instruct learner(s) on prescribed precautions during self-care and functional transfers.              Learner Progress:  Not documented in this visit.          Point: Body mechanics (Not Started)     Description:   Instruct learner(s) on proper positioning and spine alignment during self-care, functional mobility activities and/or exercises.              Learner Progress:  Not documented in this visit.                      User Key     Initials Effective Dates Name Provider Type Discipline     06/16/21 -  Kim Wick Occupational Therapist OT              OT Recommendation and Plan  Planned Therapy Interventions (OT): activity tolerance training, BADL retraining, patient/caregiver education/training, transfer/mobility retraining, ROM/therapeutic exercise  Therapy Frequency (OT): 3 times/wk  Plan of Care Review  Plan of Care Reviewed With: patient  Progress: no change  Outcome Evaluation: Pt seen for OT evaluation today.  Pt alone initially, but her son arrived toward the end of the evaluation.  Pt confused, oriented to person and month.  Pt min assist to roll in bed, mod assist to sit eob and mod to max assist to stand.  Pt did not weight bear very much through her L leg.  Pt unable to stand up straight or take any steps.  Pt was able to lay back in bed with min assist for LLE.  Pt is expected to benefit from skilled OT to improve her strength and independence with ADL tasks.     Time Calculation:    Time Calculation- OT     Row Name 05/22/23 1157             Time Calculation- OT    OT Start Time 0957  -      OT  Received On 05/22/23  -      OT Goal Re-Cert Due Date 06/01/23  -         Untimed Charges    OT Eval/Re-eval Minutes 58  -AH         Total Minutes    Untimed Charges Total Minutes 58  -AH       Total Minutes 58  -AH            User Key  (r) = Recorded By, (t) = Taken By, (c) = Cosigned By    Initials Name Provider Type    Kim Vigil Occupational Therapist              Therapy Charges for Today     Code Description Service Date Service Provider Modifiers Qty    [id number]  OT EVAL MOD COMPLEXITY 4 5/22/2023 Kim Wick GO 1               Kim Wick  5/22/2023

## 2023-05-22 NOTE — THERAPY TREATMENT NOTE
Patient Name: Carolyne Martins  : 1940    MRN: [id number]                              Today's Date: 2023       Admit Date: 2023    Visit Dx:     ICD-10-CM ICD-9-CM   1. Phlebitis  I80.9 451.9   2. Lower extremity pain, left  M79.605 729.5   3. Bacteriuria  R82.71 791.9   4. Generalized weakness  R53.1 780.79   5. Acute on chronic renal insufficiency  N28.9 593.9    N18.9 585.9     Patient Active Problem List   Diagnosis   • Arthralgia of shoulder region   • Tendinopathy of rotator cuff   • Osteoarthritis of right acromioclavicular joint   • Anemia due to stage 4 chronic kidney disease treated with erythropoietin   • Anemia   • Gastric polyp   • Gastroesophageal reflux disease without esophagitis   • Melena   • Change in bowel habits   • Colon cancer screening   • Senile osteoporosis   • Personal history of colonic polyps   • Closed fracture of left hip ([facility])   • Impaired mobility and ADLs   • CKD (chronic kidney disease) stage 4, GFR 15-29 ml/min ([facility])   • Simple chronic bronchitis   • Essential hypertension   • Acute cystitis without hematuria   • Debility   • Left leg injury   • JASMEET (acute kidney injury)   • Chronic obstructive pulmonary disease   • Hip pain   • Post-traumatic osteoarthritis of left hip   • Status post left hip replacement   • Fracture, proximal femur, left, closed, initial encounter   • Postoperative anemia due to acute blood loss   • Phlebitis   • Osteoarthritis of left knee     Past Medical History:   Diagnosis Date   • Anemia    • Anemia    • Arthritis    • Back pain    • Bleeding from anus    • Bronchitis     STATES HAS BRONCHITIS CURRENTLY (17).     • Bronchitis 2017   • CAD (coronary artery disease)    • Cataract, bilateral    • Chronic kidney disease     STAGE 4.  SEES TERA   • Chronic kidney failure     REPORTS STAGE IV   • Difficulty swallowing solids    • Disease of thyroid gland    • Fracture, radius 2016    right distal radius and ulna, healed.   •  "GERD (gastroesophageal reflux disease)    • Gout    • High cholesterol    • History of blood transfusion 2019   • History of nuclear stress test 2014    DR. CORTEZ.  \"IT WAS OK\"   • Hyperparathyroidism    • Hyperparathyroidism    • Hypertension    • Impaired functional mobility, balance, gait, and endurance    • Impaired mobility    • Iron deficiency    • Osteoarthritis    • Osteoarthritis of knees, bilateral     Both knees Supartz injection series August, 2015   • Osteoporosis    • Palpitations    • Personal history of cardiac murmur    • Pessary maintenance    • PONV (postoperative nausea and vomiting)    • Presence of pessary    • Problems with swallowing     WITH FOOD OCCASSIONALLY   • Rheumatic heart disease     Personal history   • Rotator cuff tendonitis     right    • Rupture of right proximal biceps tendon     chronic   • Seasonal allergies    • Sinus problem    • Spinal headache     REPORTS AFTER DELIVERY OF SON   • Subacromial bursitis     right    • Valvular heart disease    • Vision problems    • Vitamin B12 deficiency    • Vitamin D deficiency    • Wears glasses      Past Surgical History:   Procedure Laterality Date   • CATARACT EXTRACTION Bilateral    • CERVICAL POLYPECTOMY     • COLONOSCOPY  2011   • COLONOSCOPY N/A 1/9/2018    Procedure: COLONOSCOPY with endoscopic mucosal resection (hot snare), normal saline submucosal injection, argon thermal ablation, resolution clip placement x4, and cold biopsy polypectomy;  Surgeon: Pieter Concepcion MD;  Location: [facility] ENDOSCOPY;  Service:    • ENDOSCOPY N/A 12/6/2017    Procedure: ESOPHAGOGASTRODUODENOSCOPY with biopsies and hot snare polypectomies;  Surgeon: Pieter Concepcion MD;  Location: [facility] ENDOSCOPY;  Service:    • ENDOSCOPY N/A 1/15/2019    Procedure: ESOPHAGOGASTRODUODENOSCOPY WITH BIOPSIES AND HOT SNARE POLYPECTOMIES X10;  Surgeon: Pieter Concepcion MD;  Location: [facility] ENDOSCOPY;  Service: Gastroenterology   • HARDWARE REMOVAL Left 2/17/2023    " Procedure: FEMUR HARDWARE REMOVAL, LEFT;  Surgeon: Gabriele Loyd MD;  Location: [facility];  Service: Orthopedics;  Laterality: Left;   • HIP TROCHANTERIC NAILING WITH INTRAMEDULLARY HIP SCREW Left 2/14/2021    Procedure: HIP TROCHANTER NAIL SHORT WITH INTRAMEDULLARY HIP SCREW, LEFT;  Surgeon: Gabriele Loyd MD;  Location: [facility];  Service: Orthopedics;  Laterality: Left;   • TOTAL HIP ARTHROPLASTY Left 2/17/2023    Procedure: REMOVAL OF FRACTURE HARDWARE AND CONVERSION TO TOTAL HIP ARTHROPLASTY, LEFT;  Surgeon: Gabriele Loyd MD;  Location: [facility] OR;  Service: Orthopedics;  Laterality: Left;   • UPPER GASTROINTESTINAL ENDOSCOPY  08/18/2014      General Information     Row Name 05/22/23 1148          Physical Therapy Time and Intention    Document Type therapy note (daily note)  -     Mode of Treatment physical therapy  -     Row Name 05/22/23 1148          General Information    Patient Profile Reviewed yes  -     Existing Precautions/Restrictions fall  -     Row Name 05/22/23 1148          Safety Issues, Functional Mobility    Safety Issues Affecting Function (Mobility) awareness of need for assistance;insight into deficits/self-awareness;positioning of assistive device;problem-solving;safety precautions follow-through/compliance;sequencing abilities  -     Impairments Affecting Function (Mobility) balance;pain;strength;endurance/activity tolerance;coordination  -           User Key  (r) = Recorded By, (t) = Taken By, (c) = Cosigned By    Initials Name Provider Type     Annmarie Thomas PTA Physical Therapist Assistant               Mobility     Row Name 05/22/23 1149          Bed Mobility    Bed Mobility supine-sit  -     Scooting/Bridging [place] (Bed Mobility) verbal cues;minimum assist (75% patient effort);other (see comments)  to EOB with draw sheet  -     Supine-Sit [place] (Bed Mobility) moderate assist (50% patient effort);verbal cues  -     Assistive  Device (Bed Mobility) draw sheet;head of bed elevated;bed rails  -     Row Name 05/22/23 1149          Bed-Chair Transfer    Bed-Chair [place] (Transfers) moderate assist (50% patient effort);2 person assist  -     Assistive Device (Bed-Chair Transfers) other (see comments)  gait belt  -     Comment, (Bed-Chair Transfer) SPT  -     Row Name 05/22/23 1149          Sit-Stand Transfer    Sit-Stand [place] (Transfers) moderate assist (50% patient effort)  -     Assistive Device (Sit-Stand Transfers) walker, front-wheeled  -     Row Name 05/22/23 1149          Gait/Stairs (Locomotion)    [place] Level (Gait) not tested  -           User Key  (r) = Recorded By, (t) = Taken By, (c) = Cosigned By    Initials Name Provider Type     Annmarie Thomas, PTA Physical Therapist Assistant               Obj/Interventions    No documentation.                Goals/Plan    No documentation.                Clinical Impression     Row Name 05/22/23 1152          Pain    Pretreatment Pain Rating 5/10  -     Posttreatment Pain Rating 7/10  -     Pain Location - Side/Orientation Left  -     Pain Location - knee  -     Pain Intervention(s) Repositioned;Ambulation/increased activity  -     Additional Documentation Pain Scale: Numbers Pre/Post-Treatment (Group)  -     Row Name 05/22/23 1155          Plan of Care Review    Plan of Care Reviewed With patient;son  -     Progress improving  -     Outcome Evaluation Pt agreeable to therapy. Performed supine to sit with min A especially L LE to EOB, and min/mod A to come to upright sitting position, scooting to EOB with draw sheet and min A with increased time for pt to initiate each movement, sit <->stand x2 reps with RW mod A x2 reps and attempted to perform SPT with AD without success, however able to perform SPT with mod A x2 without AD from EOB to recliner. Con't with PT POC and progress as tolerated  -     Row Name 05/22/23 9214           Positioning and Restraints    Pre-Treatment Position in bed  -CC     Post Treatment Position chair  -CC     In Chair reclined;call light within reach;encouraged to call for assist;with family/caregiver;notified nsg  -CC           User Key  (r) = Recorded By, (t) = Taken By, (c) = Cosigned By    Initials Name Provider Type    Annmarie Dennis PTA Physical Therapist Assistant               Outcome Measures     Row Name 05/22/23 1158 05/22/23 0800       How much help from another person do you currently need...    Turning from your back to your side while in flat bed without using bedrails? 2  -CC 2  -MD    Moving from lying on back to sitting on the side of a flat bed without bedrails? 2  -CC 2  -MD    Moving to and from a bed to a chair (including a wheelchair)? 2  -CC 1  -MD    Standing up from a chair using your arms (e.g., wheelchair, bedside chair)? 2  -CC 1  -MD    Climbing 3-5 steps with a railing? 1  -CC 1  -MD    To walk in hospital room? 1  -CC 1  -MD    AM-PAC 6 Clicks Score (PT) 10  -CC 8  -MD    Highest level of mobility 4 --> Transferred to chair/commode  -CC 3 --> Sat at edge of bed  -MD    Row Name 05/22/23 1158 05/22/23 1155       Functional Assessment    Outcome Measure Options AM-PAC 6 Clicks Basic Mobility (PT)  - AM-PAC 6 Clicks Daily Activity (OT)  -          User Key  (r) = Recorded By, (t) = Taken By, (c) = Cosigned By    Initials Name Provider Type     Kim Wick Occupational Therapist    Annmarie Dennis PTA Physical Therapist Assistant    Bhavin Troncoso, RN Registered Nurse                             Physical Therapy Education     Title: PT OT SLP Therapies (In Progress)     Topic: Physical Therapy (In Progress)     Point: Mobility training (Done)     Learning Progress Summary           Patient Acceptance, E, VU by TW at 5/20/2023 8636    Comment: Pt and her son educated on purpose of PT evaluation and pt's inpt POC/goals   Family Acceptance, E, VU by TW at  5/20/2023 1425    Comment: Pt and her son educated on purpose of PT evaluation and pt's inpt POC/goals                   Point: Home exercise program (Not Started)     Learner Progress:  Not documented in this visit.          Point: Body mechanics (Done)     Learning Progress Summary           Patient Acceptance, E,TB, VU,NR by CC at 5/22/2023 1158    Comment: upright posture in standing                   Point: Precautions (Done)     Learning Progress Summary           Patient Acceptance, E,TB, VU,NR by  at 5/21/2023 1726    Comment: edu pt to relax L LE to A with decreasing pain                               User Key     Initials Effective Dates Name Provider Type Discipline    CC 06/16/21 -  Annmarie Thomas PTA Physical Therapist Assistant PT    TW 06/16/21 -  Mildred Acosta, OSCAR Physical Therapist PT              PT Recommendation and Plan     Plan of Care Reviewed With: patient, son  Progress: improving  Outcome Evaluation: Pt agreeable to therapy. Performed supine to sit with min A especially L LE to EOB, and min/mod A to come to upright sitting position, scooting to EOB with draw sheet and min A with increased time for pt to initiate each movement, sit <->stand x2 reps with RW mod A x2 reps and attempted to perform SPT with AD without success, however able to perform SPT with mod A x2 without AD from EOB to recliner. Con't with PT POC and progress as tolerated     Time Calculation:    PT Charges     Row Name 05/22/23 1159             Time Calculation    PT Received On 05/22/23  -      PT Goal Re-Cert Due Date 05/30/23  -         Time Calculation- PT    Total Timed Code Minutes- PT 41 minute(s)  -CC         Timed Charges    39020 - PT Therapeutic Activity Minutes 41  -CC         Total Minutes    Timed Charges Total Minutes 41  -CC       Total Minutes 41  -CC            User Key  (r) = Recorded By, (t) = Taken By, (c) = Cosigned By    Initials Name Provider Type    CC Annmarie Thomas PTA Physical  Therapist Assistant              Therapy Charges for Today     Code Description Service Date Service Provider Modifiers Qty    06902605374 HC PT THER PROC EA 15 MIN 5/21/2023 Annmarie Thomas, PTA GP 1    03355782970 HC PT THERAPEUTIC ACT EA 15 MIN 5/22/2023 Annmarie Thomas, MALI GP 3          PT G-Codes  Outcome Measure Options: AM-PAC 6 Clicks Basic Mobility (PT)  AM-PAC 6 Clicks Score (PT): 10  AM-PAC 6 Clicks Score (OT): 14       Annmarie Thomas PTA  5/22/2023

## 2023-05-22 NOTE — PLAN OF CARE
Goal Outcome Evaluation:  Plan of Care Reviewed With: patient        Progress: no change  Outcome Evaluation: Pt seen for OT evaluation today.  Pt alone initially, but her son arrived toward the end of the evaluation.  Pt confused, oriented to person and month.  Pt min assist to roll in bed, mod assist to sit eob and mod to max assist to stand.  Pt did not weight bear very much through her L leg.  Pt unable to stand up straight or take any steps.  Pt was able to lay back in bed with min assist for LLE.  Pt is expected to benefit from skilled OT to improve her strength and independence with ADL tasks.

## 2023-05-22 NOTE — PLAN OF CARE
Goal Outcome Evaluation:  Plan of Care Reviewed With: patient, son        Progress: no change  Outcome Evaluation: PT able to transfer to bedside chair for lunch, continues to report L knee pain. Awaiting placement options for DC

## 2023-05-22 NOTE — PROGRESS NOTES
[facility]    Name:  Carolyne Martins   Age:  83 y.o.  Sex:  female  :  1940  MRN:  [id number]   Visit Number:  [id number]  Admission Date:  2023  Date Of Service:  23  Primary Care Physician:  Jerald Dawkins MD     LOS: 0 days :    Chief Complaint:      Weakness    Subjective:    Patient seen and examined with no family at bedside.  She is alert and oriented x3 with confused conversation.  Patient does not recall her son not being able to care for her at home.  Does state that she hurts all over.  Did eat most of her breakfast.  Per nurse at bedside left knee pain improved with ice.    Hospital Course:    Ms. Martins is an 83-year-old female with history of coronary artery disease, chronic kidney disease stage IV, hypertension, dyslipidemia, recent left hip fracture and repair in 2023 was brought to the emergency room by EMS with left knee joint pain and bruising.  Patient went to short-term rehabilitation at [facility] and rehab in February after discharge from this facility and apparently was discharged home 1 day prior to presenting to ED.  At that point, the son realized how immobile the patient was.  According to the son, patient was not able to get up from the bed and stand and needed total care to the fact that he had to carry her to the bathroom.  The son subsequently called EMS as he could not care for her at home.       In the emergency room, she was afebrile and hemodynamically stable saturating at 93% on room air.  Blood work done in the emergency room showed a BUN of 38, creatinine of 2.38 (baseline) glucose 111 and albumin of 3.2.  CBC was unremarkable except for hemoglobin of 10.7.  Urine analysis was negative for any evidence of urinary tract infection.  Left femur x-ray showed stable postoperative changes of hip replacement.  X-ray of the left knee was negative for any fracture or dislocation.  X-ray of bilateral tibia and  fibula were negative for any fracture or dislocation.  Ultrasound of the left lower extremity showed nonocclusive greater saphenous vein thrombosis without any DVT.  Since the family is unable to take care of the patient, case management was consulted.  PT/OT/case management following.    Review of Systems:     All systems were reviewed and negative except as mentioned in subjective, assessment and plan.    Vital Signs:    Temp:  [98 °F (36.7 °C)-98.6 °F (37 °C)] 98 °F (36.7 °C)  Heart Rate:  [68-78] 78  Resp:  [16-18] 16  BP: (121-151)/(55-75) 142/61    Intake and output:    I/O last 3 completed shifts:  In: 480 [P.O.:480]  Out: 670 [Urine:670]  I/O this shift:  In: 240 [P.O.:240]  Out: -     Physical Examination:    General Appearance:  Alert and cooperative.  Chronically ill/frail appearing elderly female.   Head:  Atraumatic and normocephalic.   Eyes: Conjunctivae and sclerae normal, no icterus. No pallor.   Throat: No oral lesions, no thrush, oral mucosa moist.   Neck: Supple, trachea midline, no thyromegaly.   Lungs:   Breath sounds heard bilaterally equally.  No wheezing or crackles. No Pleural rub or bronchial breathing.  Unlabored on room air.   Heart:  Normal S1 and S2, no murmur, no gallop, no rub. No JVD.   Abdomen:   Normal bowel sounds, no masses, no organomegaly. Soft, nontender, nondistended, no rebound tenderness.   Extremities: Supple, no edema, no cyanosis, no clubbing.  Multiple arthritic joints noted.   Skin: No bleeding or rash.  Scattered ecchymosis.  Right lower extremity skin tear noted.  Significant ecchymosis posterior left knee without hematoma/warmth/erythema noted.  Pain with movement noted.   Neurologic: Alert and oriented x 3 with confused conversation. No facial asymmetry. Moves all four limbs. No tremors.  Severe generalized weakness.     Laboratory results:    Results from last 7 days   Lab Units 05/22/23  0613 05/21/23  0603 05/20/23  0558 05/19/23  1710   SODIUM mmol/L 138 142  140 140   POTASSIUM mmol/L 4.7 4.6 4.1 4.8   CHLORIDE mmol/L 103 105 104 102   CO2 mmol/L 24.6 25.3 24.8 28.3   BUN mg/dL 45* 40* 39* 38*   CREATININE mg/dL 2.56* 2.57* 2.21* 2.38*   CALCIUM mg/dL 9.1 9.2 9.1 9.6   BILIRUBIN mg/dL  --   --   --  0.5   ALK PHOS U/L  --   --   --  133*   ALT (SGPT) U/L  --   --   --  8   AST (SGOT) U/L  --   --   --  14   GLUCOSE mg/dL 109* 82 76 111*     Results from last 7 days   Lab Units 05/20/23  0558 05/19/23  1710   WBC 10*3/mm3 5.57 7.72   HEMOGLOBIN g/dL 10.0* 10.7*   HEMATOCRIT % 32.0* 34.1   PLATELETS 10*3/mm3 161 192                 No results for input(s): PHART, ZFR3RTS, PO2ART, RED0DTK, BASEEXCESS in the last 8760 hours.   I have reviewed the patient's laboratory results.    Radiology results:    No radiology results from the last 24 hrs  I have reviewed the patient's radiology reports.    Medication Review:     I have reviewed the patient's active and prn medications.     Problem List:      Phlebitis    Anemia    Gastroesophageal reflux disease without esophagitis    Impaired mobility and ADLs    CKD (chronic kidney disease) stage 4, GFR 15-29 ml/min ([facility])    Essential hypertension    Osteoarthritis of left knee      Assessment:    1. Generalized weakness and failure to thrive, POA.  2. Left knee joint pain likely secondary to rheumatoid arthritis, POA.  3. Left greater saphenous vein superficial thrombosis, POA.  4. Coronary artery disease.  5. Chronic diastolic heart failure, no evidence of exacerbation.  6. Chronic kidney disease stage IV.  7. Rheumatoid Arthritis  8. Essential hypertension.  9. Acquired hypothyroidism.  10. Anemia of chronic kidney disease.  11. Recent left hip replacement with ORIF     Plan:        Generalized weakness/impaired mobility and ADLs.  - Patient does have generalized weakness and inability to ambulate  - This is multifactorial including recent history of hip fracture/RA  - Consult physical and occupational therapy for  recommendations  - Consult case management for short-term rehab placement versus long-term care as family is not currently able to take care of her     Left greater saphenous vein superficial thrombosis.  - Likely secondary to recent history of hip fracture and lack of activity.  Significant posterior ecchymosis noted of left knee.  No significant warmth/erythema or swelling noted.  - DVT prophylaxis with heparin  - Imaging otherwise negative.   -Continue to monitor closely.  Pain improved with ice.     CKD  -Creatinine at baseline around 2.5     Hypertension  -Blood pressure slightly elevated.  - Continue Coreg- uptitrated and improved.       DVT Prophylaxis: Heparin  Code Status: DNR/DNI  Diet: Renal/cardiac  Discharge Plan: Pending placement    Zuleyka Rudolph, FATUMA  05/22/23  10:11 EDT    Dictated utilizing Dragon dictation.

## 2023-05-23 PROCEDURE — 99232 SBSQ HOSP IP/OBS MODERATE 35: CPT | Performed by: NURSE PRACTITIONER

## 2023-05-23 PROCEDURE — 97530 THERAPEUTIC ACTIVITIES: CPT

## 2023-05-23 PROCEDURE — 25010000002 HEPARIN (PORCINE) PER 1000 UNITS: Performed by: INTERNAL MEDICINE

## 2023-05-23 PROCEDURE — 96372 THER/PROPH/DIAG INJ SC/IM: CPT

## 2023-05-23 PROCEDURE — G0378 HOSPITAL OBSERVATION PER HR: HCPCS

## 2023-05-23 PROCEDURE — 97110 THERAPEUTIC EXERCISES: CPT

## 2023-05-23 PROCEDURE — 63710000001 PREDNISONE PER 5 MG: Performed by: NURSE PRACTITIONER

## 2023-05-23 RX ADMIN — SENNOSIDES AND DOCUSATE SODIUM 2 TABLET: 50; 8.6 TABLET ORAL at 20:21

## 2023-05-23 RX ADMIN — HEPARIN SODIUM 5000 UNITS: 5000 INJECTION INTRAVENOUS; SUBCUTANEOUS at 08:55

## 2023-05-23 RX ADMIN — Medication 10 ML: at 20:22

## 2023-05-23 RX ADMIN — Medication 10 ML: at 08:56

## 2023-05-23 RX ADMIN — SODIUM BICARBONATE 650 MG TABLET 650 MG: at 08:55

## 2023-05-23 RX ADMIN — SERTRALINE 50 MG: 50 TABLET, FILM COATED ORAL at 08:54

## 2023-05-23 RX ADMIN — PREDNISONE 10 MG: 10 TABLET ORAL at 08:54

## 2023-05-23 RX ADMIN — ROSUVASTATIN CALCIUM 10 MG: 5 TABLET, FILM COATED ORAL at 20:21

## 2023-05-23 RX ADMIN — HYDROCODONE BITARTRATE AND ACETAMINOPHEN 1 TABLET: 5; 325 TABLET ORAL at 18:17

## 2023-05-23 RX ADMIN — CARVEDILOL 25 MG: 25 TABLET, FILM COATED ORAL at 18:16

## 2023-05-23 RX ADMIN — SENNOSIDES AND DOCUSATE SODIUM 2 TABLET: 50; 8.6 TABLET ORAL at 08:54

## 2023-05-23 RX ADMIN — ASPIRIN 81 MG: 81 TABLET, COATED ORAL at 08:54

## 2023-05-23 RX ADMIN — Medication 1 TABLET: at 08:54

## 2023-05-23 RX ADMIN — LIDOCAINE 1 PATCH: 50 PATCH CUTANEOUS at 08:55

## 2023-05-23 RX ADMIN — HEPARIN SODIUM 5000 UNITS: 5000 INJECTION INTRAVENOUS; SUBCUTANEOUS at 20:22

## 2023-05-23 RX ADMIN — ALLOPURINOL 100 MG: 100 TABLET ORAL at 08:54

## 2023-05-23 RX ADMIN — CARVEDILOL 25 MG: 25 TABLET, FILM COATED ORAL at 08:54

## 2023-05-23 RX ADMIN — PANTOPRAZOLE SODIUM 40 MG: 40 TABLET, DELAYED RELEASE ORAL at 05:40

## 2023-05-23 RX ADMIN — POLYETHYLENE GLYCOL 3350 17 G: 17 POWDER, FOR SOLUTION ORAL at 12:07

## 2023-05-23 RX ADMIN — HYDROCODONE BITARTRATE AND ACETAMINOPHEN 1 TABLET: 5; 325 TABLET ORAL at 12:00

## 2023-05-23 RX ADMIN — SODIUM BICARBONATE 650 MG TABLET 650 MG: at 20:22

## 2023-05-23 NOTE — THERAPY TREATMENT NOTE
Patient Name: Carolyne Martins  : 1940    MRN: [id number]                              Today's Date: 2023       Admit Date: 2023    Visit Dx:     ICD-10-CM ICD-9-CM   1. Phlebitis  I80.9 451.9   2. Lower extremity pain, left  M79.605 729.5   3. Bacteriuria  R82.71 791.9   4. Generalized weakness  R53.1 780.79   5. Acute on chronic renal insufficiency  N28.9 593.9    N18.9 585.9     Patient Active Problem List   Diagnosis   • Arthralgia of shoulder region   • Tendinopathy of rotator cuff   • Osteoarthritis of right acromioclavicular joint   • Anemia due to stage 4 chronic kidney disease treated with erythropoietin   • Anemia   • Gastric polyp   • Gastroesophageal reflux disease without esophagitis   • Melena   • Change in bowel habits   • Colon cancer screening   • Senile osteoporosis   • Personal history of colonic polyps   • Closed fracture of left hip ([facility])   • Impaired mobility and ADLs   • CKD (chronic kidney disease) stage 4, GFR 15-29 ml/min ([facility])   • Simple chronic bronchitis   • Essential hypertension   • Acute cystitis without hematuria   • Debility   • Left leg injury   • JASMEET (acute kidney injury)   • Chronic obstructive pulmonary disease   • Hip pain   • Post-traumatic osteoarthritis of left hip   • Status post left hip replacement   • Fracture, proximal femur, left, closed, initial encounter   • Postoperative anemia due to acute blood loss   • Phlebitis   • Osteoarthritis of left knee     Past Medical History:   Diagnosis Date   • Anemia    • Anemia    • Arthritis    • Back pain    • Bleeding from anus    • Bronchitis     STATES HAS BRONCHITIS CURRENTLY (17).     • Bronchitis 2017   • CAD (coronary artery disease)    • Cataract, bilateral    • Chronic kidney disease     STAGE 4.  SEES TERA   • Chronic kidney failure     REPORTS STAGE IV   • Difficulty swallowing solids    • Disease of thyroid gland    • Fracture, radius 2016    right distal radius and ulna, healed.   •  "GERD (gastroesophageal reflux disease)    • Gout    • High cholesterol    • History of blood transfusion 2019   • History of nuclear stress test 2014    DR. CORTEZ.  \"IT WAS OK\"   • Hyperparathyroidism    • Hyperparathyroidism    • Hypertension    • Impaired functional mobility, balance, gait, and endurance    • Impaired mobility    • Iron deficiency    • Osteoarthritis    • Osteoarthritis of knees, bilateral     Both knees Supartz injection series August, 2015   • Osteoporosis    • Palpitations    • Personal history of cardiac murmur    • Pessary maintenance    • PONV (postoperative nausea and vomiting)    • Presence of pessary    • Problems with swallowing     WITH FOOD OCCASSIONALLY   • Rheumatic heart disease     Personal history   • Rotator cuff tendonitis     right    • Rupture of right proximal biceps tendon     chronic   • Seasonal allergies    • Sinus problem    • Spinal headache     REPORTS AFTER DELIVERY OF SON   • Subacromial bursitis     right    • Valvular heart disease    • Vision problems    • Vitamin B12 deficiency    • Vitamin D deficiency    • Wears glasses      Past Surgical History:   Procedure Laterality Date   • CATARACT EXTRACTION Bilateral    • CERVICAL POLYPECTOMY     • COLONOSCOPY  2011   • COLONOSCOPY N/A 1/9/2018    Procedure: COLONOSCOPY with endoscopic mucosal resection (hot snare), normal saline submucosal injection, argon thermal ablation, resolution clip placement x4, and cold biopsy polypectomy;  Surgeon: Pieter Concepcion MD;  Location: [facility] ENDOSCOPY;  Service:    • ENDOSCOPY N/A 12/6/2017    Procedure: ESOPHAGOGASTRODUODENOSCOPY with biopsies and hot snare polypectomies;  Surgeon: Pieter Concepcion MD;  Location: [facility] ENDOSCOPY;  Service:    • ENDOSCOPY N/A 1/15/2019    Procedure: ESOPHAGOGASTRODUODENOSCOPY WITH BIOPSIES AND HOT SNARE POLYPECTOMIES X10;  Surgeon: Pieter Concepcion MD;  Location: [facility] ENDOSCOPY;  Service: Gastroenterology   • HARDWARE REMOVAL Left 2/17/2023    " Procedure: FEMUR HARDWARE REMOVAL, LEFT;  Surgeon: Gabriele Loyd MD;  Location: [facility];  Service: Orthopedics;  Laterality: Left;   • HIP TROCHANTERIC NAILING WITH INTRAMEDULLARY HIP SCREW Left 2/14/2021    Procedure: HIP TROCHANTER NAIL SHORT WITH INTRAMEDULLARY HIP SCREW, LEFT;  Surgeon: Gabriele Loyd MD;  Location: [facility] OR;  Service: Orthopedics;  Laterality: Left;   • TOTAL HIP ARTHROPLASTY Left 2/17/2023    Procedure: REMOVAL OF FRACTURE HARDWARE AND CONVERSION TO TOTAL HIP ARTHROPLASTY, LEFT;  Surgeon: Gabriele Loyd MD;  Location: [facility] OR;  Service: Orthopedics;  Laterality: Left;   • UPPER GASTROINTESTINAL ENDOSCOPY  08/18/2014      General Information     Row Name 05/23/23 1452          Physical Therapy Time and Intention    Document Type therapy note (daily note)  -TW     Mode of Treatment physical therapy  -TW     Row Name 05/23/23 1452          General Information    Existing Precautions/Restrictions fall  -TW     Barriers to Rehab cognitive status;previous functional deficit;medically complex  -TW     Row Name 05/23/23 1452          Cognition    Orientation Status (Cognition) oriented to;person;place;verbal cues/prompts needed for orientation  -TW     Row Name 05/23/23 1452          Safety Issues, Functional Mobility    Impairments Affecting Function (Mobility) balance;cognition;range of motion (ROM);strength;pain;endurance/activity tolerance  -TW     Cognitive Impairments, Mobility Safety/Performance attention;awareness, need for assistance;insight into deficits/self-awareness;problem-solving/reasoning;safety precaution awareness;safety precaution follow-through;sequencing abilities  -TW     Comment, Safety Issues/Impairments (Mobility) pt oriented to self and place but confused on many aspects of her day. Pt thinks her grandmother is waiting on her at home and that earlier she went outside.  -TW           User Key  (r) = Recorded By, (t) = Taken By, (c) = Cosigned By     Initials Name Provider Type    TW Mildred Acosta PT Physical Therapist               Mobility     Row Name 05/23/23 1452          Bed Mobility    Bed Mobility supine-sit;scooting/bridging  -TW     Scooting/Bridging [place] (Bed Mobility) moderate assist (50% patient effort);verbal cues;nonverbal cues (demo/gesture);other (see comments)  pain in LLE limits RLE movement  -TW     Supine-Sit [place] (Bed Mobility) moderate assist (50% patient effort);verbal cues;nonverbal cues (demo/gesture)  -     Assistive Device (Bed Mobility) draw sheet;head of bed elevated  -TW     Comment, (Bed Mobility) With cues pt able to use RLE and BUE to assist with mobility in bed. Some increase use of LLE to assist with mobility with less pain this date.  -     Row Name 05/23/23 1452          Transfers    Comment, (Transfers) Attempted sit to stand x 3 with pt not able to shift wt forward enough to take wt through BLEs. Noted pt leans backwards with sit to stand due to fear of falling forward.  -     Row Name 05/23/23 1452          Bed-Chair Transfer    Bed-Chair [place] (Transfers) moderate assist (50% patient effort);maximum assist (25% patient effort);verbal cues  -TW     Assistive Device (Bed-Chair Transfers) other (see comments)  gt belt R knee blocked for modified stand pivot.  -     Row Name 05/23/23 1452          Sit-Stand Transfer    Sit-Stand [place] (Transfers) other (see comments)  -     Assistive Device (Sit-Stand Transfers) walker, front-wheeled  -     Row Name 05/23/23 1452          Gait/Stairs (Locomotion)    [place] Level (Gait) unable to assess  -           User Key  (r) = Recorded By, (t) = Taken By, (c) = Cosigned By    Initials Name Provider Type    TW Mildred Acosta PT Physical Therapist               Obj/Interventions     Row Name 05/23/23 1452          Motor Skills    Therapeutic Exercise knee;ankle;hip  assisted heelslides x 5 for 4 sets with R knee limited to 45 degree  flexion, assisted hip ab/add x 5 for 4 sets, and B quad sets x 10 and B heelcord stretching.  -TW     Row Name 05/23/23 1452          Balance    Balance Assessment sitting static balance;sitting dynamic balance  -TW     Static Sitting Balance standby assist  -TW     Dynamic Sitting Balance standby assist  -TW     Position, Sitting Balance unsupported;sitting edge of bed  -TW           User Key  (r) = Recorded By, (t) = Taken By, (c) = Cosigned By    Initials Name Provider Type    TW Mildred Acosta, OSCAR Physical Therapist               Goals/Plan    No documentation.                Clinical Impression     Row Name 05/23/23 1452          Pain    Pretreatment Pain Rating 2/10  -TW     Posttreatment Pain Rating 5/10  -TW     Pain Location - Side/Orientation Left  -TW     Pain Location - knee  -TW     Pre/Posttreatment Pain Comment At rest pt's L knee pain improved at 2/10 at rest. With assisted ROM to 45 degrees flexion pt had pain increase to 5/10.  -TW     Pain Intervention(s) Repositioned  -     Row Name 05/23/23 1452          Pain Scale: FACES Pre/Post-Treatment    Pain Location - Side/Orientation Left  -TW     Pain Location - knee  -TW     Row Name 05/23/23 1452          Plan of Care Review    Plan of Care Reviewed With patient  -TW     Progress improving  -TW     Outcome Evaluation PT treatment completed this pm with pt showing improvement in her L knee pain as well as L knee tolerance with ther ex/ROM. At rest pt's L knee pain 3/10 and then 5/10 BLEwith mobility. Pt's ther ex completed per flowsheet with therapist also focusing on B heelcord stretching. Attempted sit to stand with FWW x 3 but pt not able to successfully getting hips over her feet to stand. Pt leans heavily backwards when trying to stand with FWW. Pt transferred to chair with modified stand pivot R knee blocked mod to max assist. Pt was more confused this p.m. as she thought her grandmother was waiting on her at home. Nursing notified pt up in  chair with chair alarm. Continue current PT POC.  -TW     Row Name 05/23/23 1452          Vital Signs    Pre SpO2 (%) 95  -TW     O2 Delivery Pre Treatment room air  -TW     Pre Patient Position Supine  -TW     Intra Patient Position Standing  -TW     Post Patient Position Sitting  -TW     Row Name 05/23/23 1452          Positioning and Restraints    Pre-Treatment Position in bed  -TW     Post Treatment Position chair  -TW     In Chair reclined;call light within reach;encouraged to call for assist;exit alarm on;notified nsg;legs elevated  -TW           User Key  (r) = Recorded By, (t) = Taken By, (c) = Cosigned By    Initials Name Provider Type    TW Mildred Acosta, PT Physical Therapist               Outcome Measures     Row Name 05/23/23 1452 05/23/23 0800       How much help from another person do you currently need...    Turning from your back to your side while in flat bed without using bedrails? 2  -TW 2  -EC    Moving from lying on back to sitting on the side of a flat bed without bedrails? 2  -TW 2  -EC    Moving to and from a bed to a chair (including a wheelchair)? 2  -TW 2  -EC    Standing up from a chair using your arms (e.g., wheelchair, bedside chair)? 2  -TW 2  -EC    Climbing 3-5 steps with a railing? 1  -TW 1  -EC    To walk in hospital room? 1  -TW 1  -EC    AM-PAC 6 Clicks Score (PT) 10  -TW 10  -EC    Highest level of mobility 4 --> Transferred to chair/commode  -TW 4 --> Transferred to chair/commode  -EC    Row Name 05/23/23 1452          Functional Assessment    Outcome Measure Options AM-PAC 6 Clicks Basic Mobility (PT)  -TW           User Key  (r) = Recorded By, (t) = Taken By, (c) = Cosigned By    Initials Name Provider Type    Mildred Andre PT Physical Therapist    Yenny Tello RN Registered Nurse                             Physical Therapy Education     Title: PT OT SLP Therapies (In Progress)     Topic: Physical Therapy (Done)     Point: Mobility training (Done)     Learning  Progress Summary           Patient Acceptance, E, VU by  at 5/20/2023 1425    Comment: Pt and her son educated on purpose of PT evaluation and pt's inpt POC/goals   Family Acceptance, E, VU by  at 5/20/2023 1425    Comment: Pt and her son educated on purpose of PT evaluation and pt's inpt POC/goals                   Point: Home exercise program (Done)     Learning Progress Summary           Patient Acceptance, E,D, VU,DU,NR by  at 5/23/2023 1452    Comment: Pt education on BLE ther ex technique. Pt will need continued reinforcement due to her confusion.                   Point: Body mechanics (Done)     Learning Progress Summary           Patient Acceptance, E,TB, VU,NR by  at 5/22/2023 1158    Comment: upright posture in standing                   Point: Precautions (Done)     Learning Progress Summary           Patient Acceptance, E,TB, VU,NR by  at 5/21/2023 1726    Comment: edu pt to relax L LE to A with decreasing pain                               User Key     Initials Effective Dates Name Provider Type Discipline     06/16/21 -  Annmarie Thomas PTA Physical Therapist Assistant PT     06/16/21 -  Mildred Acosta PT Physical Therapist PT              PT Recommendation and Plan  Planned Therapy Interventions (PT): balance training, bed mobility training, gait training, patient/family education, ROM (range of motion), strengthening, transfer training  Plan of Care Reviewed With: patient  Progress: improving  Outcome Evaluation: PT treatment completed this pm with pt showing improvement in her L knee pain as well as L knee tolerance with ther ex/ROM. At rest pt's L knee pain 3/10 and then 5/10 BLEwith mobility. Pt's ther ex completed per flowsheet with therapist also focusing on B heelcord stretching. Attempted sit to stand with FWW x 3 but pt not able to successfully getting hips over her feet to stand. Pt leans heavily backwards when trying to stand with FWW. Pt transferred to chair with modified  stand pivot R knee blocked mod to max assist. Pt was more confused this p.m. as she thought her grandmother was waiting on her at home. Nursing notified pt up in chair with chair alarm. Continue current PT POC.     Time Calculation:    PT Charges     Row Name 05/23/23 1452             Time Calculation    Start Time 1422  -TW      Stop Time 1452  -TW      Time Calculation (min) 30 min  -TW      PT Received On 05/23/23  -TW         Timed Charges    04738 - PT Therapeutic Exercise Minutes 15  -TW      59849 - PT Therapeutic Activity Minutes 15  -TW         Total Minutes    Timed Charges Total Minutes 30  -TW       Total Minutes 30  -TW            User Key  (r) = Recorded By, (t) = Taken By, (c) = Cosigned By    Initials Name Provider Type    Mildred Andre PT Physical Therapist              Therapy Charges for Today     Code Description Service Date Service Provider Modifiers Qty    00273397868 HC PT THER PROC EA 15 MIN 5/23/2023 Mildred Acosta, PT GP 1    72158696721 HC PT THERAPEUTIC ACT EA 15 MIN 5/23/2023 Mildred Acosta PT GP 1          PT G-Codes  Outcome Measure Options: AM-PAC 6 Clicks Basic Mobility (PT)  AM-PAC 6 Clicks Score (PT): 10  AM-PAC 6 Clicks Score (OT): 14  PT Discharge Summary  Anticipated Discharge Disposition (PT): skilled nursing facility, inpatient rehabilitation facility    Mildred Acosta PT  5/23/2023

## 2023-05-23 NOTE — PROGRESS NOTES
[facility]    Name:  Carolyne Martins   Age:  83 y.o.  Sex:  female  :  1940  MRN:  [id number]   Visit Number:  [id number]  Admission Date:  2023  Date Of Service:  23  Primary Care Physician:  Jerald Dawkins MD     LOS: 0 days :    Chief Complaint:      Weakness    Subjective:    Patient seen and examined at bedside.  There is no family at bedside on exam.  Unsure why she is at the hospital and then remembers that she was at her son's and not moving too well.  States she just feels sick and there is no one to sit with her.  She is alert and oriented x3 with some confusion in conversation.    Hospital Course:    Ms. Martins is an 83-year-old female with history of coronary artery disease, chronic kidney disease stage IV, hypertension, dyslipidemia, recent left hip fracture and repair in 2023 was brought to the emergency room by EMS with left knee joint pain and bruising.  Patient went to short-term rehabilitation at [facility] and rehab in February after discharge from this facility and apparently was discharged home 1 day prior to presenting to ED.  At that point, the son realized how immobile the patient was.  According to the son, patient was not able to get up from the bed and stand and needed total care to the fact that he had to carry her to the bathroom.  The son subsequently called EMS as he could not care for her at home.       In the emergency room, she was afebrile and hemodynamically stable saturating at 93% on room air.  Blood work done in the emergency room showed a BUN of 38, creatinine of 2.38 (baseline) glucose 111 and albumin of 3.2.  CBC was unremarkable except for hemoglobin of 10.7.  Urine analysis was negative for any evidence of urinary tract infection.  Left femur x-ray showed stable postoperative changes of hip replacement.  X-ray of the left knee was negative for any fracture or dislocation.  X-ray of bilateral  tibia and fibula were negative for any fracture or dislocation.  Ultrasound of the left lower extremity showed nonocclusive greater saphenous vein thrombosis without any DVT.  Since the family is unable to take care of the patient, case management was consulted.  PT/OT/case management following.    Review of Systems:     All systems were reviewed and negative except as mentioned in subjective, assessment and plan.    Vital Signs:    Temp:  [97.6 °F (36.4 °C)-98.8 °F (37.1 °C)] 97.6 °F (36.4 °C)  Heart Rate:  [76-98] 92  Resp:  [14-18] 18  BP: (142-176)/(56-83) 176/83    Intake and output:    I/O last 3 completed shifts:  In: 780 [P.O.:780]  Out: 1300 [Urine:1300]  No intake/output data recorded.    Physical Examination:    General Appearance:  Alert and cooperative, pleasant elderly female, no acute distress on exam, appears chronically ill and frail   Head:  Atraumatic and normocephalic.   Eyes: Conjunctivae and sclerae normal, no icterus. No pallor.   Throat: No oral lesions, no thrush, oral mucosa moist.   Neck: Supple, trachea midline   Lungs:   Breath sounds heard bilaterally equally.  No wheezing or crackles. Unlabored on room air   Heart:  Normal S1 and S2, no murmur, no gallop, no rub. No JVD.   Abdomen:   Normal bowel sounds, no masses, no organomegaly. Soft, nontender, nondistended, no rebound tenderness.   Extremities: Supple, no edema, no cyanosis, no clubbing.   Skin: No bleeding or rash, scattered ecchymosis.  Right lower extremity skin tear noted.  Significant ecchymosis posterior left knee without hematoma/warmth/erythema noted.  Pain with movement noted   Neurologic: Alert and oriented x 3. No facial asymmetry. Moves all four limbs. No tremors.  Severe generalized weakness is present.     Laboratory results:    Results from last 7 days   Lab Units 05/22/23  0613 05/21/23  0603 05/20/23  0558 05/19/23  1710   SODIUM mmol/L 138 142 140 140   POTASSIUM mmol/L 4.7 4.6 4.1 4.8   CHLORIDE mmol/L 103 105  104 102   CO2 mmol/L 24.6 25.3 24.8 28.3   BUN mg/dL 45* 40* 39* 38*   CREATININE mg/dL 2.56* 2.57* 2.21* 2.38*   CALCIUM mg/dL 9.1 9.2 9.1 9.6   BILIRUBIN mg/dL  --   --   --  0.5   ALK PHOS U/L  --   --   --  133*   ALT (SGPT) U/L  --   --   --  8   AST (SGOT) U/L  --   --   --  14   GLUCOSE mg/dL 109* 82 76 111*     Results from last 7 days   Lab Units 05/20/23  0558 05/19/23  1710   WBC 10*3/mm3 5.57 7.72   HEMOGLOBIN g/dL 10.0* 10.7*   HEMATOCRIT % 32.0* 34.1   PLATELETS 10*3/mm3 161 192                 No results for input(s): PHART, MLA3NXZ, PO2ART, MCK3HFF, BASEEXCESS in the last 8760 hours.   I have reviewed the patient's laboratory results.    Radiology results:    No radiology results from the last 24 hrs  I have reviewed the patient's radiology reports.    Medication Review:     I have reviewed the patient's active and prn medications.     Problem List:      Phlebitis    Anemia    Gastroesophageal reflux disease without esophagitis    Impaired mobility and ADLs    CKD (chronic kidney disease) stage 4, GFR 15-29 ml/min ([facility])    Essential hypertension    Osteoarthritis of left knee      Assessment:    1. Generalized weakness and failure to thrive, POA.  2. Left knee joint pain likely secondary to rheumatoid arthritis, POA.  3. Left greater saphenous vein superficial thrombosis, POA.  4. Coronary artery disease.  5. Chronic diastolic heart failure, no evidence of exacerbation.  6. Chronic kidney disease stage IV.  7. Rheumatoid Arthritis  8. Essential hypertension.  9. Acquired hypothyroidism.  10. Anemia of chronic kidney disease.  11. Recent left hip replacement with ORIF    Plan:       Generalized weakness/impaired mobility and ADLs.  - Patient does have generalized weakness and inability to ambulate  - This is multifactorial including recent history of hip fracture/RA  - PT/OT consulted for recommendations  - Consult case management for short-term rehab placement versus long-term care as family is not  currently able to take care of her     Left greater saphenous vein superficial thrombosis.  - Likely secondary to recent history of hip fracture and lack of activity.  Significant posterior ecchymosis noted of left knee.  No significant warmth/erythema or swelling noted.  - DVT prophylaxis with heparin  - Imaging otherwise negative.   -Continue to monitor closely.  Pain improved with ice.     CKD  -Creatinine at baseline around 2.5     Hypertension  -Blood pressure slightly elevated.  -Continue Coreg- uptitrated and improved    DVT Prophylaxis: Heparin  Code Status: DNR/DNI  Diet: Renal/ Cardiac  Discharge Plan: STR vs LTC placement    Roma Burk, FATUMA  05/23/23  11:26 EDT    Dictated utilizing Dragon dictation.

## 2023-05-23 NOTE — PLAN OF CARE
Goal Outcome Evaluation:  Plan of Care Reviewed With: patient        Progress: no change          Plan of care discussed with patient, vs noted, patient sat up in chair today, patient is a mod-max 2 person assist upon transfer. Patient is awaiting rehab placement.

## 2023-05-23 NOTE — PLAN OF CARE
Goal Outcome Evaluation:  Plan of Care Reviewed With: patient       Problem: Adult Inpatient Plan of Care  Goal: Plan of Care Review  Recent Flowsheet Documentation  Taken 5/23/2023 1452 by Mildred Acosta PT  Progress: improving  Plan of Care Reviewed With: patient  Outcome Evaluation: PT treatment completed this pm with pt showing improvement in her L knee pain as well as L knee tolerance with ther ex/ROM. At rest pt's L knee pain 3/10 and then 5/10 BLEwith mobility. Pt's ther ex completed per flowsheet with therapist also focusing on B heelcord stretching. Attempted sit to stand with FWW x 3 but pt not able to successfully getting hips over her feet to stand. Pt leans heavily backwards when trying to stand with FWW. Pt transferred to chair with modified stand pivot R knee blocked mod to max assist. Pt was more confused this p.m. as she thought her grandmother was waiting on her at home. Nursing notified pt up in chair with chair alarm. Continue current PT POC.

## 2023-05-23 NOTE — PLAN OF CARE
Goal Outcome Evaluation:  Plan of Care Reviewed With: patient        Progress: improving  Outcome Evaluation: VSS throughout shift. No complaints of pain. Patient repositioned Q2 hours. Oral intake improved with good urinary output. No acute events.

## 2023-05-23 NOTE — PAYOR COMM NOTE
"Carolyne Martins (83 y.o. Female)     Date of Birth   1940    Social Security Number       Address   [address] [facility] [place] [address]    Home Phone   [phone]    MRN   [id number]       Buddhism   Sikhism    Marital Status                               Admission Date   5/19/23    Admission Type   Emergency    Admitting Provider   Baldomero Palomares MD    Attending Provider   Baldomero Palomares MD    Department, Room/Bed   [facility] TELEMETRY 4, 432/1       Discharge Date       Discharge Disposition       Discharge Destination                               Attending Provider: Baldomero Palomares MD    Allergies: Ferrlecit [Na Ferric Gluc Cplx In Sucrose], Ranitidine Hcl, Levofloxacin, Lisinopril    Isolation: Contact   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), 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), 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), COVID (History) (02/20/23)   Code Status: No CPR    Ht: 149.9 cm (59\")   Wt: 46.3 kg (102 lb 1.2 oz)    Admission Cmt: None   Principal Problem: Phlebitis [I80.9]                 Active Insurance as of 5/19/2023     Primary Coverage     Payor Plan Insurance Group Employer/Plan Group    ANTHEM MEDICARE REPLACEMENT ANTHEM MEDICARE ADVANTAGE [id number]     Payor Plan Address Payor Plan Phone Number Payor Plan Fax Number Effective Dates    [address] [phone]  7/1/2022 - None Entered    [facility] [id number]       Subscriber Name Subscriber Birth Date Member ID       CAROLYNE MARTINS 1940 [id number]           Secondary Coverage     Payor Plan Insurance Group Employer/Plan Group    ANTHEM BLUE CROSS ANTHEM [facility] SUPP [id number]     Payor Plan Address Payor Plan Phone Number Payor Plan Fax Number Effective Dates    [address]   12/1/2016 - None Entered    [facility] [id number]       Subscriber Name Subscriber Birth Date Member ID       CAROLYNE MARTINS 1940 [id number]                 Emergency Contacts      (Rel.) Home Phone Work Phone Mobile Phone    Magno Martins" (Son) [phone] -- [phone]    Linda Banda (Friend) [phone] -- --               History & Physical      Baldomero Palomares MD at 23            [facility]   HISTORY AND PHYSICAL      Name:  Carolyne Martins   Age:  83 y.o.  Sex:  female  :  1940  MRN:  [id number]   Visit Number:  [id number]  Admission Date:  2023  Date Of Service:  23  Primary Care Physician:  Jerald Dawkins MD    Chief Complaint:     Generalized weakness and knee pain.    History Of Presenting Illness:      Ms. Martins is an 83-year-old female with history of coronary artery disease, chronic kidney disease stage IV, hypertension, dyslipidemia, recent left hip fracture and repair in 2023 was brought to the emergency room by EMS with left knee joint pain and bruising.  Patient went to short-term rehabilitation at [facility] and rehab in February after discharge from this facility and apparently was discharged home yesterday.  At that point, the son realized how immobile the patient was.  According to the son, patient was not able to get up from the bed and stand and needed total care to the fact that he had to carry her to the bathroom.  The son subsequently called EMS as he could not care for her at home.  Patient currently complains of left knee joint pain and denies any other symptoms.    In the emergency room, she was afebrile and hemodynamically stable saturating at 93% on room air.  Blood work done in the emergency room showed a BUN of 38, creatinine of 2.38 (baseline) glucose 111 and albumin of 3.2.  CBC was unremarkable except for hemoglobin of 10.7.  Urine analysis was negative for any evidence of urinary tract infection.  Left femur x-ray showed stable postoperative changes of hip replacement.  X-ray of the left knee was negative for any fracture or dislocation.  X-ray of bilateral tibia and fibula were negative for any fracture or dislocation.  Ultrasound of the  left lower extremity showed nonocclusive greater saphenous vein thrombosis without any DVT.  Since the family is unable to take care of the patient, case management was consulted but unfortunately due to it being the weekend, patient is currently being admitted to the medical floor for safe discharge planning next week.    Review Of Systems:    All systems were reviewed and negative except as mentioned in history of presenting illness, assessment and plan.    Past Medical History: Patient  has a past medical history of Anemia (1998), Anemia, Arthritis, Back pain, Bleeding from anus, Bronchitis, Bronchitis (12/2017), CAD (coronary artery disease), Cataract, bilateral, Chronic kidney disease, Chronic kidney failure, Difficulty swallowing solids, Disease of thyroid gland, Fracture, radius (2016), GERD (gastroesophageal reflux disease), Gout, High cholesterol, History of blood transfusion (2019), History of nuclear stress test (2014), Hyperparathyroidism, Hyperparathyroidism, Hypertension, Impaired functional mobility, balance, gait, and endurance, Impaired mobility, Iron deficiency, Osteoarthritis, Osteoarthritis of knees, bilateral, Osteoporosis, Palpitations, Personal history of cardiac murmur, Pessary maintenance, PONV (postoperative nausea and vomiting), Presence of pessary, Problems with swallowing, Rheumatic heart disease, Rotator cuff tendonitis, Rupture of right proximal biceps tendon, Seasonal allergies, Sinus problem, Spinal headache, Subacromial bursitis, Valvular heart disease, Vision problems, Vitamin B12 deficiency, Vitamin D deficiency, and Wears glasses.    Past Surgical History: Patient  has a past surgical history that includes Cataract Extraction (Bilateral); Upper gastrointestinal endoscopy (08/18/2014); Colonoscopy (2011); Esophagogastroduodenoscopy (N/A, 12/6/2017); Colonoscopy (N/A, 1/9/2018); Esophagogastroduodenoscopy (N/A, 1/15/2019); Cervical polypectomy; Hip Trochanteric Nailing (Left,  2/14/2021); Total hip arthroplasty (Left, 2/17/2023); and Hardware Removal (Left, 2/17/2023).    Social History: Patient  reports that she has never smoked. She has never used smokeless tobacco. She reports that she does not drink alcohol and does not use drugs.    Family History:  Patient family history includes Asthma in an other family member; Gout in an other family member; Heart disease in an other family member; Hypertension in an other family member; Liver cancer in her maternal grandmother; Osteoarthritis in an other family member; Osteoporosis in an other family member; Stroke in an other family member; Ulcers in an other family member.    Allergies:      Ferrlecit [na ferric gluc cplx in sucrose], Ranitidine hcl, Levofloxacin, and Lisinopril    Home Medications:    Prior to Admission Medications     Prescriptions Last Dose Informant Patient Reported? Taking?    allopurinol (ZYLOPRIM) 100 MG tablet 5/19/2023 Self Yes Yes    Take 1 tablet by mouth Daily.    aspirin 81 MG EC tablet 5/19/2023  Yes Yes    Take 1 tablet by mouth Daily.    B Complex Vitamins (vitamin b complex) capsule capsule 5/19/2023  Yes Yes    Take 1 capsule by mouth Daily.    bisacodyl (DULCOLAX) 10 MG suppository Unknown  Yes No    Insert 1 suppository into the rectum As Needed for Constipation.    carboxymethylcellulose (REFRESH PLUS) 0.5 % solution Past Week  Yes Yes    Administer 1 drop to both eyes 3 (Three) Times a Day As Needed for Dry Eyes.    carvedilol (COREG) 12.5 MG tablet 5/19/2023  No Yes    Take 1 tablet by mouth 2 (Two) Times a Day With Meals.    cholecalciferol (VITAMIN D3) 1000 UNITS tablet 5/19/2023 Self Yes Yes    Take 1 tablet by mouth Daily.    cyanocobalamin (VITAMIN B-12) 500 MCG tablet 5/19/2023  Yes Yes    Take 1 tablet by mouth Daily.    docusate sodium (COLACE) 100 MG capsule 5/19/2023  No Yes    Take 1 capsule by mouth 2 (Two) Times a Day As Needed for Constipation.    Epoetin Jefe-epbx (RETACRIT) 77473 UNIT/ML  injection   No No    Inject 1 mL under the skin into the appropriate area as directed Every 14 (Fourteen) Days. Indications: Anemia associated with Chronic Kidney Failure    estradiol (ESTRACE) 0.1 MG/GM vaginal cream   No No    Massage 1 gram in vaginal opening twice weekly    Patient taking differently:  Insert 1 g into the vagina 2 (Two) Times a Week. Every Monday and Thursday    fluticasone (FLONASE) 50 MCG/ACT nasal spray Unknown Self Yes No    2 sprays into the nostril(s) as directed by provider Daily As Needed.    Fluticasone-Salmeterol (ADVAIR/WIXELA) 250-50 MCG/ACT DISKUS Unknown  Yes No    Inhale 1 puff 2 (Two) Times a Day.    HYDROcodone-acetaminophen (NORCO) 5-325 MG per tablet Unknown  No No    Take 1 tablet by mouth Every 6 (Six) Hours As Needed for Moderate Pain.    ipratropium-albuterol (DUO-NEB) 0.5-2.5 mg/3 ml nebulizer   Yes No    Take 3 mL by nebulization Every 4 (Four) Hours As Needed for Shortness of Air.    Lidocaine 4 % patch 5/19/2023  Yes Yes    Apply 1 patch topically Daily.    omeprazole (PriLOSEC) 40 MG capsule 5/19/2023 Self Yes Yes    Take 1 capsule by mouth Daily.    predniSONE (DELTASONE) 10 MG tablet Unknown  Yes No    Take 1 tablet by mouth Daily.    Probiotic Product (PROBIOTIC DAILY PO) 5/18/2023  Yes Yes    Take 1 tablet by mouth Daily As Needed.    QUEtiapine (SEROquel) 25 MG tablet   No No    Take 0.5 tablets by mouth Every Night for 30 days.    rosuvastatin (CRESTOR) 10 MG tablet 5/19/2023  Yes Yes    Take 1 tablet by mouth Daily.    saccharomyces boulardii (FLORASTOR) 250 MG capsule   Yes No    Take 1 capsule by mouth.    senna (SENOKOT) 8.6 MG tablet 5/18/2023  Yes Yes    Take 1 tablet by mouth As Needed for Constipation.    sertraline (ZOLOFT) 25 MG tablet Past Week  Yes Yes    Take 1 tablet by mouth Daily.    sertraline (ZOLOFT) 50 MG tablet   Yes No    Take 1 tablet by mouth.    sodium bicarbonate 650 MG tablet 5/18/2023  No Yes    Take 1 tablet by mouth 2 (Two) Times a  "Day.    SODIUM PHOSPHATES RE Unknown  Yes No    Insert 1 application into the rectum As Needed (constipation).    torsemide (DEMADEX) 10 MG tablet 5/19/2023  Yes Yes    Take 1 tablet by mouth 2 (Two) Times a Week. 1/2 tablet by mouth one time a day on MONDAYS and THURSDAYS        ED Medications:    Medications   sodium chloride 0.9 % infusion (125 mL/hr Intravenous New Bag 5/19/23 1857)   HYDROcodone-acetaminophen (NORCO) 5-325 MG per tablet 1 tablet (1 tablet Oral Given 5/19/23 1700)     Vital Signs:  Temp:  [98 °F (36.7 °C)] 98 °F (36.7 °C)  Heart Rate:  [] 106  Resp:  [19] 19  BP: (162-172)/(80-94) 162/83        05/19/23  1555   Weight: 50.3 kg (111 lb)     Body mass index is 22.42 kg/m².    Physical Exam:     Most recent vital Signs: /83   Pulse 106   Temp 98 °F (36.7 °C) (Oral)   Resp 19   Ht 149.9 cm (59\")   Wt 50.3 kg (111 lb)   LMP  (LMP Unknown)   SpO2 92%   BMI 22.42 kg/m²     Physical Exam  Constitutional:       General: She is not in acute distress.     Appearance: Normal appearance. She is not ill-appearing.   HENT:      Head: Normocephalic and atraumatic.      Right Ear: External ear normal.      Left Ear: External ear normal.      Nose: Nose normal.      Mouth/Throat:      Mouth: Mucous membranes are moist.   Eyes:      Extraocular Movements: Extraocular movements intact.      Conjunctiva/sclera: Conjunctivae normal.   Cardiovascular:      Rate and Rhythm: Normal rate and regular rhythm.      Pulses: Normal pulses.      Heart sounds: Normal heart sounds. No murmur heard.  Pulmonary:      Effort: Pulmonary effort is normal.      Breath sounds: Normal breath sounds. No wheezing or rales.   Abdominal:      General: Bowel sounds are normal.      Palpations: Abdomen is soft.      Tenderness: There is no abdominal tenderness. There is no guarding or rebound.   Musculoskeletal:      Cervical back: Neck supple.      Comments: 1+ pitting edema noted in the left ankle.  Left lower extremity " swollen compared to right.  Left knee joint likely more swollen compared to the right with tenderness.  No hip tenderness noted.  Previous hip surgery scar has healed well.   Skin:     General: Skin is warm.      Findings: Bruising present. No erythema or rash.      Comments: Multiple bruises noted in both legs.  Skin tear noted on the right lower leg.   Neurological:      General: No focal deficit present.      Mental Status: She is alert and oriented to person, place, and time. Mental status is at baseline.   Psychiatric:         Mood and Affect: Mood normal.         Behavior: Behavior normal.       Laboratory data:    I have reviewed the labs done in the emergency room.    Results from last 7 days   Lab Units 05/19/23  1710   SODIUM mmol/L 140   POTASSIUM mmol/L 4.8   CHLORIDE mmol/L 102   CO2 mmol/L 28.3   BUN mg/dL 38*   CREATININE mg/dL 2.38*   CALCIUM mg/dL 9.6   BILIRUBIN mg/dL 0.5   ALK PHOS U/L 133*   ALT (SGPT) U/L 8   AST (SGOT) U/L 14   GLUCOSE mg/dL 111*     Results from last 7 days   Lab Units 05/19/23  1710   WBC 10*3/mm3 7.72   HEMOGLOBIN g/dL 10.7*   HEMATOCRIT % 34.1   PLATELETS 10*3/mm3 192       Results from last 7 days   Lab Units 05/19/23  1821   COLOR UA  Yellow   GLUCOSE UA  Negative   KETONES UA  Negative   LEUKOCYTES UA  Negative   PH, URINE  7.5   BILIRUBIN UA  Negative   UROBILINOGEN UA  0.2 E.U./dL   RBC UA /HPF None Seen   WBC UA /HPF 0-2*     Radiology:    US Venous Doppler Lower Extremity Left (duplex)    Result Date: 5/19/2023  FINAL REPORT TECHNIQUE: Multiple transverse and longitudinal images were performed of the femoral-popliteal deep venous system with augmentation and compression maneuvers. CLINICAL HISTORY: r/o dvt; pain FINDINGS: Left lower extremity duplex ultrasound demonstrates normal flow in the deep venous system.  There is nonocclusive thrombosis in the greater saphenous vein.     Nonocclusive greater saphenous vein thrombosis.  No DVT. Authenticated and Electronically  Signed by David Gutierrez M.D. on 05/19/2023 06:21:21 PM    Assessment:    1. Generalized weakness and failure to thrive, POA.  2. Left knee joint pain likely secondary to osteoarthritis, POA.  3. Left greater saphenous vein superficial thrombosis, POA.  4. Coronary artery disease.  5. Chronic diastolic heart failure, no evidence of exacerbation.  6. Chronic kidney disease stage IV.  7. Essential hypertension.  8. Acquired hypothyroidism.  9. Anemia of chronic kidney disease.    Plan:    Generalized weakness/impaired mobility and ADLs.  - Patient does have generalized weakness and inability to ambulate.  - This is multifactorial including recent history of hip fracture, left knee joint osteoarthritis as well as deconditioning.  - We will consult physical and occupational therapy.  - Consult case management for short-term rehab placement versus long-term care.    Left knee joint osteoarthritis.  - We will place her on Lortab for pain control.  - Avoid NSAIDs due to chronic kidney disease.    Left greater saphenous vein superficial thrombosis.  - Likely secondary to recent history of hip fracture and lack of activity.  - DVT prophylaxis with heparin.    I have discussed the patient's condition and treatment plan with her son Magno over the phone.  I confirmed that the patient has a living will scanned into the chart and as per son she is DNR/DNI.  Discussed with nursing staff at the bedside.    Risk Assessment: Low  DVT Prophylaxis: Heparin  Code Status: DNR/DNI  Diet: Renal    Advance Care Planning      ACP discussion was held with the patient during this visit. Patient has an advance directive in EMR which is still valid.         Baldomero Palomares MD  05/19/23  19:53 EDT    Dictated utilizing Dragon dictation.    Electronically signed by Baldomero Palomares MD at 05/19/23 2049       Lab Results (last 48 hours)     Procedure Component Value Units Date/Time    Basic Metabolic Panel [[id number]]  (Abnormal) Collected: 05/22/23 0613     Specimen: Blood Updated: 23 0655     Glucose 109 mg/dL      BUN 45 mg/dL      Creatinine 2.56 mg/dL      Sodium 138 mmol/L      Potassium 4.7 mmol/L      Chloride 103 mmol/L      CO2 24.6 mmol/L      Calcium 9.1 mg/dL      BUN/Creatinine Ratio 17.6     Anion Gap 10.4 mmol/L      eGFR 18.4 mL/min/1.73     Narrative:      GFR Normal >60  Chronic Kidney Disease <60  Kidney Failure <15    The GFR formula is only valid for adults with stable renal function between ages 18 and 70.          Imaging Results (Last 48 Hours)     ** No results found for the last 48 hours. **           Physician Progress Notes (last 48 hours)      Roma Burk APRN at 23 0930              [facility]    PROGRESS NOTE    Name:  Carolyne Martins   Age:  83 y.o.  Sex:  female  :  1940  MRN:  [id number]   Visit Number:  [id number]  Admission Date:  2023  Date Of Service:  23  Primary Care Physician:  Jerald Dawkins MD     LOS: 0 days :    Chief Complaint:      Weakness    Subjective:    Patient seen and examined at bedside.  There is no family at bedside on exam.  Unsure why she is at the hospital and then remembers that she was at her son's and not moving too well.  States she just feels sick and there is no one to sit with her.  She is alert and oriented x3 with some confusion in conversation.    Hospital Course:    Ms. Martins is an 83-year-old female with history of coronary artery disease, chronic kidney disease stage IV, hypertension, dyslipidemia, recent left hip fracture and repair in 2023 was brought to the emergency room by EMS with left knee joint pain and bruising.  Patient went to short-term rehabilitation at [facility] and rehab in February after discharge from this facility and apparently was discharged home 1 day prior to presenting to ED.  At that point, the son realized how immobile the patient was.  According to the son, patient was not able to get up from  the bed and stand and needed total care to the fact that he had to carry her to the bathroom.  The son subsequently called EMS as he could not care for her at home.       In the emergency room, she was afebrile and hemodynamically stable saturating at 93% on room air.  Blood work done in the emergency room showed a BUN of 38, creatinine of 2.38 (baseline) glucose 111 and albumin of 3.2.  CBC was unremarkable except for hemoglobin of 10.7.  Urine analysis was negative for any evidence of urinary tract infection.  Left femur x-ray showed stable postoperative changes of hip replacement.  X-ray of the left knee was negative for any fracture or dislocation.  X-ray of bilateral tibia and fibula were negative for any fracture or dislocation.  Ultrasound of the left lower extremity showed nonocclusive greater saphenous vein thrombosis without any DVT.  Since the family is unable to take care of the patient, case management was consulted.  PT/OT/case management following.    Review of Systems:     All systems were reviewed and negative except as mentioned in subjective, assessment and plan.    Vital Signs:    Temp:  [97.6 °F (36.4 °C)-98.8 °F (37.1 °C)] 97.6 °F (36.4 °C)  Heart Rate:  [76-98] 92  Resp:  [14-18] 18  BP: (142-176)/(56-83) 176/83    Intake and output:    I/O last 3 completed shifts:  In: 780 [P.O.:780]  Out: 1300 [Urine:1300]  No intake/output data recorded.    Physical Examination:    General Appearance:  Alert and cooperative, pleasant elderly female, no acute distress on exam, appears chronically ill and frail   Head:  Atraumatic and normocephalic.   Eyes: Conjunctivae and sclerae normal, no icterus. No pallor.   Throat: No oral lesions, no thrush, oral mucosa moist.   Neck: Supple, trachea midline   Lungs:   Breath sounds heard bilaterally equally.  No wheezing or crackles. Unlabored on room air   Heart:  Normal S1 and S2, no murmur, no gallop, no rub. No JVD.   Abdomen:   Normal bowel sounds, no masses, no  organomegaly. Soft, nontender, nondistended, no rebound tenderness.   Extremities: Supple, no edema, no cyanosis, no clubbing.   Skin: No bleeding or rash, scattered ecchymosis.  Right lower extremity skin tear noted.  Significant ecchymosis posterior left knee without hematoma/warmth/erythema noted.  Pain with movement noted   Neurologic: Alert and oriented x 3. No facial asymmetry. Moves all four limbs. No tremors.  Severe generalized weakness is present.     Laboratory results:    Results from last 7 days   Lab Units 05/22/23  0613 05/21/23  0603 05/20/23  0558 05/19/23  1710   SODIUM mmol/L 138 142 140 140   POTASSIUM mmol/L 4.7 4.6 4.1 4.8   CHLORIDE mmol/L 103 105 104 102   CO2 mmol/L 24.6 25.3 24.8 28.3   BUN mg/dL 45* 40* 39* 38*   CREATININE mg/dL 2.56* 2.57* 2.21* 2.38*   CALCIUM mg/dL 9.1 9.2 9.1 9.6   BILIRUBIN mg/dL  --   --   --  0.5   ALK PHOS U/L  --   --   --  133*   ALT (SGPT) U/L  --   --   --  8   AST (SGOT) U/L  --   --   --  14   GLUCOSE mg/dL 109* 82 76 111*     Results from last 7 days   Lab Units 05/20/23  0558 05/19/23  1710   WBC 10*3/mm3 5.57 7.72   HEMOGLOBIN g/dL 10.0* 10.7*   HEMATOCRIT % 32.0* 34.1   PLATELETS 10*3/mm3 161 192                 No results for input(s): PHART, CWS2NCU, PO2ART, YPW6GOQ, BASEEXCESS in the last 8760 hours.   I have reviewed the patient's laboratory results.    Radiology results:    No radiology results from the last 24 hrs  I have reviewed the patient's radiology reports.    Medication Review:     I have reviewed the patient's active and prn medications.     Problem List:      Phlebitis    Anemia    Gastroesophageal reflux disease without esophagitis    Impaired mobility and ADLs    CKD (chronic kidney disease) stage 4, GFR 15-29 ml/min ([facility])    Essential hypertension    Osteoarthritis of left knee      Assessment:    1. Generalized weakness and failure to thrive, POA.  2. Left knee joint pain likely secondary to rheumatoid arthritis, POA.  3. Left greater  saphenous vein superficial thrombosis, POA.  4. Coronary artery disease.  5. Chronic diastolic heart failure, no evidence of exacerbation.  6. Chronic kidney disease stage IV.  7. Rheumatoid Arthritis  8. Essential hypertension.  9. Acquired hypothyroidism.  10. Anemia of chronic kidney disease.  11. Recent left hip replacement with ORIF    Plan:       Generalized weakness/impaired mobility and ADLs.  - Patient does have generalized weakness and inability to ambulate  - This is multifactorial including recent history of hip fracture/RA  - PT/OT consulted for recommendations  - Consult case management for short-term rehab placement versus long-term care as family is not currently able to take care of her     Left greater saphenous vein superficial thrombosis.  - Likely secondary to recent history of hip fracture and lack of activity.  Significant posterior ecchymosis noted of left knee.  No significant warmth/erythema or swelling noted.  - DVT prophylaxis with heparin  - Imaging otherwise negative.   -Continue to monitor closely.  Pain improved with ice.     CKD  -Creatinine at baseline around 2.5     Hypertension  -Blood pressure slightly elevated.  -Continue Coreg- uptitrated and improved    DVT Prophylaxis: Heparin  Code Status: DNR/DNI  Diet: Renal/ Cardiac  Discharge Plan: STR vs LTC placement    FATUMA Lee  23  11:26 EDT    Dictated utilizing Dragon dictation.      Electronically signed by Roma Burk APRN at 23 1132     Zuleyka Rudolph APRN at 23 1011              [facility]    [facility]GRESS [facility]    Name:  Carolyne Martins   Age:  83 y.o.  Sex:  female  :  1940  MRN:  [id number]   Visit Number:  [id number]  Admission Date:  2023  Date Of Service:  23  Primary Care Physician:  Jerald Dawkins MD     LOS: 0 days :    Chief Complaint:      Weakness    Subjective:    Patient seen and examined with no family at bedside.  She is alert and  oriented x3 with confused conversation.  Patient does not recall her son not being able to care for her at home.  Does state that she hurts all over.  Did eat most of her breakfast.  Per nurse at bedside left knee pain improved with ice.    Hospital Course:    Ms. Martins is an 83-year-old female with history of coronary artery disease, chronic kidney disease stage IV, hypertension, dyslipidemia, recent left hip fracture and repair in February 2023 was brought to the emergency room by EMS with left knee joint pain and bruising.  Patient went to short-term rehabilitation at [facility] [facility] February after discharge from this facility and apparently was discharged home 1 day prior to presenting to ED.  At that point, the son realized how immobile the patient was.  According to the son, patient was not able to get up from the bed and stand and needed total care to the fact that he had to carry her to the bathroom.  The son subsequently called EMS as he could not care for her at home.       In the emergency room, she was afebrile and hemodynamically stable saturating at 93% on room air.  Blood work done in the emergency room showed a BUN of 38, creatinine of 2.38 (baseline) glucose 111 and albumin of 3.2.  CBC was unremarkable except for hemoglobin of 10.7.  Urine analysis was negative for any evidence of urinary tract infection.  Left femur x-ray showed stable postoperative changes of hip replacement.  X-ray of the left knee was negative for any fracture or dislocation.  X-ray of bilateral tibia and fibula were negative for any fracture or dislocation.  Ultrasound of the left lower extremity showed nonocclusive greater saphenous vein thrombosis without any DVT.  Since the family is unable to take care of the patient, case management was consulted.  PT/OT/case management following.    Review of Systems:     All systems were reviewed and negative except as mentioned in subjective, assessment and plan.    Vital  Signs:    Temp:  [98 °F (36.7 °C)-98.6 °F (37 °C)] 98 °F (36.7 °C)  Heart Rate:  [68-78] 78  Resp:  [16-18] 16  BP: (121-151)/(55-75) 142/61    Intake and output:    I/O last 3 completed shifts:  In: 480 [P.O.:480]  Out: 670 [Urine:670]  I/O this shift:  In: 240 [P.O.:240]  Out: -     Physical Examination:    General Appearance:  Alert and cooperative.  Chronically ill/frail appearing elderly female.   Head:  Atraumatic and normocephalic.   Eyes: Conjunctivae and sclerae normal, no icterus. No pallor.   Throat: No oral lesions, no thrush, oral mucosa moist.   Neck: Supple, trachea midline, no thyromegaly.   Lungs:   Breath sounds heard bilaterally equally.  No wheezing or crackles. No Pleural rub or bronchial breathing.  Unlabored on room air.   Heart:  Normal S1 and S2, no murmur, no gallop, no rub. No JVD.   Abdomen:   Normal bowel sounds, no masses, no organomegaly. Soft, nontender, nondistended, no rebound tenderness.   Extremities: Supple, no edema, no cyanosis, no clubbing.  Multiple arthritic joints noted.   Skin: No bleeding or rash.  Scattered ecchymosis.  Right lower extremity skin tear noted.  Significant ecchymosis posterior left knee without hematoma/warmth/erythema noted.  Pain with movement noted.   Neurologic: Alert and oriented x 3 with confused conversation. No facial asymmetry. Moves all four limbs. No tremors.  Severe generalized weakness.     Laboratory results:    Results from last 7 days   Lab Units 05/22/23  0613 05/21/23  0603 05/20/23  0558 05/19/23  1710   SODIUM mmol/L 138 142 140 140   POTASSIUM mmol/L 4.7 4.6 4.1 4.8   CHLORIDE mmol/L 103 105 104 102   CO2 mmol/L 24.6 25.3 24.8 28.3   BUN mg/dL 45* 40* 39* 38*   CREATININE mg/dL 2.56* 2.57* 2.21* 2.38*   CALCIUM mg/dL 9.1 9.2 9.1 9.6   BILIRUBIN mg/dL  --   --   --  0.5   ALK PHOS U/L  --   --   --  133*   ALT (SGPT) U/L  --   --   --  8   AST (SGOT) U/L  --   --   --  14   GLUCOSE mg/dL 109* 82 76 111*     Results from last 7 days    Lab Units 05/20/23  0558 05/19/23  1710   WBC 10*3/mm3 5.57 7.72   HEMOGLOBIN g/dL 10.0* 10.7*   HEMATOCRIT % 32.0* 34.1   PLATELETS 10*3/mm3 161 192                 No results for input(s): PHART, CJX2URY, PO2ART, NFQ2NBX, BASEEXCESS in the last 8760 hours.   I have reviewed the patient's laboratory results.    Radiology results:    No radiology results from the last 24 hrs  I have reviewed the patient's radiology reports.    Medication Review:     I have reviewed the patient's active and prn medications.     Problem List:      Phlebitis    Anemia    Gastroesophageal reflux disease without esophagitis    Impaired mobility and ADLs    CKD (chronic kidney disease) stage 4, GFR 15-29 ml/min ([facility])    Essential hypertension    Osteoarthritis of left knee      Assessment:    12. Generalized weakness and failure to thrive, POA.  13. Left knee joint pain likely secondary to rheumatoid arthritis, POA.  14. Left greater saphenous vein superficial thrombosis, POA.  15. Coronary artery disease.  16. Chronic diastolic heart failure, no evidence of exacerbation.  17. Chronic kidney disease stage IV.  18. Rheumatoid Arthritis  19. Essential hypertension.  20. Acquired hypothyroidism.  21. Anemia of chronic kidney disease.  22. Recent left hip replacement with ORIF     Plan:        Generalized weakness/impaired mobility and ADLs.  - Patient does have generalized weakness and inability to ambulate  - This is multifactorial including recent history of hip fracture/RA  - Consult physical and occupational therapy for recommendations  - Consult case management for short-term rehab placement versus long-term care as family is not currently able to take care of her     Left greater saphenous vein superficial thrombosis.  - Likely secondary to recent history of hip fracture and lack of activity.  Significant posterior ecchymosis noted of left knee.  No significant warmth/erythema or swelling noted.  - DVT prophylaxis with heparin  -  Imaging otherwise negative.   -Continue to monitor closely.  Pain improved with ice.     CKD  -Creatinine at baseline around 2.5     Hypertension  -Blood pressure slightly elevated.  - Continue Coreg- uptitrated and improved.       DVT Prophylaxis: Heparin  Code Status: DNR/DNI  Diet: Renal/cardiac  Discharge Plan: Pending placement    FATUMA Arora  05/22/23  10:11 EDT    Dictated utilizing Dragon dictation.      Electronically signed by Zuleyka Rudolph APRN at 05/22/23 1016       Consult Notes (last 24 hours)  Notes from 05/22/23 1506 through 05/23/23 1506   No notes of this type exist for this encounter.

## 2023-05-24 VITALS
OXYGEN SATURATION: 97 % | HEART RATE: 71 BPM | SYSTOLIC BLOOD PRESSURE: 154 MMHG | HEIGHT: 59 IN | TEMPERATURE: 98.1 F | WEIGHT: 101.85 LBS | DIASTOLIC BLOOD PRESSURE: 70 MMHG | RESPIRATION RATE: 19 BRPM | BODY MASS INDEX: 20.53 KG/M2

## 2023-05-24 LAB
ANION GAP SERPL CALCULATED.3IONS-SCNC: 9.5 MMOL/L (ref 5–15)
BUN SERPL-MCNC: 46 MG/DL (ref 8–23)
BUN/CREAT SERPL: 19.2 (ref 7–25)
CALCIUM SPEC-SCNC: 9.4 MG/DL (ref 8.6–10.5)
CHLORIDE SERPL-SCNC: 103 MMOL/L (ref 98–107)
CO2 SERPL-SCNC: 26.5 MMOL/L (ref 22–29)
CREAT SERPL-MCNC: 2.39 MG/DL (ref 0.57–1)
DEPRECATED RDW RBC AUTO: 61.5 FL (ref 37–54)
EGFRCR SERPLBLD CKD-EPI 2021: 19.9 ML/MIN/1.73
ERYTHROCYTE [DISTWIDTH] IN BLOOD BY AUTOMATED COUNT: 16.8 % (ref 12.3–15.4)
GLUCOSE SERPL-MCNC: 89 MG/DL (ref 65–99)
HCT VFR BLD AUTO: 31 % (ref 34–46.6)
HGB BLD-MCNC: 9.7 G/DL (ref 12–15.9)
MCH RBC QN AUTO: 31.4 PG (ref 26.6–33)
MCHC RBC AUTO-ENTMCNC: 31.3 G/DL (ref 31.5–35.7)
MCV RBC AUTO: 100.3 FL (ref 79–97)
PLATELET # BLD AUTO: 177 10*3/MM3 (ref 140–450)
PMV BLD AUTO: 9.9 FL (ref 6–12)
POTASSIUM SERPL-SCNC: 4.5 MMOL/L (ref 3.5–5.2)
RBC # BLD AUTO: 3.09 10*6/MM3 (ref 3.77–5.28)
SODIUM SERPL-SCNC: 139 MMOL/L (ref 136–145)
WBC NRBC COR # BLD: 6.5 10*3/MM3 (ref 3.4–10.8)

## 2023-05-24 PROCEDURE — 80048 BASIC METABOLIC PNL TOTAL CA: CPT | Performed by: NURSE PRACTITIONER

## 2023-05-24 PROCEDURE — 63710000001 PREDNISONE PER 5 MG: Performed by: NURSE PRACTITIONER

## 2023-05-24 PROCEDURE — 96372 THER/PROPH/DIAG INJ SC/IM: CPT

## 2023-05-24 PROCEDURE — G0378 HOSPITAL OBSERVATION PER HR: HCPCS

## 2023-05-24 PROCEDURE — 85027 COMPLETE CBC AUTOMATED: CPT | Performed by: NURSE PRACTITIONER

## 2023-05-24 PROCEDURE — 25010000002 HEPARIN (PORCINE) PER 1000 UNITS: Performed by: INTERNAL MEDICINE

## 2023-05-24 PROCEDURE — 99238 HOSP IP/OBS DSCHRG MGMT 30/<: CPT | Performed by: NURSE PRACTITIONER

## 2023-05-24 RX ORDER — CARVEDILOL 25 MG/1
25 TABLET ORAL 2 TIMES DAILY WITH MEALS
Start: 2023-05-24

## 2023-05-24 RX ORDER — HYDROCODONE BITARTRATE AND ACETAMINOPHEN 5; 325 MG/1; MG/1
1 TABLET ORAL EVERY 6 HOURS PRN
Qty: 12 TABLET | Refills: 0 | Status: SHIPPED | OUTPATIENT
Start: 2023-05-24 | End: 2023-05-27

## 2023-05-24 RX ADMIN — ALLOPURINOL 100 MG: 100 TABLET ORAL at 08:43

## 2023-05-24 RX ADMIN — SODIUM BICARBONATE 650 MG TABLET 650 MG: at 08:37

## 2023-05-24 RX ADMIN — CARVEDILOL 25 MG: 25 TABLET, FILM COATED ORAL at 08:38

## 2023-05-24 RX ADMIN — SERTRALINE 50 MG: 50 TABLET, FILM COATED ORAL at 08:37

## 2023-05-24 RX ADMIN — SENNOSIDES AND DOCUSATE SODIUM 2 TABLET: 50; 8.6 TABLET ORAL at 08:38

## 2023-05-24 RX ADMIN — HYDROCODONE BITARTRATE AND ACETAMINOPHEN 1 TABLET: 5; 325 TABLET ORAL at 05:30

## 2023-05-24 RX ADMIN — LIDOCAINE 1 PATCH: 50 PATCH CUTANEOUS at 08:39

## 2023-05-24 RX ADMIN — PREDNISONE 10 MG: 10 TABLET ORAL at 08:38

## 2023-05-24 RX ADMIN — PANTOPRAZOLE SODIUM 40 MG: 40 TABLET, DELAYED RELEASE ORAL at 05:30

## 2023-05-24 RX ADMIN — ASPIRIN 81 MG: 81 TABLET, COATED ORAL at 08:38

## 2023-05-24 RX ADMIN — HEPARIN SODIUM 5000 UNITS: 5000 INJECTION INTRAVENOUS; SUBCUTANEOUS at 08:39

## 2023-05-24 RX ADMIN — Medication 1 TABLET: at 08:37

## 2023-05-24 RX ADMIN — Medication 10 ML: at 08:40

## 2023-05-24 NOTE — CASE MANAGEMENT/SOCIAL WORK
5/23 Failed P2P review. [facility] [employer] will speak with son today to see if he will admit pt as private pay while medicaid is pending. Pt had been in facility previously but family did not apply for medicaid at that time and did private pay when [employer] insurance total days were used.

## 2023-05-24 NOTE — CASE MANAGEMENT/SOCIAL WORK
Case Management Discharge Note                Selected Continued Care - Admitted Since 5/19/2023     Destination Coordination complete.    Service Provider Selected Services Address Phone Fax Patient Preferred    [facility] [address] [place] [address] [phone] --          Durable Medical Equipment    No services have been selected for the patient.              Dialysis/Infusion    No services have been selected for the patient.              Home Medical Care    No services have been selected for the patient.              Therapy    No services have been selected for the patient.              Community Resources    No services have been selected for the patient.              Community & DME    No services have been selected for the patient.                Selected Continued Care - Prior Encounters Includes continued care and service providers with selected services from prior encounters from 2/18/2023 to 5/24/2023    Discharged on 2/22/2023 Admission date: 2/16/2023 - Discharge disposition: Skilled Nursing Facility (DC - External)    Destination     Service Provider Selected Services Address Phone Fax Patient Preferred    [facility] [address] [place] [address] [phone] --                    Transportation Services  Ambulance: [facility]    Final Discharge Disposition Code: 04 - intermediate care facility

## 2023-05-24 NOTE — NURSING NOTE
Report attempted to [facility], stated I would need to call back, that nurse was in another patients room.

## 2023-05-24 NOTE — DISCHARGE SUMMARY
[facility]   DISCHARGE SUMMARY      Name:  Carolyne Martins   Age:  83 y.o.  Sex:  female  :  1940  MRN:  [id number]   Visit Number:  [id number]    Admission Date:  2023  Date of Discharge:  2023  Primary Care Physician:  Jerald Dawkins MD    Important issues to note:    1.  Patient admitted to the hospital after recently being discharged from [facility].  Son not able to care for patient at home.  Admitted for further care with case .    2.  Noted to have left greater saphenous vein superficial thrombosis.  Pain improved with ice.    3.  Evaluated by PT/OT.  Referrals sent for placement by case management.  Coreg increased to 25mg for better blood pressure control.      4.  Stable to proceed to [facility] today for further care.  Medications as reconciled.  Return to the hospital as needed.    Discharge Diagnoses:     1. Generalized weakness and failure to thrive, POA.  2. Left knee joint pain likely secondary to rheumatoid arthritis, POA.  3. Left greater saphenous vein superficial thrombosis, POA.  4. Coronary artery disease.  5. Chronic diastolic heart failure, no evidence of exacerbation.  6. Chronic kidney disease stage IV.  7. Rheumatoid Arthritis  8. Essential hypertension.  9. Acquired hypothyroidism.  10. Anemia of chronic kidney disease.  11. Recent left hip replacement with ORIF    Problem List:     Active Hospital Problems    Diagnosis  POA   • **Phlebitis [I80.9]  Yes   • Osteoarthritis of left knee [M17.12]  Yes   • Status post left hip replacement [Z96.642]  Not Applicable   • Hip pain [M25.559]  Yes   • Chronic obstructive pulmonary disease [J44.9]  Yes   • Debility [R53.81]  Yes   • Essential hypertension [I10]  Yes   • CKD (chronic kidney disease) stage 4, GFR 15-29 ml/min ([facility]) [N18.4]  Yes   • Impaired mobility and ADLs [Z74.09, Z78.9]  Yes   • Anemia [D64.9]  Yes   • Gastroesophageal reflux disease without esophagitis  [K21.9]  Yes      Resolved Hospital Problems   No resolved problems to display.     Presenting Problem:    Chief Complaint   Patient presents with   • Knee Pain      Consults:     Consulting Physician(s)             None          History of presenting illness/Hospital Course:    Patient is an 83 year old female with history of coronary artery disease, chronic kidney disease stage IV, hypertension, dyslipidemia, recent left hip fracture and repair in February 2023 was brought to the emergency room by EMS 5/19/2023 with left knee joint pain and bruising.  Patient went to short-term rehabilitation at [facility] in February after discharge from this facility and apparently was discharged home 1 day prior to presenting to ED.  At that point, the son realized how immobile the patient was and that she was needing total care which he is unable to provide.      In the emergency room, she was afebrile and hemodynamically stable saturating at 93% on room air.  Blood work done in the emergency room showed a BUN of 38, creatinine of 2.38 (baseline) glucose 111 and albumin of 3.2.  CBC was unremarkable except for hemoglobin of 10.7.  Urine analysis was negative for any evidence of urinary tract infection.  Left femur x-ray showed stable postoperative changes of hip replacement.  X-ray of the left knee was negative for any fracture or dislocation.  X-ray of bilateral tibia and fibula were negative for any fracture or dislocation.  Ultrasound of the left lower extremity showed nonocclusive greater saphenous vein thrombosis without any DVT.  Since the family is unable to take care of the patient, case management was consulted.  PT/OT/case management following.    Labs and vitals have remained stable since admission.  Patient stable to proceed to [facility] today for further care.  Medications as reconciled.  Return to the hospital as needed.       Vital Signs:    Temp:  [97 °F (36.1 °C)-98.4 °F (36.9 °C)]  98.1 °F (36.7 °C)  Heart Rate:  [70-89] 71  Resp:  [14-19] 19  BP: (133-166)/(66-72) 154/70    Physical Exam:    General Appearance:  Alert and cooperative, pleasant elderly female, no acute distress on exam, appears chronically ill and frail   Head:  Atraumatic and normocephalic.   Eyes: Conjunctivae and sclerae normal, no icterus. No pallor.   Ears:  Ears with no abnormalities noted.   Throat: No oral lesions, no thrush, oral mucosa moist.   Neck: Supple, trachea midline   Back:   No kyphoscoliosis present. No tenderness to palpation.   Lungs:   Breath sounds heard bilaterally equally.  No crackles or wheezing. Unlabored on room air   Heart:  Normal S1 and S2, no murmur, no gallop, no rub. No JVD.   Abdomen:   Normal bowel sounds, no masses, no organomegaly. Soft, nontender, nondistended, no rebound tenderness.   Extremities: Supple, no edema, no cyanosis, no clubbing.   Pulses: Pulses palpable bilaterally.   Skin: No bleeding or rash with scattered ecchymosis noted.  Right lower extremity skin tear noted.  Significant ecchymosis posterior left knee without hematoma/warmth/erythema noted. Pain with movement noted    Neurologic: Alert and oriented x 3. No facial asymmetry. Moves all four limbs. No tremors.  Severe generalized weakness is present.     Pertinent Lab Results:     Results from last 7 days   Lab Units 05/24/23  0555 05/22/23  0613 05/21/23  0603 05/20/23  0558 05/19/23  1710   SODIUM mmol/L 139 138 142   < > 140   POTASSIUM mmol/L 4.5 4.7 4.6   < > 4.8   CHLORIDE mmol/L 103 103 105   < > 102   CO2 mmol/L 26.5 24.6 25.3   < > 28.3   BUN mg/dL 46* 45* 40*   < > 38*   CREATININE mg/dL 2.39* 2.56* 2.57*   < > 2.38*   CALCIUM mg/dL 9.4 9.1 9.2   < > 9.6   BILIRUBIN mg/dL  --   --   --   --  0.5   ALK PHOS U/L  --   --   --   --  133*   ALT (SGPT) U/L  --   --   --   --  8   AST (SGOT) U/L  --   --   --   --  14   GLUCOSE mg/dL 89 109* 82   < > 111*    < > = values in this interval not displayed.     Results  from last 7 days   Lab Units 05/24/23  0555 05/20/23  0558 05/19/23  1710   WBC 10*3/mm3 6.50 5.57 7.72   HEMOGLOBIN g/dL 9.7* 10.0* 10.7*   HEMATOCRIT % 31.0* 32.0* 34.1   PLATELETS 10*3/mm3 177 161 192                                   Pertinent Radiology Results:    Imaging Results (All)     Procedure Component Value Units Date/Time    XR Tibia & Fibula 1 View Bilateral [[id number]] Collected: 05/20/23 0743     Updated: 05/20/23 0748    Narrative:      BILATERAL TIBIA/FIBULA TWO VIEWS EACH         HISTORY: Left knee pain.     FINDINGS:  A four view exam shows no acute fracture or dislocation of  either tibia/fibula. Arterial calcifications.        Impression:      No acute fracture of either tibia/fibula.               This report was signed and finalized on 5/20/2023 7:45 AM by Dr Chencho Hughes DO.    XR Femur 2 View Left [[id number]] Collected: 05/20/23 0638     Updated: 05/20/23 0643    Narrative:      LEFT FEMUR         HISTORY: Pain.     REFERENCE:  Pelvis radiograph February 2023.     FINDINGS:  A four view exam demonstrates left hip arthroplasty,  replacing previous intramedullary nail with proximal femoral neck  cannulated screw in February 2023. There is an old intertrochanteric  fracture of the proximal left femur, grossly unchanged. Transverse screw  tract through the proximal femoral diaphysis related to previously  placed screw. No acute fracture or malalignment.       Impression:      No acute fracture.               This report was signed and finalized on 5/20/2023 6:41 AM by Dr Chencho Hughes DO.    XR Knee 3 View Left [[id number]] Collected: 05/20/23 0637     Updated: 05/20/23 0640    Narrative:      LEFT KNEE     HISTORY: Left knee pain, recent hip surgery..      FINDINGS:  A three view exam demonstrates no acute fracture or  dislocation. The joint spaces appear normal. Small superior patellar  spur. No joint effusion. Mild arterial calcifications of the calf.       Impression:      No acute fracture.                This report was signed and finalized on 5/20/2023 6:38 AM by Dr Chencho Hughes DO.    US Venous Doppler Lower Extremity Left (duplex) [[id number]] Collected: 05/19/23 1821     Updated: 05/19/23 1822    Narrative:      FINAL REPORT    TECHNIQUE:  Multiple transverse and longitudinal images were performed of  the femoral-popliteal deep venous system with augmentation and  compression maneuvers.    CLINICAL HISTORY:  r/o dvt; pain    FINDINGS:  Left lower extremity duplex ultrasound demonstrates normal flow  in the deep venous system.  There is nonocclusive thrombosis in  the greater saphenous vein.      Impression:      Nonocclusive greater saphenous vein thrombosis.  No DVT.    Authenticated and Electronically Signed by David Gutierrez M.D. on  05/19/2023 06:21:21 PM          Echo:    Results for orders placed during the hospital encounter of 03/29/21    Adult Transthoracic Echo Complete W/ Cont if Necessary Per Protocol    Interpretation Summary  · Left ventricular wall thickness is consistent with mild concentric hypertrophy.  · Estimated left ventricular EF = 68% Estimated left ventricular EF was in agreement with the calculated left ventricular EF. Left ventricular ejection fraction appears to be 66 - 70%. Left ventricular systolic function is normal.  · Left ventricular diastolic function is consistent with (grade I) impaired relaxation.  · Left atrial volume is moderately increased.  · Estimated right ventricular systolic pressure from tricuspid regurgitation is normal (<35 mmHg).    Condition on Discharge:      Stable.    Code status during the hospital stay:    Code Status and Medical Interventions:   Ordered at: 05/19/23 2054     Medical Intervention Limits:    NO intubation (DNI)    NO cardioversion     Level Of Support Discussed With:    Health Care Surrogate     Code Status (Patient has no pulse and is not breathing):    No CPR (Do Not Attempt to Resuscitate)     Medical Interventions (Patient has  pulse or is breathing):    Limited Support     Discharge Disposition:    [facility]    Discharge Medications:       Discharge Medications      Changes to Medications      Instructions Start Date   carvedilol 25 MG tablet  Commonly known as: COREG  What changed:   · medication strength  · how much to take   25 mg, Oral, 2 Times Daily With Meals      estradiol 0.1 MG/GM vaginal cream  Commonly known as: ESTRACE  What changed:   · how much to take  · how to take this  · when to take this  · additional instructions   Massage 1 gram in vaginal opening twice weekly      sertraline 25 MG tablet  Commonly known as: ZOLOFT  What changed: Another medication with the same name was removed. Continue taking this medication, and follow the directions you see here.   25 mg, Oral, Daily         Continue These Medications      Instructions Start Date   allopurinol 100 MG tablet  Commonly known as: ZYLOPRIM   1 tablet, Oral, Daily      aspirin 81 MG EC tablet   81 mg, Oral, Daily      bisacodyl 10 MG suppository  Commonly known as: DULCOLAX   10 mg, Rectal, As Needed      carboxymethylcellulose 0.5 % solution  Commonly known as: REFRESH PLUS   1 drop, Both Eyes, 3 Times Daily PRN      cholecalciferol 25 MCG (1000 UT) tablet  Commonly known as: VITAMIN D3   1,000 Units, Oral, Daily      cyanocobalamin 500 MCG tablet  Commonly known as: VITAMIN B-12   1 tablet, Oral, Daily      docusate sodium 100 MG capsule  Commonly known as: COLACE   100 mg, Oral, 2 Times Daily PRN      Epoetin Jefe-epbx 25746 UNIT/ML injection  Commonly known as: RETACRIT   20,000 Units, Subcutaneous, Every 14 Days      fluticasone 50 MCG/ACT nasal spray  Commonly known as: FLONASE   2 sprays, Nasal, Daily PRN      Fluticasone-Salmeterol 250-50 MCG/ACT DISKUS  Commonly known as: ADVAIR/WIXELA   1 puff, Inhalation, 2 Times Daily - RT      HYDROcodone-acetaminophen 5-325 MG per tablet  Commonly known as: NORCO   1 tablet, Oral, Every 6 Hours PRN       ipratropium-albuterol 0.5-2.5 mg/3 ml nebulizer  Commonly known as: DUO-NEB   3 mL, Nebulization, Every 4 Hours PRN      Lidocaine 4 % patch   1 patch, Apply externally, Daily      omeprazole 40 MG capsule  Commonly known as: priLOSEC   40 mg, Oral, Daily      predniSONE 10 MG tablet  Commonly known as: DELTASONE   10 mg, Oral, Daily      rosuvastatin 10 MG tablet  Commonly known as: CRESTOR   1 tablet, Oral, Daily      sodium bicarbonate 650 MG tablet   650 mg, Oral, 2 Times Daily      SODIUM PHOSPHATES RE   1 application, Rectal, As Needed      torsemide 10 MG tablet  Commonly known as: DEMADEX   10 mg, Oral, 2 Times Weekly, 1/2 tablet by mouth one time a day on MONDAYS and THURSDAYS      vitamin b complex capsule capsule   1 capsule, Oral, Daily         Stop These Medications    PROBIOTIC DAILY PO     QUEtiapine 25 MG tablet  Commonly known as: SEROquel     saccharomyces boulardii 250 MG capsule  Commonly known as: FLORASTOR     senna 8.6 MG tablet  Commonly known as: SENOKOT          Discharge Diet:     Diet Instructions     Diet: Cardiac Diets, Renal Diets, Fluid Restriction (240 mL/tray) Diets; Healthy Heart (2-3 Na+); Regular Texture (IDDSI 7); Thin (IDDSI 0); Low Sodium (2-3g), Low Potassium; 1500 mL/day      Discharge Diet:  Cardiac Diets  Renal Diets  Fluid Restriction (240 mL/tray) Diets       Cardiac Diet: Healthy Heart (2-3 Na+)    Texture: Regular Texture (IDDSI 7)    Fluid Consistency: Thin (IDDSI 0)    Renal Diet:  Low Sodium (2-3g)  Low Potassium       Fluid Restriction Diet (240 mL/tray): 1500 mL/day        Activity at Discharge:     Activity Instructions     Up WIth Assist          Follow-up Appointments:     Contact information for after-discharge care     Destination     [facility] .    Service: Skilled Nursing  Contact information:  [address] [place], [place] [id number] [phone]                           No future appointments.      Roma Burk,  FATUMA  05/24/23  13:29 EDT    Time: I spent 25 minutes on this discharge activity which included: face-to-face encounter with the patient, reviewing the data in the system, coordination of the care with the nursing staff as well as consultants, documentation, and entering orders.     Dictated utilizing Dragon dictation.

## 2023-05-24 NOTE — PLAN OF CARE
Goal Outcome Evaluation: Patient being discharged to [facility] today

## 2023-05-24 NOTE — PLAN OF CARE
Goal Outcome Evaluation:  Plan of Care Reviewed With: patient        Progress: no change  Outcome Evaluation: VSS throughout shift. PRN pain medication given for pain; effective per patient. Patient turned and repositioned Q2 hours. Awaiting placement. No acute events.

## 2023-05-31 ENCOUNTER — NURSING HOME (OUTPATIENT)
Dept: FAMILY MEDICINE CLINIC | Facility: CLINIC | Age: 83
End: 2023-05-31

## 2023-05-31 VITALS
TEMPERATURE: 98 F | WEIGHT: 111.2 LBS | OXYGEN SATURATION: 96 % | BODY MASS INDEX: 22.46 KG/M2 | HEART RATE: 76 BPM | RESPIRATION RATE: 18 BRPM | SYSTOLIC BLOOD PRESSURE: 130 MMHG | DIASTOLIC BLOOD PRESSURE: 72 MMHG

## 2023-05-31 DIAGNOSIS — M06.4 INFLAMMATORY POLYARTHROPATHY: ICD-10-CM

## 2023-05-31 DIAGNOSIS — J44.9 CHRONIC OBSTRUCTIVE PULMONARY DISEASE, UNSPECIFIED COPD TYPE: ICD-10-CM

## 2023-05-31 DIAGNOSIS — I10 ESSENTIAL HYPERTENSION: ICD-10-CM

## 2023-05-31 DIAGNOSIS — R53.81 DEBILITY: ICD-10-CM

## 2023-05-31 DIAGNOSIS — D63.1 ANEMIA DUE TO STAGE 4 CHRONIC KIDNEY DISEASE TREATED WITH ERYTHROPOIETIN: ICD-10-CM

## 2023-05-31 DIAGNOSIS — N18.4 ANEMIA DUE TO STAGE 4 CHRONIC KIDNEY DISEASE TREATED WITH ERYTHROPOIETIN: ICD-10-CM

## 2023-05-31 DIAGNOSIS — K21.9 GASTROESOPHAGEAL REFLUX DISEASE WITHOUT ESOPHAGITIS: Primary | ICD-10-CM

## 2023-05-31 DIAGNOSIS — Z87.81 HISTORY OF FRACTURE OF FEMUR: ICD-10-CM

## 2023-05-31 RX ORDER — HYDROCODONE BITARTRATE AND ACETAMINOPHEN 5; 325 MG/1; MG/1
1 TABLET ORAL EVERY 6 HOURS PRN
Qty: 120 TABLET | Refills: 0 | Status: SHIPPED | OUTPATIENT
Start: 2023-05-31

## 2023-05-31 NOTE — LETTER
Nursing Home Follow Up Note      Mikey Duran DO []   FATUMA Monet [x]  [address] [place] [address]  Phone: [phone]  Fax: [phone] Baltazar Borden MD []    Jose Antonio Pride DO []   [address] [place] [address]  Phone: [phone]  Fax: [phone]     PATIENT NAME: Carolyne Martins                                                                          YOB: 1940           DATE OF SERVICE: 05/31/2023  FACILITY:  [][facility]   [] [facility]   [x] [facility]   [] [facility]   [] Other ______________________________________________________________________      CHIEF COMPLAINT:    Readmission to facility.       HISTORY OF PRESENT ILLNESS:     Patient is an 82-year-old woman with past medical history of CKD follows with nephrology, Dr. Fitzgerald, coronary artery disease follows with Dr. Acuña, arthritis, back pain, hyperparathyroidism, hypertension, anemia, osteoarthritis, rheumatic heart disease, valvular heart disease.  She discharged from facility on 5/18, to her home. Presented to [facility] ED on 5/19/2023 with left knee pain and bruising, feeling weak. She apparently had fallen at home. Xray of left knee and hip negative for acute findings. Lab work up negative.  Son reported that he realizes now that his mother can not live in her home because he can not provide the care she needs. She was readmitted to facility on 5/24. She is adapting well to facility again. She is resting in bed without complaints or signs and symptoms of any distress.     PAST MEDICAL & SURGICAL HISTORY:   Past Medical History:   Diagnosis Date   • Anemia 1998   • Anemia    • Arthritis    • Back pain    • Bleeding from anus    • Bronchitis     STATES HAS BRONCHITIS CURRENTLY (12/5/17).     • Bronchitis 12/2017   • CAD (coronary artery disease)    • Cataract, bilateral    • Chronic kidney disease     STAGE 4.  SEES TERA   • Chronic kidney failure     REPORTS STAGE IV   • Difficulty swallowing  "solids    • Disease of thyroid gland    • Fracture, radius 2016    right distal radius and ulna, healed.   • GERD (gastroesophageal reflux disease)    • Gout    • High cholesterol    • History of blood transfusion 2019   • History of nuclear stress test 2014    DR. CORTEZ.  \"IT WAS OK\"   • Hyperparathyroidism    • Hyperparathyroidism    • Hypertension    • Impaired functional mobility, balance, gait, and endurance    • Impaired mobility    • Iron deficiency    • Osteoarthritis    • Osteoarthritis of knees, bilateral     Both knees Supartz injection series August, 2015   • Osteoporosis    • Palpitations    • Personal history of cardiac murmur    • Pessary maintenance    • PONV (postoperative nausea and vomiting)    • Presence of pessary    • Problems with swallowing     WITH FOOD OCCASSIONALLY   • Rheumatic heart disease     Personal history   • Rotator cuff tendonitis     right    • Rupture of right proximal biceps tendon     chronic   • Seasonal allergies    • Sinus problem    • Spinal headache     REPORTS AFTER DELIVERY OF SON   • Subacromial bursitis     right    • Valvular heart disease    • Vision problems    • Vitamin B12 deficiency    • Vitamin D deficiency    • Wears glasses       Past Surgical History:   Procedure Laterality Date   • CATARACT EXTRACTION Bilateral    • CERVICAL POLYPECTOMY     • COLONOSCOPY  2011   • COLONOSCOPY N/A 1/9/2018    Procedure: COLONOSCOPY with endoscopic mucosal resection (hot snare), normal saline submucosal injection, argon thermal ablation, resolution clip placement x4, and cold biopsy polypectomy;  Surgeon: Pieter Concepcion MD;  Location: [facility] ENDOSCOPY;  Service:    • ENDOSCOPY N/A 12/6/2017    Procedure: ESOPHAGOGASTRODUODENOSCOPY with biopsies and hot snare polypectomies;  Surgeon: Pieter Concepcion MD;  Location: [facility] ENDOSCOPY;  Service:    • ENDOSCOPY N/A 1/15/2019    Procedure: ESOPHAGOGASTRODUODENOSCOPY WITH BIOPSIES AND HOT SNARE POLYPECTOMIES X10;  Surgeon: Jamey" Pieter PALACIO MD;  Location: [facility] ENDOSCOPY;  Service: Gastroenterology   • HARDWARE REMOVAL Left 2/17/2023    Procedure: FEMUR HARDWARE REMOVAL, LEFT;  Surgeon: Gabriele Loyd MD;  Location: [facility] OR;  Service: Orthopedics;  Laterality: Left;   • HIP TROCHANTERIC NAILING WITH INTRAMEDULLARY HIP SCREW Left 2/14/2021    Procedure: HIP TROCHANTER NAIL SHORT WITH INTRAMEDULLARY HIP SCREW, LEFT;  Surgeon: Gabriele Loyd MD;  Location: [facility] OR;  Service: Orthopedics;  Laterality: Left;   • TOTAL HIP ARTHROPLASTY Left 2/17/2023    Procedure: REMOVAL OF FRACTURE HARDWARE AND CONVERSION TO TOTAL HIP ARTHROPLASTY, LEFT;  Surgeon: Gabriele Loyd MD;  Location: [facility] OR;  Service: Orthopedics;  Laterality: Left;   • UPPER GASTROINTESTINAL ENDOSCOPY  08/18/2014         MEDICATIONS:  I have reviewed and reconciled the patients medication list in the patients chart at the [facility] today.      ALLERGIES:    Allergies   Allergen Reactions   • Ferrlecit [Na Ferric Gluc Cplx In Sucrose] Swelling   • Ranitidine Hcl Shortness Of Breath   • Levofloxacin Other (See Comments)     Leg cramps     • Lisinopril Cough         SOCIAL HISTORY:    Social History     Socioeconomic History   • Marital status:    Tobacco Use   • Smoking status: Never   • Smokeless tobacco: Never   Vaping Use   • Vaping Use: Never used   Substance and Sexual Activity   • Alcohol use: Never   • Drug use: Never   • Sexual activity: Not Currently     Birth control/protection: Post-menopausal       FAMILY HISTORY:    Family History   Problem Relation Age of Onset   • Liver cancer Maternal Grandmother    • Gout Other    • Osteoporosis Other    • Stroke Other    • Ulcers Other    • Asthma Other    • Hypertension Other    • Heart disease Other    • Osteoarthritis Other    • Colon cancer Neg Hx        REVIEW OF SYSTEMS:    Review of Systems   Constitutional: Negative for activity change, appetite change, chills, diaphoresis, fatigue,  fever, unexpected weight gain and unexpected weight loss.   HENT: Negative for congestion, mouth sores, nosebleeds, rhinorrhea and trouble swallowing.    Eyes: Negative for discharge, redness and itching.   Respiratory: Negative for apnea, cough, choking, chest tightness, shortness of breath, wheezing and stridor.    Cardiovascular: Negative for chest pain, palpitations and leg swelling.   Gastrointestinal: Negative for abdominal pain, blood in stool, constipation, diarrhea, nausea, vomiting and indigestion.   Endocrine: Negative for polydipsia and polyuria.   Genitourinary: Negative for decreased urine volume, difficulty urinating, dysuria, frequency, hematuria, urgency and urinary incontinence.   Musculoskeletal: Positive for arthralgias (generalized) and gait problem. Negative for joint swelling and myalgias.   Skin: Negative for color change, dry skin, rash and skin lesions.   Neurological: Positive for weakness and memory problem. Negative for dizziness, headache and confusion.   Psychiatric/Behavioral: Negative for behavioral problems, dysphoric mood, hallucinations, sleep disturbance and depressed mood. The patient is not nervous/anxious.          PHYSICAL EXAMINATION:   VITAL SIGNS:   Vitals:    05/31/23 1549   BP: 130/72   Pulse: 76   Resp: 18   Temp: 98 °F (36.7 °C)   SpO2: 96%   Weight: 50.4 kg (111 lb 3.2 oz)       Physical Exam  Vitals and nursing note reviewed.   Constitutional:       Appearance: Normal appearance. She is well-developed.   HENT:      Head: Normocephalic.      Right Ear: External ear normal.      Left Ear: External ear normal.      Nose: Nose normal.   Eyes:      Conjunctiva/sclera: Conjunctivae normal.   Cardiovascular:      Rate and Rhythm: Normal rate and regular rhythm.      Heart sounds: Normal heart sounds.   Pulmonary:      Effort: Pulmonary effort is normal. No respiratory distress.      Breath sounds: Normal breath sounds. No wheezing or rales.   Abdominal:      General: Bowel  sounds are normal. There is no distension.      Palpations: Abdomen is soft.      Tenderness: There is no abdominal tenderness.   Musculoskeletal:      Comments: NROM all major joints   Skin:     General: Skin is warm and dry.      Findings: No rash.   Neurological:      Mental Status: She is alert and oriented to person, place, and time.   Psychiatric:         Mood and Affect: Mood and affect normal.         Speech: Speech normal.         Cognition and Memory: Cognition is not impaired. Memory is impaired.         RECORDS REVIEW:   I have reviewed and interpreted the records in [facility] and [facility]    ASSESSMENT     Diagnoses and all orders for this visit:    1. Gastroesophageal reflux disease without esophagitis (Primary)    2. Essential hypertension    3. Chronic obstructive pulmonary disease, unspecified COPD type    4. Anemia due to stage 4 chronic kidney disease treated with erythropoietin    5. Inflammatory polyarthropathy    6. History of fracture of femur    7. Debility        PLAN    COPD  -Stable on current medications with no acute exacerbation.     HTN  -At goal on current medications.  Continue meds a directed.     CKD with anemia  -She is managed by Nephrology. She is on Epogen and will have labs monitored q 2 weeks.      GERD  -Controlled on current medication.    Chronic inflammatory arthritis/Joint pain multiple sites  -Stable on Prednisone and current medications.     Nursing to continue supportive care for all ADLs.    Nursing and patient encouraged to keep me informed of any acute changes, or any new concerning symptoms.    [x]  Discussed Patient in detail with nursing/staff, addressed all needs today.     [x]  Plan of Care Reviewed   [x]  PT/OT Reviewed   [x]  Order Changes  [x]  Discharge Plans Reviewed  [x]  Advance Directive on file with Nursing Home.   [x]  POA on file with Nursing Home.   [x]  Code Status listed: []  Full Code   [x]  DNR       I spent 35 minutes caring for Carolyne on this date of  service. This time includes time spent by me in the following activities:preparing for the visit, reviewing tests, obtaining and/or reviewing a separately obtained history, performing a medically appropriate examination and/or evaluation , counseling and educating the patient/family/caregiver, ordering medications, tests, or procedures, referring and communicating with other health care professionals , documenting information in the medical record, independently interpreting results and communicating that information with the patient/family/caregiver and care coordination    “I confirm accuracy of unchanged data/findings which have been carried forward from previous visit, as well as I have updated appropriately those that have changed.”      Roma Diaz, APRN.

## 2023-06-01 PROBLEM — M06.4 INFLAMMATORY POLYARTHROPATHY: Status: ACTIVE | Noted: 2023-06-01

## 2023-06-02 NOTE — PROGRESS NOTES
Nursing Home Follow Up Note      Mikey Duran DO []   FATUMA Monet [x]  [address] [place] [address]  Phone: [phone]  Fax: [phone] Baltazar Borden MD []    Jose Antonio Pride DO []   [address] [place] [address]  Phone: [phone]  Fax: [phone]     PATIENT NAME: Carolyne Martins                                                                          YOB: 1940           DATE OF SERVICE: 05/31/2023  FACILITY:  [][facility]   [] [facility]   [x] [facility]   [] [facility]   [] Other ______________________________________________________________________      CHIEF COMPLAINT:    Readmission to facility.       HISTORY OF PRESENT ILLNESS:     Patient is an 82-year-old woman with past medical history of CKD follows with nephrology, Dr. Fitzgerald, coronary artery disease follows with Dr. Acuña, arthritis, back pain, hyperparathyroidism, hypertension, anemia, osteoarthritis, rheumatic heart disease, valvular heart disease.  She discharged from facility on 5/18, to her home. Presented to [facility] ED on 5/19/2023 with left knee pain and bruising, feeling weak. She apparently had fallen at home. Xray of left knee and hip negative for acute findings. Lab work up negative.  Son reported that he realizes now that his mother can not live in her home because he can not provide the care she needs. She was readmitted to facility on 5/24. She is adapting well to facility again. She is resting in bed without complaints or signs and symptoms of any distress.     PAST MEDICAL & SURGICAL HISTORY:   Past Medical History:   Diagnosis Date   • Anemia 1998   • Anemia    • Arthritis    • Back pain    • Bleeding from anus    • Bronchitis     STATES HAS BRONCHITIS CURRENTLY (12/5/17).     • Bronchitis 12/2017   • CAD (coronary artery disease)    • Cataract, bilateral    • Chronic kidney disease     STAGE 4.  SEES TERA   • Chronic kidney failure     REPORTS STAGE IV   • Difficulty swallowing  "solids    • Disease of thyroid gland    • Fracture, radius 2016    right distal radius and ulna, healed.   • GERD (gastroesophageal reflux disease)    • Gout    • High cholesterol    • History of blood transfusion 2019   • History of nuclear stress test 2014    DR. CORTEZ.  \"IT WAS OK\"   • Hyperparathyroidism    • Hyperparathyroidism    • Hypertension    • Impaired functional mobility, balance, gait, and endurance    • Impaired mobility    • Iron deficiency    • Osteoarthritis    • Osteoarthritis of knees, bilateral     Both knees Supartz injection series August, 2015   • Osteoporosis    • Palpitations    • Personal history of cardiac murmur    • Pessary maintenance    • PONV (postoperative nausea and vomiting)    • Presence of pessary    • Problems with swallowing     WITH FOOD OCCASSIONALLY   • Rheumatic heart disease     Personal history   • Rotator cuff tendonitis     right    • Rupture of right proximal biceps tendon     chronic   • Seasonal allergies    • Sinus problem    • Spinal headache     REPORTS AFTER DELIVERY OF SON   • Subacromial bursitis     right    • Valvular heart disease    • Vision problems    • Vitamin B12 deficiency    • Vitamin D deficiency    • Wears glasses       Past Surgical History:   Procedure Laterality Date   • CATARACT EXTRACTION Bilateral    • CERVICAL POLYPECTOMY     • COLONOSCOPY  2011   • COLONOSCOPY N/A 1/9/2018    Procedure: COLONOSCOPY with endoscopic mucosal resection (hot snare), normal saline submucosal injection, argon thermal ablation, resolution clip placement x4, and cold biopsy polypectomy;  Surgeon: Pieter Concepcion MD;  Location: [facility] ENDOSCOPY;  Service:    • ENDOSCOPY N/A 12/6/2017    Procedure: ESOPHAGOGASTRODUODENOSCOPY with biopsies and hot snare polypectomies;  Surgeon: Pieter Concepcion MD;  Location: [facility] ENDOSCOPY;  Service:    • ENDOSCOPY N/A 1/15/2019    Procedure: ESOPHAGOGASTRODUODENOSCOPY WITH BIOPSIES AND HOT SNARE POLYPECTOMIES X10;  Surgeon: Jamey" Pieter PALACIO MD;  Location: [facility] ENDOSCOPY;  Service: Gastroenterology   • HARDWARE REMOVAL Left 2/17/2023    Procedure: FEMUR HARDWARE REMOVAL, LEFT;  Surgeon: Gabriele Loyd MD;  Location: [facility] OR;  Service: Orthopedics;  Laterality: Left;   • HIP TROCHANTERIC NAILING WITH INTRAMEDULLARY HIP SCREW Left 2/14/2021    Procedure: HIP TROCHANTER NAIL SHORT WITH INTRAMEDULLARY HIP SCREW, LEFT;  Surgeon: Gabriele Loyd MD;  Location: [facility] OR;  Service: Orthopedics;  Laterality: Left;   • TOTAL HIP ARTHROPLASTY Left 2/17/2023    Procedure: REMOVAL OF FRACTURE HARDWARE AND CONVERSION TO TOTAL HIP ARTHROPLASTY, LEFT;  Surgeon: Gabriele Loyd MD;  Location: [facility] OR;  Service: Orthopedics;  Laterality: Left;   • UPPER GASTROINTESTINAL ENDOSCOPY  08/18/2014         MEDICATIONS:  I have reviewed and reconciled the patients medication list in the patients chart at the [facility] today.      ALLERGIES:    Allergies   Allergen Reactions   • Ferrlecit [Na Ferric Gluc Cplx In Sucrose] Swelling   • Ranitidine Hcl Shortness Of Breath   • Levofloxacin Other (See Comments)     Leg cramps     • Lisinopril Cough         SOCIAL HISTORY:    Social History     Socioeconomic History   • Marital status:    Tobacco Use   • Smoking status: Never   • Smokeless tobacco: Never   Vaping Use   • Vaping Use: Never used   Substance and Sexual Activity   • Alcohol use: Never   • Drug use: Never   • Sexual activity: Not Currently     Birth control/protection: Post-menopausal       FAMILY HISTORY:    Family History   Problem Relation Age of Onset   • Liver cancer Maternal Grandmother    • Gout Other    • Osteoporosis Other    • Stroke Other    • Ulcers Other    • Asthma Other    • Hypertension Other    • Heart disease Other    • Osteoarthritis Other    • Colon cancer Neg Hx        REVIEW OF SYSTEMS:    Review of Systems   Constitutional: Negative for activity change, appetite change, chills, diaphoresis, fatigue,  fever, unexpected weight gain and unexpected weight loss.   HENT: Negative for congestion, mouth sores, nosebleeds, rhinorrhea and trouble swallowing.    Eyes: Negative for discharge, redness and itching.   Respiratory: Negative for apnea, cough, choking, chest tightness, shortness of breath, wheezing and stridor.    Cardiovascular: Negative for chest pain, palpitations and leg swelling.   Gastrointestinal: Negative for abdominal pain, blood in stool, constipation, diarrhea, nausea, vomiting and indigestion.   Endocrine: Negative for polydipsia and polyuria.   Genitourinary: Negative for decreased urine volume, difficulty urinating, dysuria, frequency, hematuria, urgency and urinary incontinence.   Musculoskeletal: Positive for arthralgias (generalized) and gait problem. Negative for joint swelling and myalgias.   Skin: Negative for color change, dry skin, rash and skin lesions.   Neurological: Positive for weakness and memory problem. Negative for dizziness, headache and confusion.   Psychiatric/Behavioral: Negative for behavioral problems, dysphoric mood, hallucinations, sleep disturbance and depressed mood. The patient is not nervous/anxious.          PHYSICAL EXAMINATION:   VITAL SIGNS:   Vitals:    05/31/23 1549   BP: 130/72   Pulse: 76   Resp: 18   Temp: 98 °F (36.7 °C)   SpO2: 96%   Weight: 50.4 kg (111 lb 3.2 oz)       Physical Exam  Vitals and nursing note reviewed.   Constitutional:       Appearance: Normal appearance. She is well-developed.   HENT:      Head: Normocephalic.      Right Ear: External ear normal.      Left Ear: External ear normal.      Nose: Nose normal.   Eyes:      Conjunctiva/sclera: Conjunctivae normal.   Cardiovascular:      Rate and Rhythm: Normal rate and regular rhythm.      Heart sounds: Normal heart sounds.   Pulmonary:      Effort: Pulmonary effort is normal. No respiratory distress.      Breath sounds: Normal breath sounds. No wheezing or rales.   Abdominal:      General: Bowel  sounds are normal. There is no distension.      Palpations: Abdomen is soft.      Tenderness: There is no abdominal tenderness.   Musculoskeletal:      Comments: NROM all major joints   Skin:     General: Skin is warm and dry.      Findings: No rash.   Neurological:      Mental Status: She is alert and oriented to person, place, and time.   Psychiatric:         Mood and Affect: Mood and affect normal.         Speech: Speech normal.         Cognition and Memory: Cognition is not impaired. Memory is impaired.         RECORDS REVIEW:   I have reviewed and interpreted the records in [facility] and [facility]    ASSESSMENT     Diagnoses and all orders for this visit:    1. Gastroesophageal reflux disease without esophagitis (Primary)    2. Essential hypertension    3. Chronic obstructive pulmonary disease, unspecified COPD type    4. Anemia due to stage 4 chronic kidney disease treated with erythropoietin    5. Inflammatory polyarthropathy    6. History of fracture of femur    7. Debility        PLAN    COPD  -Stable on current medications with no acute exacerbation.     HTN  -At goal on current medications.  Continue meds a directed.     CKD with anemia  -She is managed by Nephrology. She is on Epogen and will have labs monitored q 2 weeks.      GERD  -Controlled on current medication.    Chronic inflammatory arthritis/Joint pain multiple sites  -Stable on Prednisone and current medications.     Nursing to continue supportive care for all ADLs.    Nursing and patient encouraged to keep me informed of any acute changes, or any new concerning symptoms.    [x]  Discussed Patient in detail with nursing/staff, addressed all needs today.     [x]  Plan of Care Reviewed   [x]  PT/OT Reviewed   [x]  Order Changes  [x]  Discharge Plans Reviewed  [x]  Advance Directive on file with Nursing Home.   [x]  POA on file with Nursing Home.   [x]  Code Status listed: []  Full Code   [x]  DNR       I spent 35 minutes caring for Carolyne on this date of  service. This time includes time spent by me in the following activities:preparing for the visit, reviewing tests, obtaining and/or reviewing a separately obtained history, performing a medically appropriate examination and/or evaluation , counseling and educating the patient/family/caregiver, ordering medications, tests, or procedures, referring and communicating with other health care professionals , documenting information in the medical record, independently interpreting results and communicating that information with the patient/family/caregiver and care coordination    “I confirm accuracy of unchanged data/findings which have been carried forward from previous visit, as well as I have updated appropriately those that have changed.”      Roma Diaz, APRN.

## 2023-06-17 ENCOUNTER — APPOINTMENT (OUTPATIENT)
Dept: GENERAL RADIOLOGY | Facility: HOSPITAL | Age: 83
End: 2023-06-17
Payer: MEDICARE

## 2023-06-17 ENCOUNTER — HOSPITAL ENCOUNTER (EMERGENCY)
Facility: HOSPITAL | Age: 83
Discharge: HOME | End: 2023-06-17
Attending: STUDENT IN AN ORGANIZED HEALTH CARE EDUCATION/TRAINING PROGRAM
Payer: MEDICARE

## 2023-06-17 VITALS
BODY MASS INDEX: 20.24 KG/M2 | WEIGHT: 110 LBS | DIASTOLIC BLOOD PRESSURE: 95 MMHG | RESPIRATION RATE: 16 BRPM | TEMPERATURE: 98.4 F | OXYGEN SATURATION: 97 % | HEIGHT: 62 IN | SYSTOLIC BLOOD PRESSURE: 178 MMHG | HEART RATE: 76 BPM

## 2023-06-17 DIAGNOSIS — R07.9 CHEST PAIN, UNSPECIFIED TYPE: Primary | ICD-10-CM

## 2023-06-17 LAB
ALBUMIN SERPL-MCNC: 2.9 G/DL (ref 3.5–5.2)
ALBUMIN/GLOB SERPL: 1.3 G/DL
ALP SERPL-CCNC: 168 U/L (ref 39–117)
ALT SERPL W P-5'-P-CCNC: 8 U/L (ref 1–33)
ANION GAP SERPL CALCULATED.3IONS-SCNC: 7.8 MMOL/L (ref 5–15)
AST SERPL-CCNC: 16 U/L (ref 1–32)
BASOPHILS # BLD AUTO: 0.02 10*3/MM3 (ref 0–0.2)
BASOPHILS NFR BLD AUTO: 0.3 % (ref 0–1.5)
BILIRUB SERPL-MCNC: 0.3 MG/DL (ref 0–1.2)
BUN SERPL-MCNC: 35 MG/DL (ref 8–23)
BUN/CREAT SERPL: 17.3 (ref 7–25)
CALCIUM SPEC-SCNC: 9 MG/DL (ref 8.6–10.5)
CHLORIDE SERPL-SCNC: 105 MMOL/L (ref 98–107)
CO2 SERPL-SCNC: 24.2 MMOL/L (ref 22–29)
CREAT SERPL-MCNC: 2.02 MG/DL (ref 0.57–1)
DEPRECATED RDW RBC AUTO: 59.2 FL (ref 37–54)
EGFRCR SERPLBLD CKD-EPI 2021: 24.4 ML/MIN/1.73
EOSINOPHIL # BLD AUTO: 0.2 10*3/MM3 (ref 0–0.4)
EOSINOPHIL NFR BLD AUTO: 2.8 % (ref 0.3–6.2)
ERYTHROCYTE [DISTWIDTH] IN BLOOD BY AUTOMATED COUNT: 16.3 % (ref 12.3–15.4)
GEN 5 2HR TROPONIN T REFLEX: 80 NG/L
GLOBULIN UR ELPH-MCNC: 2.3 GM/DL
GLUCOSE SERPL-MCNC: 90 MG/DL (ref 65–99)
HCT VFR BLD AUTO: 30.2 % (ref 34–46.6)
HGB BLD-MCNC: 9.5 G/DL (ref 12–15.9)
HOLD SPECIMEN: NORMAL
HOLD SPECIMEN: NORMAL
IMM GRANULOCYTES # BLD AUTO: 0.08 10*3/MM3 (ref 0–0.05)
IMM GRANULOCYTES NFR BLD AUTO: 1.1 % (ref 0–0.5)
LYMPHOCYTES # BLD AUTO: 1.56 10*3/MM3 (ref 0.7–3.1)
LYMPHOCYTES NFR BLD AUTO: 21.6 % (ref 19.6–45.3)
MCH RBC QN AUTO: 31.3 PG (ref 26.6–33)
MCHC RBC AUTO-ENTMCNC: 31.5 G/DL (ref 31.5–35.7)
MCV RBC AUTO: 99.3 FL (ref 79–97)
MONOCYTES # BLD AUTO: 0.67 10*3/MM3 (ref 0.1–0.9)
MONOCYTES NFR BLD AUTO: 9.3 % (ref 5–12)
NEUTROPHILS NFR BLD AUTO: 4.69 10*3/MM3 (ref 1.7–7)
NEUTROPHILS NFR BLD AUTO: 64.9 % (ref 42.7–76)
NRBC BLD AUTO-RTO: 0 /100 WBC (ref 0–0.2)
PLATELET # BLD AUTO: 184 10*3/MM3 (ref 140–450)
PMV BLD AUTO: 9.4 FL (ref 6–12)
POTASSIUM SERPL-SCNC: 4.8 MMOL/L (ref 3.5–5.2)
PROT SERPL-MCNC: 5.2 G/DL (ref 6–8.5)
RBC # BLD AUTO: 3.04 10*6/MM3 (ref 3.77–5.28)
SODIUM SERPL-SCNC: 137 MMOL/L (ref 136–145)
TROPONIN T DELTA: 3 NG/L
TROPONIN T SERPL HS-MCNC: 77 NG/L
WBC NRBC COR # BLD: 7.22 10*3/MM3 (ref 3.4–10.8)
WHOLE BLOOD HOLD COAG: NORMAL
WHOLE BLOOD HOLD SPECIMEN: NORMAL

## 2023-06-17 PROCEDURE — 93005 ELECTROCARDIOGRAM TRACING: CPT | Performed by: STUDENT IN AN ORGANIZED HEALTH CARE EDUCATION/TRAINING PROGRAM

## 2023-06-17 PROCEDURE — 85025 COMPLETE CBC W/AUTO DIFF WBC: CPT | Performed by: STUDENT IN AN ORGANIZED HEALTH CARE EDUCATION/TRAINING PROGRAM

## 2023-06-17 PROCEDURE — 80053 COMPREHEN METABOLIC PANEL: CPT | Performed by: STUDENT IN AN ORGANIZED HEALTH CARE EDUCATION/TRAINING PROGRAM

## 2023-06-17 PROCEDURE — 84484 ASSAY OF TROPONIN QUANT: CPT | Performed by: STUDENT IN AN ORGANIZED HEALTH CARE EDUCATION/TRAINING PROGRAM

## 2023-06-17 PROCEDURE — 71045 X-RAY EXAM CHEST 1 VIEW: CPT

## 2023-06-17 RX ORDER — ASPIRIN 325 MG
325 TABLET ORAL ONCE
Status: DISCONTINUED | OUTPATIENT
Start: 2023-06-17 | End: 2023-06-17

## 2023-06-17 RX ORDER — SODIUM CHLORIDE 0.9 % (FLUSH) 0.9 %
10 SYRINGE (ML) INJECTION AS NEEDED
Status: DISCONTINUED | OUTPATIENT
Start: 2023-06-17 | End: 2023-06-17 | Stop reason: HOSPADM

## 2023-06-17 NOTE — ED NOTES
Called [facility] dispatch at this time requesting transportation back to [facility].

## 2023-06-17 NOTE — ED PROVIDER NOTES
Subjective  History of Present Illness:    Chief Complaint: Chest pain  History of Present Illness: This is an 83-year-old female patient comes into the ED with history of dementia as a transfer from skilled nursing facility via EMS with complaints of of chest pain.  Patient is a resident of local skilled nursing facility that stated that she had chest pain this morning but is denying any pain at this moment.  Patient denies any shortness of breath.  Patient does have a history of  dementia      Nurses Notes reviewed and agree, including vitals, allergies, social history and prior medical history.       Allergies:    Ferrlecit [na ferric gluc cplx in sucrose], Ranitidine hcl, Levofloxacin, and Lisinopril      Past Surgical History:   Procedure Laterality Date    CATARACT EXTRACTION Bilateral     CERVICAL POLYPECTOMY      COLONOSCOPY  2011    COLONOSCOPY N/A 1/9/2018    Procedure: COLONOSCOPY with endoscopic mucosal resection (hot snare), normal saline submucosal injection, argon thermal ablation, resolution clip placement x4, and cold biopsy polypectomy;  Surgeon: Pieter Concepcion MD;  Location: [facility] ENDOSCOPY;  Service:     ENDOSCOPY N/A 12/6/2017    Procedure: ESOPHAGOGASTRODUODENOSCOPY with biopsies and hot snare polypectomies;  Surgeon: Pieter Concepcion MD;  Location: [facility] ENDOSCOPY;  Service:     ENDOSCOPY N/A 1/15/2019    Procedure: ESOPHAGOGASTRODUODENOSCOPY WITH BIOPSIES AND HOT SNARE POLYPECTOMIES X10;  Surgeon: Pieter Concepcion MD;  Location: [facility] ENDOSCOPY;  Service: Gastroenterology    HARDWARE REMOVAL Left 2/17/2023    Procedure: FEMUR HARDWARE REMOVAL, LEFT;  Surgeon: Gabriele Loyd MD;  Location: [facility] OR;  Service: Orthopedics;  Laterality: Left;    HIP TROCHANTERIC NAILING WITH INTRAMEDULLARY HIP SCREW Left 2/14/2021    Procedure: HIP TROCHANTER NAIL SHORT WITH INTRAMEDULLARY HIP SCREW, LEFT;  Surgeon: Gabriele Loyd MD;  Location: [facility] OR;  Service: Orthopedics;  Laterality: Left;     TOTAL HIP ARTHROPLASTY Left 2/17/2023    Procedure: REMOVAL OF FRACTURE HARDWARE AND CONVERSION TO TOTAL HIP ARTHROPLASTY, LEFT;  Surgeon: Gabriele Loyd MD;  Location: [facility];  Service: Orthopedics;  Laterality: Left;    UPPER GASTROINTESTINAL ENDOSCOPY  08/18/2014         Social History     Socioeconomic History    Marital status:    Tobacco Use    Smoking status: Never    Smokeless tobacco: Never   Vaping Use    Vaping Use: Never used   Substance and Sexual Activity    Alcohol use: Never    Drug use: Never    Sexual activity: Not Currently     Birth control/protection: Post-menopausal         Family History   Problem Relation Age of Onset    Liver cancer Maternal Grandmother     Gout Other     Osteoporosis Other     Stroke Other     Ulcers Other     Asthma Other     Hypertension Other     Heart disease Other     Osteoarthritis Other     Colon cancer Neg Hx        REVIEW OF SYSTEMS: All systems reviewed and not pertinent unless noted.    Review of Systems   Cardiovascular:  Positive for chest pain.   All other systems reviewed and are negative.    Objective    Physical Exam  Vitals and nursing note reviewed.   Constitutional:       Appearance: Normal appearance.   HENT:      Head: Normocephalic and atraumatic.   Eyes:      Extraocular Movements: Extraocular movements intact.      Pupils: Pupils are equal, round, and reactive to light.   Cardiovascular:      Rate and Rhythm: Normal rate and regular rhythm.      Pulses: Normal pulses.           Carotid pulses are 1+ on the right side and 1+ on the left side.       Radial pulses are 1+ on the right side and 1+ on the left side.        Dorsalis pedis pulses are 1+ on the right side and 1+ on the left side.        Posterior tibial pulses are 1+ on the right side and 1+ on the left side.      Heart sounds: Normal heart sounds.   Pulmonary:      Effort: Pulmonary effort is normal.      Breath sounds: Normal breath sounds. Examination of the right-lower  field reveals decreased breath sounds. Examination of the left-lower field reveals decreased breath sounds.   Abdominal:      General: Abdomen is flat. Bowel sounds are normal.      Palpations: Abdomen is soft.   Musculoskeletal:      Cervical back: Normal range of motion and neck supple.   Skin:     Capillary Refill: Capillary refill takes less than 2 seconds.   Neurological:      General: No focal deficit present.      Mental Status: She is alert. Mental status is at baseline. She is disoriented.      GCS: GCS eye subscore is 4. GCS verbal subscore is 5. GCS motor subscore is 6.      Sensory: Sensation is intact.      Motor: Motor function is intact.      Gait: Gait is intact.   Psychiatric:         Attention and Perception: Attention and perception normal.         Mood and Affect: Mood and affect normal.         Speech: Speech normal.         Behavior: Behavior normal. Behavior is cooperative.     HEART Score for Major Cardiac Events - MDCalc  Calculated on Jun 17 2023 11:54 AM  4 points -> Moderate Score (4-6 points) Risk of MACE of 12-16.6%.  If troponin is positive, many experts recommend further workup and admission even with a low HEART Score.    Procedures    ED Course:    ED Course as of 06/17/23 1155   Sat Jun 17, 2023   0828 EKG interpreted by me, normal sinus rhythm with left axis deviation, no concerning ST changes noted, rate of 74, abnormal EKG [JE]   0934 Due to elevated troponin will obtain 2-hour troponin to rule out any delta prior to discharge or admission [KH]   0934 Spoke with Dr. John, cardiology on-call about patient's troponin.  He advises patient can be discharged home due to chronic elevation of troponin due to kidney issues. [KH]      ED Course User Index  [JE] Kiran Blanton MD  [KH] Florencio Mcbride APRN       Lab Results (last 24 hours)       Procedure Component Value Units Date/Time    CBC & Differential [[id number]]  (Abnormal) Collected: 06/17/23 0857    Specimen: Blood  Updated: 06/17/23 0902    Narrative:      The following orders were created for panel order CBC & Differential.  Procedure                               Abnormality         Status                     ---------                               -----------         ------                     CBC Auto Differential[[id number]]        Abnormal            Final result                 Please view results for these tests on the individual orders.    Comprehensive Metabolic Panel [[id number]]  (Abnormal) Collected: 06/17/23 0857    Specimen: Blood Updated: 06/17/23 0921     Glucose 90 mg/dL      BUN 35 mg/dL      Creatinine 2.02 mg/dL      Sodium 137 mmol/L      Potassium 4.8 mmol/L      Comment: Slight hemolysis detected by analyzer. Results may be affected.        Chloride 105 mmol/L      CO2 24.2 mmol/L      Calcium 9.0 mg/dL      Total Protein 5.2 g/dL      Albumin 2.9 g/dL      ALT (SGPT) 8 U/L      AST (SGOT) 16 U/L      Comment: Slight hemolysis detected by analyzer. Results may be affected.        Alkaline Phosphatase 168 U/L      Total Bilirubin 0.3 mg/dL      Globulin 2.3 gm/dL      A/G Ratio 1.3 g/dL      BUN/Creatinine Ratio 17.3     Anion Gap 7.8 mmol/L      eGFR 24.4 mL/min/1.73     Narrative:      GFR Normal >60  Chronic Kidney Disease <60  Kidney Failure <15    The GFR formula is only valid for adults with stable renal function between ages 18 and 70.    High Sensitivity Troponin T [[id number]]  (Abnormal) Collected: 06/17/23 0857    Specimen: Blood Updated: 06/17/23 0935     HS Troponin T 77 ng/L     Narrative:      High Sensitive Troponin T Reference Range:  <10.0 ng/L- Negative Female for AMI  <15.0 ng/L- Negative Male for AMI  >=10 - Abnormal Female indicating possible myocardial injury.  >=15 - Abnormal Male indicating possible myocardial injury.   Clinicians would have to utilize clinical acumen, EKG, Troponin, and serial changes to determine if it is an Acute Myocardial Infarction or myocardial injury due to  an underlying chronic condition.         CBC Auto Differential [[id number]]  (Abnormal) Collected: 06/17/23 0857    Specimen: Blood Updated: 06/17/23 0902     WBC 7.22 10*3/mm3      RBC 3.04 10*6/mm3      Hemoglobin 9.5 g/dL      Hematocrit 30.2 %      MCV 99.3 fL      MCH 31.3 pg      MCHC 31.5 g/dL      RDW 16.3 %      RDW-SD 59.2 fl      MPV 9.4 fL      Platelets 184 10*3/mm3      Neutrophil % 64.9 %      Lymphocyte % 21.6 %      Monocyte % 9.3 %      Eosinophil % 2.8 %      Basophil % 0.3 %      Immature Grans % 1.1 %      Neutrophils, Absolute 4.69 10*3/mm3      Lymphocytes, Absolute 1.56 10*3/mm3      Monocytes, Absolute 0.67 10*3/mm3      Eosinophils, Absolute 0.20 10*3/mm3      Basophils, Absolute 0.02 10*3/mm3      Immature Grans, Absolute 0.08 10*3/mm3      nRBC 0.0 /100 WBC     High Sensitivity Troponin T 2Hr [[id number]]  (Abnormal) Collected: 06/17/23 1110    Specimen: Blood Updated: 06/17/23 1147     HS Troponin T 80 ng/L      Troponin T Delta 3 ng/L     Narrative:      High Sensitive Troponin T Reference Range:  <10.0 ng/L- Negative Female for AMI  <15.0 ng/L- Negative Male for AMI  >=10 - Abnormal Female indicating possible myocardial injury.  >=15 - Abnormal Male indicating possible myocardial injury.   Clinicians would have to utilize clinical acumen, EKG, Troponin, and serial changes to determine if it is an Acute Myocardial Infarction or myocardial injury due to an underlying chronic condition.                  XR Chest 1 View    Result Date: 6/17/2023  PROCEDURE: XR CHEST 1 VW-  INDICATION:  Chest Pain Triage Protocol  FINDINGS:  A portable view of the chest was obtained.  Comparison is made to a prior exam dated 2/16/2023.   Heart size is normal. There are new interstitial opacities which may represent interstitial pulmonary edema or pneumonia. There is no significant pleural effusion. No pneumothorax.      Impression: New interstitial opacities which may represent pulmonary edema or pneumonia.  Recommend upright PA and lateral chest x-ray when patient able to tolerate.  This report was signed and finalized on 6/17/2023 9:51 AM by Shae Edge MD.      Medical Decision Making  83-year-old female patient comes in today complaining of chest pain.    Physical examination: GCS 15, mild confusion at baseline follows commands positive sensation bilateral upper lower extremity, cardiology regular rate and rhythm S1-S2 positive pulses bilateral upper and lower extremity abdomen soft nontender to palpation respiratory decreased in the bases    DDX: includes but is not limited to: NSTEMI, STEMI, unstable angina, chest pain unspecified    Amount and/or Complexity of Data Reviewed  Labs: ordered. Decision-making details documented in ED Course.  Radiology: ordered. Decision-making details documented in ED Course.  ECG/medicine tests: ordered. Decision-making details documented in ED Course.  Discussion of management or test interpretation with external provider(s): Discussed assessment, treatment and plan with ER attending, cardiology on-call    Risk  OTC drugs.  Prescription drug management.  Risk Details: Patient has been diagnosed with chest pain unspecified and will be discharged home.  Patient requested to follow-up with primary care provider within the next 7 days for reevaluation.                  Final diagnoses:   Chest pain, unspecified type          Florencio Mcbride, FATUMA  06/17/23 115

## 2023-06-29 PROBLEM — F03.A0 MILD DEMENTIA: Status: ACTIVE | Noted: 2023-06-29

## 2023-09-05 ENCOUNTER — NURSING HOME (OUTPATIENT)
Dept: INTERNAL MEDICINE | Facility: CLINIC | Age: 83
End: 2023-09-05
Payer: MEDICARE

## 2023-09-05 VITALS
BODY MASS INDEX: 16.83 KG/M2 | OXYGEN SATURATION: 98 % | HEART RATE: 77 BPM | DIASTOLIC BLOOD PRESSURE: 66 MMHG | TEMPERATURE: 98.2 F | RESPIRATION RATE: 16 BRPM | SYSTOLIC BLOOD PRESSURE: 112 MMHG | WEIGHT: 92 LBS

## 2023-09-05 DIAGNOSIS — M06.9 RHEUMATOID ARTHRITIS INVOLVING MULTIPLE SITES, UNSPECIFIED WHETHER RHEUMATOID FACTOR PRESENT: Primary | ICD-10-CM

## 2023-09-05 DIAGNOSIS — J44.9 CHRONIC OBSTRUCTIVE PULMONARY DISEASE, UNSPECIFIED COPD TYPE: ICD-10-CM

## 2023-09-05 DIAGNOSIS — Z74.09 IMPAIRED MOBILITY AND ADLS: ICD-10-CM

## 2023-09-05 DIAGNOSIS — Z78.9 IMPAIRED MOBILITY AND ADLS: ICD-10-CM

## 2023-09-05 DIAGNOSIS — S72.002D CLOSED FRACTURE OF LEFT HIP WITH ROUTINE HEALING, SUBSEQUENT ENCOUNTER: ICD-10-CM

## 2023-09-05 DIAGNOSIS — E44.0 MODERATE PROTEIN-CALORIE MALNUTRITION: ICD-10-CM

## 2023-09-05 DIAGNOSIS — N18.4 CHRONIC KIDNEY DISEASE (CKD), STAGE IV (SEVERE): ICD-10-CM

## 2023-09-05 DIAGNOSIS — G89.4 CHRONIC PAIN SYNDROME: ICD-10-CM

## 2023-09-05 DIAGNOSIS — I10 ESSENTIAL HYPERTENSION: ICD-10-CM

## 2023-09-05 DIAGNOSIS — D64.9 ANEMIA, UNSPECIFIED TYPE: ICD-10-CM

## 2023-09-05 NOTE — PROGRESS NOTES
"  Nursing Home Progress Note        Jose Antonio Pride DO   [address] [place] [address] Phone: [phone]  Fax: [phone]     PATIENT NAME: Carolyne Martins                                                                          YOB: 1940           DATE OF SERVICE: 09/05/2023  FACILITY:  [facility] ______________________________________________________________________     CHIEF COMPLAINT:  Chronic Medical Management      HISTORY OF PRESENT ILLNESS:   Patient was resting comfortably in bed on exam today.  She was seen for routine compliance visit.  Chronic medical issues were reviewed and discussed with nursing staff.  She remained stable and content with her care.  She remains on long-term prednisone for pain control for her rheumatoid arthritis.  She also continues to be on Procrit to assist with maintaining hemoglobin with underlying CKD.  Recent labs have been well controlled    PAST MEDICAL & SURGICAL HISTORY:   Past Medical History:   Diagnosis Date    Anemia 1998    Anemia     Arthritis     Back pain     Bleeding from anus     Bronchitis     STATES HAS BRONCHITIS CURRENTLY (12/5/17).      Bronchitis 12/2017    CAD (coronary artery disease)     Cataract, bilateral     Chronic kidney disease     STAGE 4.  TAMIE HALEY    Chronic kidney failure     REPORTS STAGE IV    Difficulty swallowing solids     Disease of thyroid gland     Fracture, radius 2016    right distal radius and ulna, healed.    GERD (gastroesophageal reflux disease)     Gout     High cholesterol     History of blood transfusion 2019    History of nuclear stress test 2014    DR. CORTEZ.  \"IT WAS OK\"    Hyperparathyroidism     Hyperparathyroidism     Hypertension     Impaired functional mobility, balance, gait, and endurance     Impaired mobility     Iron deficiency     Osteoarthritis     Osteoarthritis of knees, bilateral     Both knees Supartz injection series August, 2015    Osteoporosis     Palpitations "     Personal history of cardiac murmur     Pessary maintenance     PONV (postoperative nausea and vomiting)     Presence of pessary     Problems with swallowing     WITH FOOD OCCASSIONALLY    Rheumatic heart disease     Personal history    Rotator cuff tendonitis     right     Rupture of right proximal biceps tendon     chronic    Seasonal allergies     Sinus problem     Spinal headache     REPORTS AFTER DELIVERY OF SON    Subacromial bursitis     right     Valvular heart disease     Vision problems     Vitamin B12 deficiency     Vitamin D deficiency     Wears glasses       Past Surgical History:   Procedure Laterality Date    CATARACT EXTRACTION Bilateral     CERVICAL POLYPECTOMY      COLONOSCOPY  2011    COLONOSCOPY N/A 1/9/2018    Procedure: COLONOSCOPY with endoscopic mucosal resection (hot snare), normal saline submucosal injection, argon thermal ablation, resolution clip placement x4, and cold biopsy polypectomy;  Surgeon: Pieter Concepcion MD;  Location: [facility] ENDOSCOPY;  Service:     ENDOSCOPY N/A 12/6/2017    Procedure: ESOPHAGOGASTRODUODENOSCOPY with biopsies and hot snare polypectomies;  Surgeon: Pieter Concepcion MD;  Location: [facility] ENDOSCOPY;  Service:     ENDOSCOPY N/A 1/15/2019    Procedure: ESOPHAGOGASTRODUODENOSCOPY WITH BIOPSIES AND HOT SNARE POLYPECTOMIES X10;  Surgeon: Pieter Concepcion MD;  Location: [facility] ENDOSCOPY;  Service: Gastroenterology    HARDWARE REMOVAL Left 2/17/2023    Procedure: FEMUR HARDWARE REMOVAL, LEFT;  Surgeon: Gabriele Loyd MD;  Location: [facility] OR;  Service: Orthopedics;  Laterality: Left;    HIP TROCHANTERIC NAILING WITH INTRAMEDULLARY HIP SCREW Left 2/14/2021    Procedure: HIP TROCHANTER NAIL SHORT WITH INTRAMEDULLARY HIP SCREW, LEFT;  Surgeon: Gabriele Loyd MD;  Location: [facility] OR;  Service: Orthopedics;  Laterality: Left;    TOTAL HIP ARTHROPLASTY Left 2/17/2023    Procedure: REMOVAL OF FRACTURE HARDWARE AND CONVERSION TO TOTAL HIP ARTHROPLASTY, LEFT;  Surgeon:  Gabriele Loyd MD;  Location: [facility];  Service: Orthopedics;  Laterality: Left;    UPPER GASTROINTESTINAL ENDOSCOPY  08/18/2014         MEDICATIONS:  I have reviewed and reconciled the patients medication list in the patients chart at the skilled nursing facility today, 09/05/2023.      ALLERGIES:  Allergies   Allergen Reactions    Ferrlecit [Na Ferric Gluc Cplx In Sucrose] Swelling    Ranitidine Hcl Shortness Of Breath    Levofloxacin Other (See Comments)     Leg cramps      Lisinopril Cough         SOCIAL HISTORY:  Social History     Socioeconomic History    Marital status:    Tobacco Use    Smoking status: Never    Smokeless tobacco: Never   Vaping Use    Vaping Use: Never used   Substance and Sexual Activity    Alcohol use: Never    Drug use: Never    Sexual activity: Not Currently     Birth control/protection: Post-menopausal       FAMILY HISTORY:  Family History   Problem Relation Age of Onset    Liver cancer Maternal Grandmother     Gout Other     Osteoporosis Other     Stroke Other     Ulcers Other     Asthma Other     Hypertension Other     Heart disease Other     Osteoarthritis Other     Colon cancer Neg Hx        REVIEW OF SYSTEMS:  Review of Systems   Unable to perform ROS: Dementia        PHYSICAL EXAMINATION:     VITAL SIGNS:  /66   Pulse 77   Temp 98.2 °F (36.8 °C)   Resp 16   Wt 41.7 kg (92 lb)   LMP  (LMP Unknown)   SpO2 98%   BMI 16.83 kg/m²     Physical Exam  Vitals and nursing note reviewed.   Constitutional:       Appearance: Normal appearance. She is well-developed.      Comments: Frail elderly female in no acute distress   HENT:      Head: Normocephalic and atraumatic.      Nose: Nose normal.      Mouth/Throat:      Mouth: Mucous membranes are moist.      Pharynx: No oropharyngeal exudate.   Eyes:      General: No scleral icterus.     Conjunctiva/sclera: Conjunctivae normal.      Pupils: Pupils are equal, round, and reactive to light.   Neck:      Thyroid: No  thyromegaly.   Cardiovascular:      Rate and Rhythm: Normal rate and regular rhythm.      Heart sounds: Normal heart sounds. No murmur heard.    No friction rub. No gallop.   Pulmonary:      Effort: Pulmonary effort is normal. No respiratory distress.      Breath sounds: Normal breath sounds. No wheezing.   Abdominal:      General: Bowel sounds are normal. There is no distension.      Palpations: Abdomen is soft.      Tenderness: There is no abdominal tenderness.   Musculoskeletal:         General: No deformity or signs of injury.      Cervical back: Normal range of motion and neck supple.      Comments: Multiple small joint deformities in hands   Lymphadenopathy:      Cervical: No cervical adenopathy.   Skin:     General: Skin is warm and dry.      Findings: No rash.   Neurological:      Mental Status: She is alert and oriented to person, place, and time.   Psychiatric:         Mood and Affect: Mood normal.         Behavior: Behavior normal.       RECORDS REVIEW:   Labs: 6/22/2023 creatinine 2.1, 8/24/2023 CBC hemoglobin 11.3-stable    ASSESSMENT     Diagnoses and all orders for this visit:    1. Rheumatoid arthritis involving multiple sites, unspecified whether rheumatoid factor present (Primary)    2. Impaired mobility and ADLs    3. Closed fracture of left hip with routine healing, subsequent encounter    4. Chronic pain syndrome    5. Chronic obstructive pulmonary disease, unspecified COPD type    6. Moderate protein-calorie malnutrition    7. Anemia, unspecified type    8. Chronic kidney disease (CKD), stage IV (severe)    9. Essential hypertension        PLAN  Left hip fracture  - S/p TKA, Weight bearing as tolerated, but patient is frail overall and tends to use wheelchair more.     Impaired mobility and ADLs  - cont supportive care in nursing facility.     CKD stage IV  - Currently following with Dr. Schilling.  Diuretics per nephrology    Rheumatoid arthritis  - Currently on low-dose steroids along with  Plaquenil.  Continue Prednisone 10mg QD    Mood disorder  - Currently stable Seroquel.    COPD  - Continue Symbicort and nebs as needed in facility.    Gout  - Continue allopurinol.    GERD  - Continue Prilosec 40 mg daily.    Hypertension  - Home doses of carvedilol and irbesartan were continued.    Anemia related to chronic kidney disease  -Continue Procrit injections.  CBC every 2 weeks being sent to nephrology. Hb has been stable more recently.             [x]  Discussed Patient in detail with nursing/staff, addressed all needs today.     [x]  Plan of Care Reviewed   []  PT/OT Reviewed   []  Order Changes  []  Discharge Plans Reviewed  [x]  Advance Directive on file with Nursing Home.   [x]  POA on file with Nursing Home.    [x]  Code Status listed and reviewed.     I confirm accuracy of unchanged data/findings including physical exam and plan which have been carried forward from previous visit, as well as I have updated appropriately those that have changed          Jose Antonio Pride DO.  9/10/2023

## 2023-09-05 NOTE — LETTER
"  Nursing Home Progress Note        Jose Antonio Pride DO   [address] [place] [address] Phone: [phone]  Fax: [phone]     PATIENT NAME: Carolyne Martins                                                                          YOB: 1940           DATE OF SERVICE: 09/05/2023  FACILITY:  [facility] ______________________________________________________________________     CHIEF COMPLAINT:  Chronic Medical Management      HISTORY OF PRESENT ILLNESS:   Patient was resting comfortably in bed on exam today.  She was seen for routine compliance visit.  Chronic medical issues were reviewed and discussed with nursing staff.  She remained stable and content with her care.  She remains on long-term prednisone for pain control for her rheumatoid arthritis.  She also continues to be on Procrit to assist with maintaining hemoglobin with underlying CKD.  Recent labs have been well controlled    PAST MEDICAL & SURGICAL HISTORY:   Past Medical History:   Diagnosis Date    Anemia 1998    Anemia     Arthritis     Back pain     Bleeding from anus     Bronchitis     STATES HAS BRONCHITIS CURRENTLY (12/5/17).      Bronchitis 12/2017    CAD (coronary artery disease)     Cataract, bilateral     Chronic kidney disease     STAGE 4.  TAMIE HALEY    Chronic kidney failure     REPORTS STAGE IV    Difficulty swallowing solids     Disease of thyroid gland     Fracture, radius 2016    right distal radius and ulna, healed.    GERD (gastroesophageal reflux disease)     Gout     High cholesterol     History of blood transfusion 2019    History of nuclear stress test 2014    DR. CORTEZ.  \"IT WAS OK\"    Hyperparathyroidism     Hyperparathyroidism     Hypertension     Impaired functional mobility, balance, gait, and endurance     Impaired mobility     Iron deficiency     Osteoarthritis     Osteoarthritis of knees, bilateral     Both knees Supartz injection series August, 2015    Osteoporosis     Palpitations "     Personal history of cardiac murmur     Pessary maintenance     PONV (postoperative nausea and vomiting)     Presence of pessary     Problems with swallowing     WITH FOOD OCCASSIONALLY    Rheumatic heart disease     Personal history    Rotator cuff tendonitis     right     Rupture of right proximal biceps tendon     chronic    Seasonal allergies     Sinus problem     Spinal headache     REPORTS AFTER DELIVERY OF SON    Subacromial bursitis     right     Valvular heart disease     Vision problems     Vitamin B12 deficiency     Vitamin D deficiency     Wears glasses       Past Surgical History:   Procedure Laterality Date    CATARACT EXTRACTION Bilateral     CERVICAL POLYPECTOMY      COLONOSCOPY  2011    COLONOSCOPY N/A 1/9/2018    Procedure: COLONOSCOPY with endoscopic mucosal resection (hot snare), normal saline submucosal injection, argon thermal ablation, resolution clip placement x4, and cold biopsy polypectomy;  Surgeon: Pieter Concepcion MD;  Location: [facility] ENDOSCOPY;  Service:     ENDOSCOPY N/A 12/6/2017    Procedure: ESOPHAGOGASTRODUODENOSCOPY with biopsies and hot snare polypectomies;  Surgeon: Pieter Concepcion MD;  Location: [facility] ENDOSCOPY;  Service:     ENDOSCOPY N/A 1/15/2019    Procedure: ESOPHAGOGASTRODUODENOSCOPY WITH BIOPSIES AND HOT SNARE POLYPECTOMIES X10;  Surgeon: Pieter Concepcion MD;  Location: [facility] ENDOSCOPY;  Service: Gastroenterology    HARDWARE REMOVAL Left 2/17/2023    Procedure: FEMUR HARDWARE REMOVAL, LEFT;  Surgeon: Gabriele Loyd MD;  Location: [facility] OR;  Service: Orthopedics;  Laterality: Left;    HIP TROCHANTERIC NAILING WITH INTRAMEDULLARY HIP SCREW Left 2/14/2021    Procedure: HIP TROCHANTER NAIL SHORT WITH INTRAMEDULLARY HIP SCREW, LEFT;  Surgeon: Gabriele Loyd MD;  Location: [facility] OR;  Service: Orthopedics;  Laterality: Left;    TOTAL HIP ARTHROPLASTY Left 2/17/2023    Procedure: REMOVAL OF FRACTURE HARDWARE AND CONVERSION TO TOTAL HIP ARTHROPLASTY, LEFT;  Surgeon:  Gabriele Loyd MD;  Location: [facility];  Service: Orthopedics;  Laterality: Left;    UPPER GASTROINTESTINAL ENDOSCOPY  08/18/2014         MEDICATIONS:  I have reviewed and reconciled the patients medication list in the patients chart at the skilled nursing facility today, 09/05/2023.      ALLERGIES:  Allergies   Allergen Reactions    Ferrlecit [Na Ferric Gluc Cplx In Sucrose] Swelling    Ranitidine Hcl Shortness Of Breath    Levofloxacin Other (See Comments)     Leg cramps      Lisinopril Cough         SOCIAL HISTORY:  Social History     Socioeconomic History    Marital status:    Tobacco Use    Smoking status: Never    Smokeless tobacco: Never   Vaping Use    Vaping Use: Never used   Substance and Sexual Activity    Alcohol use: Never    Drug use: Never    Sexual activity: Not Currently     Birth control/protection: Post-menopausal       FAMILY HISTORY:  Family History   Problem Relation Age of Onset    Liver cancer Maternal Grandmother     Gout Other     Osteoporosis Other     Stroke Other     Ulcers Other     Asthma Other     Hypertension Other     Heart disease Other     Osteoarthritis Other     Colon cancer Neg Hx        REVIEW OF SYSTEMS:  Review of Systems   Unable to perform ROS: Dementia        PHYSICAL EXAMINATION:     VITAL SIGNS:  /66   Pulse 77   Temp 98.2 °F (36.8 °C)   Resp 16   Wt 41.7 kg (92 lb)   LMP  (LMP Unknown)   SpO2 98%   BMI 16.83 kg/m²     Physical Exam  Vitals and nursing note reviewed.   Constitutional:       Appearance: Normal appearance. She is well-developed.      Comments: Frail elderly female in no acute distress   HENT:      Head: Normocephalic and atraumatic.      Nose: Nose normal.      Mouth/Throat:      Mouth: Mucous membranes are moist.      Pharynx: No oropharyngeal exudate.   Eyes:      General: No scleral icterus.     Conjunctiva/sclera: Conjunctivae normal.      Pupils: Pupils are equal, round, and reactive to light.   Neck:      Thyroid: No  thyromegaly.   Cardiovascular:      Rate and Rhythm: Normal rate and regular rhythm.      Heart sounds: Normal heart sounds. No murmur heard.    No friction rub. No gallop.   Pulmonary:      Effort: Pulmonary effort is normal. No respiratory distress.      Breath sounds: Normal breath sounds. No wheezing.   Abdominal:      General: Bowel sounds are normal. There is no distension.      Palpations: Abdomen is soft.      Tenderness: There is no abdominal tenderness.   Musculoskeletal:         General: No deformity or signs of injury.      Cervical back: Normal range of motion and neck supple.      Comments: Multiple small joint deformities in hands   Lymphadenopathy:      Cervical: No cervical adenopathy.   Skin:     General: Skin is warm and dry.      Findings: No rash.   Neurological:      Mental Status: She is alert and oriented to person, place, and time.   Psychiatric:         Mood and Affect: Mood normal.         Behavior: Behavior normal.       RECORDS REVIEW:   Labs: 6/22/2023 creatinine 2.1, 8/24/2023 CBC hemoglobin 11.3-stable    ASSESSMENT     Diagnoses and all orders for this visit:    1. Rheumatoid arthritis involving multiple sites, unspecified whether rheumatoid factor present (Primary)    2. Impaired mobility and ADLs    3. Closed fracture of left hip with routine healing, subsequent encounter    4. Chronic pain syndrome    5. Chronic obstructive pulmonary disease, unspecified COPD type    6. Moderate protein-calorie malnutrition    7. Anemia, unspecified type    8. Chronic kidney disease (CKD), stage IV (severe)    9. Essential hypertension        PLAN  Left hip fracture  - S/p TKA, Weight bearing as tolerated, but patient is frail overall and tends to use wheelchair more.     Impaired mobility and ADLs  - cont supportive care in nursing facility.     CKD stage IV  - Currently following with Dr. Schilling.  Diuretics per nephrology    Rheumatoid arthritis  - Currently on low-dose steroids along with  Plaquenil.  Continue Prednisone 10mg QD    Mood disorder  - Currently stable Seroquel.    COPD  - Continue Symbicort and nebs as needed in facility.    Gout  - Continue allopurinol.    GERD  - Continue Prilosec 40 mg daily.    Hypertension  - Home doses of carvedilol and irbesartan were continued.    Anemia related to chronic kidney disease  -Continue Procrit injections.  CBC every 2 weeks being sent to nephrology. Hb has been stable more recently.             [x]  Discussed Patient in detail with nursing/staff, addressed all needs today.     [x]  Plan of Care Reviewed   []  PT/OT Reviewed   []  Order Changes  []  Discharge Plans Reviewed  [x]  Advance Directive on file with Nursing Home.   [x]  POA on file with Nursing Home.    [x]  Code Status listed and reviewed.     I confirm accuracy of unchanged data/findings including physical exam and plan which have been carried forward from previous visit, as well as I have updated appropriately those that have changed          Jose Antonio Pride DO.  9/10/2023

## 2023-09-15 RX ORDER — HYDROCODONE BITARTRATE AND ACETAMINOPHEN 5; 325 MG/1; MG/1
1 TABLET ORAL EVERY 6 HOURS PRN
Qty: 120 TABLET | Refills: 0 | Status: SHIPPED | OUTPATIENT
Start: 2023-09-15

## 2023-09-15 NOTE — TELEPHONE ENCOUNTER
[facility] REQUESTING MED REFILL FOR NORCO 5-325 MG.    DIRECTIONS:: NORCO 5-325 MG 1 TAB PO Q 6 HRS PRN.

## 2023-09-18 ENCOUNTER — NURSING HOME (OUTPATIENT)
Dept: FAMILY MEDICINE CLINIC | Facility: CLINIC | Age: 83
End: 2023-09-18
Payer: MEDICARE

## 2023-09-18 VITALS
OXYGEN SATURATION: 99 % | HEART RATE: 74 BPM | RESPIRATION RATE: 16 BRPM | DIASTOLIC BLOOD PRESSURE: 70 MMHG | TEMPERATURE: 97.4 F | BODY MASS INDEX: 17.3 KG/M2 | SYSTOLIC BLOOD PRESSURE: 124 MMHG | WEIGHT: 94.6 LBS

## 2023-09-18 DIAGNOSIS — Z78.9 IMPAIRED MOBILITY AND ADLS: ICD-10-CM

## 2023-09-18 DIAGNOSIS — Z74.09 IMPAIRED MOBILITY AND ADLS: ICD-10-CM

## 2023-09-18 DIAGNOSIS — R63.0 DECREASED APPETITE: ICD-10-CM

## 2023-09-18 DIAGNOSIS — R41.82 ACUTE ON CHRONIC ALTERATION IN MENTAL STATUS: ICD-10-CM

## 2023-09-18 DIAGNOSIS — J44.9 CHRONIC OBSTRUCTIVE PULMONARY DISEASE, UNSPECIFIED COPD TYPE: ICD-10-CM

## 2023-09-18 DIAGNOSIS — R53.83 LETHARGY: ICD-10-CM

## 2023-09-18 NOTE — LETTER
"Nursing Home Follow Up Note      Mikey Duran DO []   FATUMA Monet [x]  [address] [place] [address]  Phone: [phone]  Fax: [phone] Baltazar Borden MD []    Jose Antonio Pride DO []   [address] [place] [address]  Phone: [phone]  Fax: [phone]     PATIENT NAME: Carolyne Martins                                                                          YOB: 1940           DATE OF SERVICE: 09/18/2023  FACILITY:  [][facility]   [] [place]   [x] [facility]   [] [facility]   [] Other ______________________________________________________________________      CHIEF COMPLAINT:    Increased confusion, fatigue and weakness today.    HISTORY OF PRESENT ILLNESS:     Nursing reports that patient has been more confused, fatigued and weak  today. Nursing reports she has been sleeping more today as well. She is resting and sleeping in bed during visit.  She would wake to converse but quickly went back to sleep. She was more confused and lethargic during visit.  She denies any pain or discomfort, has no complaints or concerns but much more confused than baseline. She does appear to have some slight distress. Vital signs normal.        PAST MEDICAL & SURGICAL HISTORY:    Past Medical History:   Diagnosis Date    Anemia 1998    Anemia     Arthritis     Back pain     Bleeding from anus     Bronchitis     STATES HAS BRONCHITIS CURRENTLY (12/5/17).      Bronchitis 12/2017    CAD (coronary artery disease)     Cataract, bilateral     Chronic kidney disease     STAGE 4.  SEES TREA    Chronic kidney failure     REPORTS STAGE IV    Difficulty swallowing solids     Disease of thyroid gland     Fracture, radius 2016    right distal radius and ulna, healed.    GERD (gastroesophageal reflux disease)     Gout     High cholesterol     History of blood transfusion 2019    History of nuclear stress test 2014    DR. CORTEZ.  \"IT WAS OK\"    Hyperparathyroidism     Hyperparathyroidism     " Hypertension     Impaired functional mobility, balance, gait, and endurance     Impaired mobility     Iron deficiency     Osteoarthritis     Osteoarthritis of knees, bilateral     Both knees Supartz injection series August, 2015    Osteoporosis     Palpitations     Personal history of cardiac murmur     Pessary maintenance     PONV (postoperative nausea and vomiting)     Presence of pessary     Problems with swallowing     WITH FOOD OCCASSIONALLY    Rheumatic heart disease     Personal history    Rotator cuff tendonitis     right     Rupture of right proximal biceps tendon     chronic    Seasonal allergies     Sinus problem     Spinal headache     REPORTS AFTER DELIVERY OF SON    Subacromial bursitis     right     Valvular heart disease     Vision problems     Vitamin B12 deficiency     Vitamin D deficiency     Wears glasses       Past Surgical History:   Procedure Laterality Date    CATARACT EXTRACTION Bilateral     CERVICAL POLYPECTOMY      COLONOSCOPY  2011    COLONOSCOPY N/A 1/9/2018    Procedure: COLONOSCOPY with endoscopic mucosal resection (hot snare), normal saline submucosal injection, argon thermal ablation, resolution clip placement x4, and cold biopsy polypectomy;  Surgeon: Pieter Concepcion MD;  Location: [facility] ENDOSCOPY;  Service:     ENDOSCOPY N/A 12/6/2017    Procedure: ESOPHAGOGASTRODUODENOSCOPY with biopsies and hot snare polypectomies;  Surgeon: Pieter Concepcion MD;  Location: [facility] ENDOSCOPY;  Service:     ENDOSCOPY N/A 1/15/2019    Procedure: ESOPHAGOGASTRODUODENOSCOPY WITH BIOPSIES AND HOT SNARE POLYPECTOMIES X10;  Surgeon: Pieter Concepcion MD;  Location: [facility] ENDOSCOPY;  Service: Gastroenterology    HARDWARE REMOVAL Left 2/17/2023    Procedure: FEMUR HARDWARE REMOVAL, LEFT;  Surgeon: Gabriele Loyd MD;  Location: [facility] OR;  Service: Orthopedics;  Laterality: Left;    HIP TROCHANTERIC NAILING WITH INTRAMEDULLARY HIP SCREW Left 2/14/2021    Procedure: HIP TROCHANTER NAIL SHORT WITH  INTRAMEDULLARY HIP SCREW, LEFT;  Surgeon: Gabriele Loyd MD;  Location: [facility] OR;  Service: Orthopedics;  Laterality: Left;    TOTAL HIP ARTHROPLASTY Left 2/17/2023    Procedure: REMOVAL OF FRACTURE HARDWARE AND CONVERSION TO TOTAL HIP ARTHROPLASTY, LEFT;  Surgeon: Gabriele Loyd MD;  Location: [facility] OR;  Service: Orthopedics;  Laterality: Left;    UPPER GASTROINTESTINAL ENDOSCOPY  08/18/2014         MEDICATIONS:  I have reviewed and reconciled the patients medication list in the patients chart at the skilled nursing facility today.      ALLERGIES:    Allergies   Allergen Reactions    Ferrlecit [Na Ferric Gluc Cplx In Sucrose] Swelling    Ranitidine Hcl Shortness Of Breath    Levofloxacin Other (See Comments)     Leg cramps      Lisinopril Cough         SOCIAL HISTORY:    Social History     Socioeconomic History    Marital status:    Tobacco Use    Smoking status: Never    Smokeless tobacco: Never   Vaping Use    Vaping Use: Never used   Substance and Sexual Activity    Alcohol use: Never    Drug use: Never    Sexual activity: Not Currently     Birth control/protection: Post-menopausal       FAMILY HISTORY:    Family History   Problem Relation Age of Onset    Liver cancer Maternal Grandmother     Gout Other     Osteoporosis Other     Stroke Other     Ulcers Other     Asthma Other     Hypertension Other     Heart disease Other     Osteoarthritis Other     Colon cancer Neg Hx        REVIEW OF SYSTEMS:    Review of Systems   Constitutional:  Positive for activity change, appetite change and fatigue. Negative for chills, fever and unexpected weight loss.   HENT:  Negative for congestion, mouth sores and nosebleeds.    Respiratory:  Positive for shortness of breath (chronic). Negative for apnea, cough, choking, chest tightness and wheezing.         Chronic related to COPD   Cardiovascular:  Negative for chest pain, palpitations and leg swelling.   Gastrointestinal:  Negative for constipation, diarrhea,  nausea and vomiting.   Genitourinary:  Positive for urinary incontinence. Negative for decreased urine volume, difficulty urinating, dysuria, frequency and hematuria.   Musculoskeletal:  Positive for arthralgias (chronic) and gait problem.   Skin:  Negative for color change, dry skin and rash.   Neurological:  Positive for weakness, memory problem and confusion. Negative for dizziness and headache.       PHYSICAL EXAMINATION:   VITAL SIGNS:   Vitals:    09/18/23 1540   BP: 124/70   Pulse: 74   Resp: 16   Temp: 97.4 °F (36.3 °C)   SpO2: 99%   Weight: 42.9 kg (94 lb 9.6 oz)       Physical Exam  Vitals and nursing note reviewed.   Constitutional:       Appearance: Normal appearance. She is well-developed.   Eyes:      Conjunctiva/sclera: Conjunctivae normal.   Cardiovascular:      Rate and Rhythm: Normal rate and regular rhythm.      Heart sounds: Normal heart sounds.   Pulmonary:      Effort: Pulmonary effort is normal. No respiratory distress.      Breath sounds: Decreased air movement present. Decreased breath sounds present.   Abdominal:      General: Bowel sounds are normal. There is no distension.      Palpations: Abdomen is soft.      Tenderness: There is no abdominal tenderness.   Skin:     General: Skin is warm and dry.      Findings: No rash.   Neurological:      Mental Status: She is alert. She is lethargic, disoriented and confused.   Psychiatric:         Mood and Affect: Mood and affect normal.         Speech: Speech normal.         Behavior: Behavior is slowed.         Cognition and Memory: Cognition is impaired. Memory is impaired.       RECORDS REVIEW:   I have reviewed and interpreted the records in [facility]    ASSESSMENT     Diagnoses and all orders for this visit:    1. Lethargy    2. Acute on chronic alteration in mental status    3. Decreased appetite    4. Chronic obstructive pulmonary disease, unspecified COPD type    5. Impaired mobility and ADLs        PLAN    Lethargy/decreased  appetite/COPD  -Will get stat CBC and CMP, and chest x-ray.  We will also obtain UA C&S.  Will follow-up with results.    Will follow up and continue to monitor.     Nursing to continue supportive care for all ADLs.    Nursing encouraged to keep me informed of any acute changes, lack of improvement, or any new concerning symptoms.    [x]  Discussed Patient in detail with nursing/staff, addressed all needs today.     [x]  Plan of Care Reviewed   [x]  PT/OT Reviewed   [x]  Order Changes  [x]  Discharge Plans Reviewed  [x]  Advance Directive on file with Nursing Home.   [x]  POA on file with Nursing Home.   [x]  Code Status listed: []  Full Code   [x]  DNR       “I confirm accuracy of unchanged data/findings which have been carried forward from previous visit, as well as I have updated appropriately those that have changed.”     I spent 30 minutes caring for Carolyne on this date of service. This time includes time spent by me in the following activities:preparing for the visit, obtaining and/or reviewing a separately obtained history, performing a medically appropriate examination and/or evaluation , counseling and educating the patient/family/caregiver, ordering medications, tests, or procedures, referring and communicating with other health care professionals , documenting information in the medical record, and care coordination           FATUMA Frank.

## 2023-09-19 NOTE — PROGRESS NOTES
"Nursing Home Follow Up Note      Mikey Duran DO []   FATUMA Monet [x]  [address] [place] [address]  Phone: [phone]  Fax: [phone] Baltazar Borden MD []    Jose Antonio Pride DO []   [address] [place] [address]  Phone: [phone]  Fax: [phone]     PATIENT NAME: Carolyne Martins                                                                          YOB: 1940           DATE OF SERVICE: 09/18/2023  FACILITY:  [][place]   [] [facility]   [x] [facility]   [] [facility]   [] Other ______________________________________________________________________      CHIEF COMPLAINT:    Increased confusion, fatigue and weakness today.    HISTORY OF PRESENT ILLNESS:     Nursing reports that patient has been more confused, fatigued and weak  today. Nursing reports she has been sleeping more today as well. She is resting and sleeping in bed during visit.  She would wake to converse but quickly went back to sleep. She was more confused and lethargic during visit.  She denies any pain or discomfort, has no complaints or concerns but much more confused than baseline. She does appear to have some slight distress. Vital signs normal.        PAST MEDICAL & SURGICAL HISTORY:    Past Medical History:   Diagnosis Date    Anemia 1998    Anemia     Arthritis     Back pain     Bleeding from anus     Bronchitis     STATES HAS BRONCHITIS CURRENTLY (12/5/17).      Bronchitis 12/2017    CAD (coronary artery disease)     Cataract, bilateral     Chronic kidney disease     STAGE 4.  SEES TERA    Chronic kidney failure     REPORTS STAGE IV    Difficulty swallowing solids     Disease of thyroid gland     Fracture, radius 2016    right distal radius and ulna, healed.    GERD (gastroesophageal reflux disease)     Gout     High cholesterol     History of blood transfusion 2019    History of nuclear stress test 2014    DR. CORTEZ.  \"IT WAS OK\"    Hyperparathyroidism     Hyperparathyroidism     " Hypertension     Impaired functional mobility, balance, gait, and endurance     Impaired mobility     Iron deficiency     Osteoarthritis     Osteoarthritis of knees, bilateral     Both knees Supartz injection series August, 2015    Osteoporosis     Palpitations     Personal history of cardiac murmur     Pessary maintenance     PONV (postoperative nausea and vomiting)     Presence of pessary     Problems with swallowing     WITH FOOD OCCASSIONALLY    Rheumatic heart disease     Personal history    Rotator cuff tendonitis     right     Rupture of right proximal biceps tendon     chronic    Seasonal allergies     Sinus problem     Spinal headache     REPORTS AFTER DELIVERY OF SON    Subacromial bursitis     right     Valvular heart disease     Vision problems     Vitamin B12 deficiency     Vitamin D deficiency     Wears glasses       Past Surgical History:   Procedure Laterality Date    CATARACT EXTRACTION Bilateral     CERVICAL POLYPECTOMY      COLONOSCOPY  2011    COLONOSCOPY N/A 1/9/2018    Procedure: COLONOSCOPY with endoscopic mucosal resection (hot snare), normal saline submucosal injection, argon thermal ablation, resolution clip placement x4, and cold biopsy polypectomy;  Surgeon: Pieter Concepcion MD;  Location: [facility] ENDOSCOPY;  Service:     ENDOSCOPY N/A 12/6/2017    Procedure: ESOPHAGOGASTRODUODENOSCOPY with biopsies and hot snare polypectomies;  Surgeon: Pieter Concepcion MD;  Location: [facility] ENDOSCOPY;  Service:     ENDOSCOPY N/A 1/15/2019    Procedure: ESOPHAGOGASTRODUODENOSCOPY WITH BIOPSIES AND HOT SNARE POLYPECTOMIES X10;  Surgeon: Pieter Concepcion MD;  Location: [facility] ENDOSCOPY;  Service: Gastroenterology    HARDWARE REMOVAL Left 2/17/2023    Procedure: FEMUR HARDWARE REMOVAL, LEFT;  Surgeon: Gabriele Loyd MD;  Location: [facility] OR;  Service: Orthopedics;  Laterality: Left;    HIP TROCHANTERIC NAILING WITH INTRAMEDULLARY HIP SCREW Left 2/14/2021    Procedure: HIP TROCHANTER NAIL SHORT WITH  INTRAMEDULLARY HIP SCREW, LEFT;  Surgeon: Gabriele Loyd MD;  Location: [facility] OR;  Service: Orthopedics;  Laterality: Left;    TOTAL HIP ARTHROPLASTY Left 2/17/2023    Procedure: REMOVAL OF FRACTURE HARDWARE AND CONVERSION TO TOTAL HIP ARTHROPLASTY, LEFT;  Surgeon: Gabriele Loyd MD;  Location: [facility] OR;  Service: Orthopedics;  Laterality: Left;    UPPER GASTROINTESTINAL ENDOSCOPY  08/18/2014         MEDICATIONS:  I have reviewed and reconciled the patients medication list in the patients chart at the skilled nursing facility today.      ALLERGIES:    Allergies   Allergen Reactions    Ferrlecit [Na Ferric Gluc Cplx In Sucrose] Swelling    Ranitidine Hcl Shortness Of Breath    Levofloxacin Other (See Comments)     Leg cramps      Lisinopril Cough         SOCIAL HISTORY:    Social History     Socioeconomic History    Marital status:    Tobacco Use    Smoking status: Never    Smokeless tobacco: Never   Vaping Use    Vaping Use: Never used   Substance and Sexual Activity    Alcohol use: Never    Drug use: Never    Sexual activity: Not Currently     Birth control/protection: Post-menopausal       FAMILY HISTORY:    Family History   Problem Relation Age of Onset    Liver cancer Maternal Grandmother     Gout Other     Osteoporosis Other     Stroke Other     Ulcers Other     Asthma Other     Hypertension Other     Heart disease Other     Osteoarthritis Other     Colon cancer Neg Hx        REVIEW OF SYSTEMS:    Review of Systems   Constitutional:  Positive for activity change, appetite change and fatigue. Negative for chills, fever and unexpected weight loss.   HENT:  Negative for congestion, mouth sores and nosebleeds.    Respiratory:  Positive for shortness of breath (chronic). Negative for apnea, cough, choking, chest tightness and wheezing.         Chronic related to COPD   Cardiovascular:  Negative for chest pain, palpitations and leg swelling.   Gastrointestinal:  Negative for constipation, diarrhea,  nausea and vomiting.   Genitourinary:  Positive for urinary incontinence. Negative for decreased urine volume, difficulty urinating, dysuria, frequency and hematuria.   Musculoskeletal:  Positive for arthralgias (chronic) and gait problem.   Skin:  Negative for color change, dry skin and rash.   Neurological:  Positive for weakness, memory problem and confusion. Negative for dizziness and headache.       PHYSICAL EXAMINATION:   VITAL SIGNS:   Vitals:    09/18/23 1540   BP: 124/70   Pulse: 74   Resp: 16   Temp: 97.4 °F (36.3 °C)   SpO2: 99%   Weight: 42.9 kg (94 lb 9.6 oz)       Physical Exam  Vitals and nursing note reviewed.   Constitutional:       Appearance: Normal appearance. She is well-developed.   Eyes:      Conjunctiva/sclera: Conjunctivae normal.   Cardiovascular:      Rate and Rhythm: Normal rate and regular rhythm.      Heart sounds: Normal heart sounds.   Pulmonary:      Effort: Pulmonary effort is normal. No respiratory distress.      Breath sounds: Decreased air movement present. Decreased breath sounds present.   Abdominal:      General: Bowel sounds are normal. There is no distension.      Palpations: Abdomen is soft.      Tenderness: There is no abdominal tenderness.   Skin:     General: Skin is warm and dry.      Findings: No rash.   Neurological:      Mental Status: She is alert. She is lethargic, disoriented and confused.   Psychiatric:         Mood and Affect: Mood and affect normal.         Speech: Speech normal.         Behavior: Behavior is slowed.         Cognition and Memory: Cognition is impaired. Memory is impaired.       RECORDS REVIEW:   I have reviewed and interpreted the records in [facility] and [facility]    ASSESSMENT     Diagnoses and all orders for this visit:    1. Lethargy    2. Acute on chronic alteration in mental status    3. Decreased appetite    4. Chronic obstructive pulmonary disease, unspecified COPD type    5. Impaired mobility and ADLs        PLAN    Lethargy/decreased  appetite/COPD  -Will get stat CBC and CMP, and chest x-ray.  We will also obtain UA C&S.  Will follow-up with results.    Will follow up and continue to monitor.     Nursing to continue supportive care for all ADLs.    Nursing encouraged to keep me informed of any acute changes, lack of improvement, or any new concerning symptoms.    [x]  Discussed Patient in detail with nursing/staff, addressed all needs today.     [x]  Plan of Care Reviewed   [x]  PT/OT Reviewed   [x]  Order Changes  [x]  Discharge Plans Reviewed  [x]  Advance Directive on file with Nursing Home.   [x]  POA on file with Nursing Home.   [x]  Code Status listed: []  Full Code   [x]  DNR       “I confirm accuracy of unchanged data/findings which have been carried forward from previous visit, as well as I have updated appropriately those that have changed.”     I spent 30 minutes caring for Carolyne on this date of service. This time includes time spent by me in the following activities:preparing for the visit, obtaining and/or reviewing a separately obtained history, performing a medically appropriate examination and/or evaluation , counseling and educating the patient/family/caregiver, ordering medications, tests, or procedures, referring and communicating with other health care professionals , documenting information in the medical record, and care coordination           FATUMA Frank.

## 2023-09-26 ENCOUNTER — NURSING HOME (OUTPATIENT)
Dept: FAMILY MEDICINE CLINIC | Facility: CLINIC | Age: 83
End: 2023-09-26
Payer: MEDICARE

## 2023-09-26 VITALS
HEART RATE: 74 BPM | BODY MASS INDEX: 17.23 KG/M2 | RESPIRATION RATE: 18 BRPM | TEMPERATURE: 98.5 F | SYSTOLIC BLOOD PRESSURE: 140 MMHG | OXYGEN SATURATION: 96 % | DIASTOLIC BLOOD PRESSURE: 70 MMHG | WEIGHT: 94.2 LBS

## 2023-09-26 DIAGNOSIS — Z78.9 IMPAIRED MOBILITY AND ADLS: ICD-10-CM

## 2023-09-26 DIAGNOSIS — Z74.09 IMPAIRED MOBILITY AND ADLS: ICD-10-CM

## 2023-09-26 DIAGNOSIS — N30.00 ACUTE CYSTITIS WITHOUT HEMATURIA: Primary | ICD-10-CM

## 2023-09-26 DIAGNOSIS — R53.83 LETHARGY: ICD-10-CM

## 2023-09-26 DIAGNOSIS — F03.C0 SEVERE DEMENTIA WITHOUT BEHAVIORAL DISTURBANCE, PSYCHOTIC DISTURBANCE, MOOD DISTURBANCE, OR ANXIETY, UNSPECIFIED DEMENTIA TYPE: ICD-10-CM

## 2023-09-26 DIAGNOSIS — R41.82 ACUTE ON CHRONIC ALTERATION IN MENTAL STATUS: ICD-10-CM

## 2023-09-26 NOTE — LETTER
Nursing Home Follow Up Note      Mikey Duran DO []   FATUMA Monet [x]  [address] [place] [address]  Phone: [phone]  Fax: [phone] Baltazar Borden MD []    Jose Antonio Pride DO []   [address] [place] [address]  Phone: [phone]  Fax: [phone]     PATIENT NAME: Carolyne Martins                                                                          YOB: 1940           DATE OF SERVICE: 09/26/2023  FACILITY:  [][place]   [] [place]   [x] [facility]   [] [facility]   [] Other ______________________________________________________________________      CHIEF COMPLAINT:  Follow up mental status changes, lethargy and weakness.     HISTORY OF PRESENT ILLNESS:     Patient with mental status changes, decreased intake ,lethargy and weakness last week so labs and CXR obtained for further evaluation.  She did have elevated BUN and creatinine so fluids were given.  CBC was insignificant and she did not have any signs of acute infection on CXR. UA reports UTI and she is receiving Doxycycline until 10/2. Diet has been changed to mechanical soft and she has been placed on thickened liquids.  Labs were repeated yesterday and BUN and creatinine have improved but patient continues to have poor intake.  She continues to have lethargy and mental status changes.  Family aware of patient's declining and feel it is expected related to her dementia.  They are discussing making her comfort measures only.       PAST MEDICAL & SURGICAL HISTORY:    Past Medical History:   Diagnosis Date    Anemia 1998    Anemia     Arthritis     Back pain     Bleeding from anus     Bronchitis     STATES HAS BRONCHITIS CURRENTLY (12/5/17).      Bronchitis 12/2017    CAD (coronary artery disease)     Cataract, bilateral     Chronic kidney disease     STAGE 4.  SEES TERA    Chronic kidney failure     REPORTS STAGE IV    Difficulty swallowing solids     Disease of thyroid gland     Fracture, radius  "2016    right distal radius and ulna, healed.    GERD (gastroesophageal reflux disease)     Gout     High cholesterol     History of blood transfusion 2019    History of nuclear stress test 2014    DR. CORTEZ.  \"IT WAS OK\"    Hyperparathyroidism     Hyperparathyroidism     Hypertension     Impaired functional mobility, balance, gait, and endurance     Impaired mobility     Iron deficiency     Osteoarthritis     Osteoarthritis of knees, bilateral     Both knees Supartz injection series August, 2015    Osteoporosis     Palpitations     Personal history of cardiac murmur     Pessary maintenance     PONV (postoperative nausea and vomiting)     Presence of pessary     Problems with swallowing     WITH FOOD OCCASSIONALLY    Rheumatic heart disease     Personal history    Rotator cuff tendonitis     right     Rupture of right proximal biceps tendon     chronic    Seasonal allergies     Sinus problem     Spinal headache     REPORTS AFTER DELIVERY OF SON    Subacromial bursitis     right     Valvular heart disease     Vision problems     Vitamin B12 deficiency     Vitamin D deficiency     Wears glasses       Past Surgical History:   Procedure Laterality Date    CATARACT EXTRACTION Bilateral     CERVICAL POLYPECTOMY      COLONOSCOPY  2011    COLONOSCOPY N/A 1/9/2018    Procedure: COLONOSCOPY with endoscopic mucosal resection (hot snare), normal saline submucosal injection, argon thermal ablation, resolution clip placement x4, and cold biopsy polypectomy;  Surgeon: Pieter Concepcion MD;  Location: [facility] ENDOSCOPY;  Service:     ENDOSCOPY N/A 12/6/2017    Procedure: ESOPHAGOGASTRODUODENOSCOPY with biopsies and hot snare polypectomies;  Surgeon: Pieter Concepcion MD;  Location: [facility] ENDOSCOPY;  Service:     ENDOSCOPY N/A 1/15/2019    Procedure: ESOPHAGOGASTRODUODENOSCOPY WITH BIOPSIES AND HOT SNARE POLYPECTOMIES X10;  Surgeon: Pieter Concepcion MD;  Location: [facility] ENDOSCOPY;  Service: Gastroenterology    HARDWARE REMOVAL Left " 2/17/2023    Procedure: FEMUR HARDWARE REMOVAL, LEFT;  Surgeon: Gabriele Loyd MD;  Location: [facility];  Service: Orthopedics;  Laterality: Left;    HIP TROCHANTERIC NAILING WITH INTRAMEDULLARY HIP SCREW Left 2/14/2021    Procedure: HIP TROCHANTER NAIL SHORT WITH INTRAMEDULLARY HIP SCREW, LEFT;  Surgeon: Gabriele Loyd MD;  Location: [facility] OR;  Service: Orthopedics;  Laterality: Left;    TOTAL HIP ARTHROPLASTY Left 2/17/2023    Procedure: REMOVAL OF FRACTURE HARDWARE AND CONVERSION TO TOTAL HIP ARTHROPLASTY, LEFT;  Surgeon: Gabriele Loyd MD;  Location: [facility] OR;  Service: Orthopedics;  Laterality: Left;    UPPER GASTROINTESTINAL ENDOSCOPY  08/18/2014         MEDICATIONS:  I have reviewed and reconciled the patients medication list in the patients chart at the skilled nursing [facility] today.      ALLERGIES:    Allergies   Allergen Reactions    Ferrlecit [Na Ferric Gluc Cplx In Sucrose] Swelling    Ranitidine Hcl Shortness Of Breath    Levofloxacin Other (See Comments)     Leg cramps      Lisinopril Cough         SOCIAL HISTORY:    Social History     Socioeconomic History    Marital status:    Tobacco Use    Smoking status: Never    Smokeless tobacco: Never   Vaping Use    Vaping Use: Never used   Substance and Sexual Activity    Alcohol use: Never    Drug use: Never    Sexual activity: Not Currently     Birth control/protection: Post-menopausal       FAMILY HISTORY:    Family History   Problem Relation Age of Onset    Liver cancer Maternal Grandmother     Gout Other     Osteoporosis Other     Stroke Other     Ulcers Other     Asthma Other     Hypertension Other     Heart disease Other     Osteoarthritis Other     Colon cancer Neg Hx        REVIEW OF SYSTEMS:    Review of Systems   Constitutional:  Positive for activity change, appetite change and fatigue. Negative for fever.   HENT:  Negative for congestion, mouth sores and nosebleeds.    Respiratory:  Positive for apnea and shortness of  breath (chronic).         Chronic related to COPD   Cardiovascular:  Negative for leg swelling.   Gastrointestinal:  Negative for constipation, diarrhea and vomiting.   Genitourinary:  Positive for urinary incontinence. Negative for decreased urine volume, difficulty urinating, frequency and hematuria.   Musculoskeletal:  Positive for arthralgias (chronic) and gait problem.   Skin:  Negative for color change, dry skin and rash.   Neurological:  Positive for weakness, memory problem and confusion. Negative for dizziness and headache.       PHYSICAL EXAMINATION:   VITAL SIGNS:   Vitals:    09/26/23 1526   BP: 140/70   Pulse: 74   Resp: 18   Temp: 98.5 °F (36.9 °C)   SpO2: 96%   Weight: 42.7 kg (94 lb 3.2 oz)       Physical Exam  Vitals and nursing note reviewed.   Constitutional:       Appearance: Normal appearance. She is well-developed.   Eyes:      Conjunctiva/sclera: Conjunctivae normal.   Cardiovascular:      Rate and Rhythm: Normal rate and regular rhythm.      Heart sounds: Normal heart sounds.   Pulmonary:      Effort: Pulmonary effort is normal. No respiratory distress.      Breath sounds: Decreased air movement present. Decreased breath sounds present.   Abdominal:      General: Bowel sounds are normal. There is no distension.      Palpations: Abdomen is soft.      Tenderness: There is no abdominal tenderness.   Skin:     General: Skin is warm and dry.      Findings: No rash.   Neurological:      Mental Status: She is lethargic, disoriented and confused.   Psychiatric:         Mood and Affect: Affect is inappropriate.         Speech: Speech is tangential.         Behavior: Behavior is slowed.         Cognition and Memory: Cognition is impaired. Memory is impaired.       RECORDS REVIEW:   I have reviewed and interpreted the records in [facility] and [facility]    ASSESSMENT     Diagnoses and all orders for this visit:    1. Acute cystitis without hematuria (Primary)    2. Acute on chronic alteration in mental status    3.  Severe dementia without behavioral disturbance, psychotic disturbance, mood disturbance, or anxiety, unspecified dementia type    4. Impaired mobility and ADLs    5. Lethargy        PLAN    UTI/Lethargy/decreased appetite/mental status changes/dementia  -She will continue doxycycline until completion on 10/2.  Nursing reports that she is eating and drinking some with changing her diet to puréed and thickened liquids but still not eating well.  Nursing has been discussing plan of care with family and they are to let nursing know there are plans in the next couple days but are leaning towards palliative care.  Will follow-up.    Will follow up and continue to monitor.     Nursing to continue supportive care for all ADLs.    Nursing encouraged to keep me informed of any acute changes, lack of improvement, or any new concerning symptoms.    [x]  Discussed Patient in detail with nursing/staff, addressed all needs today.     [x]  Plan of Care Reviewed   [x]  PT/OT Reviewed   [x]  Order Changes  [x]  Discharge Plans Reviewed  [x]  Advance Directive on file with Nursing Home.   [x]  POA on file with Nursing Home.   [x]  Code Status listed: []  Full Code   [x]  DNR       I spent 30 minutes caring for Carolyne on this date of service. This time includes time spent by me in the following activities:preparing for the visit, obtaining and/or reviewing a separately obtained history, performing a medically appropriate examination and/or evaluation , counseling and educating the patient/family/caregiver, ordering medications, tests, or procedures, referring and communicating with other health care professionals , documenting information in the medical record, and care coordination     “I confirm accuracy of unchanged data/findings which have been carried forward from previous visit, as well as I have updated appropriately those that have changed.”           Roma Diaz, APRN.

## 2023-09-27 PROBLEM — F03.C0 SEVERE DEMENTIA WITHOUT BEHAVIORAL DISTURBANCE, PSYCHOTIC DISTURBANCE, MOOD DISTURBANCE, OR ANXIETY: Status: ACTIVE | Noted: 2023-01-01

## 2023-09-27 NOTE — PROGRESS NOTES
Nursing Home Follow Up Note      Mikey Duran DO []   FATUMA Monet [x]  [address] [place] [address]  Phone: [phone]  Fax: [phone] Baltazar Borden MD []    Jose Antonio Pride DO []   [address] [place] [address]  Phone: [phone]  Fax: [phone]     PATIENT NAME: Carolyne Martins                                                                          YOB: 1940           DATE OF SERVICE: 09/26/2023  FACILITY:  [][place]   [] [place]   [x] [facility]   [] [facility]   [] Other ______________________________________________________________________      CHIEF COMPLAINT:  Follow up mental status changes, lethargy and weakness.     HISTORY OF PRESENT ILLNESS:     Patient with mental status changes, decreased intake ,lethargy and weakness last week so labs and CXR obtained for further evaluation.  She did have elevated BUN and creatinine so fluids were given.  CBC was insignificant and she did not have any signs of acute infection on CXR. UA reports UTI and she is receiving Doxycycline until 10/2. Diet has been changed to mechanical soft and she has been placed on thickened liquids.  Labs were repeated yesterday and BUN and creatinine have improved but patient continues to have poor intake.  She continues to have lethargy and mental status changes.  Family aware of patient's declining and feel it is expected related to her dementia.  They are discussing making her comfort measures only.       PAST MEDICAL & SURGICAL HISTORY:    Past Medical History:   Diagnosis Date    Anemia 1998    Anemia     Arthritis     Back pain     Bleeding from anus     Bronchitis     STATES HAS BRONCHITIS CURRENTLY (12/5/17).      Bronchitis 12/2017    CAD (coronary artery disease)     Cataract, bilateral     Chronic kidney disease     STAGE 4.  SEES TERA    Chronic kidney failure     REPORTS STAGE IV    Difficulty swallowing solids     Disease of thyroid gland     Fracture, radius  "2016    right distal radius and ulna, healed.    GERD (gastroesophageal reflux disease)     Gout     High cholesterol     History of blood transfusion 2019    History of nuclear stress test 2014    DR. CORTEZ.  \"IT WAS OK\"    Hyperparathyroidism     Hyperparathyroidism     Hypertension     Impaired functional mobility, balance, gait, and endurance     Impaired mobility     Iron deficiency     Osteoarthritis     Osteoarthritis of knees, bilateral     Both knees Supartz injection series August, 2015    Osteoporosis     Palpitations     Personal history of cardiac murmur     Pessary maintenance     PONV (postoperative nausea and vomiting)     Presence of pessary     Problems with swallowing     WITH FOOD OCCASSIONALLY    Rheumatic heart disease     Personal history    Rotator cuff tendonitis     right     Rupture of right proximal biceps tendon     chronic    Seasonal allergies     Sinus problem     Spinal headache     REPORTS AFTER DELIVERY OF SON    Subacromial bursitis     right     Valvular heart disease     Vision problems     Vitamin B12 deficiency     Vitamin D deficiency     Wears glasses       Past Surgical History:   Procedure Laterality Date    CATARACT EXTRACTION Bilateral     CERVICAL POLYPECTOMY      COLONOSCOPY  2011    COLONOSCOPY N/A 1/9/2018    Procedure: COLONOSCOPY with endoscopic mucosal resection (hot snare), normal saline submucosal injection, argon thermal ablation, resolution clip placement x4, and cold biopsy polypectomy;  Surgeon: Pieter Concepcion MD;  Location: [facility] ENDOSCOPY;  Service:     ENDOSCOPY N/A 12/6/2017    Procedure: ESOPHAGOGASTRODUODENOSCOPY with biopsies and hot snare polypectomies;  Surgeon: Pieter Concepcion MD;  Location: [facility] ENDOSCOPY;  Service:     ENDOSCOPY N/A 1/15/2019    Procedure: ESOPHAGOGASTRODUODENOSCOPY WITH BIOPSIES AND HOT SNARE POLYPECTOMIES X10;  Surgeon: Pieter Concepcion MD;  Location: [facility] ENDOSCOPY;  Service: Gastroenterology    HARDWARE REMOVAL Left " 2/17/2023    Procedure: FEMUR HARDWARE REMOVAL, LEFT;  Surgeon: Gabriele Loyd MD;  Location: [facility];  Service: Orthopedics;  Laterality: Left;    HIP TROCHANTERIC NAILING WITH INTRAMEDULLARY HIP SCREW Left 2/14/2021    Procedure: HIP TROCHANTER NAIL SHORT WITH INTRAMEDULLARY HIP SCREW, LEFT;  Surgeon: Gabriele Loyd MD;  Location: [facility] OR;  Service: Orthopedics;  Laterality: Left;    TOTAL HIP ARTHROPLASTY Left 2/17/2023    Procedure: REMOVAL OF FRACTURE HARDWARE AND CONVERSION TO TOTAL HIP ARTHROPLASTY, LEFT;  Surgeon: Gabriele Loyd MD;  Location: [facility] OR;  Service: Orthopedics;  Laterality: Left;    UPPER GASTROINTESTINAL ENDOSCOPY  08/18/2014         MEDICATIONS:  I have reviewed and reconciled the patients medication list in the patients chart at the skilled nursing [facility] today.      ALLERGIES:    Allergies   Allergen Reactions    Ferrlecit [Na Ferric Gluc Cplx In Sucrose] Swelling    Ranitidine Hcl Shortness Of Breath    Levofloxacin Other (See Comments)     Leg cramps      Lisinopril Cough         SOCIAL HISTORY:    Social History     Socioeconomic History    Marital status:    Tobacco Use    Smoking status: Never    Smokeless tobacco: Never   Vaping Use    Vaping Use: Never used   Substance and Sexual Activity    Alcohol use: Never    Drug use: Never    Sexual activity: Not Currently     Birth control/protection: Post-menopausal       FAMILY HISTORY:    Family History   Problem Relation Age of Onset    Liver cancer Maternal Grandmother     Gout Other     Osteoporosis Other     Stroke Other     Ulcers Other     Asthma Other     Hypertension Other     Heart disease Other     Osteoarthritis Other     Colon cancer Neg Hx        REVIEW OF SYSTEMS:    Review of Systems   Constitutional:  Positive for activity change, appetite change and fatigue. Negative for fever.   HENT:  Negative for congestion, mouth sores and nosebleeds.    Respiratory:  Positive for apnea and shortness of  breath (chronic).         Chronic related to COPD   Cardiovascular:  Negative for leg swelling.   Gastrointestinal:  Negative for constipation, diarrhea and vomiting.   Genitourinary:  Positive for urinary incontinence. Negative for decreased urine volume, difficulty urinating, frequency and hematuria.   Musculoskeletal:  Positive for arthralgias (chronic) and gait problem.   Skin:  Negative for color change, dry skin and rash.   Neurological:  Positive for weakness, memory problem and confusion. Negative for dizziness and headache.       PHYSICAL EXAMINATION:   VITAL SIGNS:   Vitals:    09/26/23 1526   BP: 140/70   Pulse: 74   Resp: 18   Temp: 98.5 °F (36.9 °C)   SpO2: 96%   Weight: 42.7 kg (94 lb 3.2 oz)       Physical Exam  Vitals and nursing note reviewed.   Constitutional:       Appearance: Normal appearance. She is well-developed.   Eyes:      Conjunctiva/sclera: Conjunctivae normal.   Cardiovascular:      Rate and Rhythm: Normal rate and regular rhythm.      Heart sounds: Normal heart sounds.   Pulmonary:      Effort: Pulmonary effort is normal. No respiratory distress.      Breath sounds: Decreased air movement present. Decreased breath sounds present.   Abdominal:      General: Bowel sounds are normal. There is no distension.      Palpations: Abdomen is soft.      Tenderness: There is no abdominal tenderness.   Skin:     General: Skin is warm and dry.      Findings: No rash.   Neurological:      Mental Status: She is lethargic, disoriented and confused.   Psychiatric:         Mood and Affect: Affect is inappropriate.         Speech: Speech is tangential.         Behavior: Behavior is slowed.         Cognition and Memory: Cognition is impaired. Memory is impaired.       RECORDS REVIEW:   I have reviewed and interpreted the records in [facility] and [facility]    ASSESSMENT     Diagnoses and all orders for this visit:    1. Acute cystitis without hematuria (Primary)    2. Acute on chronic alteration in mental status    3.  Severe dementia without behavioral disturbance, psychotic disturbance, mood disturbance, or anxiety, unspecified dementia type    4. Impaired mobility and ADLs    5. Lethargy        PLAN    UTI/Lethargy/decreased appetite/mental status changes/dementia  -She will continue doxycycline until completion on 10/2.  Nursing reports that she is eating and drinking some with changing her diet to puréed and thickened liquids but still not eating well.  Nursing has been discussing plan of care with family and they are to let nursing know there are plans in the next couple days but are leaning towards palliative care.  Will follow-up.    Will follow up and continue to monitor.     Nursing to continue supportive care for all ADLs.    Nursing encouraged to keep me informed of any acute changes, lack of improvement, or any new concerning symptoms.    [x]  Discussed Patient in detail with nursing/staff, addressed all needs today.     [x]  Plan of Care Reviewed   [x]  PT/OT Reviewed   [x]  Order Changes  [x]  Discharge Plans Reviewed  [x]  Advance Directive on file with Nursing Home.   [x]  POA on file with Nursing Home.   [x]  Code Status listed: []  Full Code   [x]  DNR       I spent 30 minutes caring for Carolyne on this date of service. This time includes time spent by me in the following activities:preparing for the visit, obtaining and/or reviewing a separately obtained history, performing a medically appropriate examination and/or evaluation , counseling and educating the patient/family/caregiver, ordering medications, tests, or procedures, referring and communicating with other health care professionals , documenting information in the medical record, and care coordination     “I confirm accuracy of unchanged data/findings which have been carried forward from previous visit, as well as I have updated appropriately those that have changed.”           Roma Diaz, APRN.

## 2023-10-03 NOTE — LETTER
"Nursing Home Follow Up Note      Mikey Duran DO []   FATUMA Monet [x]  [address] [place] [address]  Phone: [phone]  Fax: [phone] Baltazar Borden MD []    Jose Antonio Pride DO []   [address] [place] [address]  Phone: [phone]  Fax: [phone]     PATIENT NAME: Carolyne Martins                                                                          YOB: 1940           DATE OF SERVICE: 10/3/2023  FACILITY:  [][place]   [] [place]   [x] [facility]   [] [facility]   [] Other ______________________________________________________________________      CHIEF COMPLAINT:    Follow up continued declining status.      HISTORY OF PRESENT ILLNESS:     Patient continues to have declining status, decreased intake ,lethargy and weakness. Care plan meeting was performed with patient's son/POA today, and he has decided to make her palliative care.  He does not want any more labs or other work-up and does not want her sent to the hospital.  He wants any unnecessary medications stopped and her to have pain medication to keep her comfortable.  Resting in bed during visit today with no signs and symptoms of distress.  Tangential conversation.    PAST MEDICAL & SURGICAL HISTORY:    Past Medical History:   Diagnosis Date    Anemia 1998    Anemia     Arthritis     Back pain     Bleeding from anus     Bronchitis     STATES HAS BRONCHITIS CURRENTLY (12/5/17).      Bronchitis 12/2017    CAD (coronary artery disease)     Cataract, bilateral     Chronic kidney disease     STAGE 4.  SEES TERA    Chronic kidney failure     REPORTS STAGE IV    Difficulty swallowing solids     Disease of thyroid gland     Fracture, radius 2016    right distal radius and ulna, healed.    GERD (gastroesophageal reflux disease)     Gout     High cholesterol     History of blood transfusion 2019    History of nuclear stress test 2014    DR. CORTEZ.  \"IT WAS OK\"    Hyperparathyroidism     " Hyperparathyroidism     Hypertension     Impaired functional mobility, balance, gait, and endurance     Impaired mobility     Iron deficiency     Osteoarthritis     Osteoarthritis of knees, bilateral     Both knees Supartz injection series August, 2015    Osteoporosis     Palpitations     Personal history of cardiac murmur     Pessary maintenance     PONV (postoperative nausea and vomiting)     Presence of pessary     Problems with swallowing     WITH FOOD OCCASSIONALLY    Rheumatic heart disease     Personal history    Rotator cuff tendonitis     right     Rupture of right proximal biceps tendon     chronic    Seasonal allergies     Sinus problem     Spinal headache     REPORTS AFTER DELIVERY OF SON    Subacromial bursitis     right     Valvular heart disease     Vision problems     Vitamin B12 deficiency     Vitamin D deficiency     Wears glasses       Past Surgical History:   Procedure Laterality Date    CATARACT EXTRACTION Bilateral     CERVICAL POLYPECTOMY      COLONOSCOPY  2011    COLONOSCOPY N/A 1/9/2018    Procedure: COLONOSCOPY with endoscopic mucosal resection (hot snare), normal saline submucosal injection, argon thermal ablation, resolution clip placement x4, and cold biopsy polypectomy;  Surgeon: Pieter Concepcion MD;  Location: [facility] ENDOSCOPY;  Service:     ENDOSCOPY N/A 12/6/2017    Procedure: ESOPHAGOGASTRODUODENOSCOPY with biopsies and hot snare polypectomies;  Surgeon: Pieter Concepcion MD;  Location: [facility] ENDOSCOPY;  Service:     ENDOSCOPY N/A 1/15/2019    Procedure: ESOPHAGOGASTRODUODENOSCOPY WITH BIOPSIES AND HOT SNARE POLYPECTOMIES X10;  Surgeon: Pieter Concepcion MD;  Location: [facility] ENDOSCOPY;  Service: Gastroenterology    HARDWARE REMOVAL Left 2/17/2023    Procedure: FEMUR HARDWARE REMOVAL, LEFT;  Surgeon: Gabriele Loyd MD;  Location: [facility] OR;  Service: Orthopedics;  Laterality: Left;    HIP TROCHANTERIC NAILING WITH INTRAMEDULLARY HIP SCREW Left 2/14/2021    Procedure: HIP TROCHANTER  NAIL SHORT WITH INTRAMEDULLARY HIP SCREW, LEFT;  Surgeon: Gabriele Loyd MD;  Location: [facility] OR;  Service: Orthopedics;  Laterality: Left;    TOTAL HIP ARTHROPLASTY Left 2/17/2023    Procedure: REMOVAL OF FRACTURE HARDWARE AND CONVERSION TO TOTAL HIP ARTHROPLASTY, LEFT;  Surgeon: Gabriele Loyd MD;  Location: [facility] OR;  Service: Orthopedics;  Laterality: Left;    UPPER GASTROINTESTINAL ENDOSCOPY  08/18/2014         MEDICATIONS:  I have reviewed and reconciled the patients medication list in the patients chart at the skilled nursing facility today.      ALLERGIES:    Allergies   Allergen Reactions    Ferrlecit [Na Ferric Gluc Cplx In Sucrose] Swelling    Ranitidine Hcl Shortness Of Breath    Levofloxacin Other (See Comments)     Leg cramps      Lisinopril Cough         SOCIAL HISTORY:    Social History     Socioeconomic History    Marital status:    Tobacco Use    Smoking status: Never    Smokeless tobacco: Never   Vaping Use    Vaping Use: Never used   Substance and Sexual Activity    Alcohol use: Never    Drug use: Never    Sexual activity: Not Currently     Birth control/protection: Post-menopausal       FAMILY HISTORY:    Family History   Problem Relation Age of Onset    Liver cancer Maternal Grandmother     Gout Other     Osteoporosis Other     Stroke Other     Ulcers Other     Asthma Other     Hypertension Other     Heart disease Other     Osteoarthritis Other     Colon cancer Neg Hx        REVIEW OF SYSTEMS:    Review of Systems   Reason unable to perform ROS: per nursing.   Constitutional:  Positive for activity change, appetite change and fatigue. Negative for fever.   HENT:  Negative for congestion, mouth sores and nosebleeds.    Respiratory:  Positive for apnea and shortness of breath (chronic).         Chronic related to COPD   Cardiovascular:  Negative for leg swelling.   Gastrointestinal:  Negative for constipation, diarrhea and vomiting.   Genitourinary:  Positive for urinary  incontinence. Negative for decreased urine volume and hematuria.   Musculoskeletal:  Positive for arthralgias (chronic).   Skin:  Negative for color change, pallor and rash.   Neurological:  Positive for weakness, memory problem and confusion.   Psychiatric/Behavioral:  Negative for agitation, behavioral problems and sleep disturbance.        PHYSICAL EXAMINATION:   VITAL SIGNS:   Vitals:    10/03/23 1529   BP: 139/74   Temp: 97.6 °F (36.4 °C)   SpO2: 90%   Weight: 43 kg (94 lb 12.8 oz)       Physical Exam  Vitals and nursing note reviewed.   Constitutional:       Appearance: She is cachectic. She is ill-appearing.   Eyes:      Conjunctiva/sclera: Conjunctivae normal.   Cardiovascular:      Rate and Rhythm: Normal rate and regular rhythm.      Heart sounds: Normal heart sounds.   Pulmonary:      Effort: Pulmonary effort is normal. No respiratory distress.      Breath sounds: Decreased air movement present. Decreased breath sounds present.   Abdominal:      General: Bowel sounds are normal. There is no distension.      Palpations: Abdomen is soft.      Tenderness: There is no abdominal tenderness.   Skin:     General: Skin is warm and dry.      Findings: No rash.   Neurological:      Mental Status: She is lethargic and disoriented.   Psychiatric:         Mood and Affect: Affect is inappropriate.         Speech: Speech is tangential.         Behavior: Behavior is slowed.         Cognition and Memory: Cognition is impaired. Memory is impaired.       RECORDS REVIEW:   I have reviewed and interpreted the records in [facility] and [facility]    ASSESSMENT     Diagnoses and all orders for this visit:    1. Palliative care status (Primary)    2. Declining functional status    3. Severe dementia without behavioral disturbance, psychotic disturbance, mood disturbance, or anxiety, unspecified dementia type    4. Decreased oral intake    5. Impaired mobility and ADLs        PLAN    Declining status/palliative care/decreased  intake/dementia  -Will stop all labs. POA wants patient palliative care. No hospital visits or other interventions. He does not want any artificial feedings. Will stop all Vitamins, Estrace cream, Crestor, Zoloft 50 mg and will continue to wean the other 100 mg, Torsemide, ASA, Allopurinol, Epogen and Advair. Will start Roxanol 20 mg/ml, 0.25 ml q 3 hours prn.     Will follow up and continue to monitor.     Nursing to continue supportive care for all ADLs.    Nursing encouraged to keep me informed of any acute changes, lack of improvement, or any new concerning symptoms.    [x]  Discussed Patient in detail with nursing/staff, addressed all needs today.     [x]  Plan of Care Reviewed   [x]  PT/OT Reviewed   [x]  Order Changes  [x]  Discharge Plans Reviewed  [x]  Advance Directive on file with Nursing Home.   [x]  POA on file with Nursing Home.   [x]  Code Status listed: []  Full Code   [x]  DNR       I spent 35 minutes caring for Carolyne on this date of service. This time includes time spent by me in the following activities:preparing for the visit, obtaining and/or reviewing a separately obtained history, performing a medically appropriate examination and/or evaluation , counseling and educating the patient/family/caregiver, ordering medications, tests, or procedures, referring and communicating with other health care professionals , documenting information in the medical record, and care coordination     “I confirm accuracy of unchanged data/findings which have been carried forward from previous visit, as well as I have updated appropriately those that have changed.”           Roma Diaz, APRN.

## 2023-10-04 NOTE — PROGRESS NOTES
"Nursing Home Follow Up Note      Mikey Duran DO []   FATUMA Monet [x]  [address] [place] [address]  Phone: [phone]  Fax: [phone] Baltazar Borden MD []    Jose Antonio Pride DO []   [address] [place] [address]  Phone: [phone]  Fax: [phone]     PATIENT NAME: Carolyne Martins                                                                          YOB: 1940           DATE OF SERVICE: 10/3/2023  FACILITY:  [][facility]   [] [place]   [x] [facility]   [] [facility]   [] Other ______________________________________________________________________      CHIEF COMPLAINT:    Follow up continued declining status.      HISTORY OF PRESENT ILLNESS:     Patient continues to have declining status, decreased intake ,lethargy and weakness. Care plan meeting was performed with patient's son/POA today, and he has decided to make her palliative care.  He does not want any more labs or other work-up and does not want her sent to the hospital.  He wants any unnecessary medications stopped and her to have pain medication to keep her comfortable.  Resting in bed during visit today with no signs and symptoms of distress.  Tangential conversation.    PAST MEDICAL & SURGICAL HISTORY:    Past Medical History:   Diagnosis Date    Anemia 1998    Anemia     Arthritis     Back pain     Bleeding from anus     Bronchitis     STATES HAS BRONCHITIS CURRENTLY (12/5/17).      Bronchitis 12/2017    CAD (coronary artery disease)     Cataract, bilateral     Chronic kidney disease     STAGE 4.  SEES TERA    Chronic kidney failure     REPORTS STAGE IV    Difficulty swallowing solids     Disease of thyroid gland     Fracture, radius 2016    right distal radius and ulna, healed.    GERD (gastroesophageal reflux disease)     Gout     High cholesterol     History of blood transfusion 2019    History of nuclear stress test 2014    DR. CORTEZ.  \"IT WAS OK\"    Hyperparathyroidism     " Hyperparathyroidism     Hypertension     Impaired functional mobility, balance, gait, and endurance     Impaired mobility     Iron deficiency     Osteoarthritis     Osteoarthritis of knees, bilateral     Both knees Supartz injection series August, 2015    Osteoporosis     Palpitations     Personal history of cardiac murmur     Pessary maintenance     PONV (postoperative nausea and vomiting)     Presence of pessary     Problems with swallowing     WITH FOOD OCCASSIONALLY    Rheumatic heart disease     Personal history    Rotator cuff tendonitis     right     Rupture of right proximal biceps tendon     chronic    Seasonal allergies     Sinus problem     Spinal headache     REPORTS AFTER DELIVERY OF SON    Subacromial bursitis     right     Valvular heart disease     Vision problems     Vitamin B12 deficiency     Vitamin D deficiency     Wears glasses       Past Surgical History:   Procedure Laterality Date    CATARACT EXTRACTION Bilateral     CERVICAL POLYPECTOMY      COLONOSCOPY  2011    COLONOSCOPY N/A 1/9/2018    Procedure: COLONOSCOPY with endoscopic mucosal resection (hot snare), normal saline submucosal injection, argon thermal ablation, resolution clip placement x4, and cold biopsy polypectomy;  Surgeon: Pieter Concepcion MD;  Location: [facility] ENDOSCOPY;  Service:     ENDOSCOPY N/A 12/6/2017    Procedure: ESOPHAGOGASTRODUODENOSCOPY with biopsies and hot snare polypectomies;  Surgeon: Pieter Concepcion MD;  Location: [facility] ENDOSCOPY;  Service:     ENDOSCOPY N/A 1/15/2019    Procedure: ESOPHAGOGASTRODUODENOSCOPY WITH BIOPSIES AND HOT SNARE POLYPECTOMIES X10;  Surgeon: Pieter Concepcion MD;  Location: [facility] ENDOSCOPY;  Service: Gastroenterology    HARDWARE REMOVAL Left 2/17/2023    Procedure: FEMUR HARDWARE REMOVAL, LEFT;  Surgeon: Gabriele Loyd MD;  Location: [facility] OR;  Service: Orthopedics;  Laterality: Left;    HIP TROCHANTERIC NAILING WITH INTRAMEDULLARY HIP SCREW Left 2/14/2021    Procedure: HIP TROCHANTER  NAIL SHORT WITH INTRAMEDULLARY HIP SCREW, LEFT;  Surgeon: Gabriele Loyd MD;  Location: [facility] OR;  Service: Orthopedics;  Laterality: Left;    TOTAL HIP ARTHROPLASTY Left 2/17/2023    Procedure: REMOVAL OF FRACTURE HARDWARE AND CONVERSION TO TOTAL HIP ARTHROPLASTY, LEFT;  Surgeon: Gabriele Loyd MD;  Location: [facility] OR;  Service: Orthopedics;  Laterality: Left;    UPPER GASTROINTESTINAL ENDOSCOPY  08/18/2014         MEDICATIONS:  I have reviewed and reconciled the patients medication list in the patients chart at the skilled nursing facility today.      ALLERGIES:    Allergies   Allergen Reactions    Ferrlecit [Na Ferric Gluc Cplx In Sucrose] Swelling    Ranitidine Hcl Shortness Of Breath    Levofloxacin Other (See Comments)     Leg cramps      Lisinopril Cough         SOCIAL HISTORY:    Social History     Socioeconomic History    Marital status:    Tobacco Use    Smoking status: Never    Smokeless tobacco: Never   Vaping Use    Vaping Use: Never used   Substance and Sexual Activity    Alcohol use: Never    Drug use: Never    Sexual activity: Not Currently     Birth control/protection: Post-menopausal       FAMILY HISTORY:    Family History   Problem Relation Age of Onset    Liver cancer Maternal Grandmother     Gout Other     Osteoporosis Other     Stroke Other     Ulcers Other     Asthma Other     Hypertension Other     Heart disease Other     Osteoarthritis Other     Colon cancer Neg Hx        REVIEW OF SYSTEMS:    Review of Systems   Reason unable to perform ROS: per nursing.   Constitutional:  Positive for activity change, appetite change and fatigue. Negative for fever.   HENT:  Negative for congestion, mouth sores and nosebleeds.    Respiratory:  Positive for apnea and shortness of breath (chronic).         Chronic related to COPD   Cardiovascular:  Negative for leg swelling.   Gastrointestinal:  Negative for constipation, diarrhea and vomiting.   Genitourinary:  Positive for urinary  incontinence. Negative for decreased urine volume and hematuria.   Musculoskeletal:  Positive for arthralgias (chronic).   Skin:  Negative for color change, pallor and rash.   Neurological:  Positive for weakness, memory problem and confusion.   Psychiatric/Behavioral:  Negative for agitation, behavioral problems and sleep disturbance.        PHYSICAL EXAMINATION:   VITAL SIGNS:   Vitals:    10/03/23 1529   BP: 139/74   Temp: 97.6 °F (36.4 °C)   SpO2: 90%   Weight: 43 kg (94 lb 12.8 oz)       Physical Exam  Vitals and nursing note reviewed.   Constitutional:       Appearance: She is cachectic. She is ill-appearing.   Eyes:      Conjunctiva/sclera: Conjunctivae normal.   Cardiovascular:      Rate and Rhythm: Normal rate and regular rhythm.      Heart sounds: Normal heart sounds.   Pulmonary:      Effort: Pulmonary effort is normal. No respiratory distress.      Breath sounds: Decreased air movement present. Decreased breath sounds present.   Abdominal:      General: Bowel sounds are normal. There is no distension.      Palpations: Abdomen is soft.      Tenderness: There is no abdominal tenderness.   Skin:     General: Skin is warm and dry.      Findings: No rash.   Neurological:      Mental Status: She is lethargic and disoriented.   Psychiatric:         Mood and Affect: Affect is inappropriate.         Speech: Speech is tangential.         Behavior: Behavior is slowed.         Cognition and Memory: Cognition is impaired. Memory is impaired.       RECORDS REVIEW:   I have reviewed and interpreted the records in [facility] and [facility]    ASSESSMENT     Diagnoses and all orders for this visit:    1. Palliative care status (Primary)    2. Declining functional status    3. Severe dementia without behavioral disturbance, psychotic disturbance, mood disturbance, or anxiety, unspecified dementia type    4. Decreased oral intake    5. Impaired mobility and ADLs        PLAN    Declining status/palliative care/decreased  intake/dementia  -Will stop all labs. POA wants patient palliative care. No hospital visits or other interventions. He does not want any artificial feedings. Will stop all Vitamins, Estrace cream, Crestor, Zoloft 50 mg and will continue to wean the other 100 mg, Torsemide, ASA, Allopurinol, Epogen and Advair. Will start Roxanol 20 mg/ml, 0.25 ml q 3 hours prn.     Will follow up and continue to monitor.     Nursing to continue supportive care for all ADLs.    Nursing encouraged to keep me informed of any acute changes, lack of improvement, or any new concerning symptoms.    [x]  Discussed Patient in detail with nursing/staff, addressed all needs today.     [x]  Plan of Care Reviewed   [x]  PT/OT Reviewed   [x]  Order Changes  [x]  Discharge Plans Reviewed  [x]  Advance Directive on file with Nursing Home.   [x]  POA on file with Nursing Home.   [x]  Code Status listed: []  Full Code   [x]  DNR       I spent 35 minutes caring for Carolyne on this date of service. This time includes time spent by me in the following activities:preparing for the visit, obtaining and/or reviewing a separately obtained history, performing a medically appropriate examination and/or evaluation , counseling and educating the patient/family/caregiver, ordering medications, tests, or procedures, referring and communicating with other health care professionals , documenting information in the medical record, and care coordination     “I confirm accuracy of unchanged data/findings which have been carried forward from previous visit, as well as I have updated appropriately those that have changed.”           Roma Diaz, APRN.

## 2023-10-04 NOTE — TELEPHONE ENCOUNTER
(NEW SCRIPT)    [facility] REQUESTING MED REFILL FOR ROXANOL 20 MG/ML.    DIRECTIONS: ROXANOL 20 MG/ML, GIVE 0.25 ML SL Q 3 HRS PRN.

## 2023-10-09 ENCOUNTER — TELEPHONE (OUTPATIENT)
Dept: INTERNAL MEDICINE | Facility: CLINIC | Age: 83
End: 2023-10-09
Payer: MEDICARE

## 2023-10-09 NOTE — TELEPHONE ENCOUNTER
RECEIVED NOTIFICATION FROM [facility] THAT RESIDENT  ON 10/6/23 @ 12:52AM IN FACILITY.

## 2024-03-18 NOTE — THERAPY TREATMENT NOTE
Patient:   SILVANA VALE            MRN: [id number]            FIN: [id number]              Age:   31 years     Sex:  FEMALE     :  91   Associated Diagnoses:   None   Author:   DON DOOLEY     Basic Information   Time seen: Date & time 20 14:06:00.   History source: Patient.   Arrival mode: Walking.   History limitation: None.   Additional information: Chief Complaint from Nursing Triage Note : Chief Complaint ED  20 10:28 CDT       Chief Complaint ED        preg vag bleeding, denies pains  .     History of Present Illness   31 years old female.       Health Status   Allergies:    Allergic Reactions (All)  NKA  Canceled/Inactive Reactions (All)  Severity Not Documented  Codeine- Vomitting / diarrhea.,   Allergies (1) Active Reaction  NKA None Documented   .   Medications:   No qualifying data available   .     Past Medical/ Family/ Social History   Medical history:    All Problems  Hyperemesis gravidarum / [id number] / Confirmed  Resolved: Pregnant / [id number]  Resolved: Pregnant / .   Surgical history:    No active procedure history items have been selected or recorded..  Family history: Include Family History   No family history items have been selected or recorded..   Social history:    Social & Psychosocial Habits  Alcohol  2016  Use: None, None  Substance Abuse  2016  Use: None, None  Tobacco  2016  Smoking Tobacco Use: Current some day smoker    Smoked/Smokeless Tobacco in Last 30 Days: Yes    Smoking Tobacco Use: Current some day smoker    Smoked/Smokeless Tobacco in Last 30 Days: Yes    Smoking Tobacco Use: Current some day smoker  .     Physical Examination              Vital Signs   ED Vital Sign   20 10:24 CDT Temperature - VS 36.6 deg_C  Normal    Temperature Source - VS Temporal    Heart/Pulse Rate 95  HI    Pulse Source Monitor    Respiration Rate 16 breaths/min  Normal    SpO2 100 %  Normal    NIBP Systolic 130  Normal    NIBP Diastolic  "Patient Name: Carolyne Martins  : 1940    MRN: [id number]                              Today's Date: 2021       Admit Date: 2021    Visit Dx:     ICD-10-CM ICD-9-CM   1. Closed fracture of left hip, initial encounter (CMS/HCC)  S72.002A 820.8     Patient Active Problem List   Diagnosis   • Arthralgia of shoulder region   • Tendinopathy of rotator cuff   • Osteoarthritis of right acromioclavicular joint   • Anemia due to stage 4 chronic kidney disease treated with erythropoietin (CMS/[facility])   • Anemia   • Gastric polyp   • Heartburn   • Melena   • Constipation   • Change in bowel habits   • Colon cancer screening   • Epigastric pain   • Weight loss   • Senile osteoporosis   • Left lower quadrant abdominal pain   • Personal history of colonic polyps   • Closed fracture of left hip (CMS/[facility])     Past Medical History:   Diagnosis Date   • Anemia    • Anemia    • Arthritis    • Back pain    • Bleeding from anus    • Bronchitis     STATES HAS BRONCHITIS CURRENTLY (17).     • Bronchitis 2017   • CAD (coronary artery disease)    • Cataract, bilateral    • Chronic kidney disease     STAGE 4.  SEES TERA   • Chronic kidney failure     REPORTS STAGE IV   • Difficulty swallowing solids    • Disease of thyroid gland    • Fracture, radius 2016    right distal radius and ulna, healed.   • GERD (gastroesophageal reflux disease)    • Gout    • High cholesterol    • History of blood transfusion    • History of nuclear stress test     DR. CORTEZ.  \"IT WAS OK\"   • Hyperparathyroidism (CMS/[facility])    • Hyperparathyroidism (CMS/HCC)    • Hypertension    • Impaired functional mobility, balance, gait, and endurance    • Iron deficiency    • Osteoarthritis    • Osteoarthritis of knees, bilateral     Both knees Supartz injection series    • Osteoporosis    • Palpitations    • Personal history of cardiac murmur    • PONV (postoperative nausea and vomiting)    • Presence of pessary    • Problems " Cancer Center Progress Note    Problem List:      Patient Active Problem List   Diagnosis    Depression    Overweight(278.02)    Generalized anxiety disorder    Benign essential HTN    Primary osteoarthritis of right knee    History of cervical dysplasia    Hypercholesteremia    Malignant neoplasm of upper-inner quadrant of left breast in female, estrogen receptor positive (HCC)    Glucose intolerance       Interim History:    Claudia Paniagua presents today for evaluation and management of a diagnosis of left breast cancer.    She has been on Letrozole 2.5 mg daily. She started this in 11/2023. She had an increase in her joint pain soon after starting but this has now returned to her normal DJD pains. She has no hot flashes. She has some hot flashes. She has no dyspnea or cough. She has no other pain.    History: She had a screening mammogram on 9/6/2023. There were bilateral changes. She had a diagnostic mammogram and ultrasound on 9/20/2023. She had a 2.2 cm left 9 o'clock mass that was suspicious. She had biopsy on 9/28/2023 that showed grade I invasive ductal carcinoma. The ER was >95% and UT was 20%. The Ki-67 was 5% and the Her2 was 1+. She had an MRI on 10/10/2023 that showed the 2.2 cm 9 oclock left breast mass and a 7 mm left retroareolar mass and a 4 mm 8 o'clock left breast masss. There was a 1.6 cm right breast mass. She had additional biopsies on 10/12 and 10/19. These showed papilloma. She proceeded to have bilateral lumpectomy and left SLN procedure. The left SLN had 1/1 focus of micrometastatic disease measuring 1.8 mm. The left breast had invasive ductal carcninoma measuring 3.8 cm. She had negative but close margins. She had grade II disease. The ER was 100%, UT was 90%, Ki-67 was 5%, and Her2 was 1+.       Review of Systems:   Constitutional: Negative for anorexia, fatigue, fevers, chills, night sweats and weight loss.  Eyes: Negative for visual disturbance, irritation and  70  Normal    NIBP Source Right Arm    NIBP MAP 90  Normal   .   Height and Weight   08/26/20 10:24 CDT CLINICALWEIGHT 94 kg    Weight Method Measured    MEDDOSEWT 94 kg   .   Vitals between:   25-AUG-2020 14:06:00   TO   26-AUG-2020 14:06:00                   LAST RESULT MINIMUM MAXIMUM  Temperature 36.6 36.6 36.6  Heart Rate 95 95 95  Respiratory Rate 16 16 16  NISBP           130 130 130  NIDBP           70 70 70  NIMBP           90 90 90  SpO2                    100 100 100   .     Medical Decision Making   Results review:    Labs between:  25-AUG-2020 14:06 to 26-AUG-2020 14:06  CBC:                 WBC  HgB  Hct  Plt  MCV  RDW   26-AUG-2020 (H) 13.2  12.9  38.4  319  93.7  12.1   DIFF:                 Seg  Neutroph//ABS  Lymph//ABS  Mono//ABS  EOS/ABS  26-AUG-2020 NOT APPLICABLE  76 // (H) 10.0  16 // 2.1 6 // 0.8 1 // 0.1                  .   Chest X-Ray:     .   Radiology results:     ,   Result title:  US OB SURVEY AND TRANSVAG<14WKS 1 GEST  Result status:  Final  Verified by:  JR, MATEO CORONEL on 08/26/2020 2:08  IMPRESSION:1.  Single live intrauterine pregnancy, with estimated gestational age of 10 weeks, 4 days (CAROL 03/20/2021).2.  Nonspecific prominent vessels at the periphery of the gestation/in the developing placenta, possibly early placental lakes.  No true subchorionic hemorrhage.  .   redness.  Respiratory: Negative for cough, hemoptysis, chest pain, or dyspnea.  Cardiovascular: Negative for angina, orthopnea or palpitations.  Gastrointestinal: Negative for nausea, vomiting, change in bowel habits, diarrhea, constipation and abdominal pain.  Integument/breast: Negative for rash, skin lesions, and pruritus.  Hematologic/lymphatic: Negative for easy bruising, bleeding, and lymphadenopathy.  Musculoskeletal: Negative for myalgias, muscle weakness.  Genitourinary: Negative for dysuria or hematuria  Neurological: Negative for headaches, dizziness, speech problems, gait problems and focal weakness.  Psychiatric: The patient's mood was calm and appropriate for this visit.  The pertinent positives and negatives were described. All other systems were negative.    PMH/PSH:  Past Medical History:   Diagnosis Date    Anxiety state     Breast cancer (HCC)     Depression     High blood pressure     High cholesterol     Osteoarthritis     Other screening mammogram     Routine Papanicolaou smear 2010    Visual impairment     reading glasses       Past Surgical History:   Procedure Laterality Date          x2    EGD N/A 12/15/2016    Procedure: ESOPHAGOGASTRODUODENOSCOPY, COLONOSCOPY, POSSIBLE BIOPSY, POSSIBLE POLYPECTOMY 75865, 72911;  Surgeon: Ron Lehman MD;  Location: [facility] [place] [facility]    LUMPECTOMY LEFT      NEEDLE BIOPSY LEFT Left 2016    fibrocystic changes       Family History Reviewed:  Family History   Problem Relation Age of Onset    Cancer Mother         lung cancer    Heart Disorder Father         arrhythmia    Breast Cancer Sister 64    Other (Other) Brother         AV malformation       Allergies:     No Known Allergies    Medications:  No outpatient medications have been marked as taking for the 3/18/24 encounter (Office Visit) with Juancarlos Pittman MD.         Vital Signs:      Height: 172.7 cm (5' 7.99\") ( 1148)  Weight: 114.8 kg (253 lb) ( 1148)  BSA  with swallowing     WITH FOOD OCCASSIONALLY   • Rheumatic heart disease     Personal history   • Rotator cuff tendonitis     right    • Rupture of right proximal biceps tendon     chronic   • Seasonal allergies    • Sinus problem    • Spinal headache     REPORTS AFTER DELIVERY OF SON   • Subacromial bursitis     right    • Valvular heart disease    • Vision problems    • Vitamin B12 deficiency    • Vitamin D deficiency    • Wears glasses      Past Surgical History:   Procedure Laterality Date   • CATARACT EXTRACTION Bilateral    • CERVICAL POLYPECTOMY     • COLONOSCOPY  2011   • COLONOSCOPY N/A 1/9/2018    Procedure: COLONOSCOPY with endoscopic mucosal resection (hot snare), normal saline submucosal injection, argon thermal ablation, resolution clip placement x4, and cold biopsy polypectomy;  Surgeon: Pieter Concepcion MD;  Location: [facility] ENDOSCOPY;  Service:    • ENDOSCOPY N/A 12/6/2017    Procedure: ESOPHAGOGASTRODUODENOSCOPY with biopsies and hot snare polypectomies;  Surgeon: Pieter Conecpcion MD;  Location: [facility] ENDOSCOPY;  Service:    • ENDOSCOPY N/A 1/15/2019    Procedure: ESOPHAGOGASTRODUODENOSCOPY WITH BIOPSIES AND HOT SNARE POLYPECTOMIES X10;  Surgeon: Pieter Concepcion MD;  Location: [facility] ENDOSCOPY;  Service: Gastroenterology   • HIP TROCHANTERIC NAILING WITH INTRAMEDULLARY HIP SCREW Left 2/14/2021    Procedure: HIP TROCHANTER NAIL SHORT WITH INTRAMEDULLARY HIP SCREW, LEFT;  Surgeon: Gabriele Loyd MD;  Location: [facility] OR;  Service: Orthopedics;  Laterality: Left;   • UPPER GASTROINTESTINAL ENDOSCOPY  08/18/2014     General Information     Row Name 02/16/21 1311          Physical Therapy Time and Intention    Document Type  therapy note (daily note)  -RM     Mode of Treatment  physical therapy  -RM     Row Name 02/16/21 1311          General Information    Patient Profile Reviewed  yes  -RM     Existing Precautions/Restrictions  fall  -RM     Row Name 02/16/21 1311          Cognition    Orientation  (Calculated - sq m): 2.26 sq meters (03/18 1148)  Pulse: 94 (03/18 1148)  BP: 123/79 (03/18 1148)  Temp: 97.5 °F (36.4 °C) (03/18 1148)  Do Not Use - Resp Rate: --  SpO2: 95 % (03/18 1148)      Performance Status:  ECOG 0: Fully active, able to carry on all pre-disease performance without restriction     Physical Examination:    Constitutional: Patient is alert and oriented x 3, not in acute distress.  Eyes: Anicteric sclera, pink conjunctiva.  HEENT:  Oropharynx is clear. Neck is supple.  Respiratory: Clear to auscultation and percussion. No rales.  No wheezes.  Cardiovascular: Regular rate and rhythm. No murmurs.  Gastrointestinal: Soft, non tender with good bowel sounds.  Musculoskeletal: No edema. No calf tenderness.  Neurological: Grossly intact without focal motor or sensory deficit.  Skin: No suspicious skin lesion, no rash, no ulceration.  Lymphatics: There is no palpable lymphadenopathy throughout in the cervical, supraclavicular, or axillary regions.  Psychiatric: The patient's mood is calm and appropriate for this visit.      Labs reviewed at this visit:     Lab Results   Component Value Date    WBC 5.9 11/08/2023    RBC 4.22 11/08/2023    HGB 13.0 11/08/2023    HCT 40.8 11/08/2023    MCV 96.7 11/08/2023    MCH 30.8 11/08/2023    MCHC 31.9 11/08/2023    RDW 12.2 11/08/2023    .0 11/08/2023     Lab Results   Component Value Date     11/08/2023    K 4.0 11/08/2023     11/08/2023    CO2 28.0 11/08/2023    BUN 9 11/08/2023    CREATSERUM 0.60 11/08/2023     (H) 11/08/2023    CA 9.6 11/08/2023    ALKPHO 88 11/08/2023    ALT 23 11/08/2023    AST 16 11/08/2023    BILT 0.9 11/08/2023    ALB 3.6 11/08/2023    TP 7.2 11/08/2023       Radiologic imaging reviewed at this visit:    DEXA on 2/2/2024:  LUMBAR SPINE ANALYSIS RESULTS:      Bone mineral density (BMD) (g/cm2):  1.146    Lumbar T-Score:  0.9      % young normals:  109      % age matched controls:  130      Change from prior spine  Status (Cognition)  oriented x 4  -RM     Row Name 02/16/21 1311          Safety Issues, Functional Mobility    Safety Issues Affecting Function (Mobility)  safety precaution awareness;safety precautions follow-through/compliance;positioning of assistive device;steps too close to assistive device  -RM     Impairments Affecting Function (Mobility)  balance;pain;strength;range of motion (ROM);endurance/activity tolerance  -RM       User Key  (r) = Recorded By, (t) = Taken By, (c) = Cosigned By    Initials Name Provider Type    Gabriel Doyle PTA Physical Therapy Assistant        Mobility     Row Name 02/16/21 1312          Bed Mobility    Supine-Sit [place] (Bed Mobility)  minimum assist (75% patient effort)  -RM     Assistive Device (Bed Mobility)  head of bed elevated;draw sheet  -RM     Row Name 02/16/21 1312          Bed-Chair Transfer    Bed-Chair [place] (Transfers)  moderate assist (50% patient effort);verbal cues  -RM     Assistive Device (Bed-Chair Transfers)  walker, front-wheeled  -RM     Row Name 02/16/21 1312          Sit-Stand Transfer    Sit-Stand [place] (Transfers)  minimum assist (75% patient effort);verbal cues  -RM     Assistive Device (Sit-Stand Transfers)  walker, front-wheeled  -RM     Row Name 02/16/21 1312          Mobility    Left Lower Extremity (Weight-bearing Status)  weight-bearing as tolerated (WBAT)  -RM       User Key  (r) = Recorded By, (t) = Taken By, (c) = Cosigned By    Initials Name Provider Type    Gabriel Doyle PTA Physical Therapy Assistant        Obj/Interventions     Row Name 02/16/21 1314          Motor Skills    Therapeutic Exercise  hip;knee;ankle  -RM     Row Name 02/16/21 1314          Hip (Therapeutic Exercise)    Hip (Therapeutic Exercise)  isometric exercises;strengthening exercise  -RM     Hip Isometrics (Therapeutic Exercise)  bilateral;gluteal sets;10 repetitions;5 repetitions  -RM     Hip Strengthening (Therapeutic Exercise)   examination:  2.8%               TOTAL HIP ANALYSIS RESULTS:        Bone mineral density (BMD) (g/cm2):  1.040      Total Hip T-Score:  0.8      % young normals:  110      % age matched controls:  127      Change from prior hip examination:  -5.0*%               FEMORAL NECK ANALYSIS RESULTS:        Bone mineral density (BMD) (g/cm2):  0.960      Femoral neck T-Score:  1.0      % young normals:  113      % age matched controls:  137      Change from prior hip examination:  -3.2*%          CXR on 12/11/2023:  FINDINGS:  Cardiac silhouette and pulmonary vasculature are within normal limits. No focal consolidation, pneumothorax or pleural effusion.     Impression   CONCLUSION:  No focal consolidation.  A CT of the chest can be performed for further evaluation of metastases given the history as clinically indicated.       Assessment/Plan:     Right breast cancer upper outer quadrant:  Lumpectomy on 11/14/2023  3.8 cm IDC, grade II  SLN 1/1 micromet 1.8 mm  %  MO 90%  Ki-67 5%  Her2 1+     She is doing well with ROBERTO. She is tolerating the AI. I will see her at six month intervals. She has a normal DEXA. We will repeat this is 2 years. I again reviewed the side effects of the AI. She has no vaginal symptoms. She was comfortable with this plan.        Juancarlos Pittman MD      left;heel slides;10 repetitions;5 repetitions  -RM     Row Name 02/16/21 1314          Knee (Therapeutic Exercise)    Knee (Therapeutic Exercise)  isometric exercises  -RM     Knee Isometrics (Therapeutic Exercise)  left;10 repetitions;5 repetitions;quad sets  -RM     Row Name 02/16/21 1314          Ankle (Therapeutic Exercise)    Ankle (Therapeutic Exercise)  AROM (active range of motion)  -RM     Ankle AROM (Therapeutic Exercise)  bilateral;10 repetitions;5 repetitions  -RM       User Key  (r) = Recorded By, (t) = Taken By, (c) = Cosigned By    Initials Name Provider Type    Gabriel Doyle, MALI Physical Therapy Assistant        Goals/Plan    No documentation.       Clinical Impression     Row Name 02/16/21 1315          Pain Scale: Numbers Pre/Post-Treatment    Pretreatment Pain Rating  8/10  -RM     Posttreatment Pain Rating  7/10  -RM     Pain Location - Side  Left  -RM     Pain Location  hip  -RM     Pain Intervention(s)  Repositioned  -RM     Row Name 02/16/21 1315          Plan of Care Review    Plan of Care Reviewed With  patient  -RM     Progress  improving  -RM     Outcome Summary  Pt tolerated increased activity with decreased assistance for mobility.  Pt was min a with draw sheet for supine to sit and min a for sit to stand with rw. Pt did require mod a to pivot bed to chair with rw. Pt also performed TE in supine.  See flowsheet for details.  -RM     Row Name 02/16/21 1315          Positioning and Restraints    Pre-Treatment Position  in bed  -RM     Post Treatment Position  chair  -RM     In Chair  reclined;call light within reach;encouraged to call for assist;notified nsg;exit alarm on  -RM       User Key  (r) = Recorded By, (t) = Taken By, (c) = Cosigned By    Initials Name Provider Type    Gabriel Doyle PTA Physical Therapy Assistant        Outcome Measures     Row Name 02/16/21 1318          How much help from another person do you currently need...    Turning from your back to your  side while in flat bed without using bedrails?  3  -RM     Moving from lying on back to sitting on the side of a flat bed without bedrails?  3  -RM     Moving to and from a bed to a chair (including a wheelchair)?  2  -RM     Standing up from a chair using your arms (e.g., wheelchair, bedside chair)?  3  -RM     Climbing 3-5 steps with a railing?  1  -RM     To walk in hospital room?  1  -RM     AM-PAC 6 Clicks Score (PT)  13  -RM     Row Name 02/16/21 1318          Functional Assessment    Outcome Measure Options  AM-PAC 6 Clicks Basic Mobility (PT)  -RM       User Key  (r) = Recorded By, (t) = Taken By, (c) = Cosigned By    Initials Name Provider Type     Gabriel Olivares, MALI Physical Therapy Assistant        Physical Therapy Education                 Title: PT OT SLP Therapies (In Progress)     Topic: Physical Therapy (In Progress)     Point: Mobility training (Done)     Learning Progress Summary           Patient Acceptance, E,TB,D, VU,NR by  at 2/16/2021 1318    Comment: safety and sequencing for transfers.    Acceptance, E,TB, VU by  at 2/15/2021 1425    Comment: Importance of mobility to recovery.  Role of PT and POC                   Point: Home exercise program (Not Started)     Learner Progress:  Not documented in this visit.          Point: Body mechanics (Not Started)     Learner Progress:  Not documented in this visit.          Point: Precautions (Not Started)     Learner Progress:  Not documented in this visit.                      User Key     Initials Effective Dates Name Provider Type Discipline     04/03/18 -  Elizabeth Matamoros, PT Physical Therapist PT     03/07/18 -  Gabriel Olivares, MALI Physical Therapy Assistant PT              PT Recommendation and Plan     Plan of Care Reviewed With: patient  Progress: improving  Outcome Summary: Pt tolerated increased activity with decreased assistance for mobility.  Pt was min a with draw sheet for supine to sit and min a for sit to stand with rw.  Pt did require mod a to pivot bed to chair with rw. Pt also performed TE in supine.  See flowsheet for details.     Time Calculation:   PT Charges     Row Name 02/16/21 1319             Time Calculation    Start Time  1120  -RM      Stop Time  1159  -RM      Time Calculation (min)  39 min  -RM      PT Received On  02/16/21  -RM      PT Goal Re-Cert Due Date  02/25/21  -RM         Time Calculation- PT    Total Timed Code Minutes- PT  39 minute(s)  -RM         Timed Charges    94755 - PT Therapeutic Exercise Minutes  23  -RM      16506 - PT Therapeutic Activity Minutes  16  -RM        User Key  (r) = Recorded By, (t) = Taken By, (c) = Cosigned By    Initials Name Provider Type    Gabriel Doyle, MALI Physical Therapy Assistant        Therapy Charges for Today     Code Description Service Date Service Provider Modifiers Qty    66577098801 HC PT THER PROC EA 15 MIN 2/16/2021 Gabriel Olivares, PTA GP 2    63401132088 HC PT THERAPEUTIC ACT EA 15 MIN 2/16/2021 Gabriel Olivares, PTA GP 1          PT G-Codes  Outcome Measure Options: AM-PAC 6 Clicks Basic Mobility (PT)  AM-PAC 6 Clicks Score (PT): 13  AM-PAC 6 Clicks Score (OT): 15    Gabriel Olivares PTA  2/16/2021

## (undated) DEVICE — FLEXIBLE YANKAUER,MEDIUM TIP, NO VACUUM CONTROL: Brand: ARGYLE

## (undated) DEVICE — SUT VIC 2/0 CT1 27IN J259H

## (undated) DEVICE — GLV SURG TRIUMPH ORTHO W/ALOE PF LTX 8 STRL

## (undated) DEVICE — FRCP BIOP COLD ENDOJAW ALLGTR W/NDL 2.8X2300MM BLU

## (undated) DEVICE — CONMED SCOPE SAVER BITE BLOCK, 20X27 MM: Brand: SCOPE SAVER

## (undated) DEVICE — VIOLET BRAIDED (POLYGLACTIN 910), SYNTHETIC ABSORBABLE SUTURE: Brand: COATED VICRYL

## (undated) DEVICE — RICH MAJOR PROCEDURE: Brand: MEDLINE INDUSTRIES, INC.

## (undated) DEVICE — SNAR POLYP SENSATION STDOVL 27 240 BX40

## (undated) DEVICE — Device

## (undated) DEVICE — PATIENT RETURN ELECTRODE, SINGLE-USE, CONTACT QUALITY MONITORING, ADULT, WITH 9FT CORD, FOR PATIENTS WEIGING OVER 33LBS. (15KG): Brand: MEGADYNE

## (undated) DEVICE — SYR PREFIL W/SALINE FLSH 10ML

## (undated) DEVICE — 2108 SERIES SAGITTAL BLADE, NO OFFSET  (24.8 X 1.24 X 80.1MM)

## (undated) DEVICE — TBG PENCL TELESCP MEGADYNE SMOKE EVAC 10FT

## (undated) DEVICE — FIAPC® PROBE W/ FILTER 2200 SC OD 2.3MM/6.9FR; L 2.2M/7.2FT: Brand: ERBE

## (undated) DEVICE — PILLW ABD SM

## (undated) DEVICE — NDL SCLEROTHERAPY INTERJECT 25G 4 240CM

## (undated) DEVICE — PAD GRND REM POLYHESIVE A/ DISP

## (undated) DEVICE — ENDOGATOR AUXILIARY WATER JET CONNECTOR: Brand: ENDOGATOR

## (undated) DEVICE — CVR POST PERINATAL PAD

## (undated) DEVICE — DRSNG GZ PETROLTM XEROFORM CURAD 1X8IN STRL

## (undated) DEVICE — GUIDEPIN VERSANAIL THRD 3.2X444MM

## (undated) DEVICE — CLAVICLE STRAP: Brand: DEROYAL

## (undated) DEVICE — DRSNG SURG AQUACEL AG 9X25CM

## (undated) DEVICE — GLV SURG SENSICARE SLT PF LF 8 STRL

## (undated) DEVICE — GLV SURG SENSICARE PI ORTHO SZ8 LF STRL

## (undated) DEVICE — SUT ETHIB 5 V37 30IN MB66G

## (undated) DEVICE — SUT VIC 3/0 SH 27IN J416H

## (undated) DEVICE — GUIDE WIRE, BALL-TIPPED, STERILE

## (undated) DEVICE — BNDG ELAS MATRX V/CLS 6IN 5YD LF

## (undated) DEVICE — GOWN,SIRUS,NON REINFRCD XL XLONG: Brand: MEDLINE

## (undated) DEVICE — SLV SCD CALF HEMOFORCE DVT THERP REPROC MD

## (undated) DEVICE — TRAP,MUCUS SPECIMEN,40CC: Brand: MEDLINE

## (undated) DEVICE — THE DISPOSABLE ROTH NET PLATINUM FOOD BOLUS RETRIEVAL DEVICE IS USED IN THE ENDOSCOPIC RETRIEVAL FOOD BOLUS.: Brand: ROTH NET PLATINUM

## (undated) DEVICE — SNARE E/S POLYP ACUSNARE TP/NDL 7F 2.5X5.5CM

## (undated) DEVICE — 2000CC GUARDIAN II: Brand: GUARDIAN

## (undated) DEVICE — KT ORCA VLV SXN AIR/H2O W/SEAL 1P/U STRL

## (undated) DEVICE — PENCL ES MEGADINE EZ/CLEAN BUTN W/HOLSTR 10FT

## (undated) DEVICE — 3M™ STERI-DRAPE™ U-DRAPE 1015: Brand: STERI-DRAPE™

## (undated) DEVICE — GW TIB BALL NOSE 3.0MM 80CM

## (undated) DEVICE — SHEET,DRAPE,70X100,STERILE: Brand: MEDLINE

## (undated) DEVICE — Device: Brand: PROTECTORS, LEG SPAR BALL JOINT, 12/PR

## (undated) DEVICE — PK HIP GEN 20

## (undated) DEVICE — 1000 S-DRAPE TOWEL DRAPE 10/BX: Brand: STERI-DRAPE™

## (undated) DEVICE — TUBING, SUCTION, 1/4" X 12', STRAIGHT: Brand: MEDLINE

## (undated) DEVICE — HANDPIECE SET WITH HIGH FLOW TIP AND SUCTION TUBE: Brand: INTERPULSE

## (undated) DEVICE — PROXIMATE SKIN STAPLERS (35 WIDE) CONTAINS 35 STAINLESS STEEL STAPLES (FIXED HEAD): Brand: PROXIMATE

## (undated) DEVICE — DRSNG SURESITE123 6X8IN

## (undated) DEVICE — JELLY,LUBE,STERILE,FLIP TOP,TUBE,2-OZ: Brand: MEDLINE

## (undated) DEVICE — 3M™ IOBAN™ 2 ANTIMICROBIAL INCISE DRAPE 6650EZ: Brand: IOBAN™ 2

## (undated) DEVICE — DRSNG SURG AQUACEL AG/ADVNTGE 9X30CM 3.5X12IN